# Patient Record
Sex: FEMALE | Race: WHITE | NOT HISPANIC OR LATINO | Employment: OTHER | URBAN - NONMETROPOLITAN AREA
[De-identification: names, ages, dates, MRNs, and addresses within clinical notes are randomized per-mention and may not be internally consistent; named-entity substitution may affect disease eponyms.]

---

## 2017-01-12 ENCOUNTER — TRANSFERRED RECORDS (OUTPATIENT)
Dept: HEALTH INFORMATION MANAGEMENT | Facility: HOSPITAL | Age: 57
End: 2017-01-12

## 2017-01-26 ENCOUNTER — TRANSFERRED RECORDS (OUTPATIENT)
Dept: HEALTH INFORMATION MANAGEMENT | Facility: HOSPITAL | Age: 57
End: 2017-01-26

## 2017-02-01 DIAGNOSIS — F33.9 RECURRENT MAJOR DEPRESSIVE DISORDER, REMISSION STATUS UNSPECIFIED (H): Primary | ICD-10-CM

## 2017-02-01 RX ORDER — PAROXETINE 20 MG/1
TABLET, FILM COATED ORAL
Qty: 90 TABLET | Refills: 0 | Status: SHIPPED | OUTPATIENT
Start: 2017-02-01 | End: 2017-05-02

## 2017-02-01 NOTE — TELEPHONE ENCOUNTER
Paxil 20mg tab     Last Written Prescription Date: 2-1-2016  Last Fill Quantity: 90, # refills: 0  Last Office Visit with [facility] [place] primary care provider:  12-   Next 5 appointments (look out 90 days)     Feb 09, 2017 11:30 AM   (Arrive by 11:15 AM)   SHORT with Henna Cruz MD   [facility] [place] ([facility] [place] [facility])    [address]  [facility] [address]   [phone]            Feb 20, 2017  8:15 AM   (Arrive by 8:00 AM)   SHORT with Henna Cruz MD   [facility] [place] ([facility] [place] [facility])    [address]  [place] [address]   [phone]                   Last PHQ-9 score on record=   PHQ-9 SCORE 10/14/2016   Total Score 4

## 2017-02-09 ENCOUNTER — OFFICE VISIT (OUTPATIENT)
Dept: FAMILY MEDICINE | Facility: OTHER | Age: 57
End: 2017-02-09
Attending: FAMILY MEDICINE
Payer: COMMERCIAL

## 2017-02-09 VITALS
DIASTOLIC BLOOD PRESSURE: 72 MMHG | HEART RATE: 68 BPM | SYSTOLIC BLOOD PRESSURE: 130 MMHG | OXYGEN SATURATION: 98 % | BODY MASS INDEX: 26.32 KG/M2 | RESPIRATION RATE: 20 BRPM | WEIGHT: 163 LBS

## 2017-02-09 DIAGNOSIS — M79.18 MYOFASCIAL PAIN: ICD-10-CM

## 2017-02-09 DIAGNOSIS — M54.2 CHRONIC NECK PAIN: ICD-10-CM

## 2017-02-09 DIAGNOSIS — G43.711 INTRACTABLE CHRONIC MIGRAINE WITHOUT AURA AND WITH STATUS MIGRAINOSUS: ICD-10-CM

## 2017-02-09 DIAGNOSIS — G89.29 CHRONIC NECK PAIN: ICD-10-CM

## 2017-02-09 DIAGNOSIS — Z71.89 ACP (ADVANCE CARE PLANNING): Chronic | ICD-10-CM

## 2017-02-09 DIAGNOSIS — G89.29 CHRONIC RADICULAR CERVICAL PAIN: Primary | ICD-10-CM

## 2017-02-09 DIAGNOSIS — M54.12 CHRONIC RADICULAR CERVICAL PAIN: Primary | ICD-10-CM

## 2017-02-09 PROBLEM — M75.50 SUBACROMIAL BURSITIS: Status: ACTIVE | Noted: 2017-01-26

## 2017-02-09 PROBLEM — M54.40 ACUTE BACK PAIN WITH SCIATICA: Status: ACTIVE | Noted: 2017-01-05

## 2017-02-09 PROBLEM — G43.709 CHRONIC MIGRAINE WITHOUT AURA WITHOUT STATUS MIGRAINOSUS, NOT INTRACTABLE: Status: ACTIVE | Noted: 2017-01-05

## 2017-02-09 PROBLEM — M54.10 RADICULAR PAIN: Status: ACTIVE | Noted: 2017-01-05

## 2017-02-09 PROCEDURE — 99214 OFFICE O/P EST MOD 30 MIN: CPT | Performed by: FAMILY MEDICINE

## 2017-02-09 RX ORDER — HYDROCODONE BITARTRATE AND ACETAMINOPHEN 7.5; 325 MG/1; MG/1
TABLET ORAL
Qty: 60 TABLET | Refills: 0 | Status: SHIPPED | OUTPATIENT
Start: 2017-02-09 | End: 2017-04-11

## 2017-02-09 RX ORDER — GABAPENTIN 300 MG/1
300 CAPSULE ORAL EVERY MORNING
COMMUNITY
End: 2017-04-20

## 2017-02-09 ASSESSMENT — PAIN SCALES - GENERAL: PAINLEVEL: MODERATE PAIN (4)

## 2017-02-09 NOTE — NURSING NOTE
"Chief Complaint   Patient presents with     Forms     Headache     *_* Health Care Directive *_*     declined       Initial /72 mmHg  Pulse 68  Resp 20  Wt 163 lb (73.936 kg)  SpO2 98% Estimated body mass index is 26.32 kg/(m^2) as calculated from the following:    Height as of 12/20/16: 5' 6\" (1.676 m).    Weight as of this encounter: 163 lb (73.936 kg).  Medication Reconciliation: complete   Liudmila Rivera      "

## 2017-02-09 NOTE — PROGRESS NOTES
[facility]    Sadaf Blankenship, 56 year old, female presents with   Chief Complaint   Patient presents with     Forms     forms need to be completed for disability for ongoing cervical radicular pain radiating down the right arm and recurrent severe migraine headaches. She has had 2 previous surgeries of the cervical spine for this pain. repetitive movement such as using a mouse or typing cause pain. She isn't able to lift more than 10# with this arm to due this causing exacerbation of pain. We have tried multiple modalities to treat this and she is now with the chronic pain program at [facility]. She has been receiving trigger point injections, the last of which wore off after 3 days. She continues there with treatment. She has ongoing neck pain as well related to this. She is unable to rotate her neck, flex, extend or bend the neck without pain. She had weakness of the right arm associated with her pain     Headache     headaches occur on average 2 times weekly requiring her to lie down for the remainder of the day. These are caused by stress, loud noises, repetitive noises and fatigue. She is receiving Botox injections for these headaches with Dr Guajardo also at the [facility] chronic pain program. The last given made her headaches worse. She was started on prednisone and had to wear her neck brace.      *_* Health Care Directive *_*     declined       PAST MEDICAL HISTORY:  Past Medical History   Diagnosis Date     Migraine headaches, severe 7/9/2001     sinusitis (chronic) 4/16/2001     Degenerative disc disease, cervical 1/1/2011     Esophageal ulcer 5/26/1998     Bleeding ulcer 5/26/1976     Recurrent UTI 5/26/2011     ASCUS on Pap smear 5/26/2004     negative HPV     Atypical chest pain 5/26/2008     negative cardiolite stress test [facility]     Cervical radicular pain 5/10/2012     Pseudoarthrosis of cervical spine 7/3/2012     Irritable bowel syndrome 1/26/2015       PAST SURGICAL  HISTORY:  Past Surgical History   Procedure Laterality Date     Posterior decompression , fusion c3-5       Psuedoarthrosis     Supracervical hysterectomy with right salpingoophorectomy       Endometriosis     Coronary angiogram, normal       Chest pain     Cervical spine surgery with removal of 2 disks, c5,c7              Fusion discectomy anterior cervical  2011     Nose surgery       x3     Ent surgery       nose surgery x3     Back surgery       cervical     Colonoscopy  10/13/2014     Dr Camargo, internal hemorrhoids     Hc esophagoscopy, diagnostic  10/31/2014     Dr Camargo, mild erythema of antrum, negative biopsies       SOCIAL HISTORY  Social History     Social History     Marital Status:      Spouse Name: N/A     Number of Children: N/A     Years of Education: N/A     Occupational History     architectual resources      Social History Main Topics     Smoking status: Former Smoker     Types: Cigarettes     Smokeless tobacco: Never Used      Comment: quit . no passive exposure     Alcohol Use: No     Drug Use: No     Sexual Activity: Not on file     Other Topics Concern     Blood Transfusions Yes     Caffeine Concern Yes     1 cup daily     Parent/Sibling W/ Cabg, Mi Or Angioplasty Before 65f 55m? Yes     Social History Narrative       MEDICATIONS:  Prior to Admission medications    Medication Sig Start Date End Date Taking? Authorizing Provider   gabapentin (NEURONTIN) 300 MG capsule Take 300 mg by mouth every morning   Yes Reported, Patient   order for DME Equipment being ordered: TENS   Yes Reported, Patient   HYDROcodone-acetaminophen (NORCO) 7.5-325 MG per tablet TAKE 1 TABLET BY MOUTH EVER Y 4 TO 6 HOURS AS NEEDED FO R PAIN 17  Yes Henna Cruz MD   PARoxetine (PAXIL) 20 MG tablet TAKE 1 TABLET (40MG) BY RASHAD TH AT BEDTIME 17  Yes Zoe Herbert MD   DEXILANT 60 MG CPDR CR capsule TAKE 1 CAPSULE (60 MG) BY MOUTH DAILY 16  Yes Henna Cruz MD  "  diazepam (VALIUM) 5 MG tablet TAKE 1 TABLET BY MOUTH EVER Y 6 HOURS AS NEEDED - GENER IC FOR VALIUM 12/20/16  Yes Henna Cruz MD   levofloxacin (LEVAQUIN) 500 MG tablet Take 1 tablet (500 mg) by mouth daily 12/20/16  Yes Henna Cruz MD   baclofen (LIORESAL) 10 MG tablet TAKE 1 TABLET BY MOUTH AT B EDTIME  Patient taking differently: take one tablet in the morning and one tablet at bedtime 12/5/16  Yes Henna Cruz MD   dexamethasone (DECADRON) 4 MG tablet Take 10 mg today, 8 mg tomorrow (11/21/16), 6 mg on 11/22/16 and 4 mg on 11/23/16 then discontinue 11/20/16  Yes Jessica Lux, NP   senna-docusate (SENOKOT-S;PERICOLACE) 8.6-50 MG per tablet Take 1-4 tablets by mouth 6/26/12  Yes Reported, Patient   Misc. Devices MISC Dispense one Cefaly neurostimulator and the electro stimulators with refills 6/13/16  Yes Reported, Patient   sucralfate (CARAFATE) 1 GM tablet Take 1 tablet (1 g) by mouth 4 times daily 9/13/16  Yes Henna Cruz MD   OMEPRAZOLE PO Take 40 mg by mouth 2 times daily   Yes Reported, Patient   topiramate (TOPAMAX) 25 MG tablet TAKE 2 TABLETS BY MOUTH EVERY MORNING & TAKE 2 TABLETS EVERY EVENING. GENERIC FOR TOPAMAX.  Patient taking differently: TAKE 1 TABLETS BY MOUTH EVERY MORNING & TAKE 2 TABLETS EVERY EVENING. GENERIC FOR TOPAMAX. 2/22/16  Yes Henna Cruz MD   GABAPENTIN PO Take 600 mg by mouth At Bedtime    Yes Reported, Patient   Probiotic Product (PROBIOTIC DAILY PO) Take by mouth 2 times daily   Yes Reported, Patient       ALLERGIES:     Allergies   Allergen Reactions     Aspirin Hives     Ibuprofen      Dye in the otc form causes headaches  \"patient states over the counter ibuprofen  bothers her\"     Lansoprazole Other (See Comments) and Visual Disturbance     Changes in eyesight  Prevacid  Change in eyesight     Penicillins Other (See Comments)     pruritis     Red Dye Hives     Bupropion Rash     Bupropion Hcl Hives and Rash     Wellbutrin     Demerol [Meperidine] " Other (See Comments)     Made migraine worse       ROS:  C: NEGATIVE for fever, chills, change in weight  I: NEGATIVE for worrisome rashes, moles or lesions  P: NEGATIVE for changes in mood or affect    EXAM:  /72 mmHg  Pulse 68  Resp 20  Wt 163 lb (73.936 kg)  SpO2 98% Body mass index is 26.32 kg/(m^2).   GENERAL APPEARANCE: alert, no distress and fatigued  ORTHO: Cervical Spine Exam: Inspection: increased cervical lordosis  Tender:  left paracervical muscles, right paracervical muscles, left trapezius muscles, right trapezius muscles, right much worse with significant spasm of the trapezius muscle and para spinous muscles  Non-tender:  none  Range of Motion:  flexion:  painful, 10 degrees, extension: painful, 0 degrees, left lateral bending: painful, 5 degrees, right lateral bending: painful, 10 degrees, left lateral rotation:  painful, 10 degrees, right lateral rotation:  painful, 10 degrees  Strength: flexion:  4/5, extension:  4/5, lateral bendin/5, lateral rotation:  4/5  SKIN: no suspicious lesions or rashes  NEURO: Normal strength and tone, mentation intact and speech normal  PSYCH: mentation appears normal, fatigued and in pain    LABS AND IMAGING:           ASSESSMENT/PLAN:  (M54.12,  G89.29) Chronic radicular cervical pain  (primary encounter diagnosis)  Comment: right   Plan: ongoing pain with weakness. Forms are completed for disablity    (M79.1) Myofascial pain  Comment:   Plan: trapezius and upper back, ongoing, attempting trigger point injections at the chronic pain center at [facility]    (M54.2,  G89.29) Chronic neck pain  Comment:   Plan: HYDROcodone-acetaminophen (NORCO) 7.5-325 MG         per tablet        Renewed for her    (G43.711) Intractable chronic migraine without aura and with status migrainosus  Comment:   Plan: last botox injection didn't work, caused rebound migraine and neck pain    (Z71.89) ACP (advance care planning)  Comment:   Plan: discussed per  staff      Henna Cruz MD  February 9, 2017

## 2017-02-23 ENCOUNTER — TRANSFERRED RECORDS (OUTPATIENT)
Dept: HEALTH INFORMATION MANAGEMENT | Facility: HOSPITAL | Age: 57
End: 2017-02-23

## 2017-02-23 ENCOUNTER — MEDICAL CORRESPONDENCE (OUTPATIENT)
Dept: HEALTH INFORMATION MANAGEMENT | Facility: HOSPITAL | Age: 57
End: 2017-02-23

## 2017-03-03 DIAGNOSIS — K22.10 ULCER OF ESOPHAGUS WITHOUT BLEEDING: ICD-10-CM

## 2017-03-06 RX ORDER — DEXLANSOPRAZOLE 60 MG/1
CAPSULE, DELAYED RELEASE ORAL
Qty: 30 CAPSULE | Refills: 5 | Status: SHIPPED | OUTPATIENT
Start: 2017-03-06 | End: 2017-07-11

## 2017-03-15 ENCOUNTER — OFFICE VISIT (OUTPATIENT)
Dept: FAMILY MEDICINE | Facility: OTHER | Age: 57
End: 2017-03-15
Attending: NURSE PRACTITIONER
Payer: COMMERCIAL

## 2017-03-15 VITALS
DIASTOLIC BLOOD PRESSURE: 86 MMHG | TEMPERATURE: 97.9 F | RESPIRATION RATE: 22 BRPM | WEIGHT: 157 LBS | OXYGEN SATURATION: 98 % | SYSTOLIC BLOOD PRESSURE: 110 MMHG | HEIGHT: 66 IN | BODY MASS INDEX: 25.23 KG/M2 | HEART RATE: 66 BPM

## 2017-03-15 DIAGNOSIS — R53.83 OTHER FATIGUE: ICD-10-CM

## 2017-03-15 DIAGNOSIS — J20.9 ACUTE BRONCHITIS WITH SYMPTOMS > 10 DAYS: Primary | ICD-10-CM

## 2017-03-15 LAB
FLUAV+FLUBV AG SPEC QL: NEGATIVE
FLUAV+FLUBV AG SPEC QL: NORMAL
SPECIMEN SOURCE: NORMAL

## 2017-03-15 PROCEDURE — 99213 OFFICE O/P EST LOW 20 MIN: CPT | Performed by: NURSE PRACTITIONER

## 2017-03-15 PROCEDURE — 87804 INFLUENZA ASSAY W/OPTIC: CPT | Mod: 59 | Performed by: NURSE PRACTITIONER

## 2017-03-15 RX ORDER — BACLOFEN 10 MG/1
10 TABLET ORAL
COMMUNITY
Start: 2017-01-26 | End: 2017-03-15

## 2017-03-15 RX ORDER — MULTIVIT-MIN/IRON/FA/VIT K/LUT 8MG-400MCG
1 TABLET ORAL DAILY
COMMUNITY
Start: 2017-01-06

## 2017-03-15 RX ORDER — TOPIRAMATE 50 MG/1
TABLET, FILM COATED ORAL
COMMUNITY
Start: 2017-01-03 | End: 2022-02-15 | Stop reason: DRUGHIGH

## 2017-03-15 RX ORDER — PREDNISONE 20 MG/1
TABLET ORAL
COMMUNITY
Start: 2017-02-01 | End: 2017-03-15

## 2017-03-15 RX ORDER — PAROXETINE 20 MG/1
20 TABLET, FILM COATED ORAL
COMMUNITY
End: 2017-03-15

## 2017-03-15 RX ORDER — GABAPENTIN 600 MG/1
TABLET ORAL
COMMUNITY
Start: 2017-01-31 | End: 2017-03-15

## 2017-03-15 RX ORDER — BENZONATATE 100 MG/1
100 CAPSULE ORAL 3 TIMES DAILY PRN
Qty: 42 CAPSULE | Refills: 0 | Status: SHIPPED | OUTPATIENT
Start: 2017-03-15 | End: 2017-07-11

## 2017-03-15 RX ORDER — DIAZEPAM 5 MG
5 TABLET ORAL
COMMUNITY
End: 2017-03-15

## 2017-03-15 RX ORDER — AMOXICILLIN AND CLAVULANATE POTASSIUM 500; 125 MG/1; MG/1
1 TABLET, FILM COATED ORAL 2 TIMES DAILY
Qty: 20 TABLET | Refills: 0 | Status: SHIPPED | OUTPATIENT
Start: 2017-03-15 | End: 2017-04-20

## 2017-03-15 ASSESSMENT — PAIN SCALES - GENERAL: PAINLEVEL: MODERATE PAIN (5)

## 2017-03-15 NOTE — PROGRESS NOTES
SUBJECTIVE:                                                    Sadaf Blankenship is a 56 year old female who presents to clinic today for the following health issues:      RESPIRATORY SYMPTOMS      Duration: one week    Description  nasal congestion and chest pressure and SOB    Severity: moderate    Accompanying signs and symptoms: h/a, no fever, chills, hx of uvla    History (predisposing factors):  none    Precipitating or alleviating factors: None    Therapies tried and outcome:  none           Problem list and histories reviewed & adjusted, as indicated.  Additional history: as documented    Patient Active Problem List   Diagnosis     Degenerative disc disease, cervical     Pseudoarthrosis of cervical spine     Cervical pain     Migraine     UTI (urinary tract infection)     Advanced care planning/counseling discussion     Thoracic sprain and strain     Sprain and strain of shoulder and upper arm     Irritable bowel syndrome     Myofascial pain syndrome, cervical     Depression, major     Chronic pain syndrome     Uvulitis     Chronic rhinitis     Madina bullosa     Chronic maxillary sinusitis     Hypertrophy of inferior nasal turbinate     Long uvula     Chronic neck pain     Chronic pain     Chronic tension-type headache, intractable     Migraine aura without headache     History of arthrodesis     Myofascial pain     Disuse syndrome     Intractable chronic migraine without aura and with status migrainosus     Ulcer of esophagus without bleeding     Gastroesophageal reflux disease with esophagitis     Intractable chronic migraine without aura and without status migrainosus     Ulcer of esophagus     Ulnar neuropathy     Chronic radicular cervical pain     Mild episode of recurrent major depressive disorder (H)     Ulnar neuropathy at elbow of left upper extremity     Lumbar radiculopathy     S/P cervical spinal fusion     ACP (advance care planning)     Acute back pain with sciatica     Chronic  migraine without aura without status migrainosus, not intractable     Radicular pain     Subacromial bursitis     Past Surgical History   Procedure Laterality Date     Posterior decompression , fusion c3-5       Psuedoarthrosis     Supracervical hysterectomy with right salpingoophorectomy       Endometriosis     Coronary angiogram, normal       Chest pain     Cervical spine surgery with removal of 2 disks, c5,c7              Fusion discectomy anterior cervical  2011     Nose surgery       x3     Ent surgery       nose surgery x3     Back surgery       cervical     Colonoscopy  10/13/2014     Dr Camargo, internal hemorrhoids     Hc esophagoscopy, diagnostic  10/31/2014     Dr Camargo, mild erythema of antrum, negative biopsies       Social History   Substance Use Topics     Smoking status: Former Smoker     Types: Cigarettes     Smokeless tobacco: Never Used      Comment: quit . no passive exposure     Alcohol use No     Family History   Problem Relation Age of Onset     CANCER Father      lung, also a heavy smoker     DIABETES Mother      HEART DISEASE Mother 58     MI; cause of death; heavy smoker. had first MI at 40     Coronary Artery Disease Mother      Hypertension Mother      CANCER Other      strong history     HEART DISEASE Other      heart disease     HEART DISEASE Brother      x3     Hypertension Brother      DIABETES Brother      Coronary Artery Disease Brother      Hypertension Sister          Current Outpatient Prescriptions   Medication Sig Dispense Refill     Cholecalciferol (VITAMIN D3) 1000 UNITS CAPS Take 1,000 Units by mouth       Multiple Vitamins-Minerals (CENTRUM SILVER ULTRA WOMENS) TABS        topiramate (TOPAMAX) 50 MG tablet TAKE 1 TABLET (50MG) BY MOUTH EVERY MORNING AND 2 TABLETS (100MG) ATNIGHT **NOTE DOSE INCREASE** -DO NOT CRUSH-       DEXILANT 60 MG CPDR CR capsule TAKE 1 CAPSULE (60 MG) BY MOUTH DAILY 30 capsule 5     gabapentin (NEURONTIN) 300 MG capsule Take  "300 mg by mouth every morning       order for DME Equipment being ordered: TENS       HYDROcodone-acetaminophen (NORCO) 7.5-325 MG per tablet TAKE 1 TABLET BY MOUTH EVER Y 4 TO 6 HOURS AS NEEDED FO R PAIN 60 tablet 0     PARoxetine (PAXIL) 20 MG tablet TAKE 1 TABLET (40MG) BY RASHAD TH AT BEDTIME 90 tablet 0     diazepam (VALIUM) 5 MG tablet TAKE 1 TABLET BY MOUTH EVER Y 6 HOURS AS NEEDED - GENER IC FOR VALIUM 100 tablet 0     senna-docusate (SENOKOT-S;PERICOLACE) 8.6-50 MG per tablet Take 1-4 tablets by mouth       Misc. Devices MISC Dispense one Cefaly neurostimulator and the electro stimulators with refills       sucralfate (CARAFATE) 1 GM tablet Take 1 tablet (1 g) by mouth 4 times daily 40 tablet 1     OMEPRAZOLE PO Take 40 mg by mouth 2 times daily       GABAPENTIN PO Take 600 mg by mouth At Bedtime        Probiotic Product (PROBIOTIC DAILY PO) Take by mouth 2 times daily       [DISCONTINUED] diazepam (VALIUM) 5 MG tablet Take 5 mg by mouth Reported on 3/15/2017       [DISCONTINUED] PARoxetine (PAXIL) 20 MG tablet Take 20 mg by mouth Reported on 3/15/2017       baclofen (LIORESAL) 10 MG tablet TAKE 1 TABLET BY MOUTH AT B EDTIME (Patient taking differently: take one tablet in the morning and one tablet at bedtime) 90 tablet 0     [DISCONTINUED] topiramate (TOPAMAX) 25 MG tablet TAKE 2 TABLETS BY MOUTH EVERY MORNING & TAKE 2 TABLETS EVERY EVENING. GENERIC FOR TOPAMAX. (Patient not taking: Reported on 3/15/2017) 360 tablet 1     Allergies   Allergen Reactions     Aspirin Hives     Ibuprofen      Dye in the otc form causes headaches  \"patient states over the counter ibuprofen  bothers her\"     Lansoprazole Other (See Comments) and Visual Disturbance     Changes in eyesight  Prevacid  Change in eyesight     Penicillins Other (See Comments)     pruritis     Red Dye Hives     Bupropion Rash     Bupropion Hcl Hives and Rash     Wellbutrin     Demerol [Meperidine] Other (See Comments)     Made migraine worse " "      Reviewed and updated as needed this visit by clinical staff  Tobacco  Meds  Med Hx  Surg Hx  Fam Hx  Soc Hx      Reviewed and updated as needed this visit by Provider         ROS:  CONSTITUTIONAL:arthralgias, chills, fatigue and malaise  ENT/MOUTH: ear pain bilateral, sinus pressure and tooth pain  RESP:cough-productive and SOB/dyspnea  CV: NEGATIVE for chest pain, palpitations or peripheral edema    OBJECTIVE:                                                    /86 (BP Location: Left arm, Patient Position: Chair, Cuff Size: Adult Regular)  Pulse 66  Temp 97.9  F (36.6  C) (Tympanic)  Resp 22  Ht 5' 6\" (1.676 m)  Wt 157 lb (71.2 kg)  SpO2 98%  BMI 25.34 kg/m2  Body mass index is 25.34 kg/(m^2).   GENERAL: alert and mild distress -slight SOB  HENT: ear canals and TM's normal, nose and mouth without ulcers or lesions  NECK: no adenopathy, no asymmetry, masses, or scars and thyroid normal to palpation  RESP: lungs clear to auscultation - no rales, rhonchi or wheezes  CV: regular rate and rhythm, normal S1 S2, no S3 or S4, no murmur, click or rub, no peripheral edema and peripheral pulses strong  SKIN: no suspicious lesions or rashes  PSYCH: mentation appears normal, affect normal/bright  LYMPH: normal ant/post cervical, supraclavicular nodes    Diagnostic Test Results:  Results for orders placed or performed in visit on 03/15/17 (from the past 24 hour(s))   Influenza A/B antigen   Result Value Ref Range    Influenza A/B Agn Specimen Nares     Influenza A Negative NEG    Influenza B  NEG     Negative   Test results must be correlated with clinical data. If necessary, results   should be confirmed by a molecular assay or viral culture.          ASSESSMENT:                                                        PLAN:                                                    ASSESSMENT / PLAN:  (J20.9) Acute bronchitis with symptoms > 10 days  (primary encounter diagnosis)  Comment:   Plan: "  amoxicillin-clavulanate (AUGMENTIN) 500-125 MG per tablet,  benzonatate (TESSALON) 100 MG capsule   Stay hydrated   rest            (R53.83) Other fatigue  Comment:   Plan:  Influenza A/B antigen - negative              Follow up if no improvement or worsening symptoms          Selene Diana, [facility]

## 2017-03-15 NOTE — PATIENT INSTRUCTIONS
Bronchitis, Antibiotic Treatment (Adult)    Bronchitis is an infection of the air passages (bronchial tubes) in your lungs. It often occurs when you have a cold. This illness is contagious during the first few days and is spread through the air by coughing and sneezing, or by direct contact (touching the sick person and then touching your own eyes, nose, or mouth).  Symptoms of bronchitis include cough with mucus (phlegm) and low-grade fever. Bronchitis usually lasts 7 to 14 days. Mild cases can be treated with simple home remedies. More severe infection is treated with an antibiotic.  Home care  Follow these guidelines when caring for yourself at home:    If your symptoms are severe, rest at home for the first 2 to 3 days. When you go back to your usual activities, don't let yourself get too tired.    Do not smoke. Also avoid being exposed to secondhand smoke.    You may use over-the-counter medicines to control fever or pain, unless another medicine was prescribed. (Note: If you have chronic liver or kidney disease or have ever had a stomach ulcer or gastrointestinal bleeding, talk with your healthcare provider before using these medicines. Also talk to your provider if you are taking medicine to prevent blood clots.) Aspirin should never be given to anyone younger than 18 years of age who is ill with a viral infection or fever. It may cause severe liver or brain damage.    Your appetite may be poor, so a light diet is fine. Avoid dehydration by drinking 6 to 8 glasses of fluids per day (such as water, soft drinks, sports drinks, juices, tea, or soup). Extra fluids will help loosen secretions in the nose and lungs.    Over-the-counter cough, cold, and sore-throat medicines will not shorten the length of the illness, but they may be helpful to reduce symptoms. (Note: Do not use decongestants if you have high blood pressure.)    Finish all antibiotic medicine. Do this even if you are feeling better after only a  few days.  Follow-up care  Follow up with your healthcare provider, or as advised. If you had an X-ray or ECG (electrocardiogram), a specialist will review it. You will be notified of any new findings that may affect your care.  Note: If you are age 65 or older, or if you have a chronic lung disease or condition that affects your immune system, or you smoke, talk to your healthcare provider about having pneumococcal vaccinations and a yearly influenza vaccination (flu shot).  When to seek medical advice  Call your healthcare provider right away if any of these occur:    Fever of 100.4 F (38 C) or higher    Coughing up increased amounts of colored sputum    Weakness, drowsiness, headache, facial pain, ear pain, or a stiff neck   Call 911, or get immediate medical care  Contact emergency services right away if any of these occur.    Coughing up blood    Worsening weakness, drowsiness, headache, or stiff neck    Trouble breathing, wheezing, or pain with breathing    [id number] [employer]. [address] [place] [address], [place] [address]. All rights reserved. This information is not intended as a substitute for professional medical care. Always follow your healthcare professional's instructions.

## 2017-03-15 NOTE — NURSING NOTE
"Chief Complaint   Patient presents with     URI     one week.  Taking care of her brother who is one week postop open heart and current incision infection       Initial /86 (BP Location: Left arm, Patient Position: Chair, Cuff Size: Adult Regular)  Pulse 66  Temp 97.9  F (36.6  C) (Tympanic)  Resp 22  Ht 5' 6\" (1.676 m)  Wt 157 lb (71.2 kg)  SpO2 98%  BMI 25.34 kg/m2 Estimated body mass index is 25.34 kg/(m^2) as calculated from the following:    Height as of this encounter: 5' 6\" (1.676 m).    Weight as of this encounter: 157 lb (71.2 kg).  Medication Reconciliation: complete   Megha Lakhani      "

## 2017-03-15 NOTE — MR AVS SNAPSHOT
After Visit Summary   3/15/2017    Sadaf Blankenship    MRN: [id number]           Patient Information     Date Of Birth          1960        Visit Information        Provider Department      3/15/2017 3:00 PM Selene Diana APRN [facility]        Today's Diagnoses     Acute bronchitis with symptoms > 10 days    -  1    Other fatigue          Care Instructions      Bronchitis, Antibiotic Treatment (Adult)    Bronchitis is an infection of the air passages (bronchial tubes) in your lungs. It often occurs when you have a cold. This illness is contagious during the first few days and is spread through the air by coughing and sneezing, or by direct contact (touching the sick person and then touching your own eyes, nose, or mouth).  Symptoms of bronchitis include cough with mucus (phlegm) and low-grade fever. Bronchitis usually lasts 7 to 14 days. Mild cases can be treated with simple home remedies. More severe infection is treated with an antibiotic.  Home care  Follow these guidelines when caring for yourself at home:    If your symptoms are severe, rest at home for the first 2 to 3 days. When you go back to your usual activities, don't let yourself get too tired.    Do not smoke. Also avoid being exposed to secondhand smoke.    You may use over-the-counter medicines to control fever or pain, unless another medicine was prescribed. (Note: If you have chronic liver or kidney disease or have ever had a stomach ulcer or gastrointestinal bleeding, talk with your healthcare provider before using these medicines. Also talk to your provider if you are taking medicine to prevent blood clots.) Aspirin should never be given to anyone younger than 18 years of age who is ill with a viral infection or fever. It may cause severe liver or brain damage.    Your appetite may be poor, so a light diet is fine. Avoid dehydration by drinking 6 to 8 glasses of fluids per day (such as  water, soft drinks, sports drinks, juices, tea, or soup). Extra fluids will help loosen secretions in the nose and lungs.    Over-the-counter cough, cold, and sore-throat medicines will not shorten the length of the illness, but they may be helpful to reduce symptoms. (Note: Do not use decongestants if you have high blood pressure.)    Finish all antibiotic medicine. Do this even if you are feeling better after only a few days.  Follow-up care  Follow up with your healthcare provider, or as advised. If you had an X-ray or ECG (electrocardiogram), a specialist will review it. You will be notified of any new findings that may affect your care.  Note: If you are age 65 or older, or if you have a chronic lung disease or condition that affects your immune system, or you smoke, talk to your healthcare provider about having pneumococcal vaccinations and a yearly influenza vaccination (flu shot).  When to seek medical advice  Call your healthcare provider right away if any of these occur:    Fever of 100.4 F (38 C) or higher    Coughing up increased amounts of colored sputum    Weakness, drowsiness, headache, facial pain, ear pain, or a stiff neck   Call 911, or get immediate medical care  Contact emergency services right away if any of these occur.    Coughing up blood    Worsening weakness, drowsiness, headache, or stiff neck    Trouble breathing, wheezing, or pain with breathing    9571-9990 [employer]. [address] [place] [address]. All rights reserved. This information is not intended as a substitute for professional medical care. Always follow your healthcare professional's instructions.              Follow-ups after your visit        Your next 10 appointments already scheduled     Apr 20, 2017 10:30 AM CDT   (Arrive by 10:15 AM)   SHORT with Henna Cruz MD   [facility] ([facility])    [facility] [place] [address]   [phone]              Who to contact   "   If you have questions or need follow up information about today's clinic visit or your schedule please contact [facility] [place] directly at [phone].  Normal or non-critical lab and imaging results will be communicated to you by MyChart, letter or phone within 4 business days after the clinic has received the results. If you do not hear from us within 7 days, please contact the clinic through Allocadehart or phone. If you have a critical or abnormal lab result, we will notify you by phone as soon as possible.  Submit refill requests through O-film or call your pharmacy and they will forward the refill request to us. Please allow 3 business days for your refill to be completed.          Additional Information About Your Visit        AllocadeharGreetz Information     O-film lets you send messages to your doctor, view your test results, renew your prescriptions, schedule appointments and more. To sign up, go to [email or web address] . Click on \"Log in\" on the left side of the screen, which will take you to the Welcome page. Then click on \"Sign up Now\" on the right side of the page.     You will be asked to enter the access code listed below, as well as some personal information. Please follow the directions to create your username and password.     Your access code is: [email or web address]  Expires: 3/20/2017 10:16 AM     Your access code will  in 90 days. If you need help or a new code, please call your [facility] clinic or [phone].        Care EveryWhere ID     This is your Care EveryWhere ID. This could be used by other organizations to access your [facility] medical records  [id number]        Your Vitals Were     Pulse Temperature Respirations Height Pulse Oximetry BMI (Body Mass Index)    66 97.9  F (36.6  C) (Tympanic) 22 5' 6\" (1.676 m) 98% 25.34 kg/m2       Blood Pressure from Last 3 Encounters:   03/15/17 110/86   17 130/72   16 118/76    Weight from Last 3 Encounters:   03/15/17 157 lb (71.2 " kg)   02/09/17 163 lb (73.9 kg)   12/20/16 163 lb (73.9 kg)              We Performed the Following     Influenza A/B antigen          Today's Medication Changes          These changes are accurate as of: 3/15/17  4:18 PM.  If you have any questions, ask your nurse or doctor.               Start taking these medicines.        Dose/Directions    amoxicillin-clavulanate 500-125 MG per tablet   Commonly known as:  AUGMENTIN   Used for:  Acute bronchitis with symptoms > 10 days   Started by:  Selene Diana APRN CNP        Dose:  1 tablet   Take 1 tablet by mouth 2 times daily   Quantity:  20 tablet   Refills:  0       benzonatate 100 MG capsule   Commonly known as:  TESSALON   Used for:  Acute bronchitis with symptoms > 10 days   Started by:  Selene Diana APRN CNP        Dose:  100 mg   Take 1 capsule (100 mg) by mouth 3 times daily as needed for cough   Quantity:  42 capsule   Refills:  0         These medicines have changed or have updated prescriptions.        Dose/Directions    baclofen 10 MG tablet   Commonly known as:  LIORESAL   This may have changed:  See the new instructions.   Used for:  Back muscle spasm        TAKE 1 TABLET BY MOUTH AT B EDTIME   Quantity:  90 tablet   Refills:  0            Where to get your medicines      These medications were sent to [id number] - [place], [place] - [address]  [address] [place] [address] [address]     Phone:  [phone]     amoxicillin-clavulanate 500-125 MG per tablet    benzonatate 100 MG capsule                Primary Care Provider Office Phone # Fax #    Henna Cruz [phone]       [facility] [place] [address] [facility]  [place] [address]        Thank you!     Thank you for choosing [facility] [place]  for your care. Our goal is always to provide you with excellent care. Hearing back from our patients is one way we can continue to improve our services. Please take a few minutes to complete the written  survey that you may receive in the mail after your visit with us. Thank you!             Your Updated Medication List - Protect others around you: Learn how to safely use, store and throw away your medicines at www.disposemymeds.org.          This list is accurate as of: 3/15/17  4:18 PM.  Always use your most recent med list.                   Brand Name Dispense Instructions for use    amoxicillin-clavulanate 500-125 MG per tablet    AUGMENTIN    20 tablet    Take 1 tablet by mouth 2 times daily       baclofen 10 MG tablet    LIORESAL    90 tablet    TAKE 1 TABLET BY MOUTH AT B EDTIME       benzonatate 100 MG capsule    TESSALON    42 capsule    Take 1 capsule (100 mg) by mouth 3 times daily as needed for cough       CENTRUM SILVER ULTRA WOMENS Tabs          DEXILANT 60 MG Cpdr CR capsule   Generic drug:  dexlansoprazole     30 capsule    TAKE 1 CAPSULE (60 MG) BY MOUTH DAILY       diazepam 5 MG tablet    VALIUM    100 tablet    TAKE 1 TABLET BY MOUTH EVER Y 6 HOURS AS NEEDED - GENER IC FOR VALIUM       * GABAPENTIN PO      Take 600 mg by mouth At Bedtime       * gabapentin 300 MG capsule    NEURONTIN     Take 300 mg by mouth every morning       HYDROcodone-acetaminophen 7.5-325 MG per tablet    NORCO    60 tablet    TAKE 1 TABLET BY MOUTH EVER Y 4 TO 6 HOURS AS NEEDED FO R PAIN       Misc. Devices Misc      Dispense one Cefaly neurostimulator and the electro stimulators with refills       OMEPRAZOLE PO      Take 40 mg by mouth 2 times daily       order for DME      Equipment being ordered: TENS       PARoxetine 20 MG tablet    PAXIL    90 tablet    TAKE 1 TABLET (40MG) BY RASHAD TH AT BEDTIME       PROBIOTIC DAILY PO      Take by mouth 2 times daily       senna-docusate 8.6-50 MG per tablet    SENOKOT-S;PERICOLACE     Take 1-4 tablets by mouth       sucralfate 1 GM tablet    CARAFATE    40 tablet    Take 1 tablet (1 g) by mouth 4 times daily       topiramate 50 MG tablet    TOPAMAX     TAKE 1 TABLET (50MG) BY MOUTH  EVERY MORNING AND 2 TABLETS (100MG) ATNIGHT **NOTE DOSE INCREASE** -DO NOT CRUSH-       vitamin D3 1000 UNITS Caps      Take 1,000 Units by mouth       * Notice:  This list has 2 medication(s) that are the same as other medications prescribed for you. Read the directions carefully, and ask your doctor or other care provider to review them with you.

## 2017-04-02 DIAGNOSIS — K21.00 GASTROESOPHAGEAL REFLUX DISEASE WITH ESOPHAGITIS: ICD-10-CM

## 2017-04-04 NOTE — TELEPHONE ENCOUNTER
Prilosec      Last Written Prescription Date: 9/13/16  Last Fill Quantity: 90,  # refills: 1   Last Office Visit with [facility], [facility] or [facility] prescribing provider: 3/15/16                                         Next 5 appointments (look out 90 days)     Apr 20, 2017 10:30 AM CDT   (Arrive by 10:15 AM)   SHORT with Henna Cruz MD   [facility] ([facility])    [address] [place] [address] [facility] [address]   [phone]

## 2017-04-05 RX ORDER — OMEPRAZOLE 40 MG/1
CAPSULE, DELAYED RELEASE ORAL
Qty: 30 CAPSULE | Refills: 10 | Status: SHIPPED | OUTPATIENT
Start: 2017-04-05 | End: 2017-09-21

## 2017-04-06 ENCOUNTER — TRANSFERRED RECORDS (OUTPATIENT)
Dept: HEALTH INFORMATION MANAGEMENT | Facility: HOSPITAL | Age: 57
End: 2017-04-06

## 2017-04-11 DIAGNOSIS — M54.2 CHRONIC NECK PAIN: ICD-10-CM

## 2017-04-11 DIAGNOSIS — G89.29 CHRONIC NECK PAIN: ICD-10-CM

## 2017-04-11 NOTE — TELEPHONE ENCOUNTER
blueco      Last Written Prescription Date: 2/9/17  Last Fill Quantity: 60,  # refills: 0   Last Office Visit with G, [facility] or [facility] prescribing provider: 3/15/17                                         Next 5 appointments (look out 90 days)     Apr 20, 2017 10:30 AM CDT   (Arrive by 10:15 AM)   SHORT with Henna Cruz MD   [facility] [place] [facility] [place] [facility]    [address] [place] [address] [place]  [place] [address]   [phone]

## 2017-04-12 RX ORDER — HYDROCODONE BITARTRATE AND ACETAMINOPHEN 7.5; 325 MG/1; MG/1
TABLET ORAL
Qty: 60 TABLET | Refills: 0 | Status: SHIPPED | OUTPATIENT
Start: 2017-04-12 | End: 2017-07-11

## 2017-04-12 NOTE — TELEPHONE ENCOUNTER
Pt notified that the written RX is ready at the [facility] [place]  registration to be picked up.

## 2017-04-20 ENCOUNTER — OFFICE VISIT (OUTPATIENT)
Dept: FAMILY MEDICINE | Facility: OTHER | Age: 57
End: 2017-04-20
Attending: FAMILY MEDICINE
Payer: COMMERCIAL

## 2017-04-20 VITALS
BODY MASS INDEX: 25.23 KG/M2 | OXYGEN SATURATION: 98 % | HEIGHT: 66 IN | HEART RATE: 65 BPM | RESPIRATION RATE: 20 BRPM | DIASTOLIC BLOOD PRESSURE: 68 MMHG | WEIGHT: 157 LBS | SYSTOLIC BLOOD PRESSURE: 112 MMHG

## 2017-04-20 DIAGNOSIS — R07.89 OTHER CHEST PAIN: ICD-10-CM

## 2017-04-20 DIAGNOSIS — G43.711 INTRACTABLE CHRONIC MIGRAINE WITHOUT AURA AND WITH STATUS MIGRAINOSUS: ICD-10-CM

## 2017-04-20 DIAGNOSIS — G89.29 CHRONIC RADICULAR CERVICAL PAIN: Primary | ICD-10-CM

## 2017-04-20 DIAGNOSIS — M54.12 CHRONIC RADICULAR CERVICAL PAIN: Primary | ICD-10-CM

## 2017-04-20 LAB
BUN SERPL-MCNC: 10 MG/DL (ref 7–30)
CREAT SERPL-MCNC: 1.03 MG/DL (ref 0.52–1.04)
GFR SERPL CREATININE-BSD FRML MDRD: 55 ML/MIN/1.7M2

## 2017-04-20 PROCEDURE — 82565 ASSAY OF CREATININE: CPT | Performed by: FAMILY MEDICINE

## 2017-04-20 PROCEDURE — 84520 ASSAY OF UREA NITROGEN: CPT | Performed by: FAMILY MEDICINE

## 2017-04-20 PROCEDURE — 99214 OFFICE O/P EST MOD 30 MIN: CPT | Performed by: FAMILY MEDICINE

## 2017-04-20 PROCEDURE — 36415 COLL VENOUS BLD VENIPUNCTURE: CPT | Performed by: FAMILY MEDICINE

## 2017-04-20 RX ORDER — GABAPENTIN 300 MG/1
600 CAPSULE ORAL EVERY MORNING
Qty: 90 CAPSULE | Refills: 0 | COMMUNITY
Start: 2017-04-20 | End: 2017-11-27 | Stop reason: ALTCHOICE

## 2017-04-20 RX ORDER — RIZATRIPTAN BENZOATE 10 MG/1
10 TABLET ORAL
COMMUNITY
End: 2018-02-26

## 2017-04-20 RX ORDER — ONDANSETRON 4 MG/1
4 TABLET, ORALLY DISINTEGRATING ORAL EVERY 8 HOURS PRN
COMMUNITY
End: 2017-11-27

## 2017-04-20 ASSESSMENT — ANXIETY QUESTIONNAIRES
IF YOU CHECKED OFF ANY PROBLEMS ON THIS QUESTIONNAIRE, HOW DIFFICULT HAVE THESE PROBLEMS MADE IT FOR YOU TO DO YOUR WORK, TAKE CARE OF THINGS AT HOME, OR GET ALONG WITH OTHER PEOPLE: NOT DIFFICULT AT ALL
1. FEELING NERVOUS, ANXIOUS, OR ON EDGE: MORE THAN HALF THE DAYS
GAD7 TOTAL SCORE: 9
6. BECOMING EASILY ANNOYED OR IRRITABLE: SEVERAL DAYS
5. BEING SO RESTLESS THAT IT IS HARD TO SIT STILL: SEVERAL DAYS
2. NOT BEING ABLE TO STOP OR CONTROL WORRYING: SEVERAL DAYS
7. FEELING AFRAID AS IF SOMETHING AWFUL MIGHT HAPPEN: NOT AT ALL
3. WORRYING TOO MUCH ABOUT DIFFERENT THINGS: MORE THAN HALF THE DAYS

## 2017-04-20 ASSESSMENT — PATIENT HEALTH QUESTIONNAIRE - PHQ9: 5. POOR APPETITE OR OVEREATING: MORE THAN HALF THE DAYS

## 2017-04-20 ASSESSMENT — PAIN SCALES - GENERAL: PAINLEVEL: EXTREME PAIN (8)

## 2017-04-20 NOTE — NURSING NOTE
"Chief Complaint   Patient presents with     Neck Pain     follow up on chronic neck pain. Pain is high today       Initial /68  Pulse 65  Resp 20  Ht 5' 6\" (1.676 m)  Wt 157 lb (71.2 kg)  SpO2 98%  BMI 25.34 kg/m2 Estimated body mass index is 25.34 kg/(m^2) as calculated from the following:    Height as of this encounter: 5' 6\" (1.676 m).    Weight as of this encounter: 157 lb (71.2 kg).  Medication Reconciliation: complete   Liudmila Rivera      "

## 2017-04-20 NOTE — MR AVS SNAPSHOT
"              After Visit Summary   4/20/2017    Sadaf Blankenship    MRN: [id number]           Patient Information     Date Of Birth          1960        Visit Information        Provider Department      4/20/2017 10:30 AM Henna Cruz MD [facility] [place]        Today's Diagnoses     Intractable chronic migraine without aura and with status migrainosus    -  1    Chronic migraine without aura without status migrainosus, not intractable        Other chest pain           Follow-ups after your visit        Follow-up notes from your care team     Return in about 2 months (around 6/20/2017).      Your next 10 appointments already scheduled     Jul 11, 2017 10:45 AM CDT   (Arrive by 10:30 AM)   SHORT with Henna Cruz MD   [facility] ([facility] [place] [facility])    [address]  [place] [address]   [phone]              Who to contact     If you have questions or need follow up information about today's clinic visit or your schedule please contact [facility] at [phone].  Normal or non-critical lab and imaging results will be communicated to you by MyChart, letter or phone within 4 business days after the clinic has received the results. If you do not hear from us within 7 days, please contact the clinic through MyChart or phone. If you have a critical or abnormal lab result, we will notify you by phone as soon as possible.  Submit refill requests through BUILD or call your pharmacy and they will forward the refill request to us. Please allow 3 business days for your refill to be completed.          Additional Information About Your Visit        Eagle Alphahart Information     BUILD lets you send messages to your doctor, view your test results, renew your prescriptions, schedule appointments and more. To sign up, go to [email or web address] . Click on \"Log in\" on the left side of the screen, which will take you to the Welcome page. Then click on \"Sign up " "Now\" on the right side of the page.     You will be asked to enter the access code listed below, as well as some personal information. Please follow the directions to create your username and password.     Your access code is: [email or web address]  Expires: 2017 11:09 AM     Your access code will  in 90 days. If you need help or a new code, please call your [facility] clinic or [phone].        Care EveryWhere ID     This is your Care EveryWhere ID. This could be used by other organizations to access your [facility] medical records  [id number]        Your Vitals Were     Pulse Respirations Height Pulse Oximetry BMI (Body Mass Index)       65 20 5' 6\" (1.676 m) 98% 25.34 kg/m2        Blood Pressure from Last 3 Encounters:   17 112/68   03/15/17 110/86   17 130/72    Weight from Last 3 Encounters:   17 157 lb (71.2 kg)   03/15/17 157 lb (71.2 kg)   17 163 lb (73.9 kg)              We Performed the Following     Creatinine     CT Chest w Contrast     Urea nitrogen          Today's Medication Changes          These changes are accurate as of: 17 11:12 AM.  If you have any questions, ask your nurse or doctor.               These medicines have changed or have updated prescriptions.        Dose/Directions    baclofen 10 MG tablet   Commonly known as:  LIORESAL   This may have changed:  See the new instructions.   Used for:  Back muscle spasm        TAKE 1 TABLET BY MOUTH AT B EDTIME   Quantity:  90 tablet   Refills:  0       gabapentin 300 MG capsule   Commonly known as:  NEURONTIN   This may have changed:    - how much to take  - Another medication with the same name was removed. Continue taking this medication, and follow the directions you see here.   Changed by:  Henna Cruz MD        Dose:  600 mg   Take 2 capsules (600 mg) by mouth every morning   Quantity:  90 capsule   Refills:  0                Primary Care Provider Office Phone # Fax #    Henna Cruz [phone] " [phone]       [facility] [place] [address]  [place] [address]        Thank you!     Thank you for choosing [facility]  for your care. Our goal is always to provide you with excellent care. Hearing back from our patients is one way we can continue to improve our services. Please take a few minutes to complete the written survey that you may receive in the mail after your visit with us. Thank you!             Your Updated Medication List - Protect others around you: Learn how to safely use, store and throw away your medicines at www.disposemymeds.org.          This list is accurate as of: 4/20/17 11:12 AM.  Always use your most recent med list.                   Brand Name Dispense Instructions for use    baclofen 10 MG tablet    LIORESAL    90 tablet    TAKE 1 TABLET BY MOUTH AT B EDTIME       benzonatate 100 MG capsule    TESSALON    42 capsule    Take 1 capsule (100 mg) by mouth 3 times daily as needed for cough       CENTRUM SILVER ULTRA WOMENS Tabs          DEXILANT 60 MG Cpdr CR capsule   Generic drug:  dexlansoprazole     30 capsule    TAKE 1 CAPSULE (60 MG) BY MOUTH DAILY       diazepam 5 MG tablet    VALIUM    100 tablet    TAKE 1 TABLET BY MOUTH EVER Y 6 HOURS AS NEEDED - GENER IC FOR VALIUM       gabapentin 300 MG capsule    NEURONTIN    90 capsule    Take 2 capsules (600 mg) by mouth every morning       HYDROcodone-acetaminophen 7.5-325 MG per tablet    NORCO    60 tablet    TAKE 1 TABLET BY MOUTH EVER Y 4 TO 6 HOURS AS NEEDED FO R PAIN       MAXALT 10 MG tablet   Generic drug:  rizatriptan      Take 10 mg by mouth at onset of headache for migraine       Misc. Devices Misc      Dispense one Cefaly neurostimulator and the electro stimulators with refills       * OMEPRAZOLE PO      Take 40 mg by mouth 2 times daily       * omeprazole 40 MG capsule    priLOSEC    30 capsule    TAKE 1 CAPSULE (40 MG) BY MOUTH DAILY TAKE 30-60 MINUTES BEFORE A MEAL.       ondansetron 4 MG ODT tab     ZOFRAN-ODT     Take 4 mg by mouth every 8 hours as needed for nausea       order for DME      Equipment being ordered: TENS       PARoxetine 20 MG tablet    PAXIL    90 tablet    TAKE 1 TABLET (40MG) BY RASHAD TH AT BEDTIME       PROBIOTIC DAILY PO      Take by mouth 2 times daily       senna-docusate 8.6-50 MG per tablet    SENOKOT-S;PERICOLACE     Take 1-4 tablets by mouth       sucralfate 1 GM tablet    CARAFATE    40 tablet    Take 1 tablet (1 g) by mouth 4 times daily       topiramate 50 MG tablet    TOPAMAX     Take 2 tablets in the morning and 2 tablets at night       vitamin D3 1000 UNITS Caps      Take 1,000 Units by mouth       * Notice:  This list has 2 medication(s) that are the same as other medications prescribed for you. Read the directions carefully, and ask your doctor or other care provider to review them with you.

## 2017-04-21 ENCOUNTER — HOSPITAL ENCOUNTER (OUTPATIENT)
Dept: CT IMAGING | Facility: HOSPITAL | Age: 57
Discharge: HOME | End: 2017-04-21
Attending: FAMILY MEDICINE | Admitting: FAMILY MEDICINE
Payer: COMMERCIAL

## 2017-04-21 PROCEDURE — 71260 CT THORAX DX C+: CPT | Mod: TC

## 2017-04-21 RX ORDER — IOPAMIDOL 612 MG/ML
100 INJECTION, SOLUTION INTRAVASCULAR ONCE
Status: COMPLETED | OUTPATIENT
Start: 2017-04-21 | End: 2017-04-21

## 2017-04-21 RX ADMIN — IOPAMIDOL 100 ML: 612 INJECTION, SOLUTION INTRAVASCULAR at 10:02

## 2017-04-21 ASSESSMENT — ANXIETY QUESTIONNAIRES: GAD7 TOTAL SCORE: 9

## 2017-04-21 ASSESSMENT — PATIENT HEALTH QUESTIONNAIRE - PHQ9: SUM OF ALL RESPONSES TO PHQ QUESTIONS 1-9: 5

## 2017-04-21 NOTE — PROGRESS NOTES
[facility]    Sadaf MCMAHAN Latendresseduar, 56 year old, female presents with   Chief Complaint   Patient presents with     Neck Pain     follow up on chronic neck pain. Pain is elevaed today. She is working [facility] and just had her Topamax increased along with Gabapentin to 600 mg bid and baclafen to 10 mg tid. . She is using Maxalt  with Zofran for migraines which hasn't worked in the past but she is trying again. She is concerned with her right shoulder pain radiating into her right chest as her father had similar pain which was treated but turned out to be lung cancer. She is a former smoker. No cough. Her last Botox shot worked too well causing neck weakness where she had to wear her neck brace for a time. She didn't feel it helped the pain.        PAST MEDICAL HISTORY:  Past Medical History:   Diagnosis Date     ASCUS on Pap smear 2004    negative HPV     Atypical chest pain 2008    negative cardiolite stress test [facility]     Bleeding ulcer 1976     Cervical radicular pain 5/10/2012     Degenerative disc disease, cervical 2011     Esophageal ulcer 1998     Irritable bowel syndrome 2015     Migraine headaches, severe 2001     Pseudoarthrosis of cervical spine 7/3/2012     Recurrent UTI 2011     sinusitis (chronic) 2001       PAST SURGICAL HISTORY:  Past Surgical History:   Procedure Laterality Date     BACK SURGERY      cervical     Cervical spine surgery with removal of 2 disks, C5,C7       COLONOSCOPY  10/13/2014    Dr Camargo, internal hemorrhoids     Coronary angiogram, normal      Chest pain     ENT SURGERY      nose surgery x3     fusion discectomy anterior cervical       HC ESOPHAGOSCOPY, DIAGNOSTIC  10/31/2014    Dr Camargo, mild erythema of antrum, negative biopsies     NOSE SURGERY      x3            Posterior decompression , fusion C3-5      Psuedoarthrosis     Supracervical hysterectomy with right  salpingoophorectomy  2002    Endometriosis       SOCIAL HISTORY  Social History     Social History     Marital status:      Spouse name: N/A     Number of children: N/A     Years of education: N/A     Occupational History     architectual resources      Social History Main Topics     Smoking status: Former Smoker     Types: Cigarettes     Smokeless tobacco: Never Used      Comment: quit 2002. no passive exposure     Alcohol use No     Drug use: No     Sexual activity: Not on file     Other Topics Concern     Blood Transfusions Yes     Caffeine Concern Yes     1 cup daily     Parent/Sibling W/ Cabg, Mi Or Angioplasty Before 65f 55m? Yes     Social History Narrative       MEDICATIONS:  Prior to Admission medications    Medication Sig Start Date End Date Taking? Authorizing Provider   ondansetron (ZOFRAN-ODT) 4 MG ODT tab Take 4 mg by mouth every 8 hours as needed for nausea   Yes Reported, Patient   rizatriptan (MAXALT) 10 MG tablet Take 10 mg by mouth at onset of headache for migraine   Yes Reported, Patient   gabapentin (NEURONTIN) 300 MG capsule Take 2 capsules (600 mg) by mouth every morning 4/20/17  Yes Henna Cruz MD   HYDROcodone-acetaminophen (NORCO) 7.5-325 MG per tablet TAKE 1 TABLET BY MOUTH EVER Y 4 TO 6 HOURS AS NEEDED FO R PAIN 4/12/17  Yes Anthony Hays MD   omeprazole (PRILOSEC) 40 MG capsule TAKE 1 CAPSULE (40 MG) BY MOUTH DAILY TAKE 30-60 MINUTES BEFORE A MEAL. 4/5/17  Yes Paula Leary PA   Cholecalciferol (VITAMIN D3) 1000 UNITS CAPS Take 1,000 Units by mouth 1/6/17  Yes Reported, Patient   Multiple Vitamins-Minerals (CENTRUM SILVER ULTRA WOMENS) TABS  1/6/17  Yes Reported, Patient   topiramate (TOPAMAX) 50 MG tablet Take 2 tablets in the morning and 2 tablets at night 1/3/17  Yes Reported, Patient   benzonatate (TESSALON) 100 MG capsule Take 1 capsule (100 mg) by mouth 3 times daily as needed for cough 3/15/17  Yes Selene Diana APRN CNP   DEXILANT 60 MG CPDR CR  "capsule TAKE 1 CAPSULE (60 MG) BY MOUTH DAILY 3/6/17  Yes Henna Cruz MD   order for DME Equipment being ordered: TENS   Yes Reported, Patient   PARoxetine (PAXIL) 20 MG tablet TAKE 1 TABLET (40MG) BY RASHAD TH AT BEDTIME 2/1/17  Yes Zoe Herbert MD   diazepam (VALIUM) 5 MG tablet TAKE 1 TABLET BY MOUTH EVER Y 6 HOURS AS NEEDED - GENER IC FOR VALIUM 12/20/16  Yes Henna Cruz MD   baclofen (LIORESAL) 10 MG tablet TAKE 1 TABLET BY MOUTH AT B EDTIME  Patient taking differently: take one tablet in the morning and one tablet at bedtime 12/5/16  Yes Henna Cruz MD   senna-docusate (SENOKOT-S;PERICOLACE) 8.6-50 MG per tablet Take 1-4 tablets by mouth 6/26/12  Yes Reported, Patient   Misc. Devices MISC Dispense one Cefaly neurostimulator and the electro stimulators with refills 6/13/16  Yes Reported, Patient   sucralfate (CARAFATE) 1 GM tablet Take 1 tablet (1 g) by mouth 4 times daily 9/13/16  Yes Henna Cruz MD   OMEPRAZOLE PO Take 40 mg by mouth 2 times daily   Yes Reported, Patient   Probiotic Product (PROBIOTIC DAILY PO) Take by mouth 2 times daily   Yes Reported, Patient       ALLERGIES:     Allergies   Allergen Reactions     Aspirin Hives     Ibuprofen      Dye in the otc form causes headaches  \"patient states over the counter ibuprofen  bothers her\"     Lansoprazole Other (See Comments) and Visual Disturbance     Changes in eyesight  Prevacid  Change in eyesight     Penicillins Other (See Comments)     pruritis     Red Dye Hives     Bupropion Rash     Bupropion Hcl Hives and Rash     Wellbutrin     Demerol [Meperidine] Other (See Comments)     Made migraine worse       ROS:  C: NEGATIVE for fever, chills, change in weight  R: NEGATIVE for significant cough or SOB  CV: NEGATIVE for chest pain, palpitations or peripheral edema  GI: NEGATIVE for nausea, abdominal pain, heartburn, or change in bowel habits  N: NEGATIVE for weakness, dizziness or paresthesias  P: NEGATIVE for changes in mood or " "affect    EXAM:  /68  Pulse 65  Resp 20  Ht 5' 6\" (1.676 m)  Wt 157 lb (71.2 kg)  SpO2 98%  BMI 25.34 kg/m2 Body mass index is 25.34 kg/(m^2).   GENERAL APPEARANCE: alert and fatigued  ORTHO: Cervical Spine Exam: Inspection: increased cervical lordosis  Tender:  left paracervical muscles, right paracervical muscles, left trapezius muscles, right trapezius muscles  Non-tender:  spinous processes  Range of Motion:  flexion:  decreased, painful, extension: decreased, painful, 10 degrees, left lateral rotation:  decreased, painful, 20 degrees, right lateral rotation:  decreased, painful, 10 degrees  Strength: Full strength of all neck muscles  SKIN: no suspicious lesions or rashes  NEURO: Normal strength and tone, mentation intact and speech normal  PSYCH: mentation appears normal and affect normal/bright    LABS AND IMAGING:     Results for orders placed or performed in visit on 04/20/17   Creatinine   Result Value Ref Range    Creatinine 1.03 0.52 - 1.04 mg/dL    GFR Estimate 55 (L) >60 mL/min/1.7m2    GFR Estimate If Black 67 >60 mL/min/1.7m2   Urea nitrogen   Result Value Ref Range    Urea Nitrogen 10 7 - 30 mg/dL         ASSESSMENT/PLAN:  (M54.12,  G89.29) Chronic radicular cervical pain  (primary encounter diagnosis)  Comment:   Plan: continue with [facility]. She prefers to avoid narcotics and is getting lortab sparingly. Recheck here in 2 months    (G43.711) Intractable chronic migraine without aura and with status migrainosus  Comment:   Plan: continue with Maxalt and Zofran to see if these are helpful    (R07.89) Other chest pain  Comment:   Plan: CT Chest w Contrast, Creatinine, Urea nitrogen        Right upper chest pain. Will check CT to rule out other pathology and check renal function as she recently had contrast with an MRI of the cervical spine done in [place]      Henna Cruz MD  April 21, 2017          "

## 2017-04-22 PROBLEM — J43.1 PANLOBULAR EMPHYSEMA (H): Status: ACTIVE | Noted: 2017-04-22

## 2017-05-02 DIAGNOSIS — F33.9 RECURRENT MAJOR DEPRESSIVE DISORDER, REMISSION STATUS UNSPECIFIED (H): ICD-10-CM

## 2017-05-05 RX ORDER — PAROXETINE 20 MG/1
TABLET, FILM COATED ORAL
Qty: 90 TABLET | Refills: 0 | Status: SHIPPED | OUTPATIENT
Start: 2017-05-05 | End: 2017-07-31

## 2017-05-18 ENCOUNTER — TELEPHONE (OUTPATIENT)
Dept: FAMILY MEDICINE | Facility: OTHER | Age: 57
End: 2017-05-18

## 2017-05-18 NOTE — TELEPHONE ENCOUNTER
1:19 PM    Reason for Call: Phone Call    Description: Pt called and stated she went to  her dexilant from the the pharmacy but because of her new ins, they will not cover it. Stated she needs ASAP as she will be going out of town on Sun. Please call her back at [phone]    Was an appointment offered for this call? Yes    Preferred method for responding to this message: Telephone Call    If we cannot reach you directly, may we leave a detailed response at the number you provided? Yes    Can this message wait until your PCP/provider returns, if available today? Not applicable    Noemy Ty

## 2017-05-18 NOTE — TELEPHONE ENCOUNTER
Pt notified fax just received from the pharmacy and will start the PA today. Will call her once I know this is approved or denied.

## 2017-05-19 ENCOUNTER — HOSPITAL ENCOUNTER (EMERGENCY)
Facility: HOSPITAL | Age: 57
Discharge: HOME | End: 2017-05-19
Attending: FAMILY MEDICINE | Admitting: FAMILY MEDICINE
Payer: COMMERCIAL

## 2017-05-19 VITALS
HEART RATE: 79 BPM | TEMPERATURE: 98.3 F | OXYGEN SATURATION: 98 % | RESPIRATION RATE: 16 BRPM | SYSTOLIC BLOOD PRESSURE: 105 MMHG | DIASTOLIC BLOOD PRESSURE: 70 MMHG

## 2017-05-19 DIAGNOSIS — N20.1 CALCULUS OF URETER: ICD-10-CM

## 2017-05-19 LAB
ALBUMIN UR-MCNC: NEGATIVE MG/DL
ANION GAP SERPL CALCULATED.3IONS-SCNC: 14 MMOL/L (ref 3–14)
APPEARANCE UR: CLEAR
BACTERIA #/AREA URNS HPF: ABNORMAL /HPF
BASOPHILS # BLD AUTO: 0 10E9/L (ref 0–0.2)
BASOPHILS NFR BLD AUTO: 0.3 %
BILIRUB UR QL STRIP: NEGATIVE
BUN SERPL-MCNC: 13 MG/DL (ref 7–30)
CALCIUM SERPL-MCNC: 8.6 MG/DL (ref 8.5–10.1)
CHLORIDE SERPL-SCNC: 106 MMOL/L (ref 94–109)
CO2 SERPL-SCNC: 18 MMOL/L (ref 20–32)
COLOR UR AUTO: ABNORMAL
CREAT SERPL-MCNC: 1.1 MG/DL (ref 0.52–1.04)
CRP SERPL-MCNC: <2.9 MG/L (ref 0–8)
DIFFERENTIAL METHOD BLD: NORMAL
EOSINOPHIL # BLD AUTO: 0.1 10E9/L (ref 0–0.7)
EOSINOPHIL NFR BLD AUTO: 0.9 %
ERYTHROCYTE [DISTWIDTH] IN BLOOD BY AUTOMATED COUNT: 12.3 % (ref 10–15)
GFR SERPL CREATININE-BSD FRML MDRD: 51 ML/MIN/1.7M2
GLUCOSE SERPL-MCNC: 85 MG/DL (ref 70–99)
GLUCOSE UR STRIP-MCNC: NEGATIVE MG/DL
HCT VFR BLD AUTO: 37.9 % (ref 35–47)
HGB BLD-MCNC: 13.5 G/DL (ref 11.7–15.7)
HGB UR QL STRIP: ABNORMAL
IMM GRANULOCYTES # BLD: 0 10E9/L (ref 0–0.4)
IMM GRANULOCYTES NFR BLD: 0.3 %
KETONES UR STRIP-MCNC: NEGATIVE MG/DL
LEUKOCYTE ESTERASE UR QL STRIP: ABNORMAL
LYMPHOCYTES # BLD AUTO: 1.3 10E9/L (ref 0.8–5.3)
LYMPHOCYTES NFR BLD AUTO: 14.1 %
MCH RBC QN AUTO: 30.7 PG (ref 26.5–33)
MCHC RBC AUTO-ENTMCNC: 35.6 G/DL (ref 31.5–36.5)
MCV RBC AUTO: 86 FL (ref 78–100)
MONOCYTES # BLD AUTO: 0.7 10E9/L (ref 0–1.3)
MONOCYTES NFR BLD AUTO: 7 %
MUCOUS THREADS #/AREA URNS LPF: PRESENT /LPF
NEUTROPHILS # BLD AUTO: 7.2 10E9/L (ref 1.6–8.3)
NEUTROPHILS NFR BLD AUTO: 77.4 %
NITRATE UR QL: NEGATIVE
NRBC # BLD AUTO: 0 10*3/UL
NRBC BLD AUTO-RTO: 0 /100
PH UR STRIP: 5.5 PH (ref 4.7–8)
PLATELET # BLD AUTO: 174 10E9/L (ref 150–450)
POTASSIUM SERPL-SCNC: 3.6 MMOL/L (ref 3.4–5.3)
RBC # BLD AUTO: 4.4 10E12/L (ref 3.8–5.2)
RBC #/AREA URNS AUTO: 120 /HPF (ref 0–2)
SODIUM SERPL-SCNC: 138 MMOL/L (ref 133–144)
SP GR UR STRIP: 1.01 (ref 1–1.03)
URN SPEC COLLECT METH UR: ABNORMAL
UROBILINOGEN UR STRIP-MCNC: NORMAL MG/DL (ref 0–2)
WBC # BLD AUTO: 9.3 10E9/L (ref 4–11)
WBC #/AREA URNS AUTO: 4 /HPF (ref 0–2)

## 2017-05-19 PROCEDURE — 25000128 H RX IP 250 OP 636

## 2017-05-19 PROCEDURE — 99285 EMERGENCY DEPT VISIT HI MDM: CPT | Mod: 25

## 2017-05-19 PROCEDURE — 74176 CT ABD & PELVIS W/O CONTRAST: CPT | Mod: TC

## 2017-05-19 PROCEDURE — 96375 TX/PRO/DX INJ NEW DRUG ADDON: CPT

## 2017-05-19 PROCEDURE — 99284 EMERGENCY DEPT VISIT MOD MDM: CPT | Performed by: FAMILY MEDICINE

## 2017-05-19 PROCEDURE — 36415 COLL VENOUS BLD VENIPUNCTURE: CPT | Performed by: FAMILY MEDICINE

## 2017-05-19 PROCEDURE — 86140 C-REACTIVE PROTEIN: CPT | Performed by: FAMILY MEDICINE

## 2017-05-19 PROCEDURE — 81001 URINALYSIS AUTO W/SCOPE: CPT | Performed by: FAMILY MEDICINE

## 2017-05-19 PROCEDURE — 96361 HYDRATE IV INFUSION ADD-ON: CPT

## 2017-05-19 PROCEDURE — 85025 COMPLETE CBC W/AUTO DIFF WBC: CPT | Performed by: FAMILY MEDICINE

## 2017-05-19 PROCEDURE — 80048 BASIC METABOLIC PNL TOTAL CA: CPT | Performed by: FAMILY MEDICINE

## 2017-05-19 PROCEDURE — 25000128 H RX IP 250 OP 636: Performed by: FAMILY MEDICINE

## 2017-05-19 PROCEDURE — 96374 THER/PROPH/DIAG INJ IV PUSH: CPT

## 2017-05-19 RX ORDER — DIPHENHYDRAMINE HCL 25 MG
25 TABLET ORAL EVERY 6 HOURS PRN
Qty: 56 TABLET | Refills: 0 | Status: SHIPPED | OUTPATIENT
Start: 2017-05-19 | End: 2017-07-11

## 2017-05-19 RX ORDER — DIPHENHYDRAMINE HYDROCHLORIDE 50 MG/ML
25 INJECTION INTRAMUSCULAR; INTRAVENOUS ONCE
Status: COMPLETED | OUTPATIENT
Start: 2017-05-19 | End: 2017-05-19

## 2017-05-19 RX ORDER — KETOROLAC TROMETHAMINE 30 MG/ML
30 INJECTION, SOLUTION INTRAMUSCULAR; INTRAVENOUS ONCE
Status: COMPLETED | OUTPATIENT
Start: 2017-05-19 | End: 2017-05-19

## 2017-05-19 RX ORDER — SODIUM CHLORIDE 9 MG/ML
1000 INJECTION, SOLUTION INTRAVENOUS CONTINUOUS
Status: DISCONTINUED | OUTPATIENT
Start: 2017-05-19 | End: 2017-05-19 | Stop reason: HOSPADM

## 2017-05-19 RX ORDER — KETOROLAC TROMETHAMINE 10 MG/1
10 TABLET, FILM COATED ORAL EVERY 6 HOURS PRN
Qty: 20 TABLET | Refills: 0 | Status: SHIPPED | OUTPATIENT
Start: 2017-05-19 | End: 2017-07-11

## 2017-05-19 RX ORDER — KETOROLAC TROMETHAMINE 30 MG/ML
INJECTION, SOLUTION INTRAMUSCULAR; INTRAVENOUS
Status: COMPLETED
Start: 2017-05-19 | End: 2017-05-19

## 2017-05-19 RX ADMIN — KETOROLAC TROMETHAMINE 30 MG: 30 INJECTION, SOLUTION INTRAMUSCULAR; INTRAVENOUS at 14:37

## 2017-05-19 RX ADMIN — KETOROLAC TROMETHAMINE 30 MG: 30 INJECTION, SOLUTION INTRAMUSCULAR at 14:37

## 2017-05-19 RX ADMIN — SODIUM CHLORIDE 1000 ML: 9 INJECTION, SOLUTION INTRAVENOUS at 14:31

## 2017-05-19 RX ADMIN — DIPHENHYDRAMINE HYDROCHLORIDE 25 MG: 50 INJECTION, SOLUTION INTRAMUSCULAR; INTRAVENOUS at 14:35

## 2017-05-19 NOTE — ED AVS SNAPSHOT
[facility]    [address] [address]    Phone:  [phone]                                       Sadaf Blankenship   MRN: [id number]    Department:  [facility]   Date of Visit:  5/19/2017           After Visit Summary Signature Page     I have received my discharge instructions, and my questions have been answered. I have discussed any challenges I see with this plan with the nurse or doctor.    ..........................................................................................................................................  Patient/Patient Representative Signature      ..........................................................................................................................................  Patient Representative Print Name and Relationship to Patient    ..................................................               ................................................  Date                                            Time    ..........................................................................................................................................  Reviewed by Signature/Title    ...................................................              ..............................................  Date                                                            Time

## 2017-05-19 NOTE — ED NOTES
Patient presents with left sided flank pain since last evening. The pain is intermittent and squeezing, associated with nausea without emesis. The patient denies gross hematuria.  Assessment as per chart. Presents with her sister. Ambulated to room.

## 2017-05-19 NOTE — DISCHARGE INSTRUCTIONS
Treating Kidney Stones: Expectant Therapy  Most kidney stones are about the size of a grape seed. Stones of this size are small enough to pass naturally. Once it is passed, a stone can be analyzed. This  wait and see  approach is called expectant therapy. Small stones can often be passed with expectant therapy. If pain is a problem, ask your doctor about pain medications. Then follow his or her directions on how much water to drink. Drinking more water creates more urine to  flush out  your stone. Also be sure to strain your urine. Take any stones you pass to your doctor for analysis.    Drink lots of water  Drinking lots of water may help your stone pass. Water also dilutes the chemicals in your urine. This reduces your risk of forming new stones. You may be told to drink 8, 12-ounce glasses of water a day. Avoid liquids that dehydrate you, such as those containing caffeine or alcohol.  Strain your urine  Straining your urine lets you collect your stone for analysis. Use the strainer each time you urinate. Strain your urine for as long as your doctor suggests. Watch for brown, tan, gold, or black specks or tiny maggy. These may be kidney stones.  Take your medicine  Your doctor may give you medicine that makes it more likely for you to pass the kidney stone.   Follow up with your doctor  Follow up by taking any stones you find to your doctor for analysis. The type of stone you have determines your diet and prevention program. You may need more tests in the future. These tests will ensure that new stones are not forming.    2461-6886 [employer]. [address] [place] [address], [place] [address]. All rights reserved. This information is not intended as a substitute for professional medical care. Always follow your healthcare professional's instructions.

## 2017-05-19 NOTE — ED AVS SNAPSHOT
[facility]    [address] [place] [address]    Phone:  [phone]                                       Sadaf Blankenship   MRN: [id number]    Department:  [facility]   Date of Visit:  5/19/2017           Patient Information     Date Of Birth          1960        Your diagnoses for this visit were:     Calculus of ureter        You were seen by Nanette Freeman MD.      Follow-up Information     Follow up with Henna Cruz MD.    Specialty:  Family Practice    Why:  As needed    Contact information:    [facility]  [address]  [facility] [address]  [phone]          Discharge Instructions         Treating Kidney Stones: Expectant Therapy  Most kidney stones are about the size of a grape seed. Stones of this size are small enough to pass naturally. Once it is passed, a stone can be analyzed. This  wait and see  approach is called expectant therapy. Small stones can often be passed with expectant therapy. If pain is a problem, ask your doctor about pain medications. Then follow his or her directions on how much water to drink. Drinking more water creates more urine to  flush out  your stone. Also be sure to strain your urine. Take any stones you pass to your doctor for analysis.    Drink lots of water  Drinking lots of water may help your stone pass. Water also dilutes the chemicals in your urine. This reduces your risk of forming new stones. You may be told to drink 8, 12-ounce glasses of water a day. Avoid liquids that dehydrate you, such as those containing caffeine or alcohol.  Strain your urine  Straining your urine lets you collect your stone for analysis. Use the strainer each time you urinate. Strain your urine for as long as your doctor suggests. Watch for brown, tan, gold, or black specks or tiny maggy. These may be kidney stones.  Take your medicine  Your doctor may give you medicine that makes it more likely for you to  pass the kidney stone.   Follow up with your doctor  Follow up by taking any stones you find to your doctor for analysis. The type of stone you have determines your diet and prevention program. You may need more tests in the future. These tests will ensure that new stones are not forming.    6930-0747 [employer]. [address] [place] [address]. All rights reserved. This information is not intended as a substitute for professional medical care. Always follow your healthcare professional's instructions.          Future Appointments        Provider Department Dept Phone Center    7/11/2017 10:45 AM Henna Cruz MD [facility] [phone] [facility]         Review of your medicines      START taking        Dose / Directions Last dose taken    diphenhydrAMINE 25 MG tablet   Commonly known as:  BENADRYL   Dose:  25 mg   Quantity:  56 tablet        Take 1 tablet (25 mg) by mouth every 6 hours as needed for itching   Refills:  0        ketorolac 10 MG tablet   Commonly known as:  TORADOL   Dose:  10 mg   Quantity:  20 tablet        Take 1 tablet (10 mg) by mouth every 6 hours as needed for moderate pain   Refills:  0          Our records show that you are taking the medicines listed below. If these are incorrect, please call your family doctor or clinic.        Dose / Directions Last dose taken    baclofen 10 MG tablet   Commonly known as:  LIORESAL   Quantity:  90 tablet        TAKE 1 TABLET BY MOUTH AT B EDTIME   Refills:  0        benzonatate 100 MG capsule   Commonly known as:  TESSALON   Dose:  100 mg   Quantity:  42 capsule        Take 1 capsule (100 mg) by mouth 3 times daily as needed for cough   Refills:  0        CENTRUM SILVER ULTRA WOMENS Tabs        Refills:  0        DEXILANT 60 MG Cpdr CR capsule   Quantity:  30 capsule   Generic drug:  dexlansoprazole        TAKE 1 CAPSULE (60 MG) BY MOUTH DAILY   Refills:  5        diazepam 5 MG tablet   Commonly known as:  VALIUM    Quantity:  100 tablet        TAKE 1 TABLET BY MOUTH EVER Y 6 HOURS AS NEEDED - GENER IC FOR VALIUM   Refills:  0        gabapentin 300 MG capsule   Commonly known as:  NEURONTIN   Dose:  600 mg   Quantity:  90 capsule        Take 2 capsules (600 mg) by mouth every morning   Refills:  0        HYDROcodone-acetaminophen 7.5-325 MG per tablet   Commonly known as:  NORCO   Quantity:  60 tablet        TAKE 1 TABLET BY MOUTH EVER Y 4 TO 6 HOURS AS NEEDED FO R PAIN   Refills:  0        MAXALT 10 MG tablet   Dose:  10 mg   Generic drug:  rizatriptan        Take 10 mg by mouth at onset of headache for migraine   Refills:  0        Misc. Devices Misc        Dispense one Cefaly neurostimulator and the electro stimulators with refills   Refills:  0        * OMEPRAZOLE PO   Dose:  40 mg        Take 40 mg by mouth 2 times daily   Refills:  0        * omeprazole 40 MG capsule   Commonly known as:  priLOSEC   Quantity:  30 capsule        TAKE 1 CAPSULE (40 MG) BY MOUTH DAILY TAKE 30-60 MINUTES BEFORE A MEAL.   Refills:  10        ondansetron 4 MG ODT tab   Commonly known as:  ZOFRAN-ODT   Dose:  4 mg        Take 4 mg by mouth every 8 hours as needed for nausea   Refills:  0        order for DME        Equipment being ordered: TENS   Refills:  0        PARoxetine 20 MG tablet   Commonly known as:  PAXIL   Quantity:  90 tablet        TAKE 1 TABLET (40MG) BY RASHAD TH AT BEDTIME   Refills:  0        PROBIOTIC DAILY PO        Take by mouth 2 times daily   Refills:  0        senna-docusate 8.6-50 MG per tablet   Commonly known as:  SENOKOT-S;PERICOLACE   Dose:  1-4 tablet        Take 1-4 tablets by mouth   Refills:  0        sucralfate 1 GM tablet   Commonly known as:  CARAFATE   Dose:  1 g   Quantity:  40 tablet        Take 1 tablet (1 g) by mouth 4 times daily   Refills:  1        topiramate 50 MG tablet   Commonly known as:  TOPAMAX        Take 2 tablets in the morning and 2 tablets at night   Refills:  0        vitamin D3 1000 UNITS  "Caps   Dose:  1000 Units        Take 1,000 Units by mouth   Refills:  0        * Notice:  This list has 2 medication(s) that are the same as other medications prescribed for you. Read the directions carefully, and ask your doctor or other care provider to review them with you.            Prescriptions were sent or printed at these locations (2 Prescriptions)                   [id number] - [place] - [address] [facility]   [address] [place] [address]    Telephone:  [phone]   Fax:  [phone]   Hours:                  E-Prescribed (2 of 2)         ketorolac (TORADOL) 10 MG tablet               diphenhydrAMINE (BENADRYL) 25 MG tablet                Procedures and tests performed during your visit     Abd/pelvis CT - no contrast - Stone Protocol    Basic metabolic panel    CBC with platelets differential    CRP inflammation    Peripheral IV: Standard    UA with Microscopic reflex to Culture    Vital signs      Orders Needing Specimen Collection     None      Pending Results     No orders found from 5/17/2017 to 5/20/2017.            Pending Culture Results     No orders found from 5/17/2017 to 5/20/2017.            Thank you for choosing [facility]       Thank you for choosing [facility] for your care. Our goal is always to provide you with excellent care. Hearing back from our patients is one way we can continue to improve our services. Please take a few minutes to complete the written survey that you may receive in the mail after you visit with us. Thank you!        [employer] Information     [employer] lets you send messages to your doctor, view your test results, renew your prescriptions, schedule appointments and more. To sign up, go to [email or web address] . Click on \"Log in\" on the left side of the screen, which will take you to the Welcome page. Then click on \"Sign up Now\" on the right side of the page.     You will be asked to enter the access code listed below, as well as some personal " information. Please follow the directions to create your username and password.     Your access code is: [email or web address]  Expires: 2017 11:09 AM     Your access code will  in 90 days. If you need help or a new code, please call your [facility] clinic or [phone].        Care EveryWhere ID     This is your Care EveryWhere ID. This could be used by other organizations to access your [facility] medical records  [id number]        After Visit Summary       This is your record. Keep this with you and show to your community pharmacist(s) and doctor(s) at your next visit.

## 2017-05-19 NOTE — ED PROVIDER NOTES
History     Chief Complaint   Patient presents with     Rule out Kidney Stone     intermittent left flank pain since last night.      MILTON MCMAHAN Latendresse is a 56 year old female who has had intermittent left flank pain since last night. She is not febrile, has had no issues with visible hematuria, bowels are working well.  Pain is in the flank and LUQ, and she is unable to find a position of comfort.  She has a history of renal stones, states this feels just like that.  She tried her Norco for the pain at home without relief.       I have reviewed the Medications, Allergies, Past Medical and Surgical History, and Social History in the Epic system.    Review of Systems   Constitutional: Positive for activity change. Negative for fatigue and fever.   HENT: Negative.    Respiratory: Negative for shortness of breath.    Cardiovascular: Negative for chest pain.   Gastrointestinal: Negative for abdominal pain, constipation, diarrhea, nausea and vomiting.   Genitourinary: Positive for flank pain. Negative for difficulty urinating, dysuria, hematuria and urgency.        Left side   Neurological: Negative.    Psychiatric/Behavioral: Positive for agitation and dysphoric mood. The patient is nervous/anxious.        Physical Exam   BP: 128/78  Pulse: 82  Temp: 98.7  F (37.1  C)  Resp: 16  SpO2: 100 %  Physical Exam   Constitutional: She is oriented to person, place, and time. She appears well-developed and well-nourished. She appears distressed.   HENT:   Head: Normocephalic and atraumatic.   Neck: Normal range of motion. Neck supple.   Cardiovascular: Normal rate, regular rhythm, normal heart sounds and intact distal pulses.    No murmur heard.  Pulmonary/Chest: Effort normal and breath sounds normal. No respiratory distress.   Abdominal: Soft. Bowel sounds are normal. She exhibits no distension. There is tenderness in the left upper quadrant. There is CVA tenderness. There is no rebound and no guarding.    Musculoskeletal: Normal range of motion.   Neurological: She is alert and oriented to person, place, and time.   Skin: Skin is warm and dry.   Psychiatric: Judgment normal. Her mood appears anxious. Her affect is labile. Her speech is rapid and/or pressured. She is agitated. Cognition and memory are normal.   Calmed down once pain was controlled.   Nursing note and vitals reviewed.      ED Course     ED Course     Procedures        Labs Ordered and Resulted from Time of ED Arrival Up to the Time of Departure from the ED   ROUTINE UA WITH MICROSCOPIC REFLEX TO CULTURE - Abnormal; Notable for the following:        Result Value    Blood Urine Large (*)     Leukocyte Esterase Urine Trace (*)     WBC Urine 4 (*)     RBC Urine 120 (*)     Bacteria Urine Few (*)     Mucous Urine Present (*)     All other components within normal limits   BASIC METABOLIC PANEL - Abnormal; Notable for the following:     Carbon Dioxide 18 (*)     Creatinine 1.10 (*)     GFR Estimate 51 (*)     All other components within normal limits   CBC WITH PLATELETS DIFFERENTIAL   CRP INFLAMMATION   VITAL SIGNS   PERIPHERAL IV CATHETER       Assessments & Plan (with Medical Decision Making)   Patient given Toradol with Benadryl due to previous history of issues with ibuprofen.  She had no side effects and got excellent pain relief.  She passed some small shards of stone in the toilet, but on CT she has a remaining 2mm stone at L4 level.  Given fluids, discharged with instructions to drink lots of fluids, walk and take Toradol with Benadryl every 6 hours as needed for pain.    I have reviewed the nursing notes.    I have reviewed the findings, diagnosis, plan and need for follow up with the patient.    Discharge Medication List as of 5/19/2017  4:52 PM      START taking these medications    Details   ketorolac (TORADOL) 10 MG tablet Take 1 tablet (10 mg) by mouth every 6 hours as needed for moderate pain, Disp-20 tablet, R-0, E-Prescribe       diphenhydrAMINE (BENADRYL) 25 MG tablet Take 1 tablet (25 mg) by mouth every 6 hours as needed for itching, Disp-56 tablet, R-0, E-Prescribe             Final diagnoses:   Calculus of ureter       5/19/2017   [facility]     Nanette Freeman MD  05/20/17 1640

## 2017-05-20 ASSESSMENT — ENCOUNTER SYMPTOMS
DYSPHORIC MOOD: 1
DYSURIA: 0
NERVOUS/ANXIOUS: 1
HEMATURIA: 0
ABDOMINAL PAIN: 0
NEUROLOGICAL NEGATIVE: 1
DIFFICULTY URINATING: 0
VOMITING: 0
AGITATION: 1
FATIGUE: 0
FEVER: 0
FLANK PAIN: 1
ACTIVITY CHANGE: 1
DIARRHEA: 0
CONSTIPATION: 0
SHORTNESS OF BREATH: 0
NAUSEA: 0

## 2017-05-23 ENCOUNTER — TELEPHONE (OUTPATIENT)
Dept: FAMILY MEDICINE | Facility: OTHER | Age: 57
End: 2017-05-23

## 2017-05-23 NOTE — TELEPHONE ENCOUNTER
Still awaiting on the approval to come back from insurance, pt notified once I get a fax back I will notify her.

## 2017-05-23 NOTE — TELEPHONE ENCOUNTER
10:20 AM    Reason for Call: Phone Call    Description: call back regarding her DEXILANT 60 MG CPDR CR capsule - she would like to speak with NURSE.  Thank you    Was an appointment offered for this call? N/A    Preferred method for responding to this message: TELEPHONE    If we cannot reach you directly, may we leave a detailed response at the number you provided? Yes    Can this message wait until your PCP/provider returns, if available today? Not applicable, PROVIDER IS IN TODAY    Aubrie Rahman

## 2017-07-03 ENCOUNTER — TELEPHONE (OUTPATIENT)
Dept: FAMILY MEDICINE | Facility: OTHER | Age: 57
End: 2017-07-03

## 2017-07-03 NOTE — TELEPHONE ENCOUNTER
1:12 PM    Reason for Call: Phone Call    Description: Pt called and states that she has an appointment on (07/11/17) and states that she has to fly in and wont get here until 11:30a.m was wondering if there was anyway she could get another appointment for later that day.    Was an appointment offered for this call? No    Preferred method for responding to this message: Telephone Call    If we cannot reach you directly, may we leave a detailed response at the number you provided? Yes    Can this message wait until your PCP/provider returns, if available today? Not applicable,     Rianna Spring

## 2017-07-03 NOTE — TELEPHONE ENCOUNTER
Please schedule patient for date/time: unable to work in later in the day on 7/11/17, may reschedule for next available.     Have patient go to ER/Urgent Care Center. Urgent Care hours are 9:30 am to 8 pm, open 7 days a week. No.    Provider will call patient.No.    Other:

## 2017-07-11 ENCOUNTER — OFFICE VISIT (OUTPATIENT)
Dept: FAMILY MEDICINE | Facility: OTHER | Age: 57
End: 2017-07-11
Attending: FAMILY MEDICINE
Payer: COMMERCIAL

## 2017-07-11 VITALS
HEART RATE: 60 BPM | HEIGHT: 66 IN | RESPIRATION RATE: 20 BRPM | OXYGEN SATURATION: 98 % | SYSTOLIC BLOOD PRESSURE: 100 MMHG | DIASTOLIC BLOOD PRESSURE: 60 MMHG

## 2017-07-11 DIAGNOSIS — G89.29 CHRONIC NECK PAIN: ICD-10-CM

## 2017-07-11 DIAGNOSIS — K22.10 ULCER OF ESOPHAGUS WITHOUT BLEEDING: ICD-10-CM

## 2017-07-11 DIAGNOSIS — F33.40 RECURRENT MAJOR DEPRESSIVE DISORDER, IN REMISSION (H): ICD-10-CM

## 2017-07-11 DIAGNOSIS — M62.838 MUSCLE SPASM: ICD-10-CM

## 2017-07-11 DIAGNOSIS — G89.4 CHRONIC PAIN SYNDROME: Primary | ICD-10-CM

## 2017-07-11 DIAGNOSIS — M62.830 BACK MUSCLE SPASM: ICD-10-CM

## 2017-07-11 DIAGNOSIS — N20.0 CALCULUS OF KIDNEY: ICD-10-CM

## 2017-07-11 DIAGNOSIS — M54.2 CHRONIC NECK PAIN: ICD-10-CM

## 2017-07-11 DIAGNOSIS — J43.9 PULMONARY EMPHYSEMA, UNSPECIFIED EMPHYSEMA TYPE (H): ICD-10-CM

## 2017-07-11 DIAGNOSIS — L73.9 FOLLICULITIS: ICD-10-CM

## 2017-07-11 PROCEDURE — 99215 OFFICE O/P EST HI 40 MIN: CPT | Performed by: FAMILY MEDICINE

## 2017-07-11 PROCEDURE — 99212 OFFICE O/P EST SF 10 MIN: CPT

## 2017-07-11 RX ORDER — HYDROCODONE BITARTRATE AND ACETAMINOPHEN 7.5; 325 MG/1; MG/1
TABLET ORAL
Qty: 60 TABLET | Refills: 0 | Status: SHIPPED | OUTPATIENT
Start: 2017-07-11 | End: 2017-10-09

## 2017-07-11 RX ORDER — DEXLANSOPRAZOLE 60 MG/1
CAPSULE, DELAYED RELEASE ORAL
Qty: 30 CAPSULE | Refills: 11 | Status: SHIPPED | OUTPATIENT
Start: 2017-07-11 | End: 2018-07-03

## 2017-07-11 RX ORDER — DIAZEPAM 5 MG
TABLET ORAL
Qty: 100 TABLET | Refills: 0 | Status: SHIPPED | OUTPATIENT
Start: 2017-07-11 | End: 2017-10-09

## 2017-07-11 ASSESSMENT — ANXIETY QUESTIONNAIRES
GAD7 TOTAL SCORE: 8
1. FEELING NERVOUS, ANXIOUS, OR ON EDGE: MORE THAN HALF THE DAYS
IF YOU CHECKED OFF ANY PROBLEMS ON THIS QUESTIONNAIRE, HOW DIFFICULT HAVE THESE PROBLEMS MADE IT FOR YOU TO DO YOUR WORK, TAKE CARE OF THINGS AT HOME, OR GET ALONG WITH OTHER PEOPLE: NOT DIFFICULT AT ALL
2. NOT BEING ABLE TO STOP OR CONTROL WORRYING: SEVERAL DAYS
5. BEING SO RESTLESS THAT IT IS HARD TO SIT STILL: SEVERAL DAYS
6. BECOMING EASILY ANNOYED OR IRRITABLE: SEVERAL DAYS
3. WORRYING TOO MUCH ABOUT DIFFERENT THINGS: MORE THAN HALF THE DAYS
7. FEELING AFRAID AS IF SOMETHING AWFUL MIGHT HAPPEN: NOT AT ALL

## 2017-07-11 ASSESSMENT — PATIENT HEALTH QUESTIONNAIRE - PHQ9: 5. POOR APPETITE OR OVEREATING: SEVERAL DAYS

## 2017-07-11 ASSESSMENT — PAIN SCALES - GENERAL: PAINLEVEL: SEVERE PAIN (6)

## 2017-07-11 NOTE — PROGRESS NOTES
[facility]    Sadaf Blankenship, 57 year old, female presents with   Chief Complaint   Patient presents with     Pain Management     she is using hydrocodone intermittently for pain. Her last fill was in April. She didn't take medication today. Discussed chronic pain medication contract and this is reviewed and signed     Neck Pain     chronic neck pain, rates pain 7/10. Botox shots have been helpful and she is due again. She is having ongoing pain in the left neck radiating to the left arm, worse on the left than the right     Stress     concerns with her daughter Noemy and what has been happening with her and her exhusband. Noemy is considering moving to [place]     Gastric Problem     severe gastritis failiing protonix nexium and prilosec. She called the insurance company herself and had dexilent approved. We received the letter today     Derm Problem     rash last month of the right neck with raised lesions of the upper back. She is wondering if these are related to her Botox shots but her shots were done in April wtih the rash appearing last week     Kidney Problem     she passed a kidney stone in the ER     Shortness of Breath     over the last year when climbing stairs, emphysema was noted on a chest CT last month, mild       PAST MEDICAL HISTORY:  Past Medical History:   Diagnosis Date     ASCUS on Pap smear 5/26/2004    negative HPV     Atypical chest pain 5/26/2008    negative cardiolite stress test [facility]     Bleeding ulcer 5/26/1976     Cervical radicular pain 5/10/2012     Degenerative disc disease, cervical 1/1/2011     Esophageal ulcer 5/26/1998     Irritable bowel syndrome 1/26/2015     Migraine headaches, severe 7/9/2001     Pseudoarthrosis of cervical spine 7/3/2012     Recurrent UTI 5/26/2011     sinusitis (chronic) 4/16/2001       PAST SURGICAL HISTORY:  Past Surgical History:   Procedure Laterality Date     BACK SURGERY      cervical     Cervical spine surgery with  removal of 2 disks, C5,C7       COLONOSCOPY  10/13/2014    Dr Camargo, internal hemorrhoids     Coronary angiogram, normal      Chest pain     ENT SURGERY      nose surgery x3     fusion discectomy anterior cervical  2011     HC ESOPHAGOSCOPY, DIAGNOSTIC  10/31/2014    Dr Camargo, mild erythema of antrum, negative biopsies     NOSE SURGERY      x3       1997     Posterior decompression , fusion C3-5      Psuedoarthrosis     Supracervical hysterectomy with right salpingoophorectomy  2002    Endometriosis       SOCIAL HISTORY  Social History     Social History     Marital status:      Spouse name: N/A     Number of children: N/A     Years of education: N/A     Occupational History     architectual resources      Social History Main Topics     Smoking status: Former Smoker     Types: Cigarettes     Smokeless tobacco: Never Used      Comment: quit . no passive exposure     Alcohol use No     Drug use: No     Sexual activity: Not on file     Other Topics Concern     Blood Transfusions Yes     Caffeine Concern Yes     1 cup daily     Parent/Sibling W/ Cabg, Mi Or Angioplasty Before 65f 55m? Yes     Social History Narrative       MEDICATIONS:  Prior to Admission medications    Medication Sig Start Date End Date Taking? Authorizing Provider   diazepam (VALIUM) 5 MG tablet TAKE 1 TABLET BY MOUTH EVER Y 6 HOURS AS NEEDED - GENER IC FOR VALIUM 17  Yes Henna Cruz MD   HYDROcodone-acetaminophen (NORCO) 7.5-325 MG per tablet TAKE 1 TABLET BY MOUTH EVER Y 4 TO 6 HOURS AS NEEDED FO R PAIN 17  Yes Henna Cruz MD   dexlansoprazole (DEXILANT) 60 MG CPDR CR capsule TAKE 1 CAPSULE (60 MG) BY MOUTH DAILY 17  Yes Henna Cruz MD   PARoxetine (PAXIL) 20 MG tablet TAKE 1 TABLET (40MG) BY RASHAD TH AT BEDTIME 17  Yes Zoe Herbert MD   ondansetron (ZOFRAN-ODT) 4 MG ODT tab Take 4 mg by mouth every 8 hours as needed for nausea   Yes Reported, Patient   rizatriptan (MAXALT) 10 MG tablet  "Take 10 mg by mouth at onset of headache for migraine   Yes Reported, Patient   gabapentin (NEURONTIN) 300 MG capsule Take 2 capsules (600 mg) by mouth every morning 4/20/17  Yes Henna Cruz MD   omeprazole (PRILOSEC) 40 MG capsule TAKE 1 CAPSULE (40 MG) BY MOUTH DAILY TAKE 30-60 MINUTES BEFORE A MEAL. 4/5/17  Yes Paula Leary PA   Cholecalciferol (VITAMIN D3) 1000 UNITS CAPS Take 1,000 Units by mouth 1/6/17  Yes Reported, Patient   Multiple Vitamins-Minerals (CENTRUM SILVER ULTRA WOMENS) TABS  1/6/17  Yes Reported, Patient   topiramate (TOPAMAX) 50 MG tablet Take 2 tablets in the morning and 2 tablets at night 1/3/17  Yes Reported, Patient   order for DME Equipment being ordered: TENS   Yes Reported, Patient   baclofen (LIORESAL) 10 MG tablet TAKE 1 TABLET BY MOUTH AT B EDTIME  Patient taking differently: take one tablet in the morning and one tablet at bedtime 12/5/16  Yes Henna Cruz MD   senna-docusate (SENOKOT-S;PERICOLACE) 8.6-50 MG per tablet Take 1-4 tablets by mouth 6/26/12  Yes Reported, Patient   Misc. Devices MISC Dispense one Cefaly neurostimulator and the electro stimulators with refills 6/13/16  Yes Reported, Patient   sucralfate (CARAFATE) 1 GM tablet Take 1 tablet (1 g) by mouth 4 times daily 9/13/16  Yes Henna Cruz MD   OMEPRAZOLE PO Take 40 mg by mouth 2 times daily   Yes Reported, Patient   Probiotic Product (PROBIOTIC DAILY PO) Take by mouth 2 times daily   Yes Reported, Patient       ALLERGIES:     Allergies   Allergen Reactions     Aspirin Hives     Ibuprofen      Dye in the otc form causes headaches  \"patient states over the counter ibuprofen  bothers her\"     Lansoprazole Other (See Comments) and Visual Disturbance     Changes in eyesight  Prevacid  Change in eyesight     Penicillins Other (See Comments)     pruritis     Red Dye Hives     Bupropion Rash     Bupropion Hcl Hives and Rash     Wellbutrin     Demerol [Meperidine] Other (See Comments)     Made migraine worse " "      ROS:  C: NEGATIVE for fever, chills, change in weight  R: NEGATIVE for significant cough or SOB  CV: NEGATIVE for chest pain, palpitations or peripheral edema  GI: ongoing stomach upset  M: NEGATIVE for significant arthralgias or myalgia  N: NEGATIVE for weakness, dizziness or paresthesias  H: NEGATIVE for bleeding problems  P: NEGATIVE for changes in mood or affect    EXAM:  /60  Pulse 60  Resp 20  Ht 5' 6\" (1.676 m)  SpO2 98%  Breastfeeding? No There is no height or weight on file to calculate BMI.   GENERAL APPEARANCE: alert, no distress and fatigued  NECK: no adenopathy, no asymmetry, masses, or scars and thyroid normal to palpation  RESP: lungs clear to auscultation - no rales, rhonchi or wheezes  CV: regular rates and rhythm, normal S1 S2, no S3 or S4 and no murmur, click or rub  LYMPHATICS: normal ant/post cervical and supraclavicular nodes  ABDOMEN: soft, nontender, without hepatosplenomegaly or masses and bowel sounds normal  MS: extremities normal- no gross deformities noted and significant muscle spasm of the bilateral trapezius and strap musculature of the neck, left worse than right, all tender to palpation. Pain with rotablation and flexion  SKIN: folliculitis of the upper back, 3 lesions, no rash noted  NEURO: Normal strength and tone, mentation intact and speech normal  PSYCH: mentation appears normal and worried    LABS AND IMAGING:     Results for orders placed or performed during the hospital encounter of 05/19/17   Abd/pelvis CT - no contrast - Stone Protocol    Narrative    ABDOMEN AND PELVIS CT    FINDINGS:  There is dependent atelectasis at the lung bases.  The  liver is free of masses or biliary duct enlargement.  The spleen and  pancreas are normal.  There are no adrenal masses.  No renal calculi  are noted.  On coronal image #27 in the left ureter, adjacent to L4 is  a 2 mm in diameter calculus.  No intraperitoneal abnormalities are  seen.  In the pelvis, the bladder and " rectum appear normal.  No pelvic  masses or fluid collections are noted.    IMPRESSION:  THERE IS A 2 MM MID LEFT URETERAL CALCULUS.  Exam Date: May 19, 2017 03:39:00 PM  Author: ZACHERY NUNEZ  This report is final and signed     UA with Microscopic reflex to Culture   Result Value Ref Range    Color Urine Light Yellow     Appearance Urine Clear     Glucose Urine Negative NEG mg/dL    Bilirubin Urine Negative NEG    Ketones Urine Negative NEG mg/dL    Specific Gravity Urine 1.008 1.003 - 1.035    Blood Urine Large (A) NEG    pH Urine 5.5 4.7 - 8.0 pH    Protein Albumin Urine Negative NEG mg/dL    Urobilinogen mg/dL Normal 0.0 - 2.0 mg/dL    Nitrite Urine Negative NEG    Leukocyte Esterase Urine Trace (A) NEG    Source Midstream Urine     WBC Urine 4 (H) 0 - 2 /HPF    RBC Urine 120 (H) 0 - 2 /HPF    Bacteria Urine Few (A) NEG /HPF    Mucous Urine Present (A) NEG /LPF   CBC with platelets differential   Result Value Ref Range    WBC 9.3 4.0 - 11.0 10e9/L    RBC Count 4.40 3.8 - 5.2 10e12/L    Hemoglobin 13.5 11.7 - 15.7 g/dL    Hematocrit 37.9 35.0 - 47.0 %    MCV 86 78 - 100 fl    MCH 30.7 26.5 - 33.0 pg    MCHC 35.6 31.5 - 36.5 g/dL    RDW 12.3 10.0 - 15.0 %    Platelet Count 174 150 - 450 10e9/L    Diff Method Automated Method     % Neutrophils 77.4 %    % Lymphocytes 14.1 %    % Monocytes 7.0 %    % Eosinophils 0.9 %    % Basophils 0.3 %    % Immature Granulocytes 0.3 %    Nucleated RBCs 0 0 /100    Absolute Neutrophil 7.2 1.6 - 8.3 10e9/L    Absolute Lymphocytes 1.3 0.8 - 5.3 10e9/L    Absolute Monocytes 0.7 0.0 - 1.3 10e9/L    Absolute Eosinophils 0.1 0.0 - 0.7 10e9/L    Absolute Basophils 0.0 0.0 - 0.2 10e9/L    Abs Immature Granulocytes 0.0 0 - 0.4 10e9/L    Absolute Nucleated RBC 0.0    Basic metabolic panel   Result Value Ref Range    Sodium 138 133 - 144 mmol/L    Potassium 3.6 3.4 - 5.3 mmol/L    Chloride 106 94 - 109 mmol/L    Carbon Dioxide 18 (L) 20 - 32 mmol/L    Anion Gap 14 3 - 14 mmol/L    Glucose  85 70 - 99 mg/dL    Urea Nitrogen 13 7 - 30 mg/dL    Creatinine 1.10 (H) 0.52 - 1.04 mg/dL    GFR Estimate 51 (L) >60 mL/min/1.7m2    GFR Estimate If Black 62 >60 mL/min/1.7m2    Calcium 8.6 8.5 - 10.1 mg/dL   CRP inflammation   Result Value Ref Range    CRP Inflammation <2.9 0.0 - 8.0 mg/L         ASSESSMENT/PLAN:  (G89.4) Chronic pain syndrome  (primary encounter diagnosis)  Comment:   Plan: Pain Drug Scr UR W Rptd Meds, CANCELED: Pain         Drug Scr UR W Rptd Meds        Drug medication consent signed for chronic meds. She did not take a pain pill today and doesn't take them regularly so urine testing is not done    (M62.838) Muscle spasm  Comment:   Plan: diazepam (VALIUM) 5 MG tablet        Renewed     (M54.2,  G89.29) Chronic neck pain  Comment:   Plan: HYDROcodone-acetaminophen (NORCO) 7.5-325 MG         per tablet        Renewed today, recheck 3 months    (K22.10) Ulcer of esophagus without bleeding  Comment:   Plan: dexlansoprazole (DEXILANT) 60 MG CPDR CR         capsule        This prior auth was finally approved after she called the insurance company. Prescription is resent    (N20.0) Calculus of kidney  Comment:   Plan: stable, passed    (F33.40) Recurrent major depressive disorder, in remission (H)  Comment:   Plan: stable, she wishes to continue her current dose of medicaiton    (L73.9) Folliculitis  Comment: resolving  Plan: follow at this time. This isn't related to Botox injections which we discussed    (J43.9) Pulmonary emphysema, unspecified emphysema type (H)  Comment:   Plan: mild.recommended proceeding with stress testing to rule out a cardiac cause this but she doesn't wish to proceed at this time  She is asked to follow up sooner  if symptoms worsen.    Forty five  minutes spent with the patient, greater than 50% in counseling and coordination of care.of the above problems and treatment options      Henna Cruz MD  July 11, 2017

## 2017-07-11 NOTE — LETTER
[facility]    07/11/17    Patient: Sadaf Blankenship  YOB: 1960  Medical Record Number: [id number]                                                                  Controlled Substance Agreement  I understand that my care provider has prescribed controlled substances (narcotics, tranquilizers, and/or stimulants) to help manage my condition(s).  I am taking this medicine to help me function or work.  I know that this is strong medicine.  It could have serious side effects and even cause a dependency on the drug.  If I stop these medicines suddenly, I could have severe withdrawal symptoms.    The risks, benefits, and side effects of these medication(s) were explained to me.  I agree that:  1. I will take part in other treatments as advised by my provider.  This may be psychiatry or counseling, physical therapy, behavioral therapy, group treatment, or a referral to a pain clinic.  I will reduce or stop my medicine when my provider tells me to do so.   2. I will take my medicines as prescribed.  I will not change the dose or schedule unless my provider tells me to.  There will be no refills if I  run out early.   I may be contacted at any time without warning and asked to complete a drug test or pill count.   3. I will keep all my appointments at the clinic.  If I miss appointments or fail to follow instructions, my provider may stop my medicine.  4. I will not ask other providers to prescribe controlled substances. And I will not accept controlled substances from other people. If I need another prescribed controlled substance for a new reason, I will notify my provider within one business day.  5. If I enroll in the [place] Medical Marijuana program, I will tell my provider.  I will also sign an agreement to share my medical records with my provider.  6. I will use one pharmacy to fill all of my controlled substance prescriptions.  If my prescription is mailed to my pharmacy, it may take  5 to 7 days for my medicine to be ready.  7. I understand that my provider, clinic care team, and pharmacy can track controlled substance prescriptions from other providers through a central database (prescription monitoring program).  8. I will bring in my list of medications (or my medicine bottles) each time I come to the clinic.  REV- 04/2016                                                                                                                                            Page 1 of 2      [facility]    07/11/17    Patient: Sadaf Blankenship  YOB: 1960  Medical Record Number: [id number]    9. Refills of controlled substances will be made only during office hours.  It is up to me to make sure that I do not run out of my medicines on weekends or holidays.    10. I am responsible for my prescriptions.  If the medicine is lost or stolen, it will not be replaced.   I also agree not to share these medicines with anyone.  11. I agree to not use ANY illegal or recreational drugs.  This includes marijuana, cocaine, bath salts or other drugs.  I agree not to use alcohol unless my provider says I may.  I agree to give urine samples whenever asked.  If I fail to give a urine sample, the provider may stop my medicine.     12. I will tell my nurse or provider right away if I become pregnant or have a new medical problem treated outside of [facility].  13. I understand that this medicine can affect my thinking and judgment.  It may be unsafe for me to drive, use machinery and do dangerous tasks.  I will not do any of these things until I know how the medicine affects me.  If my dose changes, I will wait to see how it affects me.  I will contact my provider if I have concerns about medicine side effects.  I understand that if I do not follow any of the conditions above, my prescriptions or treatment may be stopped.    I agree that my provider, clinic care team, and pharmacy may work with  any city, state or federal law enforcement agency that investigates the misuse, sale, or other diversion of my controlled medicine. I will allow my provider to discuss my care with or share a copy of this agreement with any other treating provider, pharmacy or emergency room where I receive care.  I agree to give up (waive) any right of privacy or confidentiality with respect to these authorizations.   I have read this agreement and have asked questions about anything I did not understand.   ___________________________________    ___________________________  Patient Signature                                                           Date and Time  ___________________________________     ____________________________  Witness                                                                            Date and Time  ___________________________________  Henna Cruz MD  REV-  04/2016                                                                                                                                                                 Page 2 of 2

## 2017-07-11 NOTE — NURSING NOTE
"Chief Complaint   Patient presents with     Pain Management     Neck Pain     chronic neck pain, rates pain 7/10.        Initial /60  Pulse 60  Resp 20  Ht 5' 6\" (1.676 m)  SpO2 98%  Breastfeeding? No Estimated body mass index is 25.34 kg/(m^2) as calculated from the following:    Height as of 4/20/17: 5' 6\" (1.676 m).    Weight as of 4/20/17: 157 lb (71.2 kg).  Medication Reconciliation: complete   Liudmila Rivera      "

## 2017-07-11 NOTE — MR AVS SNAPSHOT
After Visit Summary   7/11/2017    Sadaf Blankenship    MRN: [id number]           Patient Information     Date Of Birth          1960        Visit Information        Provider Department      7/11/2017 2:15 PM Henna Cruz MD [facility] [place]        Today's Diagnoses     Chronic pain syndrome    -  1    Muscle spasm        Chronic neck pain        Ulcer of esophagus without bleeding        Calculus of kidney        Recurrent major depressive disorder, in remission (H)        Folliculitis        Pulmonary emphysema, unspecified emphysema type (H)           Follow-ups after your visit        Follow-up notes from your care team     Return in about 3 months (around 10/11/2017).      Your next 10 appointments already scheduled     Sep 21, 2017  2:15 PM CDT   (Arrive by 2:00 PM)   SHORT with Henna Cruz MD   [facility] [place] ([facility] )    [address]  [place] [address]   [phone]              Future tests that were ordered for you today     Open Future Orders        Priority Expected Expires Ordered    Pain Drug Scr UR W Rptd Meds Routine  7/11/2018 7/11/2017            Who to contact     If you have questions or need follow up information about today's clinic visit or your schedule please contact [facility] directly at [phone].  Normal or non-critical lab and imaging results will be communicated to you by MyChart, letter or phone within 4 business days after the clinic has received the results. If you do not hear from us within 7 days, please contact the clinic through MyChart or phone. If you have a critical or abnormal lab result, we will notify you by phone as soon as possible.  Submit refill requests through [employer] or call your pharmacy and they will forward the refill request to us. Please allow 3 business days for your refill to be completed.          Additional Information About Your Visit        MyChart Information   "   [employer] lets you send messages to your doctor, view your test results, renew your prescriptions, schedule appointments and more. To sign up, go to [email or web address] . Click on \"Log in\" on the left side of the screen, which will take you to the Welcome page. Then click on \"Sign up Now\" on the right side of the page.     You will be asked to enter the access code listed below, as well as some personal information. Please follow the directions to create your username and password.     Your access code is: [email or web address]  Expires: 2017 11:09 AM     Your access code will  in 90 days. If you need help or a new code, please call your [place] clinic or [phone].        Care EveryWhere ID     This is your Care EveryWhere ID. This could be used by other organizations to access your [place] medical records  [id number]        Your Vitals Were     Pulse Respirations Height Pulse Oximetry Breastfeeding?       60 20 5' 6\" (1.676 m) 98% No        Blood Pressure from Last 3 Encounters:   17 100/60   17 105/70   17 112/68    Weight from Last 3 Encounters:   17 157 lb (71.2 kg)   03/15/17 157 lb (71.2 kg)   17 163 lb (73.9 kg)                 Today's Medication Changes          These changes are accurate as of: 17  5:20 PM.  If you have any questions, ask your nurse or doctor.               These medicines have changed or have updated prescriptions.        Dose/Directions    baclofen 10 MG tablet   Commonly known as:  LIORESAL   This may have changed:  See the new instructions.   Used for:  Back muscle spasm        TAKE 1 TABLET BY MOUTH AT B EDTIME   Quantity:  90 tablet   Refills:  0       dexlansoprazole 60 MG Cpdr CR capsule   Commonly known as:  DEXILANT   This may have changed:  See the new instructions.   Used for:  Ulcer of esophagus without bleeding   Changed by:  Henna Cruz MD        TAKE 1 CAPSULE (60 MG) BY MOUTH DAILY   Quantity:  30 capsule   Refills:  11 "            Where to get your medicines      These medications were sent to [id number] - [place], [place] - [address]  [address] [facility], [place] [address]     Phone:  [phone]     dexlansoprazole 60 MG Cpdr CR capsule         Some of these will need a paper prescription and others can be bought over the counter.  Ask your nurse if you have questions.     Bring a paper prescription for each of these medications     diazepam 5 MG tablet    HYDROcodone-acetaminophen 7.5-325 MG per tablet                Primary Care Provider Office Phone # Fax #    Henna Cruz [phone]       [facility] [place] [address]  [place] [address]        Equal Access to Services     [facility] : Hadii trent lee hadasho Soomaali, waaxda luqadaha, qaybta kaalmada adeegyada, cherise vogt . So [facility] [place] [phone].    ATENCIÓN: Si habla español, tiene a schroeder disposición servicios gratuitos de asistencia lingüística. Llame al [phone].    We comply with applicable federal civil rights laws and [place] laws. We do not discriminate on the basis of race, color, national origin, age, disability sex, sexual orientation or gender identity.            Thank you!     Thank you for choosing [facility]  for your care. Our goal is always to provide you with excellent care. Hearing back from our patients is one way we can continue to improve our services. Please take a few minutes to complete the written survey that you may receive in the mail after your visit with us. Thank you!             Your Updated Medication List - Protect others around you: Learn how to safely use, store and throw away your medicines at www.disposemymeds.org.          This list is accurate as of: 7/11/17  5:20 PM.  Always use your most recent med list.                   Brand Name Dispense Instructions for use Diagnosis    baclofen 10 MG tablet    LIORESAL    90 tablet    TAKE 1 TABLET BY MOUTH  AT B EDTIME    Back muscle spasm       CENTRUM SILVER ULTRA WOMENS Tabs           dexlansoprazole 60 MG Cpdr CR capsule    DEXILANT    30 capsule    TAKE 1 CAPSULE (60 MG) BY MOUTH DAILY    Ulcer of esophagus without bleeding       diazepam 5 MG tablet    VALIUM    100 tablet    TAKE 1 TABLET BY MOUTH EVER Y 6 HOURS AS NEEDED - GENER IC FOR VALIUM    Muscle spasm       gabapentin 300 MG capsule    NEURONTIN    90 capsule    Take 2 capsules (600 mg) by mouth every morning        HYDROcodone-acetaminophen 7.5-325 MG per tablet    NORCO    60 tablet    TAKE 1 TABLET BY MOUTH EVER Y 4 TO 6 HOURS AS NEEDED FO R PAIN    Chronic neck pain       MAXALT 10 MG tablet   Generic drug:  rizatriptan      Take 10 mg by mouth at onset of headache for migraine        Misc. Devices Misc      Dispense one Cefaly neurostimulator and the electro stimulators with refills        * OMEPRAZOLE PO      Take 40 mg by mouth 2 times daily        * omeprazole 40 MG capsule    priLOSEC    30 capsule    TAKE 1 CAPSULE (40 MG) BY MOUTH DAILY TAKE 30-60 MINUTES BEFORE A MEAL.    Gastroesophageal reflux disease with esophagitis       ondansetron 4 MG ODT tab    ZOFRAN-ODT     Take 4 mg by mouth every 8 hours as needed for nausea        order for DME      Equipment being ordered: TENS        PARoxetine 20 MG tablet    PAXIL    90 tablet    TAKE 1 TABLET (40MG) BY RASHAD TH AT BEDTIME    Recurrent major depressive disorder, remission status unspecified (H)       PROBIOTIC DAILY PO      Take by mouth 2 times daily        senna-docusate 8.6-50 MG per tablet    SENOKOT-S;PERICOLACE     Take 1-4 tablets by mouth        sucralfate 1 GM tablet    CARAFATE    40 tablet    Take 1 tablet (1 g) by mouth 4 times daily    Gastroesophageal reflux disease with esophagitis       topiramate 50 MG tablet    TOPAMAX     Take 2 tablets in the morning and 2 tablets at night        vitamin D3 1000 UNITS Caps      Take 1,000 Units by mouth        * Notice:  This list has 2  medication(s) that are the same as other medications prescribed for you. Read the directions carefully, and ask your doctor or other care provider to review them with you.

## 2017-07-12 ASSESSMENT — ANXIETY QUESTIONNAIRES: GAD7 TOTAL SCORE: 8

## 2017-07-12 ASSESSMENT — PATIENT HEALTH QUESTIONNAIRE - PHQ9: SUM OF ALL RESPONSES TO PHQ QUESTIONS 1-9: 3

## 2017-07-14 RX ORDER — BACLOFEN 10 MG/1
TABLET ORAL
Qty: 90 TABLET | Refills: 0 | Status: SHIPPED | OUTPATIENT
Start: 2017-07-14 | End: 2017-08-07

## 2017-07-31 DIAGNOSIS — F33.9 RECURRENT MAJOR DEPRESSIVE DISORDER, REMISSION STATUS UNSPECIFIED (H): ICD-10-CM

## 2017-08-01 ENCOUNTER — TRANSFERRED RECORDS (OUTPATIENT)
Dept: HEALTH INFORMATION MANAGEMENT | Facility: HOSPITAL | Age: 57
End: 2017-08-01

## 2017-08-02 RX ORDER — PAROXETINE 20 MG/1
TABLET, FILM COATED ORAL
Qty: 90 TABLET | Refills: 1 | Status: SHIPPED | OUTPATIENT
Start: 2017-08-02 | End: 2018-01-29 | Stop reason: ALTCHOICE

## 2017-08-02 NOTE — TELEPHONE ENCOUNTER
Paxil     Last Written Prescription Date: 5/5/2017  Last Fill Quantity: 90, # refills: 0  Last Office Visit with [facility] primary care provider:  7/11/2017   Next 5 appointments (look out 90 days)     Sep 21, 2017  2:15 PM CDT   (Arrive by 2:00 PM)   SHORT with Henna Cruz MD   [facility] [place] ([facility] [place] )    [address]  [place] [address]   [phone]                   Last PHQ-9 score on record=   PHQ-9 SCORE 7/11/2017   Total Score 3

## 2017-08-07 DIAGNOSIS — M62.830 BACK MUSCLE SPASM: ICD-10-CM

## 2017-08-09 NOTE — TELEPHONE ENCOUNTER
Baclofen       Last Written Prescription Date:  7/14/17  Last Fill Quantity: 90,   # refills: 0  Last [facility] Visit with [facility] prescribing provider: 7/11/17  Future Office visit:    Next 5 appointments (look out 90 days)     Sep 21, 2017  2:15 PM CDT   (Arrive by 2:00 PM)   SHORT with Henna Cruz MD   [facility] [place] ([facility] )    [address] [place] [address]   [phone]                   Routing refill request to provider for review/approval because:  Drug not on the [facility] or [facility] refill protocol or controlled substance

## 2017-08-10 RX ORDER — BACLOFEN 10 MG/1
TABLET ORAL
Qty: 90 TABLET | Refills: 1 | Status: SHIPPED | OUTPATIENT
Start: 2017-08-10 | End: 2017-10-05

## 2017-09-21 ENCOUNTER — OFFICE VISIT (OUTPATIENT)
Dept: FAMILY MEDICINE | Facility: OTHER | Age: 57
End: 2017-09-21
Attending: FAMILY MEDICINE
Payer: COMMERCIAL

## 2017-09-21 VITALS
TEMPERATURE: 98.6 F | RESPIRATION RATE: 20 BRPM | DIASTOLIC BLOOD PRESSURE: 70 MMHG | HEIGHT: 66 IN | BODY MASS INDEX: 25.71 KG/M2 | SYSTOLIC BLOOD PRESSURE: 100 MMHG | OXYGEN SATURATION: 98 % | WEIGHT: 160 LBS | HEART RATE: 61 BPM

## 2017-09-21 DIAGNOSIS — M54.16 LUMBAR RADICULOPATHY: ICD-10-CM

## 2017-09-21 DIAGNOSIS — M54.12 CHRONIC RADICULAR CERVICAL PAIN: ICD-10-CM

## 2017-09-21 DIAGNOSIS — G89.4 CHRONIC PAIN SYNDROME: Primary | ICD-10-CM

## 2017-09-21 DIAGNOSIS — K21.9 GASTROESOPHAGEAL REFLUX DISEASE WITHOUT ESOPHAGITIS: ICD-10-CM

## 2017-09-21 DIAGNOSIS — G89.29 CHRONIC RADICULAR CERVICAL PAIN: ICD-10-CM

## 2017-09-21 PROBLEM — M75.51 SUBACROMIAL BURSITIS OF RIGHT SHOULDER JOINT: Status: ACTIVE | Noted: 2017-01-26

## 2017-09-21 PROCEDURE — 99212 OFFICE O/P EST SF 10 MIN: CPT

## 2017-09-21 PROCEDURE — 99214 OFFICE O/P EST MOD 30 MIN: CPT | Performed by: FAMILY MEDICINE

## 2017-09-21 ASSESSMENT — ANXIETY QUESTIONNAIRES
6. BECOMING EASILY ANNOYED OR IRRITABLE: SEVERAL DAYS
2. NOT BEING ABLE TO STOP OR CONTROL WORRYING: SEVERAL DAYS
5. BEING SO RESTLESS THAT IT IS HARD TO SIT STILL: SEVERAL DAYS
GAD7 TOTAL SCORE: 5
7. FEELING AFRAID AS IF SOMETHING AWFUL MIGHT HAPPEN: NOT AT ALL
IF YOU CHECKED OFF ANY PROBLEMS ON THIS QUESTIONNAIRE, HOW DIFFICULT HAVE THESE PROBLEMS MADE IT FOR YOU TO DO YOUR WORK, TAKE CARE OF THINGS AT HOME, OR GET ALONG WITH OTHER PEOPLE: SOMEWHAT DIFFICULT
IF YOU CHECKED OFF ANY PROBLEMS ON THIS QUESTIONNAIRE, HOW DIFFICULT HAVE THESE PROBLEMS MADE IT FOR YOU TO DO YOUR WORK, TAKE CARE OF THINGS AT HOME, OR GET ALONG WITH OTHER PEOPLE: SOMEWHAT DIFFICULT
GAD7 TOTAL SCORE: 5
5. BEING SO RESTLESS THAT IT IS HARD TO SIT STILL: SEVERAL DAYS
6. BECOMING EASILY ANNOYED OR IRRITABLE: SEVERAL DAYS
1. FEELING NERVOUS, ANXIOUS, OR ON EDGE: NOT AT ALL
3. WORRYING TOO MUCH ABOUT DIFFERENT THINGS: SEVERAL DAYS
1. FEELING NERVOUS, ANXIOUS, OR ON EDGE: NOT AT ALL
7. FEELING AFRAID AS IF SOMETHING AWFUL MIGHT HAPPEN: NOT AT ALL
4. TROUBLE RELAXING: SEVERAL DAYS
3. WORRYING TOO MUCH ABOUT DIFFERENT THINGS: SEVERAL DAYS
4. TROUBLE RELAXING: SEVERAL DAYS
2. NOT BEING ABLE TO STOP OR CONTROL WORRYING: SEVERAL DAYS

## 2017-09-21 ASSESSMENT — PAIN SCALES - GENERAL: PAINLEVEL: MODERATE PAIN (4)

## 2017-09-21 ASSESSMENT — PATIENT HEALTH QUESTIONNAIRE - PHQ9: SUM OF ALL RESPONSES TO PHQ QUESTIONS 1-9: 10

## 2017-09-21 NOTE — MR AVS SNAPSHOT
After Visit Summary   9/21/2017    Sadaf Blankenship    MRN: [id number]           Patient Information     Date Of Birth          1960        Visit Information        Provider Department      9/21/2017 2:15 PM Henna Cruz MD [facility] [place]        Today's Diagnoses     Chronic pain syndrome    -  1    Chronic radicular cervical pain        Lumbar radiculopathy        Gastroesophageal reflux disease without esophagitis           Follow-ups after your visit        Additional Services     GASTROENTEROLOGY ADULT REF CONSULT ONLY       Preferred Location: [facility] for second opinion regarding recurrent GERD      Please be aware that coverage of these services is subject to the terms and limitations of your health insurance plan.  Call member services at your health plan with any benefit or coverage questions.  Any procedures must be performed at a [facility] facility OR coordinated by your clinic's referral office.    Please bring the following with you to your appointment:    (1) Any X-Rays, CTs or MRIs which have been performed.  Contact the facility where they were done to arrange for  prior to your scheduled appointment.    (2) List of current medications   (3) This referral request   (4) Any documents/labs given to you for this referral            NEUROSURGERY REFERRAL       Your provider has referred you to: [facility] for second opinion regarding treatment    Please be aware that coverage of these services is subject to the terms and limitations of your health insurance plan.  Call member services at your health plan with any benefit or coverage questions.      Please bring the following with you to your appointment:    (1) Any X-Rays, CTs or MRIs which have been performed.  Contact the facility where they were done to arrange for  prior to your scheduled appointment.   (2) List of current medications  (3) This referral request   (4) Any documents/labs given to  "you for this referral                  Follow-up notes from your care team     Return in about 3 months (around 2017) for medication check.      Your next 10 appointments already scheduled     Dec 18, 2017 10:45 AM CST   (Arrive by 10:30 AM)   SHORT with Henna Cruz MD   [facility] [place] ([facility] [place] )    [address] [place] [address]   [phone]              Who to contact     If you have questions or need follow up information about today's clinic visit or your schedule please contact [facility] directly at [phone].  Normal or non-critical lab and imaging results will be communicated to you by [employer], letter or phone within 4 business days after the clinic has received the results. If you do not hear from us within 7 days, please contact the clinic through [employer] or phone. If you have a critical or abnormal lab result, we will notify you by phone as soon as possible.  Submit refill requests through [employer] or call your pharmacy and they will forward the refill request to us. Please allow 3 business days for your refill to be completed.          Additional Information About Your Visit        [employer] Information     [employer] lets you send messages to your doctor, view your test results, renew your prescriptions, schedule appointments and more. To sign up, go to [email or web address] . Click on \"Log in\" on the left side of the screen, which will take you to the Welcome page. Then click on \"Sign up Now\" on the right side of the page.     You will be asked to enter the access code listed below, as well as some personal information. Please follow the directions to create your username and password.     Your access code is: [email or web address]  Expires: 2017  3:03 PM     Your access code will  in 90 days. If you need help or a new code, please call your [facility] or [phone].        Care EveryWhere ID     This is your Care EveryWhere ID. " "This could be used by other organizations to access your [place] medical records  [id number]        Your Vitals Were     Pulse Temperature Respirations Height Pulse Oximetry BMI (Body Mass Index)    61 98.6  F (37  C) (Tympanic) 20 5' 6\" (1.676 m) 98% 25.82 kg/m2       Blood Pressure from Last 3 Encounters:   09/21/17 100/70   07/11/17 100/60   05/19/17 105/70    Weight from Last 3 Encounters:   09/21/17 160 lb (72.6 kg)   04/20/17 157 lb (71.2 kg)   03/15/17 157 lb (71.2 kg)              We Performed the Following     GASTROENTEROLOGY ADULT REF CONSULT ONLY     NEUROSURGERY REFERRAL        Primary Care Provider Office Phone # Fax #    Henna Cruz [phone]       [facility] [place] [address]  [place] [address]        Equal Access to Services     [facility] : Hadii aad ku hadasho Soomaali, waaxda luqadaha, qaybta kaalmada adeegyada, waxay idiin haysergon deanna vogt . So [facility] [phone].    ATENCIÓN: Si habla español, tiene a schroeder disposición servicios gratuitos de asistencia lingüística. Traci al [phone].    We comply with applicable federal civil rights laws and [place] laws. We do not discriminate on the basis of race, color, national origin, age, disability sex, sexual orientation or gender identity.            Thank you!     Thank you for choosing [facility]  for your care. Our goal is always to provide you with excellent care. Hearing back from our patients is one way we can continue to improve our services. Please take a few minutes to complete the written survey that you may receive in the mail after your visit with us. Thank you!             Your Updated Medication List - Protect others around you: Learn how to safely use, store and throw away your medicines at www.disposemymeds.org.          This list is accurate as of: 9/21/17  3:03 PM.  Always use your most recent med list.                   Brand Name Dispense Instructions for use " Diagnosis    baclofen 10 MG tablet    LIORESAL    90 tablet    TAKE 1 TABLET DAILY BY MOUTH THREE TIMES DAILY TAKE WITH FOOD    Back muscle spasm       CENTRUM SILVER ULTRA WOMENS Tabs           dexlansoprazole 60 MG Cpdr CR capsule    DEXILANT    30 capsule    TAKE 1 CAPSULE (60 MG) BY MOUTH DAILY    Ulcer of esophagus without bleeding       diazepam 5 MG tablet    VALIUM    100 tablet    TAKE 1 TABLET BY MOUTH EVER Y 6 HOURS AS NEEDED - GENER IC FOR VALIUM    Muscle spasm       gabapentin 300 MG capsule    NEURONTIN    90 capsule    Take 2 capsules (600 mg) by mouth every morning        HYDROcodone-acetaminophen 7.5-325 MG per tablet    NORCO    60 tablet    TAKE 1 TABLET BY MOUTH EVER Y 4 TO 6 HOURS AS NEEDED FO R PAIN    Chronic neck pain       MAXALT 10 MG tablet   Generic drug:  rizatriptan      Take 10 mg by mouth at onset of headache for migraine        Misc. Devices Misc      Dispense one Cefaly neurostimulator and the electro stimulators with refills        OMEPRAZOLE PO      Take 40 mg by mouth 2 times daily        ondansetron 4 MG ODT tab    ZOFRAN-ODT     Take 4 mg by mouth every 8 hours as needed for nausea        order for DME      Equipment being ordered: TENS        PARoxetine 20 MG tablet    PAXIL    90 tablet    TAKE 1 TABLET BY MOUTH ONCE DAILY AT BEDTIME    Recurrent major depressive disorder, remission status unspecified (H)       PROBIOTIC DAILY PO      Take by mouth 2 times daily        senna-docusate 8.6-50 MG per tablet    SENOKOT-S;PERICOLACE     Take 1-4 tablets by mouth        sucralfate 1 GM tablet    CARAFATE    40 tablet    Take 1 tablet (1 g) by mouth 4 times daily    Gastroesophageal reflux disease with esophagitis       topiramate 50 MG tablet    TOPAMAX     Take 2 tablets in the morning and 2 tablets at night        vitamin D3 1000 UNITS Caps      Take 1,000 Units by mouth

## 2017-09-21 NOTE — NURSING NOTE
"Chief Complaint   Patient presents with     Musculoskeletal Problem     Follow up neck pain       Initial /70  Pulse 61  Temp 98.6  F (37  C) (Tympanic)  Resp 20  Ht 5' 6\" (1.676 m)  Wt 160 lb (72.6 kg)  SpO2 98%  BMI 25.82 kg/m2 Estimated body mass index is 25.82 kg/(m^2) as calculated from the following:    Height as of this encounter: 5' 6\" (1.676 m).    Weight as of this encounter: 160 lb (72.6 kg).  Medication Reconciliation: complete   Tessy Holloway LPN      "

## 2017-09-22 ASSESSMENT — ANXIETY QUESTIONNAIRES: GAD7 TOTAL SCORE: 5

## 2017-09-22 NOTE — PROGRESS NOTES
[facility]    Sadaf Blankenship, 57 year old, female presents with   Chief Complaint   Patient presents with     Musculoskeletal Problem     Follow up neck pain, chronic, right, with radicular burning pain down the arm. She has been following with Dr Grewal at [facility] who treated her with trigger point injections and diagnostic blocks with good resutls and pain relief. He recommended radiofrequency ablation however her family is concerned about this and wants her to get a second opinion. She has been caring for her brother who has severe heart failure and is awaiting heart transplant. Her daughter was in the hurricane in [place] recently and that also was stressful.      Gastrophageal Reflux     she is on Dexilent and still having symptoms of pain and heartburn. She would like a second opinion at [place] for this.      Pain     lumbar pain with radiculopathy, intermittently down bilateral legs, burning pain.        PAST MEDICAL HISTORY:  Past Medical History:   Diagnosis Date     ASCUS on Pap smear 5/26/2004    negative HPV     Atypical chest pain 5/26/2008    [facility] cardi[facility]     Bleeding ulcer 5/26/1976     Cervical radicular pain 5/10/2012     Degenerative disc disease, cervical 1/1/2011     Esophageal ulcer 5/26/1998     Irritable bowel syndrome 1/26/2015     Migraine headaches, severe 7/9/2001     Pseudoarthrosis of cervical spine 7/3/2012     Recurrent UTI 5/26/2011     sinusitis (chronic) 4/16/2001       PAST SURGICAL HISTORY:  Past Surgical History:   Procedure Laterality Date     BACK SURGERY      cervical     Cervical spine surgery with removal of 2 disks, C5,C7       COLONOSCOPY  10/13/2014    Dr Camargo, internal hemorrhoids     Coronary angiogram, normal  2003    Chest pain     ENT SURGERY      nose surgery x3     fusion discectomy anterior cervical  2011      ESOPHAGOSCOPY, DIAGNOSTIC  10/31/2014    Dr Camargo, mild erythema of antrum, negative biopsies      NOSE SURGERY      x3       1997     Posterior decompression , fusion C3-5      Psuedoarthrosis     Supracervical hysterectomy with right salpingoophorectomy  2002    Endometriosis       SOCIAL HISTORY  Social History     Social History     Marital status:      Spouse name: N/A     Number of children: N/A     Years of education: N/A     Occupational History     architectual resources      Social History Main Topics     Smoking status: Former Smoker     Types: Cigarettes     Smokeless tobacco: Never Used      Comment: quit . no passive exposure     Alcohol use No     Drug use: No     Sexual activity: Not on file     Other Topics Concern     Blood Transfusions Yes     Caffeine Concern Yes     1 cup daily     Parent/Sibling W/ Cabg, Mi Or Angioplasty Before 65f 55m? Yes     Social History Narrative       MEDICATIONS:  Prior to Admission medications    Medication Sig Start Date End Date Taking? Authorizing Provider   baclofen (LIORESAL) 10 MG tablet TAKE 1 TABLET DAILY BY MOUTH THREE TIMES DAILY TAKE WITH FOOD 8/10/17  Yes Paula Leary PA   diazepam (VALIUM) 5 MG tablet TAKE 1 TABLET BY MOUTH EVER Y 6 HOURS AS NEEDED - GENER IC FOR VALIUM 17  Yes Henna Crzu MD   HYDROcodone-acetaminophen (NORCO) 7.5-325 MG per tablet TAKE 1 TABLET BY MOUTH EVER Y 4 TO 6 HOURS AS NEEDED FO R PAIN 17  Yes Henna Cruz MD   dexlansoprazole (DEXILANT) 60 MG CPDR CR capsule TAKE 1 CAPSULE (60 MG) BY MOUTH DAILY 17  Yes Henna Cruz MD   rizatriptan (MAXALT) 10 MG tablet Take 10 mg by mouth at onset of headache for migraine   Yes Reported, Patient   gabapentin (NEURONTIN) 300 MG capsule Take 2 capsules (600 mg) by mouth every morning 17  Yes Henna Cruz MD   Cholecalciferol (VITAMIN D3) 1000 UNITS CAPS Take 1,000 Units by mouth 17  Yes Reported, Patient   Multiple Vitamins-Minerals (CENTRUM SILVER ULTRA WOMENS) TABS  17  Yes Reported, Patient   topiramate (TOPAMAX) 50 MG  "tablet Take 2 tablets in the morning and 2 tablets at night 1/3/17  Yes Reported, Patient   senna-docusate (SENOKOT-S;PERICOLACE) 8.6-50 MG per tablet Take 1-4 tablets by mouth 6/26/12  Yes Reported, Patient   Misc. Devices MISC Dispense one Cefaly neurostimulator and the electro stimulators with refills 6/13/16  Yes Reported, Patient   sucralfate (CARAFATE) 1 GM tablet Take 1 tablet (1 g) by mouth 4 times daily 9/13/16  Yes Henna Cruz MD   Probiotic Product (PROBIOTIC DAILY PO) Take by mouth 2 times daily   Yes Reported, Patient   PARoxetine (PAXIL) 20 MG tablet TAKE 1 TABLET BY MOUTH ONCE DAILY AT BEDTIME 8/2/17   Zoe Herbert MD   ondansetron (ZOFRAN-ODT) 4 MG ODT tab Take 4 mg by mouth every 8 hours as needed for nausea    Reported, Patient   order for DME Equipment being ordered: TENS    Reported, Patient   OMEPRAZOLE PO Take 40 mg by mouth 2 times daily    Reported, Patient       ALLERGIES:     Allergies   Allergen Reactions     Aspirin Hives     Ibuprofen      Dye in the otc form causes headaches  \"patient states over the counter ibuprofen  bothers her\"     Lansoprazole Other (See Comments) and Visual Disturbance     Changes in eyesight  Prevacid  Change in eyesight     Penicillins Other (See Comments)     pruritis     Red Dye Hives     Bupropion Rash     Bupropion Hcl Hives and Rash     Wellbutrin     Demerol [Meperidine] Other (See Comments)     Made migraine worse       ROS:  C: NEGATIVE for fever, chills, change in weight  I: NEGATIVE for worrisome rashes, moles or lesions  R: NEGATIVE for significant cough or SOB  CV: NEGATIVE for chest pain, palpitations or peripheral edema  GI: NEGATIVE for nausea, abdominal pain, heartburn, or change in bowel habits  N: NEGATIVE for weakness, dizziness or paresthesias  P: NEGATIVE for changes in mood or affect    EXAM:  /70  Pulse 61  Temp 98.6  F (37  C) (Tympanic)  Resp 20  Ht 5' 6\" (1.676 m)  Wt 160 lb (72.6 kg)  SpO2 98%  BMI 25.82 kg/m2 " Body mass index is 25.82 kg/(m^2).   GENERAL APPEARANCE: alert, no distress and teary a times  ORTHO: Cervical Spine Exam: Inspection: increased cervical lordosis  Tender:  right paracervical muscles, right trapezius muscles  Non-tender:  left paracervical muscles, left trapezius muscles  Range of Motion:  flexion:  decreased, painful, 10 degrees, extension: decreased, painful, 20 degrees, left lateral rotation:  decreased, painful, 10 degrees, right lateral rotation:  decreased, painful, 10 degrees  Strength: intact    Lumber/Thoracic Spine Exam: Tender:  left parathoracic muscles, right parathoracic muscles  Non-tender:  left SI joint, right SI joint  Range of Motion:  lumbar flexion  decreased, painful, lumbar extension  decreased, painful  Strength:  able to heel walk, able to toe walk    SKIN: no suspicious lesions or rashes  NEURO: Normal strength and tone, mentation intact and speech normal  PSYCH: mentation appears normal and worried    LABS AND IMAGING:           ASSESSMENT/PLAN:  (G89.4) Chronic pain syndrome  (primary encounter diagnosis)  Comment:   Plan: NEUROSURGERY REFERRAL        She would like to have a second opinion at [place] before she considers radioablative surgery    (M54.12,  G89.29) Chronic radicular cervical pain  Comment: right arm  Plan: NEUROSURGERY REFERRAL        As above       (M54.16) Lumbar radiculopathy  Comment: right lower leg, occasionally left lower leg  Plan: continue current medications    (K21.9) Gastroesophageal reflux disease without esophagitis  Comment:   Plan: GASTROENTEROLOGY ADULT REF CONSULT ONLY        Continue Dexilent and referral to [place]  She will follow up in December, sooner for concerns. She also has paper work that needs to be completed but that she forgot at home. She will drop this off.     Henna Cruz MD  September 21, 2017

## 2017-10-04 ENCOUNTER — TELEPHONE (OUTPATIENT)
Dept: FAMILY MEDICINE | Facility: OTHER | Age: 57
End: 2017-10-04

## 2017-10-04 NOTE — TELEPHONE ENCOUNTER
2:07 PM    Reason for Call: Phone Call    Description: Patient would like to speak to the nurse regarding some requests she sent over about her going to the [place]. Patient is aware that Dr Henna Cruz is out of office until 10-5-17    Was an appointment offered for this call? No  If yes : Appointment type              Date    Preferred method for responding to this message: Telephone Call  What is your phone number ? [phone]    If we cannot reach you directly, may we leave a detailed response at the number you provided? Yes    Can this message wait until your PCP/provider returns, if available today? YES    Marilyn Anthony

## 2017-10-05 DIAGNOSIS — M62.830 BACK MUSCLE SPASM: ICD-10-CM

## 2017-10-05 RX ORDER — BACLOFEN 10 MG/1
TABLET ORAL
Qty: 90 TABLET | Refills: 0 | Status: SHIPPED | OUTPATIENT
Start: 2017-10-05 | End: 2017-11-11

## 2017-10-05 NOTE — TELEPHONE ENCOUNTER
Called and spoke with Melanie and she would like a call back sometime today if possible in regards to her daughter Noemy. States she is at school in [place] and is continuing to have crying episodes, suicidal thoughts and wants to speak with you in regards to all of this. I did note you may not call right away, since we are busy with patients .

## 2017-10-05 NOTE — TELEPHONE ENCOUNTER
Controlled Substance Refill Request for Baclofen  Problem List Complete:  Yes    Last Written Prescription Date:  8.10.17  Last Fill Quantity: 90,   # refills: 1    Last Office Visit with [facility] primary care provider: 9.21.17    Clinic visit frequency required: Q 3 months     Future Office visit:   Next 5 appointments (look out 90 days)     Dec 18, 2017 10:45 AM CST   (Arrive by 10:30 AM)   SHORT with Henna Cruz MD   [facility] ([facility] )    [address] [place] [address]  [place] [address]   [phone]                  Controlled substance agreement on file: Yes:  Date 7.11.17.     Processing:  Eprescribe     checked in past 6 months?  Yes 7.10.17

## 2017-10-09 DIAGNOSIS — G89.29 CHRONIC NECK PAIN: ICD-10-CM

## 2017-10-09 DIAGNOSIS — M62.838 MUSCLE SPASM: ICD-10-CM

## 2017-10-09 DIAGNOSIS — M54.2 CHRONIC NECK PAIN: ICD-10-CM

## 2017-10-09 RX ORDER — HYDROCODONE BITARTRATE AND ACETAMINOPHEN 7.5; 325 MG/1; MG/1
TABLET ORAL
Qty: 60 TABLET | Refills: 0 | Status: SHIPPED | OUTPATIENT
Start: 2017-10-09 | End: 2018-01-22

## 2017-10-09 RX ORDER — DIAZEPAM 5 MG
TABLET ORAL
Qty: 100 TABLET | Refills: 0 | Status: SHIPPED | OUTPATIENT
Start: 2017-10-09 | End: 2018-01-22

## 2017-10-09 NOTE — TELEPHONE ENCOUNTER
valium      Last Written Prescription Date: 7/11/17  Last Fill Quantity: 100,  # refills: 0   Last Office Visit with [facility], [facility] or  Health prescribing provider: 9/21/17                                         Next 5 appointments (look out 90 days)     Dec 18, 2017 10:45 AM CST   (Arrive by 10:30 AM)   SHORT with Henna Cruz MD   [facility]    [address] [facility] [address]   [phone]                    norco      Last Written Prescription Date: 7/11/17  Last Fill Quantity: 60,  # refills: 0   Last Office Visit with [facility], [facility] or  Health prescribing provider: 9/27/17                                         Next 5 appointments (look out 90 days)     Dec 18, 2017 10:45 AM CST   (Arrive by 10:30 AM)   SHORT with Henna Cruz MD   [facility] ([facility] )    [address] [place] [address]   [phone]

## 2017-10-10 NOTE — TELEPHONE ENCOUNTER
Pt notified that the written RX is ready at the [facility]  registration to be picked up.

## 2017-11-01 ENCOUNTER — TELEPHONE (OUTPATIENT)
Dept: FAMILY MEDICINE | Facility: OTHER | Age: 57
End: 2017-11-01

## 2017-11-01 NOTE — TELEPHONE ENCOUNTER
Writer sent message to Gary Orosco for [facility] insurance referral for appointment 11/14/17.

## 2017-11-01 NOTE — TELEPHONE ENCOUNTER
"Sadaf stated she spoke to [employer] about forms that need to be sent to [facility].  She wanted to add that [facility] needs Dr RAMOS Cruz to put \"Urgent\" on them, so they get processed before her appointments on 11-15 and 11-16.  Thank you  "

## 2017-11-08 NOTE — TELEPHONE ENCOUNTER
11/8/17  To: Allison SCHULER for Dr Henna Cruz  Patient called regarding the insurance pre authorization to the [facility].  Patient said the submission was done for Gastro and should have been for Spinal.  The [facility] business office is in need of this as soon as possible as the procedure is set for next week possibly (Tues 11/14/17).  Please call patient back today @

## 2017-11-11 DIAGNOSIS — M62.830 BACK MUSCLE SPASM: ICD-10-CM

## 2017-11-13 NOTE — TELEPHONE ENCOUNTER
Baclofen      Last Written Prescription Date: 10/5/17  Last Fill Quantity: 90,  # refills: 0   Last Office Visit with G, P or [facility] prescribing provider: 9/21/17

## 2017-11-14 RX ORDER — BACLOFEN 10 MG/1
TABLET ORAL
Qty: 90 TABLET | Refills: 1 | Status: SHIPPED | OUTPATIENT
Start: 2017-11-14 | End: 2018-01-04

## 2017-11-15 ENCOUNTER — TELEPHONE (OUTPATIENT)
Dept: FAMILY MEDICINE | Facility: OTHER | Age: 57
End: 2017-11-15

## 2017-11-15 NOTE — TELEPHONE ENCOUNTER
8:44 AM    Reason for Call: Phone Call    Description: Pt called and stated she needs ins referrals for 3 more days of seeing specialists at [place]. Stated she has been working will Allison about this and she would know what she needs. Please call pt back at [phone]    Was an appointment offered for this call? No  If yes : Appointment type              Date    Preferred method for responding to this message: Telephone Call  What is your phone number ?    If we cannot reach you directly, may we leave a detailed response at the number you provided? Yes    Can this message wait until your PCP/provider returns, if available today? Not applicable    Noemy Ty

## 2017-11-15 NOTE — TELEPHONE ENCOUNTER
Writer will coordinate with Gary Orosco, HIM staff who works with insurance referrals, to see if further authorizations can be allowed for patient.

## 2017-11-17 ENCOUNTER — TRANSFERRED RECORDS (OUTPATIENT)
Dept: HEALTH INFORMATION MANAGEMENT | Facility: HOSPITAL | Age: 57
End: 2017-11-17

## 2017-11-20 ENCOUNTER — TELEPHONE (OUTPATIENT)
Dept: FAMILY MEDICINE | Facility: OTHER | Age: 57
End: 2017-11-20

## 2017-11-20 NOTE — TELEPHONE ENCOUNTER
Please schedule patient for date/time: unable to see this week, but can work into our schedule next week on Monday at 2:00.    Have patient go to ER/Urgent Care Center. Urgent Care hours are 9:30 am to 8 pm, open 7 days a week. No.    Provider will call patient.No.    Other:

## 2017-11-20 NOTE — TELEPHONE ENCOUNTER
11:07 AM    Reason for Call: OVERBOOK    Patient is having the following symptoms: follow up from [place] visit they wanted seen asap for med change for 1 weeks.    The patient is requesting an appointment for asap with Henna Cruz.    Was an appointment offered for this call? No  If yes : Appointment type              Date    Preferred method for responding to this message: Telephone Call  What is your phone number ?    If we cannot reach you directly, may we leave a detailed response at the number you provided? Yes    Can this message wait until your PCP/provider returns, if unavailable today?     Nichelle Landeros

## 2017-11-27 ENCOUNTER — TELEPHONE (OUTPATIENT)
Dept: FAMILY MEDICINE | Facility: OTHER | Age: 57
End: 2017-11-27

## 2017-11-27 ENCOUNTER — OFFICE VISIT (OUTPATIENT)
Dept: FAMILY MEDICINE | Facility: OTHER | Age: 57
End: 2017-11-27
Attending: FAMILY MEDICINE
Payer: COMMERCIAL

## 2017-11-27 VITALS
HEART RATE: 67 BPM | RESPIRATION RATE: 20 BRPM | HEIGHT: 66 IN | OXYGEN SATURATION: 98 % | TEMPERATURE: 98.4 F | DIASTOLIC BLOOD PRESSURE: 74 MMHG | BODY MASS INDEX: 26.03 KG/M2 | WEIGHT: 162 LBS | SYSTOLIC BLOOD PRESSURE: 102 MMHG

## 2017-11-27 DIAGNOSIS — G89.29 CHRONIC NECK PAIN: Primary | ICD-10-CM

## 2017-11-27 DIAGNOSIS — M54.2 CHRONIC NECK PAIN: Primary | ICD-10-CM

## 2017-11-27 DIAGNOSIS — F41.9 ANXIETY: ICD-10-CM

## 2017-11-27 DIAGNOSIS — M79.18 MYOFASCIAL PAIN: ICD-10-CM

## 2017-11-27 PROCEDURE — 99214 OFFICE O/P EST MOD 30 MIN: CPT | Performed by: FAMILY MEDICINE

## 2017-11-27 PROCEDURE — 99212 OFFICE O/P EST SF 10 MIN: CPT

## 2017-11-27 RX ORDER — DULOXETIN HYDROCHLORIDE 30 MG/1
30 CAPSULE, DELAYED RELEASE ORAL DAILY
Qty: 30 CAPSULE | Refills: 3 | Status: SHIPPED | OUTPATIENT
Start: 2017-11-27 | End: 2018-01-02

## 2017-11-27 ASSESSMENT — ANXIETY QUESTIONNAIRES
6. BECOMING EASILY ANNOYED OR IRRITABLE: MORE THAN HALF THE DAYS
2. NOT BEING ABLE TO STOP OR CONTROL WORRYING: MORE THAN HALF THE DAYS
3. WORRYING TOO MUCH ABOUT DIFFERENT THINGS: MORE THAN HALF THE DAYS
1. FEELING NERVOUS, ANXIOUS, OR ON EDGE: MORE THAN HALF THE DAYS
GAD7 TOTAL SCORE: 12
5. BEING SO RESTLESS THAT IT IS HARD TO SIT STILL: MORE THAN HALF THE DAYS
7. FEELING AFRAID AS IF SOMETHING AWFUL MIGHT HAPPEN: NOT AT ALL
IF YOU CHECKED OFF ANY PROBLEMS ON THIS QUESTIONNAIRE, HOW DIFFICULT HAVE THESE PROBLEMS MADE IT FOR YOU TO DO YOUR WORK, TAKE CARE OF THINGS AT HOME, OR GET ALONG WITH OTHER PEOPLE: VERY DIFFICULT

## 2017-11-27 ASSESSMENT — PAIN SCALES - GENERAL: PAINLEVEL: MODERATE PAIN (5)

## 2017-11-27 ASSESSMENT — PATIENT HEALTH QUESTIONNAIRE - PHQ9
SUM OF ALL RESPONSES TO PHQ QUESTIONS 1-9: 19
5. POOR APPETITE OR OVEREATING: MORE THAN HALF THE DAYS

## 2017-11-27 NOTE — TELEPHONE ENCOUNTER
3:46 PM    Reason for Call: Phone Call    Description: Patient is just calling to give the name of the neurologist she saw at the [facility], it is Dr Ivy Minor MD PhD.     Was an appointment offered for this call? No  If yes : Appointment type              Date    Preferred method for responding to this message: NA  What is your phone number ?    If we cannot reach you directly, may we leave a detailed response at the number you provided? NA    Can this message wait until your PCP/provider returns, if available today? EMMANUEL Anthony

## 2017-11-27 NOTE — NURSING NOTE
"Chief Complaint   Patient presents with     Recheck Medication     Follow up from [place]/Medication change       Initial /74  Pulse 67  Temp 98.4  F (36.9  C) (Tympanic)  Resp 20  Ht 5' 6\" (1.676 m)  Wt 162 lb (73.5 kg)  SpO2 98%  BMI 26.15 kg/m2 Estimated body mass index is 26.15 kg/(m^2) as calculated from the following:    Height as of this encounter: 5' 6\" (1.676 m).    Weight as of this encounter: 162 lb (73.5 kg).  Medication Reconciliation: complete   RAFFY CARLOS lpn  "

## 2017-11-27 NOTE — MR AVS SNAPSHOT
"              After Visit Summary   11/27/2017    Sadaf Blankenship    MRN: [id number]           Patient Information     Date Of Birth          1960        Visit Information        Provider Department      11/27/2017 2:00 PM Henna Cruz MD [facility]        Today's Diagnoses     Chronic neck pain    -  1    Myofascial pain           Follow-ups after your visit        Your next 10 appointments already scheduled     Nov 28, 2017  8:20 AM CST   (Arrive by 8:05 AM)   Return Visit with Zoe Herbert MD   [facility] ([facility] [place] )    [address] [place] [address]  [place] [address] [phone]            Dec 18, 2017 10:45 AM CST   (Arrive by 10:30 AM)   SHORT with Henna Cruz MD   [facility] [place] ([facility] [place] )    [address] [facility] [address]   [phone]              Who to contact     If you have questions or need follow up information about today's clinic visit or your schedule please contact [facility] directly at [phone].  Normal or non-critical lab and imaging results will be communicated to you by MyChart, letter or phone within 4 business days after the clinic has received the results. If you do not hear from us within 7 days, please contact the clinic through [employer] or phone. If you have a critical or abnormal lab result, we will notify you by phone as soon as possible.  Submit refill requests through [email or web address] or call your pharmacy and they will forward the refill request to us. Please allow 3 business days for your refill to be completed.          Additional Information About Your Visit        MyChart Information     [email or web address] lets you send messages to your doctor, view your test results, renew your prescriptions, schedule appointments and more. To sign up, go to [email or web address] . Click on \"Log in\" on the left side of the screen, which will take you to the Welcome page. Then click on " "\"Sign up Now\" on the right side of the page.     You will be asked to enter the access code listed below, as well as some personal information. Please follow the directions to create your username and password.     Your access code is: [email or web address]  Expires: 2017  2:03 PM     Your access code will  in 90 days. If you need help or a new code, please call your [facility] clinic or [phone].        Care EveryWhere ID     This is your Care EveryWhere ID. This could be used by other organizations to access your [facility] medical records  [id number]        Your Vitals Were     Pulse Temperature Respirations Height Pulse Oximetry BMI (Body Mass Index)    67 98.4  F (36.9  C) (Tympanic) 20 5' 6\" (1.676 m) 98% 26.15 kg/m2       Blood Pressure from Last 3 Encounters:   17 102/74   17 100/70   17 100/60    Weight from Last 3 Encounters:   17 162 lb (73.5 kg)   17 160 lb (72.6 kg)   17 157 lb (71.2 kg)              Today, you had the following     No orders found for display         Today's Medication Changes          These changes are accurate as of: 17  2:46 PM.  If you have any questions, ask your nurse or doctor.               Start taking these medicines.        Dose/Directions    DULoxetine 30 MG EC capsule   Commonly known as:  CYMBALTA   Used for:  Chronic neck pain   Started by:  Henna Cruz MD        Dose:  30 mg   Take 1 capsule (30 mg) by mouth daily   Quantity:  30 capsule   Refills:  3         Stop taking these medicines if you haven't already. Please contact your care team if you have questions.     gabapentin 300 MG capsule   Commonly known as:  NEURONTIN   Stopped by:  Henna Cruz MD           OMEPRAZOLE PO   Stopped by:  Henna Cruz MD           ondansetron 4 MG ODT tab   Commonly known as:  ZOFRAN-ODT   Stopped by:  Henna Cruz MD                Where to get your medicines      These medications were sent to [id number] - " [place], [place] [address]  [address] [facility] [address]     Phone:  [phone]     DULoxetine 30 MG EC capsule                Primary Care Provider Office Phone # Fax #    Henna Cruz [phone]       [facility] [address]  [place] [address]        Equal Access to Services     [facility]: Hadii aad ku hadasho Soomaali, waaxda luqadaha, qaybta kaalmada adeegyada, waxay idiin hayaan adeeg kharash la'aan ah. So [facility] [phone].    ATENCIÓN: Si habla español, tiene a schroeder disposición servicios gratuitos de asistencia lingüística. Llame al [phone].    We comply with applicable federal civil rights laws and [place] laws. We do not discriminate on the basis of race, color, national origin, age, disability, sex, sexual orientation, or gender identity.            Thank you!     Thank you for choosing [facility]  for your care. Our goal is always to provide you with excellent care. Hearing back from our patients is one way we can continue to improve our services. Please take a few minutes to complete the written survey that you may receive in the mail after your visit with us. Thank you!             Your Updated Medication List - Protect others around you: Learn how to safely use, store and throw away your medicines at www.disposemymeds.org.          This list is accurate as of: 11/27/17  2:46 PM.  Always use your most recent med list.                   Brand Name Dispense Instructions for use Diagnosis    baclofen 10 MG tablet    LIORESAL    90 tablet    TAKE 1 TABLET BY MOUTH THREE TIMES DAILY WITH FOOD    Back muscle spasm       CENTRUM SILVER ULTRA WOMENS Tabs           dexlansoprazole 60 MG Cpdr CR capsule    DEXILANT    30 capsule    TAKE 1 CAPSULE (60 MG) BY MOUTH DAILY    Ulcer of esophagus without bleeding       diazepam 5 MG tablet    VALIUM    100 tablet    TAKE 1 TABLET BY MOUTH EVER Y 6 HOURS AS NEEDED - GENER IC FOR VALIUM    Muscle spasm        DULoxetine 30 MG EC capsule    CYMBALTA    30 capsule    Take 1 capsule (30 mg) by mouth daily    Chronic neck pain       HYDROcodone-acetaminophen 7.5-325 MG per tablet    NORCO    60 tablet    TAKE 1 TABLET BY MOUTH EVER Y 4 TO 6 HOURS AS NEEDED FO R PAIN    Chronic neck pain       MAXALT 10 MG tablet   Generic drug:  rizatriptan      Take 10 mg by mouth at onset of headache for migraine        Misc. Devices Misc      Dispense one Cefaly neurostimulator and the electro stimulators with refills        order for DME      Equipment being ordered: TENS        PARoxetine 20 MG tablet    PAXIL    90 tablet    TAKE 1 TABLET BY MOUTH ONCE DAILY AT BEDTIME    Recurrent major depressive disorder, remission status unspecified (H)       PROBIOTIC DAILY PO      Take by mouth 2 times daily        senna-docusate 8.6-50 MG per tablet    SENOKOT-S;PERICOLACE     Take 1-4 tablets by mouth        sucralfate 1 GM tablet    CARAFATE    40 tablet    Take 1 tablet (1 g) by mouth 4 times daily    Gastroesophageal reflux disease with esophagitis       topiramate 50 MG tablet    TOPAMAX     Take 2 tablets in the morning and 2 tablets at night        vitamin D3 1000 UNITS Caps      Take 1,000 Units by mouth

## 2017-11-28 ENCOUNTER — OFFICE VISIT (OUTPATIENT)
Dept: PSYCHIATRY | Facility: OTHER | Age: 57
End: 2017-11-28
Attending: PSYCHIATRY & NEUROLOGY
Payer: COMMERCIAL

## 2017-11-28 VITALS
BODY MASS INDEX: 26.15 KG/M2 | TEMPERATURE: 96.9 F | HEART RATE: 57 BPM | WEIGHT: 162 LBS | DIASTOLIC BLOOD PRESSURE: 64 MMHG | SYSTOLIC BLOOD PRESSURE: 92 MMHG

## 2017-11-28 DIAGNOSIS — F32.1 MAJOR DEPRESSIVE DISORDER, SINGLE EPISODE, MODERATE (H): Primary | ICD-10-CM

## 2017-11-28 PROCEDURE — 99212 OFFICE O/P EST SF 10 MIN: CPT

## 2017-11-28 PROCEDURE — 99214 OFFICE O/P EST MOD 30 MIN: CPT | Performed by: PSYCHIATRY & NEUROLOGY

## 2017-11-28 ASSESSMENT — ANXIETY QUESTIONNAIRES
7. FEELING AFRAID AS IF SOMETHING AWFUL MIGHT HAPPEN: NOT AT ALL
6. BECOMING EASILY ANNOYED OR IRRITABLE: MORE THAN HALF THE DAYS
GAD7 TOTAL SCORE: 12
2. NOT BEING ABLE TO STOP OR CONTROL WORRYING: MORE THAN HALF THE DAYS
1. FEELING NERVOUS, ANXIOUS, OR ON EDGE: MORE THAN HALF THE DAYS
3. WORRYING TOO MUCH ABOUT DIFFERENT THINGS: MORE THAN HALF THE DAYS
5. BEING SO RESTLESS THAT IT IS HARD TO SIT STILL: MORE THAN HALF THE DAYS
GAD7 TOTAL SCORE: 12

## 2017-11-28 ASSESSMENT — PATIENT HEALTH QUESTIONNAIRE - PHQ9
5. POOR APPETITE OR OVEREATING: MORE THAN HALF THE DAYS
SUM OF ALL RESPONSES TO PHQ QUESTIONS 1-9: 13

## 2017-11-28 ASSESSMENT — PAIN SCALES - GENERAL: PAINLEVEL: MODERATE PAIN (4)

## 2017-11-28 NOTE — PATIENT INSTRUCTIONS
Start Cymbalta at 30 mg daily. Let's have you continue Paxil for a week or two as you get used to the Cymbalta. If cymbalta goes okay then I would suggest taper down and off the Paxil like I said after Cymbalta for week or two. paxil works on serotonin , cymbalta works on serotonin and norepinephrine. I'd prefer keep your meds simple rather switching than adding another one.     Paxil: short acting so sometimes people have hard time going off of it. I think decreasing the 20 mg to 10 thus take 1/2 tab for week then discontinue it. Withdrawal : nausea, sweats, dizzy, irritability.

## 2017-11-28 NOTE — PROGRESS NOTES
[facility]    Sadaf MCMAHAN Latebrent, 57 year old, female presents with   Chief Complaint   Patient presents with     Recheck Medication     Follow up from [place]/Medication change. She was evaluated at [place]; we don't have these records. The Neurosurgeon told her there was nothing to do and wanted her to go to a pain program. She has been involved with two others and didn't want to travel down there for this again. She met with a neurologist Dr Ivy Shaffer who felt that she needed treatment for her nerve pain and started tapering her off of neurontin as Melanie felt that this was causing memory change and fatigue. She is down to 300 mg daily and her symptoms have improved however she is noting how much the medication was helping as her pain has worsened. She would like to try something different. She was advised to follow up here for depression and anxiety. She will be seeing Dr Herbert tomorrow and then leaving Thursday to drive to [place] to help her brother and his wife who will be having a heart transplant. Her daughter Noemy has been having issues as well and she worries about her. Her neurologist stated that she didn't thing that with her chronic pain she would be able to work again. She continues to have burning pain of the right neck radiating to the right trapezius area and right shoulder. No suicidal ideation       PAST MEDICAL HISTORY:  Past Medical History:   Diagnosis Date     ASCUS on Pap smear 5/26/2004    negative HPV     Atypical chest pain 5/26/2008    negative cardiolite stress test [facility]     Bleeding ulcer 5/26/1976     Cervical radicular pain 5/10/2012     Degenerative disc disease, cervical 1/1/2011     Esophageal ulcer 5/26/1998     Irritable bowel syndrome 1/26/2015     Migraine headaches, severe 7/9/2001     Pseudoarthrosis of cervical spine 7/3/2012     Recurrent UTI 5/26/2011     sinusitis (chronic) 4/16/2001       PAST SURGICAL HISTORY:  Past Surgical History:    Procedure Laterality Date     BACK SURGERY      cervical     Cervical spine surgery with removal of 2 disks, C5,C7       COLONOSCOPY  10/13/2014    Dr Camargo, internal hemorrhoids     Coronary angiogram, normal      Chest pain     ENT SURGERY      nose surgery x3     fusion discectomy anterior cervical  2011     HC ESOPHAGOSCOPY, DIAGNOSTIC  10/31/2014    Dr Camargo, mild erythema of antrum, negative biopsies     NOSE SURGERY      x3            Posterior decompression , fusion C3-5      Psuedoarthrosis     Supracervical hysterectomy with right salpingoophorectomy  2002    Endometriosis       SOCIAL HISTORY  Social History     Social History     Marital status:      Spouse name: N/A     Number of children: N/A     Years of education: N/A     Occupational History     architectual resources      Social History Main Topics     Smoking status: Former Smoker     Types: Cigarettes     Smokeless tobacco: Never Used      Comment: quit . no passive exposure     Alcohol use No     Drug use: No     Sexual activity: Not on file     Other Topics Concern     Blood Transfusions Yes     Caffeine Concern Yes     1 cup daily     Parent/Sibling W/ Cabg, Mi Or Angioplasty Before 65f 55m? Yes     Social History Narrative       MEDICATIONS:  Prior to Admission medications    Medication Sig Start Date End Date Taking? Authorizing Provider   DULoxetine (CYMBALTA) 30 MG EC capsule Take 1 capsule (30 mg) by mouth daily 17  Yes Henna Cruz MD   baclofen (LIORESAL) 10 MG tablet TAKE 1 TABLET BY MOUTH THREE TIMES DAILY WITH FOOD 17  Yes Henna Cruz MD   diazepam (VALIUM) 5 MG tablet TAKE 1 TABLET BY MOUTH EVER Y 6 HOURS AS NEEDED - GENER IC FOR VALIUM 10/9/17  Yes Henna Cruz MD   PARoxetine (PAXIL) 20 MG tablet TAKE 1 TABLET BY MOUTH ONCE DAILY AT BEDTIME 17  Yes Zoe Herbert MD   dexlansoprazole (DEXILANT) 60 MG CPDR CR capsule TAKE 1 CAPSULE (60 MG) BY MOUTH DAILY 17  Yes  "Henna Cruz MD   Cholecalciferol (VITAMIN D3) 1000 UNITS CAPS Take 1,000 Units by mouth 1/6/17  Yes Reported, Patient   Multiple Vitamins-Minerals (CENTRUM SILVER ULTRA WOMENS) TABS  1/6/17  Yes Reported, Patient   topiramate (TOPAMAX) 50 MG tablet Take 2 tablets in the morning and 2 tablets at night 1/3/17  Yes Reported, Patient   order for DME Equipment being ordered: TENS   Yes Reported, Patient   Misc. Devices MISC Dispense one Cefaly neurostimulator and the electro stimulators with refills 6/13/16  Yes Reported, Patient   Probiotic Product (PROBIOTIC DAILY PO) Take by mouth 2 times daily   Yes Reported, Patient   HYDROcodone-acetaminophen (NORCO) 7.5-325 MG per tablet TAKE 1 TABLET BY MOUTH EVER Y 4 TO 6 HOURS AS NEEDED FO R PAIN 10/9/17   Henna Cruz MD   rizatriptan (MAXALT) 10 MG tablet Take 10 mg by mouth at onset of headache for migraine    Reported, Patient   senna-docusate (SENOKOT-S;PERICOLACE) 8.6-50 MG per tablet Take 1-4 tablets by mouth 6/26/12   Reported, Patient   sucralfate (CARAFATE) 1 GM tablet Take 1 tablet (1 g) by mouth 4 times daily 9/13/16   Henna Cruz MD       ALLERGIES:     Allergies   Allergen Reactions     Aspirin Hives     Ibuprofen      Dye in the otc form causes headaches  \"patient states over the counter ibuprofen  bothers her\"     Lansoprazole Other (See Comments) and Visual Disturbance     Changes in eyesight  Prevacid  Change in eyesight     Penicillins Other (See Comments)     pruritis     Red Dye Hives     Bupropion Rash     Bupropion Hcl Hives and Rash     Wellbutrin     Demerol [Meperidine] Other (See Comments)     Made migraine worse       ROS:  CONSTITUTIONAL:NEGATIVE for fever, chills, change in weight  NEURO: NEGATIVE for  dizziness   PSYCHIATRIC: NEGATIVE for changes in mood or affect    EXAM:  /74  Pulse 67  Temp 98.4  F (36.9  C) (Tympanic)  Resp 20  Ht 5' 6\" (1.676 m)  Wt 162 lb (73.5 kg)  SpO2 98%  BMI 26.15 kg/m2 Body mass index is 26.15 " kg/(m^2).   GENERAL APPEARANCE: alert, no distress, crying and fatigued  MS: spasm of the right cervical and upper bilateral trapezius muscles with tenderness noted. Decreased ROM throughout of the neck   NEURO: Normal strength and tone, mentation intact and speech normal  PSYCH: mentation appears normal, crying and worried    LABS AND IMAGING:           ASSESSMENT/PLAN:  (M54.2,  G89.29) Chronic neck pain  (primary encounter diagnosis)  Comment:   Plan: DULoxetine (CYMBALTA) 30 MG EC capsule        Will start Cymbalta when she has completed gabapentin later this week. She will call from [place] in a month to see how she is doing. Will use this for pain and anxiety    (M79.1) Myofascial pain  Comment:   Plan: as above. This is chronic and will not improve    (F41.9) Anxiety  Comment:   Plan: Cymbalta as above and follow up tomorrow with Dr. Coronel. continue Paxil    Thirty minutes spent with the patient, greater than 50% in counseling and coordination of care of the above problems and treatment        Henna Cruz MD  November 27, 2017

## 2017-11-28 NOTE — PROGRESS NOTES
"  PSYCHIATRY CLINIC PROGRESS NOTE   20 minute medication management, more than 50% of time spent counseling patient on medications, medication side effects, symptom history and management   SUBJECTIVE / INTERIM HISTORY                                                                       Social- niece and niece's kids moved out Children-  Daughter going to college  Last visit April 2016:  Continue Paxil 20 mg HS    - Dr. Cruz suggested cymbal  - Oral visit for second opinion for cervical rhizotomy: they said \"no\" and do their pain program and / or rhizotomy there  - long-term disability and moved in with her sister  - \"I'm horseshit\"   - as it gets colder pain gets worse then depression & anxiety get worse  - pain clinic Tues / Thurs 8-4:30 for ~8 weeks starts tomorrow  - terminated her from work and working on disability  - Topamax: for migraines on twice daily    SUBSTANCE USE- no issues    SYMPTOMS- depression, anhedonia, low energy, overwhelmed, no SI.  MEDICAL ROS-  .. Substernal pain after she eats (hx ulcers esophageal and gastric) neck pain s/p neck surgeries, migraines, IBS  MEDICAL / SURGICAL HISTORY                    Patient Active Problem List   Diagnosis     Degenerative disc disease, cervical     Pseudoarthrosis of cervical spine     Cervical pain     Migraine     UTI (urinary tract infection)     Advanced care planning/counseling discussion     Thoracic sprain and strain     Sprain and strain of shoulder and upper arm     Irritable bowel syndrome     Myofascial pain syndrome, cervical     Depression, major     Chronic pain syndrome     Uvulitis     Chronic rhinitis     Madina bullosa     Chronic maxillary sinusitis     Hypertrophy of inferior nasal turbinate     Long uvula     Chronic neck pain     Chronic pain     Chronic tension-type headache, intractable     Migraine aura without headache     History of arthrodesis     Myofascial pain     Disuse syndrome     Intractable chronic migraine " without aura and with status migrainosus     Ulcer of esophagus without bleeding     Gastroesophageal reflux disease with esophagitis     Intractable chronic migraine without aura and without status migrainosus     Ulcer of esophagus     Ulnar neuropathy     Chronic radicular cervical pain     Mild episode of recurrent major depressive disorder (H)     Ulnar neuropathy at elbow of left upper extremity     Lumbar radiculopathy     S/P cervical spinal fusion     ACP (advance care planning)     Acute back pain with sciatica     Chronic migraine without aura without status migrainosus, not intractable     Radicular pain     Subacromial bursitis of right shoulder joint     Panlobular emphysema (H)     Calculus of kidney     Pulmonary emphysema, unspecified emphysema type (H)     ALLERGY   Aspirin; Ibuprofen; Lansoprazole; Penicillins; Red dye; Bupropion; Bupropion hcl; and Demerol [meperidine]  MEDICATIONS                                                                                             Current Outpatient Prescriptions   Medication Sig     DULoxetine (CYMBALTA) 30 MG EC capsule Take 1 capsule (30 mg) by mouth daily     baclofen (LIORESAL) 10 MG tablet TAKE 1 TABLET BY MOUTH THREE TIMES DAILY WITH FOOD     diazepam (VALIUM) 5 MG tablet TAKE 1 TABLET BY MOUTH EVER Y 6 HOURS AS NEEDED - GENER IC FOR VALIUM     HYDROcodone-acetaminophen (NORCO) 7.5-325 MG per tablet TAKE 1 TABLET BY MOUTH EVER Y 4 TO 6 HOURS AS NEEDED FO R PAIN     PARoxetine (PAXIL) 20 MG tablet TAKE 1 TABLET BY MOUTH ONCE DAILY AT BEDTIME     dexlansoprazole (DEXILANT) 60 MG CPDR CR capsule TAKE 1 CAPSULE (60 MG) BY MOUTH DAILY     rizatriptan (MAXALT) 10 MG tablet Take 10 mg by mouth at onset of headache for migraine     Cholecalciferol (VITAMIN D3) 1000 UNITS CAPS Take 1,000 Units by mouth     Multiple Vitamins-Minerals (CENTRUM SILVER ULTRA WOMENS) TABS      topiramate (TOPAMAX) 50 MG tablet Take 2 tablets in the morning and 2 tablets at night  "    order for DME Equipment being ordered: TENS     senna-docusate (SENOKOT-S;PERICOLACE) 8.6-50 MG per tablet Take 1-4 tablets by mouth     Misc. Devices MISC Dispense one Cefaly neurostimulator and the electro stimulators with refills     sucralfate (CARAFATE) 1 GM tablet Take 1 tablet (1 g) by mouth 4 times daily     Probiotic Product (PROBIOTIC DAILY PO) Take by mouth 2 times daily     No current facility-administered medications for this visit.        VITALS   BP 92/64  Pulse 57  Temp 96.9  F (36.1  C) (Tympanic)  Wt 162 lb (73.5 kg)  BMI 26.15 kg/m2     LABS                                                                                                                           Last Basic Metabolic Panel:  NA      142   6/9/2015   POTASSIUM      3.6   6/9/2015  CHLORIDE      109   6/9/2015  SALOME      8.7   6/9/2015  CO2       26   6/9/2015  BUN        8   6/9/2015  CR     0.91   6/9/2015  GLC       96   6/9/2015    MENTAL STATUS EXAM                                                                                        Alert. Oriented to person, place, and date / time. Well groomed, calm, cooperative with good eye contact. No problems with speech or psychomotor behavior. Mood was described as \"horseshit and affect was congruent to speech content and full range. Thought process, including associations, was unremarkable and thought content was devoid of suicidal and homicidal ideation and psychotic thought. No hallucinations. Insight was good. Judgment was intact and adequate for safety. Fund of knowledge was intact. Pt demonstrates no obvious problems with attention, concentration, language, recent or remote memory although these were not formally tested.     ASSESSMENT                                                                                                      HISTORICAL:  Initial psych note 10/13/15        NOTES:  Cymbalta -> agitation, angry    This patient is a 57 year old, female who has hx of " depression and she is not doing well with depression, anxiety and pain. Still not working, visit to [facility] and they recommended hold off on their pain program until anxiety / depression under control. Gabapentin made her foggy / cognitive impairment and was on Topamax concurrently: no longer on gabapentin.     I support her going on disability and frankly am surprised she managed to work- given mental health and physical health sx.     Today Sadaf and I reviewed past chart notes. She was on Cymbalta in past but we both think it is worth trying it again --> pretty complicated back then as we had tapered her off prozac and paxil then she was put back on paxil with cymbalta. AND she ewas on gabapentin during this time. AND issues at work and with her sister.... Dr. Cruz, her primary, started Cymbalta and we are going to go ahead [facility] plan.     TREATMENT RISK STATEMENT:  The risks, benefits, alternatives and potential adverse effects have been explained and are understood by the pt.  The pt agrees to the treatment plan with the ability to do so.   The pt knows to call the clinic for any problems or access emergency care if needed.        DIAGNOSES                 (Use of Axes system will continue, even though absent from DSM 5)         MDD recurrent, moderate  ESTRELLA      PLAN                                                                                                                    1)  MEDICATIONS:         --  Start Cymbalta at 30 mg daily. Let's have you continue Paxil for a week or two as you get used to the Cymbalta. If cymbalta goes okay then I would suggest taper down and off the Paxil like I said after Cymbalta for week or two. paxil works on serotonin , cymbalta works on serotonin and norepinephrine. I'd prefer keep your meds simple rather switching than adding another one.     Paxil: short acting so sometimes people have hard time going off of it. I think decreasing the 20 mg to 10 thus take 1/2 tab for  week then discontinue it. Withdrawal : nausea, sweats, dizzy, irritability.    2)  THERAPY:  No Change    3)  LABS:  None    4)  PT MONITOR [call for probs]:  worsening sx, SI/HI, SEs from meds    5)  REFERRALS [CD, medical, other]:  None    6)  RTC:  ~ Brian

## 2017-11-28 NOTE — NURSING NOTE
"Chief Complaint   Patient presents with     RECHECK     Mental health.  Discuss anxiety and depression.       Initial BP 92/64 (BP Location: Left arm, Patient Position: Sitting, Cuff Size: Adult Regular)  Pulse 57  Temp 96.9  F (36.1  C) (Tympanic)  Wt 162 lb (73.5 kg)  BMI 26.15 kg/m2 Estimated body mass index is 26.15 kg/(m^2) as calculated from the following:    Height as of 11/27/17: 5' 6\" (1.676 m).    Weight as of this encounter: 162 lb (73.5 kg).  Medication Reconciliation: complete     JANICE ROMO      "

## 2017-11-28 NOTE — MR AVS SNAPSHOT
After Visit Summary   11/28/2017    Sadaf Blankenship    MRN: [id number]           Patient Information     Date Of Birth          1960        Visit Information        Provider Department      11/28/2017 8:20 AM Zoe Herbert MD [facility]        Care Instructions    Start Cymbalta at 30 mg daily. Let's have you continue Paxil for a week or two as you get used to the Cymbalta. If cymbalta goes okay then I would suggest taper down and off the Paxil like I said after Cymbalta for week or two. paxil works on serotonin , cymbalta works on serotonin and norepinephrine. I'd prefer keep your meds simple rather switching than adding another one.     Paxil: short acting so sometimes people have hard time going off of it. I think decreasing the 20 mg to 10 thus take 1/2 tab for week then discontinue it. Withdrawal : nausea, sweats, dizzy, irritability.          Follow-ups after your visit        Your next 10 appointments already scheduled     Dec 18, 2017 10:45 AM CST   (Arrive by 10:30 AM)   SHORT with Henna Cruz MD   [facility] ([facility] )    [address] [place] [id number]   [phone]            Jan 29, 2018  8:40 AM CST   (Arrive by 8:25 AM)   Return Visit with Zoe Herbert MD   [facility] ([facility] )    [address] [place] [phone] [phone]              Who to contact     If you have questions or need follow up information about today's clinic visit or your schedule please contact [facility] directly at [phone].  Normal or non-critical lab and imaging results will be communicated to you by MyChart, letter or phone within 4 business days after the clinic has received the results. If you do not hear from us within 7 days, please contact the clinic through MyChart or phone. If you have a critical or abnormal lab result, we will notify you by phone as soon  "as possible.  Submit refill requests through [employer] or call your pharmacy and they will forward the refill request to us. Please allow 3 business days for your refill to be completed.          Additional Information About Your Visit        [employer] Information     [employer] lets you send messages to your doctor, view your test results, renew your prescriptions, schedule appointments and more. To sign up, go to [email or web address] . Click on \"Log in\" on the left side of the screen, which will take you to the Welcome page. Then click on \"Sign up Now\" on the right side of the page.     You will be asked to enter the access code listed below, as well as some personal information. Please follow the directions to create your username and password.     Your access code is: [email or web address]  Expires: 2017  2:03 PM     Your access code will  in 90 days. If you need help or a new code, please call your [facility] clinic or [phone].        Care EveryWhere ID     This is your Care EveryWhere ID. This could be used by other organizations to access your [facility] medical records  [id number]        Your Vitals Were     Pulse Temperature BMI (Body Mass Index)             57 96.9  F (36.1  C) (Tympanic) 26.15 kg/m2          Blood Pressure from Last 3 Encounters:   17 92/64   17 102/74   17 100/70    Weight from Last 3 Encounters:   17 162 lb (73.5 kg)   17 162 lb (73.5 kg)   17 160 lb (72.6 kg)              Today, you had the following     No orders found for display       Primary Care Provider Office Phone # Fax #    Henna Cruz [phone]       [facility] [place] [address]  [place] [address]        Equal Access to Services     HARIKA MAY : Quita Knott, waaxda luqadaha, qaybta kaalmada le, cherise chen. [facility] [phone].    ATENCIÓN: Si habla chelsiañol, tiene a schroeder disposición servicios " kamla de asistencia lingüística. Traci reyes [phone].    We comply with applicable federal civil rights laws and [place] laws. We do not discriminate on the basis of race, color, national origin, age, disability, sex, sexual orientation, or gender identity.            Thank you!     Thank you for choosing [facility]  for your care. Our goal is always to provide you with excellent care. Hearing back from our patients is one way we can continue to improve our services. Please take a few minutes to complete the written survey that you may receive in the mail after your visit with us. Thank you!             Your Updated Medication List - Protect others around you: Learn how to safely use, store and throw away your medicines at www.disposemymeds.org.          This list is accurate as of: 11/28/17  9:05 AM.  Always use your most recent med list.                   Brand Name Dispense Instructions for use Diagnosis    baclofen 10 MG tablet    LIORESAL    90 tablet    TAKE 1 TABLET BY MOUTH THREE TIMES DAILY WITH FOOD    Back muscle spasm       CENTRUM SILVER ULTRA WOMENS Tabs           dexlansoprazole 60 MG Cpdr CR capsule    DEXILANT    30 capsule    TAKE 1 CAPSULE (60 MG) BY MOUTH DAILY    Ulcer of esophagus without bleeding       diazepam 5 MG tablet    VALIUM    100 tablet    TAKE 1 TABLET BY MOUTH EVER Y 6 HOURS AS NEEDED - GENER IC FOR VALIUM    Muscle spasm       DULoxetine 30 MG EC capsule    CYMBALTA    30 capsule    Take 1 capsule (30 mg) by mouth daily    Chronic neck pain       HYDROcodone-acetaminophen 7.5-325 MG per tablet    NORCO    60 tablet    TAKE 1 TABLET BY MOUTH EVER Y 4 TO 6 HOURS AS NEEDED FO R PAIN    Chronic neck pain       MAXALT 10 MG tablet   Generic drug:  rizatriptan      Take 10 mg by mouth at onset of headache for migraine        Misc. Devices Misc      Dispense one Cefaly neurostimulator and the electro stimulators with refills        order for DME      Equipment being  ordered: TENS        PARoxetine 20 MG tablet    PAXIL    90 tablet    TAKE 1 TABLET BY MOUTH ONCE DAILY AT BEDTIME    Recurrent major depressive disorder, remission status unspecified (H)       PROBIOTIC DAILY PO      Take by mouth 2 times daily        senna-docusate 8.6-50 MG per tablet    SENOKOT-S;PERICOLACE     Take 1-4 tablets by mouth        sucralfate 1 GM tablet    CARAFATE    40 tablet    Take 1 tablet (1 g) by mouth 4 times daily    Gastroesophageal reflux disease with esophagitis       topiramate 50 MG tablet    TOPAMAX     Take 2 tablets in the morning and 2 tablets at night        vitamin D3 1000 UNITS Caps      Take 1,000 Units by mouth

## 2017-11-29 ENCOUNTER — TRANSFERRED RECORDS (OUTPATIENT)
Dept: HEALTH INFORMATION MANAGEMENT | Facility: HOSPITAL | Age: 57
End: 2017-11-29

## 2017-11-29 ASSESSMENT — ANXIETY QUESTIONNAIRES: GAD7 TOTAL SCORE: 12

## 2017-12-27 ENCOUNTER — TELEPHONE (OUTPATIENT)
Dept: PSYCHIATRY | Facility: OTHER | Age: 57
End: 2017-12-27

## 2017-12-27 NOTE — TELEPHONE ENCOUNTER
Patient is asking for a return call from Dr. Herbert about Cymbalta and other medications.  She c/o possible side effect of stomach issues, nausea since she started taking Cymbalta.  She also states that the Cymbalta is working for anxiety, but not for pain.  Last office visit on 11-28-17.  Next f/u is on 1-29-18.  Currently in [place].  # to reach [facility] is [phone].  Noted that Dr. Herbert is out of office until next week.  Please review and advise.  Thank you

## 2017-12-28 ENCOUNTER — TRANSFERRED RECORDS (OUTPATIENT)
Dept: HEALTH INFORMATION MANAGEMENT | Facility: HOSPITAL | Age: 57
End: 2017-12-28

## 2017-12-29 ENCOUNTER — MEDICAL CORRESPONDENCE (OUTPATIENT)
Dept: HEALTH INFORMATION MANAGEMENT | Facility: HOSPITAL | Age: 57
End: 2017-12-29

## 2018-01-02 ENCOUNTER — TELEPHONE (OUTPATIENT)
Dept: FAMILY MEDICINE | Facility: OTHER | Age: 58
End: 2018-01-02

## 2018-01-02 DIAGNOSIS — R10.13 ABDOMINAL PAIN, EPIGASTRIC: Primary | ICD-10-CM

## 2018-01-02 DIAGNOSIS — G89.29 CHRONIC NECK PAIN: ICD-10-CM

## 2018-01-02 DIAGNOSIS — M54.2 CHRONIC NECK PAIN: ICD-10-CM

## 2018-01-02 RX ORDER — DULOXETIN HYDROCHLORIDE 30 MG/1
60 CAPSULE, DELAYED RELEASE ORAL DAILY
Qty: 60 CAPSULE | Refills: 3 | COMMUNITY
Start: 2018-01-02 | End: 2018-01-14 | Stop reason: DRUGHIGH

## 2018-01-02 NOTE — TELEPHONE ENCOUNTER
Phone call returned 1:04 pm. Severe stomach pain was evaluated in [place] in the hospital with what sounds like a GI cocktail that wasn't helpful and an ultrasound that showed a normal gallbladder, sounds as though pancreas wasn't seen well. She was started on Zofran and isn't able to eat much other than liquids. Discussed, will refer for EGD and colonoscopy with Dr Iraheta with further evaluation and will follow up with her when she returns

## 2018-01-02 NOTE — TELEPHONE ENCOUNTER
"Cymbalta helping with anxiety, not pain yet. Had nausea and then GI / \"stomach issues\" and notes ER visit and didn't think was gallbladder. Had phone conversation with Dr. Cruz plan being try increase of Cymbalta with hope of it helping with pain.     Sadaf and I discussed and agreed with her and Dr. Cruz's plan to increase to 60 mg daily and we are going to have her try it at night with hope not as much nausea. Reviewed try if she can eat prior to taking it and also if it messes with her sleep please give me a call and we will then change to a different med.  "

## 2018-01-02 NOTE — TELEPHONE ENCOUNTER
10:34 AM    Reason for Call: Phone Call    Description: Pt called and states that she would like to see if she could get a call back regarding some stomach issues she has been having     Was an appointment offered for this call? No  If yes : Appointment type              Date    Preferred method for responding to this message: Telephone Call  What is your phone number ?    If we cannot reach you directly, may we leave a detailed response at the number you provided? Yes    Can this message wait until your PCP/provider returns, if available today? Not applicable, PCP is in     [place]

## 2018-01-02 NOTE — TELEPHONE ENCOUNTER
Called patient back and she has an upcoming appointment with us on 1/29/17, is having some major stomach issues and wants you to call her back if at all possible.   Was seen in the ER down in [place] and had some tests done but wants to discuss this with you.

## 2018-01-04 DIAGNOSIS — M62.830 BACK MUSCLE SPASM: ICD-10-CM

## 2018-01-07 RX ORDER — BACLOFEN 10 MG/1
TABLET ORAL
Qty: 90 TABLET | Refills: 0 | Status: SHIPPED | OUTPATIENT
Start: 2018-01-13 | End: 2018-02-10

## 2018-01-08 ENCOUNTER — TELEPHONE (OUTPATIENT)
Dept: FAMILY MEDICINE | Facility: OTHER | Age: 58
End: 2018-01-08

## 2018-01-08 NOTE — TELEPHONE ENCOUNTER
Called patient back and she was inquiring when the referral was sent, it was sent last week on 1/2/18 and they just called her today but she can't get into see Dr Iraheta until March, she is wondering if she has to see him or if she could see Elsi?

## 2018-01-08 NOTE — TELEPHONE ENCOUNTER
She can see Dr Calzada also and if they can't get her in we can have her see one of the surgeons here. Make sure that they are looking at doing the scope in [place] for Dr Calzada

## 2018-01-08 NOTE — TELEPHONE ENCOUNTER
12:02 PM    Reason for Call: Phone Call    Description: Patient would like to talk about her referral to Dr. Mcrae.    Was an appointment offered for this call? No  If yes : Appointment type              Date    Preferred method for responding to this message: Telephone Call  What is your phone number ? [phone]     If we cannot reach you directly, may we leave a detailed response at the number you provided? Yes    Can this message wait until your PCP/provider returns, if available today? YES,     Henna Grullon

## 2018-01-12 ENCOUNTER — TELEPHONE (OUTPATIENT)
Dept: PSYCHIATRY | Facility: OTHER | Age: 58
End: 2018-01-12

## 2018-01-12 DIAGNOSIS — F41.1 GAD (GENERALIZED ANXIETY DISORDER): Primary | ICD-10-CM

## 2018-01-12 NOTE — TELEPHONE ENCOUNTER
Refill request for Cymbalta 30 mg.  Takes 2 capsules 60 mg daily.  Please send to [facility].  On 1-2-18 per Epic, order not sent to printer for this Rx (Rx was not sent.)  Patient will need this med on Monday, 1-15-18.  Has f/u on 1-29-18.  Thank you, please advise.

## 2018-01-14 RX ORDER — DULOXETIN HYDROCHLORIDE 60 MG/1
60 CAPSULE, DELAYED RELEASE ORAL DAILY
Qty: 30 CAPSULE | Refills: 6 | Status: SHIPPED | OUTPATIENT
Start: 2018-01-14 | End: 2018-01-29 | Stop reason: DRUGHIGH

## 2018-01-22 DIAGNOSIS — M62.838 MUSCLE SPASM: ICD-10-CM

## 2018-01-22 DIAGNOSIS — M54.2 CHRONIC NECK PAIN: ICD-10-CM

## 2018-01-22 DIAGNOSIS — G89.29 CHRONIC NECK PAIN: ICD-10-CM

## 2018-01-22 RX ORDER — DIAZEPAM 5 MG
TABLET ORAL
Qty: 100 TABLET | Refills: 0 | Status: SHIPPED | OUTPATIENT
Start: 2018-01-22 | End: 2018-04-06

## 2018-01-22 RX ORDER — HYDROCODONE BITARTRATE AND ACETAMINOPHEN 7.5; 325 MG/1; MG/1
TABLET ORAL
Qty: 60 TABLET | Refills: 0 | Status: SHIPPED | OUTPATIENT
Start: 2018-01-22 | End: 2018-02-06

## 2018-01-22 NOTE — TELEPHONE ENCOUNTER
Patient notified that hard copy RX is ready at the [facility]  registration to be picked up.

## 2018-01-22 NOTE — TELEPHONE ENCOUNTER
Controlled Substance Refill Request for Hydrocodone and Valium  Problem List Complete:  No     PROVIDER TO CONSIDER COMPLETION OF PROBLEM LIST AND OVERVIEW/CONTROLLED SUBSTANCE AGREEMENT    Hydrocodone  Last Written Prescription Date:  10.9.17  Last Fill Quantity: 60,   # refills: 0    Valium  Last Written Prescription Date:  10.9.17  Last Fill Quantity: 100,   # refills: 0    Last Office Visit with [facility] primary care provider: 11.27.17    Future Office visit:   Next 5 appointments (look out 90 days)     Jan 29, 2018  8:40 AM CST   (Arrive by 8:25 AM)   Return Visit with Zoe Herbert MD   [facility] ([facility] )    [address] [place] [id number] [phone]            Jan 29, 2018 10:45 AM CST   (Arrive by 10:30 AM)   SHORT with Henan Cruz MD   [facility] ([facility] )    [address] [place], [place] [address] [place] [address]   [phone]                  Controlled substance agreement on file: No.     Processing:  Patient will  in clinic

## 2018-01-29 ENCOUNTER — OFFICE VISIT (OUTPATIENT)
Dept: PSYCHIATRY | Facility: OTHER | Age: 58
End: 2018-01-29
Attending: PSYCHIATRY & NEUROLOGY
Payer: COMMERCIAL

## 2018-01-29 ENCOUNTER — OFFICE VISIT (OUTPATIENT)
Dept: FAMILY MEDICINE | Facility: OTHER | Age: 58
End: 2018-01-29
Attending: FAMILY MEDICINE
Payer: COMMERCIAL

## 2018-01-29 VITALS
WEIGHT: 153 LBS | HEART RATE: 64 BPM | BODY MASS INDEX: 24.59 KG/M2 | DIASTOLIC BLOOD PRESSURE: 64 MMHG | SYSTOLIC BLOOD PRESSURE: 108 MMHG | OXYGEN SATURATION: 99 % | TEMPERATURE: 97.5 F | HEIGHT: 66 IN

## 2018-01-29 VITALS
WEIGHT: 153 LBS | DIASTOLIC BLOOD PRESSURE: 72 MMHG | HEART RATE: 65 BPM | BODY MASS INDEX: 24.69 KG/M2 | SYSTOLIC BLOOD PRESSURE: 110 MMHG | TEMPERATURE: 97.7 F | OXYGEN SATURATION: 98 %

## 2018-01-29 DIAGNOSIS — M54.12 CHRONIC RADICULAR CERVICAL PAIN: Primary | ICD-10-CM

## 2018-01-29 DIAGNOSIS — R10.13 EPIGASTRIC PAIN: ICD-10-CM

## 2018-01-29 DIAGNOSIS — F41.1 GAD (GENERALIZED ANXIETY DISORDER): Primary | ICD-10-CM

## 2018-01-29 DIAGNOSIS — M79.18 MYOFASCIAL PAIN: ICD-10-CM

## 2018-01-29 DIAGNOSIS — G89.29 CHRONIC RADICULAR CERVICAL PAIN: Primary | ICD-10-CM

## 2018-01-29 DIAGNOSIS — Z98.1 S/P CERVICAL SPINAL FUSION: ICD-10-CM

## 2018-01-29 DIAGNOSIS — K59.00 CONSTIPATION, UNSPECIFIED CONSTIPATION TYPE: ICD-10-CM

## 2018-01-29 PROCEDURE — 99214 OFFICE O/P EST MOD 30 MIN: CPT | Performed by: FAMILY MEDICINE

## 2018-01-29 PROCEDURE — G0463 HOSPITAL OUTPT CLINIC VISIT: HCPCS

## 2018-01-29 PROCEDURE — 99214 OFFICE O/P EST MOD 30 MIN: CPT | Performed by: PSYCHIATRY & NEUROLOGY

## 2018-01-29 RX ORDER — PREGABALIN 25 MG/1
CAPSULE ORAL
Qty: 60 CAPSULE | Refills: 1 | Status: SHIPPED | OUTPATIENT
Start: 2018-01-29 | End: 2018-02-26

## 2018-01-29 RX ORDER — DULOXETIN HYDROCHLORIDE 30 MG/1
30 CAPSULE, DELAYED RELEASE ORAL DAILY
Qty: 30 CAPSULE | Refills: 11 | Status: SHIPPED | OUTPATIENT
Start: 2018-01-29 | End: 2018-04-25 | Stop reason: ALTCHOICE

## 2018-01-29 ASSESSMENT — ANXIETY QUESTIONNAIRES
2. NOT BEING ABLE TO STOP OR CONTROL WORRYING: NOT AT ALL
5. BEING SO RESTLESS THAT IT IS HARD TO SIT STILL: NOT AT ALL
5. BEING SO RESTLESS THAT IT IS HARD TO SIT STILL: NOT AT ALL
GAD7 TOTAL SCORE: 0
3. WORRYING TOO MUCH ABOUT DIFFERENT THINGS: NOT AT ALL
IF YOU CHECKED OFF ANY PROBLEMS ON THIS QUESTIONNAIRE, HOW DIFFICULT HAVE THESE PROBLEMS MADE IT FOR YOU TO DO YOUR WORK, TAKE CARE OF THINGS AT HOME, OR GET ALONG WITH OTHER PEOPLE: NOT DIFFICULT AT ALL
6. BECOMING EASILY ANNOYED OR IRRITABLE: NOT AT ALL
3. WORRYING TOO MUCH ABOUT DIFFERENT THINGS: NOT AT ALL
1. FEELING NERVOUS, ANXIOUS, OR ON EDGE: NOT AT ALL
GAD7 TOTAL SCORE: 0
1. FEELING NERVOUS, ANXIOUS, OR ON EDGE: NOT AT ALL
2. NOT BEING ABLE TO STOP OR CONTROL WORRYING: NOT AT ALL
7. FEELING AFRAID AS IF SOMETHING AWFUL MIGHT HAPPEN: NOT AT ALL
6. BECOMING EASILY ANNOYED OR IRRITABLE: NOT AT ALL
7. FEELING AFRAID AS IF SOMETHING AWFUL MIGHT HAPPEN: NOT AT ALL

## 2018-01-29 ASSESSMENT — PATIENT HEALTH QUESTIONNAIRE - PHQ9
5. POOR APPETITE OR OVEREATING: NOT AT ALL
5. POOR APPETITE OR OVEREATING: NOT AT ALL

## 2018-01-29 ASSESSMENT — PAIN SCALES - GENERAL
PAINLEVEL: MODERATE PAIN (4)
PAINLEVEL: MILD PAIN (3)

## 2018-01-29 NOTE — NURSING NOTE
"Chief Complaint   Patient presents with     Pain     Follow up on neck and back pain       Initial /64  Pulse 64  Temp 97.5  F (36.4  C) (Tympanic)  Ht 5' 6\" (1.676 m)  Wt 153 lb (69.4 kg)  SpO2 99%  BMI 24.69 kg/m2 Estimated body mass index is 24.69 kg/(m^2) as calculated from the following:    Height as of this encounter: 5' 6\" (1.676 m).    Weight as of this encounter: 153 lb (69.4 kg).  Medication Reconciliation: complete   RAFFY CARLOS LPN  "

## 2018-01-29 NOTE — MR AVS SNAPSHOT
"              After Visit Summary   1/29/2018    Sadaf Blankenship    MRN: [id number]           Patient Information     Date Of Birth          1960        Visit Information        Provider Department      1/29/2018 8:40 AM Zoe Herbert MD [facility]        Today's Diagnoses     ESTRELLA (generalized anxiety disorder)    -  1       Follow-ups after your visit        Your next 10 appointments already scheduled     Jan 29, 2018 10:45 AM CST   (Arrive by 10:30 AM)   SHORT with Henna Cruz MD   [facility] [place] ([facility] [place] )    [address]  [place] [address]   [phone]              Who to contact     If you have questions or need follow up information about today's clinic visit or your schedule please contact [facility] directly at [phone].  Normal or non-critical lab and imaging results will be communicated to you by MyChart, letter or phone within 4 business days after the clinic has received the results. If you do not hear from us within 7 days, please contact the clinic through MyChart or phone. If you have a critical or abnormal lab result, we will notify you by phone as soon as possible.  Submit refill requests through [employer] or call your pharmacy and they will forward the refill request to us. Please allow 3 business days for your refill to be completed.          Additional Information About Your Visit        MyChart Information     [employer] lets you send messages to your doctor, view your test results, renew your prescriptions, schedule appointments and more. To sign up, go to [email or web address] . Click on \"Log in\" on the left side of the screen, which will take you to the Welcome page. Then click on \"Sign up Now\" on the right side of the page.     You will be asked to enter the access code listed below, as well as some personal information. Please follow the directions to create your username and password.     Your access " code is: [email or web address]  Expires: 2018  9:08 AM     Your access code will  in 90 days. If you need help or a new code, please call your [facility] or [phone].        Care EveryWhere ID     This is your Care EveryWhere ID. This could be used by other organizations to access your [facility] medical records  [id number]        Your Vitals Were     Pulse Temperature Pulse Oximetry BMI (Body Mass Index)          65 97.7  F (36.5  C) (Tympanic) 98% 24.69 kg/m2         Blood Pressure from Last 3 Encounters:   18 110/72   17 92/64   17 102/74    Weight from Last 3 Encounters:   18 153 lb (69.4 kg)   17 162 lb (73.5 kg)   17 162 lb (73.5 kg)              Today, you had the following     No orders found for display         Today's Medication Changes          These changes are accurate as of 18  9:08 AM.  If you have any questions, ask your nurse or doctor.               These medicines have changed or have updated prescriptions.        Dose/Directions    DULoxetine 30 MG EC capsule   Commonly known as:  CYMBALTA   This may have changed:    - medication strength  - how much to take   Used for:  ESTRELLA (generalized anxiety disorder)   Changed by:  Zoe Herbert MD        Dose:  30 mg   Take 1 capsule (30 mg) by mouth daily   Quantity:  30 capsule   Refills:  11         Stop taking these medicines if you haven't already. Please contact your care team if you have questions.     PARoxetine 20 MG tablet   Commonly known as:  PAXIL   Stopped by:  Zoe Herbert MD                Where to get your medicines      These medications were sent to [id number] - [place] - [address]  [address] [place] [address]     Phone:  [phone]     DULoxetine 30 MG EC capsule                Primary Care Provider Office Phone # Fax #    Henna Cruz [phone]       [facility] [address] [place] [address]        Equal  Access to Services     [facility]: Hadii aad ku hadperiaaliyah Monikaali, waaxda luqadaha, qaybta kaalmakayleigh lujan, cherise chen. So [facility] [phone].    ATENCIÓN: Si habla maria, tiene a schroeder disposición servicios gratuitos de asistencia lingüística. Llame al [phone].    We comply with applicable federal civil rights laws and [place] laws. We do not discriminate on the basis of race, color, national origin, age, disability, sex, sexual orientation, or gender identity.            Thank you!     Thank you for choosing [facility]  for your care. Our goal is always to provide you with excellent care. Hearing back from our patients is one way we can continue to improve our services. Please take a few minutes to complete the written survey that you may receive in the mail after your visit with us. Thank you!             Your Updated Medication List - Protect others around you: Learn how to safely use, store and throw away your medicines at www.disposemymeds.org.          This list is accurate as of 1/29/18  9:08 AM.  Always use your most recent med list.                   Brand Name Dispense Instructions for use Diagnosis    baclofen 10 MG tablet    LIORESAL    90 tablet    TAKE 1 TABLET BY MOUTH THREE TIMES DAILY WITH FOOD    Back muscle spasm       CENTRUM SILVER ULTRA WOMENS Tabs           dexlansoprazole 60 MG Cpdr CR capsule    DEXILANT    30 capsule    TAKE 1 CAPSULE (60 MG) BY MOUTH DAILY    Ulcer of esophagus without bleeding       diazepam 5 MG tablet    VALIUM    100 tablet    TAKE 1 TABLET BY MOUTH EVER Y 6 HOURS AS NEEDED - GENER IC FOR VALIUM    Muscle spasm       DULoxetine 30 MG EC capsule    CYMBALTA    30 capsule    Take 1 capsule (30 mg) by mouth daily    ESTRELLA (generalized anxiety disorder)       HYDROcodone-acetaminophen 7.5-325 MG per tablet    NORCO    60 tablet    TAKE 1 TABLET BY MOUTH EVER Y 4 TO 6 HOURS AS NEEDED FO R PAIN    Chronic neck pain        MAXALT 10 MG tablet   Generic drug:  rizatriptan      Take 10 mg by mouth at onset of headache for migraine        Misc. Devices Misc      Dispense one Cefaly neurostimulator and the electro stimulators with refills        order for DME      Equipment being ordered: TENS        PROBIOTIC DAILY PO      Take by mouth 2 times daily        senna-docusate 8.6-50 MG per tablet    SENOKOT-S;PERICOLACE     Take 1-4 tablets by mouth as needed        sucralfate 1 GM tablet    CARAFATE    40 tablet    Take 1 tablet (1 g) by mouth 4 times daily    Gastroesophageal reflux disease with esophagitis       topiramate 50 MG tablet    TOPAMAX     Take 2 tablets in the morning and 2 tablets at night        vitamin D3 1000 UNITS Caps      Take 1,000 Units by mouth
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
## 2018-01-29 NOTE — MR AVS SNAPSHOT
After Visit Summary   1/29/2018    Sadaf Blankenship    MRN: [id number]           Patient Information     Date Of Birth          1960        Visit Information        Provider Department      1/29/2018 10:45 AM Henna Cruz MD [facility]        Today's Diagnoses     Chronic radicular cervical pain    -  1    S/P cervical spinal fusion        Myofascial pain        Epigastric pain        Constipation, unspecified constipation type           Follow-ups after your visit        Additional Services     GASTROENTEROLOGY ADULT REF CONSULT ONLY       Preferred Location: Scheduled with Dr Iraheta for March for EGD and colonoscopy but having ongoing abdominal pain, losing weight, can she be seen sooner, consult and procedure      Please be aware that coverage of these services is subject to the terms and limitations of your health insurance plan.  Call member services at your health plan with any benefit or coverage questions.  Any procedures must be performed at a [facility] facility OR coordinated by your clinic's referral office.    Please bring the following with you to your appointment:    (1) Any X-Rays, CTs or MRIs which have been performed.  Contact the facility where they were done to arrange for  prior to your scheduled appointment.    (2) List of current medications   (3) This referral request   (4) Any documents/labs given to you for this referral                  Your next 10 appointments already scheduled     Feb 28, 2018  8:40 AM CST   (Arrive by 8:25 AM)   Return Visit with Zoe Herbert MD   [facility] ([facility] )    [address] [place] [id number] [phone]            Mar 01, 2018 10:45 AM CST   (Arrive by 10:30 AM)   SHORT with Henna Cruz MD   [facility] ([facility] )    [address] [place] [address]   [phone]              Who to contact     If you have  "questions or need follow up information about today's clinic visit or your schedule please contact [facility] directly at [phone].  Normal or non-critical lab and imaging results will be communicated to you by MyChart, letter or phone within 4 business days after the clinic has received the results. If you do not hear from us within 7 days, please contact the clinic through [employer] or phone. If you have a critical or abnormal lab result, we will notify you by phone as soon as possible.  Submit refill requests through [employer] or call your pharmacy and they will forward the refill request to us. Please allow 3 business days for your refill to be completed.          Additional Information About Your Visit        [employer] Information     [employer] lets you send messages to your doctor, view your test results, renew your prescriptions, schedule appointments and more. To sign up, go to [email or web address] . Click on \"Log in\" on the left side of the screen, which will take you to the Welcome page. Then click on \"Sign up Now\" on the right side of the page.     You will be asked to enter the access code listed below, as well as some personal information. Please follow the directions to create your username and password.     Your access code is: [email or web address]  Expires: 2018  9:08 AM     Your access code will  in 90 days. If you need help or a new code, please call your [facility] clinic or [phone].        Care EveryWhere ID     This is your Care EveryWhere ID. This could be used by other organizations to access your [facility] medical records  [id number]        Your Vitals Were     Pulse Temperature Height Pulse Oximetry BMI (Body Mass Index)       64 97.5  F (36.4  C) (Tympanic) 5' 6\" (1.676 m) 99% 24.69 kg/m2        Blood Pressure from Last 3 Encounters:   18 108/64   18 110/72   17 92/64    Weight from Last 3 Encounters:   18 153 lb (69.4 kg)   18 153 lb (69.4 " kg)   11/28/17 162 lb (73.5 kg)              We Performed the Following     GASTROENTEROLOGY ADULT REF CONSULT ONLY          Today's Medication Changes          These changes are accurate as of 1/29/18 11:28 AM.  If you have any questions, ask your nurse or doctor.               Start taking these medicines.        Dose/Directions    pregabalin 25 MG capsule   Commonly known as:  LYRICA   Used for:  Chronic radicular cervical pain   Started by:  Henna Cruz MD        Start with 25 mg at bedtime then increase up to twice a day after one week   Quantity:  60 capsule   Refills:  1         These medicines have changed or have updated prescriptions.        Dose/Directions    DULoxetine 30 MG EC capsule   Commonly known as:  CYMBALTA   This may have changed:    - medication strength  - how much to take   Used for:  ESTRELLA (generalized anxiety disorder)   Changed by:  Zoe Herbert MD        Dose:  30 mg   Take 1 capsule (30 mg) by mouth daily   Quantity:  30 capsule   Refills:  11         Stop taking these medicines if you haven't already. Please contact your care team if you have questions.     PARoxetine 20 MG tablet   Commonly known as:  PAXIL   Stopped by:  Zoe Herbert MD                Where to get your medicines      These medications were sent to [id number] - [place], [place] - [address]  [address] [place] [address]     Phone:  [phone]     DULoxetine 30 MG EC capsule         Some of these will need a paper prescription and others can be bought over the counter.  Ask your nurse if you have questions.     Bring a paper prescription for each of these medications     pregabalin 25 MG capsule                Primary Care Provider Office Phone # Fax #    Henna Cruz [phone]       [facility] [place] [address] [facility] [place] [address]        Equal Access to Services     [facility]: Hadii trent Knott, yaden rojas, stefan grady  cherise lujanwalter kristiedonta simaan ah. [facility] [phone].    ATENCIÓN: Si gennyla maria, tiene a schroeder disposición servicios gratuitos de asistencia lingüística. Traci al [phone].    We comply with applicable federal civil rights laws and [place] laws. We do not discriminate on the basis of race, color, national origin, age, disability, sex, sexual orientation, or gender identity.            Thank you!     Thank you for choosing [facility]  [facility] your [facility]. Our goal is always to provide you with excellent care. Hearing back from our patients is one way we can continue to improve our services. Please take a few minutes to complete the written survey that you may receive in the mail after your visit with us. Thank you!             Your Updated Medication List - Protect others around you: Learn how to safely use, store and throw away your medicines at www.disposemymeds.org.          This list is accurate as of 1/29/18 11:28 AM.  Always use your most recent med list.                   Brand Name Dispense Instructions for use Diagnosis    baclofen 10 MG tablet    LIORESAL    90 tablet    TAKE 1 TABLET BY MOUTH THREE TIMES DAILY WITH FOOD    Back muscle spasm       CENTRUM SILVER ULTRA WOMENS Tabs           dexlansoprazole 60 MG Cpdr CR capsule    DEXILANT    30 capsule    TAKE 1 CAPSULE (60 MG) BY MOUTH DAILY    Ulcer of esophagus without bleeding       diazepam 5 MG tablet    VALIUM    100 tablet    TAKE 1 TABLET BY MOUTH EVER Y 6 HOURS AS NEEDED - GENER IC FOR VALIUM    Muscle spasm       DULoxetine 30 MG EC capsule    CYMBALTA    30 capsule    Take 1 capsule (30 mg) by mouth daily    ESTRELLA (generalized anxiety disorder)       HYDROcodone-acetaminophen 7.5-325 MG per tablet    NORCO    60 tablet    TAKE 1 TABLET BY MOUTH EVER Y 4 TO 6 HOURS AS NEEDED FO R PAIN    Chronic neck pain       MAXALT 10 MG tablet   Generic drug:  rizatriptan      Take 10 mg by mouth at onset of headache for  migraine        Misc. Devices Misc      Dispense one Cefaly neurostimulator and the electro stimulators with refills        order for DME      Equipment being ordered: TENS        pregabalin 25 MG capsule    LYRICA    60 capsule    Start with 25 mg at bedtime then increase up to twice a day after one week    Chronic radicular cervical pain       PROBIOTIC DAILY PO      Take by mouth 2 times daily        senna-docusate 8.6-50 MG per tablet    SENOKOT-S;PERICOLACE     Take 1-4 tablets by mouth as needed        sucralfate 1 GM tablet    CARAFATE    40 tablet    Take 1 tablet (1 g) by mouth 4 times daily    Gastroesophageal reflux disease with esophagitis       topiramate 50 MG tablet    TOPAMAX     Take 2 tablets in the morning and 2 tablets at night        vitamin D3 1000 UNITS Caps      Take 1,000 Units by mouth

## 2018-01-29 NOTE — NURSING NOTE
"Chief Complaint   Patient presents with     RECHECK     mental health.  Discuss Cymbalta       Initial /72 (BP Location: Left arm, Patient Position: Sitting, Cuff Size: Adult Regular)  Pulse 65  Temp 97.7  F (36.5  C) (Tympanic)  Wt 153 lb (69.4 kg)  SpO2 98%  BMI 24.69 kg/m2 Estimated body mass index is 24.69 kg/(m^2) as calculated from the following:    Height as of 11/27/17: 5' 6\" (1.676 m).    Weight as of this encounter: 153 lb (69.4 kg).  Medication Reconciliation: complete     JANICE ROMO      "

## 2018-01-30 ASSESSMENT — PATIENT HEALTH QUESTIONNAIRE - PHQ9
SUM OF ALL RESPONSES TO PHQ QUESTIONS 1-9: 0
SUM OF ALL RESPONSES TO PHQ QUESTIONS 1-9: 0

## 2018-01-30 ASSESSMENT — ANXIETY QUESTIONNAIRES
GAD7 TOTAL SCORE: 0
GAD7 TOTAL SCORE: 0

## 2018-01-30 NOTE — PROGRESS NOTES
[facility]    Sadaf Blankenship, 57 year old, female presents with   Chief Complaint   Patient presents with     Pain     Follow up on neck and back pain, chronic right sided radicular pain. She has gone off of Gabapentin as she didn't feel well on this. She had cymbalta reduced due to side effects though it is helping her anxiety. She saw Dr Herbert for this today.      Abdominal Pain     ongoing with inability to eat well with this. She is taking Dexilent and carafate withtout a lot of relief. She has scheduled an EGD and colonoscopy with Dr Iraheta but not until March. She is having diarrhea, no hematemasis, occasional constipation. She had a workup done in [place] where she was staying with an ultrasound that was normal       PAST MEDICAL HISTORY:  Past Medical History:   Diagnosis Date     ASCUS on Pap smear 2004    negative HPV     Atypical chest pain 2008    negative cardiolite stress test [facility]     Bleeding ulcer 1976     Cervical radicular pain 5/10/2012     Degenerative disc disease, cervical 2011     Esophageal ulcer 1998     Irritable bowel syndrome 2015     Migraine headaches, severe 2001     Pseudoarthrosis of cervical spine 7/3/2012     Recurrent UTI 2011     sinusitis (chronic) 2001       PAST SURGICAL HISTORY:  Past Surgical History:   Procedure Laterality Date     BACK SURGERY      cervical     Cervical spine surgery with removal of 2 disks, C5,C7       COLONOSCOPY  10/13/2014    Dr Camargo, internal hemorrhoids     Coronary angiogram, normal      Chest pain     ENT SURGERY      nose surgery x3     fusion discectomy anterior cervical       HC ESOPHAGOSCOPY, DIAGNOSTIC  10/31/2014    Dr Camargo, mild erythema of antrum, negative biopsies     NOSE SURGERY      x3            Posterior decompression , fusion C3-5      Psuedoarthrosis     Supracervical hysterectomy with right salpingoophorectomy       Endometriosis       SOCIAL HISTORY  Social History     Social History     Marital status:      Spouse name: N/A     Number of children: N/A     Years of education: N/A     Occupational History     architectual resources      Social History Main Topics     Smoking status: Former Smoker     Types: Cigarettes     Smokeless tobacco: Never Used      Comment: quit 2002. no passive exposure     Alcohol use No     Drug use: No     Sexual activity: Not on file     Other Topics Concern     Blood Transfusions Yes     Caffeine Concern Yes     1 cup daily     Parent/Sibling W/ Cabg, Mi Or Angioplasty Before 65f 55m? Yes     Social History Narrative       MEDICATIONS:  Prior to Admission medications    Medication Sig Start Date End Date Taking? Authorizing Provider   DULoxetine (CYMBALTA) 30 MG EC capsule Take 1 capsule (30 mg) by mouth daily 1/29/18  Yes Zoe Herbert MD   pregabalin (LYRICA) 25 MG capsule Start with 25 mg at bedtime then increase up to twice a day after one week 1/29/18  Yes Henna Cruz MD   HYDROcodone-acetaminophen (NORCO) 7.5-325 MG per tablet TAKE 1 TABLET BY MOUTH EVER Y 4 TO 6 HOURS AS NEEDED FO R PAIN 1/22/18  Yes Henna Cruz MD   diazepam (VALIUM) 5 MG tablet TAKE 1 TABLET BY MOUTH EVER Y 6 HOURS AS NEEDED - GENER IC FOR VALIUM 1/22/18  Yes Henna Cruz MD   baclofen (LIORESAL) 10 MG tablet TAKE 1 TABLET BY MOUTH THREE TIMES DAILY WITH FOOD 1/13/18  Yes Henna Cruz MD   dexlansoprazole (DEXILANT) 60 MG CPDR CR capsule TAKE 1 CAPSULE (60 MG) BY MOUTH DAILY 7/11/17  Yes Henna Cruz MD   rizatriptan (MAXALT) 10 MG tablet Take 10 mg by mouth at onset of headache for migraine   Yes Reported, Patient   Cholecalciferol (VITAMIN D3) 1000 UNITS CAPS Take 1,000 Units by mouth 1/6/17  Yes Reported, Patient   Multiple Vitamins-Minerals (CENTRUM SILVER ULTRA WOMENS) TABS  1/6/17  Yes Reported, Patient   topiramate (TOPAMAX) 50 MG tablet Take 2 tablets in the morning and 2 tablets at  "night 1/3/17  Yes Reported, Patient   order for DME Equipment being ordered: TENS   Yes Reported, Patient   senna-docusate (SENOKOT-S;PERICOLACE) 8.6-50 MG per tablet Take 1-4 tablets by mouth as needed  6/26/12  Yes Reported, Patient   Misc. Devices MISC Dispense one Cefaly neurostimulator and the electro stimulators with refills 6/13/16  Yes Reported, Patient   sucralfate (CARAFATE) 1 GM tablet Take 1 tablet (1 g) by mouth 4 times daily 9/13/16  Yes Henna Cruz MD   Probiotic Product (PROBIOTIC DAILY PO) Take by mouth 2 times daily   Yes Reported, Patient       ALLERGIES:     Allergies   Allergen Reactions     Aspirin Hives     Ibuprofen      Dye in the otc form causes headaches  \"patient states over the counter ibuprofen  bothers her\"     Lansoprazole Other (See Comments) and Visual Disturbance     Changes in eyesight  Prevacid  Change in eyesight     Penicillins Other (See Comments)     pruritis     Red Dye Hives     Bupropion Rash     Bupropion Hcl Hives and Rash     Wellbutrin     Demerol [Meperidine] Other (See Comments)     Made migraine worse       ROS:  CONSTITUTIONAL:NEGATIVE for fever, chills, change in weight  RESP:NEGATIVE for significant cough or SOB  CV: NEGATIVE for chest pain, palpitations or peripheral edema  NEURO: NEGATIVE for weakness, dizziness or paresthesias  PSYCHIATRIC: NEGATIVE for changes in mood or affect    EXAM:  /64  Pulse 64  Temp 97.5  F (36.4  C) (Tympanic)  Ht 5' 6\" (1.676 m)  Wt 153 lb (69.4 kg)  SpO2 99%  BMI 24.69 kg/m2 Body mass index is 24.69 kg/(m^2).   GENERAL APPEARANCE: alert, no distress and fatigued  NECK: no adenopathy, no asymmetry, masses, or scars and thyroid normal to palpation  RESP: lungs clear to auscultation - no rales, rhonchi or wheezes  CV: regular rates and rhythm, normal S1 S2, no S3 or S4 and no murmur, click or rub  LYMPHATICS: normal ant/post cervical and supraclavicular nodes  ABDOMEN: soft, slightly tender in the epigastric area " without rebound or guarding, without hepatosplenomegaly or masses and bowel sounds normal  NEURO: Normal strength and tone, mentation intact and speech normal  PSYCH: mentation appears normal and worried    LABS AND IMAGING:     Results for orders placed or performed during the hospital encounter of 05/19/17   Abd/pelvis CT - no contrast - Stone Protocol    Narrative    ABDOMEN AND PELVIS CT    FINDINGS:  There is dependent atelectasis at the lung bases.  The  liver is free of masses or biliary duct enlargement.  The spleen and  pancreas are normal.  There are no adrenal masses.  No renal calculi  are noted.  On coronal image #27 in the left ureter, adjacent to L4 is  a 2 mm in diameter calculus.  No intraperitoneal abnormalities are  seen.  In the pelvis, the bladder and rectum appear normal.  No pelvic  masses or fluid collections are noted.    IMPRESSION:  THERE IS A 2 MM MID LEFT URETERAL CALCULUS.  Exam Date: May 19, 2017 03:39:00 PM  Author: ZACHERY NUNEZ  This report is final and signed     UA with Microscopic reflex to Culture   Result Value Ref Range    Color Urine Light Yellow     Appearance Urine Clear     Glucose Urine Negative NEG mg/dL    Bilirubin Urine Negative NEG    Ketones Urine Negative NEG mg/dL    Specific Gravity Urine 1.008 1.003 - 1.035    Blood Urine Large (A) NEG    pH Urine 5.5 4.7 - 8.0 pH    Protein Albumin Urine Negative NEG mg/dL    Urobilinogen mg/dL Normal 0.0 - 2.0 mg/dL    Nitrite Urine Negative NEG    Leukocyte Esterase Urine Trace (A) NEG    Source Midstream Urine     WBC Urine 4 (H) 0 - 2 /HPF    RBC Urine 120 (H) 0 - 2 /HPF    Bacteria Urine Few (A) NEG /HPF    Mucous Urine Present (A) NEG /LPF   CBC with platelets differential   Result Value Ref Range    WBC 9.3 4.0 - 11.0 10e9/L    RBC Count 4.40 3.8 - 5.2 10e12/L    Hemoglobin 13.5 11.7 - 15.7 g/dL    Hematocrit 37.9 35.0 - 47.0 %    MCV 86 78 - 100 fl    MCH 30.7 26.5 - 33.0 pg    MCHC 35.6 31.5 - 36.5 g/dL    RDW 12.3  10.0 - 15.0 %    Platelet Count 174 150 - 450 10e9/L    Diff Method Automated Method     % Neutrophils 77.4 %    % Lymphocytes 14.1 %    % Monocytes 7.0 %    % Eosinophils 0.9 %    % Basophils 0.3 %    % Immature Granulocytes 0.3 %    Nucleated RBCs 0 0 /100    Absolute Neutrophil 7.2 1.6 - 8.3 10e9/L    Absolute Lymphocytes 1.3 0.8 - 5.3 10e9/L    Absolute Monocytes 0.7 0.0 - 1.3 10e9/L    Absolute Eosinophils 0.1 0.0 - 0.7 10e9/L    Absolute Basophils 0.0 0.0 - 0.2 10e9/L    Abs Immature Granulocytes 0.0 0 - 0.4 10e9/L    Absolute Nucleated RBC 0.0    Basic metabolic panel   Result Value Ref Range    Sodium 138 133 - 144 mmol/L    Potassium 3.6 3.4 - 5.3 mmol/L    Chloride 106 94 - 109 mmol/L    Carbon Dioxide 18 (L) 20 - 32 mmol/L    Anion Gap 14 3 - 14 mmol/L    Glucose 85 70 - 99 mg/dL    Urea Nitrogen 13 7 - 30 mg/dL    Creatinine 1.10 (H) 0.52 - 1.04 mg/dL    GFR Estimate 51 (L) >60 mL/min/1.7m2    GFR Estimate If Black 62 >60 mL/min/1.7m2    Calcium 8.6 8.5 - 10.1 mg/dL   CRP inflammation   Result Value Ref Range    CRP Inflammation <2.9 0.0 - 8.0 mg/L         ASSESSMENT/PLAN:  (M54.12,  G89.29) Chronic radicular cervical pain  (primary encounter diagnosis)  Comment:   Plan: pregabalin (LYRICA) 25 MG capsule        Will try Lyrica 25 mg titrating up to 25 mg bid, recheck in one month, sooner for concerns    (Z98.1) S/P cervical spinal fusion  Comment:   Plan: stable    (M79.1) Myofascial pain  Comment:   Plan: Lyrica    (R10.13) Epigastric pain  Comment:   Plan: GASTROENTEROLOGY ADULT REF CONSULT ONLY        Will see if she can't be moved up sooner, the other option is to have the studies done her with our surgeons    (K59.00) Constipation, unspecified constipation type  Comment:   Plan: GASTROENTEROLOGY ADULT REF CONSULT ONLY        referred  Thirty minutes spent with the patient, greater than 50% in counseling and coordination of care of the above problems and treatment options      Henna Cruz,  MD  January 29, 2018

## 2018-02-05 ENCOUNTER — TELEPHONE (OUTPATIENT)
Dept: FAMILY MEDICINE | Facility: OTHER | Age: 58
End: 2018-02-05

## 2018-02-05 DIAGNOSIS — G43.711 INTRACTABLE CHRONIC MIGRAINE WITHOUT AURA AND WITH STATUS MIGRAINOSUS: Primary | ICD-10-CM

## 2018-02-05 NOTE — TELEPHONE ENCOUNTER
3:55 PM    Reason for Call: Phone Call    Description: Patient called in and stated she has a really bad migranes, she has taken pain killers and nothing is helping.     Was an appointment offered for this call? No  If yes : Appointment type              Date    Preferred method for responding to this message: Telephone Call  What is your phone number ? [phone]    If we cannot reach you directly, may we leave a detailed response at the number you provided? Yes    Can this message wait until your PCP/provider returns, if available today? YES,     Henna Grullon

## 2018-02-05 NOTE — TELEPHONE ENCOUNTER
Called Melanie back and gave her the recommendations below, she said she will follow up with neurology.

## 2018-02-05 NOTE — TELEPHONE ENCOUNTER
I would have her seen back in Neurology at [facility] as we cannot go higher on the Topamax she is taking. Long term hydrocodone isn't an option. She has had chronic headaches evaluated there in the past. Referral  done

## 2018-02-06 ENCOUNTER — TELEPHONE (OUTPATIENT)
Dept: FAMILY MEDICINE | Facility: OTHER | Age: 58
End: 2018-02-06

## 2018-02-06 DIAGNOSIS — G89.29 CHRONIC NECK PAIN: ICD-10-CM

## 2018-02-06 DIAGNOSIS — M54.2 CHRONIC NECK PAIN: ICD-10-CM

## 2018-02-06 NOTE — TELEPHONE ENCOUNTER
Controlled Substance Refill Request for Norco  Problem List Complete:  No     PROVIDER TO CONSIDER COMPLETION OF PROBLEM LIST AND OVERVIEW/CONTROLLED SUBSTANCE AGREEMENT    Last Written Prescription Date:  1.22.18  Last Fill Quantity: 60,   # refills: 0    [place] [facility] [place]: 1.29.18    Future Office visit:   Next 5 appointments (look out 90 days)     Feb 28, 2018  8:40 AM CST   (Arrive by 8:25 AM)   [facility]   [facility]    [address] [place] [address]   [phone]            Mar 01, 2018 10:45 AM CST   (Arrive by 10:30 AM)   SHORT with Henna Cruz MD   [facility] ([facility]    [address] [facility] [address]   [phone]                  Controlled substance agreement on file: No.     Processing:  Patient will  in clinic

## 2018-02-07 RX ORDER — HYDROCODONE BITARTRATE AND ACETAMINOPHEN 7.5; 325 MG/1; MG/1
TABLET ORAL
Qty: 60 TABLET | Refills: 0 | Status: SHIPPED | OUTPATIENT
Start: 2018-02-07 | End: 2018-04-06

## 2018-02-07 NOTE — TELEPHONE ENCOUNTER
Please see note below for what patient is looking for.  Patient denied Norco RX.  [facility] shredded by refill.

## 2018-02-07 NOTE — TELEPHONE ENCOUNTER
Patient states that she does not want another Rx for Norco, she wanted a shot for her migraine.

## 2018-02-08 NOTE — TELEPHONE ENCOUNTER
"Called patient back and wanted to clarify that she doesn't want a prescription for hydrocodone since she already had received the prescription and isn't out and is not a \"fricken\" drug seeker, and she wants a shot of Demerol for her migraine, I did state that we will not be doing that as its not recommended and she was in agreeance with that. Pt will follow up with neurology.   "

## 2018-02-09 ENCOUNTER — TRANSFERRED RECORDS (OUTPATIENT)
Dept: HEALTH INFORMATION MANAGEMENT | Facility: HOSPITAL | Age: 58
End: 2018-02-09

## 2018-02-10 DIAGNOSIS — M62.830 BACK MUSCLE SPASM: ICD-10-CM

## 2018-02-12 RX ORDER — BACLOFEN 10 MG/1
TABLET ORAL
Qty: 90 TABLET | Refills: 0 | Status: SHIPPED | OUTPATIENT
Start: 2018-02-12 | End: 2018-02-26

## 2018-02-22 ENCOUNTER — TRANSFERRED RECORDS (OUTPATIENT)
Dept: HEALTH INFORMATION MANAGEMENT | Facility: HOSPITAL | Age: 58
End: 2018-02-22

## 2018-02-26 ENCOUNTER — OFFICE VISIT (OUTPATIENT)
Dept: FAMILY MEDICINE | Facility: OTHER | Age: 58
End: 2018-02-26
Attending: FAMILY MEDICINE
Payer: COMMERCIAL

## 2018-02-26 VITALS
HEIGHT: 66 IN | SYSTOLIC BLOOD PRESSURE: 118 MMHG | OXYGEN SATURATION: 98 % | DIASTOLIC BLOOD PRESSURE: 72 MMHG | HEART RATE: 71 BPM | WEIGHT: 153 LBS | TEMPERATURE: 97.8 F | BODY MASS INDEX: 24.59 KG/M2

## 2018-02-26 DIAGNOSIS — G89.29 CHRONIC RADICULAR CERVICAL PAIN: ICD-10-CM

## 2018-02-26 DIAGNOSIS — G43.709 CHRONIC MIGRAINE WITHOUT AURA WITHOUT STATUS MIGRAINOSUS, NOT INTRACTABLE: Primary | ICD-10-CM

## 2018-02-26 DIAGNOSIS — K29.50 OTHER CHRONIC GASTRITIS WITHOUT HEMORRHAGE: ICD-10-CM

## 2018-02-26 DIAGNOSIS — M54.12 CHRONIC RADICULAR CERVICAL PAIN: ICD-10-CM

## 2018-02-26 DIAGNOSIS — M79.18 MYOFASCIAL PAIN SYNDROME, CERVICAL: ICD-10-CM

## 2018-02-26 DIAGNOSIS — Z98.1 S/P CERVICAL SPINAL FUSION: ICD-10-CM

## 2018-02-26 PROCEDURE — G0463 HOSPITAL OUTPT CLINIC VISIT: HCPCS

## 2018-02-26 PROCEDURE — 99215 OFFICE O/P EST HI 40 MIN: CPT | Performed by: FAMILY MEDICINE

## 2018-02-26 RX ORDER — PREGABALIN 100 MG/1
100 CAPSULE ORAL 3 TIMES DAILY
Qty: 90 CAPSULE | Refills: 0 | COMMUNITY
Start: 2018-02-26 | End: 2018-11-05

## 2018-02-26 RX ORDER — BACLOFEN 10 MG/1
10 TABLET ORAL 3 TIMES DAILY PRN
Qty: 40 TABLET | Refills: 0 | Status: SHIPPED | OUTPATIENT
Start: 2018-02-26 | End: 2018-12-29

## 2018-02-26 ASSESSMENT — ANXIETY QUESTIONNAIRES
GAD7 TOTAL SCORE: 5
6. BECOMING EASILY ANNOYED OR IRRITABLE: SEVERAL DAYS
7. FEELING AFRAID AS IF SOMETHING AWFUL MIGHT HAPPEN: NOT AT ALL
5. BEING SO RESTLESS THAT IT IS HARD TO SIT STILL: NOT AT ALL
1. FEELING NERVOUS, ANXIOUS, OR ON EDGE: SEVERAL DAYS
3. WORRYING TOO MUCH ABOUT DIFFERENT THINGS: SEVERAL DAYS
IF YOU CHECKED OFF ANY PROBLEMS ON THIS QUESTIONNAIRE, HOW DIFFICULT HAVE THESE PROBLEMS MADE IT FOR YOU TO DO YOUR WORK, TAKE CARE OF THINGS AT HOME, OR GET ALONG WITH OTHER PEOPLE: NOT DIFFICULT AT ALL
2. NOT BEING ABLE TO STOP OR CONTROL WORRYING: SEVERAL DAYS

## 2018-02-26 ASSESSMENT — PATIENT HEALTH QUESTIONNAIRE - PHQ9: 5. POOR APPETITE OR OVEREATING: SEVERAL DAYS

## 2018-02-26 ASSESSMENT — PAIN SCALES - GENERAL: PAINLEVEL: MODERATE PAIN (5)

## 2018-02-26 NOTE — MR AVS SNAPSHOT
"              After Visit Summary   2/26/2018    Sadaf Blankenship    MRN: [id number]           Patient Information     Date Of Birth          1960        Visit Information        Provider Department      2/26/2018 3:30 PM Henna Cruz MD [facility]        Today's Diagnoses     Chronic radicular cervical pain           Follow-ups after your visit        Follow-up notes from your care team     Return in about 2 months (around 4/26/2018).      Your next 10 appointments already scheduled     Feb 28, 2018  8:40 AM CST   (Arrive by 8:25 AM)   Return Visit with Zoe Herbert MD   [facility] ([facility] [place] )    [address] [place] [address]  [place] [id number] [phone]            Apr 26, 2018  8:30 AM CDT   (Arrive by 8:15 AM)   SHORT with Henna Cruz MD   [facility] ([facility] [place] )    [address]  [place] [address]   [phone]              Who to contact     If you have questions or need follow up information about today's clinic visit or your schedule please contact [facility] directly at [phone].  Normal or non-critical lab and imaging results will be communicated to you by MyChart, letter or phone within 4 business days after the clinic has received the results. If you do not hear from us within 7 days, please contact the clinic through MyChart or phone. If you have a critical or abnormal lab result, we will notify you by phone as soon as possible.  Submit refill requests through ipvive or call your pharmacy and they will forward the refill request to us. Please allow 3 business days for your refill to be completed.          Additional Information About Your Visit        MyChart Information     ipvive lets you send messages to your doctor, view your test results, renew your prescriptions, schedule appointments and more. To sign up, go to [email or web address] . Click on \"Log in\" on the left " "side of the screen, which will take you to the Welcome page. Then click on \"Sign up Now\" on the right side of the page.     You will be asked to enter the access code listed below, as well as some personal information. Please follow the directions to create your username and password.     Your access code is: [email or web address]  Expires: 2018  9:08 AM     Your access code will  in 90 days. If you need help or a new code, please call your [place] clinic or [phone].        Care EveryWhere ID     This is your Care EveryWhere ID. This could be used by other organizations to access your [place] medical records  [id number]        Your Vitals Were     Pulse Temperature Height Pulse Oximetry BMI (Body Mass Index)       71 97.8  F (36.6  C) (Tympanic) 5' 6\" (1.676 m) 98% 24.69 kg/m2        Blood Pressure from Last 3 Encounters:   18 118/72   18 108/64   18 110/72    Weight from Last 3 Encounters:   18 153 lb (69.4 kg)   18 153 lb (69.4 kg)   18 153 lb (69.4 kg)              Today, you had the following     No orders found for display         Today's Medication Changes          These changes are accurate as of 18  4:53 PM.  If you have any questions, ask your nurse or doctor.               Start taking these medicines.        Dose/Directions    baclofen 10 MG tablet   Commonly known as:  LIORESAL   Used for:  Chronic radicular cervical pain   Started by:  Henna Cruz MD        Dose:  10 mg   Take 1 tablet (10 mg) by mouth 3 times daily as needed for muscle spasms   Quantity:  40 tablet   Refills:  0         These medicines have changed or have updated prescriptions.        Dose/Directions    pregabalin 100 MG capsule   Commonly known as:  LYRICA   This may have changed:    - medication strength  - how much to take  - how to take this  - when to take this  - additional instructions   Used for:  Chronic radicular cervical pain   Changed by:  Henna Cruz MD        " Dose:  100 mg   Take 1 capsule (100 mg) by mouth 3 times daily   Quantity:  90 capsule   Refills:  0       sucralfate 1 GM tablet   Commonly known as:  CARAFATE   This may have changed:  how much to take   Used for:  Gastroesophageal reflux disease with esophagitis        Dose:  1 g   Take 1 tablet (1 g) by mouth 4 times daily   Quantity:  40 tablet   Refills:  1         Stop taking these medicines if you haven't already. Please contact your care team if you have questions.     MAXALT 10 MG tablet   Generic drug:  rizatriptan   Stopped by:  Henna Cruz MD                Where to get your medicines      These medications were sent to [id number] - [place], [place] - [address]  [address] [facility] [address]     Phone:  [phone]     baclofen 10 MG tablet                Primary Care Provider Office Phone # Fax #    Henna Cruz [phone] [address] [facility] [address]        Equal Access to Services     [facility]: Hadii aad ku hadasho Soomaali, waaxda luqadaha, qaybta kaalmada adeegyada, waxay idiin hayaan deanna vogt . So [facility] [phone].    ATENCIÓN: Si gennyla espjosias, tiene a schroeder disposición servicios gratuitos de asistencia lingüística. Llame al [phone].    We comply with applicable federal civil rights laws and [place] laws. We do not discriminate on the basis of race, color, national origin, age, disability, sex, sexual orientation, or gender identity.            Thank you!     Thank you for choosing [facility]  for your care. Our goal is always to provide you with excellent care. Hearing back from our patients is one way we can continue to improve our services. Please take a few minutes to complete the written survey that you may receive in the mail after your visit with us. Thank you!             Your Updated Medication List - Protect others around you: Learn how to safely use, store and throw away your medicines  at www.disposemymeds.org.          This list is accurate as of 2/26/18  4:53 PM.  Always use your most recent med list.                   Brand Name Dispense Instructions for use Diagnosis    baclofen 10 MG tablet    LIORESAL    40 tablet    Take 1 tablet (10 mg) by mouth 3 times daily as needed for muscle spasms    Chronic radicular cervical pain       CENTRUM SILVER ULTRA WOMENS Tabs           dexlansoprazole 60 MG Cpdr CR capsule    DEXILANT    30 capsule    TAKE 1 CAPSULE (60 MG) BY MOUTH DAILY    Ulcer of esophagus without bleeding       diazepam 5 MG tablet    VALIUM    100 tablet    TAKE 1 TABLET BY MOUTH EVER Y 6 HOURS AS NEEDED - GENER IC FOR VALIUM    Muscle spasm       DULoxetine 30 MG EC capsule    CYMBALTA    30 capsule    Take 1 capsule (30 mg) by mouth daily    ESTRELLA (generalized anxiety disorder)       HYDROcodone-acetaminophen 7.5-325 MG per tablet    NORCO    60 tablet    TAKE 1 TABLET BY MOUTH EVER Y 4 TO 6 HOURS AS NEEDED FO R PAIN    Chronic neck pain       Misc. Devices Misc      Dispense one Cefaly neurostimulator and the electro stimulators with refills        order for DME      Equipment being ordered: TENS        pregabalin 100 MG capsule    LYRICA    90 capsule    Take 1 capsule (100 mg) by mouth 3 times daily    Chronic radicular cervical pain       PROBIOTIC DAILY PO      Take by mouth 2 times daily        senna-docusate 8.6-50 MG per tablet    SENOKOT-S;PERICOLACE     Take 1-4 tablets by mouth as needed        sucralfate 1 GM tablet    CARAFATE    40 tablet    Take 1 tablet (1 g) by mouth 4 times daily    Gastroesophageal reflux disease with esophagitis       topiramate 50 MG tablet    TOPAMAX     Take 2 tablets in the morning and 2 tablets at night        vitamin D3 1000 UNITS Caps      Take 1,000 Units by mouth

## 2018-02-26 NOTE — NURSING NOTE
"Chief Complaint   Patient presents with     Pain     Follow up for neck and back pain. On Lyrica 25mg BID       Initial /72  Pulse 71  Temp 97.8  F (36.6  C) (Tympanic)  Ht 5' 6\" (1.676 m)  Wt 163 lb (73.9 kg)  SpO2 98%  BMI 26.31 kg/m2 Estimated body mass index is 26.31 kg/(m^2) as calculated from the following:    Height as of this encounter: 5' 6\" (1.676 m).    Weight as of this encounter: 163 lb (73.9 kg).  Medication Reconciliation: complete   RAFFY CALROS LPN  "

## 2018-02-27 ASSESSMENT — PATIENT HEALTH QUESTIONNAIRE - PHQ9: SUM OF ALL RESPONSES TO PHQ QUESTIONS 1-9: 2

## 2018-02-27 ASSESSMENT — ANXIETY QUESTIONNAIRES: GAD7 TOTAL SCORE: 5

## 2018-02-27 NOTE — PROGRESS NOTES
[facility]    Sadaf Blankenship, 57 year old, female presents with   Chief Complaint   Patient presents with     Pain     Follow up for neck and back pain. On Lyrica 100 mg tid. She was seen recently by Dr Kirby for her ongoing headaches and will be trying Botox injections again for her. She also increased her Lyrica to 100 mg tid.      Abdominal Pain     she had an EGD and colonoscopy at [facility] and was found to have gastritis, was started on Carafate bid and continued on Dexiclent. She has now gained 3 pounds and is able to eat more foods.      Neck Pain     chronic, right, aching, She will be seeing Dr Agrawal for Botox injections as well for ongoing pain. She had been seen at [place] and is frustrated with her results there, feels more satisfied with her care at [facility]     Stress     she is upset with issues with her daughter, Noemy, who she is worried may be using drugs and may be buying drugs for her father. Her brother is critically ill as well on the list for a heart transplant       PAST MEDICAL HISTORY:  Past Medical History:   Diagnosis Date     ASCUS on Pap smear 5/26/2004    negative HPV     Atypical chest pain 5/26/2008    negative cardiolite stress test [facility]     Bleeding ulcer 5/26/1976     Cervical radicular pain 5/10/2012     Degenerative disc disease, cervical 1/1/2011     Esophageal ulcer 5/26/1998     Irritable bowel syndrome 1/26/2015     Migraine headaches, severe 7/9/2001     Pseudoarthrosis of cervical spine 7/3/2012     Recurrent UTI 5/26/2011     sinusitis (chronic) 4/16/2001       PAST SURGICAL HISTORY:  Past Surgical History:   Procedure Laterality Date     BACK SURGERY      cervical     Cervical spine surgery with removal of 2 disks, C5,C7       COLONOSCOPY  10/13/2014    Dr Camargo, internal hemorrhoids     Coronary angiogram, normal  2003    Chest pain     ENT SURGERY      nose surgery x3     fusion discectomy anterior cervical  2011     HC ESOPHAGOSCOPY,  DIAGNOSTIC  10/31/2014    Dr Camargo, mild erythema of antrum, negative biopsies     NOSE SURGERY      x3            Posterior decompression , fusion C3-5      Psuedoarthrosis     Supracervical hysterectomy with right salpingoophorectomy  2002    Endometriosis       SOCIAL HISTORY  Social History     Social History     Marital status:      Spouse name: N/A     Number of children: N/A     Years of education: N/A     Occupational History     architectual resources      Social History Main Topics     Smoking status: Former Smoker     Types: Cigarettes     Smokeless tobacco: Never Used      Comment: quit . no passive exposure     Alcohol use No     Drug use: No     Sexual activity: Not on file     Other Topics Concern     Blood Transfusions Yes     Caffeine Concern Yes     1 cup daily     Parent/Sibling W/ Cabg, Mi Or Angioplasty Before 65f 55m? Yes     Social History Narrative       MEDICATIONS:  Prior to Admission medications    Medication Sig Start Date End Date Taking? Authorizing Provider   pregabalin (LYRICA) 100 MG capsule Take 1 capsule (100 mg) by mouth 3 times daily 18  Yes Henna Cruz MD   baclofen (LIORESAL) 10 MG tablet Take 1 tablet (10 mg) by mouth 3 times daily as needed for muscle spasms 18  Yes Henna Cruz MD   HYDROcodone-acetaminophen (NORCO) 7.5-325 MG per tablet TAKE 1 TABLET BY MOUTH EVER Y 4 TO 6 HOURS AS NEEDED FO R PAIN 18   Henna Cruz MD   DULoxetine (CYMBALTA) 30 MG EC capsule Take 1 capsule (30 mg) by mouth daily 18   Zoe Herbert MD   diazepam (VALIUM) 5 MG tablet TAKE 1 TABLET BY MOUTH EVER Y 6 HOURS AS NEEDED - GENER IC FOR VALIUM 18   Henna Cruz MD   dexlansoprazole (DEXILANT) 60 MG CPDR CR capsule TAKE 1 CAPSULE (60 MG) BY MOUTH DAILY 17   Henna Cruz MD   Cholecalciferol (VITAMIN D3) 1000 UNITS CAPS Take 1,000 Units by mouth 17   Reported, Patient   Multiple Vitamins-Minerals (CENTRUM SILVER ULTRA  "WOMENS) TABS  1/6/17   Reported, Patient   topiramate (TOPAMAX) 50 MG tablet Take 2 tablets in the morning and 2 tablets at night 1/3/17   Reported, Patient   order for DME Equipment being ordered: TENS    Reported, Patient   senna-docusate (SENOKOT-S;PERICOLACE) 8.6-50 MG per tablet Take 1-4 tablets by mouth as needed  6/26/12   Reported, Patient   Misc. Devices MISC Dispense one Cefaly neurostimulator and the electro stimulators with refills 6/13/16   Reported, Patient   sucralfate (CARAFATE) 1 GM tablet Take 1 tablet (1 g) by mouth 4 times daily  Patient taking differently: Take 2 g by mouth 4 times daily  9/13/16   Henna Cruz MD   Probiotic Product (PROBIOTIC DAILY PO) Take by mouth 2 times daily    Reported, Patient       ALLERGIES:     Allergies   Allergen Reactions     Aspirin Hives     Ibuprofen      Dye in the otc form causes headaches  \"patient states over the counter ibuprofen  bothers her\"     Lansoprazole Other (See Comments) and Visual Disturbance     Changes in eyesight  Prevacid  Change in eyesight     Penicillins Other (See Comments)     pruritis     Red Dye Hives     Bupropion Rash     Bupropion Hcl Hives and Rash     Wellbutrin     Demerol [Meperidine] Other (See Comments)     Made migraine worse       ROS:  CONSTITUTIONAL:NEGATIVE for fever, chills, change in weight  RESP:NEGATIVE for significant cough or SOB  CV: NEGATIVE for chest pain, palpitations or peripheral edema  GI: Negative for emesis, diarrhea, POSITIVE for hemorrhoids, no bleeding  NEURO: NEGATIVE for weakness, dizziness or paresthesias  PSYCHIATRIC: anxiety and fatigue    EXAM:  /72  Pulse 71  Temp 97.8  F (36.6  C) (Tympanic)  Ht 5' 6\" (1.676 m)  Wt 153 lb (69.4 kg)  SpO2 98%  BMI 24.69 kg/m2 Body mass index is 24.69 kg/(m^2).   GENERAL APPEARANCE: alert, no distress, crying and fatigued  NECK: no adenopathy, no asymmetry, masses, or scars and thyroid normal to palpation  RESP: lungs clear to auscultation - no " rales, rhonchi or wheezes  CV: regular rates and rhythm, normal S1 S2, no S3 or S4 and no murmur, click or rub  MS: extremities normal- no gross deformities noted and neck with bilateral trapezius muscle spasm extending to the neck with tenderness of these areas, ROM is limited in all modes due to pain  SKIN: no suspicious lesions or rashes and well healed incisions of the anterior lower neck and posterior neck   NEURO: Normal strength and tone, mentation intact and speech normal  PSYCH: mentation appears normal, crying and worried    LABS AND IMAGING:           ASSESSMENT/PLAN:  (G43.709) Chronic migraine without aura without status migrainosus, not intractable  (primary encounter diagnosis)  Comment:   Plan: continue topamax and follow up for Botox injections    (M54.12,  G89.29) Chronic radicular cervical pain  Comment:   Plan: pregabalin (LYRICA) 100 MG capsule, baclofen         (LIORESAL) 10 MG tablet        Continue lyrica for pain and will add back Baclofen which was stopped by GI as it was felt this was used for her IBS but is actually use for her neck and chronic pain    (Z98.1) S/P cervical spinal fusion  Comment:   Plan: stable    (M79.1) Myofascial pain syndrome, cervical  Comment:   Plan: possible Botox injections for this as well     (K29.50) Other chronic gastritis without hemorrhage  Comment:   Plan: improved on medications. Continue. Recheck in 2 months, sooner for concerns  Forty five  minutes spent with the patient, greater than 50% in counseling and coordination of care of the above problems and treatment options      Henna Cruz MD  February 26, 2018

## 2018-02-28 ENCOUNTER — OFFICE VISIT (OUTPATIENT)
Dept: PSYCHIATRY | Facility: OTHER | Age: 58
End: 2018-02-28
Attending: PSYCHIATRY & NEUROLOGY
Payer: COMMERCIAL

## 2018-02-28 VITALS
TEMPERATURE: 97.8 F | SYSTOLIC BLOOD PRESSURE: 98 MMHG | BODY MASS INDEX: 26.31 KG/M2 | WEIGHT: 163 LBS | HEART RATE: 74 BPM | OXYGEN SATURATION: 95 % | DIASTOLIC BLOOD PRESSURE: 66 MMHG

## 2018-02-28 DIAGNOSIS — F33.1 MAJOR DEPRESSIVE DISORDER, RECURRENT EPISODE, MODERATE (H): Primary | ICD-10-CM

## 2018-02-28 PROCEDURE — G0463 HOSPITAL OUTPT CLINIC VISIT: HCPCS

## 2018-02-28 PROCEDURE — 99214 OFFICE O/P EST MOD 30 MIN: CPT | Performed by: PSYCHIATRY & NEUROLOGY

## 2018-02-28 RX ORDER — FLUOXETINE 20 MG/1
20 TABLET, FILM COATED ORAL DAILY
Qty: 30 TABLET | Refills: 11 | Status: SHIPPED | OUTPATIENT
Start: 2018-02-28 | End: 2018-02-28 | Stop reason: ALTCHOICE

## 2018-02-28 ASSESSMENT — PATIENT HEALTH QUESTIONNAIRE - PHQ9: 5. POOR APPETITE OR OVEREATING: NOT AT ALL

## 2018-02-28 ASSESSMENT — ANXIETY QUESTIONNAIRES
2. NOT BEING ABLE TO STOP OR CONTROL WORRYING: SEVERAL DAYS
5. BEING SO RESTLESS THAT IT IS HARD TO SIT STILL: NOT AT ALL
3. WORRYING TOO MUCH ABOUT DIFFERENT THINGS: NOT AT ALL
6. BECOMING EASILY ANNOYED OR IRRITABLE: NOT AT ALL
GAD7 TOTAL SCORE: 2
7. FEELING AFRAID AS IF SOMETHING AWFUL MIGHT HAPPEN: NOT AT ALL
1. FEELING NERVOUS, ANXIOUS, OR ON EDGE: SEVERAL DAYS

## 2018-02-28 ASSESSMENT — PAIN SCALES - GENERAL: PAINLEVEL: MODERATE PAIN (4)

## 2018-02-28 NOTE — MR AVS SNAPSHOT
"              After Visit Summary   2/28/2018    Sadaf Blankenship    MRN: [id number]           Patient Information     Date Of Birth          1960        Visit Information        Provider Department      2/28/2018 8:40 AM Zoe Herbert MD [facility]        Today's Diagnoses     Major depressive disorder, recurrent episode, moderate (H)    -  1       Follow-ups after your visit        Your next 10 appointments already scheduled     Apr 25, 2018  9:00 AM CDT   (Arrive by 8:45 AM)   Return Visit with Zoe Herbert MD   [facility] ([facility] )    [address] [place] [id number] [phone]            Apr 26, 2018  8:30 AM CDT   (Arrive by 8:15 AM)   SHORT with Henna Cruz MD   [facility] ([facility] )    [address] [facility] [address]   [phone]              Who to contact     If you have questions or need follow up information about today's clinic visit or your schedule please contact [facility] directly at [phone].  Normal or non-critical lab and imaging results will be communicated to you by MyChart, letter or phone within 4 business days after the clinic has received the results. If you do not hear from us within 7 days, please contact the clinic through [employer] or phone. If you have a critical or abnormal lab result, we will notify you by phone as soon as possible.  Submit refill requests through [email or web address] or call your pharmacy and they will forward the refill request to us. Please allow 3 business days for your refill to be completed.          Additional Information About Your Visit        MyChart Information     [email or web address] lets you send messages to your doctor, view your test results, renew your prescriptions, schedule appointments and more. To sign up, go to [email or web address] . Click on \"Log in\" on the left side of the screen, which will take you to the Welcome " "page. Then click on \"Sign up Now\" on the right side of the page.     You will be asked to enter the access code listed below, as well as some personal information. Please follow the directions to create your username and password.     Your access code is: [email or web address]  Expires: 2018  9:08 AM     Your access code will  in 90 days. If you need help or a new code, please call your [place] clinic or [phone].        Care EveryWhere ID     This is your Care EveryWhere ID. This could be used by other organizations to access your [place] medical records  [id number]        Your Vitals Were     Pulse Temperature Pulse Oximetry BMI (Body Mass Index)          74 97.8  F (36.6  C) (Tympanic) 95% 26.31 kg/m2         Blood Pressure from Last 3 Encounters:   18 98/66   18 118/72   18 108/64    Weight from Last 3 Encounters:   18 163 lb (73.9 kg)   18 153 lb (69.4 kg)   18 153 lb (69.4 kg)              Today, you had the following     No orders found for display         Today's Medication Changes          These changes are accurate as of 18  9:34 AM.  If you have any questions, ask your nurse or doctor.               Start taking these medicines.        Dose/Directions    FLUoxetine 20 MG capsule   Commonly known as:  PROzac   Used for:  Major depressive disorder, recurrent episode, moderate (H)   Started by:  Zoe Herbert MD        Dose:  20 mg   Take 1 capsule (20 mg) by mouth daily   Quantity:  30 capsule   Refills:  11         These medicines have changed or have updated prescriptions.        Dose/Directions    sucralfate 1 GM tablet   Commonly known as:  CARAFATE   This may have changed:  how much to take   Used for:  Gastroesophageal reflux disease with esophagitis        Dose:  1 g   Take 1 tablet (1 g) by mouth 4 times daily   Quantity:  40 tablet   Refills:  1            Where to get your medicines      These medications were sent to [facility] " #[id number] - [place], [place] - [address] [facility]  [address] [facility] [facility] [address]     Phone:  [phone]     FLUoxetine 20 MG capsule                Primary Care Provider Office Phone # Fax #    Henna Cruz [phone]       [address] [facility] [address]        Equal Access to Services     [facility]: Hadii aad ku hadasho Soomaali, waaxda luqadaha, qaybta kaalmada adeegyada, waxay idiin hayaan adeeg khevash laSulemanaan . So [facility] [phone].    ATENCIÓN: Si habla español, tiene a schroeder disposición servicios gratuitos de asistencia lingüística. Llame al [phone].    We comply with applicable federal civil rights laws and [place] laws. We do not discriminate on the basis of race, color, national origin, age, disability, sex, sexual orientation, or gender identity.            Thank you!     Thank you for choosing [facility]  for your care. Our goal is always to provide you with excellent care. Hearing back from our patients is one way we can continue to improve our services. Please take a few minutes to complete the written survey that you may receive in the mail after your visit with us. Thank you!             Your Updated Medication List - Protect others around you: Learn how to safely use, store and throw away your medicines at www.disposemymeds.org.          This list is accurate as of 2/28/18  9:34 AM.  Always use your most recent med list.                   Brand Name Dispense Instructions for use Diagnosis    baclofen 10 MG tablet    LIORESAL    40 tablet    Take 1 tablet (10 mg) by mouth 3 times daily as needed for muscle spasms    Chronic radicular cervical pain       CENTRUM SILVER ULTRA WOMENS Tabs           dexlansoprazole 60 MG Cpdr CR capsule    DEXILANT    30 capsule    TAKE 1 CAPSULE (60 MG) BY MOUTH DAILY    Ulcer of esophagus without bleeding       diazepam 5 MG tablet    VALIUM    100 tablet    TAKE 1 TABLET BY MOUTH EVER Y 6 HOURS AS NEEDED - GENER IC FOR  VALIUM    Muscle spasm       DULoxetine 30 MG EC capsule    CYMBALTA    30 capsule    Take 1 capsule (30 mg) by mouth daily    ESTRELLA (generalized anxiety disorder)       FLUoxetine 20 MG capsule    PROzac    30 capsule    Take 1 capsule (20 mg) by mouth daily    Major depressive disorder, recurrent episode, moderate (H)       HYDROcodone-acetaminophen 7.5-325 MG per tablet    NORCO    60 tablet    TAKE 1 TABLET BY MOUTH EVER Y 4 TO 6 HOURS AS NEEDED FO R PAIN    Chronic neck pain       Misc. Devices Misc      Dispense one Cefaly neurostimulator and the electro stimulators with refills        order for DME      Equipment being ordered: TENS        pregabalin 100 MG capsule    LYRICA    90 capsule    Take 1 capsule (100 mg) by mouth 3 times daily    Chronic radicular cervical pain       PROBIOTIC DAILY PO      Take by mouth 2 times daily        senna-docusate 8.6-50 MG per tablet    SENOKOT-S;PERICOLACE     Take 1-4 tablets by mouth as needed        sucralfate 1 GM tablet    CARAFATE    40 tablet    Take 1 tablet (1 g) by mouth 4 times daily    Gastroesophageal reflux disease with esophagitis       topiramate 50 MG tablet    TOPAMAX     Take 2 tablets in the morning and 2 tablets at night        vitamin D3 1000 UNITS Caps      Take 1,000 Units by mouth

## 2018-02-28 NOTE — PROGRESS NOTES
"  PSYCHIATRY CLINIC PROGRESS NOTE   20 minute medication management, more than 50% of time spent counseling patient on medications, medication side effects, symptom history and management   SUBJECTIVE / INTERIM HISTORY                                                                       Social- niece and niece's kids moved out Children-  Daughter going to college  Last visit 1/29/18: cymbalta decrease down to 30 mg daily     - issues with daughter 20 yo Noemy. She lives in [place] with dad. Afraid something going with daughter.   - wonders if Cymbalta helping with depression, feels helping with anxiety. Working on boundaries with daughter  - GI issues: gastritis. Started on Carafate  - came back from [place] was visiting and helping try take care of her sick brother. Brother and her got into BIG fight. She did not get into it with him rather did not partake in his arguing. Brother on list for heart transplant (heart failure).   - [facility] visit for second opinion for cervical rhizotomy: they said \"no\" and do their pain program and / or rhizotomy there  - long-term disability and moved in with her sister  - as it gets colder pain gets worse then depression & anxiety get worse  - Topamax: for migraines on twice daily    SUBSTANCE USE- no issues    SYMPTOMS- doing depression, anhedonia, low energy, overwhelmed, no SI.  MEDICAL ROS- upper abdominal pain better with Carafate.  Substernal pain after she eats (hx ulcers esophageal and gastric) neck pain s/p neck surgeries, migraines, IBS  MEDICAL / SURGICAL HISTORY                    Patient Active Problem List   Diagnosis     Degenerative disc disease, cervical     Pseudoarthrosis of cervical spine     Cervical pain     Migraine     UTI (urinary tract infection)     Advanced care planning/counseling discussion     Thoracic sprain and strain     Sprain and strain of shoulder and upper arm     Irritable bowel syndrome     Myofascial pain syndrome, cervical     Depression, major "     Chronic pain syndrome     Uvulitis     Chronic rhinitis     Madina bullosa     Chronic maxillary sinusitis     Hypertrophy of inferior nasal turbinate     Long uvula     Chronic neck pain     Chronic pain     Chronic tension-type headache, intractable     Migraine aura without headache     History of arthrodesis     Myofascial pain     Disuse syndrome     Intractable chronic migraine without aura and with status migrainosus     Ulcer of esophagus without bleeding     Gastroesophageal reflux disease with esophagitis     Intractable chronic migraine without aura and without status migrainosus     Ulcer of esophagus     Ulnar neuropathy     Chronic radicular cervical pain     Mild episode of recurrent major depressive disorder (H)     Ulnar neuropathy at elbow of left upper extremity     Lumbar radiculopathy     S/P cervical spinal fusion     ACP (advance care planning)     Acute back pain with sciatica     Chronic migraine without aura without status migrainosus, not intractable     Radicular pain     Subacromial bursitis of right shoulder joint     Panlobular emphysema (H)     Calculus of kidney     Pulmonary emphysema, unspecified emphysema type (H)     Other chronic gastritis without hemorrhage     ALLERGY   Aspirin; Ibuprofen; Lansoprazole; Penicillins; Red dye; Bupropion; Bupropion hcl; and Demerol [meperidine]  MEDICATIONS                                                                                             Current Outpatient Prescriptions   Medication Sig     pregabalin (LYRICA) 100 MG capsule Take 1 capsule (100 mg) by mouth 3 times daily     baclofen (LIORESAL) 10 MG tablet Take 1 tablet (10 mg) by mouth 3 times daily as needed for muscle spasms     HYDROcodone-acetaminophen (NORCO) 7.5-325 MG per tablet TAKE 1 TABLET BY MOUTH EVER Y 4 TO 6 HOURS AS NEEDED FO R PAIN     DULoxetine (CYMBALTA) 30 MG EC capsule Take 1 capsule (30 mg) by mouth daily     diazepam (VALIUM) 5 MG tablet TAKE 1 TABLET BY  MOUTH EVER Y 6 HOURS AS NEEDED - GENER IC FOR VALIUM     dexlansoprazole (DEXILANT) 60 MG CPDR CR capsule TAKE 1 CAPSULE (60 MG) BY MOUTH DAILY     Cholecalciferol (VITAMIN D3) 1000 UNITS CAPS Take 1,000 Units by mouth     Multiple Vitamins-Minerals (CENTRUM SILVER ULTRA WOMENS) TABS      topiramate (TOPAMAX) 50 MG tablet Take 2 tablets in the morning and 2 tablets at night     order for DME Equipment being ordered: TENS     senna-docusate (SENOKOT-S;PERICOLACE) 8.6-50 MG per tablet Take 1-4 tablets by mouth as needed      Misc. Devices MISC Dispense one Cefaly neurostimulator and the electro stimulators with refills     sucralfate (CARAFATE) 1 GM tablet Take 1 tablet (1 g) by mouth 4 times daily (Patient taking differently: Take 2 g by mouth 4 times daily )     Probiotic Product (PROBIOTIC DAILY PO) Take by mouth 2 times daily     No current facility-administered medications for this visit.        VITALS   BP 98/66 (BP Location: Left arm, Patient Position: Sitting, Cuff Size: Adult Regular)  Pulse 74  Temp 97.8  F (36.6  C) (Tympanic)  Wt 163 lb (73.9 kg)  SpO2 95%  BMI 26.31 kg/m2     LABS                                                                                                                           Last Basic Metabolic Panel:  NA      142   6/9/2015   POTASSIUM      3.6   6/9/2015  CHLORIDE      109   6/9/2015  SALOME      8.7   6/9/2015  CO2       26   6/9/2015  BUN        8   6/9/2015  CR     0.91   6/9/2015  GLC       96   6/9/2015    MENTAL STATUS EXAM                                                                                        Alert. Oriented to person, place, and date / time. Well groomed, calm, cooperative with good eye contact. No problems with speech or psychomotor behavior. Mood was described as depressed and affect was congruent to speech content and full range. Thought process, including associations, was unremarkable and thought content was devoid of suicidal and homicidal  ideation and psychotic thought. No hallucinations. Insight was good. Judgment was intact and adequate for safety. Fund of knowledge was intact. Pt demonstrates no obvious problems with attention, concentration, language, recent or remote memory although these were not formally tested.     ASSESSMENT                                                                                                      HISTORICAL:  Initial psych note 10/13/15        NOTES:  Cymbalta -> agitation, angry. Paxil     This patient is a 57 year old, female who has hx of depression and is actually doing okay with depression, anxiety. cymbalta she thinks is helping with anxiety but major GI issues.  Still not working, visit to [facility] and they recommended hold off on their pain program until anxiety / depression under control. Gabapentin made her foggy / cognitive impairment and was on Topamax concurrently: no longer on gabapentin.     I support her going on disability and frankly am surprised she managed to work- given mental health and physical health sx.     Cymbalta helping a lot for anxiety BUT GI issues so we reduced dose from 60 mg to 30 mg. She doesn't feel helping with depression.     Felt like prozac was best in terms of treating her sx. ** RED dye in capsule. She would actually like to try first however and need be we can then change to tab.        TREATMENT RISK STATEMENT:  The risks, benefits, alternatives and potential adverse effects have been explained and are understood by the pt.  The pt agrees to the treatment plan with the ability to do so.   The pt knows to call the clinic for any problems or access emergency care if needed.        DIAGNOSES                 (Use of Axes system will continue, even though absent from DSM 5)         MDD recurrent, moderate  ESTRELLA      PLAN                                                                                                                    1)  MEDICATIONS:         --  Start fluoxetine  20 mg daily. Continue cymbalta 30 mg daily for 5 days while you start fluoxetine then can disocntinue fluoxetine (so on both for 5 days)    2)  THERAPY:  No Change    3)  LABS:  None    4)  PT MONITOR [call for probs]:  worsening sx, SI/HI, SEs from meds    5)  REFERRALS [CD, medical, other]:  None    6)  RTC:  ~ 2 months

## 2018-02-28 NOTE — NURSING NOTE
"Chief Complaint   Patient presents with     RECHECK     Mental health.  Discuss Cymbalta or change to different medication       Initial BP 98/66 (BP Location: Left arm, Patient Position: Sitting, Cuff Size: Adult Regular)  Pulse 74  Temp 97.8  F (36.6  C) (Tympanic)  Wt 163 lb (73.9 kg)  SpO2 95%  BMI 26.31 kg/m2 Estimated body mass index is 26.31 kg/(m^2) as calculated from the following:    Height as of 2/26/18: 5' 6\" (1.676 m).    Weight as of this encounter: 163 lb (73.9 kg).  Medication Reconciliation: complete     JANICE ROMO      "

## 2018-03-01 ASSESSMENT — PATIENT HEALTH QUESTIONNAIRE - PHQ9: SUM OF ALL RESPONSES TO PHQ QUESTIONS 1-9: 2

## 2018-03-01 ASSESSMENT — ANXIETY QUESTIONNAIRES: GAD7 TOTAL SCORE: 2

## 2018-03-08 ENCOUNTER — TELEPHONE (OUTPATIENT)
Dept: FAMILY MEDICINE | Facility: OTHER | Age: 58
End: 2018-03-08

## 2018-03-08 NOTE — TELEPHONE ENCOUNTER
Called Lay back at [facility] and notified her of her issues we are following and also upcoming appointment date given.

## 2018-03-08 NOTE — TELEPHONE ENCOUNTER
10:26 AM    Reason for Call: Phone Call    Description: Lay from [employer] called and states that she would like a call back regarding Sadaf and if she has any work restrictions.    Was an appointment offered for this call? No  If yes : Appointment type              Date    Preferred method for responding to this message: Telephone Call  What is your phone number ?    If we cannot reach you directly, may we leave a detailed response at the number you provided? Yes    Can this message wait until your PCP/provider returns, if available today? Not applicable, PCP is in     Rianna Mcdaniel

## 2018-03-08 NOTE — TELEPHONE ENCOUNTER
She has not been able to work due to ongoing severe pain from radicular neck pain, headaches, thoracic back pain, please call

## 2018-03-14 ENCOUNTER — TRANSFERRED RECORDS (OUTPATIENT)
Dept: HEALTH INFORMATION MANAGEMENT | Facility: CLINIC | Age: 58
End: 2018-03-14

## 2018-03-20 ENCOUNTER — TELEPHONE (OUTPATIENT)
Dept: PSYCHIATRY | Facility: OTHER | Age: 58
End: 2018-03-20

## 2018-03-20 NOTE — TELEPHONE ENCOUNTER
10:59 AM    Reason for Call: Phone Call    Description: Michelle from [facility] called and stated they had requested records for disability claim. They also needed an attending physician statement that was not filled out. Wondering if pt is on restrictions and when last appt is? Please call her back at [phone] ext [phone]    Was an appointment offered for this call? No  If yes : Appointment type              Date    Preferred method for responding to this message: Telephone Call  What is your phone number ?    If we cannot reach you directly, may we leave a detailed response at the number you provided? Yes    Can this message wait until your PCP/provider returns, if available today? Yes, aware provider out until Friday    Noemy Ty

## 2018-03-21 ENCOUNTER — TRANSFERRED RECORDS (OUTPATIENT)
Dept: HEALTH INFORMATION MANAGEMENT | Facility: CLINIC | Age: 58
End: 2018-03-21

## 2018-03-22 ENCOUNTER — TELEPHONE (OUTPATIENT)
Dept: FAMILY MEDICINE | Facility: OTHER | Age: 58
End: 2018-03-22

## 2018-03-22 NOTE — TELEPHONE ENCOUNTER
Called patient back and she states her paperwork for [facility] is at risk for being cut off, so the 20 pound weight limit is not good enough, her diagnoses have to be specific to her neck and shoulder pain. Printed forms for you to look at and complete.

## 2018-03-22 NOTE — TELEPHONE ENCOUNTER
12:36 PM    Reason for Call: Phone Call    Description: Sadaf states that some paperwork that was sent to the insurance company was not done correctly and she needs to tell Dr. Cruz what was wrong.  Please call Sadaf    Was an appointment offered for this call ?  No    Preferred method for responding to this message: [phone]    If we cannot reach you directly, may we leave a detailed response at the number you provided? Yes        Valencia Alcazar

## 2018-04-02 ENCOUNTER — TRANSFERRED RECORDS (OUTPATIENT)
Dept: HEALTH INFORMATION MANAGEMENT | Facility: CLINIC | Age: 58
End: 2018-04-02

## 2018-04-06 DIAGNOSIS — M54.2 CHRONIC NECK PAIN: ICD-10-CM

## 2018-04-06 DIAGNOSIS — M62.838 MUSCLE SPASM: ICD-10-CM

## 2018-04-06 DIAGNOSIS — G89.29 CHRONIC NECK PAIN: ICD-10-CM

## 2018-04-06 RX ORDER — DIAZEPAM 5 MG
TABLET ORAL
Qty: 100 TABLET | Refills: 0 | Status: SHIPPED | OUTPATIENT
Start: 2018-04-06 | End: 2018-09-26

## 2018-04-06 RX ORDER — HYDROCODONE BITARTRATE AND ACETAMINOPHEN 7.5; 325 MG/1; MG/1
TABLET ORAL
Qty: 60 TABLET | Refills: 0 | Status: SHIPPED | OUTPATIENT
Start: 2018-04-06 | End: 2018-06-20

## 2018-04-06 NOTE — TELEPHONE ENCOUNTER
norco      Last Written Prescription Date:  2/7/18  Last Fill Quantity: 60,   # refills: 0  Last Office Visit: 2/26/18  Future Office visit:    Next 5 appointments (look out 90 days)     Apr 25, 2018  9:00 AM CDT   (Arrive by 8:45 AM)   Return Visit with Zoe Herbert MD   [facility] [place] ([facility] [place] )    [address] [place] [address]  [place] [address] [phone]            Apr 26, 2018  8:30 AM CDT   (Arrive by 8:15 AM)   SHORT with Henna Cruz MD   [facility] ([facility] [place] )    [address] [place] [address]   [phone]                   Routing refill request to provider for review/approval because:  Drug not on the [facility], [facility] or [facility] refill protocol or controlled substance

## 2018-04-25 ENCOUNTER — TRANSFERRED RECORDS (OUTPATIENT)
Dept: HEALTH INFORMATION MANAGEMENT | Facility: CLINIC | Age: 58
End: 2018-04-25

## 2018-04-25 ENCOUNTER — OFFICE VISIT (OUTPATIENT)
Dept: PSYCHIATRY | Facility: OTHER | Age: 58
End: 2018-04-25
Attending: PSYCHIATRY & NEUROLOGY
Payer: COMMERCIAL

## 2018-04-25 VITALS
TEMPERATURE: 98 F | WEIGHT: 156 LBS | DIASTOLIC BLOOD PRESSURE: 62 MMHG | HEART RATE: 64 BPM | BODY MASS INDEX: 25.18 KG/M2 | OXYGEN SATURATION: 98 % | SYSTOLIC BLOOD PRESSURE: 108 MMHG

## 2018-04-25 DIAGNOSIS — F41.1 GAD (GENERALIZED ANXIETY DISORDER): Primary | ICD-10-CM

## 2018-04-25 PROCEDURE — G0463 HOSPITAL OUTPT CLINIC VISIT: HCPCS

## 2018-04-25 PROCEDURE — 99213 OFFICE O/P EST LOW 20 MIN: CPT | Performed by: PSYCHIATRY & NEUROLOGY

## 2018-04-25 ASSESSMENT — ANXIETY QUESTIONNAIRES
5. BEING SO RESTLESS THAT IT IS HARD TO SIT STILL: NOT AT ALL
2. NOT BEING ABLE TO STOP OR CONTROL WORRYING: NOT AT ALL
GAD7 TOTAL SCORE: 0
7. FEELING AFRAID AS IF SOMETHING AWFUL MIGHT HAPPEN: NOT AT ALL
6. BECOMING EASILY ANNOYED OR IRRITABLE: NOT AT ALL
1. FEELING NERVOUS, ANXIOUS, OR ON EDGE: NOT AT ALL
3. WORRYING TOO MUCH ABOUT DIFFERENT THINGS: NOT AT ALL

## 2018-04-25 ASSESSMENT — PAIN SCALES - GENERAL: PAINLEVEL: MODERATE PAIN (4)

## 2018-04-25 ASSESSMENT — PATIENT HEALTH QUESTIONNAIRE - PHQ9: 5. POOR APPETITE OR OVEREATING: NOT AT ALL

## 2018-04-25 NOTE — NURSING NOTE
"Chief Complaint   Patient presents with     RECHECK     Mental health. Fluoxetine is working well.       Initial /62 (BP Location: Right arm, Patient Position: Sitting, Cuff Size: Adult Regular)  Pulse 64  Temp 98  F (36.7  C) (Tympanic)  Wt 156 lb (70.8 kg)  SpO2 98%  BMI 25.18 kg/m2 Estimated body mass index is 25.18 kg/(m^2) as calculated from the following:    Height as of 2/26/18: 5' 6\" (1.676 m).    Weight as of this encounter: 156 lb (70.8 kg).  Medication Reconciliation: complete     JANICE ROMO      "

## 2018-04-25 NOTE — PROGRESS NOTES
PSYCHIATRY CLINIC PROGRESS NOTE   20 minute medication management, more than 50% of time spent counseling patient on medications, medication side effects, symptom history and management   SUBJECTIVE / INTERIM HISTORY                                                                       Social- niece and niece's kids moved out Children-  Daughter Shandra going to college  Last visit February: Start fluoxetine 20 mg daily. Continue cymbalta 30 mg daily for 5 days while you start fluoxetine then can disocntinue fluoxetine (so on both for 5 days)    - feels fluoxetine is helping. Feels more level overall. Likes fluoxetine much more than Cymbalta.  - issues with daughter 22 yo Noemy. She lives in [place] with dad. Afraid something going with daughter. Shandra wants to have 22 yo birthday party at her house. Initially was okay with it then Shandra's idea of the party seems to be expanding  - wonders if Cymbalta helping with depression, feels helping with anxiety. Working on boundaries with daughter  - GI issues: gastritis. Started on Carafate. Had tests yesterday.   - pain has been extreme: had botox injections last week and had reaction to them. notes she ended up down in bed for days afterwards.   - long-term disability and moved in with her sister    SUBSTANCE USE- no issues    SYMPTOMS-  Anxiety, gets easily overwhelmed, gets panic attacks, depression, anhedonia, low energy, overwhelmed, no SI.  MEDICAL ROS- migraines most recent she was in bed for 3 days, fourth day she notes could start to slightly move her neck. . upper abdominal pain. .  Substernal pain after she eats (hx ulcers esophageal and gastric) neck pain s/p neck surgeries, migraines, IBS  MEDICAL / SURGICAL HISTORY                    Patient Active Problem List   Diagnosis     Degenerative disc disease, cervical     Pseudoarthrosis of cervical spine     Cervical pain     Migraine     UTI (urinary tract infection)     Advanced care planning/counseling  discussion     Thoracic sprain and strain     Sprain and strain of shoulder and upper arm     Irritable bowel syndrome     Myofascial pain syndrome, cervical     Depression, major     Chronic pain syndrome     Uvulitis     Chronic rhinitis     Madina bullosa     Chronic maxillary sinusitis     Hypertrophy of inferior nasal turbinate     Long uvula     Chronic neck pain     Chronic pain     Chronic tension-type headache, intractable     Migraine aura without headache     History of arthrodesis     Myofascial pain     Disuse syndrome     Intractable chronic migraine without aura and with status migrainosus     Ulcer of esophagus without bleeding     Gastroesophageal reflux disease with esophagitis     Intractable chronic migraine without aura and without status migrainosus     Ulcer of esophagus     Ulnar neuropathy     Chronic radicular cervical pain     Mild episode of recurrent major depressive disorder (H)     Ulnar neuropathy at elbow of left upper extremity     Lumbar radiculopathy     S/P cervical spinal fusion     ACP (advance care planning)     Acute back pain with sciatica     Chronic migraine without aura without status migrainosus, not intractable     Radicular pain     Subacromial bursitis of right shoulder joint     Panlobular emphysema (H)     Calculus of kidney     Pulmonary emphysema, unspecified emphysema type (H)     Other chronic gastritis without hemorrhage     ALLERGY   Aspirin; Ibuprofen; Lansoprazole; Penicillins; Red dye; Bupropion; Bupropion hcl; and Demerol [meperidine]  MEDICATIONS                                                                                             Current Outpatient Prescriptions   Medication Sig     baclofen (LIORESAL) 10 MG tablet Take 1 tablet (10 mg) by mouth 3 times daily as needed for muscle spasms     Cholecalciferol (VITAMIN D3) 1000 UNITS CAPS Take 1,000 Units by mouth     dexlansoprazole (DEXILANT) 60 MG CPDR CR capsule TAKE 1 CAPSULE (60 MG) BY MOUTH  DAILY     diazepam (VALIUM) 5 MG tablet TAKE 1 TABLET BY MOUTH EVER Y 6 HOURS AS NEEDED - GENER IC FOR VALIUM     FLUoxetine (PROZAC) 20 MG capsule Take 1 capsule (20 mg) by mouth daily     HYDROcodone-acetaminophen (NORCO) 7.5-325 MG per tablet TAKE 1 TABLET BY MOUTH EVER Y 4 TO 6 HOURS AS NEEDED FO R PAIN     Misc. Devices MISC Dispense one Cefaly neurostimulator and the electro stimulators with refills     Multiple Vitamins-Minerals (CENTRUM SILVER ULTRA WOMENS) TABS      order for DME Equipment being ordered: TENS     pregabalin (LYRICA) 100 MG capsule Take 1 capsule (100 mg) by mouth 3 times daily     Probiotic Product (PROBIOTIC DAILY PO) Take by mouth 2 times daily     senna-docusate (SENOKOT-S;PERICOLACE) 8.6-50 MG per tablet Take 1-4 tablets by mouth as needed      sucralfate (CARAFATE) 1 GM tablet Take 1 tablet (1 g) by mouth 4 times daily (Patient taking differently: Take 2 g by mouth 4 times daily )     topiramate (TOPAMAX) 50 MG tablet Take 2 tablets in the morning and 2 tablets at night     No current facility-administered medications for this visit.        VITALS   /62 (BP Location: Right arm, Patient Position: Sitting, Cuff Size: Adult Regular)  Pulse 64  Temp 98  F (36.7  C) (Tympanic)  Wt 156 lb (70.8 kg)  SpO2 98%  BMI 25.18 kg/m2     LABS                                                                                                                           Last Basic Metabolic Panel:  NA      142   6/9/2015   POTASSIUM      3.6   6/9/2015  CHLORIDE      109   6/9/2015  SALOME      8.7   6/9/2015  CO2       26   6/9/2015  BUN        8   6/9/2015  CR     0.91   6/9/2015  GLC       96   6/9/2015    MENTAL STATUS EXAM                                                                                        Alert. Oriented to person, place, and date / time. Well groomed, calm, cooperative with good eye contact. No problems with speech or psychomotor behavior. Mood was  depressed and affect was  congruent to speech content and full range. Thought process, including associations, was unremarkable and thought content was devoid of suicidal and homicidal ideation and psychotic thought. No hallucinations. Insight was good. Judgment was intact and adequate for safety. Fund of knowledge was intact. Pt demonstrates no obvious problems with attention, concentration, language, recent or remote memory although these were not formally tested.     ASSESSMENT                                                                                                      HISTORICAL:  Initial psych note 10/13/15        NOTES:  Cymbalta -> agitation, angry. Perry Blankenship is a  57 year old, female who has hx of depression and is actually doing okay with depression, anxiety. cymbalta she thinks is helping with anxiety but major GI issues.  Still not working, visit to [facility] and they recommended hold off on their pain program until anxiety / depression under control. Gabapentin made her foggy / cognitive impairment and was on Topamax concurrently: no longer on gabapentin.     I support her going on disability and frankly am surprised she managed to work- given mental health and physical health sx.     Last month was pretty rough: crying to point her family and friends indicated they felt she should make appointment to come in ... Eventually things settled down and she feels prozac started helping. Prozac is helping: no changes today. She feels better than she did on Cymbalta.         TREATMENT RISK STATEMENT:  The risks, benefits, alternatives and potential adverse effects have been explained and are understood by the pt.  The pt agrees to the treatment plan with the ability to do so.   The pt knows to call the clinic for any problems or access emergency care if needed.        DIAGNOSES                 (Use of Axes system will continue, even though absent from DSM 5)         MDD recurrent, moderate  ESTRELLA      PLAN                                                                                                                     1)  MEDICATIONS:         --  Continue fluoxetine 20 mg daily    2)  THERAPY:  No Change    3)  LABS:  None    4)  PT MONITOR [call for probs]:  worsening sx, SI/HI, SEs from meds    5)  REFERRALS [CD, medical, other]:  None    6)  RTC:  ~ 2 months

## 2018-04-25 NOTE — MR AVS SNAPSHOT
"              After Visit Summary   4/25/2018    Sadaf Blankenship    MRN: [id number]           Patient Information     Date Of Birth          1960        Visit Information        Provider Department      4/25/2018 9:00 AM Zoe Herbert MD [facility]         Follow-ups after your visit        Your next 10 appointments already scheduled     Apr 26, 2018  8:30 AM CDT   (Arrive by 8:15 AM)   SHORT with Henna Cruz MD   [facility] ([facility] )    [address] [place] [address]   [phone]              Who to contact     If you have questions or need follow up information about today's clinic visit or your schedule please contact [facility] directly at [phone].  Normal or non-critical lab and imaging results will be communicated to you by MyChart, letter or phone within 4 business days after the clinic has received the results. If you do not hear from us within 7 days, please contact the clinic through MyChart or phone. If you have a critical or abnormal lab result, we will notify you by phone as soon as possible.  Submit refill requests through MagMe or call your pharmacy and they will forward the refill request to us. Please allow 3 business days for your refill to be completed.          Additional Information About Your Visit        MyChart Information     MagMe lets you send messages to your doctor, view your test results, renew your prescriptions, schedule appointments and more. To sign up, go to [email or web address] . Click on \"Log in\" on the left side of the screen, which will take you to the Welcome page. Then click on \"Sign up Now\" on the right side of the page.     You will be asked to enter the access code listed below, as well as some personal information. Please follow the directions to create your username and password.     Your access code is: [email or web address]  Expires: 4/29/2018 10:08 AM     Your access code " will  in 90 days. If you need help or a new code, please call your [place] clinic or [phone].        Care EveryWhere ID     This is your Care EveryWhere ID. This could be used by other organizations to access your [place] medical records  [id number]        Your Vitals Were     Pulse Temperature Pulse Oximetry BMI (Body Mass Index)          64 98  F (36.7  C) (Tympanic) 98% 25.18 kg/m2         Blood Pressure from Last 3 Encounters:   18 108/62   18 98/66   18 118/72    Weight from Last 3 Encounters:   18 156 lb (70.8 kg)   18 163 lb (73.9 kg)   18 153 lb (69.4 kg)              Today, you had the following     No orders found for display         Today's Medication Changes          These changes are accurate as of 18  9:49 AM.  If you have any questions, ask your nurse or doctor.               These medicines have changed or have updated prescriptions.        Dose/Directions    sucralfate 1 GM tablet   Commonly known as:  CARAFATE   This may have changed:  how much to take   Used for:  Gastroesophageal reflux disease with esophagitis        Dose:  1 g   Take 1 tablet (1 g) by mouth 4 times daily   Quantity:  40 tablet   Refills:  1         Stop taking these medicines if you haven't already. Please contact your care team if you have questions.     DULoxetine 30 MG EC capsule   Commonly known as:  CYMBALTA   Stopped by:  Zoe Herbert MD                    Primary Care Provider Office Phone # Fax #    Henna Cruz [phone]       [facility] [address]        Equal Access to Services     [facility] AH: Hadii trent pradhan Sosujata, waaxda luqadaha, qaybta kaalmacherise sweet. So [facility] [phone].    ATENCIÓN: Si habla español, tiene a schroeder disposición servicios gratuitos de asistencia lingüística. Llame al [phone].    We comply with applicable federal civil rights laws and  [place] laws. We do not discriminate on the basis of race, color, national origin, age, disability, sex, sexual orientation, or gender identity.            Thank you!     [facility] you [facility]  [facility] your [facility]. Our goal is always to provide you with excellent care. Hearing back from our patients is one way we can continue to improve our services. Please take a few minutes to complete the written survey that you may receive in the mail after your visit with us. Thank you!             Your Updated Medication List - Protect others around you: Learn how to safely use, store and throw away your medicines at www.disposemymeds.org.          This list is accurate as of 4/25/18  9:49 AM.  Always use your most recent med list.                   Brand Name Dispense Instructions for use Diagnosis    baclofen 10 MG tablet    LIORESAL    40 tablet    Take 1 tablet (10 mg) by mouth 3 times daily as needed for muscle spasms    Chronic radicular cervical pain       CENTRUM SILVER ULTRA WOMENS Tabs           dexlansoprazole 60 MG Cpdr CR capsule    DEXILANT    30 capsule    TAKE 1 CAPSULE (60 MG) BY MOUTH DAILY    Ulcer of esophagus without bleeding       diazepam 5 MG tablet    VALIUM    100 tablet    TAKE 1 TABLET BY MOUTH EVER Y 6 HOURS AS NEEDED - GENER IC FOR VALIUM    Muscle spasm       FLUoxetine 20 MG capsule    PROzac    30 capsule    Take 1 capsule (20 mg) by mouth daily    Major depressive disorder, recurrent episode, moderate (H)       HYDROcodone-acetaminophen 7.5-325 MG per tablet    NORCO    60 tablet    TAKE 1 TABLET BY MOUTH EVER Y 4 TO 6 HOURS AS NEEDED FO R PAIN    Chronic neck pain       Misc. Devices Misc      Dispense one Cefaly neurostimulator and the electro stimulators with refills        order for DME      Equipment being ordered: TENS        pregabalin 100 MG capsule    LYRICA    90 capsule    Take 1 capsule (100 mg) by mouth 3 times daily    Chronic radicular cervical pain        PROBIOTIC DAILY PO      Take by mouth 2 times daily        senna-docusate 8.6-50 MG per tablet    SENOKOT-S;PERICOLACE     Take 1-4 tablets by mouth as needed        sucralfate 1 GM tablet    CARAFATE    40 tablet    Take 1 tablet (1 g) by mouth 4 times daily    Gastroesophageal reflux disease with esophagitis       topiramate 50 MG tablet    TOPAMAX     Take 2 tablets in the morning and 2 tablets at night        vitamin D3 1000 units Caps      Take 1,000 Units by mouth

## 2018-04-26 ENCOUNTER — OFFICE VISIT (OUTPATIENT)
Dept: FAMILY MEDICINE | Facility: OTHER | Age: 58
End: 2018-04-26
Attending: FAMILY MEDICINE
Payer: COMMERCIAL

## 2018-04-26 VITALS
TEMPERATURE: 98.1 F | SYSTOLIC BLOOD PRESSURE: 90 MMHG | OXYGEN SATURATION: 98 % | BODY MASS INDEX: 25.71 KG/M2 | DIASTOLIC BLOOD PRESSURE: 60 MMHG | WEIGHT: 160 LBS | HEART RATE: 63 BPM | HEIGHT: 66 IN

## 2018-04-26 DIAGNOSIS — G43.711 INTRACTABLE CHRONIC MIGRAINE WITHOUT AURA AND WITH STATUS MIGRAINOSUS: ICD-10-CM

## 2018-04-26 DIAGNOSIS — M54.12 CHRONIC RADICULAR CERVICAL PAIN: ICD-10-CM

## 2018-04-26 DIAGNOSIS — M79.18 MYOFASCIAL PAIN SYNDROME, CERVICAL: Primary | ICD-10-CM

## 2018-04-26 DIAGNOSIS — M54.2 CHRONIC NECK PAIN: ICD-10-CM

## 2018-04-26 DIAGNOSIS — G89.29 CHRONIC RADICULAR CERVICAL PAIN: ICD-10-CM

## 2018-04-26 DIAGNOSIS — G56.22 ULNAR NEUROPATHY AT ELBOW OF LEFT UPPER EXTREMITY: ICD-10-CM

## 2018-04-26 DIAGNOSIS — G89.29 CHRONIC NECK PAIN: ICD-10-CM

## 2018-04-26 DIAGNOSIS — F33.0 MILD EPISODE OF RECURRENT MAJOR DEPRESSIVE DISORDER (H): ICD-10-CM

## 2018-04-26 PROCEDURE — G0463 HOSPITAL OUTPT CLINIC VISIT: HCPCS

## 2018-04-26 PROCEDURE — 99214 OFFICE O/P EST MOD 30 MIN: CPT | Performed by: FAMILY MEDICINE

## 2018-04-26 ASSESSMENT — ANXIETY QUESTIONNAIRES
2. NOT BEING ABLE TO STOP OR CONTROL WORRYING: NOT AT ALL
GAD7 TOTAL SCORE: 0
6. BECOMING EASILY ANNOYED OR IRRITABLE: NOT AT ALL
5. BEING SO RESTLESS THAT IT IS HARD TO SIT STILL: NOT AT ALL
GAD7 TOTAL SCORE: 0
3. WORRYING TOO MUCH ABOUT DIFFERENT THINGS: NOT AT ALL
1. FEELING NERVOUS, ANXIOUS, OR ON EDGE: NOT AT ALL
7. FEELING AFRAID AS IF SOMETHING AWFUL MIGHT HAPPEN: NOT AT ALL
4. TROUBLE RELAXING: NOT AT ALL

## 2018-04-26 ASSESSMENT — PATIENT HEALTH QUESTIONNAIRE - PHQ9: SUM OF ALL RESPONSES TO PHQ QUESTIONS 1-9: 0

## 2018-04-26 ASSESSMENT — PAIN SCALES - GENERAL: PAINLEVEL: MODERATE PAIN (5)

## 2018-04-26 NOTE — MR AVS SNAPSHOT
After Visit Summary   4/26/2018    Sadaf Blankenship    MRN: [id number]           Patient Information     Date Of Birth          1960        Visit Information        Provider Department      4/26/2018 8:30 AM Henna Cruz MD [facility]        Today's Diagnoses     Myofascial pain syndrome, cervical    -  1    Chronic neck pain        Intractable chronic migraine without aura and with status migrainosus        Chronic radicular cervical pain        Ulnar neuropathy at elbow of left upper extremity        Mild episode of recurrent major depressive disorder (H)           Follow-ups after your visit        Follow-up notes from your care team     Return in about 3 months (around 7/26/2018).      Your next 10 appointments already scheduled     May 23, 2018  9:00 AM CDT   (Arrive by 8:45 AM)   Return Visit with Zoe Herbert MD   [facility] ([facility] [place] )    [address] [place] [address]  [place] [address] [phone]            Jul 03, 2018 10:00 AM CDT   (Arrive by 9:45 AM)   SHORT with Henna Cruz MD   [facility] ([facility] [place] )    [address] [place] [address]   [phone]              Who to contact     If you have questions or need follow up information about today's clinic visit or your schedule please contact [facility] directly at [phone].  Normal or non-critical lab and imaging results will be communicated to you by MyChart, letter or phone within 4 business days after the clinic has received the results. If you do not hear from us within 7 days, please contact the clinic through MyChart or phone. If you have a critical or abnormal lab result, we will notify you by phone as soon as possible.  Submit refill requests through rankdesk or call your pharmacy and they will forward the refill request to us. Please allow 3 business days for your refill to be completed.           "Additional Information About Your Visit        MyChart Information     [email or web address].it lets you send messages to your doctor, view your test results, renew your prescriptions, schedule appointments and more. To sign up, go to [email or web address] . Click on \"Log in\" on the left side of the screen, which will take you to the Welcome page. Then click on \"Sign up Now\" on the right side of the page.     You will be asked to enter the access code listed below, as well as some personal information. Please follow the directions to create your username and password.     Your access code is: [email or web address]  Expires: 2018 10:08 AM     Your access code will  in 90 days. If you need help or a new code, please call your [place] clinic or [phone].        Care EveryWhere ID     This is your Care EveryWhere ID. This could be used by other organizations to access your [place] medical records  [id number]        Your Vitals Were     Pulse Temperature Height Pulse Oximetry BMI (Body Mass Index)       63 98.1  F (36.7  C) 5' 6\" (1.676 m) 98% 25.82 kg/m2        Blood Pressure from Last 3 Encounters:   18 90/60   18 108/62   18 98/66    Weight from Last 3 Encounters:   18 160 lb (72.6 kg)   18 156 lb (70.8 kg)   18 163 lb (73.9 kg)              Today, you had the following     No orders found for display         Today's Medication Changes          These changes are accurate as of 18  9:10 AM.  If you have any questions, ask your nurse or doctor.               These medicines have changed or have updated prescriptions.        Dose/Directions    sucralfate 1 GM tablet   Commonly known as:  CARAFATE   This may have changed:  how much to take   Used for:  Gastroesophageal reflux disease with esophagitis        Dose:  1 g   Take 1 tablet (1 g) by mouth 4 times daily   Quantity:  40 tablet   Refills:  1                Primary Care Provider Office Phone # Fax #    Henna Cruz MD " [phone]       [address] [facility] [address]        Equal Access to Services     HARIKA MAY : Hadii aad ku hadperiaaliyah Nikos, wamckenzieda jennienicky, qadedrickta kaanada le, cherise maevein hayaan breezywalter horn laSulemancristiana chen. So [facility] [phone].    ATENCIÓN: Si habla español, tiene a schroeder disposición servicios gratuitos de asistencia lingüística. Llame al [phone].    We comply with applicable federal civil rights laws and [place] laws. We do not discriminate on the basis of race, color, national origin, age, disability, sex, sexual orientation, or gender identity.            Thank you!     Thank you for choosing [facility]  for your care. Our goal is always to provide you with excellent care. Hearing back from our patients is one way we can continue to improve our services. Please take a few minutes to complete the written survey that you may receive in the mail after your visit with us. Thank you!             Your Updated Medication List - Protect others around you: Learn how to safely use, store and throw away your medicines at www.disposemymeds.org.          This list is accurate as of 4/26/18  9:10 AM.  Always use your most recent med list.                   Brand Name Dispense Instructions for use Diagnosis    baclofen 10 MG tablet    LIORESAL    40 tablet    Take 1 tablet (10 mg) by mouth 3 times daily as needed for muscle spasms    Chronic radicular cervical pain       CENTRUM SILVER ULTRA WOMENS Tabs           dexlansoprazole 60 MG Cpdr CR capsule    DEXILANT    30 capsule    TAKE 1 CAPSULE (60 MG) BY MOUTH DAILY    Ulcer of esophagus without bleeding       diazepam 5 MG tablet    VALIUM    100 tablet    TAKE 1 TABLET BY MOUTH EVER Y 6 HOURS AS NEEDED - GENER IC FOR VALIUM    Muscle spasm       FLUoxetine 20 MG capsule    PROzac    30 capsule    Take 1 capsule (20 mg) by mouth daily    Major depressive disorder, recurrent episode, moderate (H)        HYDROcodone-acetaminophen 7.5-325 MG per tablet    NORCO    60 tablet    TAKE 1 TABLET BY MOUTH EVER Y 4 TO 6 HOURS AS NEEDED FO R PAIN    Chronic neck pain       Misc. Devices Misc      Dispense one Cefaly neurostimulator and the electro stimulators with refills        order for DME      Equipment being ordered: TENS        pregabalin 100 MG capsule    LYRICA    90 capsule    Take 1 capsule (100 mg) by mouth 3 times daily    Chronic radicular cervical pain       PROBIOTIC DAILY PO      Take by mouth 2 times daily        senna-docusate 8.6-50 MG per tablet    SENOKOT-S;PERICOLACE     Take 1-4 tablets by mouth as needed        sucralfate 1 GM tablet    CARAFATE    40 tablet    Take 1 tablet (1 g) by mouth 4 times daily    Gastroesophageal reflux disease with esophagitis       topiramate 50 MG tablet    TOPAMAX     Take 2 tablets in the morning and 2 tablets at night        vitamin D3 1000 units Caps      Take 1,000 Units by mouth

## 2018-04-26 NOTE — PROGRESS NOTES
SUBJECTIVE:   Sadaf Blankenship is a 57 year old female who presents to clinic today for the following health issues:      Chronic Pain Follow-Up       Type / Location of Pain: Neck,Right shoulder,Right arm  Analgesia/pain control:       Recent changes:  worse      Overall control: Inadequate pain control  Activity level/function:      Daily activities:  Unable to perform most daily activities - chores, hobbies, social activities, driving    Work:  Unable to work  Adverse effects:  No  Adherance    Taking medication as directed?  Yes    Participating in other treatments: No,goes to Neurologist,Neurophysicyst  Risk Factors:    Sleep:  Poor    Mood/anxiety:  controlled    Recent family or social stressors:  none noted    Other aggravating factors: none  PHQ-9 SCORE 2/28/2018 4/25/2018 4/26/2018   Total Score 2 0 0     ESTRELLA-7 SCORE 2/28/2018 4/25/2018 4/26/2018   Total Score 2 0 0     Encounter-Level CSA - 07/11/2017:          Controlled Substance Agreement - Scan on 7/14/2017 12:56 PM : CONTROLLED SUBSTANCE AGREEMENT (below)                Amount of exercise or physical activity: None    Problems taking medications regularly: No    Medication side effects: none    Diet: regular (no restrictions)    She has just been approved for rhizotomy injections to help control her pain. She is [facility] and just had her Lyrica increased and was treated with prednisone and robaxin for acute relief of her pain. This has been disabling for her and she is unable to work. Neck pain is bilateral with radicular pain down the left arm.     Chief Complaint   Patient presents with     Chronic Pain     she was out in [place] helping her brother who has end stage heart disease and just received a heart transplant. She has returned here now to continue to manage her chronic pain and her application for disability. She is living with her sister as she hasn't been able to sustain work with her chronic neck and radicualr left  arm pain.      Headache     migraine headache which has been intractable. She is following with neurology at [facility] and has been recieving Botox injections for this, however her last injections caused her headache to be worse and she was in bed for 2 days afterwards.      Diarrhea     ongoing with GI issues. She was just evaluated at [facility] with testing that revealed a rectocele and enterocele which we discussed. She hasn't recieved recommendations from [facility] yet             Problem list and histories reviewed & adjusted, as indicated.  Additional history: as documented    Patient Active Problem List   Diagnosis     Degenerative disc disease, cervical     Pseudoarthrosis of cervical spine     Cervical pain     Migraine     UTI (urinary tract infection)     Advanced care planning/counseling discussion     Thoracic sprain and strain     Sprain and strain of shoulder and upper arm     Irritable bowel syndrome     Myofascial pain syndrome, cervical     Depression, major     Chronic pain syndrome     Uvulitis     Chronic rhinitis     Madina bullosa     Chronic maxillary sinusitis     Hypertrophy of inferior nasal turbinate     Long uvula     Chronic neck pain     Chronic pain     Chronic tension-type headache, intractable     Migraine aura without headache     History of arthrodesis     Myofascial pain     Disuse syndrome     Intractable chronic migraine without aura and with status migrainosus     Ulcer of esophagus without bleeding     Gastroesophageal reflux disease with esophagitis     Intractable chronic migraine without aura and without status migrainosus     Ulcer of esophagus     Ulnar neuropathy     Chronic radicular cervical pain     Mild episode of recurrent major depressive disorder (H)     Ulnar neuropathy at elbow of left upper extremity     Lumbar radiculopathy     S/P cervical spinal fusion     ACP (advance care planning)     Acute back pain with sciatica     Chronic migraine without aura without  status migrainosus, not intractable     Radicular pain     Subacromial bursitis of right shoulder joint     Panlobular emphysema (H)     Calculus of kidney     Pulmonary emphysema, unspecified emphysema type (H)     Other chronic gastritis without hemorrhage     Past Surgical History:   Procedure Laterality Date     BACK SURGERY      cervical     Cervical spine surgery with removal of 2 disks, C5,C7       COLONOSCOPY  10/13/2014    Dr Camargo, internal hemorrhoids     COLONOSCOPY - HIM SCAN  2018    EGD and colonoscopy with Dr. Jennings     Coronary angiogram, normal      Chest pain     ENT SURGERY      nose surgery x3     fusion discectomy anterior cervical  2011     HC ESOPHAGOSCOPY, DIAGNOSTIC  10/31/2014    Dr Camargo, mild erythema of antrum, negative biopsies     NOSE SURGERY      x3            Posterior decompression , fusion C3-5      Psuedoarthrosis     Supracervical hysterectomy with right salpingoophorectomy      Endometriosis       Social History   Substance Use Topics     Smoking status: Former Smoker     Types: Cigarettes     Smokeless tobacco: Never Used      Comment: quit . no passive exposure     Alcohol use No     Family History   Problem Relation Age of Onset     CANCER Father      lung, also a heavy smoker     DIABETES Mother      HEART DISEASE Mother 58     MI; cause of death; heavy smoker. had first MI at 40     Coronary Artery Disease Mother      Hypertension Mother      CANCER Other      strong history     HEART DISEASE Other      heart disease     HEART DISEASE Brother      x3     Hypertension Brother      DIABETES Brother      Coronary Artery Disease Brother      Hypertension Sister          Current Outpatient Prescriptions   Medication Sig Dispense Refill     baclofen (LIORESAL) 10 MG tablet Take 1 tablet (10 mg) by mouth 3 times daily as needed for muscle spasms 40 tablet 0     Cholecalciferol (VITAMIN D3) 1000 UNITS CAPS Take 1,000 Units by mouth        "dexlansoprazole (DEXILANT) 60 MG CPDR CR capsule TAKE 1 CAPSULE (60 MG) BY MOUTH DAILY 30 capsule 11     diazepam (VALIUM) 5 MG tablet TAKE 1 TABLET BY MOUTH EVER Y 6 HOURS AS NEEDED - GENER IC FOR VALIUM 100 tablet 0     FLUoxetine (PROZAC) 20 MG capsule Take 1 capsule (20 mg) by mouth daily 30 capsule 11     HYDROcodone-acetaminophen (NORCO) 7.5-325 MG per tablet TAKE 1 TABLET BY MOUTH EVER Y 4 TO 6 HOURS AS NEEDED FO R PAIN 60 tablet 0     Misc. Devices MISC Dispense one Cefaly neurostimulator and the electro stimulators with refills       Multiple Vitamins-Minerals (CENTRUM SILVER ULTRA WOMENS) TABS        order for DME Equipment being ordered: TENS       pregabalin (LYRICA) 100 MG capsule Take 1 capsule (100 mg) by mouth 3 times daily 90 capsule 0     Probiotic Product (PROBIOTIC DAILY PO) Take by mouth 2 times daily       senna-docusate (SENOKOT-S;PERICOLACE) 8.6-50 MG per tablet Take 1-4 tablets by mouth as needed        sucralfate (CARAFATE) 1 GM tablet Take 1 tablet (1 g) by mouth 4 times daily (Patient taking differently: Take 2 g by mouth 4 times daily ) 40 tablet 1     topiramate (TOPAMAX) 50 MG tablet Take 2 tablets in the morning and 2 tablets at night       Allergies   Allergen Reactions     Aspirin Hives     Ibuprofen      Dye in the otc form causes headaches  \"patient states over the counter ibuprofen  bothers her\"     Lansoprazole Other (See Comments) and Visual Disturbance     Changes in eyesight  Prevacid  Change in eyesight     Penicillins Other (See Comments)     pruritis     Red Dye Hives     Bupropion Rash     Bupropion Hcl Hives and Rash     Wellbutrin     Demerol [Meperidine] Other (See Comments)     Made migraine worse     BP Readings from Last 3 Encounters:   04/26/18 90/60   04/25/18 108/62   02/28/18 98/66    Wt Readings from Last 3 Encounters:   04/26/18 160 lb (72.6 kg)   04/25/18 156 lb (70.8 kg)   02/28/18 163 lb (73.9 kg)                    Reviewed and updated as needed this visit " "by clinical staff  Tobacco  Allergies  Meds  Med Hx  Surg Hx  Fam Hx  Soc Hx      Reviewed and updated as needed this visit by Provider         ROS:  Constitutional, HEENT, cardiovascular, pulmonary, gi and gu systems are negative, except as otherwise noted.    OBJECTIVE:                                                    BP 90/60  Pulse 63  Temp 98.1  F (36.7  C)  Ht 5' 6\" (1.676 m)  Wt 160 lb (72.6 kg)  SpO2 98%  BMI 25.82 kg/m2  Body mass index is 25.82 kg/(m^2).  GENERAL APPEARANCE: alert, no distress and fatigued  RESP: lungs clear to auscultation - no rales, rhonchi or wheezes  CV: regular rates and rhythm, normal S1 S2, no S3 or S4 and no murmur, click or rub  SKIN: well healed incisions of the anterior neck from her cervical fusion  ORTHO: Cervical Spine Exam: Inspection: increased cervical lordosis  Tender:  left paracervical muscles, right paracervical muscles, left trapezius muscles, right trapezius muscles  Non-tender:  spinous processes, scapula left  Range of Motion:  flexion:  decreased, painful, 20 degrees, extension: decreased, painful, 10 degrees, left lateral rotation:  decreased, painful, 40 degrees, right lateral rotation:  decreased, painful, 20 degrees  Strength: flexion:  4/5, extension:  4/5  SKIN: no suspicious lesions or rashes  NEURO: mentation intact and speech normal  PSYCH: mentation appears normal and worried         ASSESSMENT/PLAN:                                                    1. Myofascial pain syndrome, cervical  Chronic, under the care of neurology at [facility]    2. Chronic neck pain  I support her application for disability as she is not able to work with her chronic pain and depression    3. Intractable chronic migraine without aura and with status migrainosus  Continue botox injections, these have been intractable    4. Chronic radicular cervical pain  Left sided with 2 previous surgeries    5. Ulnar neuropathy at elbow of left upper extremity  New, she is " working with treatment again through [facility]    6. Mild episode of recurrent major depressive disorder (H)  Per Dr Herbert, psychiatrist, ongoing      Follow up with Provider - 3 months     Henna Cruz MD  [facility]

## 2018-04-26 NOTE — NURSING NOTE
"Chief Complaint   Patient presents with     Chronic Pain       Initial BP 90/60  Pulse 63  Temp 98.1  F (36.7  C)  Ht 5' 6\" (1.676 m)  Wt 160 lb (72.6 kg)  SpO2 98%  BMI 25.82 kg/m2 Estimated body mass index is 25.82 kg/(m^2) as calculated from the following:    Height as of this encounter: 5' 6\" (1.676 m).    Weight as of this encounter: 160 lb (72.6 kg).  Medication Reconciliation: complete   [facility]   Medical Assistant      "

## 2018-04-27 ENCOUNTER — TRANSFERRED RECORDS (OUTPATIENT)
Dept: HEALTH INFORMATION MANAGEMENT | Facility: CLINIC | Age: 58
End: 2018-04-27

## 2018-04-27 ASSESSMENT — PATIENT HEALTH QUESTIONNAIRE - PHQ9: SUM OF ALL RESPONSES TO PHQ QUESTIONS 1-9: 0

## 2018-04-27 ASSESSMENT — ANXIETY QUESTIONNAIRES: GAD7 TOTAL SCORE: 0

## 2018-05-03 ENCOUNTER — TELEPHONE (OUTPATIENT)
Dept: FAMILY MEDICINE | Facility: OTHER | Age: 58
End: 2018-05-03

## 2018-05-03 DIAGNOSIS — R19.7 DIARRHEA: Primary | ICD-10-CM

## 2018-05-03 DIAGNOSIS — R19.7 DIARRHEA, UNSPECIFIED TYPE: ICD-10-CM

## 2018-05-03 NOTE — TELEPHONE ENCOUNTER
11:25 AM    Reason for Call: Phone Call    Description: Pt calling and wanting a referral to see Dr Mcrae. She is not happy with who she has been seeing. Ok with having to wait a while. Please call her back.     Was an appointment offered for this call? Yes  If yes : Appointment type              Date    Preferred method for responding to this message: Telephone Call  What is your phone number ? [phone]    If we cannot reach you directly, may we leave a detailed response at the number you provided? Yes    Can this message wait until your PCP/provider returns, if available today? Not applicable, Dr is in today    Nichelle Landeros

## 2018-05-10 ENCOUNTER — TRANSFERRED RECORDS (OUTPATIENT)
Dept: HEALTH INFORMATION MANAGEMENT | Facility: CLINIC | Age: 58
End: 2018-05-10

## 2018-05-15 ENCOUNTER — TRANSFERRED RECORDS (OUTPATIENT)
Dept: HEALTH INFORMATION MANAGEMENT | Facility: CLINIC | Age: 58
End: 2018-05-15

## 2018-05-23 ENCOUNTER — OFFICE VISIT (OUTPATIENT)
Dept: PSYCHIATRY | Facility: OTHER | Age: 58
End: 2018-05-23
Attending: PSYCHIATRY & NEUROLOGY
Payer: COMMERCIAL

## 2018-05-23 VITALS
HEART RATE: 63 BPM | BODY MASS INDEX: 25.82 KG/M2 | SYSTOLIC BLOOD PRESSURE: 92 MMHG | DIASTOLIC BLOOD PRESSURE: 68 MMHG | TEMPERATURE: 98.3 F | WEIGHT: 160 LBS | OXYGEN SATURATION: 98 %

## 2018-05-23 DIAGNOSIS — F33.2 MAJOR DEPRESSIVE DISORDER, RECURRENT SEVERE WITHOUT PSYCHOTIC FEATURES (H): Primary | ICD-10-CM

## 2018-05-23 PROCEDURE — G0463 HOSPITAL OUTPT CLINIC VISIT: HCPCS

## 2018-05-23 PROCEDURE — 99214 OFFICE O/P EST MOD 30 MIN: CPT | Performed by: PSYCHIATRY & NEUROLOGY

## 2018-05-23 RX ORDER — FLUOXETINE 40 MG/1
40 CAPSULE ORAL DAILY
Qty: 30 CAPSULE | Refills: 3 | Status: SHIPPED | OUTPATIENT
Start: 2018-05-23 | End: 2018-09-02

## 2018-05-23 ASSESSMENT — ANXIETY QUESTIONNAIRES
5. BEING SO RESTLESS THAT IT IS HARD TO SIT STILL: MORE THAN HALF THE DAYS
GAD7 TOTAL SCORE: 13
3. WORRYING TOO MUCH ABOUT DIFFERENT THINGS: MORE THAN HALF THE DAYS
6. BECOMING EASILY ANNOYED OR IRRITABLE: MORE THAN HALF THE DAYS
1. FEELING NERVOUS, ANXIOUS, OR ON EDGE: MORE THAN HALF THE DAYS
2. NOT BEING ABLE TO STOP OR CONTROL WORRYING: MORE THAN HALF THE DAYS
7. FEELING AFRAID AS IF SOMETHING AWFUL MIGHT HAPPEN: SEVERAL DAYS

## 2018-05-23 ASSESSMENT — PAIN SCALES - GENERAL: PAINLEVEL: EXTREME PAIN (8)

## 2018-05-23 ASSESSMENT — PATIENT HEALTH QUESTIONNAIRE - PHQ9: 5. POOR APPETITE OR OVEREATING: MORE THAN HALF THE DAYS

## 2018-05-23 NOTE — PROGRESS NOTES
PSYCHIATRY CLINIC PROGRESS NOTE   20 minute medication management, more than 50% of time spent counseling patient on medications, medication side effects, symptom history and management   SUBJECTIVE / INTERIM HISTORY                                                                       Social- niece and niece's kids moved out Children-  Daughter Shandra going to college  Last visit:  Continue fluoxetine 20 mg daily    - lots of backfired medical issues going on: botox, rhizotomy  - issues with her daughter Noemy. Working at [employer] and worked 19 days -- still asking Sadaf for $ even with her helping with car, phone, etc. Going back and forth between Sadaf and dad.  - LAST VISIT Sadaf noted feelng fluoxetine helpful much more than Cymbalta.  - GI issues: gastritis. Started on Carafate. Had tests yesterday.   - pain has been extreme: had botox injections last week and had reaction to them. notes she ended up down in bed for days afterwards.   - long-term disability and moved in with her sister    SUBSTANCE USE- no issues    SYMPTOMS-  Anxiety, gets easily overwhelmed, gets panic attacks, depression, anhedonia, low energy, overwhelmed, no SI.  MEDICAL ROS- migraines most recent she was in bed for 3 days, fourth day she notes could start to slightly move her neck. . upper abdominal pain. .  Substernal pain after she eats (hx ulcers esophageal and gastric) neck pain s/p neck surgeries, migraines, IBS  MEDICAL / SURGICAL HISTORY                    Patient Active Problem List   Diagnosis     Degenerative disc disease, cervical     Pseudoarthrosis of cervical spine     Cervical pain     Migraine     UTI (urinary tract infection)     Advanced care planning/counseling discussion     Thoracic sprain and strain     Sprain and strain of shoulder and upper arm     Irritable bowel syndrome     Myofascial pain syndrome, cervical     Depression, major     Chronic pain syndrome     Uvulitis     Chronic rhinitis     Madina  bullosa     Chronic maxillary sinusitis     Hypertrophy of inferior nasal turbinate     Long uvula     Chronic neck pain     Chronic pain     Chronic tension-type headache, intractable     Migraine aura without headache     History of arthrodesis     Myofascial pain     Disuse syndrome     Intractable chronic migraine without aura and with status migrainosus     Ulcer of esophagus without bleeding     Gastroesophageal reflux disease with esophagitis     Intractable chronic migraine without aura and without status migrainosus     Ulcer of esophagus     Ulnar neuropathy     Chronic radicular cervical pain     Mild episode of recurrent major depressive disorder (H)     Ulnar neuropathy at elbow of left upper extremity     Lumbar radiculopathy     S/P cervical spinal fusion     ACP (advance care planning)     Acute back pain with sciatica     Chronic migraine without aura without status migrainosus, not intractable     Radicular pain     Subacromial bursitis of right shoulder joint     Panlobular emphysema (H)     Calculus of kidney     Pulmonary emphysema, unspecified emphysema type (H)     Other chronic gastritis without hemorrhage     ALLERGY   Aspirin; Ibuprofen; Lansoprazole; Penicillins; Red dye; Bupropion; Bupropion hcl; and Demerol [meperidine]  MEDICATIONS                                                                                             Current Outpatient Prescriptions   Medication Sig     Cholecalciferol (VITAMIN D3) 1000 UNITS CAPS Take 1,000 Units by mouth     dexlansoprazole (DEXILANT) 60 MG CPDR CR capsule TAKE 1 CAPSULE (60 MG) BY MOUTH DAILY     diazepam (VALIUM) 5 MG tablet TAKE 1 TABLET BY MOUTH EVER Y 6 HOURS AS NEEDED - GENER IC FOR VALIUM     FLUoxetine (PROZAC) 20 MG capsule Take 1 capsule (20 mg) by mouth daily     HYDROcodone-acetaminophen (NORCO) 7.5-325 MG per tablet TAKE 1 TABLET BY MOUTH EVER Y 4 TO 6 HOURS AS NEEDED FO R PAIN     METHOCARBAMOL PO Take 750 mg by mouth Take one  tablet at bedtime for 21 days  Prescribed by Dr. Alonzo Grewal     Misc. Devices MISC Dispense one Cefaly neurostimulator and the electro stimulators with refills     Multiple Vitamins-Minerals (CENTRUM SILVER ULTRA WOMENS) TABS      order for DME Equipment being ordered: TENS     pregabalin (LYRICA) 100 MG capsule Take 1 capsule (100 mg) by mouth 3 times daily     Probiotic Product (PROBIOTIC DAILY PO) Take by mouth 2 times daily     senna-docusate (SENOKOT-S;PERICOLACE) 8.6-50 MG per tablet Take 1-4 tablets by mouth as needed      sucralfate (CARAFATE) 1 GM tablet Take 1 tablet (1 g) by mouth 4 times daily (Patient taking differently: Patient takes as needed (PRN.)     topiramate (TOPAMAX) 50 MG tablet Take 2 tablets in the morning and 2 tablets at night     baclofen (LIORESAL) 10 MG tablet Take 1 tablet (10 mg) by mouth 3 times daily as needed for muscle spasms (Patient not taking: Reported on 5/23/2018)     No current facility-administered medications for this visit.        VITALS   BP 92/68 (BP Location: Left arm, Patient Position: Sitting, Cuff Size: Adult Regular)  Pulse 63  Temp 98.3  F (36.8  C) (Tympanic)  Wt 160 lb (72.6 kg)  SpO2 98%  BMI 25.82 kg/m2     LABS                                                                                                                           Last Basic Metabolic Panel:  NA      142   6/9/2015   POTASSIUM      3.6   6/9/2015  CHLORIDE      109   6/9/2015  SALOME      8.7   6/9/2015  CO2       26   6/9/2015  BUN        8   6/9/2015  CR     0.91   6/9/2015  GLC       96   6/9/2015    MENTAL STATUS EXAM                                                                                        Alert. Oriented to person, place, and date / time. Well groomed, calm, cooperative with good eye contact. No problems with speech or psychomotor behavior. Mood was  depressed and affect was congruent to speech content and full range. Thought process, including associations, was  unremarkable and thought content was devoid of suicidal and homicidal ideation and psychotic thought. No hallucinations. Insight was good. Judgment was intact and adequate for safety. Fund of knowledge was intact. Pt demonstrates no obvious problems with attention, concentration, language, recent or remote memory although these were not formally tested.     ASSESSMENT                                                                                                      HISTORICAL:  Initial psych note 10/13/15        NOTES:  Cymbalta -> agitation, angry. Perry Blankenship is a  57 year old, female who has hx of depression and is actually doing okay with depression, anxiety. cymbalta she thinks is helping with anxiety but major GI issues.  Still not working, visit to [facility] and they recommended hold off on their pain program until anxiety / depression under control. Gabapentin made her foggy / cognitive impairment and was on Topamax concurrently: no longer on gabapentin.     I support her going on disability and frankly am surprised she managed to work- given mental health and physical health sx.     Today she is really having a rough time with irritablity and anxiety. WE both feel increase in fluoxetine warranted.      TREATMENT RISK STATEMENT:  The risks, benefits, alternatives and potential adverse effects have been explained and are understood by the pt.  The pt agrees to the treatment plan with the ability to do so.   The pt knows to call the clinic for any problems or access emergency care if needed.        DIAGNOSES                 (Use of Axes system will continue, even though absent from DSM 5)         MDD recurrent, moderate  ESTRELLA      PLAN                                                                                                                    1)  MEDICATIONS:         --  Increase fluoxetine 20 mg daily to 40 mg daily    2)  THERAPY:  No Change    3)  LABS:  None    4)  PT MONITOR [call for  probs]:  worsening sx, SI/HI, SEs from meds    5)  REFERRALS [CD, medical, other]:  None    6)  RTC:  ~ 1 month

## 2018-05-23 NOTE — MR AVS SNAPSHOT
After Visit Summary   5/23/2018    Sadaf Blankenship    MRN: [id number]           Patient Information     Date Of Birth          1960        Visit Information        Provider Department      5/23/2018 9:00 AM Zoe Herbert MD [facility]        Today's Diagnoses     Major depressive disorder, recurrent severe without psychotic features (H)    -  1       Follow-ups after your visit        Your next 10 appointments already scheduled     Jul 03, 2018 10:00 AM CDT   (Arrive by 9:45 AM)   SHORT with Henna Cruz MD   [facility] ([facility] [place] )    [address]  [place] [address]   [phone]            Jul 23, 2018  9:00 AM CDT   (Arrive by 8:45 AM)   Return Visit with Zoe Herbert MD   [facility] ([facility] [place] )    [address] [place] [address]  [place] [id number] [phone]              Who to contact     If you have questions or need follow up information about today's clinic visit or your schedule please contact [facility] directly at [phone].  Normal or non-critical lab and imaging results will be communicated to you by MyChart, letter or phone within 4 business days after the clinic has received the results. If you do not hear from us within 7 days, please contact the clinic through Mediumhart or phone. If you have a critical or abnormal lab result, we will notify you by phone as soon as possible.  Submit refill requests through Social[employer] or call your pharmacy and they will forward the refill request to us. Please allow 3 business days for your refill to be completed.          Additional Information About Your Visit        MyChart Information     SocialDi[employer] gives you secure access to your electronic health record. If you see a primary care provider, you can also send messages to your care team and make appointments. If you have questions, please call your primary care clinic.  If you do not  have a primary care provider, please call [phone] and they will assist you.        Care EveryWhere ID     This is your Care EveryWhere ID. This could be used by other organizations to access your [facility] medical records  [id number]        Your Vitals Were     Pulse Temperature Pulse Oximetry BMI (Body Mass Index)          63 98.3  F (36.8  C) (Tympanic) 98% 25.82 kg/m2         Blood Pressure from Last 3 Encounters:   05/23/18 92/68   04/26/18 90/60   04/25/18 108/62    Weight from Last 3 Encounters:   05/23/18 160 lb (72.6 kg)   04/26/18 160 lb (72.6 kg)   04/25/18 156 lb (70.8 kg)              Today, you had the following     No orders found for display         Today's Medication Changes          These changes are accurate as of 5/23/18  9:31 AM.  If you have any questions, ask your nurse or doctor.               These medicines have changed or have updated prescriptions.        Dose/Directions    FLUoxetine 40 MG capsule   Commonly known as:  PROzac   This may have changed:    - medication strength  - how much to take   Used for:  Major depressive disorder, recurrent severe without psychotic features (H)   Changed by:  Zoe Herbert MD        Dose:  40 mg   Take 1 capsule (40 mg) by mouth daily   Quantity:  30 capsule   Refills:  3       sucralfate 1 GM tablet   Commonly known as:  CARAFATE   This may have changed:    - how much to take  - when to take this  - additional instructions   Used for:  Gastroesophageal reflux disease with esophagitis        Dose:  1 g   Take 1 tablet (1 g) by mouth 4 times daily   Quantity:  40 tablet   Refills:  1            Where to get your medicines      These medications were sent to [id number] - [place] - [address] [facility]  [address] [place] [address]     Phone:  [phone]     FLUoxetine 40 MG capsule                Primary Care Provider Office Phone # Fax #    Henna Cruz [phone] [address] [facility]  [place]  [place] [address]        Equal Access to Services     [facility]: Hadii trent lee lorne Knott, waaxda luqadaha, qaybta kaalmada le, cherise flowersevamily chen. So [facility] [phone].    ATENCIÓN: Si habla español, tiene a schroeder disposición servicios gratuitos de asistencia lingüística. Traci al [phone].    We comply with applicable federal civil rights laws and [place] laws. We do not discriminate on the basis of race, color, national origin, age, disability, sex, sexual orientation, or gender identity.            Thank you!     Thank you for choosing [facility]  for your care. Our goal is always to provide you with excellent care. Hearing back from our patients is one way we can continue to improve our services. Please take a few minutes to complete the written survey that you may receive in the mail after your visit with us. Thank you!             Your Updated Medication List - Protect others around you: Learn how to safely use, store and throw away your medicines at www.disposemymeds.org.          This list is accurate as of 5/23/18  9:31 AM.  Always use your most recent med list.                   Brand Name Dispense Instructions for use Diagnosis    baclofen 10 MG tablet    LIORESAL    40 tablet    Take 1 tablet (10 mg) by mouth 3 times daily as needed for muscle spasms    Chronic radicular cervical pain       CENTRUM SILVER ULTRA WOMENS Tabs           dexlansoprazole 60 MG Cpdr CR capsule    DEXILANT    30 capsule    TAKE 1 CAPSULE (60 MG) BY MOUTH DAILY    Ulcer of esophagus without bleeding       diazepam 5 MG tablet    VALIUM    100 tablet    TAKE 1 TABLET BY MOUTH EVER Y 6 HOURS AS NEEDED - GENER IC FOR VALIUM    Muscle spasm       FLUoxetine 40 MG capsule    PROzac    30 capsule    Take 1 capsule (40 mg) by mouth daily    Major depressive disorder, recurrent severe without psychotic features (H)       HYDROcodone-acetaminophen 7.5-325 MG per tablet    NORCO    60 tablet     TAKE 1 TABLET BY MOUTH EVER Y 4 TO 6 HOURS AS NEEDED FO R PAIN    Chronic neck pain       METHOCARBAMOL PO      Take 750 mg by mouth Take one tablet at bedtime for 21 days Prescribed by Dr. Alonzo Grewal        Misc. Devices Misc      Dispense one Cefaly neurostimulator and the electro stimulators with refills        order for DME      Equipment being ordered: TENS        pregabalin 100 MG capsule    LYRICA    90 capsule    Take 1 capsule (100 mg) by mouth 3 times daily    Chronic radicular cervical pain       PROBIOTIC DAILY PO      Take by mouth 2 times daily        senna-docusate 8.6-50 MG per tablet    SENOKOT-S;PERICOLACE     Take 1-4 tablets by mouth as needed        sucralfate 1 GM tablet    CARAFATE    40 tablet    Take 1 tablet (1 g) by mouth 4 times daily    Gastroesophageal reflux disease with esophagitis       topiramate 50 MG tablet    TOPAMAX     Take 2 tablets in the morning and 2 tablets at night        vitamin D3 1000 units Caps      Take 1,000 Units by mouth

## 2018-05-23 NOTE — NURSING NOTE
"Chief Complaint   Patient presents with     RECHECK     Mental health.       Initial BP 92/68 (BP Location: Left arm, Patient Position: Sitting, Cuff Size: Adult Regular)  Pulse 63  Temp 98.3  F (36.8  C) (Tympanic)  Wt 160 lb (72.6 kg)  SpO2 98%  BMI 25.82 kg/m2 Estimated body mass index is 25.82 kg/(m^2) as calculated from the following:    Height as of 4/26/18: 5' 6\" (1.676 m).    Weight as of this encounter: 160 lb (72.6 kg).  Medication Reconciliation: complete    JANICE ROMO LPN    "

## 2018-05-24 ENCOUNTER — HOSPITAL ENCOUNTER (EMERGENCY)
Facility: HOSPITAL | Age: 58
Discharge: HOME | End: 2018-05-24
Attending: NURSE PRACTITIONER | Admitting: NURSE PRACTITIONER
Payer: COMMERCIAL

## 2018-05-24 VITALS
RESPIRATION RATE: 16 BRPM | SYSTOLIC BLOOD PRESSURE: 138 MMHG | OXYGEN SATURATION: 98 % | DIASTOLIC BLOOD PRESSURE: 74 MMHG | TEMPERATURE: 97.3 F

## 2018-05-24 DIAGNOSIS — M54.81 CERVICO-OCCIPITAL NEURALGIA OF RIGHT SIDE: ICD-10-CM

## 2018-05-24 LAB
BASOPHILS # BLD AUTO: 0 10E9/L (ref 0–0.2)
BASOPHILS NFR BLD AUTO: 0.7 %
DIFFERENTIAL METHOD BLD: NORMAL
EOSINOPHIL # BLD AUTO: 0.1 10E9/L (ref 0–0.7)
EOSINOPHIL NFR BLD AUTO: 1.9 %
ERYTHROCYTE [DISTWIDTH] IN BLOOD BY AUTOMATED COUNT: 12.3 % (ref 10–15)
HCT VFR BLD AUTO: 40.1 % (ref 35–47)
HGB BLD-MCNC: 13.8 G/DL (ref 11.7–15.7)
IMM GRANULOCYTES # BLD: 0 10E9/L (ref 0–0.4)
IMM GRANULOCYTES NFR BLD: 0.2 %
LYMPHOCYTES # BLD AUTO: 1.6 10E9/L (ref 0.8–5.3)
LYMPHOCYTES NFR BLD AUTO: 38.1 %
MCH RBC QN AUTO: 30.7 PG (ref 26.5–33)
MCHC RBC AUTO-ENTMCNC: 34.4 G/DL (ref 31.5–36.5)
MCV RBC AUTO: 89 FL (ref 78–100)
MONOCYTES # BLD AUTO: 0.3 10E9/L (ref 0–1.3)
MONOCYTES NFR BLD AUTO: 7.7 %
NEUTROPHILS # BLD AUTO: 2.2 10E9/L (ref 1.6–8.3)
NEUTROPHILS NFR BLD AUTO: 51.4 %
NRBC # BLD AUTO: 0 10*3/UL
NRBC BLD AUTO-RTO: 0 /100
PLATELET # BLD AUTO: 164 10E9/L (ref 150–450)
RBC # BLD AUTO: 4.49 10E12/L (ref 3.8–5.2)
WBC # BLD AUTO: 4.3 10E9/L (ref 4–11)

## 2018-05-24 PROCEDURE — G0463 HOSPITAL OUTPT CLINIC VISIT: HCPCS | Mod: 25

## 2018-05-24 PROCEDURE — 99213 OFFICE O/P EST LOW 20 MIN: CPT | Performed by: NURSE PRACTITIONER

## 2018-05-24 PROCEDURE — 36415 COLL VENOUS BLD VENIPUNCTURE: CPT | Performed by: NURSE PRACTITIONER

## 2018-05-24 PROCEDURE — 96372 THER/PROPH/DIAG INJ SC/IM: CPT

## 2018-05-24 PROCEDURE — 25000128 H RX IP 250 OP 636: Performed by: NURSE PRACTITIONER

## 2018-05-24 PROCEDURE — 85025 COMPLETE CBC W/AUTO DIFF WBC: CPT | Performed by: NURSE PRACTITIONER

## 2018-05-24 RX ORDER — LIDOCAINE 40 MG/G
CREAM TOPICAL ONCE
Status: DISCONTINUED | OUTPATIENT
Start: 2018-05-24 | End: 2018-05-24 | Stop reason: HOSPADM

## 2018-05-24 RX ORDER — DIAZEPAM 10 MG/2ML
10 INJECTION, SOLUTION INTRAMUSCULAR; INTRAVENOUS ONCE
Status: COMPLETED | OUTPATIENT
Start: 2018-05-24 | End: 2018-05-24

## 2018-05-24 RX ADMIN — DIAZEPAM 10 MG: 5 INJECTION, SOLUTION INTRAMUSCULAR; INTRAVENOUS at 17:47

## 2018-05-24 ASSESSMENT — ENCOUNTER SYMPTOMS
FEVER: 0
NECK PAIN: 1
COUGH: 0
FATIGUE: 0
CHILLS: 0

## 2018-05-24 ASSESSMENT — PATIENT HEALTH QUESTIONNAIRE - PHQ9: SUM OF ALL RESPONSES TO PHQ QUESTIONS 1-9: 11

## 2018-05-24 ASSESSMENT — ANXIETY QUESTIONNAIRES: GAD7 TOTAL SCORE: 13

## 2018-05-24 NOTE — DISCHARGE INSTRUCTIONS
Apply cold packs and rest.   Avoid taking Norco while on Valium.   Call for follow up appointment to see PCP or neurology for re-evaluation.

## 2018-05-24 NOTE — ED AVS SNAPSHOT
[facility]    [address] [address]    Phone:  [phone]                                       Sadaf Blankenship   MRN: [id number]    Department:  [facility]   Date of Visit:  5/24/2018           Patient Information     Date Of Birth          1960        Your diagnoses for this visit were:     Cervico-occipital neuralgia of right side        You were seen by Astrid Rogers NP.      Follow-up Information     Schedule an appointment as soon as possible for a visit with Henna Cruz MD.    Specialty:  Family Practice    Why:  for re-evaluation    Contact information:    [address] [facility] [address]  [phone]          Discharge Instructions       Apply cold packs and rest.   Avoid taking Norco while on Valium.   Call for follow up appointment to see PCP or neurology for re-evaluation.         Your next 10 appointments already scheduled     Jul 03, 2018 10:00 AM CDT   (Arrive by 9:45 AM)   SHORT with Henna Cruz MD   [facility] ([facility] )    [address] [place] [address]   [phone]            Jul 23, 2018  9:00 AM CDT   (Arrive by 8:45 AM)   Return Visit with Zoe Herbert MD   [facility] ([facility] )    [address] [id number] [phone]                 Review of your medicines      Our records show that you are taking the medicines listed below. If these are incorrect, please call your family doctor or clinic.        Dose / Directions Last dose taken    baclofen 10 MG tablet   Commonly known as:  LIORESAL   Dose:  10 mg   Quantity:  40 tablet        Take 1 tablet (10 mg) by mouth 3 times daily as needed for muscle spasms   Refills:  0        CENTRUM SILVER ULTRA WOMENS Tabs        Refills:  0        dexlansoprazole 60 MG Cpdr CR capsule   Commonly known as:  DEXILANT   Quantity:  30 capsule        TAKE 1 CAPSULE (60 MG)  BY MOUTH DAILY   Refills:  11        diazepam 5 MG tablet   Commonly known as:  VALIUM   Quantity:  100 tablet        TAKE 1 TABLET BY MOUTH EVER Y 6 HOURS AS NEEDED - GENER IC FOR VALIUM   Refills:  0        FLUoxetine 40 MG capsule   Commonly known as:  PROzac   Dose:  40 mg   Quantity:  30 capsule        Take 1 capsule (40 mg) by mouth daily   Refills:  3        HYDROcodone-acetaminophen 7.5-325 MG per tablet   Commonly known as:  NORCO   Quantity:  60 tablet        TAKE 1 TABLET BY MOUTH EVER Y 4 TO 6 HOURS AS NEEDED FO R PAIN   Refills:  0        METHOCARBAMOL PO   Dose:  750 mg        Take 750 mg by mouth Take one tablet at bedtime for 21 days Prescribed by Dr. Alonzo Grewal   Refills:  0        Misc. Devices Misc        Dispense one Cefaly neurostimulator and the electro stimulators with refills   Refills:  0        order for DME        Equipment being ordered: TENS   Refills:  0        pregabalin 100 MG capsule   Commonly known as:  LYRICA   Dose:  100 mg   Quantity:  90 capsule        Take 1 capsule (100 mg) by mouth 3 times daily   Refills:  0        PROBIOTIC DAILY PO        Take by mouth 2 times daily   Refills:  0        senna-docusate 8.6-50 MG per tablet   Commonly known as:  SENOKOT-S;PERICOLACE   Dose:  1-4 tablet        Take 1-4 tablets by mouth as needed   Refills:  0        sucralfate 1 GM tablet   Commonly known as:  CARAFATE   Dose:  1 g   Quantity:  40 tablet        Take 1 tablet (1 g) by mouth 4 times daily   Refills:  1        topiramate 50 MG tablet   Commonly known as:  TOPAMAX        Take 2 tablets in the morning and 2 tablets at night   Refills:  0        vitamin D3 1000 units Caps   Dose:  1000 Units        Take 1,000 Units by mouth   Refills:  0                Procedures and tests performed during your visit     CBC with platelets differential      Orders Needing Specimen Collection     None      Pending Results     No orders found from 5/22/2018 to 5/25/2018.            Pending Culture  Results     No orders found from 5/22/2018 to 5/25/2018.            Thank you for choosing [facility]       Thank you for choosing [facility] for your care. Our goal is always to provide you with excellent care. Hearing back from our patients is one way we can continue to improve our services. Please take a few minutes to complete the written survey that you may receive in the mail after you visit with us. Thank you!        Ma[employer]in[employer] Information     [employer]o gives you secure access to your electronic health record. If you see a primary care provider, you can also send messages to your care team and make appointments. If you have questions, please call your primary care clinic.  If you do not have a primary care provider, please call [phone] and they will assist you.        Care EveryWhere ID     This is your Care EveryWhere ID. This could be used by other organizations to access your [facility] medical records  [id number]        Equal Access to Services     HARIKA MAY : Quita Knott, ayden rojas, cherise jackson . So [facility] [phone].    ATENCIÓN: Si habla español, tiene a schroeder disposición servicios gratuitos de asistencia lingüística. Llame al [phone].    We comply with applicable federal civil rights laws and [place] laws. We do not discriminate on the basis of race, color, national origin, age, disability, sex, sexual orientation, or gender identity.            After Visit Summary       This is your record. Keep this with you and show to your community pharmacist(s) and doctor(s) at your next visit.

## 2018-05-24 NOTE — ED AVS SNAPSHOT
[facility]    [address] [address]    Phone:  [phone]                                       Sadaf Blankenship   MRN: [id number]    Department:  [facility]   Date of Visit:  5/24/2018           After Visit Summary Signature Page     I have received my discharge instructions, and my questions have been answered. I have discussed any challenges I see with this plan with the nurse or doctor.    ..........................................................................................................................................  Patient/Patient Representative Signature      ..........................................................................................................................................  Patient Representative Print Name and Relationship to Patient    ..................................................               ................................................  Date                                            Time    ..........................................................................................................................................  Reviewed by Signature/Title    ...................................................              ..............................................  Date                                                            Time

## 2018-05-24 NOTE — ED PROVIDER NOTES
History     Chief Complaint   Patient presents with     Neck Pain     hx of 3 neck surgeries. states that she has a risotomy on 4/27 and has seen doctor for f/u and the doctor suggested increased injections. pt states taht sh ehas burning to the neck and doctor states that is normal after burning nerves.      The history is provided by the patient. No  was used.     Sadaf Blankenship is a 57 year old female who presents with right sided burning neck pain. Had a rhizotomy done on 4/27/18 and has had the burning since. Worse in past few days after PT was working on the area. Today she has taken Fioricet with no relief. She applied a salonpas patch which increased her pain. Last night she took a Lortab that didn't help her pain at all, has taken robaxin with no improvement of pain as well. States she's called her pain clinic and neurology and was advised to come into the ED for evaluation.     Problem List:    Patient Active Problem List    Diagnosis Date Noted     Other chronic gastritis without hemorrhage 02/26/2018     Priority: Medium     Calculus of kidney 07/11/2017     Priority: Medium     Pulmonary emphysema, unspecified emphysema type (H) 07/11/2017     Priority: Medium     Panlobular emphysema (H) 04/22/2017     Priority: Medium     Mild, noted on CT 4/21/17       ACP (advance care planning) 02/09/2017     Priority: Medium     Advance Care Planning 2/9/2017: ACP Review of Chart / Resources Provided:  Reviewed chart for advance care plan.  Sadaf Blankenship has no plan or code status on file. Discussed available resources and provided with information. Confirmed code status reflects current choices pending further ACP discussions.  Confirmed/documented legally designated decision makers.  Added by Liudmila Rivera             Subacromial bursitis of right shoulder joint 01/26/2017     Priority: Medium     Acute back pain with sciatica 01/05/2017     Priority: Medium     Chronic  migraine without aura without status migrainosus, not intractable 01/05/2017     Priority: Medium     Radicular pain 01/05/2017     Priority: Medium     Chronic radicular cervical pain 11/14/2016     Priority: Medium     Right arm       Mild episode of recurrent major depressive disorder (H) 11/14/2016     Priority: Medium     Ulnar neuropathy at elbow of left upper extremity 11/14/2016     Priority: Medium     Lumbar radiculopathy 11/14/2016     Priority: Medium     bilateral       S/P cervical spinal fusion 11/14/2016     Priority: Medium     Ulnar neuropathy 11/11/2016     Priority: Medium     Ulcer of esophagus without bleeding 10/14/2016     Priority: Medium     Gastroesophageal reflux disease with esophagitis 10/14/2016     Priority: Medium     Intractable chronic migraine without aura and without status migrainosus 10/14/2016     Priority: Medium     Intractable chronic migraine without aura and with status migrainosus 09/13/2016     Priority: Medium     Myofascial pain 05/09/2016     Priority: Medium     Disuse syndrome 02/12/2016     Priority: Medium     Chronic pain 02/05/2016     Priority: Medium     Uvulitis 01/22/2016     Priority: Medium     Chronic rhinitis 01/22/2016     Priority: Medium     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     Priority: Medium     Chronic maxillary sinusitis 01/22/2016     Priority: Medium     Hypertrophy of inferior nasal turbinate 01/22/2016     Priority: Medium     Long uvula 01/22/2016     Priority: Medium     improved       Chronic pain syndrome 12/07/2015     Priority: Medium     Patient is followed by Henna Cruz MD for ongoing prescription of pain medication.  All refills should only be approved by this provider, or covering partner.    Medication(s): Norco 7.5/325mg.   Maximum quantity per month: #60  Clinic visit frequency required: Q 3 months     Controlled substance agreement:  Encounter-Level CSA - 07/11/2017:          Controlled Substance Agreement - Scan on 7/14/2017  12:56 PM : CONTROLLED SUBSTANCE AGREEMENT (below)              Pain Clinic evaluation in the past: No    DIRE Total Score(s):  No flowsheet data found.    Last [facility] website verification:  done on 7.10.17   [email or web address]         Chronic neck pain 10/05/2015     Priority: Medium     Chronic tension-type headache, intractable 10/05/2015     Priority: Medium     Migraine aura without headache 10/05/2015     Priority: Medium     History of arthrodesis 10/05/2015     Priority: Medium     Myofascial pain syndrome, cervical 09/17/2015     Priority: Medium     Depression, major 09/17/2015     Priority: Medium     Irritable bowel syndrome 01/26/2015     Priority: Medium     Thoracic sprain and strain 11/06/2013     Priority: Medium     Sprain and strain of shoulder and upper arm 11/06/2013     Priority: Medium     Pseudoarthrosis of cervical spine 07/03/2012     Priority: Medium     Ulcer of esophagus 06/25/2012     Priority: Medium     Cervical pain 05/10/2012     Priority: Medium     With radicuopathy         Advanced care planning/counseling discussion 02/22/2012     Priority: Medium     UTI (urinary tract infection) 05/26/2011     Priority: Medium     Problem list name updated by automated process. Provider to review       Degenerative disc disease, cervical 01/01/2011     Priority: Medium     Migraine 07/09/2001     Priority: Medium     Problem list name updated by automated process. Provider to review          Past Medical History:    Past Medical History:   Diagnosis Date     ASCUS on Pap smear 5/26/2004     Atypical chest pain 5/26/2008     Bleeding ulcer 5/26/1976     Cervical radicular pain 5/10/2012     Degenerative disc disease, cervical 1/1/2011     Esophageal ulcer 5/26/1998     Irritable bowel syndrome 1/26/2015     Migraine headaches, severe 7/9/2001     Pseudoarthrosis of cervical spine 7/3/2012     Recurrent UTI 5/26/2011     sinusitis (chronic) 4/16/2001       Past Surgical History:    Past  Surgical History:   Procedure Laterality Date     BACK SURGERY      cervical     Cervical spine surgery with removal of 2 disks, C5,C7       COLONOSCOPY  10/13/2014    Dr Camargo, internal hemorrhoids     COLONOSCOPY - HIM SCAN  2018    EGD and colonoscopy with Dr. Jennings     Coronary angiogram, normal      Chest pain     ENT SURGERY      nose surgery x3     fusion discectomy anterior cervical  2011     HC ESOPHAGOSCOPY, DIAGNOSTIC  10/31/2014    Dr Camargo, mild erythema of antrum, negative biopsies     NOSE SURGERY      x3       1997     Posterior decompression , fusion C3-5      Psuedoarthrosis     Supracervical hysterectomy with right salpingoophorectomy      Endometriosis       Family History:    Family History   Problem Relation Age of Onset     CANCER Father      lung, also a heavy smoker     DIABETES Mother      HEART DISEASE Mother 58     MI; cause of death; heavy smoker. had first MI at 40     Coronary Artery Disease Mother      Hypertension Mother      CANCER Other      strong history     HEART DISEASE Other      heart disease     HEART DISEASE Brother      x3     Hypertension Brother      DIABETES Brother      Coronary Artery Disease Brother      Hypertension Sister        Social History:  Marital Status:   [4]  Social History   Substance Use Topics     Smoking status: Former Smoker     Types: Cigarettes     Smokeless tobacco: Never Used      Comment: quit . no passive exposure     Alcohol use No        Medications:      baclofen (LIORESAL) 10 MG tablet   Cholecalciferol (VITAMIN D3) 1000 UNITS CAPS   dexlansoprazole (DEXILANT) 60 MG CPDR CR capsule   diazepam (VALIUM) 5 MG tablet   FLUoxetine (PROZAC) 40 MG capsule   HYDROcodone-acetaminophen (NORCO) 7.5-325 MG per tablet   METHOCARBAMOL PO   Misc. Devices MISC   Multiple Vitamins-Minerals (CENTRUM SILVER ULTRA WOMENS) TABS   order for DME   pregabalin (LYRICA) 100 MG capsule   Probiotic Product (PROBIOTIC DAILY PO)    senna-docusate (SENOKOT-S;PERICOLACE) 8.6-50 MG per tablet   sucralfate (CARAFATE) 1 GM tablet   topiramate (TOPAMAX) 50 MG tablet         Review of Systems   Constitutional: Negative for chills, fatigue and fever.   Respiratory: Negative for cough.    Musculoskeletal: Positive for neck pain (Right side posterior burning neck pain).       Physical Exam   BP: 138/74  Heart Rate: 84  Temp: 97.3  F (36.3  C)  Resp: 16  SpO2: 98 %      Physical Exam   Constitutional: She appears well-developed and well-nourished.   HENT:   Head: Normocephalic and atraumatic.   Neck: Normal range of motion. Neck supple. No spinous process tenderness present. No rigidity. No edema and no erythema present.       Noted area is painful for her, sensitive to touch. There is no erythema, edema or ecchymosis. No masses. She has a well healed mid-line incision.  Applied topical lidocaine, checked with her 15 minutes later and it had made her pain worse. Pt had to wipe of the medication with a wet wash cloth and did so independently without difficulty or increasing pain with applying pressure to the site.    Cardiovascular: Normal rate.    Pulmonary/Chest: Effort normal.   Neurological: She is alert.   Skin: She is not diaphoretic.   Psychiatric: Her speech is normal. Her mood appears anxious.   Nursing note and vitals reviewed.      ED Course     ED Course     Procedures    Results for orders placed or performed during the hospital encounter of 05/24/18 (from the past 24 hour(s))   CBC with platelets differential   Result Value Ref Range    WBC 4.3 4.0 - 11.0 10e9/L    RBC Count 4.49 3.8 - 5.2 10e12/L    Hemoglobin 13.8 11.7 - 15.7 g/dL    Hematocrit 40.1 35.0 - 47.0 %    MCV 89 78 - 100 fl    MCH 30.7 26.5 - 33.0 pg    MCHC 34.4 31.5 - 36.5 g/dL    RDW 12.3 10.0 - 15.0 %    Platelet Count 164 150 - 450 10e9/L    Diff Method Automated Method     % Neutrophils 51.4 %    % Lymphocytes 38.1 %    % Monocytes 7.7 %    % Eosinophils 1.9 %    %  "Basophils 0.7 %    % Immature Granulocytes 0.2 %    Nucleated RBCs 0 0 /100    Absolute Neutrophil 2.2 1.6 - 8.3 10e9/L    Absolute Lymphocytes 1.6 0.8 - 5.3 10e9/L    Absolute Monocytes 0.3 0.0 - 1.3 10e9/L    Absolute Eosinophils 0.1 0.0 - 0.7 10e9/L    Absolute Basophils 0.0 0.0 - 0.2 10e9/L    Abs Immature Granulocytes 0.0 0 - 0.4 10e9/L    Absolute Nucleated RBC 0.0        Medications   lidocaine (LMX4) kit (not administered)   diazepam (VALIUM) injection 10 mg (10 mg Intramuscular Given 5/24/18 1747)       Assessments & Plan (with Medical Decision Making)     I have reviewed the nursing notes.  I have reviewed the findings, diagnosis, plan and need for follow up with the patient.  Patient here today for complaints of burning pain in her neck.   CBC is benign, topical lidocaine made her pain worse. There is no indication of an infectious process on exam.   She's taken Lyrica, barbituate, and topical analgesics today with no benefit. Is also on an SSRI.   Yesterday took muscle relaxer and Norco with no relief. Valium helped her sleep but in combination with Norco made her feel \"loopy\".  Out of UC, there isn't much else to offer to help with her neuralgia.   Discussed with ED provider, with worsening pain using a topical anesthetic advised to treat anxiety.  Given valium IM. Advised to use cold packs to the area and follow up with PCP or neurology for re-evaluation.   Advised no narcotics while taking valium.     Current Discharge Medication List          Final diagnoses:   Cervico-occipital neuralgia of right side       5/24/2018   [facility]     Astrid Rogers NP  05/24/18 3583    "

## 2018-05-27 ENCOUNTER — HEALTH MAINTENANCE LETTER (OUTPATIENT)
Age: 58
End: 2018-05-27

## 2018-06-08 ENCOUNTER — TRANSFERRED RECORDS (OUTPATIENT)
Dept: HEALTH INFORMATION MANAGEMENT | Facility: CLINIC | Age: 58
End: 2018-06-08

## 2018-06-20 DIAGNOSIS — G89.29 CHRONIC NECK PAIN: ICD-10-CM

## 2018-06-20 DIAGNOSIS — M54.2 CHRONIC NECK PAIN: ICD-10-CM

## 2018-06-20 RX ORDER — HYDROCODONE BITARTRATE AND ACETAMINOPHEN 7.5; 325 MG/1; MG/1
TABLET ORAL
Qty: 60 TABLET | Refills: 0 | Status: SHIPPED | OUTPATIENT
Start: 2018-06-20 | End: 2018-09-26

## 2018-06-20 NOTE — TELEPHONE ENCOUNTER
norco      Last Written Prescription Date:  4/6/18  Last Fill Quantity: 60,   # refills: 0  Last Office Visit: 4/26/18  Future Office visit:    Next 5 appointments (look out 90 days)     Jul 03, 2018 10:00 AM CDT   (Arrive by 9:45 AM)   SHORT with Henna Cruz MD   [facility] ([facility] )    [address] [place], [place] [address] [address]   [phone]            Jul 23, 2018  9:00 AM CDT   (Arrive by 8:45 AM)   [facility]   [facility]    [address] [place] [id number] [phone]                   Routing refill request to provider for review/approval because:  Drug not on the [facility], [facility] or  Health refill protocol or controlled substance]

## 2018-06-21 NOTE — TELEPHONE ENCOUNTER
Patient notified prescription is ready to be picked up at the [facility], Registration.

## 2018-07-02 ENCOUNTER — HOSPITAL ENCOUNTER (OUTPATIENT)
Dept: PHYSICAL THERAPY | Facility: HOSPITAL | Age: 58
Setting detail: THERAPIES SERIES
End: 2018-07-02
Attending: INTERNAL MEDICINE
Payer: COMMERCIAL

## 2018-07-02 PROCEDURE — 40000718 ZZHC STATISTIC PT DEPARTMENT ORTHO VISIT

## 2018-07-02 PROCEDURE — 97161 PT EVAL LOW COMPLEX 20 MIN: CPT | Mod: GP

## 2018-07-03 ENCOUNTER — OFFICE VISIT (OUTPATIENT)
Dept: FAMILY MEDICINE | Facility: OTHER | Age: 58
End: 2018-07-03
Attending: FAMILY MEDICINE
Payer: COMMERCIAL

## 2018-07-03 VITALS
BODY MASS INDEX: 25.71 KG/M2 | DIASTOLIC BLOOD PRESSURE: 78 MMHG | SYSTOLIC BLOOD PRESSURE: 132 MMHG | WEIGHT: 160 LBS | OXYGEN SATURATION: 97 % | RESPIRATION RATE: 20 BRPM | HEART RATE: 64 BPM | HEIGHT: 66 IN

## 2018-07-03 DIAGNOSIS — M25.571 PAIN IN JOINT INVOLVING ANKLE AND FOOT, RIGHT: ICD-10-CM

## 2018-07-03 DIAGNOSIS — M79.18 MYOFASCIAL PAIN: ICD-10-CM

## 2018-07-03 DIAGNOSIS — G43.719 INTRACTABLE CHRONIC MIGRAINE WITHOUT AURA AND WITHOUT STATUS MIGRAINOSUS: ICD-10-CM

## 2018-07-03 DIAGNOSIS — G89.29 CHRONIC RADICULAR CERVICAL PAIN: Primary | ICD-10-CM

## 2018-07-03 DIAGNOSIS — M54.12 CHRONIC RADICULAR CERVICAL PAIN: Primary | ICD-10-CM

## 2018-07-03 PROBLEM — M62.89 PELVIC FLOOR DYSFUNCTION: Status: ACTIVE | Noted: 2018-05-01

## 2018-07-03 LAB — URATE SERPL-MCNC: 4.6 MG/DL (ref 2.6–6)

## 2018-07-03 PROCEDURE — 82607 VITAMIN B-12: CPT | Mod: ZL | Performed by: FAMILY MEDICINE

## 2018-07-03 PROCEDURE — 36415 COLL VENOUS BLD VENIPUNCTURE: CPT | Mod: ZL | Performed by: FAMILY MEDICINE

## 2018-07-03 PROCEDURE — 84550 ASSAY OF BLOOD/URIC ACID: CPT | Mod: ZL | Performed by: FAMILY MEDICINE

## 2018-07-03 PROCEDURE — 99000 SPECIMEN HANDLING OFFICE-LAB: CPT | Performed by: FAMILY MEDICINE

## 2018-07-03 PROCEDURE — 82746 ASSAY OF FOLIC ACID SERUM: CPT | Mod: ZL | Performed by: FAMILY MEDICINE

## 2018-07-03 PROCEDURE — G0463 HOSPITAL OUTPT CLINIC VISIT: HCPCS

## 2018-07-03 PROCEDURE — 99214 OFFICE O/P EST MOD 30 MIN: CPT | Performed by: FAMILY MEDICINE

## 2018-07-03 ASSESSMENT — ANXIETY QUESTIONNAIRES
6. BECOMING EASILY ANNOYED OR IRRITABLE: SEVERAL DAYS
1. FEELING NERVOUS, ANXIOUS, OR ON EDGE: SEVERAL DAYS
2. NOT BEING ABLE TO STOP OR CONTROL WORRYING: SEVERAL DAYS
4. TROUBLE RELAXING: SEVERAL DAYS
5. BEING SO RESTLESS THAT IT IS HARD TO SIT STILL: SEVERAL DAYS
7. FEELING AFRAID AS IF SOMETHING AWFUL MIGHT HAPPEN: NOT AT ALL
IF YOU CHECKED OFF ANY PROBLEMS ON THIS QUESTIONNAIRE, HOW DIFFICULT HAVE THESE PROBLEMS MADE IT FOR YOU TO DO YOUR WORK, TAKE CARE OF THINGS AT HOME, OR GET ALONG WITH OTHER PEOPLE: NOT DIFFICULT AT ALL
3. WORRYING TOO MUCH ABOUT DIFFERENT THINGS: SEVERAL DAYS
GAD7 TOTAL SCORE: 6

## 2018-07-03 ASSESSMENT — PAIN SCALES - GENERAL: PAINLEVEL: MODERATE PAIN (4)

## 2018-07-03 NOTE — MR AVS SNAPSHOT
After Visit Summary   7/3/2018    Sadaf Blankenship    MRN: [id number]           Patient Information     Date Of Birth          1960        Visit Information        Provider Department      7/3/2018 10:00 AM Henna Cruz MD [facility]        Today's Diagnoses     Pain in joint involving ankle and foot, right    -  1       Follow-ups after your visit        Your next 10 appointments already scheduled     Jul 18, 2018 10:30 AM CDT   Treatment 60 with Lay Butcher, PT   [facility] ([facility] )    [address] [address]   [phone]            Jul 23, 2018  9:00 AM CDT   (Arrive by 8:45 AM)   Return Visit with Zoe Herbert MD   [facility] ([facility] )    [address] [id number]   [phone]            Jul 23, 2018  3:00 PM CDT   Treatment 60 with Gemini Jackman, PT   [facility] ([facility] )    [address] [address]   [phone]            Jul 30, 2018  2:00 PM CDT   Treatment 60 with Gemini Jackman, PT   [facility] ([facility] )    [address] [address]   [phone]            Aug 06, 2018 10:00 AM CDT   Treatment 60 with Gemini Jackman, PT   [facility] ([facility] )    [address] [address]   [phone]            Aug 13, 2018 10:00 AM CDT   Treatment 60 with Gemini Jackman, PT   [facility] ([facility] )    [address] [address]   [phone]            Sep 06, 2018 10:00 AM CDT   (Arrive by 9:45 AM)   PHYSICAL with Henna Cruz MD   [facility] ([facility] )    [facility] [address]  [facility] [address]   [phone]              Who to contact     If you have questions or need follow up information about today's clinic visit or your schedule please contact [facility]  "[place] directly at [phone].  Normal or non-critical lab and imaging results will be communicated to you by Kunerangohart, letter or phone within 4 business days after the clinic has received the results. If you do not hear from us within 7 days, please contact the clinic through Kunerangohart or phone. If you have a critical or abnormal lab result, we will notify you by phone as soon as possible.  Submit refill requests through copygram or call your pharmacy and they will forward the refill request to us. Please allow 3 business days for your refill to be completed.          Additional Information About Your Visit        Kunerangohart Information     copygram gives you secure access to your electronic health record. If you see a primary care provider, you can also send messages to your care team and make appointments. If you have questions, please call your primary care clinic.  If you do not have a primary care provider, please call [phone] and they will assist you.        Care EveryWhere ID     This is your Care EveryWhere ID. This could be used by other organizations to access your [facility] medical records  [id number]        Your Vitals Were     Pulse Respirations Height Pulse Oximetry Breastfeeding? BMI (Body Mass Index)    64 20 5' 6\" (1.676 m) 97% No 25.82 kg/m2       Blood Pressure from Last 3 Encounters:   07/03/18 132/78   05/24/18 138/74   05/23/18 92/68    Weight from Last 3 Encounters:   07/03/18 160 lb (72.6 kg)   05/23/18 160 lb (72.6 kg)   04/26/18 160 lb (72.6 kg)              We Performed the Following     Folate     Uric acid     Vitamin B12          Today's Medication Changes          These changes are accurate as of 7/3/18 10:56 AM.  If you have any questions, ask your nurse or doctor.               These medicines have changed or have updated prescriptions.        Dose/Directions    sucralfate 1 GM tablet   Commonly known as:  CARAFATE   This may have changed:    - how much to take  - when to take " this  - additional instructions   Used for:  Gastroesophageal reflux disease with esophagitis        Dose:  1 g   Take 1 tablet (1 g) by mouth 4 times daily   Quantity:  40 tablet   Refills:  1                Primary Care Provider Office Phone # Fax #    Henna Cruz [phone] [address] [facility] [address]        Equal Access to Services     [facility]: Hadii aad ku hadasho Soomaali, waaxda luqadaha, qaybta kaalmada adeegyada, waxeva flannery haysergon adewalter flowersevamily simsergodayday . So [facility] [phone].    ATENCIÓN: Si habla español, tiene a schroeder disposición servicios gratuitos de asistencia lingüística. [facility] [phone].    We comply with applicable federal civil rights laws and [place] laws. We do not discriminate on the basis of race, color, national origin, age, disability, sex, sexual orientation, or gender identity.            Thank you!     Thank you for choosing [facility]  for your care. Our goal is always to provide you with excellent care. Hearing back from our patients is one way we can continue to improve our services. Please take a few minutes to complete the written survey that you may receive in the mail after your visit with us. Thank you!             Your Updated Medication List - Protect others around you: Learn how to safely use, store and throw away your medicines at www.disposemymeds.org.          This list is accurate as of 7/3/18 10:56 AM.  Always use your most recent med list.                   Brand Name Dispense Instructions for use Diagnosis    baclofen 10 MG tablet    LIORESAL    40 tablet    Take 1 tablet (10 mg) by mouth 3 times daily as needed for muscle spasms    Chronic radicular cervical pain       CENTRUM SILVER ULTRA WOMENS Tabs           dexlansoprazole 60 MG Cpdr CR capsule    DEXILANT    30 capsule    TAKE 1 CAPSULE (60 MG) BY MOUTH DAILY    Ulcer of esophagus without bleeding       diazepam 5 MG tablet    VALIUM    100 tablet     TAKE 1 TABLET BY MOUTH EVER Y 6 HOURS AS NEEDED - GENER IC FOR VALIUM    Muscle spasm       FLUoxetine 40 MG capsule    PROzac    30 capsule    Take 1 capsule (40 mg) by mouth daily    Major depressive disorder, recurrent severe without psychotic features (H)       HYDROcodone-acetaminophen 7.5-325 MG per tablet    NORCO    60 tablet    TAKE 1 TABLET BY MOUTH EVER Y 4 TO 6 HOURS AS NEEDED FO R PAIN    Chronic neck pain       METHOCARBAMOL PO      Take 750 mg by mouth Take one tablet at bedtime for 21 days Prescribed by Dr. Alonzo Grewal        Misc. Devices Misc      Dispense one Cefaly neurostimulator and the electro stimulators with refills        order for DME      Equipment being ordered: TENS        pregabalin 100 MG capsule    LYRICA    90 capsule    Take 1 capsule (100 mg) by mouth 3 times daily    Chronic radicular cervical pain       PROBIOTIC DAILY PO      Take by mouth 2 times daily        senna-docusate 8.6-50 MG per tablet    SENOKOT-S;PERICOLACE     Take 1-4 tablets by mouth as needed        sucralfate 1 GM tablet    CARAFATE    40 tablet    Take 1 tablet (1 g) by mouth 4 times daily    Gastroesophageal reflux disease with esophagitis       topiramate 50 MG tablet    TOPAMAX     Take 2 tablets in the morning and 2 tablets at night        vitamin D3 1000 units Caps      Take 1,000 Units by mouth

## 2018-07-03 NOTE — PROGRESS NOTES
"  SUBJECTIVE:   Sadaf Blankenship is a 58 year old female who presents to clinic today for the following health issues:      Musculoskeletal problem/pain      Duration: ongoing    Description  Location: neck    Intensity:  mild, 4/10    Accompanying signs and symptoms: none    History  Previous similar problem: no   Previous evaluation:  x-ray and MRI    Precipitating or alleviating factors:  Trauma or overuse: no   Aggravating factors include: sitting, lifting, exercise and overuse    Therapies tried and outcome: rest/inactivity, heat, ice, acetaminophen, Ibuprofen and physical therapy    She is having ongoing neck pain, particularly on the right with pain radiating to the right upper shoulder and trapezius muscle. This has been chronic. She had two previous cervical spine surgeries for this problem. Most recently she had a rhizotomy on the right which gave some relief however she developed pain on the right after the injection that felt like \"fire\" and was severe. She was given a muscle relaxer, Lyrica was increased to 150 mg tid and she was referred to PT. After 2 weeks of severe pain she was given lidocaine injections which did decrease the pain along with the other therapies. She continues to have pain of the right neck \"like a sunburn' now. She continues to have decreased movement of her neck chronically.     Migraine headaches  These continue to be an ongoing problem with chronic pain.  She is seeing Dr Pérez for Botox injections which are somewhat helpful.     Right foot pain  She is having burning pain intermittently when sheets touch her right ankle. She also has burning and tingling of her right toes intermittently. No trauma, no erythema of the joint    Problem list and histories reviewed & adjusted, as indicated.  Additional history: as documented    Patient Active Problem List   Diagnosis     Degenerative disc disease, cervical     Pseudoarthrosis of cervical spine     Cervical pain     Migraine "     UTI (urinary tract infection)     Advanced care planning/counseling discussion     Thoracic sprain and strain     Sprain and strain of shoulder and upper arm     Irritable bowel syndrome     Myofascial pain syndrome, cervical     Depression, major     Chronic pain syndrome     Uvulitis     Chronic rhinitis     Madina bullosa     Chronic maxillary sinusitis     Hypertrophy of inferior nasal turbinate     Long uvula     Chronic neck pain     Chronic pain     Chronic tension-type headache, intractable     Migraine aura without headache     History of arthrodesis     Myofascial pain     Disuse syndrome     Intractable chronic migraine without aura and with status migrainosus     Ulcer of esophagus without bleeding     Gastroesophageal reflux disease with esophagitis     Intractable chronic migraine without aura and without status migrainosus     Ulcer of esophagus     Ulnar neuropathy     Chronic radicular cervical pain     Mild episode of recurrent major depressive disorder (H)     Ulnar neuropathy at elbow of left upper extremity     Lumbar radiculopathy     S/P cervical spinal fusion     ACP (advance care planning)     Acute back pain with sciatica     Chronic migraine without aura without status migrainosus, not intractable     Radicular pain     Subacromial bursitis of right shoulder joint     Panlobular emphysema (H)     Calculus of kidney     Pulmonary emphysema, unspecified emphysema type (H)     Other chronic gastritis without hemorrhage     Pelvic floor dysfunction     Past Surgical History:   Procedure Laterality Date     BACK SURGERY      cervical     Cervical spine surgery with removal of 2 disks, C5,C7       COLONOSCOPY  10/13/2014    Dr Camargo, internal hemorrhoids     COLONOSCOPY - HIM SCAN  02/09/2018    EGD and colonoscopy with Dr. Jennings     Coronary angiogram, normal  2003    Chest pain     ENT SURGERY      nose surgery x3     fusion discectomy anterior cervical  2011      ESOPHAGOSCOPY,  DIAGNOSTIC  10/31/2014    Dr Camargo, mild erythema of antrum, negative biopsies     NOSE SURGERY      x3            Posterior decompression , fusion C3-5      Psuedoarthrosis     Supracervical hysterectomy with right salpingoophorectomy      Endometriosis       Social History   Substance Use Topics     Smoking status: Former Smoker     Types: Cigarettes     Smokeless tobacco: Never Used      Comment: quit . no passive exposure     Alcohol use No     Family History   Problem Relation Age of Onset     Cancer Father      lung, also a heavy smoker     Diabetes Mother      HEART DISEASE Mother 58     MI; cause of death; heavy smoker. had first MI at 40     Coronary Artery Disease Mother      Hypertension Mother      Cancer Other      strong history     HEART DISEASE Other      heart disease     HEART DISEASE Brother      x3     Hypertension Brother      Diabetes Brother      Coronary Artery Disease Brother      Hypertension Sister          Current Outpatient Prescriptions   Medication Sig Dispense Refill     baclofen (LIORESAL) 10 MG tablet Take 1 tablet (10 mg) by mouth 3 times daily as needed for muscle spasms 40 tablet 0     Cholecalciferol (VITAMIN D3) 1000 UNITS CAPS Take 1,000 Units by mouth       dexlansoprazole (DEXILANT) 60 MG CPDR CR capsule TAKE 1 CAPSULE (60 MG) BY MOUTH DAILY 30 capsule 11     diazepam (VALIUM) 5 MG tablet TAKE 1 TABLET BY MOUTH EVER Y 6 HOURS AS NEEDED - GENER IC FOR VALIUM 100 tablet 0     FLUoxetine (PROZAC) 40 MG capsule Take 1 capsule (40 mg) by mouth daily 30 capsule 3     HYDROcodone-acetaminophen (NORCO) 7.5-325 MG per tablet TAKE 1 TABLET BY MOUTH EVER Y 4 TO 6 HOURS AS NEEDED FO R PAIN 60 tablet 0     METHOCARBAMOL PO Take 750 mg by mouth Take one tablet at bedtime for 21 days  Prescribed by Dr. Alonzo Grewal       Misc. Devices MISC Dispense one Cefaly neurostimulator and the electro stimulators with refills       Multiple Vitamins-Minerals (CENTRUM SILVER ULTRA  "WOMENS) TABS        order for DME Equipment being ordered: TENS       pregabalin (LYRICA) 100 MG capsule Take 1 capsule (100 mg) by mouth 3 times daily 90 capsule 0     Probiotic Product (PROBIOTIC DAILY PO) Take by mouth 2 times daily       senna-docusate (SENOKOT-S;PERICOLACE) 8.6-50 MG per tablet Take 1-4 tablets by mouth as needed        sucralfate (CARAFATE) 1 GM tablet Take 1 tablet (1 g) by mouth 4 times daily (Patient taking differently: Patient takes as needed (PRN.) 40 tablet 1     topiramate (TOPAMAX) 50 MG tablet Take 2 tablets in the morning and 2 tablets at night       Allergies   Allergen Reactions     Aspirin Hives     Ibuprofen      Dye in the otc form causes headaches  \"patient states over the counter ibuprofen  bothers her\"     Lansoprazole Other (See Comments) and Visual Disturbance     Changes in eyesight  Prevacid  Change in eyesight     Penicillins Other (See Comments)     pruritis     Red Dye Hives     Bupropion Rash     Bupropion Hcl Hives and Rash     Wellbutrin     Demerol [Meperidine] Other (See Comments)     Made migraine worse     BP Readings from Last 3 Encounters:   07/03/18 132/78   05/24/18 138/74   05/23/18 92/68    Wt Readings from Last 3 Encounters:   07/03/18 160 lb (72.6 kg)   05/23/18 160 lb (72.6 kg)   04/26/18 160 lb (72.6 kg)                    Reviewed and updated as needed this visit by clinical staff  Tobacco  Allergies  Meds  Med Hx  Surg Hx  Fam Hx  Soc Hx      Reviewed and updated as needed this visit by Provider         ROS:  Constitutional, HEENT, cardiovascular, pulmonary, gi and gu systems are negative, except as otherwise noted.    OBJECTIVE:                                                    /78  Pulse 64  Resp 20  Ht 5' 6\" (1.676 m)  Wt 160 lb (72.6 kg)  SpO2 97%  Breastfeeding? No  BMI 25.82 kg/m2  Body mass index is 25.82 kg/(m^2).  GENERAL APPEARANCE: alert, no distress and fatigued  MS: extremities normal- no gross deformities noted and " no tenderness of the right lateral malleolus or toes  ORTHO: Cervical Spine Exam: Inspection: increased cervical lordosis  Tender:  right paracervical muscles, right trapezius muscles  Non-tender:  spinous processes  Range of Motion:  flexion:  decreased, painful, 20 degrees, extension: decreased, painful, 10 degrees, left lateral rotation:  decreased, painful, 20 degrees, right lateral rotation:  decreased, painful, 10 degrees  Strength: Full strength of all neck muscles  SKIN: no suspicious lesions or rashes and well healed incisions of the mid posterior cervical spine and anterior neck  NEURO: Normal strength and tone, mentation intact and speech normal  PSYCH: mentation appears normal and worried         ASSESSMENT/PLAN:                                                    .(M54.12,  G89.29) Chronic radicular cervical pain  (primary encounter diagnosis)  Comment:   Plan: ongoing pain management in [place] with rhizotomy with ongoing right neck pain post procedure. She has not been able to work due to her disability. Her hearing is coming up August 7. Recheck in 3 months, continue pain management through [facility]    (M79.1) Myofascial pain  Comment:   Plan: as above, ongoing, right sided    (G43.719) Intractable chronic migraine without aura and without status migrainosus  Comment:   Plan: continue medications and botox injections. These have also been chronic and she has not been able to work with the ongoing pain    (M25.571) Pain in joint involving ankle and foot, right  Comment:   Plan: Uric acid, Vitamin B12, Folate        Check uric acid and labs to rule out gout and vitamin deficiencies          Follow up with Provider - 3 months     Henna Cruz MD  [facility]

## 2018-07-03 NOTE — LETTER
My Depression Action Plan  Name: Sadaf Blankenship   Date of Birth 1960  Date: 7/3/2018    My doctor: Henna Cruz   My clinic: [facility] [place]  [place] [address]  [phone]          GREEN    ZONE   Good Control    What it looks like:     Things are going generally well. You have normal up s and down s. You may even feel depressed from time to time, but bad moods usually last less than a day.   What you need to do:  1. Continue to care for yourself (see self care plan)  2. Check your depression survival kit and update it as needed  3. Follow your physician s recommendations including any medication.  4. Do not stop taking medication unless you consult with your physician first.           YELLOW         ZONE Getting Worse    What it looks like:     Depression is starting to interfere with your life.     It may be hard to get out of bed; you may be starting to isolate yourself from others.    Symptoms of depression are starting to last most all day and this has happened for several days.     You may have suicidal thoughts but they are not constant.   What you need to do:     1. Call your care team, your response to treatment will improve if you keep your care team informed of your progress. Yellow periods are signs an adjustment may need to be made.     2. Continue your self-care, even if you have to fake it!    3. Talk to someone in your support network    4. Open up your depression survival kit           RED    ZONE Medical Alert - Get Help    What it looks like:     Depression is seriously interfering with your life.     You may experience these or other symptoms: You can t get out of bed most days, can t work or engage in other necessary activities, you have trouble taking care of basic hygiene, or basic responsibilities, thoughts of suicide or death that will not go away, self-injurious behavior.     What you need to do:  1. Call your care team and request a  same-day appointment. If they are not available (weekends or after hours) call your local crisis line, emergency room or 911.            Depression Self Care Plan / Survival Kit    Self-Care for Depression  Here s the deal. Your body and mind are really not as separate as most people think.  What you do and think affects how you feel and how you feel influences what you do and think. This means if you do things that people who feel good do, it will help you feel better.  Sometimes this is all it takes.  There is also a place for medication and therapy depending on how severe your depression is, so be sure to consult with your medical provider and/ or Behavioral Health Consultant if your symptoms are worsening or not improving.     In order to better manage my stress, I will:    Exercise  Get some form of exercise, every day. This will help reduce pain and release endorphins, the  feel good  chemicals in your brain. This is almost as good as taking antidepressants!  This is not the same as joining a gym and then never going! (they count on that by the way ) It can be as simple as just going for a walk or doing some gardening, anything that will get you moving.      Hygiene   Maintain good hygiene (Get out of bed in the morning, Make your bed, Brush your teeth, Take a shower, and Get dressed like you were going to work, even if you are unemployed).  If your clothes don't fit try to get ones that do.    Diet  I will strive to eat foods that are good for me, drink plenty of water, and avoid excessive sugar, caffeine, alcohol, and other mood-altering substances.  Some foods that are helpful in depression are: complex carbohydrates, B vitamins, flaxseed, fish or fish oil, fresh fruits and vegetables.    Psychotherapy  I agree to participate in Individual Therapy (if recommended).    Medication  If prescribed medications, I agree to take them.  Missing doses can result in serious side effects.  I understand that drinking  alcohol, or other illicit drug use, may cause potential side effects.  I will not stop my medication abruptly without first discussing it with my provider.    Staying Connected With Others  I will stay in touch with my friends, family members, and my primary care provider/team.    Use your imagination  Be creative.  We all have a creative side; it doesn t matter if it s oil painting, sand castles, or mud pies! This will also kick up the endorphins.    Witness Beauty  (AKA stop and smell the roses) Take a look outside, even in mid-winter. Notice colors, textures. Watch the squirrels and birds.     Service to others  Be of service to others.  There is always someone else in need.  By helping others we can  get out of ourselves  and remember the really important things.  This also provides opportunities for practicing all the other parts of the program.    Humor  Laugh and be silly!  Adjust your TV habits for less news and crime-drama and more comedy.    Control your stress  Try breathing deep, massage therapy, biofeedback, and meditation. Find time to relax each day.     My support system    Clinic Contact:  Phone number:    Contact 1:  Phone number:    Contact 2:  Phone number:    S[employer]ian[employer]/:  Phone number:    Therapist:  Phone number:    Local crisis center:    Phone number:    Other community support:  Phone number:

## 2018-07-03 NOTE — NURSING NOTE
"Chief Complaint   Patient presents with     Musculoskeletal Problem       Initial /78  Pulse 64  Resp 20  Ht 5' 6\" (1.676 m)  Wt 160 lb (72.6 kg)  SpO2 97%  Breastfeeding? No  BMI 25.82 kg/m2 Estimated body mass index is 25.82 kg/(m^2) as calculated from the following:    Height as of this encounter: 5' 6\" (1.676 m).    Weight as of this encounter: 160 lb (72.6 kg).  Medication Reconciliation: complete    Liudmila Rivera LPN    "

## 2018-07-04 ASSESSMENT — ANXIETY QUESTIONNAIRES: GAD7 TOTAL SCORE: 6

## 2018-07-04 ASSESSMENT — PATIENT HEALTH QUESTIONNAIRE - PHQ9: SUM OF ALL RESPONSES TO PHQ QUESTIONS 1-9: 3

## 2018-07-05 LAB
FOLATE SERPL-MCNC: 45.9 NG/ML
VIT B12 SERPL-MCNC: 383 PG/ML (ref 193–986)

## 2018-07-16 ENCOUNTER — OFFICE VISIT (OUTPATIENT)
Dept: FAMILY MEDICINE | Facility: OTHER | Age: 58
End: 2018-07-16
Attending: NURSE PRACTITIONER
Payer: COMMERCIAL

## 2018-07-16 ENCOUNTER — TELEPHONE (OUTPATIENT)
Dept: FAMILY MEDICINE | Facility: OTHER | Age: 58
End: 2018-07-16

## 2018-07-16 VITALS
BODY MASS INDEX: 24.75 KG/M2 | WEIGHT: 154 LBS | TEMPERATURE: 99.1 F | OXYGEN SATURATION: 98 % | RESPIRATION RATE: 16 BRPM | DIASTOLIC BLOOD PRESSURE: 60 MMHG | HEIGHT: 66 IN | HEART RATE: 65 BPM | SYSTOLIC BLOOD PRESSURE: 122 MMHG

## 2018-07-16 DIAGNOSIS — R30.0 DYSURIA: Primary | ICD-10-CM

## 2018-07-16 DIAGNOSIS — N89.8 VAGINAL DRYNESS: ICD-10-CM

## 2018-07-16 LAB
ALBUMIN UR-MCNC: NEGATIVE MG/DL
APPEARANCE UR: CLEAR
BILIRUB UR QL STRIP: NEGATIVE
COLOR UR AUTO: NORMAL
GLUCOSE UR STRIP-MCNC: NEGATIVE MG/DL
HGB UR QL STRIP: NEGATIVE
KETONES UR STRIP-MCNC: NEGATIVE MG/DL
LEUKOCYTE ESTERASE UR QL STRIP: NEGATIVE
NITRATE UR QL: NEGATIVE
PH UR STRIP: 7 PH (ref 4.7–8)
SOURCE: NORMAL
SP GR UR STRIP: 1 (ref 1–1.03)
SPECIMEN SOURCE: NORMAL
UROBILINOGEN UR STRIP-MCNC: NORMAL MG/DL (ref 0–2)
WET PREP SPEC: NORMAL

## 2018-07-16 PROCEDURE — G0463 HOSPITAL OUTPT CLINIC VISIT: HCPCS

## 2018-07-16 PROCEDURE — 99213 OFFICE O/P EST LOW 20 MIN: CPT | Performed by: NURSE PRACTITIONER

## 2018-07-16 PROCEDURE — 87210 SMEAR WET MOUNT SALINE/INK: CPT | Mod: ZL | Performed by: NURSE PRACTITIONER

## 2018-07-16 PROCEDURE — 81003 URINALYSIS AUTO W/O SCOPE: CPT | Mod: ZL | Performed by: NURSE PRACTITIONER

## 2018-07-16 RX ORDER — DOCUSATE SODIUM 100 MG/1
100 CAPSULE, LIQUID FILLED ORAL DAILY
COMMUNITY

## 2018-07-16 RX ORDER — BACLOFEN 10 MG/1
10 TABLET ORAL
COMMUNITY
Start: 2018-02-26 | End: 2018-07-16

## 2018-07-16 ASSESSMENT — ANXIETY QUESTIONNAIRES
5. BEING SO RESTLESS THAT IT IS HARD TO SIT STILL: SEVERAL DAYS
1. FEELING NERVOUS, ANXIOUS, OR ON EDGE: SEVERAL DAYS
6. BECOMING EASILY ANNOYED OR IRRITABLE: SEVERAL DAYS
2. NOT BEING ABLE TO STOP OR CONTROL WORRYING: SEVERAL DAYS
7. FEELING AFRAID AS IF SOMETHING AWFUL MIGHT HAPPEN: SEVERAL DAYS
4. TROUBLE RELAXING: SEVERAL DAYS
IF YOU CHECKED OFF ANY PROBLEMS ON THIS QUESTIONNAIRE, HOW DIFFICULT HAVE THESE PROBLEMS MADE IT FOR YOU TO DO YOUR WORK, TAKE CARE OF THINGS AT HOME, OR GET ALONG WITH OTHER PEOPLE: NOT DIFFICULT AT ALL
3. WORRYING TOO MUCH ABOUT DIFFERENT THINGS: SEVERAL DAYS
GAD7 TOTAL SCORE: 7

## 2018-07-16 ASSESSMENT — PAIN SCALES - GENERAL: PAINLEVEL: MODERATE PAIN (4)

## 2018-07-16 NOTE — PATIENT INSTRUCTIONS
How is vaginal dryness treated?    You can try drugstore products such as Replens, Gyne-Moistrin or Lubrin. They last for about three days.    Use water-based lubricants during sex. These include Astroglide and K-Y Jelly. Do not use Vaseline or petroleum jelly because they are not good for vaginal tissues.    Use low-dose estrogen cream in the vagina. Your doctor must prescribe this medicine.  For informational purposes only. Not to replace the advice of your health care provider.  Copyright   2010 [employer]. All rights reserved. [employer] 356203   12/15.

## 2018-07-16 NOTE — TELEPHONE ENCOUNTER
07/16/2018 Received a PA from Anay Munguia for Dexilant. Submitted on CMM. Waiting for a response.

## 2018-07-16 NOTE — PROGRESS NOTES
SUBJECTIVE:   Sadaf Blankenship is a 58 year old female who presents to clinic today for the following health issues:      URINARY TRACT SYMPTOMS      Duration: 4 days    Description  odor and pressure with full bladder    Intensity:  mild    Accompanying signs and symptoms:  Fever/chills: no   Flank pain no   Nausea and vomiting: no   Vaginal symptoms: odor and pressure  Abdominal/Pelvic Pain: YES    History  History of frequent UTI's: no   History of kidney stones: yes  Sexually Active: no   Possibility of pregnancy: No    Precipitating or alleviating factors: None    Therapies tried and outcome: none   Outcome: none          Problem list and histories reviewed & adjusted, as indicated.  Additional history: as documented    Patient Active Problem List   Diagnosis     Degenerative disc disease, cervical     Pseudoarthrosis of cervical spine     Cervical pain     Migraine     UTI (urinary tract infection)     Advanced care planning/counseling discussion     Thoracic sprain and strain     Sprain and strain of shoulder and upper arm     Irritable bowel syndrome     Myofascial pain syndrome, cervical     Depression, major     Chronic pain syndrome     Uvulitis     Chronic rhinitis     Madina bullosa     Chronic maxillary sinusitis     Hypertrophy of inferior nasal turbinate     Long uvula     Chronic neck pain     Chronic pain     Chronic tension-type headache, intractable     Migraine aura without headache     History of arthrodesis     Myofascial pain     Disuse syndrome     Intractable chronic migraine without aura and with status migrainosus     Ulcer of esophagus without bleeding     Gastroesophageal reflux disease with esophagitis     Intractable chronic migraine without aura and without status migrainosus     Ulcer of esophagus     Ulnar neuropathy     Chronic radicular cervical pain     Mild episode of recurrent major depressive disorder (H)     Ulnar neuropathy at elbow of left upper extremity      Lumbar radiculopathy     S/P cervical spinal fusion     ACP (advance care planning)     Acute back pain with sciatica     Chronic migraine without aura without status migrainosus, not intractable     Radicular pain     Subacromial bursitis of right shoulder joint     Panlobular emphysema (H)     Calculus of kidney     Pulmonary emphysema, unspecified emphysema type (H)     Other chronic gastritis without hemorrhage     Pelvic floor dysfunction     Past Surgical History:   Procedure Laterality Date     BACK SURGERY      cervical     Cervical spine surgery with removal of 2 disks, C5,C7       COLONOSCOPY  10/13/2014    Dr Camargo, internal hemorrhoids     COLONOSCOPY - HIM SCAN  2018    EGD and colonoscopy with Dr. Jennings     Coronary angiogram, normal      Chest pain     ENT SURGERY      nose surgery x3     fusion discectomy anterior cervical  2011     HC ESOPHAGOSCOPY, DIAGNOSTIC  10/31/2014    Dr Camargo, mild erythema of antrum, negative biopsies     NOSE SURGERY      x3       1997     Posterior decompression , fusion C3-5      Psuedoarthrosis     Supracervical hysterectomy with right salpingoophorectomy      Endometriosis       Social History   Substance Use Topics     Smoking status: Former Smoker     Types: Cigarettes     Smokeless tobacco: Never Used      Comment: quit . no passive exposure     Alcohol use No     Family History   Problem Relation Age of Onset     Cancer Father      lung, also a heavy smoker     Diabetes Mother      HEART DISEASE Mother 58     MI; cause of death; heavy smoker. had first MI at 40     Coronary Artery Disease Mother      Hypertension Mother      Cancer Other      strong history     HEART DISEASE Other      heart disease     HEART DISEASE Brother      x3     Hypertension Brother      Diabetes Brother      Coronary Artery Disease Brother      Hypertension Sister          Current Outpatient Prescriptions   Medication Sig Dispense Refill     baclofen  "(LIORESAL) 10 MG tablet Take 1 tablet (10 mg) by mouth 3 times daily as needed for muscle spasms 40 tablet 0     Cholecalciferol (VITAMIN D3) 1000 UNITS CAPS Take 1,000 Units by mouth       DEXILANT 60 MG CPDR CR capsule TAKE 1 CAPSULE DAILY BY MOUTH 30 capsule 4     diazepam (VALIUM) 5 MG tablet TAKE 1 TABLET BY MOUTH EVER Y 6 HOURS AS NEEDED - GENER IC FOR VALIUM 100 tablet 0     docusate sodium (COLACE) 100 MG capsule Take 100 mg by mouth       FLUoxetine (PROZAC) 40 MG capsule Take 1 capsule (40 mg) by mouth daily 30 capsule 3     HYDROcodone-acetaminophen (NORCO) 7.5-325 MG per tablet TAKE 1 TABLET BY MOUTH EVER Y 4 TO 6 HOURS AS NEEDED FO R PAIN 60 tablet 0     METHOCARBAMOL PO Take 750 mg by mouth Take one tablet at bedtime for 21 days  Prescribed by Dr. Alonzo Grewal       Misc. Devices MISC Dispense one Cefaly neurostimulator and the electro stimulators with refills       Multiple Vitamins-Minerals (CENTRUM SILVER ULTRA WOMENS) TABS        order for DME Equipment being ordered: TENS       pregabalin (LYRICA) 100 MG capsule Take 1 capsule (100 mg) by mouth 3 times daily 90 capsule 0     Probiotic Product (PROBIOTIC DAILY PO) Take by mouth 2 times daily       senna-docusate (SENOKOT-S;PERICOLACE) 8.6-50 MG per tablet Take 1-4 tablets by mouth as needed        sucralfate (CARAFATE) 1 GM tablet Take 1 tablet (1 g) by mouth 4 times daily (Patient taking differently: Patient takes as needed (PRN.) 40 tablet 1     topiramate (TOPAMAX) 50 MG tablet Take 2 tablets in the morning and 2 tablets at night       Allergies   Allergen Reactions     Aspirin Hives     Ibuprofen      Dye in the otc form causes headaches  \"patient states over the counter ibuprofen  bothers her\"     Lansoprazole Other (See Comments) and Visual Disturbance     Changes in eyesight  Prevacid  Change in eyesight     Penicillins Other (See Comments) and Itching     pruritis     Red Dye Hives     Bupropion Rash     Bupropion Hcl Hives and Rash     " "Wellbutrin     Demerol [Meperidine] Other (See Comments)     Made migraine worse       Reviewed and updated as needed this visit by clinical staff       Reviewed and updated as needed this visit by Provider         ROS:  CONSTITUTIONAL:NEGATIVE for fever, chills, change in weight  INTEGUMENTARY/SKIN: NEGATIVE for worrisome rashes, moles or lesions  RESP: NEGATIVE for significant cough or SOB  CV: NEGATIVE for chest pain, palpitations or peripheral edema  GI: abdominal pain suprapubic  : vaginal odor     OBJECTIVE:     /60  Pulse 65  Temp 99.1  F (37.3  C) (Tympanic)  Resp 16  Ht 5' 6\" (1.676 m)  Wt 154 lb (69.9 kg)  SpO2 98%  BMI 24.86 kg/m2  Body mass index is 24.86 kg/(m^2).   GENERAL: healthy, alert and no distress  RESP: lungs clear to auscultation - no rales, rhonchi or wheezes  CV: regular rate and rhythm, normal S1 S2, no S3 or S4, no murmur, click or rub, no peripheral edema and peripheral pulses strong  ABDOMEN: tenderness suprapubic and bowel sounds normal   (female): normal female external genitalia, normal urethral meatus , vaginal skin findings: dry and tender with a normal cervix, adnexae, and uterus without masses.    Diagnostic Test Results:  Results for orders placed or performed in visit on 07/16/18 (from the past 24 hour(s))   UA reflex to Microscopic and Culture - [place]   Result Value Ref Range    Color Urine Light Yellow     Appearance Urine Clear     Glucose Urine Negative NEG^Negative mg/dL    Bilirubin Urine Negative NEG^Negative    Ketones Urine Negative NEG^Negative mg/dL    Specific Gravity Urine 1.005 1.003 - 1.035    Blood Urine Negative NEG^Negative    pH Urine 7.0 4.7 - 8.0 pH    Protein Albumin Urine Negative NEG^Negative mg/dL    Urobilinogen mg/dL Normal 0.0 - 2.0 mg/dL    Nitrite Urine Negative NEG^Negative    Leukocyte Esterase Urine Negative NEG^Negative    Source Midstream Urine    Wet prep   Result Value Ref Range    Specimen Description Vagina     Wet Prep " Rare  WBC'S seen       Wet Prep No Trichomonas seen     Wet Prep No clue cells seen     Wet Prep No yeast seen        ASSESSMENT/PLAN:      1. Dysuria  Sadaf presented today complain of suprapubic irritation and vaginal odor without discharge.  UA and wet prep negative. Discussed Vaginal dryness and use of OTC lubricants every other day. If irritation continues or symptoms worsen she is encouraged to follow up with her PCP. Sadaf has an appointment for a physical in about a month. If she has not had any improvement she may need to try vaginal estrogen. Sadaf agrees with plan   - UA reflex to Microscopic and Culture - [place]  - Wet prep    2. Vaginal dryness  As above          See Patient Instructions    JULIO Lantigua [facility] [place]

## 2018-07-16 NOTE — TELEPHONE ENCOUNTER
Please schedule patient for date/time: unable to see today may go to urgent care for evaluation or else another provider    Have patient go to ER/Urgent Care Center. Urgent Care hours are 9:30 am to 8 pm, open 7 days a week. Yes.    Provider will call patient.No.    Other:

## 2018-07-16 NOTE — TELEPHONE ENCOUNTER
8:14 AM    Reason for Call: OVERBOOK    Patient is having the following symptoms: Possible UTI/vaginal infection - pain and odor for 3 days.    The patient is requesting an appointment for overbook  with Dr. CLEMENCIA Cruz.    Was an appointment offered for this call? Yes - August first available pt declined  If yes : Appointment type              Date    Preferred method for responding to this message: Telephone Call  What is your phone number ?    If we cannot reach you directly, may we leave a detailed response at the number you provided? Yes [phone]    Can this message wait until your PCP/provider returns, if unavailable today? Not applicable,     Roslyn Golden

## 2018-07-16 NOTE — NURSING NOTE
"Chief Complaint   Patient presents with     UTI       Initial /60  Pulse 65  Temp 99.1  F (37.3  C) (Tympanic)  Resp 16  Ht 5' 6\" (1.676 m)  Wt 154 lb (69.9 kg)  SpO2 98%  BMI 24.86 kg/m2 Estimated body mass index is 24.86 kg/(m^2) as calculated from the following:    Height as of this encounter: 5' 6\" (1.676 m).    Weight as of this encounter: 154 lb (69.9 kg).  Medication Reconciliation: complete    Megha Lakhani LPN    "

## 2018-07-16 NOTE — MR AVS SNAPSHOT
After Visit Summary   7/16/2018    Sadaf Blankenship    MRN: [id number]           Patient Information     Date Of Birth          1960        Visit Information        Provider Department      7/16/2018 3:30 PM Selene Diana APRN CNP [facility]        Today's Diagnoses     Dysuria    -  1    Vaginal dryness          Care Instructions    How is vaginal dryness treated?    You can try drugstore products such as Replens, Gyne-Moistrin or Lubrin. They last for about three days.    Use water-based lubricants during sex. These include Astroglide and K-Y Jelly. Do not use Vaseline or petroleum jelly because they are not good for vaginal tissues.    Use low-dose estrogen cream in the vagina. Your doctor must prescribe this medicine.  For informational purposes only. Not to replace the advice of your health care provider.  Copyright   2010 [facility]. All rights reserved. [employer] 742743 - 12/15.            Follow-ups after your visit        Your next 10 appointments already scheduled     Jul 18, 2018 10:30 AM CDT   Treatment 60 with Lay Butcher, PT   [facility] ([facility] )    [address] [place] [address]   [phone]            Jul 23, 2018  9:00 AM CDT   (Arrive by 8:45 AM)   Return Visit with Zoe Herbert MD   [facility] ([facility] )    [address] [place] [id number]   [phone]            Jul 23, 2018  3:00 PM CDT   Treatment 60 with Gemini Jackman PT   [facility] ([facility] )    [address] [place] [address]   [phone]            Jul 30, 2018  2:00 PM CDT   Treatment 60 with Gemini Jackman PT   [facility] ([facility] )    [address] [place] [address]   [phone]            Aug 06, 2018 10:00 AM CDT   Treatment 60 with Gemini Jackman PT   [facility] ([facility] )  "   [address] [address]   [phone]            Aug 13, 2018 10:00 AM CDT   Treatment 60 with Gemini Jackman, PT   [facility] ([facility] )    [address] [address]   [phone]            Sep 06, 2018 10:00 AM CDT   (Arrive by 9:45 AM)   PHYSICAL with Henna Cruz MD   [facility] ([facility] )    [address]  [facility] [address]   [phone]              Who to contact     If you have questions or need follow up information about today's clinic visit or your schedule please contact [facility] directly at [phone].  Normal or non-critical lab and imaging results will be communicated to you by [employer], letter or phone within 4 business days after the clinic has received the results. If you do not hear from us within 7 days, please contact the clinic through [employer] or phone. If you have a critical or abnormal lab result, we will notify you by phone as soon as possible.  Submit refill requests through [employer] or call your pharmacy and they will forward the refill request to us. Please allow 3 business days for your refill to be completed.          Additional Information About Your Visit        [employer] Information     [employer] gives you secure access to your electronic health record. If you see a primary care provider, you can also send messages to your care team and make appointments. If you have questions, please call your primary care clinic.  If you do not have a primary care provider, please call [phone] and they will assist you.        Care EveryWhere ID     This is your Care EveryWhere ID. This could be used by other organizations to access your [facility] medical records  [id number]        Your Vitals Were     Pulse Temperature Respirations Height Pulse Oximetry BMI (Body Mass Index)    65 99.1  F (37.3  C) (Tympanic) 16 5' 6\" (1.676 m) 98% 24.86 kg/m2       Blood Pressure from Last 3 " Encounters:   07/16/18 122/60   07/03/18 132/78   05/24/18 138/74    Weight from Last 3 Encounters:   07/16/18 154 lb (69.9 kg)   07/03/18 160 lb (72.6 kg)   05/23/18 160 lb (72.6 kg)              We Performed the Following     UA reflex to Microscopic and Culture - [place]     Wet prep          Today's Medication Changes          These changes are accurate as of 7/16/18  4:24 PM.  If you have any questions, ask your nurse or doctor.               These medicines have changed or have updated prescriptions.        Dose/Directions    baclofen 10 MG tablet   Commonly known as:  LIORESAL   This may have changed:  Another medication with the same name was removed. Continue taking this medication, and follow the directions you see here.   Used for:  Chronic radicular cervical pain   Changed by:  Selene Diana APRN CNP        Dose:  10 mg   Take 1 tablet (10 mg) by mouth 3 times daily as needed for muscle spasms   Quantity:  40 tablet   Refills:  0       sucralfate 1 GM tablet   Commonly known as:  CARAFATE   This may have changed:    - how much to take  - when to take this  - additional instructions   Used for:  Gastroesophageal reflux disease with esophagitis        Dose:  1 g   Take 1 tablet (1 g) by mouth 4 times daily   Quantity:  40 tablet   Refills:  1                Primary Care Provider Office Phone # Fax #    Henna Cruz [phone]       [facility] [id number]        Equal Access to Services     [facility] LALO AH: Hadii trent sinhao Sosujata, waaxda luqadaha, qaybta kaalmada adewalteryada, cherise chen. So [facility] [phone].    ATENCIÓN: Si habla español, tiene a schroeder disposición servicios gratuitos de asistencia lingüística. Llame al [phone].    We comply with applicable federal civil rights laws and [place] laws. We do not discriminate on the basis of race, color, national origin, age, disability, sex, sexual orientation, or gender  identity.            Thank you!     Thank you for choosing [facility]  for your care. Our goal is always to provide you with excellent care. Hearing back from our patients is one way we can continue to improve our services. Please take a few minutes to complete the written survey that you may receive in the mail after your visit with us. Thank you!             Your Updated Medication List - Protect others around you: Learn how to safely use, store and throw away your medicines at www.disposemymeds.org.          This list is accurate as of 7/16/18  4:24 PM.  Always use your most recent med list.                   Brand Name Dispense Instructions for use Diagnosis    baclofen 10 MG tablet    LIORESAL    40 tablet    Take 1 tablet (10 mg) by mouth 3 times daily as needed for muscle spasms    Chronic radicular cervical pain       CENTRUM SILVER ULTRA WOMENS Tabs           DEXILANT 60 MG Cpdr CR capsule   Generic drug:  dexlansoprazole     30 capsule    TAKE 1 CAPSULE DAILY BY MOUTH    Ulcer of esophagus without bleeding       diazepam 5 MG tablet    VALIUM    100 tablet    TAKE 1 TABLET BY MOUTH EVER Y 6 HOURS AS NEEDED - GENER IC FOR VALIUM    Muscle spasm       docusate sodium 100 MG capsule    COLACE     Take 100 mg by mouth        FLUoxetine 40 MG capsule    PROzac    30 capsule    Take 1 capsule (40 mg) by mouth daily    Major depressive disorder, recurrent severe without psychotic features (H)       HYDROcodone-acetaminophen 7.5-325 MG per tablet    NORCO    60 tablet    TAKE 1 TABLET BY MOUTH EVER Y 4 TO 6 HOURS AS NEEDED FO R PAIN    Chronic neck pain       METHOCARBAMOL PO      Take 750 mg by mouth Take one tablet at bedtime for 21 days Prescribed by Dr. Alonzo Grewal        Misc. Devices Misc      Dispense one Cefaly neurostimulator and the electro stimulators with refills        order for DME      Equipment being ordered: TENS        pregabalin 100 MG capsule    LYRICA    90 capsule    Take 1  capsule (100 mg) by mouth 3 times daily    Chronic radicular cervical pain       PROBIOTIC DAILY PO      Take by mouth 2 times daily        senna-docusate 8.6-50 MG per tablet    SENOKOT-S;PERICOLACE     Take 1-4 tablets by mouth as needed        sucralfate 1 GM tablet    CARAFATE    40 tablet    Take 1 tablet (1 g) by mouth 4 times daily    Gastroesophageal reflux disease with esophagitis       topiramate 50 MG tablet    TOPAMAX     Take 2 tablets in the morning and 2 tablets at night        vitamin D3 1000 units Caps      Take 1,000 Units by mouth

## 2018-07-17 ASSESSMENT — PATIENT HEALTH QUESTIONNAIRE - PHQ9: SUM OF ALL RESPONSES TO PHQ QUESTIONS 1-9: 3

## 2018-07-17 ASSESSMENT — ANXIETY QUESTIONNAIRES: GAD7 TOTAL SCORE: 7

## 2018-07-17 NOTE — TELEPHONE ENCOUNTER
07/17/2018 Received an APPROVAL from [employer] for Dexilant. Effective 06/16/2018 to 07/15/2021. Forms scanned to [employer].

## 2018-07-18 ENCOUNTER — HOSPITAL ENCOUNTER (OUTPATIENT)
Dept: PHYSICAL THERAPY | Facility: HOSPITAL | Age: 58
Setting detail: THERAPIES SERIES
End: 2018-07-18
Attending: FAMILY MEDICINE
Payer: COMMERCIAL

## 2018-07-18 PROCEDURE — 97140 MANUAL THERAPY 1/> REGIONS: CPT | Mod: GP

## 2018-07-18 PROCEDURE — 40000718 ZZHC STATISTIC PT DEPARTMENT ORTHO VISIT

## 2018-07-18 PROCEDURE — 97530 THERAPEUTIC ACTIVITIES: CPT | Mod: GP

## 2018-07-18 NOTE — PROGRESS NOTES
07/18/18 1023   Signing Clinician's Name / Credentials   Signing clinician's name / credentials Lay Butcher DPT   Session Number   Session Number 2   Additional Session Number Sees Dr Iraheta again 8/6/18.   Subjective Report   Subjective Report Patient seen [id number] for C/O pelvic floor dysfunction and difficulty with BM. Seen in association with Gemini Jackman, PT. Denies LBP but has lots of problems with her upper back. She gets injections. The exercises Gemini gave her are going really well. She can now turn her legs out and lay them flat. She is frustrated with prior care at another facility because they did not do anything to help her with her BM. Then she saw Dr Iraheta who sent her to PT. She has a sensation of pain that triggers her urge to defecate. Without the laxative she would just fill up and fill up and fill up.    Objective Measure 1   Objective Measure Somatic dysfunction   Details Transverse colon hypomobile in counterclockwise motion (18:7). Left SI jt locked and left LE 5/8 inch longer than right. Left ASIS, IC, PSIS, and IT all inferior relative to right. Left sacral sulcus deep and left CITLALLI prominent. T2-L5=NSRRL.    Therapeutic Activity   Minutes 30'   Skilled Intervention ther act   Patient Response good, asked many questions   Treatment Detail Discussed the importance of standing and sitting with equal weight on right and left sides to avoid re-establishing the dysfunctional mechanical pattern found today.   Progress Patient verbalized understanding.   Manual Therapy   Minutes 30'   Skilled Intervention man ther   Patient Response good   Treatment Detail Visceral mobilization   Progress Post tx colon mobile, leg lengths equal, pelvis level, and SI jts equally mobile. Sacral, lumbar, and thoracic segments WFL of mobility and alignment.    Plan   Homework Stand and sit with equal weight on right and left sides   Plan for next session Tx per re-eval 1 X week, continue with Gemini for  pelvic floor work   Comments   Comments Findings consistent with viscerally driven constipation and mechanical dysfunction at pelvis. Immobility in transverse colon, left downslipped innominate with functionally long left LE, SL sacrum, and SRRL curve in T-L spine that was associated with the leg length difference.   Total Session Time   Timed Code Treatment Minutes 60'   Total Treatment Time (sum of timed and untimed services) 60'     Lay Butcher DPT

## 2018-07-23 ENCOUNTER — OFFICE VISIT (OUTPATIENT)
Dept: PSYCHIATRY | Facility: OTHER | Age: 58
End: 2018-07-23
Attending: PSYCHIATRY & NEUROLOGY
Payer: COMMERCIAL

## 2018-07-23 ENCOUNTER — HOSPITAL ENCOUNTER (OUTPATIENT)
Dept: PHYSICAL THERAPY | Facility: HOSPITAL | Age: 58
Setting detail: THERAPIES SERIES
End: 2018-07-23
Attending: FAMILY MEDICINE
Payer: COMMERCIAL

## 2018-07-23 VITALS
HEIGHT: 66 IN | DIASTOLIC BLOOD PRESSURE: 74 MMHG | HEART RATE: 67 BPM | BODY MASS INDEX: 24.75 KG/M2 | OXYGEN SATURATION: 98 % | WEIGHT: 154 LBS | TEMPERATURE: 97.3 F | SYSTOLIC BLOOD PRESSURE: 116 MMHG

## 2018-07-23 DIAGNOSIS — F33.1 MAJOR DEPRESSIVE DISORDER, RECURRENT EPISODE, MODERATE (H): Primary | ICD-10-CM

## 2018-07-23 PROCEDURE — G0463 HOSPITAL OUTPT CLINIC VISIT: HCPCS

## 2018-07-23 PROCEDURE — 97110 THERAPEUTIC EXERCISES: CPT | Mod: GP

## 2018-07-23 PROCEDURE — 97530 THERAPEUTIC ACTIVITIES: CPT | Mod: GP

## 2018-07-23 PROCEDURE — 99213 OFFICE O/P EST LOW 20 MIN: CPT | Performed by: PSYCHIATRY & NEUROLOGY

## 2018-07-23 PROCEDURE — 40000718 ZZHC STATISTIC PT DEPARTMENT ORTHO VISIT

## 2018-07-23 PROCEDURE — 97140 MANUAL THERAPY 1/> REGIONS: CPT | Mod: GP

## 2018-07-23 ASSESSMENT — ANXIETY QUESTIONNAIRES
5. BEING SO RESTLESS THAT IT IS HARD TO SIT STILL: SEVERAL DAYS
6. BECOMING EASILY ANNOYED OR IRRITABLE: NOT AT ALL
3. WORRYING TOO MUCH ABOUT DIFFERENT THINGS: SEVERAL DAYS
7. FEELING AFRAID AS IF SOMETHING AWFUL MIGHT HAPPEN: SEVERAL DAYS
1. FEELING NERVOUS, ANXIOUS, OR ON EDGE: SEVERAL DAYS
IF YOU CHECKED OFF ANY PROBLEMS ON THIS QUESTIONNAIRE, HOW DIFFICULT HAVE THESE PROBLEMS MADE IT FOR YOU TO DO YOUR WORK, TAKE CARE OF THINGS AT HOME, OR GET ALONG WITH OTHER PEOPLE: NOT DIFFICULT AT ALL
GAD7 TOTAL SCORE: 6
2. NOT BEING ABLE TO STOP OR CONTROL WORRYING: SEVERAL DAYS

## 2018-07-23 ASSESSMENT — PAIN SCALES - GENERAL: PAINLEVEL: NO PAIN (0)

## 2018-07-23 ASSESSMENT — PATIENT HEALTH QUESTIONNAIRE - PHQ9: 5. POOR APPETITE OR OVEREATING: SEVERAL DAYS

## 2018-07-23 NOTE — MR AVS SNAPSHOT
After Visit Summary   7/23/2018    Sadaf Blankenship    MRN: [id number]           Patient Information     Date Of Birth          1960        Visit Information        Provider Department      7/23/2018 9:00 AM Zoe Herbert MD [facility]        Today's Diagnoses     Major depressive disorder, recurrent episode, moderate (H)    -  1       Follow-ups after your visit        Your next 10 appointments already scheduled     Jul 23, 2018  3:00 PM CDT   Treatment 60 with Gemini Jackman, PT   [facility] ([facility] )    [address] [address]   [phone]            Jul 26, 2018  1:30 PM CDT   Treatment with Lay Butcher, PT   [facility] ([facility] )    [address] [address]   [phone]            Jul 30, 2018  2:00 PM CDT   Treatment 60 with Gemini Jackman, PT   [facility] ([facility] )    [address] [address]   [phone]            Aug 06, 2018 10:00 AM CDT   Treatment 60 with Gemini Jackman, PT   [facility] ([facility] )    [address] [address]   [phone]            Aug 08, 2018  1:00 PM CDT   Treatment with Lay Butcher, PT   [facility] )    [address] [address]   [phone]            Aug 13, 2018 10:00 AM CDT   Treatment 60 with Gemini Jackman, PT   [facility] ([facility] )    [address] [place] [address]   [phone]            Aug 15, 2018  9:00 AM CDT   Treatment with Lay Butcher, PT   [facility] )    [address] [address]   [phone]            Aug 22, 2018  9:00 AM CDT   Treatment with Lay Butcher, PT   [facility] ([facility] )    [address] [place] [address]   [phone]            Sep 06, 2018 10:00 AM CDT  "  (Arrive by 9:45 AM)   PHYSICAL with Henna Cruz MD   [facility] [place] ([facility] [place] )    [address]  [place] [address]   [phone]            Oct 30, 2018 10:20 AM CDT   (Arrive by 10:05 AM)   Return Visit with Zoe Herbert MD   [facility] [place] ([facility] [place] )    [address]  [place] [id number] [phone]              Who to contact     If you have questions or need follow up information about today's clinic visit or your schedule please contact [facility] directly at [phone].  Normal or non-critical lab and imaging results will be communicated to you by MyChart, letter or phone within 4 business days after the clinic has received the results. If you do not hear from us within 7 days, please contact the clinic through [employer] or phone. If you have a critical or abnormal lab result, we will notify you by phone as soon as possible.  Submit refill requests through [email or web address] or call your pharmacy and they will forward the refill request to us. Please allow 3 business days for your refill to be completed.          Additional Information About Your Visit        [employer] Information     [email or web address] gives you secure access to your electronic health record. If you see a primary care provider, you can also send messages to your care team and make appointments. If you have questions, please call your primary care clinic.  If you do not have a primary care provider, please call [phone] and they will assist you.        Care EveryWhere ID     This is your Care EveryWhere ID. This could be used by other organizations to access your [facility] medical records  [id number]        Your Vitals Were     Pulse Temperature Height Pulse Oximetry BMI (Body Mass Index)       67 97.3  F (36.3  C) (Tympanic) 5' 6\" (1.676 m) 98% 24.86 kg/m2        Blood Pressure from Last 3 Encounters:   07/23/18 116/74   07/16/18 122/60   07/03/18 132/78 "    Weight from Last 3 Encounters:   07/23/18 154 lb (69.9 kg)   07/16/18 154 lb (69.9 kg)   07/03/18 160 lb (72.6 kg)              Today, you had the following     No orders found for display         Today's Medication Changes          These changes are accurate as of 7/23/18  9:36 AM.  If you have any questions, ask your nurse or doctor.               These medicines have changed or have updated prescriptions.        Dose/Directions    sucralfate 1 GM tablet   Commonly known as:  CARAFATE   This may have changed:    - how much to take  - when to take this  - additional instructions   Used for:  Gastroesophageal reflux disease with esophagitis        Dose:  1 g   Take 1 tablet (1 g) by mouth 4 times daily   Quantity:  40 tablet   Refills:  1                Primary Care Provider Office Phone # Fax #    Henna Cruz [phone] [address] [address]        Equal Access to Services     PRASANNA MAY AH: Hadii trent pradhan Sosujata, waaxda luqadaha, qaybta kaalmada adewalteryakayleigh, cherise vogt . So [facility] [phone].    ATENCIÓN: Si habla español, tiene a schroeder disposición servicios gratuitos de asistencia lingüística. [facility] [phone].    We comply with applicable federal civil rights laws and [place] laws. We do not discriminate on the basis of race, color, national origin, age, disability, sex, sexual orientation, or gender identity.            Thank you!     Thank you for choosing [facility]  for your care. Our goal is always to provide you with excellent care. Hearing back from our patients is one way we can continue to improve our services. Please take a few minutes to complete the written survey that you may receive in the mail after your visit with us. Thank you!             Your Updated Medication List - Protect others around you: Learn how to safely use, store and throw away your medicines at www.disposemymeds.org.          This  list is accurate as of 7/23/18  9:36 AM.  Always use your most recent med list.                   Brand Name Dispense Instructions for use Diagnosis    baclofen 10 MG tablet    LIORESAL    40 tablet    Take 1 tablet (10 mg) by mouth 3 times daily as needed for muscle spasms    Chronic radicular cervical pain       CENTRUM SILVER ULTRA WOMENS Tabs           DEXILANT 60 MG Cpdr CR capsule   Generic drug:  dexlansoprazole     30 capsule    TAKE 1 CAPSULE DAILY BY MOUTH    Ulcer of esophagus without bleeding       diazepam 5 MG tablet    VALIUM    100 tablet    TAKE 1 TABLET BY MOUTH EVER Y 6 HOURS AS NEEDED - GENER IC FOR VALIUM    Muscle spasm       docusate sodium 100 MG capsule    COLACE     Take 100 mg by mouth        FLUoxetine 40 MG capsule    PROzac    30 capsule    Take 1 capsule (40 mg) by mouth daily    Major depressive disorder, recurrent severe without psychotic features (H)       HYDROcodone-acetaminophen 7.5-325 MG per tablet    NORCO    60 tablet    TAKE 1 TABLET BY MOUTH EVER Y 4 TO 6 HOURS AS NEEDED FO R PAIN    Chronic neck pain       METHOCARBAMOL PO      Take 750 mg by mouth Take one tablet at bedtime for 21 days Prescribed by Dr. Alonzo Grewal        Misc. Devices Misc      Dispense one Cefaly neurostimulator and the electro stimulators with refills        order for DME      Equipment being ordered: TENS        pregabalin 100 MG capsule    LYRICA    90 capsule    Take 1 capsule (100 mg) by mouth 3 times daily    Chronic radicular cervical pain       PROBIOTIC DAILY PO      Take by mouth 2 times daily        senna-docusate 8.6-50 MG per tablet    SENOKOT-S;PERICOLACE     Take 1-4 tablets by mouth as needed        sucralfate 1 GM tablet    CARAFATE    40 tablet    Take 1 tablet (1 g) by mouth 4 times daily    Gastroesophageal reflux disease with esophagitis       topiramate 50 MG tablet    TOPAMAX     Take 2 tablets in the morning and 2 tablets at night        vitamin D3 1000 units Caps      Take 1,000  Units by mouth

## 2018-07-23 NOTE — NURSING NOTE
"Chief Complaint   Patient presents with     Mental Health Problem     Recheck       Initial /74 (Cuff Size: Adult Regular)  Pulse 67  Temp 97.3  F (36.3  C) (Tympanic)  Ht 5' 6\" (1.676 m)  Wt 154 lb (69.9 kg)  SpO2 98%  BMI 24.86 kg/m2 Estimated body mass index is 24.86 kg/(m^2) as calculated from the following:    Height as of this encounter: 5' 6\" (1.676 m).    Weight as of this encounter: 154 lb (69.9 kg).  Medication Reconciliation: complete    Lali Seaman LPN    "

## 2018-07-23 NOTE — PROGRESS NOTES
PSYCHIATRY CLINIC PROGRESS NOTE   20 minute medication management, more than 50% of time spent counseling patient on medications, medication side effects, symptom history and management   SUBJECTIVE / INTERIM HISTORY                                                                       Social- niece and niece's kids moved out Children-  Daughter Shandra going to college  Last visit 5/23/18:   Increase fluoxetine 20 mg daily to 40 mg daily    - hearing is August 7th [place]  - generally doing okay with depression and anxiety.   - last week had injections / numbed the area from rhizotomy   - going to be working with physical therapy for GI issues  - doing better with issues with her daughter Noemy. Tough love.  Working at [employer] and worked 19 days -- WAS still asking Sadaf for $ even with her helping with car, phone, etc.   - she feels fluoxetine helpful much more than Cymbalta.  - GI : issues with bowel movements. .   - long-term disability and moved in with her sister    SUBSTANCE USE- no issues    SYMPTOMS-  Anxiety, gets easily overwhelmed, gets panic attacks, depression, anhedonia, low energy, overwhelmed, no SI.  MEDICAL ROS- migraines, . upper abdominal pain. .  Substernal pain after she eats (hx ulcers esophageal and gastric) neck pain s/p neck surgeries, migraines, IBS  MEDICAL / SURGICAL HISTORY                    Patient Active Problem List   Diagnosis     Degenerative disc disease, cervical     Pseudoarthrosis of cervical spine     Cervical pain     Migraine     UTI (urinary tract infection)     Advanced care planning/counseling discussion     Thoracic sprain and strain     Sprain and strain of shoulder and upper arm     Irritable bowel syndrome     Myofascial pain syndrome, cervical     Depression, major     Chronic pain syndrome     Uvulitis     Chronic rhinitis     Madina bullosa     Chronic maxillary sinusitis     Hypertrophy of inferior nasal turbinate     Long uvula     Chronic neck pain      Chronic pain     Chronic tension-type headache, intractable     Migraine aura without headache     History of arthrodesis     Myofascial pain     Disuse syndrome     Intractable chronic migraine without aura and with status migrainosus     Ulcer of esophagus without bleeding     Gastroesophageal reflux disease with esophagitis     Intractable chronic migraine without aura and without status migrainosus     Ulcer of esophagus     Ulnar neuropathy     Chronic radicular cervical pain     Mild episode of recurrent major depressive disorder (H)     Ulnar neuropathy at elbow of left upper extremity     Lumbar radiculopathy     S/P cervical spinal fusion     ACP (advance care planning)     Acute back pain with sciatica     Chronic migraine without aura without status migrainosus, not intractable     Radicular pain     Subacromial bursitis of right shoulder joint     Panlobular emphysema (H)     Calculus of kidney     Pulmonary emphysema, unspecified emphysema type (H)     Other chronic gastritis without hemorrhage     Pelvic floor dysfunction     ALLERGY   Aspirin; Ibuprofen; Lansoprazole; Penicillins; Red dye; Bupropion; Bupropion hcl; and Demerol [meperidine]  MEDICATIONS                                                                                             Current Outpatient Prescriptions   Medication Sig     baclofen (LIORESAL) 10 MG tablet Take 1 tablet (10 mg) by mouth 3 times daily as needed for muscle spasms     Cholecalciferol (VITAMIN D3) 1000 UNITS CAPS Take 1,000 Units by mouth     DEXILANT 60 MG CPDR CR capsule TAKE 1 CAPSULE DAILY BY MOUTH     diazepam (VALIUM) 5 MG tablet TAKE 1 TABLET BY MOUTH EVER Y 6 HOURS AS NEEDED - GENER IC FOR VALIUM     docusate sodium (COLACE) 100 MG capsule Take 100 mg by mouth     FLUoxetine (PROZAC) 40 MG capsule Take 1 capsule (40 mg) by mouth daily     HYDROcodone-acetaminophen (NORCO) 7.5-325 MG per tablet TAKE 1 TABLET BY MOUTH EVER Y 4 TO 6 HOURS AS NEEDED FO R PAIN      "METHOCARBAMOL PO Take 750 mg by mouth Take one tablet at bedtime for 21 days  Prescribed by Dr. Alonzo Grewal     Misc. Devices MISC Dispense one Cefaly neurostimulator and the electro stimulators with refills     Multiple Vitamins-Minerals (CENTRUM SILVER ULTRA WOMENS) TABS      order for DME Equipment being ordered: TENS     pregabalin (LYRICA) 100 MG capsule Take 1 capsule (100 mg) by mouth 3 times daily     Probiotic Product (PROBIOTIC DAILY PO) Take by mouth 2 times daily     senna-docusate (SENOKOT-S;PERICOLACE) 8.6-50 MG per tablet Take 1-4 tablets by mouth as needed      sucralfate (CARAFATE) 1 GM tablet Take 1 tablet (1 g) by mouth 4 times daily (Patient taking differently: Patient takes as needed (PRN.)     topiramate (TOPAMAX) 50 MG tablet Take 2 tablets in the morning and 2 tablets at night     No current facility-administered medications for this visit.        VITALS   /74 (Cuff Size: Adult Regular)  Pulse 67  Temp 97.3  F (36.3  C) (Tympanic)  Ht 5' 6\" (1.676 m)  Wt 154 lb (69.9 kg)  SpO2 98%  BMI 24.86 kg/m2     LABS                                                                                                                           Last Basic Metabolic Panel:  NA      142   6/9/2015   POTASSIUM      3.6   6/9/2015  CHLORIDE      109   6/9/2015  SALOME      8.7   6/9/2015  CO2       26   6/9/2015  BUN        8   6/9/2015  CR     0.91   6/9/2015  GLC       96   6/9/2015    MENTAL STATUS EXAM                                                                                        Alert. Oriented to person, place, and date / time. Well groomed, calm, cooperative with good eye contact. No problems with speech or psychomotor behavior. Mood was described as \"doing okay\"  and affect was congruent to speech content and full range. Thought process, including associations, was unremarkable and thought content was devoid of suicidal and homicidal ideation and psychotic thought. No hallucinations. Insight " was good. Judgment was intact and adequate for safety. Fund of knowledge was intact. Pt demonstrates no obvious problems with attention, concentration, language, recent or remote memory although these were not formally tested.     ASSESSMENT                                                                                                      HISTORICAL:  Initial psych note 10/13/15        NOTES:  Cymbalta -> agitation, angry. Perry Blankenship is a  58 year old, female who has hx of depression and is actually doing okay with depression, anxiety, now on fluoxetine 40 mg daily. No changes today. No changes today      TREATMENT RISK STATEMENT:  The risks, benefits, alternatives and potential adverse effects have been explained and are understood by the pt.  The pt agrees to the treatment plan with the ability to do so.   The pt knows to call the clinic for any problems or access emergency care if needed.        DIAGNOSES                 (Use of Axes system will continue, even though absent from DSM 5)         MDD recurrent, mild  ESTRELLA      PLAN                                                                                                                    1)  MEDICATIONS:         -- Continue fluoxetine 40 mg daily    2)  THERAPY:  No Change    3)  LABS:  None    4)  PT MONITOR [call for probs]:  worsening sx, SI/HI, SEs from meds    5)  REFERRALS [CD, medical, other]:  None    6)  RTC:  ~ 1 month

## 2018-07-24 ASSESSMENT — ANXIETY QUESTIONNAIRES: GAD7 TOTAL SCORE: 6

## 2018-07-24 ASSESSMENT — PATIENT HEALTH QUESTIONNAIRE - PHQ9: SUM OF ALL RESPONSES TO PHQ QUESTIONS 1-9: 1

## 2018-07-26 ENCOUNTER — HOSPITAL ENCOUNTER (OUTPATIENT)
Dept: PHYSICAL THERAPY | Facility: HOSPITAL | Age: 58
Setting detail: THERAPIES SERIES
End: 2018-07-26
Attending: FAMILY MEDICINE
Payer: COMMERCIAL

## 2018-07-26 PROCEDURE — 40000718 ZZHC STATISTIC PT DEPARTMENT ORTHO VISIT

## 2018-07-26 PROCEDURE — 97140 MANUAL THERAPY 1/> REGIONS: CPT | Mod: GP

## 2018-07-30 ENCOUNTER — HOSPITAL ENCOUNTER (OUTPATIENT)
Dept: PHYSICAL THERAPY | Facility: HOSPITAL | Age: 58
Setting detail: THERAPIES SERIES
End: 2018-07-30
Attending: FAMILY MEDICINE
Payer: COMMERCIAL

## 2018-07-30 PROCEDURE — 40000718 ZZHC STATISTIC PT DEPARTMENT ORTHO VISIT

## 2018-07-30 PROCEDURE — 97110 THERAPEUTIC EXERCISES: CPT | Mod: GP

## 2018-07-30 PROCEDURE — 97140 MANUAL THERAPY 1/> REGIONS: CPT | Mod: GP

## 2018-08-06 ENCOUNTER — HOSPITAL ENCOUNTER (OUTPATIENT)
Dept: PHYSICAL THERAPY | Facility: HOSPITAL | Age: 58
Setting detail: THERAPIES SERIES
End: 2018-08-06
Attending: FAMILY MEDICINE
Payer: COMMERCIAL

## 2018-08-06 PROCEDURE — 40000718 ZZHC STATISTIC PT DEPARTMENT ORTHO VISIT

## 2018-08-06 PROCEDURE — 97110 THERAPEUTIC EXERCISES: CPT | Mod: GP

## 2018-08-06 NOTE — PROGRESS NOTES
Outpatient Physical Therapy Progress Note     Patient: Sadaf Blankenship  : 1960    Beginning/End Dates of Reporting Period:  2018 to 2018    Referring Provider: Dr Iraheta    Therapy Diagnosis: chronic constipation and pelvic pain d/t PFM dysfunction.      Client Self Report: hurts all over today- was cooking for family all weekend. Worried about court tomorrow.  Has been having BM 3 days in row over past 4 days.         Goals:  Goal Identifier STG 1   Goal Description Pt will demonstrate indep HEP compliance including toileting posture/techniques   Target Date 18   Date Met      Progress: ongoingly being met.     Goal Identifier STG2   Goal Description Pt will have regular formed BMs 3x/week without significant strain/pain.    Target Date 18   Date Met      Progress: met last week- will cont same goal     Goal Identifier LTG    Goal Description ADLs will not be limited by PFM dysfunction and pt will have regular BMs without use of laxatives.   Target Date 18   Date Met      Progress: not met as of this date.      Goal Identifier     Goal Description     Target Date     Date Met      Progress:     Goal Identifier     Goal Description     Target Date     Date Met      Progress:     Goal Identifier     Goal Description     Target Date     Date Met      Progress:     Goal Identifier     Goal Description     Target Date     Date Met      Progress:     Goal Identifier     Goal Description     Target Date     Date Met      Progress:     Progress Toward Goals:   Progress this reporting period: Pt has been seen for pelvic based PT and manual PT 1-2x/week for total of 6 visits thus far. Pt is receiving pelvic floor manual therapy, education on bowel habits and diet, as well as manual therapy including craniosacral therapy/visceral mobilization.  She is making significant progress towards goals and has started having somewhat regular bowel movements but continues to strain at times  depending on bowel movement consistency. Pt is drinking prune juice and taking laxatives daily until regularity and PFM becomes normal tension/length. HEP consists of stretching and relaxation techniques as well as clockwise colon massage.           Plan:  Continue therapy per current plan of care. Plan to cont 1-2x/week for now. Pt agrees with plan.    Discharge:  No

## 2018-08-08 ENCOUNTER — HOSPITAL ENCOUNTER (OUTPATIENT)
Dept: PHYSICAL THERAPY | Facility: HOSPITAL | Age: 58
Setting detail: THERAPIES SERIES
End: 2018-08-08
Attending: FAMILY MEDICINE
Payer: COMMERCIAL

## 2018-08-08 PROCEDURE — 97140 MANUAL THERAPY 1/> REGIONS: CPT | Mod: GP

## 2018-08-08 PROCEDURE — 40000718 ZZHC STATISTIC PT DEPARTMENT ORTHO VISIT

## 2018-08-13 ENCOUNTER — HOSPITAL ENCOUNTER (OUTPATIENT)
Dept: PHYSICAL THERAPY | Facility: HOSPITAL | Age: 58
Setting detail: THERAPIES SERIES
End: 2018-08-13
Attending: FAMILY MEDICINE
Payer: COMMERCIAL

## 2018-08-13 PROCEDURE — 97140 MANUAL THERAPY 1/> REGIONS: CPT | Mod: GP

## 2018-08-13 PROCEDURE — 97530 THERAPEUTIC ACTIVITIES: CPT | Mod: GP

## 2018-08-13 PROCEDURE — 97110 THERAPEUTIC EXERCISES: CPT | Mod: GP

## 2018-08-13 PROCEDURE — 40000718 ZZHC STATISTIC PT DEPARTMENT ORTHO VISIT

## 2018-08-15 ENCOUNTER — OFFICE VISIT (OUTPATIENT)
Dept: FAMILY MEDICINE | Facility: OTHER | Age: 58
End: 2018-08-15
Attending: FAMILY MEDICINE
Payer: COMMERCIAL

## 2018-08-15 ENCOUNTER — HOSPITAL ENCOUNTER (OUTPATIENT)
Dept: PHYSICAL THERAPY | Facility: HOSPITAL | Age: 58
Setting detail: THERAPIES SERIES
End: 2018-08-15
Attending: FAMILY MEDICINE
Payer: COMMERCIAL

## 2018-08-15 VITALS
TEMPERATURE: 99.3 F | SYSTOLIC BLOOD PRESSURE: 90 MMHG | BODY MASS INDEX: 25.41 KG/M2 | HEART RATE: 60 BPM | WEIGHT: 157.4 LBS | OXYGEN SATURATION: 95 % | DIASTOLIC BLOOD PRESSURE: 62 MMHG

## 2018-08-15 DIAGNOSIS — J20.9 ACUTE BRONCHITIS, UNSPECIFIED ORGANISM: Primary | ICD-10-CM

## 2018-08-15 PROCEDURE — 99213 OFFICE O/P EST LOW 20 MIN: CPT | Performed by: FAMILY MEDICINE

## 2018-08-15 PROCEDURE — G0463 HOSPITAL OUTPT CLINIC VISIT: HCPCS

## 2018-08-15 PROCEDURE — 40000268 ZZH STATISTIC NO CHARGES

## 2018-08-15 RX ORDER — AZITHROMYCIN 250 MG/1
TABLET, FILM COATED ORAL
Qty: 6 TABLET | Refills: 0 | Status: SHIPPED | OUTPATIENT
Start: 2018-08-15 | End: 2018-09-27

## 2018-08-15 ASSESSMENT — ANXIETY QUESTIONNAIRES
4. TROUBLE RELAXING: SEVERAL DAYS
7. FEELING AFRAID AS IF SOMETHING AWFUL MIGHT HAPPEN: NOT AT ALL
6. BECOMING EASILY ANNOYED OR IRRITABLE: NOT AT ALL
3. WORRYING TOO MUCH ABOUT DIFFERENT THINGS: SEVERAL DAYS
1. FEELING NERVOUS, ANXIOUS, OR ON EDGE: SEVERAL DAYS
5. BEING SO RESTLESS THAT IT IS HARD TO SIT STILL: NOT AT ALL
GAD7 TOTAL SCORE: 4
2. NOT BEING ABLE TO STOP OR CONTROL WORRYING: SEVERAL DAYS

## 2018-08-15 ASSESSMENT — PAIN SCALES - GENERAL: PAINLEVEL: MODERATE PAIN (4)

## 2018-08-15 NOTE — LETTER
My COPD Action Plan     Name: Sadaf Blankenship    YOB: 1960   Date: 8/15/2018    My doctor: Anthony Hays MD   My clinic: [facility] [place]    [address]  [place] [address]  [phone]  My Controller Medicine: { :531743}   Dose: ***     My Rescue Medicine: { :882246}   Dose: ***     My Flare Up Medicine: { :794351}   Dose: ***     My COPD Severity: { :460997}      Use of Oxygen: { :318971}     Make sure you've had your pneumonia   vaccines.          GREEN ZONE       Doing well today      Usual level of activity and exercise    Usual amount of cough and mucus    No shortness of breath    Usual level of health (thinking clearly, sleeping well, feel like eating) Actions:      Take daily medicines    Use oxygen as prescribed    Follow regular exercise and diet plan    Avoid cigarette smoke and other irritants that harm the lungs           YELLOW ZONE          Having a bad day or flare up      Short of breath more than usual    A lot more sputum (mucus) than usual    Sputum looks yellow, green, tan, brown or bloody    More coughing or wheezing    Fever or chills    Less energy; trouble completing activities    Trouble thinking or focusing    Using quick relief inhaler or nebulizer more often    Poor sleep; symptoms wake me up    Do not feel like eating Actions:      Get plenty of rest    Take daily medicines    Use quick relief inhaler every *** hours    If you use oxygen, call you doctor to see if you should adjust your oxygen    Do breathing exercises or other things to help you relax    Let a loved one, friend or neighbor know you are feeling worse    Call your care team if you have 2 or more symptoms.  Start taking steroids or antibiotics if directed by your care team           RED ZONE       Need medical care now      Severe shortness of breath (feel you can't breathe)    Fever, chills    Not enough breath to do any activity    Trouble coughing up mucus, walking or  talking    Blood in mucus    Frequent coughing   Rescue medicines are not working    Not able to sleep because of breathing    Feel confused or drowsy    Chest pain    Actions:      Call your health care team.  If you cannot reach your care team, call 911 or go to the emergency room.        Annual Reminders:  Meet with Care Team, Flu Shot every Fall  Pharmacy:    [id number] - [place], [place] - [address] [facility] [id number] - [place], [place] - [address] AT [facility] [address]

## 2018-08-15 NOTE — NURSING NOTE
"Chief Complaint   Patient presents with     URI       Initial BP 90/62 (BP Location: Right arm, Patient Position: Chair, Cuff Size: Adult Regular)  Pulse 60  Temp 99.3  F (37.4  C) (Tympanic)  Wt 157 lb 6.4 oz (71.4 kg)  SpO2 95%  BMI 25.41 kg/m2 Estimated body mass index is 25.41 kg/(m^2) as calculated from the following:    Height as of 7/23/18: 5' 6\" (1.676 m).    Weight as of this encounter: 157 lb 6.4 oz (71.4 kg).  Medication Reconciliation: lux Mishra    "

## 2018-08-15 NOTE — MR AVS SNAPSHOT
"              After Visit Summary   8/15/2018    Sadaf Blankenship    MRN: [id number]           Patient Information     Date Of Birth          1960        Visit Information        Provider Department      8/15/2018 10:00 AM Anthony Hays MD [facility]        Today's Diagnoses     Acute bronchitis, unspecified organism    -  1      Care Instructions    Take azithromycin (Zithromax) as directed          Follow-ups after your visit        Follow-up notes from your care team     Return if symptoms worsen or fail to improve.      Your next 10 appointments already scheduled     Sep 12, 2018 10:30 AM CDT   Treatment 60 with Gemini Jackman, PT   [facility] ([facility] )    [address] [address]   [phone]            Sep 19, 2018 10:30 AM CDT   Treatment 60 with Gemini Jackman, PT   [facility] ([facility] )    [address] [address]   [phone]            Sep 26, 2018 10:00 AM CDT   (Arrive by 9:45 AM)   MA SCREENING BILATERAL W/ VINAY with HCMA1   [facility] ([facility] )    [address] [facility] [address]   [phone]           Three-dimensional (3D) mammograms are available at [facility] locations in [facility], [place], [place], [facility], [place], [place], and [place]. Health locations include [place] and [facility] in [place]. Benefits of 3D mammograms include: - Improved rate of cancer detection - Decreases your chance of having to go back for more tests, which means fewer: - \"False-positive\" results (This means that there is an abnormal area but it isn't cancer.) - Invasive testing procedures, such as a biopsy or surgery - Can provide clearer images of the breast if you have dense breast tissue. 3D mammography is an optional exam that anyone can have with a 2D mammogram. It doesn't replace or take the place of a " 2D mammogram. 2D mammograms remain an effective screening test for all women.  Not all insurance companies cover the cost of a 3D mammogram. Check with your insurance.            Sep 26, 2018 10:30 AM CDT   Treatment 60 with Gemini Jackman, PT   [facility] ([facility] )    [address] [address]   [phone]            Sep 27, 2018  9:00 AM CDT   (Arrive by 8:45 AM)   PHYSICAL with Henna Cruz MD   [facility] ([facility] )    [address] [facility] [address]   [phone]            Oct 03, 2018 10:30 AM CDT   Treatment 60 with Gemini Jackman, PT   [facility] ([facility] )    [address] [address]   [phone]            Oct 10, 2018 10:30 AM CDT   Treatment 60 with Gemini Jackman, PT   [facility] ([facility] )    [address] [address]   [phone]            Oct 30, 2018 10:20 AM CDT   (Arrive by 10:05 AM)   Return Visit with Zoe Herbert MD   [facility] ([facility] )    [address] [phone]              Who to contact     If you have questions or need follow up information about today's clinic visit or your schedule please contact [facility] directly at [phone].  Normal or non-critical lab and imaging results will be communicated to you by MyChart, letter or phone within 4 business days after the clinic has received the results. If you do not hear from us within 7 days, please contact the clinic through [employer] or phone. If you have a critical or abnormal lab result, we will notify you by phone as soon as possible.  Submit refill requests through [employer] or call your pharmacy and they will forward the refill request to us. Please allow 3 business days for your refill to be completed.          Additional Information About Your Visit        [employer] [facility] [employer]  Information     [employer] [employer] gives you secure access to your electronic health record. If you see a primary care provider, you can also send messages to your care team and make appointments. If you have questions, please call your primary care clinic.  If you do not have a primary care provider, please call [phone] and they will assist you.        Care EveryWhere ID     This is your Care EveryWhere ID. This could be used by other organizations to access your [place] medical records  [id number]        Your Vitals Were     Pulse Temperature Pulse Oximetry BMI (Body Mass Index)          60 99.3  F (37.4  C) (Tympanic) 95% 25.41 kg/m2         Blood Pressure from Last 3 Encounters:   08/15/18 90/62   07/23/18 116/74   07/16/18 122/60    Weight from Last 3 Encounters:   08/15/18 157 lb 6.4 oz (71.4 kg)   07/23/18 154 lb (69.9 kg)   07/16/18 154 lb (69.9 kg)              Today, you had the following     No orders found for display         Today's Medication Changes          These changes are accurate as of 8/15/18  7:28 PM.  If you have any questions, ask your nurse or doctor.               Start taking these medicines.        Dose/Directions    azithromycin 250 MG tablet   Commonly known as:  ZITHROMAX   Used for:  Acute bronchitis, unspecified organism   Started by:  Anthony Hays MD        Two tablets first day, then one tablet daily for four days.   Quantity:  6 tablet   Refills:  0         These medicines have changed or have updated prescriptions.        Dose/Directions    sucralfate 1 GM tablet   Commonly known as:  CARAFATE   This may have changed:    - how much to take  - when to take this  - additional instructions   Used for:  Gastroesophageal reflux disease with esophagitis        Dose:  1 g   Take 1 tablet (1 g) by mouth 4 times daily   Quantity:  40 tablet   Refills:  1            Where to get your medicines      These medications were sent to [id number] - [place], [place] - [address] [facility]   [address] [facility] [address]     Phone:  [phone]     azithromycin 250 MG tablet                Primary Care Provider Office Phone # Fax #    Henna Cruz [phone] [address] [facility] [address]        Equal Access to Services     [facility] : Hadii trent ku hadasho Soomaali, waaxda luqadaha, qaybta kaalmada adeegyada, waxay maevein haysergon deanna simonemily chen. So [facility] [phone].    ATENCIÓN: Si habla español, tiene a schroeder disposición servicios gratuitos de asistencia lingüística. Llame al [phone].    We comply with applicable federal civil rights laws and [place] laws. We do not discriminate on the basis of race, color, national origin, age, disability, sex, sexual orientation, or gender identity.            Thank you!     Thank you for choosing [facility]  for your care. Our goal is always to provide you with excellent care. Hearing back from our patients is one way we can continue to improve our services. Please take a few minutes to complete the written survey that you may receive in the mail after your visit with us. Thank you!             Your Updated Medication List - Protect others around you: Learn how to safely use, store and throw away your medicines at www.disposemymeds.org.          This list is accurate as of 8/15/18  7:28 PM.  Always use your most recent med list.                   Brand Name Dispense Instructions for use Diagnosis    azithromycin 250 MG tablet    ZITHROMAX    6 tablet    Two tablets first day, then one tablet daily for four days.    Acute bronchitis, unspecified organism       baclofen 10 MG tablet    LIORESAL    40 tablet    Take 1 tablet (10 mg) by mouth 3 times daily as needed for muscle spasms    Chronic radicular cervical pain       CENTRUM SILVER ULTRA WOMENS Tabs           DEXILANT 60 MG Cpdr CR capsule   Generic drug:  dexlansoprazole     30 capsule    TAKE 1 CAPSULE DAILY BY MOUTH    Ulcer of esophagus  without bleeding       diazepam 5 MG tablet    VALIUM    100 tablet    TAKE 1 TABLET BY MOUTH EVER Y 6 HOURS AS NEEDED - GENER IC FOR VALIUM    Muscle spasm       docusate sodium 100 MG capsule    COLACE     Take 100 mg by mouth        FLUoxetine 40 MG capsule    PROzac    30 capsule    Take 1 capsule (40 mg) by mouth daily    Major depressive disorder, recurrent severe without psychotic features (H)       HYDROcodone-acetaminophen 7.5-325 MG per tablet    NORCO    60 tablet    TAKE 1 TABLET BY MOUTH EVER Y 4 TO 6 HOURS AS NEEDED FO R PAIN    Chronic neck pain       METHOCARBAMOL PO      Take 750 mg by mouth Take one tablet at bedtime for 21 days Prescribed by Dr. Alonzo Grewal        Misc. Devices Misc      Dispense one Cefaly neurostimulator and the electro stimulators with refills        order for DME      Equipment being ordered: TENS        pregabalin 100 MG capsule    LYRICA    90 capsule    Take 1 capsule (100 mg) by mouth 3 times daily    Chronic radicular cervical pain       PROBIOTIC DAILY PO      Take by mouth 2 times daily        senna-docusate 8.6-50 MG per tablet    SENOKOT-S;PERICOLACE     Take 1-4 tablets by mouth as needed        sucralfate 1 GM tablet    CARAFATE    40 tablet    Take 1 tablet (1 g) by mouth 4 times daily    Gastroesophageal reflux disease with esophagitis       topiramate 50 MG tablet    TOPAMAX     Take 2 tablets in the morning and 2 tablets at night        vitamin D3 1000 units Caps      Take 1,000 Units by mouth

## 2018-08-15 NOTE — PROGRESS NOTES
SUBJECTIVE:   Sadaf Blankenship is a 58 year old female who presents to clinic today for the following health issues:    RESPIRATORY SYMPTOMS      Duration: 8/11/18    Description  sore throat, facial pain/pressure, cough, fever, chills, headache and fatigue/malaise    Severity: severe    Accompanying signs and symptoms: Sore throat, coughing, fatigue / exhausted, fever, chills , facial pressure     History (predisposing factors):  none    Precipitating or alleviating factors: Tylenol    Therapies tried and outcome:  rest and fluids acetaminophen, vicks    Problem list and histories reviewed & adjusted, as indicated.  Additional history: as documented    Patient Active Problem List   Diagnosis     Degenerative disc disease, cervical     Pseudoarthrosis of cervical spine     Cervical pain     Migraine     UTI (urinary tract infection)     Advanced care planning/counseling discussion     Thoracic sprain and strain     Sprain and strain of shoulder and upper arm     Irritable bowel syndrome     Myofascial pain syndrome, cervical     Depression, major     Chronic pain syndrome     Uvulitis     Chronic rhinitis     Madina bullosa     Chronic maxillary sinusitis     Hypertrophy of inferior nasal turbinate     Long uvula     Chronic neck pain     Chronic pain     Chronic tension-type headache, intractable     Migraine aura without headache     History of arthrodesis     Myofascial pain     Disuse syndrome     Intractable chronic migraine without aura and with status migrainosus     Ulcer of esophagus without bleeding     Gastroesophageal reflux disease with esophagitis     Intractable chronic migraine without aura and without status migrainosus     Ulcer of esophagus     Ulnar neuropathy     Chronic radicular cervical pain     Mild episode of recurrent major depressive disorder (H)     Ulnar neuropathy at elbow of left upper extremity     Lumbar radiculopathy     S/P cervical spinal fusion     ACP (advance care  planning)     Acute back pain with sciatica     Chronic migraine without aura without status migrainosus, not intractable     Radicular pain     Subacromial bursitis of right shoulder joint     Panlobular emphysema (H)     Calculus of kidney     Pulmonary emphysema, unspecified emphysema type (H)     Other chronic gastritis without hemorrhage     Pelvic floor dysfunction     Past Surgical History:   Procedure Laterality Date     BACK SURGERY      cervical     Cervical spine surgery with removal of 2 disks, C5,C7       COLONOSCOPY  10/13/2014    Dr Camargo, internal hemorrhoids     COLONOSCOPY - HIM SCAN  2018    EGD and colonoscopy with Dr. Jennings     Coronary angiogram, normal      Chest pain     ENT SURGERY      nose surgery x3     fusion discectomy anterior cervical       HC ESOPHAGOSCOPY, DIAGNOSTIC  10/31/2014    Dr Camargo, mild erythema of antrum, negative biopsies     NOSE SURGERY      x3            Posterior decompression , fusion C3-5      Psuedoarthrosis     Supracervical hysterectomy with right salpingoophorectomy      Endometriosis       Social History   Substance Use Topics     Smoking status: Former Smoker     Types: Cigarettes     Smokeless tobacco: Never Used      Comment: quit . no passive exposure     Alcohol use No     Family History   Problem Relation Age of Onset     Cancer Father      lung, also a heavy smoker     Diabetes Mother      HEART DISEASE Mother 58     MI; cause of death; heavy smoker. had first MI at 40     Coronary Artery Disease Mother      Hypertension Mother      Cancer Other      strong history     HEART DISEASE Other      heart disease     HEART DISEASE Brother      x3     Hypertension Brother      Diabetes Brother      Coronary Artery Disease Brother      Hypertension Sister          Current Outpatient Prescriptions   Medication Sig Dispense Refill     baclofen (LIORESAL) 10 MG tablet Take 1 tablet (10 mg) by mouth 3 times daily as needed for  "muscle spasms 40 tablet 0     Cholecalciferol (VITAMIN D3) 1000 UNITS CAPS Take 1,000 Units by mouth       DEXILANT 60 MG CPDR CR capsule TAKE 1 CAPSULE DAILY BY MOUTH 30 capsule 4     diazepam (VALIUM) 5 MG tablet TAKE 1 TABLET BY MOUTH EVER Y 6 HOURS AS NEEDED - GENER IC FOR VALIUM 100 tablet 0     docusate sodium (COLACE) 100 MG capsule Take 100 mg by mouth       FLUoxetine (PROZAC) 40 MG capsule Take 1 capsule (40 mg) by mouth daily 30 capsule 3     HYDROcodone-acetaminophen (NORCO) 7.5-325 MG per tablet TAKE 1 TABLET BY MOUTH EVER Y 4 TO 6 HOURS AS NEEDED FO R PAIN 60 tablet 0     METHOCARBAMOL PO Take 750 mg by mouth Take one tablet at bedtime for 21 days  Prescribed by Dr. Alonzo Grewal       Misc. Devices MISC Dispense one Cefaly neurostimulator and the electro stimulators with refills       Multiple Vitamins-Minerals (CENTRUM SILVER ULTRA WOMENS) TABS        order for DME Equipment being ordered: TENS       pregabalin (LYRICA) 100 MG capsule Take 1 capsule (100 mg) by mouth 3 times daily 90 capsule 0     Probiotic Product (PROBIOTIC DAILY PO) Take by mouth 2 times daily       senna-docusate (SENOKOT-S;PERICOLACE) 8.6-50 MG per tablet Take 1-4 tablets by mouth as needed        sucralfate (CARAFATE) 1 GM tablet Take 1 tablet (1 g) by mouth 4 times daily (Patient taking differently: Patient takes as needed (PRN.) 40 tablet 1     topiramate (TOPAMAX) 50 MG tablet Take 2 tablets in the morning and 2 tablets at night       Allergies   Allergen Reactions     Aspirin Hives     Ibuprofen      Dye in the otc form causes headaches  \"patient states over the counter ibuprofen  bothers her\"     Lansoprazole Other (See Comments) and Visual Disturbance     Changes in eyesight  Prevacid  Change in eyesight     Penicillins Other (See Comments) and Itching     pruritis     Red Dye Hives     Bupropion Rash     Bupropion Hcl Hives and Rash     Wellbutrin     Demerol [Meperidine] Other (See Comments)     Made migraine worse "       Reviewed and updated as needed this visit by clinical staff  Tobacco  Allergies  Med Hx  Surg Hx  Fam Hx  Soc Hx      Reviewed and updated as needed this visit by Provider         ROS:  Constitutional, HEENT, cardiovascular, pulmonary, gi and gu systems are negative, except as otherwise noted.    OBJECTIVE:     BP 90/62 (BP Location: Right arm, Patient Position: Chair, Cuff Size: Adult Regular)  Pulse 60  Temp 99.3  F (37.4  C) (Tympanic)  Wt 157 lb 6.4 oz (71.4 kg)  SpO2 95%  BMI 25.41 kg/m2  Body mass index is 25.41 kg/(m^2).  Physical Exam   Constitutional: She is oriented to person, place, and time. She appears well-developed and well-nourished. No distress.   HENT:   Head: Normocephalic.   Right Ear: Hearing, tympanic membrane, external ear and ear canal normal.   Left Ear: Hearing, tympanic membrane, external ear and ear canal normal.   Nose: Nose normal. Right sinus exhibits no maxillary sinus tenderness and no frontal sinus tenderness. Left sinus exhibits no maxillary sinus tenderness and no frontal sinus tenderness.   Mouth/Throat: Uvula is midline and oropharynx is clear and moist. No oropharyngeal exudate or posterior oropharyngeal erythema.   Eyes: Conjunctivae are normal.   Neck: Neck supple.   Pulmonary/Chest: Effort normal and breath sounds normal.   Lymphadenopathy:     She has no cervical adenopathy.   Neurological: She is alert and oriented to person, place, and time.   Skin: Skin is warm and dry.   Psychiatric: She has a normal mood and affect.         Diagnostic Test Results:  none     ASSESSMENT/PLAN:     Acute bronchitis, unspecified organism  Azithromycin (Zithromax) as directed.  Continue over the counter cough suppressants and expectorants.  Follow up with persistent symptoms.  - azithromycin (ZITHROMAX) 250 MG tablet; Two tablets first day, then one tablet daily for four days.      Anthony Hays MD  [facility]

## 2018-08-16 ASSESSMENT — ANXIETY QUESTIONNAIRES: GAD7 TOTAL SCORE: 4

## 2018-08-16 ASSESSMENT — PATIENT HEALTH QUESTIONNAIRE - PHQ9: SUM OF ALL RESPONSES TO PHQ QUESTIONS 1-9: 2

## 2018-08-20 ENCOUNTER — TELEPHONE (OUTPATIENT)
Dept: FAMILY MEDICINE | Facility: OTHER | Age: 58
End: 2018-08-20

## 2018-08-20 NOTE — TELEPHONE ENCOUNTER
Reason for call:  Medication    1. Medication Name? azithromycin  2. Is this request for a refill? No  3. What Pharmacy do you use? [facility]  4. Have you contacted your pharmacy? No    5. If yes, when?  (Please note that the turn-around-time for prescriptions is 72 business hours; I am sending your request at this time. SEND TO  [facility]  )  Description: Pt saw Dr. Hays last week (8-15-18) and he prescribed azithromycin. Pt states she hasn't gotten any better and would like to know if she could try something stronger. Her daughter is having surgery tomorrow and she will be taking care of her for a while afterward and wants to make she she doesn't give her anything.  Was an appointment offered for this a call? No   Preferred method for responding to this messageTelephone Call  If we cannot reach you directly, may we leave a detailed response at the number you provided? Yes  Can this message wait until your PCP/Provider returns if not available today? No, would like call back today

## 2018-08-20 NOTE — TELEPHONE ENCOUNTER
Give it few more days - lot of times it takes 4-5 days to start improving. It is a 5 day med but last for 10 days.  IF not working also could be viral.

## 2018-09-19 ENCOUNTER — HOSPITAL ENCOUNTER (OUTPATIENT)
Dept: PHYSICAL THERAPY | Facility: HOSPITAL | Age: 58
Setting detail: THERAPIES SERIES
End: 2018-09-19
Attending: FAMILY MEDICINE
Payer: COMMERCIAL

## 2018-09-19 PROCEDURE — 97140 MANUAL THERAPY 1/> REGIONS: CPT | Mod: GP

## 2018-09-19 PROCEDURE — 40000718 ZZHC STATISTIC PT DEPARTMENT ORTHO VISIT

## 2018-09-19 PROCEDURE — 97110 THERAPEUTIC EXERCISES: CPT | Mod: GP

## 2018-09-21 NOTE — PROGRESS NOTES
SUBJECTIVE:   CC: Sadaf Blankenship is an 58 year old woman who presents for preventive health visit.     Healthy Habits:    Do you get at least three servings of calcium containing foods daily (dairy, green leafy vegetables, etc.)? yes    Amount of exercise or daily activities, outside of work: no    Problems taking medications regularly No    Medication side effects: No    Have you had an eye exam in the past two years? no    Do you see a dentist twice per year? yes    Do you have sleep apnea, excessive snoring or daytime drowsiness?yes      Musculoskeletal problem/pain      Duration: 2 months    Description  Location: left shoulder    Intensity:  moderate    Accompanying signs and symptoms: radiation of pain to bicep    History  Previous similar problem: YES- right shoulder  Previous evaluation:  none    Precipitating or alleviating factors:  Trauma or overuse: no   Aggravating factors include: none    Therapies tried and outcome: nothing    Chronic Pain Follow-Up       Type / Location of Pain: neck and right shoulder  Analgesia/pain control:       Recent changes:  worse      Overall control: Tolerable with discomfort  Activity level/function:      Daily activities:  Able to do light housework, cooking    Work:  Unable to work  Adverse effects:  No  Adherance    Taking medication as directed?  Yes    Participating in other treatments: yes  Risk Factors:    Sleep:  Poor    Mood/anxiety:  controlled    Recent family or social stressors:  none noted    Other aggravating factors: none  PHQ-9 SCORE 7/16/2018 7/23/2018 8/15/2018   Total Score 3 1 2     ESTRELLA-7 SCORE 7/16/2018 7/23/2018 8/15/2018   Total Score 7 6 4     Encounter-Level CSA - 07/11/2017:          Controlled Substance Agreement - Scan on 7/14/2017 12:56 PM : CONTROLLED SUBSTANCE AGREEMENT (below)               Chief Complaint   Patient presents with     Physical     mammogram done yesterday     Shortness of Breath     with walking, different but not  incapacitating. No chest pain or tightness     Shoulder Pain     left posterior shoulder pain particularly with extension. No trauma. She has chronic neck and right arm pain due to cervical disease and is concerned that this is new from her neck however she isn't having neck pain with this.      Knee Pain     right knee with a bulge of the posterior knee which is getting smaller. No trauma. She is taking 800 mg of Ibuprofen with relief.          Today's PHQ-2 Score:   PHQ-2 ( 1999 Pf[employer]) 10/14/2014 2/6/2014   Q1: Little interest or pleasure in doing things 0 0   Q2: Feeling down, depressed or hopeless 0 0   PHQ-2 Score 0 0       Abuse: Current or Past(Physical, Sexual or Emotional)- NO  Do you feel safe in your environment - Yes    Social History   Substance Use Topics     Smoking status: Former Smoker     Types: Cigarettes     Smokeless tobacco: Never Used      Comment: quit 2002. no passive exposure     Alcohol use No     If you drink alcohol do you typically have >3 drinks per day or >7 drinks per week? No                     Reviewed orders with patient.  Reviewed health maintenance and updated orders accordingly - Yes  BP Readings from Last 3 Encounters:   09/27/18 108/60   08/15/18 90/62   07/23/18 116/74    Wt Readings from Last 3 Encounters:   09/27/18 155 lb (70.3 kg)   08/15/18 157 lb 6.4 oz (71.4 kg)   07/23/18 154 lb (69.9 kg)                  Patient Active Problem List   Diagnosis     Degenerative disc disease, cervical     Pseudoarthrosis of cervical spine     Cervical pain     Migraine     UTI (urinary tract infection)     Advanced care planning/counseling discussion     Thoracic sprain and strain     Sprain and strain of shoulder and upper arm     Irritable bowel syndrome     Myofascial pain syndrome, cervical     Depression, major     Chronic pain syndrome     Uvulitis     Chronic rhinitis     Madina bullosa     Chronic maxillary sinusitis     Hypertrophy of inferior nasal turbinate     Long uvula      Chronic neck pain     Chronic pain     Chronic tension-type headache, intractable     Migraine aura without headache     History of arthrodesis     Myofascial pain     Disuse syndrome     Intractable chronic migraine without aura and with status migrainosus     Ulcer of esophagus without bleeding     Gastroesophageal reflux disease with esophagitis     Intractable chronic migraine without aura and without status migrainosus     Ulcer of esophagus     Ulnar neuropathy     Chronic radicular cervical pain     Mild episode of recurrent major depressive disorder (H)     Ulnar neuropathy at elbow of left upper extremity     Lumbar radiculopathy     S/P cervical spinal fusion     ACP (advance care planning)     Acute back pain with sciatica     Chronic migraine without aura without status migrainosus, not intractable     Radicular pain     Subacromial bursitis of right shoulder joint     Panlobular emphysema (H)     Calculus of kidney     Pulmonary emphysema, unspecified emphysema type (H)     Other chronic gastritis without hemorrhage     Pelvic floor dysfunction     Past Surgical History:   Procedure Laterality Date     BACK SURGERY      cervical     Cervical spine surgery with removal of 2 disks, C5,C7       COLONOSCOPY  10/13/2014    Dr Camargo, internal hemorrhoids     COLONOSCOPY - HIM SCAN  2018    EGD and colonoscopy with Dr. Jennings     Coronary angiogram, normal      Chest pain     ENT SURGERY      nose surgery x3     fusion discectomy anterior cervical  2011      ESOPHAGOSCOPY, DIAGNOSTIC  10/31/2014    Dr Camargo, mild erythema of antrum, negative biopsies     NOSE SURGERY      x3            Posterior decompression , fusion C3-5      Psuedoarthrosis     Supracervical hysterectomy with right salpingoophorectomy  2002    Endometriosis       Social History   Substance Use Topics     Smoking status: Former Smoker     Years: 8.00     Types: Cigarettes     Smokeless tobacco: Never Used       Comment: quit 2002. no passive exposure     Alcohol use No     Family History   Problem Relation Age of Onset     Cancer Father      lung, also a heavy smoker     Diabetes Mother      HEART DISEASE Mother 58     MI; cause of death; heavy smoker. had first MI at 40     Coronary Artery Disease Mother      Hypertension Mother      Cancer Other      strong history     HEART DISEASE Other      heart disease     HEART DISEASE Brother      x3     Hypertension Brother      Diabetes Brother      Coronary Artery Disease Brother      Hypertension Sister          Current Outpatient Prescriptions   Medication Sig Dispense Refill     baclofen (LIORESAL) 10 MG tablet Take 1 tablet (10 mg) by mouth 3 times daily as needed for muscle spasms (Patient taking differently: Take 10 mg by mouth At Bedtime ) 40 tablet 0     Cholecalciferol (VITAMIN D3) 1000 UNITS CAPS Take 1,000 Units by mouth       DEXILANT 60 MG CPDR CR capsule TAKE 1 CAPSULE DAILY BY MOUTH 30 capsule 4     diazepam (VALIUM) 5 MG tablet TAKE 1 TABLET BY MOUTH EVER Y 6 HOURS AS NEEDED - GENER IC FOR VALIUM 100 tablet 0     docusate sodium (COLACE) 100 MG capsule Take 100 mg by mouth       FLUoxetine (PROZAC) 40 MG capsule TAKE 1 CAPSULE (40 MG) BY MOUTH DAILY 30 capsule 3     HYDROcodone-acetaminophen (NORCO) 7.5-325 MG per tablet TAKE 1 TABLET BY MOUTH EVER Y 4 TO 6 HOURS AS NEEDED FO R PAIN 60 tablet 0     ibuprofen (ADVIL/MOTRIN) 800 MG tablet Take 1 tablet (800 mg) by mouth every 8 hours as needed for moderate pain 90 tablet 1     METHOCARBAMOL PO Take 750 mg by mouth Take one tablet at bedtime for 21 days  Prescribed by Dr. Alonzo Grewal       Misc. Devices MISC Dispense one Cefaly neurostimulator and the electro stimulators with refills       Multiple Vitamins-Minerals (CENTRUM SILVER ULTRA WOMENS) TABS        order for DME Equipment being ordered: TENS       pregabalin (LYRICA) 100 MG capsule Take 1 capsule (100 mg) by mouth 3 times daily 90 capsule 0     Probiotic  "Product (PROBIOTIC DAILY PO) Take by mouth At Bedtime        senna-docusate (SENOKOT-S;PERICOLACE) 8.6-50 MG per tablet Take 1-4 tablets by mouth as needed        sucralfate (CARAFATE) 1 GM tablet Take 1 tablet (1 g) by mouth 4 times daily (Patient taking differently: Patient takes as needed (PRN.) 40 tablet 1     topiramate (TOPAMAX) 50 MG tablet Take 2 tablets in the morning and 2 tablets at night       Allergies   Allergen Reactions     Aspirin Hives     Ibuprofen      Dye in the otc form causes headaches  \"patient states over the counter ibuprofen  bothers her\"     Lansoprazole Other (See Comments) and Visual Disturbance     Changes in eyesight  Prevacid  Change in eyesight     Penicillins Other (See Comments) and Itching     pruritis     Red Dye Hives     Bupropion Rash     Bupropion Hcl Hives and Rash     Wellbutrin     Demerol [Meperidine] Other (See Comments)     Made migraine worse           Pertinent mammograms are reviewed under the imaging tab.  History of abnormal Pap smear: Status post hysterectomy. Pap still indicated. By record she is to have a cervix remaining however on exam she has no cervix.      Reviewed and updated as needed this visit by clinical staff         Reviewed and updated as needed this visit by Provider        Past Medical History:   Diagnosis Date     ASCUS on Pap smear 5/26/2004    negative HPV     Atypical chest pain 5/26/2008    [facility]     Bleeding ulcer 5/26/1976     Cervical radicular pain 5/10/2012     Degenerative disc disease, cervical 1/1/2011     Esophageal ulcer 5/26/1998     Irritable bowel syndrome 1/26/2015     Migraine headaches, severe 7/9/2001     Pseudoarthrosis of cervical spine 7/3/2012     Recurrent UTI 5/26/2011     sinusitis (chronic) 4/16/2001      Past Surgical History:   Procedure Laterality Date     BACK SURGERY      cervical     Cervical spine surgery with removal of 2 disks, C5,C7       COLONOSCOPY  10/13/2014    " Raman, internal hemorrhoids     COLONOSCOPY - HIM SCAN  2018    EGD and colonoscopy with Dr. Jennings     Coronary angiogram, normal      Chest pain     ENT SURGERY      nose surgery x3     fusion discectomy anterior cervical  2011     [facility] ESOPHAGOSCOPY, DIAGNOSTIC  10/31/2014    Dr Camargo, mild erythema of antrum, negative biopsies     NOSE SURGERY      x3            Posterior decompression , fusion C3-5      Psuedoarthrosis     Supracervical hysterectomy with right salpingoophorectomy      Endometriosis     Obstetric History     No data available          ROS:  CONSTITUTIONAL: NEGATIVE for fever, chills, change in weight  INTEGUMENTARU/SKIN: NEGATIVE for worrisome rashes, moles or lesions  EYES: NEGATIVE for vision changes or irritation  ENT: NEGATIVE for ear, mouth and throat problems  RESP: NEGATIVE for significant cough or SOB  BREAST: NEGATIVE for masses, tenderness or discharge  CV: NEGATIVE for chest pain, palpitations or peripheral edema  GI: NEGATIVE for nausea, abdominal pain, heartburn, or change in bowel habits  : NEGATIVE for unusual urinary or vaginal symptoms. Periods are regular.  MUSCULOSKELETAL: NEGATIVE for significant arthralgias or myalgia  NEURO: NEGATIVE for weakness, dizziness or paresthesias  ENDOCRINE: NEGATIVE for temperature intolerance, skin/hair changes  HEME/ALLERGY/IMMUNE: NEGATIVE for bleeding problems  PSYCHIATRIC: NEGATIVE for changes in mood or affect    OBJECTIVE:   There were no vitals taken for this visit.  EXAM:  GENERAL APPEARANCE: healthy, alert and no distress  EYES: Eyes grossly normal to inspection, PERRL and conjunctivae and sclerae normal  HENT: ear canals and TM's normal, nose and mouth without ulcers or lesions, oropharynx clear and oral mucous membranes moist  NECK: no adenopathy, no asymmetry, masses, or scars and thyroid normal to palpation  RESP: lungs clear to auscultation - no rales, rhonchi or wheezes  BREAST: normal without masses,  tenderness or nipple discharge and no palpable axillary masses or adenopathy  CV: regular rate and rhythm, normal S1 S2, no S3 or S4, no murmur, click or rub, no peripheral edema and peripheral pulses strong  ABDOMEN: soft, nontender, no hepatosplenomegaly, no masses and bowel sounds normal   (female): normal female external genitalia, normal urethral meatus, vaginal mucosal atrophy noted, cervix, uterus and ovaries are surgically absent  MS: no musculoskeletal defects are noted and gait is age appropriate without ataxia. Left shoulder is tender posteriorly, with pain with external rotation and flexion and abduction above 90 degrees. Strength intact. Bursa noted in the popliteal space of the right knee. Joint line is non tender with full ROM, ligaments and ACL intact, strength intact  SKIN: no suspicious lesions or rashes  NEURO: Normal strength and tone, sensory exam grossly normal, mentation intact and speech normal  PSYCH: mentation appears normal and affect normal/bright        ASSESSMENT/PLAN:   1. Routine general medical examination at a health care facility  Pap done however cervix is not present. Will adjust PMH, no further paps needed  - A pap thin layer screen with  HPV - recommended age 30 - 65 years (select HPV order below)  - HPV High Risk Types DNA Cervical  - Lipid Profile  - Glucose    2. Chronic radicular cervical pain  Continue rechecks every 3 months    3. Myofascial pain syndrome, cervical  As above. S/P cervical fusion X2    4. Need for prophylactic vaccination and inoculation against influenza  Flu vaccine and pneumovax given today  - Vaccine Administration, Initial [77188]  - FLU VAC, QUADRIVALENT (RIV4) RECOMBINANT DNA, IM    5. Bursitis of other bursa of right knee  Resolving continue Ibuprofen  - ibuprofen (ADVIL/MOTRIN) 800 MG tablet; Take 1 tablet (800 mg) by mouth every 8 hours as needed for moderate pain  Dispense: 90 tablet; Refill: 1    6. Acute pain of left shoulder  X ray today  "and refer, continue Ibuprofen  - ORTHOPEDICS ADULT REFERRAL  - XR SHOULDER LEFT G/E 2 VIEWS (Clinic Performed); Future    7. Dyspnea, unspecified type  Discussed that this could be cardiac in origin and recommended stress testing to evaluate. She is traveling for 3 weeks leaving in about 10 days and wants to defer this. She will schedule follow up when she returns    8. Need for vaccination  Given today  - Pneumococcal vaccine 13 valent PCV13 IM (Prevnar) [69903]  - ADMIN: Vaccine, Initial (33994)    COUNSELING:   Reviewed preventive health counseling, as reflected in patient instructions       Regular exercise       Healthy diet/nutrition    BP Readings from Last 1 Encounters:   08/15/18 90/62     Estimated body mass index is 25.41 kg/(m^2) as calculated from the following:    Height as of 7/23/18: 5' 6\" (1.676 m).    Weight as of 8/15/18: 157 lb 6.4 oz (71.4 kg).           reports that she has quit smoking. Her smoking use included Cigarettes. She has never used smokeless tobacco.      Counseling Resources:  ATP IV Guidelines  Pooled Cohorts Equation Calculator  Breast Cancer Risk Calculator  FRAX Risk Assessment  ICSI Preventive Guidelines  Dietary Guidelines for Americans, 2010  USDA's MyPlate  ASA Prophylaxis  Lung CA Screening    Henna Cruz MD  [facility] [place]  "

## 2018-09-26 ENCOUNTER — RADIANT APPOINTMENT (OUTPATIENT)
Dept: MAMMOGRAPHY | Facility: OTHER | Age: 58
End: 2018-09-26
Attending: FAMILY MEDICINE
Payer: COMMERCIAL

## 2018-09-26 DIAGNOSIS — Z12.31 VISIT FOR SCREENING MAMMOGRAM: ICD-10-CM

## 2018-09-26 DIAGNOSIS — M54.2 CHRONIC NECK PAIN: ICD-10-CM

## 2018-09-26 DIAGNOSIS — G89.29 CHRONIC NECK PAIN: ICD-10-CM

## 2018-09-26 DIAGNOSIS — M62.838 MUSCLE SPASM: ICD-10-CM

## 2018-09-26 PROCEDURE — 77067 SCR MAMMO BI INCL CAD: CPT | Mod: TC

## 2018-09-26 NOTE — TELEPHONE ENCOUNTER
diazepam      Last Written Prescription Date:  4/6/18  Last Fill Quantity: 100,   # refills: 0  Last Office Visit: 8/15/18  Future Office visit:    Next 5 appointments (look out 90 days)     Sep 27, 2018  9:00 AM CDT   (Arrive by 8:45 AM)   PHYSICAL with Henna Cruz MD   [facility] [place] ([facility] [place] )    [address]  [place] [address]   [phone]            Oct 30, 2018 10:20 AM CDT   (Arrive by 10:05 AM)   Return Visit with Zoe Herbert MD   [facility] ([facility] [place] )    [address]  [place] [address] [phone]                   Routing refill request to provider for review/approval because:  Drug not on the [facility], [facility] or [facility] refill protocol or controlled substance  norco      Last Written Prescription Date:  6/20/18  Last Fill Quantity: 60,   # refills: 0  Last Office Visit:   Future Office visit:    Next 5 appointments (look out 90 days)     Sep 27, 2018  9:00 AM CDT   (Arrive by 8:45 AM)   PHYSICAL with Henna Cruz MD   [facility] [place] ([facility] [place] )    [address]  [place] [address]   [phone]            Oct 30, 2018 10:20 AM CDT   (Arrive by 10:05 AM)   Return Visit with Zoe Herbert MD   [facility] [place] ([facility] [place] )    [address]  [place] [address]   [phone]                   Routing refill request to provider for review/approval because:  Drug not on the [facility], [facility] or [facility] refill protocol or controlled substance

## 2018-09-27 ENCOUNTER — OFFICE VISIT (OUTPATIENT)
Dept: FAMILY MEDICINE | Facility: OTHER | Age: 58
End: 2018-09-27
Attending: FAMILY MEDICINE
Payer: COMMERCIAL

## 2018-09-27 ENCOUNTER — RADIANT APPOINTMENT (OUTPATIENT)
Dept: GENERAL RADIOLOGY | Facility: OTHER | Age: 58
End: 2018-09-27
Attending: FAMILY MEDICINE
Payer: COMMERCIAL

## 2018-09-27 VITALS
TEMPERATURE: 97.4 F | OXYGEN SATURATION: 98 % | BODY MASS INDEX: 24.91 KG/M2 | SYSTOLIC BLOOD PRESSURE: 108 MMHG | WEIGHT: 155 LBS | DIASTOLIC BLOOD PRESSURE: 60 MMHG | HEIGHT: 66 IN | HEART RATE: 54 BPM

## 2018-09-27 DIAGNOSIS — G89.29 CHRONIC RADICULAR CERVICAL PAIN: ICD-10-CM

## 2018-09-27 DIAGNOSIS — Z00.00 ROUTINE GENERAL MEDICAL EXAMINATION AT A HEALTH CARE FACILITY: Primary | ICD-10-CM

## 2018-09-27 DIAGNOSIS — M25.512 ACUTE PAIN OF LEFT SHOULDER: ICD-10-CM

## 2018-09-27 DIAGNOSIS — Z23 NEED FOR PROPHYLACTIC VACCINATION AND INOCULATION AGAINST INFLUENZA: ICD-10-CM

## 2018-09-27 DIAGNOSIS — R06.00 DYSPNEA, UNSPECIFIED TYPE: ICD-10-CM

## 2018-09-27 DIAGNOSIS — M79.18 MYOFASCIAL PAIN SYNDROME, CERVICAL: ICD-10-CM

## 2018-09-27 DIAGNOSIS — M70.51 BURSITIS OF OTHER BURSA OF RIGHT KNEE: ICD-10-CM

## 2018-09-27 DIAGNOSIS — Z23 NEED FOR VACCINATION: ICD-10-CM

## 2018-09-27 DIAGNOSIS — M54.12 CHRONIC RADICULAR CERVICAL PAIN: ICD-10-CM

## 2018-09-27 LAB
CHOLEST SERPL-MCNC: 240 MG/DL
GLUCOSE SERPL-MCNC: 86 MG/DL (ref 70–99)
HDLC SERPL-MCNC: 62 MG/DL
LDLC SERPL CALC-MCNC: 145 MG/DL
NONHDLC SERPL-MCNC: 178 MG/DL
TRIGL SERPL-MCNC: 163 MG/DL

## 2018-09-27 PROCEDURE — 90472 IMMUNIZATION ADMIN EACH ADD: CPT | Performed by: FAMILY MEDICINE

## 2018-09-27 PROCEDURE — 73030 X-RAY EXAM OF SHOULDER: CPT | Mod: TC,LT

## 2018-09-27 PROCEDURE — 80061 LIPID PANEL: CPT | Mod: ZL | Performed by: FAMILY MEDICINE

## 2018-09-27 PROCEDURE — G0476 HPV COMBO ASSAY CA SCREEN: HCPCS | Mod: ZL | Performed by: FAMILY MEDICINE

## 2018-09-27 PROCEDURE — 99000 SPECIMEN HANDLING OFFICE-LAB: CPT | Performed by: FAMILY MEDICINE

## 2018-09-27 PROCEDURE — 36415 COLL VENOUS BLD VENIPUNCTURE: CPT | Mod: ZL | Performed by: FAMILY MEDICINE

## 2018-09-27 PROCEDURE — 82947 ASSAY GLUCOSE BLOOD QUANT: CPT | Mod: ZL | Performed by: FAMILY MEDICINE

## 2018-09-27 PROCEDURE — G0123 SCREEN CERV/VAG THIN LAYER: HCPCS | Mod: ZL | Performed by: FAMILY MEDICINE

## 2018-09-27 PROCEDURE — 90471 IMMUNIZATION ADMIN: CPT | Performed by: FAMILY MEDICINE

## 2018-09-27 PROCEDURE — 90670 PCV13 VACCINE IM: CPT | Performed by: FAMILY MEDICINE

## 2018-09-27 PROCEDURE — 90682 RIV4 VACC RECOMBINANT DNA IM: CPT | Performed by: FAMILY MEDICINE

## 2018-09-27 PROCEDURE — 99396 PREV VISIT EST AGE 40-64: CPT | Performed by: FAMILY MEDICINE

## 2018-09-27 PROCEDURE — 87624 HPV HI-RISK TYP POOLED RSLT: CPT | Mod: ZL | Performed by: FAMILY MEDICINE

## 2018-09-27 RX ORDER — PREGABALIN 150 MG/1
150 CAPSULE ORAL 3 TIMES DAILY
Refills: 5 | COMMUNITY
Start: 2018-09-13 | End: 2020-08-14

## 2018-09-27 RX ORDER — HYDROCODONE BITARTRATE AND ACETAMINOPHEN 7.5; 325 MG/1; MG/1
TABLET ORAL
Qty: 60 TABLET | Refills: 0 | Status: SHIPPED | OUTPATIENT
Start: 2018-09-27 | End: 2019-03-07

## 2018-09-27 RX ORDER — IBUPROFEN 800 MG/1
800 TABLET, FILM COATED ORAL EVERY 8 HOURS PRN
Qty: 90 TABLET | Refills: 1 | Status: SHIPPED | OUTPATIENT
Start: 2018-09-27 | End: 2022-01-06

## 2018-09-27 RX ORDER — DIAZEPAM 5 MG
TABLET ORAL
Qty: 100 TABLET | Refills: 0 | Status: SHIPPED | OUTPATIENT
Start: 2018-09-27 | End: 2019-03-21

## 2018-09-27 ASSESSMENT — ANXIETY QUESTIONNAIRES
2. NOT BEING ABLE TO STOP OR CONTROL WORRYING: SEVERAL DAYS
6. BECOMING EASILY ANNOYED OR IRRITABLE: NOT AT ALL
4. TROUBLE RELAXING: SEVERAL DAYS
GAD7 TOTAL SCORE: 4
7. FEELING AFRAID AS IF SOMETHING AWFUL MIGHT HAPPEN: NOT AT ALL
3. WORRYING TOO MUCH ABOUT DIFFERENT THINGS: SEVERAL DAYS
5. BEING SO RESTLESS THAT IT IS HARD TO SIT STILL: NOT AT ALL
1. FEELING NERVOUS, ANXIOUS, OR ON EDGE: SEVERAL DAYS

## 2018-09-27 ASSESSMENT — PAIN SCALES - GENERAL: PAINLEVEL: MODERATE PAIN (5)

## 2018-09-27 NOTE — MR AVS SNAPSHOT
After Visit Summary   9/27/2018    Sadaf Blankenship    MRN: [id number]           Patient Information     Date Of Birth          1960        Visit Information        Provider Department      9/27/2018 9:00 AM Henna Cruz MD [facility]        Today's Diagnoses     Routine general medical examination at a health care facility    -  1    Chronic radicular cervical pain        Myofascial pain syndrome, cervical        Need for prophylactic vaccination and inoculation against influenza        Bursitis of other bursa of right knee        Acute pain of left shoulder        Dyspnea, unspecified type        Need for vaccination          Care Instructions      Preventive Health Recommendations  Female Ages 50 - 64    Yearly exam: See your health care provider every year in order to  o Review health changes.   o Discuss preventive care.    o Review your medicines if your doctor has prescribed any.      Get a Pap test every three years (unless you have an abnormal result and your provider advises testing more often).    If you get Pap tests with HPV test, you only need to test every 5 years, unless you have an abnormal result.     You do not need a Pap test if your uterus was removed (hysterectomy) and you have not had cancer.    You should be tested each year for STDs (sexually transmitted diseases) if you're at risk.     Have a mammogram every 1 to 2 years.    Have a colonoscopy at age 50, or have a yearly FIT test (stool test). These exams screen for colon cancer.      Have a cholesterol test every 5 years, or more often if advised.    Have a diabetes test (fasting glucose) every three years. If you are at risk for diabetes, you should have this test more often.     If you are at risk for osteoporosis (brittle bone disease), think about having a bone density scan (DEXA).    Shots: Get a flu shot each year. Get a tetanus shot every 10 years.    Nutrition:     Eat at least 5  servings of fruits and vegetables each day.    Eat whole-grain bread, whole-wheat pasta and brown rice instead of white grains and rice.    Get adequate Calcium and Vitamin D.     Lifestyle    Exercise at least 150 minutes a week (30 minutes a day, 5 days a week). This will help you control your weight and prevent disease.    Limit alcohol to one drink per day.    No smoking.     Wear sunscreen to prevent skin cancer.     See your dentist every six months for an exam and cleaning.    See your eye doctor every 1 to 2 years.            Follow-ups after your visit        Additional Services     ORTHOPEDICS ADULT REFERRAL       Your provider has referred you to: [facility] [phone]  [email or web address]    Please be aware that coverage of these services is subject to the terms and limitations of your health insurance plan.  Call member services at your health plan with any benefit or coverage questions.      Please bring the following to your appointment:    >>   Any x-rays, CTs or MRIs which have been performed.  Contact the facility where they were done to arrange for  prior to your scheduled appointment.    >>   List of current medications   >>   This referral request   >>   Any documents/labs given to you for this referral                  Your next 10 appointments already scheduled     Oct 02, 2018 10:00 AM CDT   Treatment 61 with Gemini Jackman PT   [facility] ([facility] )    [address] [address]   [phone]            Oct 09, 2018  3:00 PM CDT   Treatment 61 with Gemini Jackman PT   [facility] ([facility] )    [address] [address]   [phone]            Oct 30, 2018 10:20 AM CDT   (Arrive by 10:05 AM)   Return Visit with Zoe Herbert MD   [facility] ([facility] )    [address] [place] [address]  [phone]            Nov 05, 2018 10:45 AM CST   (Arrive by 10:30 AM)   SHORT with Henna Cruz MD   [facility] ([facility] )    [place] [address]   [phone]              Who to contact     If you have questions or need follow up information about today's clinic visit or your schedule please contact [facility] directly at [phone].  Normal or non-critical lab and imaging results will be communicated to you by [email or web address], letter or phone within 4 business days after the clinic has received the results. If you do not hear from us within 7 days, please contact the clinic through Rosumt or phone. If you have a critical or abnormal lab result, we will notify you by phone as soon as possible.  Submit refill requests through saambaa or call your pharmacy and they will forward the refill request to us. Please allow 3 business days for your refill to be completed.          Additional Information About Your Visit        saambaa Information     saambaa gives you secure access to your electronic health record. If you see a primary care provider, you can also send messages to your care team and make appointments. If you have questions, please call your primary care clinic.  If you do not have a primary care provider, please call [phone] and they will assist you.        Care EveryWhere ID     This is your Care EveryWhere ID. This could be used by other organizations to access your [place] medical records  [id number]        Your Vitals Were     Pulse Temperature Height Pulse Oximetry BMI (Body Mass Index)       54 97.4  F (36.3  C) 5' 6\" (1.676 m) 98% 25.02 kg/m2        Blood Pressure from Last 3 Encounters:   09/27/18 108/60   08/15/18 90/62   07/23/18 116/74    Weight from Last 3 Encounters:   09/27/18 155 lb (70.3 kg)   08/15/18 157 lb 6.4 oz (71.4 kg)   07/23/18 154 lb (69.9 kg)              We Performed the " Following     A pap thin layer screen with  HPV - recommended age 30 - 65 years (select HPV order below)     ADMIN: Vaccine, Initial (95844)     FLU VAC, QUADRIVALENT (RIV4) RECOMBINANT DNA, IM     Glucose     HPV High Risk Types DNA Cervical     Lipid Profile     ORTHOPEDICS ADULT REFERRAL     Pneumococcal vaccine 13 valent PCV13 IM (Prevnar) [28756]     Vaccine Administration, Initial [87018]          Today's Medication Changes          These changes are accurate as of 9/27/18  8:57 PM.  If you have any questions, ask your nurse or doctor.               Start taking these medicines.        Dose/Directions    ibuprofen 800 MG tablet   Commonly known as:  ADVIL/MOTRIN   Used for:  Bursitis of other bursa of right knee   Started by:  Henna Cruz MD        Dose:  800 mg   Take 1 tablet (800 mg) by mouth every 8 hours as needed for moderate pain   Quantity:  90 tablet   Refills:  1         These medicines have changed or have updated prescriptions.        Dose/Directions    baclofen 10 MG tablet   Commonly known as:  LIORESAL   This may have changed:  when to take this   Used for:  Chronic radicular cervical pain        Dose:  10 mg   Take 1 tablet (10 mg) by mouth 3 times daily as needed for muscle spasms   Quantity:  40 tablet   Refills:  0       sucralfate 1 GM tablet   Commonly known as:  CARAFATE   This may have changed:    - how much to take  - when to take this  - additional instructions   Used for:  Gastroesophageal reflux disease with esophagitis        Dose:  1 g   Take 1 tablet (1 g) by mouth 4 times daily   Quantity:  40 tablet   Refills:  1         Stop taking these medicines if you haven't already. Please contact your care team if you have questions.     azithromycin 250 MG tablet   Commonly known as:  ZITHROMAX   Stopped by:  Henna Cruz MD                Where to get your medicines      These medications were sent to [id number] - [place], [place] - [address]  [address] [facility],  [facility] [address]     Phone:  [phone]     ibuprofen 800 MG tablet                Primary Care Provider Office Phone # Fax #    Henna Cruz [phone] [address] [facility] [address]        Equal Access to Services     SHAZIAPRASANNA LALO : Hadii trent lee hadperio Soomaali, waaxda luqadaha, qaybta kaalmada adeegyada, waxeva maevein haysergon adewalter horn laprincedayday chen. So [facility] [phone].    ATENCIÓN: Si habla español, tiene a schroeder disposición servicios gratuitos de asistencia lingüística. Llame al [phone].    We comply with applicable federal civil rights laws and [place] laws. We do not discriminate on the basis of race, color, national origin, age, disability, sex, sexual orientation, or gender identity.            Thank you!     Thank you for choosing [facility]  for your care. Our goal is always to provide you with excellent care. Hearing back from our patients is one way we can continue to improve our services. Please take a few minutes to complete the written survey that you may receive in the mail after your visit with us. Thank you!             Your Updated Medication List - Protect others around you: Learn how to safely use, store and throw away your medicines at www.disposemymeds.org.          This list is accurate as of 9/27/18  8:58 PM.  Always use your most recent med list.                   Brand Name Dispense Instructions for use Diagnosis    baclofen 10 MG tablet    LIORESAL    40 tablet    Take 1 tablet (10 mg) by mouth 3 times daily as needed for muscle spasms    Chronic radicular cervical pain       CENTRUM SILVER ULTRA WOMENS Tabs           DEXILANT 60 MG Cpdr CR capsule   Generic drug:  dexlansoprazole     30 capsule    TAKE 1 CAPSULE DAILY BY MOUTH    Ulcer of esophagus without bleeding       diazepam 5 MG tablet    VALIUM    100 tablet    TAKE 1 TABLET BY MOUTH EVER Y 6 HOURS AS NEEDED - GENER IC FOR VALIUM    Muscle spasm       docusate sodium  100 MG capsule    COLACE     Take 100 mg by mouth        FLUoxetine 40 MG capsule    PROzac    30 capsule    TAKE 1 CAPSULE (40 MG) BY MOUTH DAILY    Major depressive disorder, recurrent severe without psychotic features (H)       HYDROcodone-acetaminophen 7.5-325 MG per tablet    NORCO    60 tablet    TAKE 1 TABLET BY MOUTH EVER Y 4 TO 6 HOURS AS NEEDED FO R PAIN    Chronic neck pain       ibuprofen 800 MG tablet    ADVIL/MOTRIN    90 tablet    Take 1 tablet (800 mg) by mouth every 8 hours as needed for moderate pain    Bursitis of other bursa of right knee       METHOCARBAMOL PO      Take 750 mg by mouth Take one tablet at bedtime for 21 days Prescribed by Dr. Alonzo Grewal        Misc. Devices Misc      Dispense one Cefaly neurostimulator and the electro stimulators with refills        order for DME      Equipment being ordered: TENS        * pregabalin 100 MG capsule    LYRICA    90 capsule    Take 1 capsule (100 mg) by mouth 3 times daily    Chronic radicular cervical pain       * LYRICA 150 MG capsule   Generic drug:  pregabalin      INCREASE OVER 2 WEEKS  MG THREE TIMES DAILY, THEN CONTINUE ON THIS DOSE.        PROBIOTIC DAILY PO      Take by mouth At Bedtime        senna-docusate 8.6-50 MG per tablet    SENOKOT-S;PERICOLACE     Take 1-4 tablets by mouth as needed        sucralfate 1 GM tablet    CARAFATE    40 tablet    Take 1 tablet (1 g) by mouth 4 times daily    Gastroesophageal reflux disease with esophagitis       topiramate 50 MG tablet    TOPAMAX     Take 2 tablets in the morning and 2 tablets at night        vitamin D3 1000 units Caps      Take 1,000 Units by mouth        * Notice:  This list has 2 medication(s) that are the same as other medications prescribed for you. Read the directions carefully, and ask your doctor or other care provider to review them with you.

## 2018-09-27 NOTE — PROGRESS NOTES

## 2018-09-27 NOTE — NURSING NOTE
"Chief Complaint   Patient presents with     Physical       Initial /60  Pulse 54  Temp 97.4  F (36.3  C)  Ht 5' 6\" (1.676 m)  Wt 155 lb (70.3 kg)  SpO2 98%  BMI 25.02 kg/m2 Estimated body mass index is 25.02 kg/(m^2) as calculated from the following:    Height as of this encounter: 5' 6\" (1.676 m).    Weight as of this encounter: 155 lb (70.3 kg).  Medication Reconciliation: complete    Amaury Anthony LPN  "

## 2018-09-27 NOTE — LETTER
My COPD Action Plan     Name: Sadaf Blankenship    YOB: 1960   Date: 9/21/2018    My doctor: Henna Cruz MD   My clinic: [facility] [place]    [facility] [facility]  [place] [address]  [phone]      My COPD Severity: Mild = FeV1 > 80%      Use of Oxygen: Oxygen Not Prescribed      Make sure you've had your pneumonia   vaccines.          GREEN ZONE       Doing well today      Usual level of activity and exercise    Usual amount of cough and mucus    No shortness of breath    Usual level of health (thinking clearly, sleeping well, feel like eating) Actions:      Take daily medicines    Use oxygen as prescribed    Follow regular exercise and diet plan    Avoid cigarette smoke and other irritants that harm the lungs           YELLOW ZONE          Having a bad day or flare up      Short of breath more than usual    A lot more sputum (mucus) than usual    Sputum looks yellow, green, tan, brown or bloody    More coughing or wheezing    Fever or chills    Less energy; trouble completing activities    Trouble thinking or focusing    Using quick relief inhaler or nebulizer more often    Poor sleep; symptoms wake me up    Do not feel like eating Actions:      Get plenty of rest    Take daily medicines    Use quick relief inhaler every 4 hours    If you use oxygen, call you doctor to see if you should adjust your oxygen    Do breathing exercises or other things to help you relax    Let a loved one, friend or neighbor know you are feeling worse    Call your care team if you have 2 or more symptoms.  Start taking steroids or antibiotics if directed by your care team           RED ZONE       Need medical care now      Severe shortness of breath (feel you can't breathe)    Fever, chills    Not enough breath to do any activity    Trouble coughing up mucus, walking or talking    Blood in mucus    Frequent coughing   Rescue medicines are not working    Not able to sleep because of  breathing    Feel confused or drowsy    Chest pain    Actions:      Call your health care team.  If you cannot reach your care team, call 911 or go to the emergency room.        Annual Reminders:  Meet with Care Team, Flu Shot every Fall  Pharmacy:    [id number] - [place], [place] - [address] [facility] [id number] - [place] - [address] AT [facility] OF  [address]

## 2018-09-27 NOTE — LETTER
[facility] [place]  [address]  [place] [address]  [phone]        October 4, 2018    Sadaf Dumontndresseduar  [address]  [facility] [address]          Dear Sadaf Blankenship    Your pap smear is normal.  It is generally recommended that women have a yearly pelvic exam.  If your provider  requested that you have a pap smear more frequently because of any previous problems please schedule that appointment as requested.  If you have any questions please call the clinic at [phone].      Sincerely,        Henna Cruz MD

## 2018-09-28 ASSESSMENT — ANXIETY QUESTIONNAIRES: GAD7 TOTAL SCORE: 4

## 2018-09-28 ASSESSMENT — PATIENT HEALTH QUESTIONNAIRE - PHQ9: SUM OF ALL RESPONSES TO PHQ QUESTIONS 1-9: 4

## 2018-10-02 ENCOUNTER — HOSPITAL ENCOUNTER (OUTPATIENT)
Dept: PHYSICAL THERAPY | Facility: HOSPITAL | Age: 58
Setting detail: THERAPIES SERIES
End: 2018-10-02
Attending: FAMILY MEDICINE
Payer: COMMERCIAL

## 2018-10-02 LAB
COPATH REPORT: NORMAL
PAP: NORMAL

## 2018-10-02 PROCEDURE — 97530 THERAPEUTIC ACTIVITIES: CPT | Mod: GP,59

## 2018-10-02 PROCEDURE — 40000718 ZZHC STATISTIC PT DEPARTMENT ORTHO VISIT

## 2018-10-02 PROCEDURE — 97140 MANUAL THERAPY 1/> REGIONS: CPT | Mod: GP

## 2018-10-02 PROCEDURE — 97110 THERAPEUTIC EXERCISES: CPT | Mod: GP

## 2018-10-03 LAB
FINAL DIAGNOSIS: NORMAL
HPV HR 12 DNA CVX QL NAA+PROBE: NEGATIVE
HPV16 DNA SPEC QL NAA+PROBE: NEGATIVE
HPV18 DNA SPEC QL NAA+PROBE: NEGATIVE
SPECIMEN DESCRIPTION: NORMAL
SPECIMEN SOURCE CVX/VAG CYTO: NORMAL

## 2018-10-09 ENCOUNTER — HOSPITAL ENCOUNTER (OUTPATIENT)
Dept: PHYSICAL THERAPY | Facility: HOSPITAL | Age: 58
Setting detail: THERAPIES SERIES
End: 2018-10-09
Attending: FAMILY MEDICINE
Payer: COMMERCIAL

## 2018-10-09 PROCEDURE — 97110 THERAPEUTIC EXERCISES: CPT | Mod: GP

## 2018-10-09 PROCEDURE — 40000718 ZZHC STATISTIC PT DEPARTMENT ORTHO VISIT

## 2018-10-09 PROCEDURE — 97140 MANUAL THERAPY 1/> REGIONS: CPT | Mod: GP

## 2018-10-23 DIAGNOSIS — M25.561 POSTERIOR KNEE PAIN, RIGHT: Primary | ICD-10-CM

## 2018-10-29 NOTE — PROGRESS NOTES
SUBJECTIVE:   Sadaf Blankenship is a 58 year old female who presents to clinic today for the following health issues:      COPD Follow-Up    Symptoms are currently: stable, can hear wheezing when at rest and while walking    Current fatigue or dyspnea with ambulation: none    Shortness of breath: stable    Increased or change in Cough/Sputum: No    Fever(s): No    Baseline ambulation without stopping to rest:  Couple blocks . Able to walk up 2 flights of stairs without stopping to rest.    Any ER/UC or hospital admissions since your last visit? No     History   Smoking Status     Former Smoker     Years: 8.00     Types: Cigarettes   Smokeless Tobacco     Never Used     Comment: quit 2002. no passive exposure     No results found for: FEV1, VAB7FSU    Amount of exercise or physical activity: None    Problems taking medications regularly: No    Medication side effects: none    Diet: regular (no restrictions)             Her disability application was denied. She is upset about this, and teary. She will be appealing. She has had ongoing pain with anxiety and depression related to cervical radicular pain with 2 cervical surgeries, intractable migraine headaches and has pushed through pain when she was working. She is unable to work due to chronic pain of the neck and right arm with her ongoing headaches, anxiety and depression    Shoulder pain, left  She fell while visiting family in [place] on October 24 sustaining a comminuted fracture of the left index finger. This was placed in a cast with her third finger and she will be seeing Dr Kraus later this week. She also struck her shoulder but this was not evaluated as she was out of state and her insurance would not ok this. She cannot move the shoulder without pain.    Problem list and histories reviewed & adjusted, as indicated.  Additional history: as documented    Patient Active Problem List   Diagnosis     Degenerative disc disease, cervical     Pseudoarthrosis of  cervical spine     Cervical pain     Migraine     UTI (urinary tract infection)     Advanced care planning/counseling discussion     Thoracic sprain and strain     Sprain and strain of shoulder and upper arm     Irritable bowel syndrome     Myofascial pain syndrome, cervical     Depression, major     Chronic pain syndrome     Uvulitis     Chronic rhinitis     Madina bullosa     Chronic maxillary sinusitis     Hypertrophy of inferior nasal turbinate     Long uvula     Chronic neck pain     Chronic pain     Chronic tension-type headache, intractable     Migraine aura without headache     History of arthrodesis     Myofascial pain     Disuse syndrome     Intractable chronic migraine without aura and with status migrainosus     Ulcer of esophagus without bleeding     Gastroesophageal reflux disease with esophagitis     Intractable chronic migraine without aura and without status migrainosus     Ulcer of esophagus     Ulnar neuropathy     Chronic radicular cervical pain     Mild episode of recurrent major depressive disorder (H)     Ulnar neuropathy at elbow of left upper extremity     Lumbar radiculopathy     S/P cervical spinal fusion     ACP (advance care planning)     Acute back pain with sciatica     Chronic migraine without aura without status migrainosus, not intractable     Radicular pain     Subacromial bursitis of right shoulder joint     Panlobular emphysema (H)     Calculus of kidney     Pulmonary emphysema, unspecified emphysema type (H)     Other chronic gastritis without hemorrhage     Pelvic floor dysfunction     Past Surgical History:   Procedure Laterality Date     BACK SURGERY      cervical     Cervical spine surgery with removal of 2 disks, C5,C7       COLONOSCOPY  10/13/2014    Dr Camargo, internal hemorrhoids     COLONOSCOPY - HIM SCAN  02/09/2018    EGD and colonoscopy with Dr. Jennings     Coronary angiogram, normal  2003    Chest pain     ENT SURGERY      nose surgery x3     fusion discectomy  anterior cervical  2011      ESOPHAGOSCOPY, DIAGNOSTIC  10/31/2014    Dr Camargo, mild erythema of antrum, negative biopsies     NOSE SURGERY      x3       1997     Posterior decompression , fusion C3-5      Psuedoarthrosis     Supracervical hysterectomy with right salpingoophorectomy      Endometriosis       Social History   Substance Use Topics     Smoking status: Former Smoker     Years: 8.00     Types: Cigarettes     Smokeless tobacco: Never Used      Comment: quit . no passive exposure     Alcohol use No     Family History   Problem Relation Age of Onset     Cancer Father      lung, also a heavy smoker     Diabetes Mother      HEART DISEASE Mother 58     MI; cause of death; heavy smoker. had first MI at 40     Coronary Artery Disease Mother      Hypertension Mother      Cancer Other      strong history     HEART DISEASE Other      heart disease     HEART DISEASE Brother      x3     Hypertension Brother      Diabetes Brother      Coronary Artery Disease Brother      Hypertension Sister          Current Outpatient Prescriptions   Medication Sig Dispense Refill     baclofen (LIORESAL) 10 MG tablet Take 1 tablet (10 mg) by mouth 3 times daily as needed for muscle spasms (Patient taking differently: Take 10 mg by mouth At Bedtime ) 40 tablet 0     Cholecalciferol (VITAMIN D3) 1000 UNITS CAPS Take 1,000 Units by mouth       DEXILANT 60 MG CPDR CR capsule TAKE 1 CAPSULE DAILY BY MOUTH 30 capsule 4     diazepam (VALIUM) 5 MG tablet TAKE 1 TABLET BY MOUTH EVER Y 6 HOURS AS NEEDED - GENER IC FOR VALIUM 100 tablet 0     docusate sodium (COLACE) 100 MG capsule Take 100 mg by mouth       FLUoxetine (PROZAC) 40 MG capsule TAKE 1 CAPSULE (40 MG) BY MOUTH DAILY 30 capsule 3     HYDROcodone-acetaminophen (NORCO)  MG per tablet Take 1 tablet by mouth every 4 hours as needed for severe pain 60 tablet 0     HYDROcodone-acetaminophen (NORCO) 7.5-325 MG per tablet TAKE 1 TABLET BY MOUTH EVER Y 4 TO 6 HOURS AS  "NEEDED FO R PAIN 60 tablet 0     ibuprofen (ADVIL/MOTRIN) 800 MG tablet Take 1 tablet (800 mg) by mouth every 8 hours as needed for moderate pain 90 tablet 1     LYRICA 150 MG capsule INCREASE OVER 2 WEEKS  MG THREE TIMES DAILY, THEN CONTINUE ON THIS DOSE.  5     Misc. Devices MISC Dispense one Cefaly neurostimulator and the electro stimulators with refills       Multiple Vitamins-Minerals (CENTRUM SILVER ULTRA WOMENS) TABS        order for DME Equipment being ordered: TENS       Probiotic Product (PROBIOTIC DAILY PO) Take by mouth At Bedtime        senna-docusate (SENOKOT-S;PERICOLACE) 8.6-50 MG per tablet Take 1-4 tablets by mouth as needed        sucralfate (CARAFATE) 1 GM tablet Take 1 tablet (1 g) by mouth 4 times daily (Patient taking differently: Patient takes as needed (PRN.) 40 tablet 1     topiramate (TOPAMAX) 50 MG tablet Take 2 tablets in the morning and 2 tablets at night       [DISCONTINUED] pregabalin (LYRICA) 100 MG capsule Take 1 capsule (100 mg) by mouth 3 times daily 90 capsule 0     Allergies   Allergen Reactions     Aspirin Hives     Ibuprofen      Dye in the otc form causes headaches  \"patient states over the counter ibuprofen  bothers her\"     Lansoprazole Other (See Comments) and Visual Disturbance     Changes in eyesight  Prevacid  Change in eyesight     Penicillins Other (See Comments) and Itching     pruritis     Red Dye Hives     Bupropion Rash     Bupropion Hcl Hives and Rash     Wellbutrin     Demerol [Meperidine] Other (See Comments)     Made migraine worse     BP Readings from Last 3 Encounters:   11/05/18 112/78   09/27/18 108/60   08/15/18 90/62    Wt Readings from Last 3 Encounters:   11/05/18 159 lb (72.1 kg)   09/27/18 155 lb (70.3 kg)   08/15/18 157 lb 6.4 oz (71.4 kg)                    Reviewed and updated as needed this visit by clinical staff       Reviewed and updated as needed this visit by Provider         ROS:  Constitutional, HEENT, cardiovascular, pulmonary, gi " "and gu systems are negative, except as otherwise noted.    OBJECTIVE:                                                    /78  Pulse 69  Temp 98.3  F (36.8  C)  Ht 5' 6\" (1.676 m)  Wt 159 lb (72.1 kg)  SpO2 98%  BMI 25.66 kg/m2  Body mass index is 25.66 kg/(m^2).  GENERAL APPEARANCE: alert, moderate distress and crying  NECK: no adenopathy, no asymmetry, masses, or scars and thyroid normal to palpation  RESP: lungs clear to auscultation - no rales, rhonchi or wheezes  CV: regular rates and rhythm, normal S1 S2, no S3 or S4 and no murmur, click or rub  MS: left second and third fingers in a cast to the mid forearm, CMS intact. Left shoulder tender over the anterior shoulder and upper lateral arm. Posterior shoulder and trapezius are non tender. She is unable to move the shoulder in any ROM without pain  SKIN: no suspicious lesions or rashes  NEURO: Normal strength and tone, mentation intact and speech normal  PSYCH: mentation appears normal, crying and worried         ASSESSMENT/PLAN:                                                    1. Chronic radicular cervical pain  Her current pain medication is not covering her pain. Will increase thi dose to 10 mg. Recheck in 4 weeks. She will be appealing the decision  - HYDROcodone-acetaminophen (NORCO)  MG per tablet; Take 1 tablet by mouth every 4 hours as needed for severe pain  Dispense: 60 tablet; Refill: 0    2.COPD  Discussed options and decided to follow at this time. This was noted to be mild on chest x ray in the past and she isn't having symptoms    3. Acute pain of left shoulder  Will check x ray and MRI to rule out rotator cuff tear  - MR Shoulder Left w/o Contrast; Future  - HYDROcodone-acetaminophen (NORCO)  MG per tablet; Take 1 tablet by mouth every 4 hours as needed for severe pain  Dispense: 60 tablet; Refill: 0    4. Closed nondisplaced fracture of phalanx of left index finger with routine healing, unspecified phalanx, subsequent " encounter    - XR FINGER LT G/E 2 VW (Clinic Performed); Future  - HYDROcodone-acetaminophen (NORCO)  MG per tablet; Take 1 tablet by mouth every 4 hours as needed for severe pain  Dispense: 60 tablet; Refill: 0    5. S/P cervical spinal fusion  With ongoing radicular pain    6. Intractable chronic migraine without aura and without status migrainosus  Ongoing. She has been following with neurology, receiving Botox injections remotely          Henna Cruz MD  [facility] - [place]

## 2018-11-05 ENCOUNTER — OFFICE VISIT (OUTPATIENT)
Dept: FAMILY MEDICINE | Facility: OTHER | Age: 58
End: 2018-11-05
Attending: FAMILY MEDICINE
Payer: COMMERCIAL

## 2018-11-05 ENCOUNTER — RADIANT APPOINTMENT (OUTPATIENT)
Dept: GENERAL RADIOLOGY | Facility: OTHER | Age: 58
End: 2018-11-05
Attending: FAMILY MEDICINE
Payer: COMMERCIAL

## 2018-11-05 ENCOUNTER — HOSPITAL ENCOUNTER (OUTPATIENT)
Dept: MRI IMAGING | Facility: HOSPITAL | Age: 58
Discharge: HOME | End: 2018-11-05
Attending: FAMILY MEDICINE | Admitting: FAMILY MEDICINE
Payer: COMMERCIAL

## 2018-11-05 VITALS
WEIGHT: 159 LBS | HEIGHT: 66 IN | BODY MASS INDEX: 25.55 KG/M2 | SYSTOLIC BLOOD PRESSURE: 112 MMHG | HEART RATE: 69 BPM | DIASTOLIC BLOOD PRESSURE: 78 MMHG | TEMPERATURE: 98.3 F | OXYGEN SATURATION: 98 %

## 2018-11-05 DIAGNOSIS — G43.719 INTRACTABLE CHRONIC MIGRAINE WITHOUT AURA AND WITHOUT STATUS MIGRAINOSUS: ICD-10-CM

## 2018-11-05 DIAGNOSIS — R06.09 DYSPNEA ON EXERTION: ICD-10-CM

## 2018-11-05 DIAGNOSIS — M25.512 ACUTE PAIN OF LEFT SHOULDER: ICD-10-CM

## 2018-11-05 DIAGNOSIS — Z98.1 S/P CERVICAL SPINAL FUSION: ICD-10-CM

## 2018-11-05 DIAGNOSIS — S62.601D CLOSED NONDISPLACED FRACTURE OF PHALANX OF LEFT INDEX FINGER WITH ROUTINE HEALING, UNSPECIFIED PHALANX, SUBSEQUENT ENCOUNTER: ICD-10-CM

## 2018-11-05 DIAGNOSIS — M54.12 CHRONIC RADICULAR CERVICAL PAIN: Primary | ICD-10-CM

## 2018-11-05 DIAGNOSIS — G89.29 CHRONIC RADICULAR CERVICAL PAIN: Primary | ICD-10-CM

## 2018-11-05 PROCEDURE — 73140 X-RAY EXAM OF FINGER(S): CPT | Mod: TC,LT

## 2018-11-05 PROCEDURE — G0463 HOSPITAL OUTPT CLINIC VISIT: HCPCS

## 2018-11-05 PROCEDURE — 73030 X-RAY EXAM OF SHOULDER: CPT | Mod: TC,LT

## 2018-11-05 PROCEDURE — G0463 HOSPITAL OUTPT CLINIC VISIT: HCPCS | Mod: 25

## 2018-11-05 PROCEDURE — 99214 OFFICE O/P EST MOD 30 MIN: CPT | Performed by: FAMILY MEDICINE

## 2018-11-05 PROCEDURE — 73221 MRI JOINT UPR EXTREM W/O DYE: CPT | Mod: TC,LT

## 2018-11-05 RX ORDER — HYDROCODONE BITARTRATE AND ACETAMINOPHEN 10; 325 MG/1; MG/1
1 TABLET ORAL EVERY 4 HOURS PRN
Qty: 60 TABLET | Refills: 0 | Status: SHIPPED | OUTPATIENT
Start: 2018-11-05 | End: 2018-12-21

## 2018-11-05 ASSESSMENT — ANXIETY QUESTIONNAIRES
3. WORRYING TOO MUCH ABOUT DIFFERENT THINGS: MORE THAN HALF THE DAYS
1. FEELING NERVOUS, ANXIOUS, OR ON EDGE: MORE THAN HALF THE DAYS
7. FEELING AFRAID AS IF SOMETHING AWFUL MIGHT HAPPEN: SEVERAL DAYS
6. BECOMING EASILY ANNOYED OR IRRITABLE: SEVERAL DAYS
2. NOT BEING ABLE TO STOP OR CONTROL WORRYING: MORE THAN HALF THE DAYS
4. TROUBLE RELAXING: MORE THAN HALF THE DAYS
5. BEING SO RESTLESS THAT IT IS HARD TO SIT STILL: MORE THAN HALF THE DAYS
GAD7 TOTAL SCORE: 12

## 2018-11-05 ASSESSMENT — PATIENT HEALTH QUESTIONNAIRE - PHQ9: SUM OF ALL RESPONSES TO PHQ QUESTIONS 1-9: 8

## 2018-11-05 ASSESSMENT — PAIN SCALES - GENERAL: PAINLEVEL: SEVERE PAIN (7)

## 2018-11-05 NOTE — LETTER
My COPD Action Plan     Name: Sadaf Blankenship    YOB: 1960   Date: 10/29/2018    My doctor: Henna Cruz MD   My clinic: [facility] [place]    [facility]  [place] [address]  [phone]  My Controller Medicine: none   Dose:      My Rescue Medicine: none   Dose:      My Flare Up Medicine: none   Dose:      My COPD Severity: Mild = FeV1 > 80%      Use of Oxygen: Oxygen Not Prescribed      Make sure you've had your pneumonia   vaccines.          GREEN ZONE       Doing well today      Usual level of activity and exercise    Usual amount of cough and mucus    No shortness of breath    Usual level of health (thinking clearly, sleeping well, feel like eating) Actions:      Take daily medicines    Use oxygen as prescribed    Follow regular exercise and diet plan    Avoid cigarette smoke and other irritants that harm the lungs           YELLOW ZONE          Having a bad day or flare up      Short of breath more than usual    A lot more sputum (mucus) than usual    Sputum looks yellow, green, tan, brown or bloody    More coughing or wheezing    Fever or chills    Less energy; trouble completing activities    Trouble thinking or focusing    Using quick relief inhaler or nebulizer more often    Poor sleep; symptoms wake me up    Do not feel like eating Actions:      Get plenty of rest    Take daily medicines    Use quick relief inhaler every 4 hours    If you use oxygen, call you doctor to see if you should adjust your oxygen    Do breathing exercises or other things to help you relax    Let a loved one, friend or neighbor know you are feeling worse    Call your care team if you have 2 or more symptoms.  Start taking steroids or antibiotics if directed by your care team           RED ZONE       Need medical care now      Severe shortness of breath (feel you can't breathe)    Fever, chills    Not enough breath to do any activity    Trouble coughing up mucus, walking or  talking    Blood in mucus    Frequent coughing   Rescue medicines are not working    Not able to sleep because of breathing    Feel confused or drowsy    Chest pain    Actions:      Call your health care team.  If you cannot reach your care team, call 911 or go to the emergency room.        Annual Reminders:  Meet with Care Team, Flu Shot every Fall  Pharmacy:    [id number] - [place], [place] - [address] [facility] [id number] - [place], [place] - [address] AT [facility] [address]

## 2018-11-05 NOTE — NURSING NOTE
"Chief Complaint   Patient presents with     Breathing Problem     COPD       Initial /78  Pulse 69  Temp 98.3  F (36.8  C)  Ht 5' 6\" (1.676 m)  Wt 159 lb (72.1 kg)  SpO2 98%  BMI 25.66 kg/m2 Estimated body mass index is 25.66 kg/(m^2) as calculated from the following:    Height as of this encounter: 5' 6\" (1.676 m).    Weight as of this encounter: 159 lb (72.1 kg).  Medication Reconciliation: complete    Amaury Anthony LPN  "

## 2018-11-05 NOTE — MR AVS SNAPSHOT
After Visit Summary   11/5/2018    Sadaf Blankenship    MRN: [id number]           Patient Information     Date Of Birth          1960        Visit Information        Provider Department      11/5/2018 10:45 AM Henna Cruz MD [facility]        Today's Diagnoses     Dyspnea on exertion    -  1    Acute pain of left shoulder        Closed nondisplaced fracture of phalanx of left index finger with routine healing, unspecified phalanx, subsequent encounter        Chronic neck pain           Follow-ups after your visit        Your next 10 appointments already scheduled     Nov 06, 2018 10:00 AM CST   Treatment 60 with Gemini Jackman, PT   [facility] ([facility] )    [address] [address]   [phone]            Nov 08, 2018  2:30 PM CST   (Arrive by 2:15 PM)   XR KNEE RIGHT G/E 4 VIEWS with HCXR1   [facility] ([facility] )    [address]  [facility] [address]   [phone]           How do I prepare for my exam? (Food and drink instructions) No Food and Drink Restrictions.  How do I prepare for my exam? (Other instructions) You do not need to do anything special for this exam.  What should I wear: Wear comfortable clothes.  How long does the exam take: Most scans take less than 5 minutes.  What should I bring: Bring a list of your medicines, including vitamins, minerals and over-the-counter drugs. It is safest to leave personal items at home.  Do I need a :  No  is needed.  What do I need to tell my doctor: Tell your doctor if there s any chance you are pregnant.  What should I do after the exam: No restrictions, You may resume normal activities.  What is this test: An image of a specific body part shown in shades of black and white.  Who should I call with questions: If you have any questions, please call the Imaging Department where you will have your exam.  Directions, parking instructions, and other information is available on our website, [email or web address].            Nov 08, 2018  3:00 PM CST   (Arrive by 2:45 PM)   New Visit with Subhash Kraus DO   [facility]    [address] [address]   [phone]            Nov 09, 2018  8:40 AM CST   (Arrive by 8:25 AM)   Return Visit with Zoe Herbert MD   [facility] ([facility] )    [address] [address]   [phone]            Nov 13, 2018 10:00 AM CST   Treatment 60 with Gemini Jackman, PT   [facility] ([facility] )    [address] [address]   [phone]            Nov 20, 2018 10:00 AM CST   Treatment 60 with Gemini Jackman, PT   [facility] ([facility] )    [address] [address]   [phone]            Nov 29, 2018 10:00 AM CST   Treatment 60 with Gemini Jackman, PT   [facility] ([facility] )    [address] [address]   [phone]            Dec 06, 2018 10:45 AM CST   (Arrive by 10:30 AM)   SHORT with Henna Cruz MD   [facility]    [address] [facility] [address]   [phone]              Future tests that were ordered for you today     Open Future Orders        Priority Expected Expires Ordered    MR Shoulder Left w/o Contrast Routine  11/5/2019 11/5/2018    XR FINGER LT G/E 2 VW (Clinic Performed) Routine 11/5/2018 11/5/2019 11/5/2018    XR SHOULDER LT 1 VW (Clinic Performed) Routine 11/5/2018 11/5/2019 11/5/2018            Who to contact     If you have questions or need follow up information about today's clinic visit or your schedule please contact [facility] directly at [phone].  Normal or non-critical lab and imaging results will be communicated  "to you by [employer], letter or phone within 4 business days after the clinic has received the results. If you do not hear from us within 7 days, please contact the clinic through [employer] or phone. If you have a critical or abnormal lab result, we will notify you by phone as soon as possible.  Submit refill requests through [employer] or call your pharmacy and they will forward the refill request to us. Please allow 3 business days for your refill to be completed.          Additional Information About Your Visit        [employer] Information     [employer] gives you secure access to your electronic health record. If you see a primary care provider, you can also send messages to your care team and make appointments. If you have questions, please call your primary care clinic.  If you do not have a primary care provider, please call [phone] and they will assist you.        Care EveryWhere ID     This is your Care EveryWhere ID. This could be used by other organizations to access your [place] medical records  [id number]        Your Vitals Were     Pulse Temperature Height Pulse Oximetry BMI (Body Mass Index)       69 98.3  F (36.8  C) 5' 6\" (1.676 m) 98% 25.66 kg/m2        Blood Pressure from Last 3 Encounters:   11/05/18 112/78   09/27/18 108/60   08/15/18 90/62    Weight from Last 3 Encounters:   11/05/18 159 lb (72.1 kg)   09/27/18 155 lb (70.3 kg)   08/15/18 157 lb 6.4 oz (71.4 kg)                 Today's Medication Changes          These changes are accurate as of 11/5/18 11:44 AM.  If you have any questions, ask your nurse or doctor.               These medicines have changed or have updated prescriptions.        Dose/Directions    baclofen 10 MG tablet   Commonly known as:  LIORESAL   This may have changed:  when to take this   Used for:  Chronic radicular cervical pain        Dose:  10 mg   Take 1 tablet (10 mg) by mouth 3 times daily as needed for muscle spasms   Quantity:  40 tablet   Refills:  0       * " HYDROcodone-acetaminophen 7.5-325 MG per tablet   Commonly known as:  NORCO   This may have changed:  Another medication with the same name was added. Make sure you understand how and when to take each.   Used for:  Chronic neck pain   Changed by:  Henna Cruz MD        TAKE 1 TABLET BY MOUTH EVER Y 4 TO 6 HOURS AS NEEDED FO R PAIN   Quantity:  60 tablet   Refills:  0       * HYDROcodone-acetaminophen  MG per tablet   Commonly known as:  NORCO   This may have changed:  You were already taking a medication with the same name, and this prescription was added. Make sure you understand how and when to take each.   Used for:  Closed nondisplaced fracture of phalanx of left index finger with routine healing, unspecified phalanx, subsequent encounter, Acute pain of left shoulder, Chronic neck pain   Changed by:  Henna Cruz MD        Dose:  1 tablet   Take 1 tablet by mouth every 4 hours as needed for severe pain   Quantity:  60 tablet   Refills:  0       LYRICA 150 MG capsule   This may have changed:  Another medication with the same name was removed. Continue taking this medication, and follow the directions you see here.   Generic drug:  pregabalin   Changed by:  Henna Cruz MD        INCREASE OVER 2 WEEKS  MG THREE TIMES DAILY, THEN CONTINUE ON THIS DOSE.   Refills:  5       sucralfate 1 GM tablet   Commonly known as:  CARAFATE   This may have changed:    - how much to take  - when to take this  - additional instructions   Used for:  Gastroesophageal reflux disease with esophagitis        Dose:  1 g   Take 1 tablet (1 g) by mouth 4 times daily   Quantity:  40 tablet   Refills:  1       * Notice:  This list has 2 medication(s) that are the same as other medications prescribed for you. Read the directions carefully, and ask your doctor or other care provider to review them with you.      Stop taking these medicines if you haven't already. Please contact your care team if you have questions.      METHOCARBAMOL PO   Stopped by:  Henna Cruz MD                Where to get your medicines      Some of these will need a paper prescription and others can be bought over the counter.  Ask your nurse if you have questions.     Bring a paper prescription for each of these medications     HYDROcodone-acetaminophen  MG per tablet               Information about OPIOIDS     PRESCRIPTION OPIOIDS: WHAT YOU NEED TO KNOW   We gave you an opioid (narcotic) pain medicine. It is important to manage your pain, but opioids are not always the best choice. You should first try all the other options your care team gave you. Take this medicine for as short a time (and as few doses) as possible.    Some activities can increase your pain, such as bandage changes or therapy sessions. It may help to take your pain medicine 30 to 60 minutes before these activities. Reduce your stress by getting enough sleep, working on hobbies you enjoy and practicing relaxation or meditation. Talk to your care team about ways to manage your pain beyond prescription opioids.    These medicines have risks:    DO NOT drive when on new or higher doses of pain medicine. These medicines can affect your alertness and reaction times, and you could be arrested for driving under the influence (DUI). If you need to use opioids long-term, talk to your care team about driving.    DO NOT operate heavy machinery    DO NOT do any other dangerous activities while taking these medicines.    DO NOT drink any alcohol while taking these medicines.     If the opioid prescribed includes acetaminophen, DO NOT take with any other medicines that contain acetaminophen. Read all labels carefully. Look for the word  acetaminophen  or  Tylenol.  Ask your pharmacist if you have questions or are unsure.    You can get addicted to pain medicines, especially if you have a history of addiction (chemical, alcohol or substance dependence). Talk to your care team about ways to  reduce this risk.    All opioids tend to cause constipation. Drink plenty of water and eat foods that have a lot of fiber, such as fruits, vegetables, prune juice, apple juice and high-fiber cereal. Take a laxative (Miralax, milk of magnesia, Colace, Senna) if you don t move your bowels at least every other day. Other side effects include upset stomach, sleepiness, dizziness, throwing up, tolerance (needing more of the medicine to have the same effect), physical dependence and slowed breathing.    Store your pills in a secure place, locked if possible. We will not replace any lost or stolen medicine. If you don t finish your medicine, please throw away (dispose) as directed by your pharmacist. The [place] [employer]ution [employer] has more information about safe disposal: [email or web address]://www.pca.[email or web address]-[email or web address]/[email or web address]-medications         Primary Care Provider Office Phone # Fax #    Henna Cruz [phone]       [address] [place] [address]        Equal Access to Services     HARIKA MAY : Hadii trent lee hadasho Soomaali, waaxda luqadaha, qaybta kaalmada adewalteryakayleigh, cherise vogt . So [facility] [phone].    ATENCIÓN: Si habla español, tiene a schroeder disposición servicios gratuitos de asistencia lingüística. [facility] [phone].    We comply with applicable federal civil rights laws and [place] laws. We do not discriminate on the basis of race, color, national origin, age, disability, sex, sexual orientation, or gender identity.            Thank you!     Thank you for choosing [facility]  for your care. Our goal is always to provide you with excellent care. Hearing back from our patients is one way we can continue to improve our services. Please take a few minutes to complete the written survey that you may receive in the mail after your visit with us. Thank you!             Your Updated Medication List - Protect others around  you: Learn how to safely use, store and throw away your medicines at www.disposemymeds.org.          This list is accurate as of 11/5/18 11:44 AM.  Always use your most recent med list.                   Brand Name Dispense Instructions for use Diagnosis    baclofen 10 MG tablet    LIORESAL    40 tablet    Take 1 tablet (10 mg) by mouth 3 times daily as needed for muscle spasms    Chronic radicular cervical pain       CENTRUM SILVER ULTRA WOMENS Tabs           DEXILANT 60 MG Cpdr CR capsule   Generic drug:  dexlansoprazole     30 capsule    TAKE 1 CAPSULE DAILY BY MOUTH    Ulcer of esophagus without bleeding       diazepam 5 MG tablet    VALIUM    100 tablet    TAKE 1 TABLET BY MOUTH EVER Y 6 HOURS AS NEEDED - GENER IC FOR VALIUM    Muscle spasm       docusate sodium 100 MG capsule    COLACE     Take 100 mg by mouth        FLUoxetine 40 MG capsule    PROzac    30 capsule    TAKE 1 CAPSULE (40 MG) BY MOUTH DAILY    Major depressive disorder, recurrent severe without psychotic features (H)       * HYDROcodone-acetaminophen 7.5-325 MG per tablet    NORCO    60 tablet    TAKE 1 TABLET BY MOUTH EVER Y 4 TO 6 HOURS AS NEEDED FO R PAIN    Chronic neck pain       * HYDROcodone-acetaminophen  MG per tablet    NORCO    60 tablet    Take 1 tablet by mouth every 4 hours as needed for severe pain    Closed nondisplaced fracture of phalanx of left index finger with routine healing, unspecified phalanx, subsequent encounter, Acute pain of left shoulder, Chronic neck pain       ibuprofen 800 MG tablet    ADVIL/MOTRIN    90 tablet    Take 1 tablet (800 mg) by mouth every 8 hours as needed for moderate pain    Bursitis of other bursa of right knee       LYRICA 150 MG capsule   Generic drug:  pregabalin      INCREASE OVER 2 WEEKS  MG THREE TIMES DAILY, THEN CONTINUE ON THIS DOSE.        Misc. Devices Misc      Dispense one Cefaly neurostimulator and the electro stimulators with refills        order for DME      Equipment  being ordered: TENS        PROBIOTIC DAILY PO      Take by mouth At Bedtime        senna-docusate 8.6-50 MG per tablet    SENOKOT-S;PERICOLACE     Take 1-4 tablets by mouth as needed        sucralfate 1 GM tablet    CARAFATE    40 tablet    Take 1 tablet (1 g) by mouth 4 times daily    Gastroesophageal reflux disease with esophagitis       topiramate 50 MG tablet    TOPAMAX     Take 2 tablets in the morning and 2 tablets at night        vitamin D3 1000 units Caps      Take 1,000 Units by mouth        * Notice:  This list has 2 medication(s) that are the same as other medications prescribed for you. Read the directions carefully, and ask your doctor or other care provider to review them with you.

## 2018-11-06 ENCOUNTER — HOSPITAL ENCOUNTER (OUTPATIENT)
Dept: PHYSICAL THERAPY | Facility: HOSPITAL | Age: 58
Setting detail: THERAPIES SERIES
End: 2018-11-06
Attending: FAMILY MEDICINE
Payer: COMMERCIAL

## 2018-11-06 PROCEDURE — 97140 MANUAL THERAPY 1/> REGIONS: CPT | Mod: GP

## 2018-11-06 PROCEDURE — 40000718 ZZHC STATISTIC PT DEPARTMENT ORTHO VISIT

## 2018-11-06 PROCEDURE — 97530 THERAPEUTIC ACTIVITIES: CPT | Mod: GP

## 2018-11-06 PROCEDURE — 97110 THERAPEUTIC EXERCISES: CPT | Mod: GP

## 2018-11-06 ASSESSMENT — ANXIETY QUESTIONNAIRES: GAD7 TOTAL SCORE: 12

## 2018-11-08 ENCOUNTER — RADIANT APPOINTMENT (OUTPATIENT)
Dept: GENERAL RADIOLOGY | Facility: OTHER | Age: 58
End: 2018-11-08
Attending: ORTHOPAEDIC SURGERY
Payer: COMMERCIAL

## 2018-11-08 ENCOUNTER — OFFICE VISIT (OUTPATIENT)
Dept: ORTHOPEDICS | Facility: OTHER | Age: 58
End: 2018-11-08
Attending: FAMILY MEDICINE
Payer: COMMERCIAL

## 2018-11-08 VITALS
HEART RATE: 65 BPM | OXYGEN SATURATION: 98 % | WEIGHT: 159 LBS | BODY MASS INDEX: 25.55 KG/M2 | SYSTOLIC BLOOD PRESSURE: 110 MMHG | DIASTOLIC BLOOD PRESSURE: 70 MMHG | HEIGHT: 66 IN | TEMPERATURE: 97.7 F

## 2018-11-08 DIAGNOSIS — M71.21 BAKER'S CYST, UNRUPTURED, RIGHT: ICD-10-CM

## 2018-11-08 DIAGNOSIS — G89.29 CHRONIC PAIN OF RIGHT KNEE: ICD-10-CM

## 2018-11-08 DIAGNOSIS — S62.601A CLOSED NONDISPLACED FRACTURE OF PHALANX OF LEFT INDEX FINGER, UNSPECIFIED PHALANX, INITIAL ENCOUNTER: Primary | ICD-10-CM

## 2018-11-08 DIAGNOSIS — M25.561 CHRONIC PAIN OF RIGHT KNEE: ICD-10-CM

## 2018-11-08 DIAGNOSIS — M75.02 ADHESIVE CAPSULITIS OF LEFT SHOULDER: ICD-10-CM

## 2018-11-08 DIAGNOSIS — M25.561 POSTERIOR KNEE PAIN, RIGHT: ICD-10-CM

## 2018-11-08 PROCEDURE — G0463 HOSPITAL OUTPT CLINIC VISIT: HCPCS | Mod: 25

## 2018-11-08 PROCEDURE — G0463 HOSPITAL OUTPT CLINIC VISIT: HCPCS

## 2018-11-08 PROCEDURE — 26720 TREAT FINGER FRACTURE EACH: CPT | Mod: LT

## 2018-11-08 PROCEDURE — 99203 OFFICE O/P NEW LOW 30 MIN: CPT | Mod: 25 | Performed by: ORTHOPAEDIC SURGERY

## 2018-11-08 PROCEDURE — 73564 X-RAY EXAM KNEE 4 OR MORE: CPT | Mod: TC,RT

## 2018-11-08 PROCEDURE — 73140 X-RAY EXAM OF FINGER(S): CPT | Mod: TC,LT

## 2018-11-08 PROCEDURE — 26720 TREAT FINGER FRACTURE EACH: CPT | Mod: LT | Performed by: ORTHOPAEDIC SURGERY

## 2018-11-08 ASSESSMENT — PAIN SCALES - GENERAL: PAINLEVEL: MODERATE PAIN (5)

## 2018-11-08 NOTE — PROGRESS NOTES
Patient presents today with multiple musculoskeletal issues.  First, she fell while she was on vacation in [place] approximately 2 weeks ago and sustained a closed nondisplaced fracture at the base of her index finger that is intra-articular with the metacarpal phalangeal joint.  She was placed in a cast for this and referred to orthopedics on her return home for definitive care.  At the same time as her hand injury she also injured her shoulder.  Since that time she said limited range of motion, pain with attempted use, no instability, no numbness tingling or neurologic symptoms in the left upper extremity.  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  This is been inflating and deflating for many years.  She relates a history of having had a locked knee when she was approximately 21 years old there was treated with traction in a cast.  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  If she bends down or twists the knee she gets a sharp stabbing pain in the medial and lateral aspect of her knee joint often accompanied for several days by an effusion.    Past medical history: Cervical spine arthrodesis for traumatic fractures and disc disruption of the cervical spine, recurrent UTIs, kidney stones,Intractable migraines, chronic pain syndrome, ulnar neuropathy, lumbar radiculopathy, chronic rhinitis chronic maxillary sinusitis, pulmonary emphysema, irritable bowel syndrome esophageal ulcers and chronic gastritis    Allergies: Aspirin causes hives, ibuprofen, lansoprazole, penicillin causes itching, red dye causes itching, bupropion causes rashes and hives, Demerol causes delirium.    Review of systems: Patient has no recent constitutional symptoms or changes in her overall medical condition.  As mentioned in her past medical history, she does have chronic neck pain, migraines, as well as myofascial and musculoskeletal chronic  issues.  HEENT: No new changes, chest: No new changes, cardiovascular, no new changes, GI, stable, neurologic, and stable psychiatric, stable musculoskeletal: The above-mentioned chronic issues as well as the chief complaint.    Physical exam: Patient is an alert, pleasant adult female in no obvious distress.    HEENT: Cranial nerves II through XII intact, well-healed scar in the anterior aspect of her neck from her previous cervical spine surgeries, limited range of motion and side bending, right and left rotation as well as flexion and extension.  Midline trachea.    Chest: Appears normal    Abdomen: Appears normal    Cardiovascular: Regular rate and rhythm with no abnormal vital signs.    Neurologic: Patient walks with a normal gait and balance    Musculoskeletal: The left shoulder demonstrates limited active and passive range of motion.  This is limited to approximately 80 degrees of abduction 70 degrees of forward flexion 30 degrees of extension and approximately 15 degrees of external rotation.  The cast is removed from the left hand, there is some contusion and ecchymosis over the metacarpal phalangeal joint area and proximal phalanxes of the index and middle finger with tenderness at the base of the index finger, sensation is intact and the alignment is normal.  Examination of the right knee demonstrates a moderately large Baker's cyst with an effusion, medial and lateral joint line tenderness, range of motion is 0 to approximately 125 degrees.  The knee is stable to varus valgus and drawer.  X-rays of the shoulder are essentially normal, x-rays of the hand demonstrate a comminuted intra-articular nondisplaced fracture of the proximal phalanx of the left index finger that involves the MCP joint as well, x-rays of the knee are essentially normal.  An MRI of the shoulder demonstrate some mild tendinosis, some findings in the axillary recess that are consistent with frozen shoulder or adhesive  "capsulitis.    Assessment and plan: The patient has a nondisplaced intra-articular comminuted fracture of the index finger proximal phalanx, adhesive capsulitis of the left shoulder, and probable meniscus derangement with Baker's cyst of the right knee.  She was placed in a much less restrictive much less bulky cast today to protect her index finger fracture for an additional 2 weeks.  We will institute physical therapy to help her with her frozen shoulder syndrome, and I am ordering an MRI of her right knee to assess the Baker's cyst and the highly likely association with a chronic meniscus tear./70  Pulse 65  Temp 97.7  F (36.5  C) (Tympanic)  Ht 5' 6\" (1.676 m)  Wt 159 lb (72.1 kg)  SpO2 98%  BMI 25.66 kg/m2  "

## 2018-11-08 NOTE — MR AVS SNAPSHOT
After Visit Summary   11/8/2018    Sadaf Blankenship    MRN: [id number]           Patient Information     Date Of Birth          1960        Visit Information        Provider Department      11/8/2018 3:00 PM Subhash Kraus DO [facility]        Today's Diagnoses     Closed nondisplaced fracture of phalanx of left index finger, unspecified phalanx, initial encounter    -  1    Chronic pain of right knee        Adhesive capsulitis of left shoulder           Follow-ups after your visit        Additional Services     PHYSICAL THERAPY REFERRAL       If you have not heard from the scheduling office within 2 business days, please call [phone] for all locations, with the exception of [place], please call [phone] and [place], please call [phone].    Please be aware that coverage of these services is subject to the terms and limitations of your health insurance plan.  Call member services at your health plan with any benefit or coverage questions.                  Your next 10 appointments already scheduled     Nov 09, 2018  8:40 AM CST   (Arrive by 8:25 AM)   Return Visit with Zoe Herbert MD   [facility] )    [address] [address]   [phone]            Nov 13, 2018 10:00 AM CST   Treatment 60 with Gemini Jackman, PT   [facility] )    [address] [address]   [phone]            Nov 20, 2018 10:00 AM CST   Treatment 60 with Gemini Jackman, PT   [facility] ([facility] )    [address] [address]   [phone]            Nov 26, 2018  1:20 PM CST   (Arrive by 1:05 PM)   Return Visit with Subhash Kraus DO   [facility] ([facility] )    [address] [address]   [phone]            Nov 29, 2018 10:00 AM CST   Treatment  60 with Gemini Jackman, PT   [facility] ([facility] )    [address] [address]   [phone]            Dec 06, 2018 10:45 AM CST   (Arrive by 10:30 AM)   SHORT with Henna Cruz MD   [facility] )    [address]  [facility] [address]   [phone]              Future tests that were ordered for you today     Open Future Orders        Priority Expected Expires Ordered    MR Knee Right w/o Contrast Routine  11/8/2019 11/8/2018    PHYSICAL THERAPY REFERRAL Routine  11/8/2019 11/8/2018            Who to contact     If you have questions or need follow up information about today's clinic visit or your schedule please contact [facility] directly at [phone].  Normal or non-critical lab and imaging results will be communicated to you by MyChart, letter or phone within 4 business days after the clinic has received the results. If you do not hear from us within 7 days, please contact the clinic through [employer] or phone. If you have a critical or abnormal lab result, we will notify you by phone as soon as possible.  Submit refill requests through [employer] or call your pharmacy and they will forward the refill request to us. Please allow 3 business days for your refill to be completed.          Additional Information About Your Visit        MyChart Information     [employer] gives you secure access to your electronic health record. If you see a primary care provider, you can also send messages to your care team and make appointments. If you have questions, please call your primary care clinic.  If you do not have a primary care provider, please call [phone] and they will assist you.        Care EveryWhere ID     This is your Care EveryWhere ID. This could be used by other organizations to access your [place] medical records  [id number]        Your Vitals Were     Pulse Temperature Height Pulse  "Oximetry BMI (Body Mass Index)       65 97.7  F (36.5  C) (Tympanic) 5' 6\" (1.676 m) 98% 25.66 kg/m2        Blood Pressure from Last 3 Encounters:   11/08/18 110/70   11/05/18 112/78   09/27/18 108/60    Weight from Last 3 Encounters:   11/08/18 159 lb (72.1 kg)   11/05/18 159 lb (72.1 kg)   09/27/18 155 lb (70.3 kg)              We Performed the Following     XR FINGER LT G/E 2 VW (Clinic Performed)          Today's Medication Changes          These changes are accurate as of 11/8/18  4:24 PM.  If you have any questions, ask your nurse or doctor.               These medicines have changed or have updated prescriptions.        Dose/Directions    baclofen 10 MG tablet   Commonly known as:  LIORESAL   This may have changed:  when to take this   Used for:  Chronic radicular cervical pain        Dose:  10 mg   Take 1 tablet (10 mg) by mouth 3 times daily as needed for muscle spasms   Quantity:  40 tablet   Refills:  0       sucralfate 1 GM tablet   Commonly known as:  CARAFATE   This may have changed:    - how much to take  - when to take this  - additional instructions   Used for:  Gastroesophageal reflux disease with esophagitis        Dose:  1 g   Take 1 tablet (1 g) by mouth 4 times daily   Quantity:  40 tablet   Refills:  1                Primary Care Provider Office Phone # Fax #    Henna Cruz [phone]       [address] [place] [address]        Equal Access to Services     PRASANNA MAY AH: Hadii trent sinhao Sosujata, waaxda luqadaha, qaybta kaalmada adeegyada, waxay idiin hayaan adewalter chen. So [facility] [phone].    ATENCIÓN: Si habla español, tiene a schroeder disposición servicios gratuitos de asistencia lingüística. Llame al [phone].    We comply with applicable federal civil rights laws and [place] laws. We do not discriminate on the basis of race, color, national origin, age, disability, sex, sexual orientation, or gender identity.            Thank you!     Thank " you for choosing [facility]  for your care. Our goal is always to provide you with excellent care. Hearing back from our patients is one way we can continue to improve our services. Please take a few minutes to complete the written survey that you may receive in the mail after your visit with us. Thank you!             Your Updated Medication List - Protect others around you: Learn how to safely use, store and throw away your medicines at www.disposemymeds.org.          This list is accurate as of 11/8/18  4:24 PM.  Always use your most recent med list.                   Brand Name Dispense Instructions for use Diagnosis    baclofen 10 MG tablet    LIORESAL    40 tablet    Take 1 tablet (10 mg) by mouth 3 times daily as needed for muscle spasms    Chronic radicular cervical pain       CENTRUM SILVER ULTRA WOMENS Tabs           DEXILANT 60 MG Cpdr CR capsule   Generic drug:  dexlansoprazole     30 capsule    TAKE 1 CAPSULE DAILY BY MOUTH    Ulcer of esophagus without bleeding       diazepam 5 MG tablet    VALIUM    100 tablet    TAKE 1 TABLET BY MOUTH EVER Y 6 HOURS AS NEEDED - GENER IC FOR VALIUM    Muscle spasm       docusate sodium 100 MG capsule    COLACE     Take 100 mg by mouth        FLUoxetine 40 MG capsule    PROzac    30 capsule    TAKE 1 CAPSULE (40 MG) BY MOUTH DAILY    Major depressive disorder, recurrent severe without psychotic features (H)       * HYDROcodone-acetaminophen 7.5-325 MG per tablet    NORCO    60 tablet    TAKE 1 TABLET BY MOUTH EVER Y 4 TO 6 HOURS AS NEEDED FO R PAIN    Chronic neck pain       * HYDROcodone-acetaminophen  MG per tablet    NORCO    60 tablet    Take 1 tablet by mouth every 4 hours as needed for severe pain    Closed nondisplaced fracture of phalanx of left index finger with routine healing, unspecified phalanx, subsequent encounter, Acute pain of left shoulder, Chronic radicular cervical pain       ibuprofen 800 MG tablet    ADVIL/MOTRIN    90  tablet    Take 1 tablet (800 mg) by mouth every 8 hours as needed for moderate pain    Bursitis of other bursa of right knee       LYRICA 150 MG capsule   Generic drug:  pregabalin      INCREASE OVER 2 WEEKS  MG THREE TIMES DAILY, THEN CONTINUE ON THIS DOSE.        Misc. Devices Misc      Dispense one Cefaly neurostimulator and the electro stimulators with refills        order for DME      Equipment being ordered: TENS        PROBIOTIC DAILY PO      Take by mouth At Bedtime        senna-docusate 8.6-50 MG per tablet    SENOKOT-S;PERICOLACE     Take 1-4 tablets by mouth as needed        sucralfate 1 GM tablet    CARAFATE    40 tablet    Take 1 tablet (1 g) by mouth 4 times daily    Gastroesophageal reflux disease with esophagitis       topiramate 50 MG tablet    TOPAMAX     Take 2 tablets in the morning and 2 tablets at night        vitamin D3 1000 units Caps      Take 1,000 Units by mouth        * Notice:  This list has 2 medication(s) that are the same as other medications prescribed for you. Read the directions carefully, and ask your doctor or other care provider to review them with you.

## 2018-11-08 NOTE — NURSING NOTE
"Chief Complaint   Patient presents with     Pain     NPT  Right Knee/ Left Shoulder/ Left Index Finger  Xrays First       Initial /70  Pulse 65  Temp 97.7  F (36.5  C) (Tympanic)  Ht 5' 6\" (1.676 m)  Wt 159 lb (72.1 kg)  SpO2 98%  BMI 25.66 kg/m2 Estimated body mass index is 25.66 kg/(m^2) as calculated from the following:    Height as of this encounter: 5' 6\" (1.676 m).    Weight as of this encounter: 159 lb (72.1 kg).  Medication Reconciliation: complete    Fariba Hazel LPN    "

## 2018-11-09 ENCOUNTER — OFFICE VISIT (OUTPATIENT)
Dept: PSYCHIATRY | Facility: OTHER | Age: 58
End: 2018-11-09
Attending: PSYCHIATRY & NEUROLOGY
Payer: COMMERCIAL

## 2018-11-09 VITALS
HEART RATE: 68 BPM | BODY MASS INDEX: 25.66 KG/M2 | OXYGEN SATURATION: 98 % | DIASTOLIC BLOOD PRESSURE: 78 MMHG | SYSTOLIC BLOOD PRESSURE: 98 MMHG | WEIGHT: 159 LBS | TEMPERATURE: 97.9 F

## 2018-11-09 DIAGNOSIS — F33.1 MAJOR DEPRESSIVE DISORDER, RECURRENT EPISODE, MODERATE (H): Primary | ICD-10-CM

## 2018-11-09 PROCEDURE — G0463 HOSPITAL OUTPT CLINIC VISIT: HCPCS

## 2018-11-09 PROCEDURE — 99213 OFFICE O/P EST LOW 20 MIN: CPT | Performed by: PSYCHIATRY & NEUROLOGY

## 2018-11-09 ASSESSMENT — ANXIETY QUESTIONNAIRES
6. BECOMING EASILY ANNOYED OR IRRITABLE: MORE THAN HALF THE DAYS
GAD7 TOTAL SCORE: 12
5. BEING SO RESTLESS THAT IT IS HARD TO SIT STILL: SEVERAL DAYS
2. NOT BEING ABLE TO STOP OR CONTROL WORRYING: MORE THAN HALF THE DAYS
3. WORRYING TOO MUCH ABOUT DIFFERENT THINGS: MORE THAN HALF THE DAYS
1. FEELING NERVOUS, ANXIOUS, OR ON EDGE: MORE THAN HALF THE DAYS
7. FEELING AFRAID AS IF SOMETHING AWFUL MIGHT HAPPEN: SEVERAL DAYS

## 2018-11-09 ASSESSMENT — PATIENT HEALTH QUESTIONNAIRE - PHQ9
SUM OF ALL RESPONSES TO PHQ QUESTIONS 1-9: 11
5. POOR APPETITE OR OVEREATING: MORE THAN HALF THE DAYS

## 2018-11-09 ASSESSMENT — PAIN SCALES - GENERAL: PAINLEVEL: MODERATE PAIN (4)

## 2018-11-09 NOTE — MR AVS SNAPSHOT
After Visit Summary   11/9/2018    Sadaf Blankenship    MRN: [id number]           Patient Information     Date Of Birth          1960        Visit Information        Provider Department      11/9/2018 8:40 AM Zoe Herbert MD [facility]        Today's Diagnoses     Major depressive disorder, recurrent episode, moderate (H)    -  1       Follow-ups after your visit        Your next 10 appointments already scheduled     Nov 13, 2018 10:00 AM CST   Treatment 60 with Gemini Jackman, PT   [facility] ([facility] )    [address] [address]   [phone]            Nov 20, 2018 10:00 AM CST   Treatment 60 with Gemini Jackman, PT   [facility] ([facility] )    [address] [address]   [phone]            Nov 26, 2018  1:20 PM CST   (Arrive by 1:05 PM)   Return Visit with Subhash Kraus DO   [facility] ([facility] )    [address] [address] [phone]            Nov 29, 2018 10:00 AM CST   Treatment 60 with Gemini Jackman, PT   [facility] ([facility] )    [address] [address]   [phone]            Dec 06, 2018 10:45 AM CST   (Arrive by 10:30 AM)   SHORT with Henna Cruz MD   [facility] ([facility] )    [address] [facility] [address]   [phone]              Future tests that were ordered for you today     Open Future Orders        Priority Expected Expires Ordered    MR Knee Right w/o Contrast Routine  11/8/2019 11/8/2018    PHYSICAL THERAPY REFERRAL Routine  11/8/2019 11/8/2018            Who to contact     If you have questions or need follow up information about today's clinic visit or your schedule please contact [facility] directly at [phone].  Normal or non-critical lab and imaging results  will be communicated to you by [employer], letter or phone within 4 business days after the clinic has received the results. If you do not hear from us within 7 days, please contact the clinic through [employer] or phone. If you have a critical or abnormal lab result, we will notify you by phone as soon as possible.  Submit refill requests through [employer] or call your pharmacy and they will forward the refill request to us. Please allow 3 business days for your refill to be completed.          Additional Information About Your Visit        [employer] Information     [employer] gives you secure access to your electronic health record. If you see a primary care provider, you can also send messages to your care team and make appointments. If you have questions, please call your primary care clinic.  If you do not have a primary care provider, please call [phone] and they will assist you.        Care EveryWhere ID     This is your Care EveryWhere ID. This could be used by other organizations to access your [place] medical records  [id number]        Your Vitals Were     Pulse Temperature Pulse Oximetry BMI (Body Mass Index)          68 97.9  F (36.6  C) (Tympanic) 98% 25.66 kg/m2         Blood Pressure from Last 3 Encounters:   11/09/18 98/78   11/08/18 110/70   11/05/18 112/78    Weight from Last 3 Encounters:   11/09/18 72.1 kg (159 lb)   11/08/18 72.1 kg (159 lb)   11/05/18 72.1 kg (159 lb)              Today, you had the following     No orders found for display         Today's Medication Changes          These changes are accurate as of 11/9/18  9:23 AM.  If you have any questions, ask your nurse or doctor.               These medicines have changed or have updated prescriptions.        Dose/Directions    baclofen 10 MG tablet   Commonly known as:  LIORESAL   This may have changed:  when to take this   Used for:  Chronic radicular cervical pain        Dose:  10 mg   Take 1 tablet (10 mg) by mouth 3 times daily as needed  for muscle spasms   Quantity:  40 tablet   Refills:  0       sucralfate 1 GM tablet   Commonly known as:  CARAFATE   This may have changed:    - how much to take  - when to take this  - additional instructions   Used for:  Gastroesophageal reflux disease with esophagitis        Dose:  1 g   Take 1 tablet (1 g) by mouth 4 times daily   Quantity:  40 tablet   Refills:  1                Primary Care Provider Office Phone # Fax #    Henna Cruz [phone] [address] [address]        Equal Access to Services     HARIKA MAY : Hadii trent ku hadasho Soomaali, waaxda luqadaha, qaybta kaalmada adeegyada, waxeva flannery haycristiana vogt . So [facility] [phone].    ATENCIÓN: Si habla español, tiene a schroeder disposición servicios gratuitos de asistencia lingüística. [facility] [phone].    We comply with applicable federal civil rights laws and [place] laws. We do not discriminate on the basis of race, color, national origin, age, disability, sex, sexual orientation, or gender identity.            Thank you!     Thank you for choosing [facility]  for your care. Our goal is always to provide you with excellent care. Hearing back from our patients is one way we can continue to improve our services. Please take a few minutes to complete the written survey that you may receive in the mail after your visit with us. Thank you!             Your Updated Medication List - Protect others around you: Learn how to safely use, store and throw away your medicines at www.disposemymeds.org.          This list is accurate as of 11/9/18  9:23 AM.  Always use your most recent med list.                   Brand Name Dispense Instructions for use Diagnosis    baclofen 10 MG tablet    LIORESAL    40 tablet    Take 1 tablet (10 mg) by mouth 3 times daily as needed for muscle spasms    Chronic radicular cervical pain       CENTRUM SILVER ULTRA WOMENS Tabs           DEXILANT 60  MG Cpdr CR capsule   Generic drug:  dexlansoprazole     30 capsule    TAKE 1 CAPSULE DAILY BY MOUTH    Ulcer of esophagus without bleeding       diazepam 5 MG tablet    VALIUM    100 tablet    TAKE 1 TABLET BY MOUTH EVER Y 6 HOURS AS NEEDED - GENER IC FOR VALIUM    Muscle spasm       docusate sodium 100 MG capsule    COLACE     Take 100 mg by mouth        FLUoxetine 40 MG capsule    PROzac    30 capsule    TAKE 1 CAPSULE (40 MG) BY MOUTH DAILY    Major depressive disorder, recurrent severe without psychotic features (H)       * HYDROcodone-acetaminophen 7.5-325 MG per tablet    NORCO    60 tablet    TAKE 1 TABLET BY MOUTH EVER Y 4 TO 6 HOURS AS NEEDED FO R PAIN    Chronic neck pain       * HYDROcodone-acetaminophen  MG per tablet    NORCO    60 tablet    Take 1 tablet by mouth every 4 hours as needed for severe pain    Closed nondisplaced fracture of phalanx of left index finger with routine healing, unspecified phalanx, subsequent encounter, Acute pain of left shoulder, Chronic radicular cervical pain       ibuprofen 800 MG tablet    ADVIL/MOTRIN    90 tablet    Take 1 tablet (800 mg) by mouth every 8 hours as needed for moderate pain    Bursitis of other bursa of right knee       LYRICA 150 MG capsule   Generic drug:  pregabalin      INCREASE OVER 2 WEEKS  MG THREE TIMES DAILY, THEN CONTINUE ON THIS DOSE.        Misc. Devices Misc      Dispense one Cefaly neurostimulator and the electro stimulators with refills        order for DME      Equipment being ordered: TENS        PROBIOTIC DAILY PO      Take by mouth At Bedtime        senna-docusate 8.6-50 MG per tablet    SENOKOT-S;PERICOLACE     Take 1-4 tablets by mouth as needed        sucralfate 1 GM tablet    CARAFATE    40 tablet    Take 1 tablet (1 g) by mouth 4 times daily    Gastroesophageal reflux disease with esophagitis       topiramate 50 MG tablet    TOPAMAX     Take 2 tablets in the morning and 2 tablets at night        vitamin D3 1000 units  Caps      Take 1,000 Units by mouth        * Notice:  This list has 2 medication(s) that are the same as other medications prescribed for you. Read the directions carefully, and ask your doctor or other care provider to review them with you.

## 2018-11-09 NOTE — PROGRESS NOTES
PSYCHIATRY CLINIC PROGRESS NOTE   20 minute medication management, more than 50% of time spent counseling patient on medications, medication side effects, symptom history and management   SUBJECTIVE / INTERIM HISTORY                                                                       Social- niece and niece's kids moved out Children-  Daughter Shandra going to college  Last visit 7/23/18:  Continue fluoxetine 40 mg daily    - hearing August 7th [place] and just received letter she was denied this week  - left finger broken from trip / fall  - sister helping take care of Sadaf  - as of now health insurance will be shut off as of end this month / November  - doing better with issues with her daughter Noemy. Tough love.  Working at [employer] and worked 19 days -- WAS still asking Sadaf for $ even with her helping with car, phone, etc.   - GI : issues with bowel movements. Still having issues  - long-term disability and moved in with her sister    SUBSTANCE USE- no issues    SYMPTOMS-  Depressed mood, low energy, anhedonia, Anxiety, gets easily overwhelmed, gets panic attacks, depression, anhedonia, low energy, overwhelmed, no SI.  MEDICAL ROS- migraines, . upper abdominal pain. .  Substernal pain after she eats (hx ulcers esophageal and gastric) neck pain s/p neck surgeries, migraines, IBS  MEDICAL / SURGICAL HISTORY                    Patient Active Problem List   Diagnosis     Degenerative disc disease, cervical     Pseudoarthrosis of cervical spine     Cervical pain     Migraine     UTI (urinary tract infection)     Advanced care planning/counseling discussion     Thoracic sprain and strain     Sprain and strain of shoulder and upper arm     Irritable bowel syndrome     Myofascial pain syndrome, cervical     Depression, major     Chronic pain syndrome     Uvulitis     Chronic rhinitis     Madina bullosa     Chronic maxillary sinusitis     Hypertrophy of inferior nasal turbinate     Long uvula     Chronic  neck pain     Chronic pain     Chronic tension-type headache, intractable     Migraine aura without headache     History of arthrodesis     Myofascial pain     Disuse syndrome     Intractable chronic migraine without aura and with status migrainosus     Ulcer of esophagus without bleeding     Gastroesophageal reflux disease with esophagitis     Intractable chronic migraine without aura and without status migrainosus     Ulcer of esophagus     Ulnar neuropathy     Chronic radicular cervical pain     Mild episode of recurrent major depressive disorder (H)     Ulnar neuropathy at elbow of left upper extremity     Lumbar radiculopathy     S/P cervical spinal fusion     ACP (advance care planning)     Acute back pain with sciatica     Chronic migraine without aura without status migrainosus, not intractable     Radicular pain     Subacromial bursitis of right shoulder joint     Panlobular emphysema (H)     Calculus of kidney     Pulmonary emphysema, unspecified emphysema type (H)     Other chronic gastritis without hemorrhage     Pelvic floor dysfunction     ALLERGY   Aspirin; Ibuprofen; Lansoprazole; Penicillins; Red dye; Bupropion; Bupropion hcl; and Demerol [meperidine]  MEDICATIONS                                                                                             Current Outpatient Prescriptions   Medication Sig     baclofen (LIORESAL) 10 MG tablet Take 1 tablet (10 mg) by mouth 3 times daily as needed for muscle spasms (Patient taking differently: Take 10 mg by mouth At Bedtime )     Cholecalciferol (VITAMIN D3) 1000 UNITS CAPS Take 1,000 Units by mouth     DEXILANT 60 MG CPDR CR capsule TAKE 1 CAPSULE DAILY BY MOUTH     diazepam (VALIUM) 5 MG tablet TAKE 1 TABLET BY MOUTH EVER Y 6 HOURS AS NEEDED - GENER IC FOR VALIUM     docusate sodium (COLACE) 100 MG capsule Take 100 mg by mouth     FLUoxetine (PROZAC) 40 MG capsule TAKE 1 CAPSULE (40 MG) BY MOUTH DAILY     HYDROcodone-acetaminophen (NORCO)  MG  per tablet Take 1 tablet by mouth every 4 hours as needed for severe pain     HYDROcodone-acetaminophen (NORCO) 7.5-325 MG per tablet TAKE 1 TABLET BY MOUTH EVER Y 4 TO 6 HOURS AS NEEDED FO R PAIN     ibuprofen (ADVIL/MOTRIN) 800 MG tablet Take 1 tablet (800 mg) by mouth every 8 hours as needed for moderate pain     LYRICA 150 MG capsule INCREASE OVER 2 WEEKS  MG THREE TIMES DAILY, THEN CONTINUE ON THIS DOSE.     Misc. Devices MISC Dispense one Cefaly neurostimulator and the electro stimulators with refills     Multiple Vitamins-Minerals (CENTRUM SILVER ULTRA WOMENS) TABS      order for DME Equipment being ordered: TENS     Probiotic Product (PROBIOTIC DAILY PO) Take by mouth At Bedtime      senna-docusate (SENOKOT-S;PERICOLACE) 8.6-50 MG per tablet Take 1-4 tablets by mouth as needed      sucralfate (CARAFATE) 1 GM tablet Take 1 tablet (1 g) by mouth 4 times daily (Patient taking differently: Patient takes as needed (PRN.)     topiramate (TOPAMAX) 50 MG tablet Take 2 tablets in the morning and 2 tablets at night     No current facility-administered medications for this visit.        VITALS   BP 98/78 (BP Location: Right arm, Patient Position: Standing, Cuff Size: Adult Regular)  Pulse 68  Temp 97.9  F (36.6  C) (Tympanic)  Wt 72.1 kg (159 lb)  SpO2 98%  BMI 25.66 kg/m2     LABS                                                                                                                           Last Basic Metabolic Panel:  NA      142   6/9/2015   POTASSIUM      3.6   6/9/2015  CHLORIDE      109   6/9/2015  SALOME      8.7   6/9/2015  CO2       26   6/9/2015  BUN        8   6/9/2015  CR     0.91   6/9/2015  GLC       96   6/9/2015    MENTAL STATUS EXAM                                                                                        Alert. Oriented to person, place, and date / time. Well groomed, calm, cooperative with good eye contact. No problems with speech or psychomotor behavior. Mood was  depressed and affect was congruent to speech content and full range. Thought process, including associations, was unremarkable and thought content was devoid of suicidal and homicidal ideation and psychotic thought. No hallucinations. Insight was good. Judgment was intact and adequate for safety. Fund of knowledge was intact. Pt demonstrates no obvious problems with attention, concentration, language, recent or remote memory although these were not formally tested.     ASSESSMENT                                                                                                      HISTORICAL:  Initial psych note 10/13/15        NOTES:  Cymbalta -> agitation, angry. Perry Blankenship is a  58 year old, female who has hx of depression and is actually doing okay with depression, anxiety, now on fluoxetine 40 mg daily. She is depressed, upset: has a lot of things going on. We don't feel med change is going to change / improve unfortunately      TREATMENT RISK STATEMENT:  The risks, benefits, alternatives and potential adverse effects have been explained and are understood by the pt.  The pt agrees to the treatment plan with the ability to do so.   The pt knows to call the clinic for any problems or access emergency care if needed.        DIAGNOSES                 (Use of Axes system will continue, even though absent from DSM 5)         MDD recurrent, mild  ESTRELLA      PLAN                                                                                                                    1)  MEDICATIONS:         -- Continue fluoxetine 40 mg daily    2)  THERAPY:  No Change    3)  LABS:  None    4)  PT MONITOR [call for probs]:  worsening sx, SI/HI, SEs from meds    5)  REFERRALS [CD, medical, other]:  None    6)  RTC:  ~ 1 month

## 2018-11-09 NOTE — NURSING NOTE
"Chief Complaint   Patient presents with     RECHECK     Mental health.       Initial BP 98/78 (BP Location: Right arm, Patient Position: Standing, Cuff Size: Adult Regular)  Pulse 68  Temp 97.9  F (36.6  C) (Tympanic)  Wt 72.1 kg (159 lb)  SpO2 98%  BMI 25.66 kg/m2 Estimated body mass index is 25.66 kg/(m^2) as calculated from the following:    Height as of 11/8/18: 1.676 m (5' 6\").    Weight as of this encounter: 72.1 kg (159 lb).  Medication Reconciliation: complete    JANICE ROMO LPN    "

## 2018-11-10 ASSESSMENT — ANXIETY QUESTIONNAIRES: GAD7 TOTAL SCORE: 12

## 2018-11-11 DIAGNOSIS — K22.10 ULCER OF ESOPHAGUS WITHOUT BLEEDING: ICD-10-CM

## 2018-11-12 NOTE — TELEPHONE ENCOUNTER
Dexilant       Last Written Prescription Date:  7/05/2018  Last Fill Quantity: 30,   # refills: 4  Last Office Visit: 11/05/2018  Future Office visit:    Next 5 appointments (look out 90 days)     Nov 26, 2018  1:20 PM CST   (Arrive by 1:05 PM)   Return Visit with Subhash Kraus DO   [facility] [place] [facility] [place] [facility]    [address]  [place] [address]   [phone]            Dec 06, 2018 10:45 AM CST   (Arrive by 10:30 AM)   SHORT with Henna Cruz MD   [facility] [place] ([facility] [place] [facility]    [address] [place] [address]   [phone]            Jan 09, 2019  9:00 AM CST   (Arrive by 8:45 AM)   Return Visit with Zoe Herbert MD   [facility] [place] ([facility] [place] )    [address]  [place] [address]   [phone]

## 2018-11-13 ENCOUNTER — HOSPITAL ENCOUNTER (OUTPATIENT)
Dept: PHYSICAL THERAPY | Facility: HOSPITAL | Age: 58
Setting detail: THERAPIES SERIES
End: 2018-11-13
Attending: FAMILY MEDICINE
Payer: COMMERCIAL

## 2018-11-13 PROCEDURE — 40000718 ZZHC STATISTIC PT DEPARTMENT ORTHO VISIT

## 2018-11-13 PROCEDURE — 97140 MANUAL THERAPY 1/> REGIONS: CPT | Mod: GP

## 2018-11-13 PROCEDURE — 97110 THERAPEUTIC EXERCISES: CPT | Mod: GP

## 2018-11-13 RX ORDER — DEXLANSOPRAZOLE 60 MG/1
CAPSULE, DELAYED RELEASE ORAL
Qty: 30 CAPSULE | Refills: 3 | Status: SHIPPED | OUTPATIENT
Start: 2018-11-13 | End: 2019-03-15

## 2018-11-16 ENCOUNTER — HOSPITAL ENCOUNTER (OUTPATIENT)
Dept: PHYSICAL THERAPY | Facility: HOSPITAL | Age: 58
Setting detail: THERAPIES SERIES
End: 2018-11-16
Attending: ORTHOPAEDIC SURGERY
Payer: COMMERCIAL

## 2018-11-16 DIAGNOSIS — M75.02 ADHESIVE CAPSULITIS OF LEFT SHOULDER: ICD-10-CM

## 2018-11-16 PROCEDURE — 97530 THERAPEUTIC ACTIVITIES: CPT | Mod: GP

## 2018-11-16 PROCEDURE — 97162 PT EVAL MOD COMPLEX 30 MIN: CPT | Mod: GP

## 2018-11-16 PROCEDURE — 97110 THERAPEUTIC EXERCISES: CPT | Mod: GP

## 2018-11-16 NOTE — PROGRESS NOTES
Initial Physical Therapy Evaluation      Name: Sadaf Blankenship MRN# [id number]   Age: 58 year old YOB: 1960     Date of Consultation: November 16, 2018  Primary care provider: Henna Cruz    Referring Physician: Dr. Kraus  Orders: Eval and Treat  Medical Diagnosis: L frozen shoulder/adhesive capsulitis  Onset of Illness/Injury: 10/25/18    Reason for PT Visit: Patient is a 57 y/o female who presents with L shoulder pain and discomfort. States that she had a fall when she was running and tripped over some patio blocks. Ended up breaking her L pointer finger, but also landed on her L shoulder. Started to notice it was difficult to lift and move the L shoulder after the fall. Had an X ray which ruled out a fracture. Did have an MRI taken on 11/5/18 which showed adhesive capsulitis. Pain will wake her up at night and has difficulty performing any active movements of L shouler.   Prior Level of Function: Normal use of L shoulder prior to fall     Community Support/Living Environment/Employment History: Disabled     Patient/Family Goal: Decrease pain, return to normal use of L    Fall Screen:   Have you fallen 2 or more times in the last year? Yes  Have you fallen and had an injury in the last year? Yes - injured L shoulder and broke L 2nd finger  Timed up & go:   Is patient a fall risk? No    Past Medical History:   Past Medical History:   Diagnosis Date     ASCUS on Pap smear 5/26/2004    negative HPV     Atypical chest pain 5/26/2008    negative cardiolite stress test [facility]     Bleeding ulcer 5/26/1976     Cervical radicular pain 5/10/2012     Degenerative disc disease, cervical 1/1/2011     Esophageal ulcer 5/26/1998     Irritable bowel syndrome 1/26/2015     Migraine headaches, severe 7/9/2001     Pseudoarthrosis of cervical spine 7/3/2012     Recurrent UTI 5/26/2011     sinusitis (chronic) 4/16/2001       Past Surgical History:  Past Surgical History:   Procedure Laterality Date      BACK SURGERY      cervical     Cervical spine surgery with removal of 2 disks, C5,C7       COLONOSCOPY  10/13/2014    Dr Camargo, internal hemorrhoids     COLONOSCOPY - HIM SCAN  2018    EGD and colonoscopy with Dr. Jennings     Coronary angiogram, normal      Chest pain     ENT SURGERY      nose surgery x3     fusion discectomy anterior cervical  2011     HC ESOPHAGOSCOPY, DIAGNOSTIC  10/31/2014    Dr Camargo, mild erythema of antrum, negative biopsies     NOSE SURGERY      x3            Posterior decompression , fusion C3-5      Psuedoarthrosis     Supracervical hysterectomy with right salpingoophorectomy      Endometriosis       Medications:   Current Outpatient Prescriptions   Medication Sig     baclofen (LIORESAL) 10 MG tablet Take 1 tablet (10 mg) by mouth 3 times daily as needed for muscle spasms (Patient taking differently: Take 10 mg by mouth At Bedtime )     Cholecalciferol (VITAMIN D3) 1000 UNITS CAPS Take 1,000 Units by mouth     DEXILANT 60 MG CPDR CR capsule TAKE 1 CAPSULE DAILY BY MOUTH     diazepam (VALIUM) 5 MG tablet TAKE 1 TABLET BY MOUTH EVER Y 6 HOURS AS NEEDED - GENER IC FOR VALIUM     docusate sodium (COLACE) 100 MG capsule Take 100 mg by mouth     FLUoxetine (PROZAC) 40 MG capsule TAKE 1 CAPSULE (40 MG) BY MOUTH DAILY     HYDROcodone-acetaminophen (NORCO)  MG per tablet Take 1 tablet by mouth every 4 hours as needed for severe pain     HYDROcodone-acetaminophen (NORCO) 7.5-325 MG per tablet TAKE 1 TABLET BY MOUTH EVER Y 4 TO 6 HOURS AS NEEDED FO R PAIN     ibuprofen (ADVIL/MOTRIN) 800 MG tablet Take 1 tablet (800 mg) by mouth every 8 hours as needed for moderate pain     LYRICA 150 MG capsule INCREASE OVER 2 WEEKS  MG THREE TIMES DAILY, THEN CONTINUE ON THIS DOSE.     Misc. Devices MISC Dispense one Cefaly neurostimulator and the electro stimulators with refills     Multiple Vitamins-Minerals (CENTRUM SILVER ULTRA WOMENS) TABS      order for DME Equipment  being ordered: TENS     Probiotic Product (PROBIOTIC DAILY PO) Take by mouth At Bedtime      senna-docusate (SENOKOT-S;PERICOLACE) 8.6-50 MG per tablet Take 1-4 tablets by mouth as needed      sucralfate (CARAFATE) 1 GM tablet Take 1 tablet (1 g) by mouth 4 times daily (Patient taking differently: Patient takes as needed (PRN.)     topiramate (TOPAMAX) 50 MG tablet Take 2 tablets in the morning and 2 tablets at night     No current facility-administered medications for this encounter.        Imaging: MRI taken of L shoulder on 11/5/18:  IMPRESSION: Pericapsular soft tissue edema within the axillary recess.  This can be seen with adhesive capsulitis.     There is mild to moderate tendinosis of the rotator cuff. There is no  evidence of a full-thickness rotator cuff tear.     SUN BRADSHAW MD    Musculoskeletal Findings:     OBJECTIVE   Observation: Patient appears to PT in to acute distress  Palpation: Tenderness with palpation to L rotator cuff insertions, upper trap tension    Posture: forward head, protracted shoulders  Sitting Posture: Fair   Standing Posture: Fair    Neurological Assessment:   -Deferred    Range of Motion/Strength:   Cervical range of motion: within normal limits    Shoulder Range of Motion and Strength    RIGHT Shoulder ROM (Active)  Flexion: 124 degrees  Abduction: 97 degrees   Internal Rotation:   Functional IR: L1   External Rotation:   Functional ER: T3     LEFT Shoulder ROM (Active)  Flexion: 75 degrees  Abduction: 55 degrees  Internal Rotation:   Functional IR: ASIS on L  External Rotation:   Functional ER: L upper trap   Bilateral elbow, wrist, and hand range of motion and strength within normal limits.    L PROM: scaption to 75 degrees, IR to chest wall, ER to neutral (0 degrees)    STRENGTH                                                 Right                       Left  Flexion:                              3/5                         1+/5  Extension:                                                      IR:                                      3/5                         2/5  ER:                                     3/5                         1+/5  Abduction:                         3/5                         2-/5      Short-term goals:  To be achieved in 3 weeks:  Instruct in home program    1.) Patient will understand biomechanical stressors of the shoulder joint in order to make modifications to activities to reduce further risk of injury.  2.) Patient will be independent with a short-term home exercise program.  3.) Patient will report 25% improvement of overall symptoms to allow decreased shoulder pain with daily movement and activity.       Long-term goals:  To be achieved in 10 weeks:  Self management of symptoms    1.) Improve score on Shoulder Pain and Disability Index by 13 points or greater to correlate with clinically significant change.  2.) Patient will report 50% improvement of overall symptoms to allow decreased shoulder pain with daily movement and activity  3.) Patient will be able to attain roughly 125 degrees of active L shoulder scaption to allow increased ability to reach and grab objects with left upper extremity.    Discharge goals: Patient will be independent with a home program for self-management of symptoms.    Outcome Measures:   SPADI - 100% disability    Prognosis/Plan of Care: Good  Appropriate for Physical Therapy Intervention: Yes       Planned Interventions: Therapeutic exercise, manual therapy, therapeutic activity, patient education    Patient presents today with signs and symptoms consistent of L shoulder adhesive capsulitis. Therapy today consisted of evaluation, patient education, and therapeutic exercise. Patient would benefit from continued PT sessions addressing overall pain and dysfunction with use of appropriate interventions.    Clinical Impressions:  Criteria for Skilled Therapeutic Intervention Met: Yes  PT Diagnosis: L shoulder adhesive  capsulitis  Influenced by the following impairments: pain, decreased ROM, weakness  Functional limitations due to impairment: Difficulty sleeping at night, unable to reach, difficulty with dressing  Clinical presentation: Evolving/Changing  Clinical presentation rationale: Clinical judgement  Clinical Decision making (complexity): Moderate Complexity  Predicted Duration of Therapy Intervention (days/wks): 10 weeks  Risks and Benefits of therapy have been explained: Yes  Patient, Family & other staff in agreement with plan of care: Yes  Comments:     Treatment Rendered/Intervention:  Evaluation completed as described above followed by discussion of exam findings and plan of care.    Therapeutic exercise: Patient instructed in and demonstrated proper performance of home exercise program consisting of:  Pendulums, wall slides, active assistive reaching with other UE helping, and scap sets    Educated in posture principles and neutral spine positioning. Patient was instructed in home use of heat and/or ice for pain management      Total Evaluation Time: 50 minutes

## 2018-11-20 ENCOUNTER — HOSPITAL ENCOUNTER (OUTPATIENT)
Dept: MRI IMAGING | Facility: HOSPITAL | Age: 58
Discharge: HOME | End: 2018-11-20
Attending: ORTHOPAEDIC SURGERY | Admitting: ORTHOPAEDIC SURGERY
Payer: COMMERCIAL

## 2018-11-20 ENCOUNTER — HOSPITAL ENCOUNTER (OUTPATIENT)
Dept: PHYSICAL THERAPY | Facility: HOSPITAL | Age: 58
Setting detail: THERAPIES SERIES
End: 2018-11-20
Attending: FAMILY MEDICINE
Payer: COMMERCIAL

## 2018-11-20 ENCOUNTER — HOSPITAL ENCOUNTER (OUTPATIENT)
Dept: PHYSICAL THERAPY | Facility: HOSPITAL | Age: 58
Setting detail: THERAPIES SERIES
End: 2018-11-20
Attending: ORTHOPAEDIC SURGERY
Payer: COMMERCIAL

## 2018-11-20 DIAGNOSIS — G89.29 CHRONIC PAIN OF RIGHT KNEE: ICD-10-CM

## 2018-11-20 DIAGNOSIS — M25.561 CHRONIC PAIN OF RIGHT KNEE: ICD-10-CM

## 2018-11-20 PROCEDURE — 97140 MANUAL THERAPY 1/> REGIONS: CPT | Mod: GP

## 2018-11-20 PROCEDURE — 97110 THERAPEUTIC EXERCISES: CPT | Mod: GP

## 2018-11-20 PROCEDURE — 73721 MRI JNT OF LWR EXTRE W/O DYE: CPT | Mod: TC,RT

## 2018-11-20 PROCEDURE — 40000718 ZZHC STATISTIC PT DEPARTMENT ORTHO VISIT

## 2018-11-26 ENCOUNTER — OFFICE VISIT (OUTPATIENT)
Dept: ORTHOPEDICS | Facility: OTHER | Age: 58
End: 2018-11-26
Attending: ORTHOPAEDIC SURGERY
Payer: COMMERCIAL

## 2018-11-26 ENCOUNTER — RADIANT APPOINTMENT (OUTPATIENT)
Dept: GENERAL RADIOLOGY | Facility: OTHER | Age: 58
End: 2018-11-26
Attending: ORTHOPAEDIC SURGERY
Payer: COMMERCIAL

## 2018-11-26 VITALS
SYSTOLIC BLOOD PRESSURE: 110 MMHG | WEIGHT: 160 LBS | TEMPERATURE: 98.9 F | HEART RATE: 71 BPM | HEIGHT: 66 IN | DIASTOLIC BLOOD PRESSURE: 72 MMHG | BODY MASS INDEX: 25.71 KG/M2 | OXYGEN SATURATION: 93 %

## 2018-11-26 DIAGNOSIS — S62.601A CLOSED NONDISPLACED FRACTURE OF PHALANX OF LEFT INDEX FINGER, UNSPECIFIED PHALANX, INITIAL ENCOUNTER: ICD-10-CM

## 2018-11-26 DIAGNOSIS — S62.641A: Primary | ICD-10-CM

## 2018-11-26 PROCEDURE — 99212 OFFICE O/P EST SF 10 MIN: CPT | Mod: 24 | Performed by: ORTHOPAEDIC SURGERY

## 2018-11-26 PROCEDURE — 99207 ZZC FRACTURE CARE IN GLOBAL PERIOD: CPT | Performed by: ORTHOPAEDIC SURGERY

## 2018-11-26 PROCEDURE — G0463 HOSPITAL OUTPT CLINIC VISIT: HCPCS

## 2018-11-26 PROCEDURE — 73140 X-RAY EXAM OF FINGER(S): CPT | Mod: TC,LT

## 2018-11-26 ASSESSMENT — PAIN SCALES - GENERAL: PAINLEVEL: SEVERE PAIN (6)

## 2018-11-26 NOTE — PROGRESS NOTES
"Patient presents today follow-up after MRI of her right knee and for her left index finger proximal phalanx fracture.  She has minimal tenderness at the fracture site, anatomic alignment is noted, sensation is intact, vascular status is normal in the left index finger.  The MRI of her right knee demonstrates some chondromalacia and cracking and fissuring of the lateral facet of the patella but no significant arthritic change in the medial or lateral compartments, no meniscus tears and specifically no Baker's cyst is noted.  At the present time were going to buddy tape the left index finger to the left middle finger and allow her to begin range of motion and comfortable use of the digit.  As far as her knee is concerned, she should continue to maintain her flexibility and strength and follow-up for her knee on a as needed basis./72 (BP Location: Left arm, Cuff Size: Adult Regular)  Pulse 71  Temp 98.9  F (37.2  C) (Tympanic)  Ht 5' 6\" (1.676 m)  Wt 160 lb (72.6 kg)  SpO2 93%  BMI 25.82 kg/m2  "

## 2018-11-26 NOTE — NURSING NOTE
"Chief Complaint   Patient presents with     RECHECK     follow up right knee, left shoulder and left index finger       Initial /72 (BP Location: Left arm, Cuff Size: Adult Regular)  Pulse 71  Temp 98.9  F (37.2  C) (Tympanic)  Ht 5' 6\" (1.676 m)  Wt 160 lb (72.6 kg)  SpO2 93%  BMI 25.82 kg/m2 Estimated body mass index is 25.82 kg/(m^2) as calculated from the following:    Height as of this encounter: 5' 6\" (1.676 m).    Weight as of this encounter: 160 lb (72.6 kg).  Medication Reconciliation: complete    Jazmin Harris LPN  "

## 2018-11-26 NOTE — MR AVS SNAPSHOT
After Visit Summary   11/26/2018    Sadaf Blankenship    MRN: [id number]           Patient Information     Date Of Birth          1960        Visit Information        Provider Department      11/26/2018 1:20 PM Subhash Kraus DO [facility]        Today's Diagnoses     Nondisp fx of proximal phalanx of left index finger, init    -  1    Closed nondisplaced fracture of phalanx of left index finger, unspecified phalanx, initial encounter           Follow-ups after your visit        Your next 10 appointments already scheduled     Nov 29, 2018  9:30 AM CST   Ortho Treatment with Kip Thorstenson, PT   [facility] ([facility] )    [address] [address]   [phone]            Nov 29, 2018 10:00 AM CST   Treatment 60 with Gemini Jackman, PT   [facility] ([facility] )    [address] [address]   [phone]            Dec 05, 2018 10:00 AM CST   Ortho Treatment with Katie Hu, PTA   [facility] ([facility] )    [address] [address]   [phone]            Dec 06, 2018 10:45 AM CST   (Arrive by 10:30 AM)   SHORT with Henna Cruz MD   [facility]    [address] [facility] [address]   [phone]            Dec 11, 2018  9:00 AM CST   Ortho Treatment with Kip Thorstenson, PT   [facility] ([facility] )    [address] [address]   [phone]            Jan 09, 2019  9:00 AM CST   (Arrive by 8:45 AM)   Return Visit with Zoe Herbert MD   [facility] ([facility] )    [address] [id number] [phone]              Who to contact     If you have questions or need follow up information about today's clinic visit or your schedule please contact [facility] directly at  "[phone].  Normal or non-critical lab and imaging results will be communicated to you by [email or web address], letter or phone within 4 business days after the clinic has received the results. If you do not hear from us within 7 days, please contact the clinic through [employer] or phone. If you have a critical or abnormal lab result, we will notify you by phone as soon as possible.  Submit refill requests through [employer] or call your pharmacy and they will forward the refill request to us. Please allow 3 business days for your refill to be completed.          Additional Information About Your Visit        MashMe[employer]TV[employer] Information     [employer] gives you secure access to your electronic health record. If you see a primary care provider, you can also send messages to your care team and make appointments. If you have questions, please call your primary care clinic.  If you do not have a primary care provider, please call [phone] and they will assist you.        Care EveryWhere ID     This is your Care EveryWhere ID. This could be used by other organizations to access your [place] medical records  [id number]        Your Vitals Were     Pulse Temperature Height Pulse Oximetry BMI (Body Mass Index)       71 98.9  F (37.2  C) (Tympanic) 5' 6\" (1.676 m) 93% 25.82 kg/m2        Blood Pressure from Last 3 Encounters:   11/26/18 110/72   11/09/18 98/78   11/08/18 110/70    Weight from Last 3 Encounters:   11/26/18 160 lb (72.6 kg)   11/09/18 159 lb (72.1 kg)   11/08/18 159 lb (72.1 kg)              We Performed the Following     XR FINGER LT G/E 2 VW (Clinic Performed)          Today's Medication Changes          These changes are accurate as of 11/26/18  2:01 PM.  If you have any questions, ask your nurse or doctor.               These medicines have changed or have updated prescriptions.        Dose/Directions    baclofen 10 MG tablet   Commonly known as:  LIORESAL   This may have changed:  when to take this   Used for:  Chronic " radicular cervical pain        Dose:  10 mg   Take 1 tablet (10 mg) by mouth 3 times daily as needed for muscle spasms   Quantity:  40 tablet   Refills:  0                Primary Care Provider Office Phone # Fax #    Henna Cruz [phone] [address] [facility] [address]        Equal Access to Services     [facility]: Hadii aad ku hadasho Soomaali, waaxda luqadaha, qaybta kaalmada adeegyada, waxay maevein hayaan adewalter horn laprincedayday . So [facility] [phone].    ATENCIÓN: Si habla español, tiene a schroeder disposición servicios gratuitos de asistencia lingüística. [facility] [phone].    We comply with applicable federal civil rights laws and [place] laws. We do not discriminate on the basis of race, color, national origin, age, disability, sex, sexual orientation, or gender identity.            Thank you!     Thank you for choosing [facility]  for your care. Our goal is always to provide you with excellent care. Hearing back from our patients is one way we can continue to improve our services. Please take a few minutes to complete the written survey that you may receive in the mail after your visit with us. Thank you!             Your Updated Medication List - Protect others around you: Learn how to safely use, store and throw away your medicines at www.disposemymeds.org.          This list is accurate as of 11/26/18  2:01 PM.  Always use your most recent med list.                   Brand Name Dispense Instructions for use Diagnosis    baclofen 10 MG tablet    LIORESAL    40 tablet    Take 1 tablet (10 mg) by mouth 3 times daily as needed for muscle spasms    Chronic radicular cervical pain       CENTRUM SILVER ULTRA WOMENS Tabs           DEXILANT 60 MG Cpdr CR capsule   Generic drug:  dexlansoprazole     30 capsule    TAKE 1 CAPSULE DAILY BY MOUTH    Ulcer of esophagus without bleeding       diazepam 5 MG tablet    VALIUM    100 tablet    TAKE 1 TABLET BY  MOUTH EVER Y 6 HOURS AS NEEDED - GENER IC FOR VALIUM    Muscle spasm       docusate sodium 100 MG capsule    COLACE     Take 100 mg by mouth        FLUoxetine 40 MG capsule    PROzac    30 capsule    TAKE 1 CAPSULE (40 MG) BY MOUTH DAILY    Major depressive disorder, recurrent severe without psychotic features (H)       * HYDROcodone-acetaminophen 7.5-325 MG per tablet    NORCO    60 tablet    TAKE 1 TABLET BY MOUTH EVER Y 4 TO 6 HOURS AS NEEDED FO R PAIN    Chronic neck pain       * HYDROcodone-acetaminophen  MG per tablet    NORCO    60 tablet    Take 1 tablet by mouth every 4 hours as needed for severe pain    Closed nondisplaced fracture of phalanx of left index finger with routine healing, unspecified phalanx, subsequent encounter, Acute pain of left shoulder, Chronic radicular cervical pain       ibuprofen 800 MG tablet    ADVIL/MOTRIN    90 tablet    Take 1 tablet (800 mg) by mouth every 8 hours as needed for moderate pain    Bursitis of other bursa of right knee       LYRICA 150 MG capsule   Generic drug:  pregabalin      INCREASE OVER 2 WEEKS  MG THREE TIMES DAILY, THEN CONTINUE ON THIS DOSE.        Misc. Devices Misc      Dispense one Cefaly neurostimulator and the electro stimulators with refills        order for DME      Equipment being ordered: TENS        PROBIOTIC DAILY PO      Take by mouth At Bedtime        sucralfate 1 GM tablet    CARAFATE    40 tablet    Take 1 tablet (1 g) by mouth 4 times daily    Gastroesophageal reflux disease with esophagitis       topiramate 50 MG tablet    TOPAMAX     Take 2 tablets in the morning and 2 tablets at night        vitamin D3 1000 units Caps      Take 1,000 Units by mouth        * Notice:  This list has 2 medication(s) that are the same as other medications prescribed for you. Read the directions carefully, and ask your doctor or other care provider to review them with you.

## 2018-11-28 NOTE — PROGRESS NOTES
SUBJECTIVE:   Sadaf Blankenship is a 58 year old female who presents to clinic today for the following health issues:      Chronic Pain Follow-Up       Type / Location of Pain: Left shoulder and arm  Analgesia/pain control:       Recent changes:  improved      Overall control: Tolerable with discomfort  Activity level/function:      Daily activities:  Unable to perform most daily activities - chores, hobbies, social activities, driving    Work:  not applicable  Adverse effects:  No  Adherance    Taking medication as directed?  Yes    Participating in other treatments: yes  Risk Factors:    Sleep:  Poor    Mood/anxiety:  controlled    Recent family or social stressors:  none noted    Other aggravating factors: reaching and lying down  PHQ-9 SCORE 9/27/2018 11/5/2018 11/9/2018   PHQ-9 Total Score 4 8 11     ESTRELLA-7 SCORE 9/27/2018 11/5/2018 11/9/2018   Total Score 4 12 12     Encounter-Level CSA - 07/11/2017:          Controlled Substance Agreement - Scan on 7/14/2017 12:56 PM : CONTROLLED SUBSTANCE AGREEMENT (below)                Amount of exercise or physical activity: 5-6 days a week    Problems taking medications regularly: No    Medication side effects: none    Diet: regular (no restrictions)  This left shoulder pain has continued since a fall where she also fractured her left index finger. Dr Kraus has been following this for her. She has limited movement of the shoulder and is expected to need 6 months of PT to improve this. She continues to wait for her appeal for disability for her chronic neck pain. She is considering moving to [place] to be with family.       She continues to have ongoing chronic neck and upper thoracic pain    Problem list and histories reviewed & adjusted, as indicated.  Additional history: as documented    Patient Active Problem List   Diagnosis     Degenerative disc disease, cervical     Pseudoarthrosis of cervical spine     Cervical pain     Migraine     UTI (urinary tract infection)      Advanced care planning/counseling discussion     Thoracic sprain and strain     Sprain and strain of shoulder and upper arm     Irritable bowel syndrome     Myofascial pain syndrome, cervical     Depression, major     Chronic pain syndrome     Uvulitis     Chronic rhinitis     Madina bullosa     Chronic maxillary sinusitis     Hypertrophy of inferior nasal turbinate     Long uvula     Chronic neck pain     Chronic pain     Chronic tension-type headache, intractable     Migraine aura without headache     History of arthrodesis     Myofascial pain     Disuse syndrome     Intractable chronic migraine without aura and with status migrainosus     Ulcer of esophagus without bleeding     Gastroesophageal reflux disease with esophagitis     Intractable chronic migraine without aura and without status migrainosus     Ulcer of esophagus     Ulnar neuropathy     Chronic radicular cervical pain     Mild episode of recurrent major depressive disorder (H)     Ulnar neuropathy at elbow of left upper extremity     Lumbar radiculopathy     S/P cervical spinal fusion     ACP (advance care planning)     Acute back pain with sciatica     Chronic migraine without aura without status migrainosus, not intractable     Radicular pain     Subacromial bursitis of right shoulder joint     Panlobular emphysema (H)     Calculus of kidney     Pulmonary emphysema, unspecified emphysema type (H)     Other chronic gastritis without hemorrhage     Pelvic floor dysfunction     Adhesive capsulitis of left shoulder     Past Surgical History:   Procedure Laterality Date     BACK SURGERY      cervical     Cervical spine surgery with removal of 2 disks, C5,C7       COLONOSCOPY  10/13/2014    Dr Camargo, internal hemorrhoids     COLONOSCOPY - HIM SCAN  02/09/2018    EGD and colonoscopy with Dr. Jennings     Coronary angiogram, normal  2003    Chest pain     ENT SURGERY      nose surgery x3     fusion discectomy anterior cervical  2011      ESOPHAGOSCOPY,  DIAGNOSTIC  10/31/2014    Dr Camargo, mild erythema of antrum, negative biopsies     NOSE SURGERY      x3            Posterior decompression , fusion C3-5      Psuedoarthrosis     Supracervical hysterectomy with right salpingoophorectomy      Endometriosis       Social History   Substance Use Topics     Smoking status: Former Smoker     Years: 8.00     Types: Cigarettes     Smokeless tobacco: Never Used      Comment: quit . no passive exposure     Alcohol use No     Family History   Problem Relation Age of Onset     Cancer Father      lung, also a heavy smoker     Diabetes Mother      Heart Disease Mother 58     MI; cause of death; heavy smoker. had first MI at 40     Coronary Artery Disease Mother      Hypertension Mother      Cancer Other      strong history     Heart Disease Other      heart disease     Heart Disease Brother      x3     Hypertension Brother      Diabetes Brother      Coronary Artery Disease Brother      Hypertension Sister          Current Outpatient Prescriptions   Medication Sig Dispense Refill     baclofen (LIORESAL) 10 MG tablet Take 1 tablet (10 mg) by mouth 3 times daily as needed for muscle spasms (Patient taking differently: Take 10 mg by mouth At Bedtime ) 40 tablet 0     butalbital-acetaminophen-caffeine (FIORICET/ESGIC) -40 MG tablet TAKE 1 TO 2 TABLETS BY MOUTH AT THE ONSET OF A MIGRAINE HEADACHE, LIMIT NO MORE THAN 3 TIMES PER WEEK. ACETAMINOPHEN SHOULD BE LIMITED TO 40  5     Cholecalciferol (VITAMIN D3) 1000 UNITS CAPS Take 1,000 Units by mouth       DEXILANT 60 MG CPDR CR capsule TAKE 1 CAPSULE DAILY BY MOUTH 30 capsule 3     diazepam (VALIUM) 5 MG tablet TAKE 1 TABLET BY MOUTH EVER Y 6 HOURS AS NEEDED - GENER IC FOR VALIUM (Patient not taking: Reported on 2018) 100 tablet 0     docusate sodium (COLACE) 100 MG capsule Take 100 mg by mouth       FLUoxetine (PROZAC) 40 MG capsule TAKE 1 CAPSULE (40 MG) BY MOUTH DAILY 30 capsule 3      "HYDROcodone-acetaminophen (NORCO)  MG per tablet Take 1 tablet by mouth every 4 hours as needed for severe pain (Patient not taking: Reported on 11/26/2018) 60 tablet 0     HYDROcodone-acetaminophen (NORCO) 7.5-325 MG per tablet TAKE 1 TABLET BY MOUTH EVER Y 4 TO 6 HOURS AS NEEDED FO R PAIN (Patient not taking: Reported on 11/26/2018) 60 tablet 0     ibuprofen (ADVIL/MOTRIN) 800 MG tablet Take 1 tablet (800 mg) by mouth every 8 hours as needed for moderate pain (Patient not taking: Reported on 11/26/2018) 90 tablet 1     LYRICA 150 MG capsule INCREASE OVER 2 WEEKS  MG THREE TIMES DAILY, THEN CONTINUE ON THIS DOSE.  5     Misc. Devices MISC Dispense one Cefaly neurostimulator and the electro stimulators with refills       Multiple Vitamins-Minerals (CENTRUM SILVER ULTRA WOMENS) TABS        order for DME Equipment being ordered: TENS       Probiotic Product (PROBIOTIC DAILY PO) Take by mouth At Bedtime        sucralfate (CARAFATE) 1 GM tablet Take 1 tablet (1 g) by mouth 4 times daily (Patient not taking: Reported on 11/26/2018) 40 tablet 1     topiramate (TOPAMAX) 50 MG tablet Take 2 tablets in the morning and 3 tablets at night       Allergies   Allergen Reactions     Aspirin Hives     Ibuprofen      Dye in the otc form causes headaches  \"patient states over the counter ibuprofen  bothers her\"     Lansoprazole Other (See Comments) and Visual Disturbance     Changes in eyesight  Prevacid  Change in eyesight     Penicillins Other (See Comments) and Itching     pruritis     Red Dye Hives     Bupropion Rash     Bupropion Hcl Hives and Rash     Wellbutrin     Demerol [Meperidine] Other (See Comments)     Made migraine worse     BP Readings from Last 3 Encounters:   12/06/18 104/70   11/26/18 110/72   11/09/18 98/78    Wt Readings from Last 3 Encounters:   12/06/18 158 lb (71.7 kg)   11/26/18 160 lb (72.6 kg)   11/09/18 159 lb (72.1 kg)                    Reviewed and updated as needed this visit by clinical " "staff       Reviewed and updated as needed this visit by Provider         ROS:  Constitutional, HEENT, cardiovascular, pulmonary, gi and gu systems are negative, except as otherwise noted.    OBJECTIVE:                                                    /70 (BP Location: Right arm, Patient Position: Standing, Cuff Size: Adult Regular)  Pulse 71  Temp 98.7  F (37.1  C) (Tympanic)  Resp 17  Ht 5' 6\" (1.676 m)  Wt 158 lb (71.7 kg)  SpO2 98%  BMI 25.5 kg/m2  Body mass index is 25.5 kg/(m^2).  GENERAL APPEARANCE, alert and mild distress, uncomfortable  NECK: no adenopathy, no asymmetry, masses, or scars and thyroid normal to palpation  RESP: lungs clear to auscultation - no rales, rhonchi or wheezes  CV: regular rates and rhythm, normal S1 S2, no S3 or S4 and no murmur, click or rub  ORTHO:   SHOULDER Exam-Left   Inspection: no swelling, no bruising, no discoloration, no obvious deformity, no asymmetry, no glenohumeral joint anterior bulge, no distal clavicle elevation, no muscle atrophy, no scapular winging   Tenderness of: SC joint- no , clavicle(prox-mid)- no , clavicle-(mid-distal)- no , AC joint- YES, acromion- no , anterior capsule- YES, prox bicep tendon- no , greater tuberosity- no , prox humerus- no , supraspinatous- no , infraspinatous- no , superior trapezious- YES, rhomboids- no    Range of Motion: Active- forward flexion- 30 degrees, abduction- 30 degrees, external rotation- unalbe, internal rotation- degrees 20  Range of Motion: Passive- as above    Strength: intact but testing is painful   Special tests: not done        Cervical Spine Exam: Inspection: increased cervical lordosis  Tender:  left paracervical muscles, right paracervical muscles, left trapezius muscles  Non-tender:  occipital nerves, spinous processes, scapula, right trapezius muscles  Range of Motion:  flexion:  decreased, painful, extension: decreased, painful, left lateral rotation:  decreased, painful, 30 degrees, right " lateral rotation:  decreased, painful, 20degrees  Strength: Full strength of all neck muscles  Left index finger is peyton taped to the third finger for another few days. Normal appearance  SKIN: no suspicious lesions or rashes  NEURO: Normal strength and tone, mentation intact and speech normal  PSYCH: mentation appears normal, anxious and worried         ASSESSMENT/PLAN:                                                    1. Chronic radicular cervical pain  Continue medication, she is waiting on an appeal for disability.     2. Lumbar radiculopathy  stable    3. S/P cervical spinal fusion      4. Intractable chronic migraine without aura and with status migrainosus  She has been receiving Botox injections    5. Chronic pain syndrome  Continue medication    6. Adhesive capsulitis of left shoulder  With significant decreased ROM, continue PT. She is very frustrated with her ongoing pain and inability to be active or work     7. Closed nondisplaced fracture of proximal phalanx of left index finger with routine healing, subsequent encounter  Peyton taped today, doing well.      Follow up with Provider - 3 months     Henna Cruz MD  [facility]

## 2018-11-29 ENCOUNTER — HOSPITAL ENCOUNTER (OUTPATIENT)
Dept: PHYSICAL THERAPY | Facility: HOSPITAL | Age: 58
Setting detail: THERAPIES SERIES
End: 2018-11-29
Attending: ORTHOPAEDIC SURGERY
Payer: COMMERCIAL

## 2018-11-29 ENCOUNTER — HOSPITAL ENCOUNTER (OUTPATIENT)
Dept: PHYSICAL THERAPY | Facility: HOSPITAL | Age: 58
Setting detail: THERAPIES SERIES
End: 2018-11-29
Attending: FAMILY MEDICINE
Payer: COMMERCIAL

## 2018-11-29 PROCEDURE — 97110 THERAPEUTIC EXERCISES: CPT | Mod: GP

## 2018-11-29 PROCEDURE — 97140 MANUAL THERAPY 1/> REGIONS: CPT | Mod: GP

## 2018-11-29 PROCEDURE — 40000718 ZZHC STATISTIC PT DEPARTMENT ORTHO VISIT

## 2018-12-05 ENCOUNTER — HOSPITAL ENCOUNTER (OUTPATIENT)
Dept: PHYSICAL THERAPY | Facility: HOSPITAL | Age: 58
Setting detail: THERAPIES SERIES
End: 2018-12-05
Attending: ORTHOPAEDIC SURGERY

## 2018-12-05 PROCEDURE — 97110 THERAPEUTIC EXERCISES: CPT | Mod: GP

## 2018-12-05 PROCEDURE — 97140 MANUAL THERAPY 1/> REGIONS: CPT | Mod: GP

## 2018-12-06 ENCOUNTER — OFFICE VISIT (OUTPATIENT)
Dept: FAMILY MEDICINE | Facility: OTHER | Age: 58
End: 2018-12-06
Attending: FAMILY MEDICINE

## 2018-12-06 VITALS
DIASTOLIC BLOOD PRESSURE: 70 MMHG | HEART RATE: 71 BPM | HEIGHT: 66 IN | RESPIRATION RATE: 17 BRPM | SYSTOLIC BLOOD PRESSURE: 104 MMHG | BODY MASS INDEX: 25.39 KG/M2 | WEIGHT: 158 LBS | TEMPERATURE: 98.7 F | OXYGEN SATURATION: 98 %

## 2018-12-06 DIAGNOSIS — S62.641D CLOSED NONDISPLACED FRACTURE OF PROXIMAL PHALANX OF LEFT INDEX FINGER WITH ROUTINE HEALING, SUBSEQUENT ENCOUNTER: ICD-10-CM

## 2018-12-06 DIAGNOSIS — M54.16 LUMBAR RADICULOPATHY: ICD-10-CM

## 2018-12-06 DIAGNOSIS — M75.02 ADHESIVE CAPSULITIS OF LEFT SHOULDER: ICD-10-CM

## 2018-12-06 DIAGNOSIS — Z98.1 S/P CERVICAL SPINAL FUSION: ICD-10-CM

## 2018-12-06 DIAGNOSIS — G89.4 CHRONIC PAIN SYNDROME: ICD-10-CM

## 2018-12-06 DIAGNOSIS — M54.12 CHRONIC RADICULAR CERVICAL PAIN: Primary | ICD-10-CM

## 2018-12-06 DIAGNOSIS — G43.711 INTRACTABLE CHRONIC MIGRAINE WITHOUT AURA AND WITH STATUS MIGRAINOSUS: ICD-10-CM

## 2018-12-06 DIAGNOSIS — G89.29 CHRONIC RADICULAR CERVICAL PAIN: Primary | ICD-10-CM

## 2018-12-06 PROCEDURE — 99214 OFFICE O/P EST MOD 30 MIN: CPT | Performed by: FAMILY MEDICINE

## 2018-12-06 RX ORDER — BUTALBITAL, ACETAMINOPHEN AND CAFFEINE 50; 325; 40 MG/1; MG/1; MG/1
TABLET ORAL
Refills: 5 | COMMUNITY
Start: 2018-11-27 | End: 2022-01-06

## 2018-12-06 ASSESSMENT — ANXIETY QUESTIONNAIRES
5. BEING SO RESTLESS THAT IT IS HARD TO SIT STILL: NOT AT ALL
6. BECOMING EASILY ANNOYED OR IRRITABLE: NOT AT ALL
1. FEELING NERVOUS, ANXIOUS, OR ON EDGE: SEVERAL DAYS
4. TROUBLE RELAXING: SEVERAL DAYS
2. NOT BEING ABLE TO STOP OR CONTROL WORRYING: NOT AT ALL
GAD7 TOTAL SCORE: 2
7. FEELING AFRAID AS IF SOMETHING AWFUL MIGHT HAPPEN: NOT AT ALL
3. WORRYING TOO MUCH ABOUT DIFFERENT THINGS: NOT AT ALL

## 2018-12-06 ASSESSMENT — PAIN SCALES - GENERAL: PAINLEVEL: MODERATE PAIN (4)

## 2018-12-06 ASSESSMENT — PATIENT HEALTH QUESTIONNAIRE - PHQ9: SUM OF ALL RESPONSES TO PHQ QUESTIONS 1-9: 5

## 2018-12-06 NOTE — LETTER
My COPD Action Plan     Name: Sadaf Blankenship    YOB: 1960   Date: 11/28/2018    My doctor: Henna Cruz MD   My clinic: [facility] [place]    [facility] [facility]  [place] [address]  [phone]  My Controller Medicine: none   Dose: N/A     My Rescue Medicine: none   Dose: N/A     My Flare Up Medicine: none   Dose: N/A     My COPD Severity:  mild     Use of Oxygen: Oxygen Not Prescribed      Make sure you've had your pneumonia   vaccines.          GREEN ZONE       Doing well today      Usual level of activity and exercise    Usual amount of cough and mucus    No shortness of breath    Usual level of health (thinking clearly, sleeping well, feel like eating) Actions:      Take daily medicines    Use oxygen as prescribed    Follow regular exercise and diet plan    Avoid cigarette smoke and other irritants that harm the lungs           YELLOW ZONE          Having a bad day or flare up      Short of breath more than usual    A lot more sputum (mucus) than usual    Sputum looks yellow, green, tan, brown or bloody    More coughing or wheezing    Fever or chills    Less energy; trouble completing activities    Trouble thinking or focusing    Using quick relief inhaler or nebulizer more often    Poor sleep; symptoms wake me up    Do not feel like eating Actions:      Get plenty of rest    Take daily medicines    Use quick relief inhaler every 4 hours    If you use oxygen, call you doctor to see if you should adjust your oxygen    Do breathing exercises or other things to help you relax    Let a loved one, friend or neighbor know you are feeling worse    Call your care team if you have 2 or more symptoms.  Start taking steroids or antibiotics if directed by your care team           RED ZONE       Need medical care now      Severe shortness of breath (feel you can't breathe)    Fever, chills    Not enough breath to do any activity    Trouble coughing up mucus, walking or talking    Blood  in mucus    Frequent coughing   Rescue medicines are not working    Not able to sleep because of breathing    Feel confused or drowsy    Chest pain    Actions:      Call your health care team.  If you cannot reach your care team, call 911 or go to the emergency room.        Annual Reminders:  Meet with Care Team, Flu Shot every Fall  Pharmacy: [id number] - [place], [place] - [address]

## 2018-12-06 NOTE — LETTER
December 6, 2018      Sadaf MARJ Dumontndresseduar  [address] [facility] [address]        To Whom It May Concern,      Sadaf will not be able to travel on January 3,2019 for medical reasons and needs to change the dates of the ticket          Sincerely,        Henna Cruz MD

## 2018-12-06 NOTE — MR AVS SNAPSHOT
After Visit Summary   12/6/2018    Sadaf Blankenship    MRN: [id number]           Patient Information     Date Of Birth          1960        Visit Information        Provider Department      12/6/2018 10:45 AM [facility]        Today's Diagnoses     Chronic radicular cervical pain    -  1    Lumbar radiculopathy        S/P cervical spinal fusion        Intractable chronic migraine without aura and with status migrainosus        Chronic pain syndrome           Follow-ups after your visit        Follow-up notes from your care team     Return in about 3 months (around 3/6/2019) for left shoulder pain, neck pain.      Your next 10 appointments already scheduled     Dec 19, 2018 10:00 AM CST   Ortho Treatment with Kip Thorstenson, PT   [facility] ([facility] )    [address] [address]   [phone]            Dec 26, 2018  8:30 AM CST   Ortho Treatment with Katie Noha, PTA   [facility] ([facility] )    [address] [address]   [phone]            Jan 02, 2019 11:00 AM CST   Ortho Treatment with Kip Thorstenson, PT   [facility] ([facility] )    [address] [address]   [phone]            Jan 09, 2019  9:00 AM CST   (Arrive by 8:45 AM)   [facility]   [facility]    [address] [address]   [phone]            Mar 07, 2019 10:00 AM CST   (Arrive by 9:45 AM)   SHORT with Henna Cruz MD   [facility]    [address]  [facility] [address]   [phone]              Who to contact     If you have questions or need follow up information about today's clinic visit or your schedule please contact [facility] directly at [phone].  Normal or  "non-critical lab and imaging results will be communicated to you by MyChart, letter or phone within 4 business days after the clinic has received the results. If you do not hear from us within 7 days, please contact the clinic through [employer] or phone. If you have a critical or abnormal lab result, we will notify you by phone as soon as possible.  Submit refill requests through [employer] or call your pharmacy and they will forward the refill request to us. Please allow 3 business days for your refill to be completed.          Additional Information About Your Visit        [employer] Information     [employer] gives you secure access to your electronic health record. If you see a primary care provider, you can also send messages to your care team and make appointments. If you have questions, please call your primary care clinic.  If you do not have a primary care provider, please call [phone] and they will assist you.        Care EveryWhere ID     This is your Care EveryWhere ID. This could be used by other organizations to access your [facility] medical records  [id number]        Your Vitals Were     Pulse Temperature Respirations Height Pulse Oximetry BMI (Body Mass Index)    71 98.7  F (37.1  C) (Tympanic) 17 5' 6\" (1.676 m) 98% 25.5 kg/m2       Blood Pressure from Last 3 Encounters:   12/06/18 104/70   11/26/18 110/72   11/09/18 98/78    Weight from Last 3 Encounters:   12/06/18 158 lb (71.7 kg)   11/26/18 160 lb (72.6 kg)   11/09/18 159 lb (72.1 kg)              Today, you had the following     No orders found for display         Today's Medication Changes          These changes are accurate as of 12/6/18 11:46 AM.  If you have any questions, ask your nurse or doctor.               These medicines have changed or have updated prescriptions.        Dose/Directions    baclofen 10 MG tablet   Commonly known as:  LIORESAL   This may have changed:  when to take this   Used for:  Chronic radicular cervical pain        " Dose:  10 mg   Take 1 tablet (10 mg) by mouth 3 times daily as needed for muscle spasms   Quantity:  40 tablet   Refills:  0                Primary Care Provider Office Phone # Fax #    Henna Cruz [phone] [address] [facility] [address]        Equal Access to Services     [facility] : Hadii aad ku hadasho Soomaali, waaxda luqadaha, qaybta kaalmada adeegyada, waxay maevein haysergon adewalter horn la'sergodayday chen. So [facility] [phone].    ATENCIÓN: Si habla español, tiene a schroeder disposición servicios gratuitos de asistencia lingüística. Llame al [phone].    We comply with applicable federal civil rights laws and [place] laws. We do not discriminate on the basis of race, color, national origin, age, disability, sex, sexual orientation, or gender identity.            Thank you!     Thank you for choosing [facility]  for your care. Our goal is always to provide you with excellent care. Hearing back from our patients is one way we can continue to improve our services. Please take a few minutes to complete the written survey that you may receive in the mail after your visit with us. Thank you!             Your Updated Medication List - Protect others around you: Learn how to safely use, store and throw away your medicines at www.disposemymeds.org.          This list is accurate as of 12/6/18 11:46 AM.  Always use your most recent med list.                   Brand Name Dispense Instructions for use Diagnosis    baclofen 10 MG tablet    LIORESAL    40 tablet    Take 1 tablet (10 mg) by mouth 3 times daily as needed for muscle spasms    Chronic radicular cervical pain       butalbital-acetaminophen-caffeine -40 MG tablet    FIORICET/ESGIC     TAKE 1 TO 2 TABLETS BY MOUTH AT THE ONSET OF A MIGRAINE HEADACHE, LIMIT NO MORE THAN 3 TIMES PER WEEK. ACETAMINOPHEN SHOULD BE LIMITED TO 40        CENTRUM SILVER ULTRA WOMENS Tabs           DEXILANT 60 MG Cpdr CR capsule    Generic drug:  dexlansoprazole     30 capsule    TAKE 1 CAPSULE DAILY BY MOUTH    Ulcer of esophagus without bleeding       diazepam 5 MG tablet    VALIUM    100 tablet    TAKE 1 TABLET BY MOUTH EVER Y 6 HOURS AS NEEDED - GENER IC FOR VALIUM    Muscle spasm       docusate sodium 100 MG capsule    COLACE     Take 100 mg by mouth        FLUoxetine 40 MG capsule    PROzac    30 capsule    TAKE 1 CAPSULE (40 MG) BY MOUTH DAILY    Major depressive disorder, recurrent severe without psychotic features (H)       * HYDROcodone-acetaminophen 7.5-325 MG per tablet    NORCO    60 tablet    TAKE 1 TABLET BY MOUTH EVER Y 4 TO 6 HOURS AS NEEDED FO R PAIN    Chronic neck pain       * HYDROcodone-acetaminophen  MG per tablet    NORCO    60 tablet    Take 1 tablet by mouth every 4 hours as needed for severe pain    Closed nondisplaced fracture of phalanx of left index finger with routine healing, unspecified phalanx, subsequent encounter, Acute pain of left shoulder, Chronic radicular cervical pain       ibuprofen 800 MG tablet    ADVIL/MOTRIN    90 tablet    Take 1 tablet (800 mg) by mouth every 8 hours as needed for moderate pain    Bursitis of other bursa of right knee       LYRICA 150 MG capsule   Generic drug:  pregabalin      INCREASE OVER 2 WEEKS  MG THREE TIMES DAILY, THEN CONTINUE ON THIS DOSE.        Misc. Devices Misc      Dispense one Cefaly neurostimulator and the electro stimulators with refills        order for DME      Equipment being ordered: TENS        PROBIOTIC DAILY PO      Take by mouth At Bedtime        sucralfate 1 GM tablet    CARAFATE    40 tablet    Take 1 tablet (1 g) by mouth 4 times daily    Gastroesophageal reflux disease with esophagitis       topiramate 50 MG tablet    TOPAMAX     Take 2 tablets in the morning and 3 tablets at night        vitamin D3 1000 units Caps      Take 1,000 Units by mouth        * Notice:  This list has 2 medication(s) that are the same as other medications  prescribed for you. Read the directions carefully, and ask your doctor or other care provider to review them with you.

## 2018-12-06 NOTE — NURSING NOTE
"Chief Complaint   Patient presents with     Pain       Initial /70 (BP Location: Right arm, Patient Position: Standing, Cuff Size: Adult Regular)  Pulse 71  Temp 98.7  F (37.1  C) (Tympanic)  Resp 17  Ht 5' 6\" (1.676 m)  Wt 158 lb (71.7 kg)  SpO2 98%  BMI 25.5 kg/m2 Estimated body mass index is 25.5 kg/(m^2) as calculated from the following:    Height as of this encounter: 5' 6\" (1.676 m).    Weight as of this encounter: 158 lb (71.7 kg).  Medication Reconciliation: complete    Mónica Goyal LPN  "

## 2018-12-07 ASSESSMENT — ANXIETY QUESTIONNAIRES: GAD7 TOTAL SCORE: 2

## 2018-12-19 ENCOUNTER — HOSPITAL ENCOUNTER (OUTPATIENT)
Dept: PHYSICAL THERAPY | Facility: HOSPITAL | Age: 58
Setting detail: THERAPIES SERIES
End: 2018-12-19
Attending: ORTHOPAEDIC SURGERY

## 2018-12-19 PROCEDURE — 97140 MANUAL THERAPY 1/> REGIONS: CPT | Mod: GP

## 2018-12-21 DIAGNOSIS — M54.12 CHRONIC RADICULAR CERVICAL PAIN: ICD-10-CM

## 2018-12-21 DIAGNOSIS — S62.601D CLOSED NONDISPLACED FRACTURE OF PHALANX OF LEFT INDEX FINGER WITH ROUTINE HEALING, UNSPECIFIED PHALANX, SUBSEQUENT ENCOUNTER: ICD-10-CM

## 2018-12-21 DIAGNOSIS — G89.29 CHRONIC RADICULAR CERVICAL PAIN: ICD-10-CM

## 2018-12-21 DIAGNOSIS — M25.512 ACUTE PAIN OF LEFT SHOULDER: ICD-10-CM

## 2018-12-21 RX ORDER — HYDROCODONE BITARTRATE AND ACETAMINOPHEN 10; 325 MG/1; MG/1
1 TABLET ORAL EVERY 4 HOURS PRN
Qty: 60 TABLET | Refills: 0 | Status: SHIPPED | OUTPATIENT
Start: 2018-12-21 | End: 2019-02-13

## 2018-12-21 NOTE — TELEPHONE ENCOUNTER
NORCO  Last Written Prescription Date:  9/27/18  Last Fill Quantity: 60,   # refills: 0  Last Office Visit: 12/6/18  Future Office visit:    Next 5 appointments (look out 90 days)    Jan 09, 2019  9:00 AM CST  (Arrive by 8:45 AM)  Return Visit with Zoe Herebrt MD  [facility] [address] [place] [id number] [phone]   Mar 07, 2019 10:00 AM CST  (Arrive by 9:45 AM)  SHORT with Henna Cruz MD  [facility] ([facility] ) [address] [facility] [address]  [phone]           Routing refill request to provider for review/approval because:  Drug not on the [facility], [facility] or [facility] refill protocol or controlled substance

## 2018-12-26 ENCOUNTER — HOSPITAL ENCOUNTER (OUTPATIENT)
Dept: PHYSICAL THERAPY | Facility: HOSPITAL | Age: 58
Setting detail: THERAPIES SERIES
End: 2018-12-26
Attending: ORTHOPAEDIC SURGERY

## 2018-12-26 PROCEDURE — 97140 MANUAL THERAPY 1/> REGIONS: CPT | Mod: GP

## 2018-12-29 DIAGNOSIS — M54.12 CHRONIC RADICULAR CERVICAL PAIN: ICD-10-CM

## 2018-12-29 DIAGNOSIS — G89.29 CHRONIC RADICULAR CERVICAL PAIN: ICD-10-CM

## 2018-12-31 RX ORDER — BACLOFEN 10 MG/1
10 TABLET ORAL 3 TIMES DAILY PRN
Qty: 40 TABLET | Refills: 0 | Status: SHIPPED | OUTPATIENT
Start: 2018-12-31 | End: 2019-04-02

## 2018-12-31 NOTE — TELEPHONE ENCOUNTER
BACLOFEN 10MG TABLET      Last Written Prescription Date:  2/26/18  Last Fill Quantity: 40,   # refills: 0  Last Office Visit: 12/6/18  Future Office visit:

## 2019-01-02 ENCOUNTER — HOSPITAL ENCOUNTER (OUTPATIENT)
Dept: PHYSICAL THERAPY | Facility: HOSPITAL | Age: 59
Setting detail: THERAPIES SERIES
End: 2019-01-02
Attending: ORTHOPAEDIC SURGERY
Payer: COMMERCIAL

## 2019-01-02 PROCEDURE — 97140 MANUAL THERAPY 1/> REGIONS: CPT | Mod: GP

## 2019-01-02 PROCEDURE — 97110 THERAPEUTIC EXERCISES: CPT | Mod: GP

## 2019-01-13 DIAGNOSIS — F33.2 MAJOR DEPRESSIVE DISORDER, RECURRENT SEVERE WITHOUT PSYCHOTIC FEATURES (H): ICD-10-CM

## 2019-01-14 RX ORDER — FLUOXETINE 40 MG/1
CAPSULE ORAL
Qty: 30 CAPSULE | Refills: 0 | Status: SHIPPED | OUTPATIENT
Start: 2019-01-14 | End: 2019-02-19

## 2019-01-14 NOTE — TELEPHONE ENCOUNTER
FLUoxetine (PROZAC) 40 MG capsule  Last Written Prescription Date:  09/04/2018  Last Fill Quantity: 30,   # refills: 3  Last Office Visit: 12/06/2018  Future Office visit:    Next 5 appointments (look out 90 days)    Feb 05, 2019  9:20 AM CST  (Arrive by 9:05 AM)  [facility] [address] [place] [id number] [phone]   Mar 07, 2019 10:00 AM CST  (Arrive by 9:45 AM)  SHORT with Henna Cruz [facility]  [facility] [address]  [phone]           Routing refill request to provider for review/approval because:

## 2019-02-05 ENCOUNTER — HOSPITAL ENCOUNTER (OUTPATIENT)
Dept: PHYSICAL THERAPY | Facility: HOSPITAL | Age: 59
Setting detail: THERAPIES SERIES
End: 2019-02-05
Attending: FAMILY MEDICINE
Payer: COMMERCIAL

## 2019-02-05 ENCOUNTER — OFFICE VISIT (OUTPATIENT)
Dept: PSYCHIATRY | Facility: OTHER | Age: 59
End: 2019-02-05
Attending: PSYCHIATRY & NEUROLOGY
Payer: COMMERCIAL

## 2019-02-05 VITALS
DIASTOLIC BLOOD PRESSURE: 70 MMHG | HEART RATE: 72 BPM | SYSTOLIC BLOOD PRESSURE: 90 MMHG | WEIGHT: 160 LBS | BODY MASS INDEX: 25.82 KG/M2 | OXYGEN SATURATION: 98 % | TEMPERATURE: 98.7 F

## 2019-02-05 DIAGNOSIS — F33.1 MAJOR DEPRESSIVE DISORDER, RECURRENT EPISODE, MODERATE (H): Primary | ICD-10-CM

## 2019-02-05 PROCEDURE — 97110 THERAPEUTIC EXERCISES: CPT | Mod: GP

## 2019-02-05 PROCEDURE — 99213 OFFICE O/P EST LOW 20 MIN: CPT | Performed by: PSYCHIATRY & NEUROLOGY

## 2019-02-05 PROCEDURE — G0463 HOSPITAL OUTPT CLINIC VISIT: HCPCS

## 2019-02-05 PROCEDURE — 97140 MANUAL THERAPY 1/> REGIONS: CPT | Mod: GP

## 2019-02-05 ASSESSMENT — ANXIETY QUESTIONNAIRES
3. WORRYING TOO MUCH ABOUT DIFFERENT THINGS: MORE THAN HALF THE DAYS
6. BECOMING EASILY ANNOYED OR IRRITABLE: SEVERAL DAYS
1. FEELING NERVOUS, ANXIOUS, OR ON EDGE: SEVERAL DAYS
7. FEELING AFRAID AS IF SOMETHING AWFUL MIGHT HAPPEN: NOT AT ALL
5. BEING SO RESTLESS THAT IT IS HARD TO SIT STILL: SEVERAL DAYS
GAD7 TOTAL SCORE: 7
2. NOT BEING ABLE TO STOP OR CONTROL WORRYING: SEVERAL DAYS

## 2019-02-05 ASSESSMENT — PATIENT HEALTH QUESTIONNAIRE - PHQ9
SUM OF ALL RESPONSES TO PHQ QUESTIONS 1-9: 0
5. POOR APPETITE OR OVEREATING: SEVERAL DAYS

## 2019-02-05 ASSESSMENT — PAIN SCALES - GENERAL: PAINLEVEL: NO PAIN (0)

## 2019-02-05 NOTE — NURSING NOTE
"Chief Complaint   Patient presents with     RECHECK     Depression, anxiety.       Initial BP 90/70 (BP Location: Right arm, Patient Position: Sitting, Cuff Size: Adult Regular)   Pulse 72   Temp 98.7  F (37.1  C) (Tympanic)   Wt 72.6 kg (160 lb)   SpO2 98%   BMI 25.82 kg/m   Estimated body mass index is 25.82 kg/m  as calculated from the following:    Height as of 12/6/18: 1.676 m (5' 6\").    Weight as of this encounter: 72.6 kg (160 lb).  Medication Reconciliation: complete    JANICE ROMO LPN    "

## 2019-02-05 NOTE — PROGRESS NOTES
PSYCHIATRY CLINIC PROGRESS NOTE   20 minute medication management, more than 50% of time spent counseling patient on medications, medication side effects, symptom history and management   SUBJECTIVE / INTERIM HISTORY                                                                       Social- niece and niece's kids moved out Children-  Daughter Shandra going to college  Last visit: Continue fluoxetine 40 mg daily      - frozen shoulder and PT. It is horribly painful   - wondering about Topamax. At night if she stays up is seeing things out of corner of her eyes... Only thing she can think of is when started on third Toapamax HS.   - doing well with sister but NOT well with her daughter  - hearing August 7th [employer] and denied, appealing. Does not feel she can work   - left finger broken from trip / fall  - still having really bad and dark days  - doing better with issues with her daughter Noemy. Tough love.  Working at [employer] and worked 19 days -- WAS still asking Sadaf for $ even with her helping with car, phone, etc.   - GI : issues with bowel movements. Still having issues  - long-term disability and moved in with her sister    SUBSTANCE USE- no issues    SYMPTOMS-  Depressed mood, low energy, anhedonia, Anxiety, gets easily overwhelmed, gets panic attacks, depression, anhedonia, low energy, overwhelmed, no SI.  MEDICAL ROS- migraines, . upper abdominal pain. .  Substernal pain after she eats (hx ulcers esophageal and gastric) neck pain s/p neck surgeries, migraines, IBS  MEDICAL / SURGICAL HISTORY                    Patient Active Problem List   Diagnosis     Degenerative disc disease, cervical     Pseudoarthrosis of cervical spine     Cervical pain     Migraine     UTI (urinary tract infection)     Advanced care planning/counseling discussion     Thoracic sprain and strain     Sprain and strain of shoulder and upper arm     Irritable bowel syndrome     Myofascial pain syndrome, cervical     Depression,  major     Chronic pain syndrome     Uvulitis     Chronic rhinitis     Madina bullosa     Chronic maxillary sinusitis     Hypertrophy of inferior nasal turbinate     Long uvula     Chronic neck pain     Chronic pain     Chronic tension-type headache, intractable     Migraine aura without headache     History of arthrodesis     Myofascial pain     Disuse syndrome     Intractable chronic migraine without aura and with status migrainosus     Ulcer of esophagus without bleeding     Gastroesophageal reflux disease with esophagitis     Intractable chronic migraine without aura and without status migrainosus     Ulcer of esophagus     Ulnar neuropathy     Chronic radicular cervical pain     Mild episode of recurrent major depressive disorder (H)     Ulnar neuropathy at elbow of left upper extremity     Lumbar radiculopathy     S/P cervical spinal fusion     ACP (advance care planning)     Acute back pain with sciatica     Chronic migraine without aura without status migrainosus, not intractable     Radicular pain     Subacromial bursitis of right shoulder joint     Panlobular emphysema (H)     Calculus of kidney     Pulmonary emphysema, unspecified emphysema type (H)     Other chronic gastritis without hemorrhage     Pelvic floor dysfunction     Adhesive capsulitis of left shoulder     ALLERGY   Aspirin; Ibuprofen; Lansoprazole; Penicillins; Red dye; Bupropion; Bupropion hcl; and Demerol [meperidine]  MEDICATIONS                                                                                             Current Outpatient Medications   Medication Sig     baclofen (LIORESAL) 10 MG tablet TAKE 1 TABLET (10 MG) BY MOUTH 3 TIMES DAILY AS NEEDED FOR MUSCLE SPASMS     butalbital-acetaminophen-caffeine (FIORICET/ESGIC) -40 MG tablet TAKE 1 TO 2 TABLETS BY MOUTH AT THE ONSET OF A MIGRAINE HEADACHE, LIMIT NO MORE THAN 3 TIMES PER WEEK. ACETAMINOPHEN SHOULD BE LIMITED TO 40     Cholecalciferol (VITAMIN D3) 1000 UNITS CAPS Take  1,000 Units by mouth     DEXILANT 60 MG CPDR CR capsule TAKE 1 CAPSULE DAILY BY MOUTH     diazepam (VALIUM) 5 MG tablet TAKE 1 TABLET BY MOUTH EVER Y 6 HOURS AS NEEDED - GENER IC FOR VALIUM     docusate sodium (COLACE) 100 MG capsule Take 100 mg by mouth     FLUoxetine (PROZAC) 40 MG capsule TAKE 1 CAPSULE DAILY BY MOUTH     HYDROcodone-acetaminophen (NORCO)  MG per tablet Take 1 tablet by mouth every 4 hours as needed for severe pain     HYDROcodone-acetaminophen (NORCO) 7.5-325 MG per tablet TAKE 1 TABLET BY MOUTH EVER Y 4 TO 6 HOURS AS NEEDED FO R PAIN     ibuprofen (ADVIL/MOTRIN) 800 MG tablet Take 1 tablet (800 mg) by mouth every 8 hours as needed for moderate pain     LYRICA 150 MG capsule INCREASE OVER 2 WEEKS  MG THREE TIMES DAILY, THEN CONTINUE ON THIS DOSE.     Misc. Devices MISC Dispense one Cefaly neurostimulator and the electro stimulators with refills     Multiple Vitamins-Minerals (CENTRUM SILVER ULTRA WOMENS) TABS      order for DME Equipment being ordered: TENS     Probiotic Product (PROBIOTIC DAILY PO) Take by mouth At Bedtime      sucralfate (CARAFATE) 1 GM tablet Take 1 tablet (1 g) by mouth 4 times daily     topiramate (TOPAMAX) 50 MG tablet Take 2 tablets in the morning and 3 tablets at night     No current facility-administered medications for this visit.        VITALS   BP 90/70 (BP Location: Right arm, Patient Position: Sitting, Cuff Size: Adult Regular)   Pulse 72   Temp 98.7  F (37.1  C) (Tympanic)   Wt 72.6 kg (160 lb)   SpO2 98%   BMI 25.82 kg/m       LABS                                                                                                                           Last Basic Metabolic Panel:  NA      142   6/9/2015   POTASSIUM      3.6   6/9/2015  CHLORIDE      109   6/9/2015  SALOME      8.7   6/9/2015  CO2       26   6/9/2015  BUN        8   6/9/2015  CR     0.91   6/9/2015  GLC       96   6/9/2015    MENTAL STATUS EXAM                                                                                         Alert. Oriented to person, place, and date / time. Well groomed, calm, cooperative with good eye contact. No problems with speech or psychomotor behavior. Mood was depressed and affect was congruent to speech content and full range. Thought process, including associations, was unremarkable and thought content was devoid of suicidal and homicidal ideation and psychotic thought. No hallucinations. Insight was good. Judgment was intact and adequate for safety. Fund of knowledge was intact. Pt demonstrates no obvious problems with attention, concentration, language, recent or remote memory although these were not formally tested.     ASSESSMENT                                                                                                      HISTORICAL:  Initial psych note 10/13/15        NOTES:  Cymbalta -> agitation, angry. Perry Blankenship is a  58 year old, female who has hx of depression and is actually doing okay with depression, anxiety, now on fluoxetine 40 mg daily. She is depressed, upset: has a lot of things going on. We don't feel med change is going to change / improve unfortunately. The visual distortions: only thing her and I can associate is the Topamax --- she has never had this before and it directly correlates when she started on the third topamax at night.      TREATMENT RISK STATEMENT:  The risks, benefits, alternatives and potential adverse effects have been explained and are understood by the pt.  The pt agrees to the treatment plan with the ability to do so.   The pt knows to call the clinic for any problems or access emergency care if needed.        DIAGNOSES                     MDD recurrent, mild  ESTRELLA      PLAN                                                                                                                    1)  MEDICATIONS:         -- Continue fluoxetine 40 mg daily. She is going to discuss Topamax decrease the HS  dose with her primary.     2)  THERAPY:  No Change    3)  LABS:  None    4)  PT MONITOR [call for probs]:  worsening sx, SI/HI, SEs from meds    5)  REFERRALS [CD, medical, other]:  None    6)  RTC:  ~ 1 month

## 2019-02-06 ASSESSMENT — ANXIETY QUESTIONNAIRES: GAD7 TOTAL SCORE: 7

## 2019-02-07 ENCOUNTER — HOSPITAL ENCOUNTER (OUTPATIENT)
Dept: PHYSICAL THERAPY | Facility: HOSPITAL | Age: 59
Setting detail: THERAPIES SERIES
End: 2019-02-07
Attending: FAMILY MEDICINE
Payer: COMMERCIAL

## 2019-02-07 PROCEDURE — 97110 THERAPEUTIC EXERCISES: CPT | Mod: GP

## 2019-02-07 PROCEDURE — 97140 MANUAL THERAPY 1/> REGIONS: CPT | Mod: GP

## 2019-02-07 NOTE — PROGRESS NOTES
Outpatient Physical Therapy Progress Note     Patient: Sadaf Blankenship  : 1960    Beginning/End Dates of Reporting Period:  18 to 2019    Referring Provider: Dr. Kraus    Therapy Diagnosis: L frozen shoulder/adhesive capsulitis     Client Self Report: Patient reports today for L shoulder rehab. States that L shoulder has a little bit of pain with the new exercise, but it is tolerable. Motion seems to be consistently improving.    Objective Measurements:  Objective Measure: L shoulder active motion  Details: 138 degrees supine scaption, seated scaption 120 degrees,      Goals:  Short-term goals:  To be achieved in 3 weeks:  Instruct in home program     1.) Patient will understand biomechanical stressors of the shoulder joint in order to make modifications to activities to reduce further risk of injury. MET  2.) Patient will be independent with a short-term home exercise program. MET  3.) Patient will report 25% improvement of overall symptoms to allow decreased shoulder pain with daily movement and activity. MET        Long-term goals:  To be achieved in 10 weeks:  Self management of symptoms     1.) Improve score on Shoulder Pain and Disability Index by 13 points or greater to correlate with clinically significant change. NOT MET  2.) Patient will report 50% improvement of overall symptoms to allow decreased shoulder pain with daily movement and activity NOT MET  3.) Patient will be able to attain roughly 125 degrees of active L shoulder scaption to allow increased ability to reach and grab objects with left upper extremity. NOT MET (120 degrees)    Assessment:  Throughout course of therapy, patient has improved overall L shoulder ROM, strength, and decreased pain. Patient is still having difficulty performing daily movements and activity secondary to L shoulder decreased ROM and pain secondary to adhesive capsulitis symptoms. Patient would benefit from continued therapy sessions addressing L  shoulder ROM, strength, and overall pain to allow safe return to PLOF.    Plan:  Continue therapy per current plan of care.    Discharge:  No

## 2019-02-12 ENCOUNTER — HOSPITAL ENCOUNTER (OUTPATIENT)
Dept: PHYSICAL THERAPY | Facility: HOSPITAL | Age: 59
Setting detail: THERAPIES SERIES
End: 2019-02-12
Attending: FAMILY MEDICINE
Payer: COMMERCIAL

## 2019-02-12 PROCEDURE — 97140 MANUAL THERAPY 1/> REGIONS: CPT | Mod: GP

## 2019-02-12 PROCEDURE — 97110 THERAPEUTIC EXERCISES: CPT | Mod: GP

## 2019-02-13 ENCOUNTER — TELEPHONE (OUTPATIENT)
Dept: FAMILY MEDICINE | Facility: OTHER | Age: 59
End: 2019-02-13

## 2019-02-13 ENCOUNTER — OFFICE VISIT (OUTPATIENT)
Dept: FAMILY MEDICINE | Facility: OTHER | Age: 59
End: 2019-02-13
Attending: FAMILY MEDICINE
Payer: COMMERCIAL

## 2019-02-13 VITALS
SYSTOLIC BLOOD PRESSURE: 98 MMHG | HEIGHT: 66 IN | OXYGEN SATURATION: 99 % | HEART RATE: 68 BPM | TEMPERATURE: 98.4 F | WEIGHT: 160 LBS | BODY MASS INDEX: 25.71 KG/M2 | DIASTOLIC BLOOD PRESSURE: 62 MMHG

## 2019-02-13 DIAGNOSIS — B96.89 ACUTE BACTERIAL SINUSITIS: Primary | ICD-10-CM

## 2019-02-13 DIAGNOSIS — J01.90 ACUTE BACTERIAL SINUSITIS: Primary | ICD-10-CM

## 2019-02-13 PROCEDURE — 99213 OFFICE O/P EST LOW 20 MIN: CPT | Performed by: FAMILY MEDICINE

## 2019-02-13 PROCEDURE — G0463 HOSPITAL OUTPT CLINIC VISIT: HCPCS

## 2019-02-13 RX ORDER — METHYLPREDNISOLONE 4 MG
TABLET, DOSE PACK ORAL
Qty: 21 TABLET | Refills: 0 | Status: SHIPPED | OUTPATIENT
Start: 2019-02-13 | End: 2019-06-05

## 2019-02-13 ASSESSMENT — MIFFLIN-ST. JEOR: SCORE: 1327.51

## 2019-02-13 ASSESSMENT — PAIN SCALES - GENERAL: PAINLEVEL: MILD PAIN (3)

## 2019-02-13 NOTE — PROGRESS NOTES
"  SUBJECTIVE:   Sadaf Blankenship is a 58 year old female who presents to clinic today for the following health issues:        RESPIRATORY SYMPTOMS      Duration: 1.5 month    Description  nasal congestion, sore throat, facial pain/pressure, cough, fever, ear pain bilateral, headache, fatigue/malaise, hoarse voice and nausea    Severity: moderate    Accompanying signs and symptoms: None    History (predisposing factors):  none    Precipitating or alleviating factors: None    Therapies tried and outcome:  rest and fluids oral decongestant OTC NSAID    Initially symptoms seemed to improve about 2 weeks ago, worsened again 5 days ago. Now having worsening headaches, pain and fatigue. No fevers. Noting productive cough at night. Occasional blood in nasal drainage. History of sinus infection sin the past. Not allergic to penicillin per patient. Has tolerated amox in the past.     Problem list and histories reviewed & adjusted, as indicated.  Additional history: as documented    Labs reviewed in EPIC    Reviewed and updated as needed this visit by clinical staff  Tobacco  Allergies  Meds  Problems  Med Hx  Surg Hx  Fam Hx       Reviewed and updated as needed this visit by Provider  Tobacco  Allergies  Meds  Problems  Med Hx  Surg Hx  Fam Hx         ROS:  Constitutional, HEENT, cardiovascular, pulmonary, gi and gu systems are negative, except as otherwise noted.    OBJECTIVE:     BP 98/62 (BP Location: Right arm, Patient Position: Sitting, Cuff Size: Adult Regular)   Pulse 68   Temp 98.4  F (36.9  C) (Tympanic)   Ht 1.676 m (5' 6\")   Wt 72.6 kg (160 lb)   SpO2 99%   BMI 25.82 kg/m    Body mass index is 25.82 kg/m .  GENERAL: healthy, alert and no distress  EYES: Eyes grossly normal to inspection, PERRL and conjunctivae and sclerae normal  HENT: normal cephalic/atraumatic, right ear: clear effusion, left ear: clear effusion, nose and mouth without ulcers or lesions, oropharynx clear and oral mucous " membranes moist  NECK: no adenopathy, no asymmetry, masses, or scars and thyroid normal to palpation  RESP: lungs clear to auscultation - no rales, rhonchi or wheezes  CV: regular rate and rhythm, normal S1 S2, no S3 or S4, no murmur, click or rub, no peripheral edema and peripheral pulses strong    Diagnostic Test Results:  none     ASSESSMENT/PLAN:     1. Acute bacterial sinusitis  Treatment as below. Supportive cares including nasal saline rinses discussed. Follow up if not improving over the next week or symptoms return shortly after completion of abx.   - amoxicillin-clavulanate (AUGMENTIN) 875-125 MG tablet; Take 1 tablet by mouth 2 times daily for 10 days  Dispense: 20 tablet; Refill: 0  - methylPREDNISolone (MEDROL DOSEPAK) 4 MG tablet therapy pack; Follow Package Directions  Dispense: 21 tablet; Refill: 0      Gwendolyn Rodgers MD  [facility]

## 2019-02-13 NOTE — NURSING NOTE
"Chief Complaint   Patient presents with     URI       Initial BP 98/62 (BP Location: Right arm, Patient Position: Sitting, Cuff Size: Adult Regular)   Pulse 68   Temp 98.4  F (36.9  C) (Tympanic)   Ht 1.676 m (5' 6\")   Wt 72.6 kg (160 lb)   SpO2 99%   BMI 25.82 kg/m   Estimated body mass index is 25.82 kg/m  as calculated from the following:    Height as of this encounter: 1.676 m (5' 6\").    Weight as of this encounter: 72.6 kg (160 lb).  Medication Reconciliation: complete    Jenny Castro LPN  "

## 2019-02-13 NOTE — TELEPHONE ENCOUNTER
GENERIC ADULT  Sadaf Blankenship is a 58 year old female who calls with complaints of symptoms of sinus infection.  Patient states she has had cold symptoms for the last 3 weeks and has been trying to treat with OTC cold medication.  Patient complains of productive cough, blood tinged green sputum, headache, sinus pain, and sinus drainage.  Patient denies fever, chest pain, difficulty breathing, or shortness of breath.      RECOMMENDED DISPOSITION: Patient scheduled today with .     Will comply with recommendation: Yes    If further questions/concerns or if symptoms do not improve, worsen or new symptoms develop, call your PCP or [facility] as soon as possible.      Guideline used: Adult Telephone Protocols Office Version 3rd Edition - Chidi Rogers MD, FACEP      Heather Vaughn RN

## 2019-02-13 NOTE — PATIENT INSTRUCTIONS
Patient Education     Acute Bacterial Rhinosinusitis (ABRS)    Acute bacterial rhinosinusitis (ABRS) is an infection of your nasal cavity and sinuses. It s caused by bacteria. Acute means that you ve had symptoms for less than 4 weeks, but possibly up to 12 weeks.  Understanding your sinuses  The nasal cavity is the large air-filled space behind your nose. The sinuses are a group of spaces formed by the bones of your face. They connect with your nasal cavity. ABRS causes the tissue lining these spaces to become inflamed. Mucus may not drain normally. This leads to facial pain and other symptoms.  What causes ABRS?  ABRS most often follows an upper respiratory infection caused by a virus. Bacteria then infect the lining of your nasal cavity and sinuses. But you can also get ABRS if you have:    Nasal allergies    Long-term nasal swelling and congestion not caused by allergies    Blockage in the nose  Symptoms of ABRS  The symptoms of ABRS may be different for each person and include:    Nasal congestion or blockage    Pain or pressure in the face    Thick, colored drainage from the nose  Other symptoms may include:    Runny nose    Fluid draining from the nose down the throat (postnasal drip)    Headache    Cough    Pain    Fever  Diagnosing ABRS  ABRS may be diagnosed if you ve had an upper respiratory infection like a cold and cough for 10 or more days without improvement or with worsening symptoms. Your healthcare provider will ask about your symptoms and your medical history. The provider will check your vital signs, including your temperature. You ll have a physical exam. The healthcare provider will check your ears, nose, and throat. You likely won t need any tests. If ABRS comes back, you may have a culture or other tests.  Treatment for ABRS  Treatment may include:    Antibiotic medicine. This is for symptoms that last for at least 10 to 14 days.    Nasal corticosteroid medicine. Drops or spray used in the  nose can lessen swelling and congestion.    Over-the-counter pain medicine. This is to lessen sinus pain and pressure.    Nasal decongestant medicine. Spray or drops may help to lessen congestion. Do not use them for more than a few days.    Salt wash (saline irrigation). This can help to loosen mucus.  Possible complications of ABRS  ABRS may come back or become long-term (chronic). In rare cases, ABRS may cause complications such as:     Inflamed tissue around the brain and spinal cord (meningitis)    Inflamed tissue around the eyes (orbital cellulitis)    Inflamed bones around the sinuses (osteitis)  These problems may need to be treated in a hospital with intravenous (IV) antibiotic medicine or surgery.  When to call the healthcare provider  Call your healthcare provider if you have any of the following:    Symptoms that don t get better, or get worse    Symptoms that don t get better after 3 to 5 days on antibiotics    Trouble seeing    Swelling around your eyes    Confusion or trouble staying awake   Date Last Reviewed: 5/1/2017 2000-2018 [employer]. [address] [place] [address], [place] [address]. All rights reserved. This information is not intended as a substitute for professional medical care. Always follow your healthcare professional's instructions.

## 2019-02-19 ENCOUNTER — HOSPITAL ENCOUNTER (OUTPATIENT)
Dept: PHYSICAL THERAPY | Facility: HOSPITAL | Age: 59
Setting detail: THERAPIES SERIES
End: 2019-02-19
Attending: FAMILY MEDICINE
Payer: COMMERCIAL

## 2019-02-19 DIAGNOSIS — F33.2 MAJOR DEPRESSIVE DISORDER, RECURRENT SEVERE WITHOUT PSYCHOTIC FEATURES (H): ICD-10-CM

## 2019-02-19 PROCEDURE — 97140 MANUAL THERAPY 1/> REGIONS: CPT | Mod: GP

## 2019-02-19 PROCEDURE — 97110 THERAPEUTIC EXERCISES: CPT | Mod: GP

## 2019-02-19 RX ORDER — FLUOXETINE 40 MG/1
CAPSULE ORAL
Qty: 30 CAPSULE | Refills: 0 | Status: SHIPPED | OUTPATIENT
Start: 2019-02-19 | End: 2019-03-26

## 2019-02-19 NOTE — TELEPHONE ENCOUNTER
FLUoxetine (PROZAC) 40 MG capsule  Last Written Prescription Date:  1/14/19  Last Fill Quantity: 30,   # refills: 0  Last Office Visit: 2/13/19  Future Office visit:    Next 5 appointments (look out 90 days)    Mar 07, 2019 10:00 AM CST  (Arrive by 9:45 AM)  SHORT with Henna Cruz MD  [facility] ([facility] ) [address] [place] [address]  [phone]   Apr 16, 2019  9:20 AM CDT  (Arrive by 9:05 AM)  Return Visit with Zoe Herbert MD  [facility] ([facility] ) [address] [facility] [id number] [phone]

## 2019-02-21 ENCOUNTER — HOSPITAL ENCOUNTER (OUTPATIENT)
Dept: PHYSICAL THERAPY | Facility: HOSPITAL | Age: 59
Setting detail: THERAPIES SERIES
End: 2019-02-21
Attending: FAMILY MEDICINE
Payer: COMMERCIAL

## 2019-02-21 PROCEDURE — 97140 MANUAL THERAPY 1/> REGIONS: CPT | Mod: GP

## 2019-02-21 PROCEDURE — 97110 THERAPEUTIC EXERCISES: CPT | Mod: GP

## 2019-02-28 ENCOUNTER — HOSPITAL ENCOUNTER (OUTPATIENT)
Dept: PHYSICAL THERAPY | Facility: HOSPITAL | Age: 59
Setting detail: THERAPIES SERIES
End: 2019-02-28
Attending: FAMILY MEDICINE
Payer: COMMERCIAL

## 2019-02-28 PROCEDURE — 97110 THERAPEUTIC EXERCISES: CPT | Mod: GP

## 2019-02-28 PROCEDURE — 97140 MANUAL THERAPY 1/> REGIONS: CPT | Mod: GP

## 2019-02-28 NOTE — PROGRESS NOTES
SUBJECTIVE:   Sadaf Blankenship is a 58 year old female who presents to clinic today for the following health issues:      Chronic Pain Follow-Up       Type / Location of Pain: Left shoulder and arm, neck  Analgesia/pain control:       Recent changes:  same      Overall control: Tolerable with discomfort  Activity level/function:      Daily activities:  Unable to perform most daily activities - chores, hobbies, social activities, driving    Work:  not applicable  Adverse effects:  No  Adherance    Taking medication as directed?  Yes    Participating in other treatments: yes  Risk Factors:    Sleep:  Poor    Mood/anxiety:  controlled    Recent family or social stressors:  none noted    Other aggravating factors: reaching and lying down  PHQ-9 SCORE 11/9/2018 12/6/2018 2/5/2019   PHQ-9 Total Score 11 5 0     ESTRELLA-7 SCORE 11/9/2018 12/6/2018 2/5/2019   Total Score 12 2 7     Encounter-Level CSA - 07/11/2017:    Controlled Substance Agreement - Scan on 7/14/2017 12:56 PM: CONTROLLED SUBSTANCE AGREEMENT (below)       Patient-Level CSA:    There are no patient-level csa.         Amount of exercise or physical activity: 5-6 days a week    Problems taking medications regularly: No    Medication side effects: none    Diet: regular (no restrictions)  Dr Grewal  had done trigger point injections in the past of the cervical spine which helped relieve some of her pain but she is starting to have symptoms again of stinging burning pain of the right neck which is mild at this time.     Sinus infection  This was severe with symptoms starting in early January when she was visiting family in [place]. She returned January 31 with severe symptoms and was treated by Dr Krishna with Augmentin and steroids. Symptoms are continuing and so another course of Augmentin was ordered for her. She continues to use a netti pot and get green mucous from her sinuses.       Left shoulder pain  Due to adhesive capsulitis. Kip in PT has been very  helpful and she is working hard on her exercises at home      Migraine headache  Worsening, occurring most days of the week. This has always been hard to  out but her Topamax was decreased by Dr Pérez and these have become worse. She will be following up with her soon.         Problem list and histories reviewed & adjusted, as indicated.  Additional history: as documented    Patient Active Problem List   Diagnosis     Degenerative disc disease, cervical     Pseudoarthrosis of cervical spine     Cervical pain     Migraine     UTI (urinary tract infection)     Advanced care planning/counseling discussion     Thoracic sprain and strain     Sprain and strain of shoulder and upper arm     Irritable bowel syndrome     Myofascial pain syndrome, cervical     Depression, major     Chronic pain syndrome     Uvulitis     Chronic rhinitis     Madina bullosa     Chronic maxillary sinusitis     Hypertrophy of inferior nasal turbinate     Long uvula     Chronic neck pain     Chronic pain     Chronic tension-type headache, intractable     Migraine aura without headache     History of arthrodesis     Myofascial pain     Disuse syndrome     Intractable chronic migraine without aura and with status migrainosus     Ulcer of esophagus without bleeding     Gastroesophageal reflux disease with esophagitis     Intractable chronic migraine without aura and without status migrainosus     Ulcer of esophagus     Ulnar neuropathy     Chronic radicular cervical pain     Mild episode of recurrent major depressive disorder (H)     Ulnar neuropathy at elbow of left upper extremity     Lumbar radiculopathy     S/P cervical spinal fusion     ACP (advance care planning)     Acute back pain with sciatica     Chronic migraine without aura without status migrainosus, not intractable     Radicular pain     Subacromial bursitis of right shoulder joint     Panlobular emphysema (H)     Calculus of kidney     Pulmonary emphysema, unspecified  emphysema type (H)     Other chronic gastritis without hemorrhage     Pelvic floor dysfunction     Adhesive capsulitis of left shoulder     Past Surgical History:   Procedure Laterality Date     BACK SURGERY      cervical     Cervical spine surgery with removal of 2 disks, C5,C7       COLONOSCOPY  10/13/2014    Dr Camargo, internal hemorrhoids     COLONOSCOPY - HIM SCAN  2018    EGD and colonoscopy with Dr. Jennings     Coronary angiogram, normal      Chest pain     ENT SURGERY      nose surgery x3     fusion discectomy anterior cervical  2011     HC ESOPHAGOSCOPY, DIAGNOSTIC  10/31/2014    Dr Camargo, mild erythema of antrum, negative biopsies     NOSE SURGERY      x3       1997     Posterior decompression , fusion C3-5      Psuedoarthrosis     Supracervical hysterectomy with right salpingoophorectomy      Endometriosis       Social History     Tobacco Use     Smoking status: Former Smoker     Years: 8.00     Types: Cigarettes     Smokeless tobacco: Never Used     Tobacco comment: quit . no passive exposure   Substance Use Topics     Alcohol use: No     Family History   Problem Relation Age of Onset     Cancer Father         lung, also a heavy smoker     Diabetes Mother      Heart Disease Mother 58        MI; cause of death; heavy smoker. had first MI at 40     Coronary Artery Disease Mother      Hypertension Mother      Cancer Other         strong history     Heart Disease Other         heart disease     Heart Disease Brother         x3     Hypertension Brother      Diabetes Brother      Coronary Artery Disease Brother      Hypertension Sister          Current Outpatient Medications   Medication Sig Dispense Refill     baclofen (LIORESAL) 10 MG tablet TAKE 1 TABLET (10 MG) BY MOUTH 3 TIMES DAILY AS NEEDED FOR MUSCLE SPASMS 40 tablet 0     butalbital-acetaminophen-caffeine (FIORICET/ESGIC) -40 MG tablet TAKE 1 TO 2 TABLETS BY MOUTH AT THE ONSET OF A MIGRAINE HEADACHE, LIMIT NO MORE THAN  "3 TIMES PER WEEK. ACETAMINOPHEN SHOULD BE LIMITED TO 40  5     Cholecalciferol (VITAMIN D3) 1000 UNITS CAPS Take 1,000 Units by mouth       DEXILANT 60 MG CPDR CR capsule TAKE 1 CAPSULE DAILY BY MOUTH 30 capsule 3     diazepam (VALIUM) 5 MG tablet TAKE 1 TABLET BY MOUTH EVER Y 6 HOURS AS NEEDED - GENER IC FOR VALIUM 100 tablet 0     docusate sodium (COLACE) 100 MG capsule Take 100 mg by mouth       FLUoxetine (PROZAC) 40 MG capsule TAKE 1 CAPSULE DAILY BY MOUTH 30 capsule 0     HYDROcodone-acetaminophen (NORCO) 7.5-325 MG per tablet TAKE 1 TABLET BY MOUTH EVER Y 4 TO 6 HOURS AS NEEDED FO R PAIN 60 tablet 0     ibuprofen (ADVIL/MOTRIN) 800 MG tablet Take 1 tablet (800 mg) by mouth every 8 hours as needed for moderate pain 90 tablet 1     LYRICA 150 MG capsule INCREASE OVER 2 WEEKS  MG THREE TIMES DAILY, THEN CONTINUE ON THIS DOSE.  5     methylPREDNISolone (MEDROL DOSEPAK) 4 MG tablet therapy pack Follow Package Directions 21 tablet 0     Misc. Devices MISC Dispense one Cefaly neurostimulator and the electro stimulators with refills       Multiple Vitamins-Minerals (CENTRUM SILVER ULTRA WOMENS) TABS        order for DME Equipment being ordered: TENS       Probiotic Product (PROBIOTIC DAILY PO) Take by mouth At Bedtime        sucralfate (CARAFATE) 1 GM tablet Take 1 tablet (1 g) by mouth 4 times daily 40 tablet 1     topiramate (TOPAMAX) 50 MG tablet Take 2 tablets in the morning and 2 tablets at night       Allergies   Allergen Reactions     Aspirin Hives     Ibuprofen      Dye in the otc form causes headaches  \"patient states over the counter ibuprofen  bothers her\"     Lansoprazole Other (See Comments) and Visual Disturbance     Changes in eyesight  Prevacid  Change in eyesight     Penicillins Other (See Comments) and Itching     pruritis     Red Dye Hives     Bupropion Rash     Bupropion Hcl Hives and Rash     Wellbutrin     Demerol [Meperidine] Other (See Comments)     Made migraine worse     BP Readings " from Last 3 Encounters:   03/07/19 118/68   02/13/19 98/62   02/05/19 90/70    Wt Readings from Last 3 Encounters:   03/07/19 72.6 kg (160 lb)   02/13/19 72.6 kg (160 lb)   02/05/19 72.6 kg (160 lb)                    Reviewed and updated as needed this visit by clinical staff       Reviewed and updated as needed this visit by Provider         ROS:  Constitutional, HEENT, cardiovascular, pulmonary, gi and gu systems are negative, except as otherwise noted.    OBJECTIVE:                                                    /68   Pulse 73   Resp 19   Wt 72.6 kg (160 lb)   SpO2 98%   BMI 25.82 kg/m     Body mass index is 25.82 kg/m .  GENERAL APPEARANCE: alert, no distress, fatigued and congested  EYES: Eyes grossly normal to inspection and conjunctivae and sclerae normal  HENT: ear canals and TM's normal and nose and mouth without ulcers or lesions  NECK: no adenopathy, no asymmetry, masses, or scars and thyroid normal to palpation  RESP: lungs clear to auscultation - no rales, rhonchi or wheezes  CV: regular rates and rhythm, normal S1 S2, no S3 or S4 and no murmur, click or rub  ORTHO: Cervical Spine Exam: Inspection: increased cervical lordosis  Tender:  left paracervical muscles, right paracervical muscles, left trapezius muscles, right trapezius muscles  Non-tender:  spinous processes  Range of Motion:  flexion:  decreased, painful, 20 degrees, extension: decreased, painful, 10 degrees, left lateral rotation:  decreased, painful, 10 degrees, right lateral rotation:  decreased, painful, 15 degrees  Strength: Full strength of all neck muscles  SKIN: no suspicious lesions or rashes  NEURO: Normal strength and tone, mentation intact and speech normal  PSYCH: mentation appears normal and worried         ASSESSMENT/PLAN:                                                    1. Other chronic pain  Cervical spine and upper thoracic back. Discussed that she may need more injections if pain gets worse and not to wait  too long to reconsider this. Recheck 3 months, sooner for concerns    2. Adhesive capsulitis of left shoulder  Improving with PT, continue    3. Chronic neck pain  Renewed, this is used very sparingly  - HYDROcodone-acetaminophen (NORCO) 7.5-325 MG per tablet; TAKE 1 TABLET BY MOUTH EVER Y 4 TO 6 HOURS AS NEEDED FO R PAIN  Dispense: 60 tablet; Refill: 0    4. Acute recurrent maxillary sinusitis  Complete Augmentin, follow up if not imroving    5. Intractable chronic migraine without aura and without status migrainosus  Increasing now, discussed having her evaluate the new monthly injectable medications as an option for her, however they may not be covered yet. She will discuss with Dr Pérez      Follow up with Provider - 3 months     Henna Cruz MD  [facility] [place]

## 2019-03-01 ENCOUNTER — TELEPHONE (OUTPATIENT)
Dept: FAMILY MEDICINE | Facility: OTHER | Age: 59
End: 2019-03-01

## 2019-03-01 DIAGNOSIS — J32.0 CHRONIC MAXILLARY SINUSITIS: Primary | ICD-10-CM

## 2019-03-01 NOTE — TELEPHONE ENCOUNTER
Called and notified pt of rx. Needs to be seen if symptoms do not resolve.   Gwendolyn Rodgers MD

## 2019-03-01 NOTE — TELEPHONE ENCOUNTER
Pt called, reports that she saw Dr Rodgers on 2/13/19. Pt has diagnosed with sinusitis and prescribed augmentin and medrol. Pt states that Dr told her if symptoms return after completing abx, she should call the clinic to be restarted on abx. Pt states that she finished abx last Thursday and her symptoms have returned. Symptoms include cough, green nasal discharge. No fever. Pharmacy pended. Please advise. Thank you!

## 2019-03-07 ENCOUNTER — OFFICE VISIT (OUTPATIENT)
Dept: FAMILY MEDICINE | Facility: OTHER | Age: 59
End: 2019-03-07
Attending: FAMILY MEDICINE
Payer: COMMERCIAL

## 2019-03-07 VITALS
WEIGHT: 160 LBS | DIASTOLIC BLOOD PRESSURE: 68 MMHG | BODY MASS INDEX: 25.82 KG/M2 | RESPIRATION RATE: 19 BRPM | HEART RATE: 73 BPM | SYSTOLIC BLOOD PRESSURE: 118 MMHG | OXYGEN SATURATION: 98 %

## 2019-03-07 DIAGNOSIS — G89.29 OTHER CHRONIC PAIN: Primary | ICD-10-CM

## 2019-03-07 DIAGNOSIS — M54.2 CHRONIC NECK PAIN: ICD-10-CM

## 2019-03-07 DIAGNOSIS — G89.29 CHRONIC NECK PAIN: ICD-10-CM

## 2019-03-07 DIAGNOSIS — J01.01 ACUTE RECURRENT MAXILLARY SINUSITIS: ICD-10-CM

## 2019-03-07 DIAGNOSIS — G43.719 INTRACTABLE CHRONIC MIGRAINE WITHOUT AURA AND WITHOUT STATUS MIGRAINOSUS: ICD-10-CM

## 2019-03-07 DIAGNOSIS — M75.02 ADHESIVE CAPSULITIS OF LEFT SHOULDER: ICD-10-CM

## 2019-03-07 PROCEDURE — 99214 OFFICE O/P EST MOD 30 MIN: CPT | Performed by: FAMILY MEDICINE

## 2019-03-07 PROCEDURE — G0463 HOSPITAL OUTPT CLINIC VISIT: HCPCS

## 2019-03-07 RX ORDER — HYDROCODONE BITARTRATE AND ACETAMINOPHEN 7.5; 325 MG/1; MG/1
TABLET ORAL
Qty: 60 TABLET | Refills: 0 | Status: SHIPPED | OUTPATIENT
Start: 2019-03-07 | End: 2019-03-18

## 2019-03-07 ASSESSMENT — PAIN SCALES - GENERAL: PAINLEVEL: SEVERE PAIN (6)

## 2019-03-07 NOTE — LETTER
My COPD Action Plan     Name: Sadaf Blankenship    YOB: 1960   Date: 2/28/2019    My doctor: Henna Cruz MD   My clinic: [facility] [place]    [address]  [place] [address]  [phone]  My Controller Medicine: { :132672}   Dose: ***     My Rescue Medicine: { :189382}   Dose: ***     My Flare Up Medicine: { :499669}   Dose: ***     My COPD Severity: { :251649}      Use of Oxygen: { :292379}     Make sure you've had your pneumonia   vaccines.          GREEN ZONE       Doing well today      Usual level of activity and exercise    Usual amount of cough and mucus    No shortness of breath    Usual level of health (thinking clearly, sleeping well, feel like eating) Actions:      Take daily medicines    Use oxygen as prescribed    Follow regular exercise and diet plan    Avoid cigarette smoke and other irritants that harm the lungs           YELLOW ZONE          Having a bad day or flare up      Short of breath more than usual    A lot more sputum (mucus) than usual    Sputum looks yellow, green, tan, brown or bloody    More coughing or wheezing    Fever or chills    Less energy; trouble completing activities    Trouble thinking or focusing    Using quick relief inhaler or nebulizer more often    Poor sleep; symptoms wake me up    Do not feel like eating Actions:      Get plenty of rest    Take daily medicines    Use quick relief inhaler every *** hours    If you use oxygen, call you doctor to see if you should adjust your oxygen    Do breathing exercises or other things to help you relax    Let a loved one, friend or neighbor know you are feeling worse    Call your care team if you have 2 or more symptoms.  Start taking steroids or antibiotics if directed by your care team           RED ZONE       Need medical care now      Severe shortness of breath (feel you can't breathe)    Fever, chills    Not enough breath to do any activity    Trouble coughing up mucus, walking or  talking    Blood in mucus    Frequent coughing   Rescue medicines are not working    Not able to sleep because of breathing    Feel confused or drowsy    Chest pain    Actions:      Call your health care team.  If you cannot reach your care team, call 911 or go to the emergency room.        Annual Reminders:  Meet with Care Team, Flu Shot every Fall  Pharmacy: [id number] - [place], [place] - [address]

## 2019-03-07 NOTE — NURSING NOTE
"Chief Complaint   Patient presents with     Pain       Initial /68   Pulse 73   Resp 19   Wt 72.6 kg (160 lb)   SpO2 98%   BMI 25.82 kg/m   Estimated body mass index is 25.82 kg/m  as calculated from the following:    Height as of 2/13/19: 1.676 m (5' 6\").    Weight as of this encounter: 72.6 kg (160 lb).  Medication Reconciliation: complete    Liudmila Rivera LPN    "

## 2019-03-08 ENCOUNTER — HOSPITAL ENCOUNTER (OUTPATIENT)
Dept: PHYSICAL THERAPY | Facility: HOSPITAL | Age: 59
Setting detail: THERAPIES SERIES
End: 2019-03-08
Attending: FAMILY MEDICINE
Payer: COMMERCIAL

## 2019-03-08 PROCEDURE — 97110 THERAPEUTIC EXERCISES: CPT | Mod: GP

## 2019-03-08 PROCEDURE — 97140 MANUAL THERAPY 1/> REGIONS: CPT | Mod: GP

## 2019-03-15 DIAGNOSIS — K22.10 ULCER OF ESOPHAGUS WITHOUT BLEEDING: ICD-10-CM

## 2019-03-15 RX ORDER — DEXLANSOPRAZOLE 60 MG/1
CAPSULE, DELAYED RELEASE ORAL
Qty: 30 CAPSULE | Refills: 3 | Status: SHIPPED | OUTPATIENT
Start: 2019-03-15 | End: 2019-07-14

## 2019-03-15 NOTE — TELEPHONE ENCOUNTER
Dexilant  Last Written Prescription Date: 11/13/18  Last Fill Quantity: 30 # of Refills: 3  Last Office Visit: 3/7/19

## 2019-03-18 DIAGNOSIS — M54.2 CHRONIC NECK PAIN: ICD-10-CM

## 2019-03-18 DIAGNOSIS — G89.29 CHRONIC NECK PAIN: ICD-10-CM

## 2019-03-18 RX ORDER — HYDROCODONE BITARTRATE AND ACETAMINOPHEN 7.5; 325 MG/1; MG/1
TABLET ORAL
Qty: 60 TABLET | Refills: 0 | Status: SHIPPED | OUTPATIENT
Start: 2019-03-18 | End: 2019-03-28

## 2019-03-18 NOTE — TELEPHONE ENCOUNTER
Patient notified prescription is ready to be picked up at the [facility], Registration.

## 2019-03-18 NOTE — TELEPHONE ENCOUNTER
Norco       Last Written Prescription Date:  3/7/2019  Last Fill Quantity: 60,   # refills: 0  Last Office Visit: 2/13/2019  Future Office visit:    Next 5 appointments (look out 90 days)    Apr 16, 2019  9:20 AM CDT  (Arrive by 9:05 AM)  Return Visit with Zoe Herbert MD  [facility] ) [address] [place] [id number] [phone]   Jun 06, 2019 10:00 AM CDT  (Arrive by 9:45 AM)  SHORT with Henna Cruz MD  [facility] ([facility] ) [facility] [address]  [phone]           Routing refill request to provider for review/approval because:  Drug not on the [facility], [facility] or [facility] refill protocol or controlled substance

## 2019-03-21 DIAGNOSIS — M62.838 MUSCLE SPASM: ICD-10-CM

## 2019-03-21 RX ORDER — DIAZEPAM 5 MG
TABLET ORAL
Qty: 100 TABLET | Refills: 0 | Status: SHIPPED | OUTPATIENT
Start: 2019-03-21 | End: 2019-06-06

## 2019-03-21 NOTE — TELEPHONE ENCOUNTER
diazepam (VALIUM) 5 MG tablet  Last Written Prescription Date:  9/27/18  Last Fill Quantity: 100,   # refills: 0  Last Office Visit: 3/7/19  Future Office visit:    Next 5 appointments (look out 90 days)    Apr 16, 2019  9:20 AM CDT  (Arrive by 9:05 AM)  Return Visit with Zoe Herbert MD  [facility] ([facility] ) [address] [place] [id number] [phone]   Jun 06, 2019 10:00 AM CDT  (Arrive by 9:45 AM)  SHORT with Henna Cruz MD  [facility] ([facility] ) [facility] [address]  [phone]           Routing refill request to provider for review/approval because:  Drug not on the [facility], [facility] or  Health refill protocol or controlled substance

## 2019-03-22 ENCOUNTER — HOSPITAL ENCOUNTER (OUTPATIENT)
Dept: PHYSICAL THERAPY | Facility: HOSPITAL | Age: 59
Setting detail: THERAPIES SERIES
End: 2019-03-22
Attending: ORTHOPAEDIC SURGERY
Payer: COMMERCIAL

## 2019-03-22 PROCEDURE — 97140 MANUAL THERAPY 1/> REGIONS: CPT | Mod: GP

## 2019-03-22 PROCEDURE — 97110 THERAPEUTIC EXERCISES: CPT | Mod: GP

## 2019-03-26 DIAGNOSIS — F33.2 MAJOR DEPRESSIVE DISORDER, RECURRENT SEVERE WITHOUT PSYCHOTIC FEATURES (H): ICD-10-CM

## 2019-03-27 RX ORDER — FLUOXETINE 40 MG/1
CAPSULE ORAL
Qty: 30 CAPSULE | Refills: 0 | Status: SHIPPED | OUTPATIENT
Start: 2019-03-27 | End: 2019-04-18

## 2019-03-28 DIAGNOSIS — G89.29 CHRONIC NECK PAIN: ICD-10-CM

## 2019-03-28 DIAGNOSIS — M54.2 CHRONIC NECK PAIN: ICD-10-CM

## 2019-03-28 RX ORDER — HYDROCODONE BITARTRATE AND ACETAMINOPHEN 7.5; 325 MG/1; MG/1
TABLET ORAL
Qty: 60 TABLET | Refills: 0 | Status: SHIPPED | OUTPATIENT
Start: 2019-03-28 | End: 2019-06-06

## 2019-03-28 NOTE — TELEPHONE ENCOUNTER
Norco       Last Written Prescription Date:  3/18/2019  Last Fill Quantity: 60,   # refills: 0  Last Office Visit: 3/7/2019  Future Office visit:    Next 5 appointments (look out 90 days)    Apr 16, 2019  9:20 AM CDT  (Arrive by 9:05 AM)  Return Visit with Z[facility] Herbert MD  [facility] ) [address] [place] [id number] [phone]   Jun 06, 2019 10:00 AM CDT  (Arrive by 9:45 AM)  SHORT with Henna Cruz MD  [facility] ([facility] ) [facility] [address]  [phone]           Routing refill request to provider for review/approval because:  Drug not on the [facility], [facility] or [facility] refill protocol or controlled substance

## 2019-04-02 DIAGNOSIS — G89.29 CHRONIC RADICULAR CERVICAL PAIN: ICD-10-CM

## 2019-04-02 DIAGNOSIS — M54.12 CHRONIC RADICULAR CERVICAL PAIN: ICD-10-CM

## 2019-04-03 RX ORDER — BACLOFEN 10 MG/1
10 TABLET ORAL 3 TIMES DAILY PRN
Qty: 40 TABLET | Refills: 0 | Status: SHIPPED | OUTPATIENT
Start: 2019-04-03 | End: 2019-06-06

## 2019-04-03 NOTE — TELEPHONE ENCOUNTER
Baclofen  Last visit: 3.7.19  Last refill: 12.31.18 #40    Future appointments:   Next 5 appointments (look out 90 days)    Apr 16, 2019  9:20 AM CDT  (Arrive by 9:05 AM)  Return Visit with Zoe Herbert MD  [facility] ([facility] ) [address] [place] [address]  [phone]   Jun 06, 2019 10:00 AM CDT  (Arrive by 9:45 AM)  SHORT with Henna Cruz MD  [facility] ([facility] ) [facility] [address]  [phone]

## 2019-04-05 ENCOUNTER — HOSPITAL ENCOUNTER (OUTPATIENT)
Dept: PHYSICAL THERAPY | Facility: HOSPITAL | Age: 59
Setting detail: THERAPIES SERIES
End: 2019-04-05
Attending: ORTHOPAEDIC SURGERY
Payer: COMMERCIAL

## 2019-04-05 PROCEDURE — 97110 THERAPEUTIC EXERCISES: CPT | Mod: GP

## 2019-04-05 NOTE — PROGRESS NOTES
Outpatient Physical Therapy Discharge Note     Patient: Sadaf Blankenship  : 1960    Beginning/End Dates of Reporting Period:  18 to 2019    Referring Provider: Dr. Kraus    Therapy Diagnosis: L frozen shoulder     Client Self Report: Patient reports today for L shoulder rehab. States that elbow and wrist are still painful. Feels roughly 80% improved. Is leaving for [place] tomorrow for roughly 2 months. Will continue with use of HEP and feels comfortable with today being discharge day.     Objective Measurements:  125 abduction  135 degrees scaption  45 degrees L ER  *all active movements    Goals:  Short-term goals:  To be achieved in 3 weeks:  Instruct in home program     1.) Patient will understand biomechanical stressors of the shoulder joint in order to make modifications to activities to reduce further risk of injury. MET  2.) Patient will be independent with a short-term home exercise program. MET  3.) Patient will report 25% improvement of overall symptoms to allow decreased shoulder pain with daily movement and activity. MET        Long-term goals:  To be achieved in 10 weeks:  Self management of symptoms MET     1.) Improve score on Shoulder Pain and Disability Index by 13 points or greater to correlate with clinically significant change.  2.) Patient will report 50% improvement of overall symptoms to allow decreased shoulder pain with daily movement and activity NOT MET - unable to get SPADI completed  3.) Patient will be able to attain roughly 125 degrees of active L shoulder scaption to allow increased ability to reach and grab objects with left upper extremity. MET         Assessment:  Throughout course of therapy, patient has improved overall L shoulder mobility, reduced pain, and improved overall ability to perform daily movements and activity with reduced pain. Patient to continue with use of HEP provided for effective management and further improvement of adhesive capsulitis  outside of clinic.    Plan:  Discharge from therapy.    Discharge:    Reason for Discharge: Patient leaving on trip to [place] - will be gone for roughly 2 months and will continue with use of HEP outside of clinic.    Equipment Issued: none    Discharge Plan: Patient to continue home program.

## 2019-04-18 DIAGNOSIS — F33.2 MAJOR DEPRESSIVE DISORDER, RECURRENT SEVERE WITHOUT PSYCHOTIC FEATURES (H): ICD-10-CM

## 2019-04-22 RX ORDER — FLUOXETINE 40 MG/1
CAPSULE ORAL
Qty: 30 CAPSULE | Refills: 3 | Status: SHIPPED | OUTPATIENT
Start: 2019-04-22 | End: 2019-08-13

## 2019-05-21 NOTE — PROGRESS NOTES
Danni Blankenship is a 58 year old female who presents to clinic today for the following health issues:    HPI   Chronic Pain Follow-Up       Type / Location of Pain: from head all the way down the back and right and left arms  Analgesia/pain control:       Recent changes:  worse      Overall control: Tolerable with discomfort  Activity level/function:      Daily activities:  Able to do light housework, cooking    Work:  not applicable  Adverse effects:  No  Adherance    Taking medication as directed?  Yes    Participating in other treatments: no  Risk Factors:    Sleep:  Fair    Mood/anxiety:  worsened    Recent family or social stressors:  none noted    Other aggravating factors: cold weather  PHQ-9 SCORE 12/6/2018 2/5/2019 6/5/2019   PHQ-9 Total Score 5 0 0     ESTRELLA-7 SCORE 12/6/2018 2/5/2019 6/5/2019   Total Score 2 7 0     Encounter-Level CSA - 07/11/2017:    Controlled Substance Agreement - Scan on 7/14/2017 12:56 PM: CONTROLLED SUBSTANCE AGREEMENT (below)       Patient-Level CSA:    There are no patient-level csa.         Amount of exercise or physical activity: None    Problems taking medications regularly: No    Medication side effects: none    Diet: regular (no restrictions)    Pain is worsening with paresthesias down her arms bilaterally. When she flexes her head forward it catches and she hears a snap. She can hardly rotate her head. She was visiting relatives in [place] and found the humidity there to be helpful. She tried mopping floors, washing dishes, washing clothes which significantly increased her pain. She took more hydrocodone for pain and became constipated. The pain relief she had with her rhizotomy has worn off now as well. Her disability was denied last Friday and is going to an appeal now.       Rectal pain   she notes that with constipation she developed some rectal pain, internally. She also had external hemorrhoids. She had a colonoscopy in 2018 that was negative and her  pain has now resolved with resolution of her constipation.       Migraine headaches  She has vision changes with increased Topamax so has reduced this to 2 tablets bid. She will be seeing her neurologist and would like to consider restarting Botox injections which were helpful.     {  Patient Active Problem List   Diagnosis     Degenerative disc disease, cervical     Pseudoarthrosis of cervical spine     Cervical pain     Migraine     UTI (urinary tract infection)     Advanced care planning/counseling discussion     Thoracic sprain and strain     Sprain and strain of shoulder and upper arm     Irritable bowel syndrome     Myofascial pain syndrome, cervical     Depression, major     Chronic pain syndrome     Uvulitis     Chronic rhinitis     Madina bullosa     Chronic maxillary sinusitis     Hypertrophy of inferior nasal turbinate     Long uvula     Chronic neck pain     Chronic pain     Chronic tension-type headache, intractable     Migraine aura without headache     History of arthrodesis     Myofascial pain     Disuse syndrome     Intractable chronic migraine without aura and with status migrainosus     Ulcer of esophagus without bleeding     Gastroesophageal reflux disease with esophagitis     Intractable chronic migraine without aura and without status migrainosus     Ulcer of esophagus     Ulnar neuropathy     Chronic radicular cervical pain     Mild episode of recurrent major depressive disorder (H)     Ulnar neuropathy at elbow of left upper extremity     Lumbar radiculopathy     S/P cervical spinal fusion     ACP (advance care planning)     Acute back pain with sciatica     Chronic migraine without aura without status migrainosus, not intractable     Radicular pain     Subacromial bursitis of right shoulder joint     Panlobular emphysema (H)     Calculus of kidney     Pulmonary emphysema, unspecified emphysema type (H)     Other chronic gastritis without hemorrhage     Pelvic floor dysfunction     Adhesive  capsulitis of left shoulder     Past Surgical History:   Procedure Laterality Date     BACK SURGERY      cervical     Cervical spine surgery with removal of 2 disks, C5,C7       COLONOSCOPY  10/13/2014    Dr Camargo, internal hemorrhoids     COLONOSCOPY - HIM SCAN  2018    EGD and colonoscopy with Dr. Jennings     Coronary angiogram, normal      Chest pain     ENT SURGERY      nose surgery x3     fusion discectomy anterior cervical  2011     HC ESOPHAGOSCOPY, DIAGNOSTIC  10/31/2014    Dr Camargo, mild erythema of antrum, negative biopsies     NOSE SURGERY      x3            Posterior decompression , fusion C3-5      Psuedoarthrosis     Supracervical hysterectomy with right salpingoophorectomy      Endometriosis       Social History     Tobacco Use     Smoking status: Former Smoker     Years: 8.00     Types: Cigarettes     Smokeless tobacco: Never Used     Tobacco comment: quit . no passive exposure   Substance Use Topics     Alcohol use: No     Family History   Problem Relation Age of Onset     Cancer Father         lung, also a heavy smoker     Diabetes Mother      Heart Disease Mother 58        MI; cause of death; heavy smoker. had first MI at 40     Coronary Artery Disease Mother      Hypertension Mother      Cancer Other         strong history     Heart Disease Other         heart disease     Heart Disease Brother         x3     Hypertension Brother      Diabetes Brother      Coronary Artery Disease Brother      Hypertension Sister          Current Outpatient Medications   Medication Sig Dispense Refill     baclofen (LIORESAL) 10 MG tablet Take 1 tablet (10 mg) by mouth 3 times daily as needed for muscle spasms 40 tablet 0     butalbital-acetaminophen-caffeine (FIORICET/ESGIC) -40 MG tablet TAKE 1 TO 2 TABLETS BY MOUTH AT THE ONSET OF A MIGRAINE HEADACHE, LIMIT NO MORE THAN 3 TIMES PER WEEK. ACETAMINOPHEN SHOULD BE LIMITED TO 40  5     Cholecalciferol (VITAMIN D3) 1000 UNITS CAPS  "Take 1,000 Units by mouth       DEXILANT 60 MG CPDR CR capsule TAKE 1 CAPSULE DAILY BY MOUTH 30 capsule 3     diazepam (VALIUM) 5 MG tablet TAKE 1 TABLET BY MOUTH EVER Y 6 HOURS AS NEEDED - GENER IC FOR VALIUM 100 tablet 0     docusate sodium (COLACE) 100 MG capsule Take 100 mg by mouth       FLUoxetine (PROZAC) 40 MG capsule TAKE 1 CAPSULE DAILY BY MOUTH 30 capsule 3     HYDROcodone-acetaminophen (NORCO) 7.5-325 MG per tablet TAKE 1 TABLET BY MOUTH EVER Y 4 TO 6 HOURS AS NEEDED FO R PAIN 60 tablet 0     ibuprofen (ADVIL/MOTRIN) 800 MG tablet Take 1 tablet (800 mg) by mouth every 8 hours as needed for moderate pain 90 tablet 1     LYRICA 150 MG capsule INCREASE OVER 2 WEEKS  MG THREE TIMES DAILY, THEN CONTINUE ON THIS DOSE.  5     Misc. Devices MISC Dispense one Cefaly neurostimulator and the electro stimulators with refills       Multiple Vitamins-Minerals (CENTRUM SILVER ULTRA WOMENS) TABS        order for DME Equipment being ordered: TENS       Probiotic Product (PROBIOTIC DAILY PO) Take by mouth At Bedtime        sucralfate (CARAFATE) 1 GM tablet Take 1 tablet (1 g) by mouth 4 times daily 40 tablet 1     topiramate (TOPAMAX) 50 MG tablet Take 2 tablets in the morning and 2 tablets at night       Allergies   Allergen Reactions     Aspirin Hives     Ibuprofen      Dye in the otc form causes headaches  \"patient states over the counter ibuprofen  bothers her\"     Lansoprazole Other (See Comments) and Visual Disturbance     Changes in eyesight  Prevacid  Change in eyesight     Penicillins Other (See Comments) and Itching     pruritis     Red Dye Hives     Bupropion Rash     Bupropion Hcl Hives and Rash     Wellbutrin     Demerol [Meperidine] Other (See Comments)     Made migraine worse     BP Readings from Last 3 Encounters:   06/06/19 90/60   06/05/19 92/68   03/07/19 118/68    Wt Readings from Last 3 Encounters:   06/06/19 68.9 kg (152 lb)   06/05/19 72.6 kg (160 lb)   03/07/19 72.6 kg (160 lb)                "     Reviewed and updated as needed this visit by Provider         Review of Systems   ROS COMP: Constitutional, HEENT, cardiovascular, pulmonary, gi and gu systems are negative, except as otherwise noted.      Objective    BP 90/60 (BP Location: Right arm, Patient Position: Sitting, Cuff Size: Adult Regular)   Pulse 69   Temp 98.5  F (36.9  C) (Tympanic)   Wt 68.9 kg (152 lb)   SpO2 98%   BMI 24.53 kg/m    Body mass index is 24.53 kg/m .  Physical Exam   GENERAL APPEARANCE: alert, no distress and fatigued  NECK: no adenopathy, no asymmetry, masses, or scars and thyroid normal to palpation  RESP: lungs clear to auscultation - no rales, rhonchi or wheezes  CV: regular rates and rhythm, normal S1 S2, no S3 or S4 and no murmur, click or rub  LYMPHATICS: no cervical adenopathy  ORTHO: Cervical Spine Exam: Inspection: increased cervical lordosis  Tender:  occipital nerves, spinous processes, left paracervical muscles, right paracervical muscles, left trapezius muscles, right trapezius muscles, spasm of the trapezius muscles bilaterally  Non-tender:  none  Range of Motion:  flexion:  decreased, painful, 10 degrees, extension: decreased, painful, 5 degrees, left lateral rotation:  decreased, painful, 10 degrees, right lateral rotation:  decreased, painful, 10 degrees  Strength: decreased strength through out. She has little motion  SKIN: no suspicious lesions or rashes  NEURO: Normal strength and tone, mentation intact and speech normal  PSYCH: mentation appears normal, worried and frustrated            Assessment & Plan     1. Chronic neck pain  Ongoing with little range of motion. onging  - HYDROcodone-acetaminophen (NORCO) 7.5-325 MG per tablet; TAKE 1 TABLET BY MOUTH EVER Y 4 TO 6 HOURS AS NEEDED FO R PAIN  Dispense: 60 tablet; Refill: 0    2. Chronic radicular cervical pain  Medication renewed. She is willing to try gentle PT so will refer. Will recheck cervical xrays as well to evaluate   - baclofen (LIORESAL) 10  "MG tablet; Take 1 tablet (10 mg) by mouth 3 times daily as needed for muscle spasms  Dispense: 40 tablet; Refill: 0  - XR Cervical Spine w Flex/Ext G/E 4 Views; Future  - PHYSICAL THERAPY REFERRAL; Future    3. Muscle spasm  renewed  - diazepam (VALIUM) 5 MG tablet; TAKE 1 TABLET BY MOUTH EVER Y 6 HOURS AS NEEDED - GENER IC FOR VALIUM  Dispense: 100 tablet; Refill: 0     BMI:   Estimated body mass index is 24.53 kg/m  as calculated from the following:    Height as of 2/13/19: 1.676 m (5' 6\").    Weight as of this encounter: 68.9 kg (152 lb).               Return in about 3 months (around 9/6/2019) for chronic pain.    Henna Cruz MD  [facility] - [place]    "

## 2019-06-05 ENCOUNTER — OFFICE VISIT (OUTPATIENT)
Dept: PSYCHIATRY | Facility: OTHER | Age: 59
End: 2019-06-05
Attending: PSYCHIATRY & NEUROLOGY
Payer: COMMERCIAL

## 2019-06-05 VITALS
TEMPERATURE: 98.1 F | WEIGHT: 160 LBS | BODY MASS INDEX: 25.82 KG/M2 | HEART RATE: 67 BPM | DIASTOLIC BLOOD PRESSURE: 68 MMHG | SYSTOLIC BLOOD PRESSURE: 92 MMHG | OXYGEN SATURATION: 98 %

## 2019-06-05 DIAGNOSIS — F41.1 GAD (GENERALIZED ANXIETY DISORDER): Primary | ICD-10-CM

## 2019-06-05 PROCEDURE — G0463 HOSPITAL OUTPT CLINIC VISIT: HCPCS

## 2019-06-05 PROCEDURE — 99214 OFFICE O/P EST MOD 30 MIN: CPT | Performed by: PSYCHIATRY & NEUROLOGY

## 2019-06-05 ASSESSMENT — ANXIETY QUESTIONNAIRES
GAD7 TOTAL SCORE: 0
5. BEING SO RESTLESS THAT IT IS HARD TO SIT STILL: NOT AT ALL
6. BECOMING EASILY ANNOYED OR IRRITABLE: NOT AT ALL
2. NOT BEING ABLE TO STOP OR CONTROL WORRYING: NOT AT ALL
1. FEELING NERVOUS, ANXIOUS, OR ON EDGE: NOT AT ALL
3. WORRYING TOO MUCH ABOUT DIFFERENT THINGS: NOT AT ALL
7. FEELING AFRAID AS IF SOMETHING AWFUL MIGHT HAPPEN: NOT AT ALL

## 2019-06-05 ASSESSMENT — PATIENT HEALTH QUESTIONNAIRE - PHQ9
SUM OF ALL RESPONSES TO PHQ QUESTIONS 1-9: 0
5. POOR APPETITE OR OVEREATING: NOT AT ALL

## 2019-06-05 ASSESSMENT — PAIN SCALES - GENERAL: PAINLEVEL: MODERATE PAIN (4)

## 2019-06-05 NOTE — NURSING NOTE
"Chief Complaint   Patient presents with     RECHECK     Depression, anxiety.       Initial BP 92/68 (BP Location: Left arm, Patient Position: Sitting, Cuff Size: Adult Regular)   Pulse 67   Temp 98.1  F (36.7  C) (Tympanic)   Wt 72.6 kg (160 lb)   SpO2 98%   BMI 25.82 kg/m   Estimated body mass index is 25.82 kg/m  as calculated from the following:    Height as of 2/13/19: 1.676 m (5' 6\").    Weight as of this encounter: 72.6 kg (160 lb).  Medication Reconciliation: complete    JANICE ROMO LPN    "

## 2019-06-05 NOTE — PROGRESS NOTES
"  PSYCHIATRY CLINIC PROGRESS NOTE   20 minute medication management, more than 50% of time spent counseling patient on medications, medication side effects, symptom history and management   SUBJECTIVE / INTERIM HISTORY                                                                       Social- niece and niece's kids moved out Children-  Daughter Shandra going to college  Last visit 2/5/19:   -- Continue fluoxetine 40 mg daily. She is going to discuss Topamax decrease the HS dose with her primary.    - notes she is done with helping out her daughter financially. Daughter moved in with dad for now and daughter is still going to go to [employer] for [employer]  - unable to make food for her nephew's graduation party given pain and weakness of upper extremities. Instead they are going to have it at [place]  - sister has been little better / nicer. Sadaf thinks sister wants her to stay there and hence being nicer  - Decreased Topamax as she was seeing things out of corner of her eyes..  Since she is doing better.   - is extremely frus  - hearing August 7th '18 [place] and denied, appealing. Does not feel she can work. Is very frustrated and notes near ready giving up. I inquired about the mismatch between her PH-Q 9 / ESTRELLA-7 and her level of depression and anxiety and she notes \"I gave up\"   - struggling financially    SUBSTANCE USE- no issues    SYMPTOMS-  Depressed mood, low energy, anhedonia, Anxiety, gets easily overwhelmed, gets panic attacks, depression, anhedonia, low energy, overwhelmed, Passive SI, no intent or plan  MEDICAL ROS- migraines, . upper abdominal pain. .  Substernal pain after she eats (hx ulcers esophageal and gastric) neck pain s/p neck surgeries, migraines, IBS  MEDICAL / SURGICAL HISTORY                    Patient Active Problem List   Diagnosis     Degenerative disc disease, cervical     Pseudoarthrosis of cervical spine     Cervical pain     Migraine     UTI (urinary tract infection)     " Advanced care planning/counseling discussion     Thoracic sprain and strain     Sprain and strain of shoulder and upper arm     Irritable bowel syndrome     Myofascial pain syndrome, cervical     Depression, major     Chronic pain syndrome     Uvulitis     Chronic rhinitis     Madina bullosa     Chronic maxillary sinusitis     Hypertrophy of inferior nasal turbinate     Long uvula     Chronic neck pain     Chronic pain     Chronic tension-type headache, intractable     Migraine aura without headache     History of arthrodesis     Myofascial pain     Disuse syndrome     Intractable chronic migraine without aura and with status migrainosus     Ulcer of esophagus without bleeding     Gastroesophageal reflux disease with esophagitis     Intractable chronic migraine without aura and without status migrainosus     Ulcer of esophagus     Ulnar neuropathy     Chronic radicular cervical pain     Mild episode of recurrent major depressive disorder (H)     Ulnar neuropathy at elbow of left upper extremity     Lumbar radiculopathy     S/P cervical spinal fusion     ACP (advance care planning)     Acute back pain with sciatica     Chronic migraine without aura without status migrainosus, not intractable     Radicular pain     Subacromial bursitis of right shoulder joint     Panlobular emphysema (H)     Calculus of kidney     Pulmonary emphysema, unspecified emphysema type (H)     Other chronic gastritis without hemorrhage     Pelvic floor dysfunction     Adhesive capsulitis of left shoulder     ALLERGY   Aspirin; Ibuprofen; Lansoprazole; Penicillins; Red dye; Bupropion; Bupropion hcl; and Demerol [meperidine]  MEDICATIONS                                                                                             Current Outpatient Medications   Medication Sig     baclofen (LIORESAL) 10 MG tablet TAKE 1 TABLET (10 MG) BY MOUTH 3 TIMES DAILY AS NEEDED FOR MUSCLE SPASMS     butalbital-acetaminophen-caffeine (FIORICET/ESGIC)  -40 MG tablet TAKE 1 TO 2 TABLETS BY MOUTH AT THE ONSET OF A MIGRAINE HEADACHE, LIMIT NO MORE THAN 3 TIMES PER WEEK. ACETAMINOPHEN SHOULD BE LIMITED TO 40     Cholecalciferol (VITAMIN D3) 1000 UNITS CAPS Take 1,000 Units by mouth     DEXILANT 60 MG CPDR CR capsule TAKE 1 CAPSULE DAILY BY MOUTH     diazepam (VALIUM) 5 MG tablet TAKE 1 TABLET BY MOUTH EVER Y 6 HOURS AS NEEDED - GENER IC FOR VALIUM     docusate sodium (COLACE) 100 MG capsule Take 100 mg by mouth     FLUoxetine (PROZAC) 40 MG capsule TAKE 1 CAPSULE DAILY BY MOUTH     HYDROcodone-acetaminophen (NORCO) 7.5-325 MG per tablet TAKE 1 TABLET BY MOUTH EVER Y 4 TO 6 HOURS AS NEEDED FO R PAIN     ibuprofen (ADVIL/MOTRIN) 800 MG tablet Take 1 tablet (800 mg) by mouth every 8 hours as needed for moderate pain     LYRICA 150 MG capsule INCREASE OVER 2 WEEKS  MG THREE TIMES DAILY, THEN CONTINUE ON THIS DOSE.     Misc. Devices MISC Dispense one Cefaly neurostimulator and the electro stimulators with refills     Multiple Vitamins-Minerals (CENTRUM SILVER ULTRA WOMENS) TABS      order for DME Equipment being ordered: TENS     Probiotic Product (PROBIOTIC DAILY PO) Take by mouth At Bedtime      sucralfate (CARAFATE) 1 GM tablet Take 1 tablet (1 g) by mouth 4 times daily     topiramate (TOPAMAX) 50 MG tablet Take 2 tablets in the morning and 2 tablets at night     No current facility-administered medications for this visit.        VITALS   BP 92/68 (BP Location: Left arm, Patient Position: Sitting, Cuff Size: Adult Regular)   Pulse 67   Temp 98.1  F (36.7  C) (Tympanic)   Wt 72.6 kg (160 lb)   SpO2 98%   BMI 25.82 kg/m       LABS                                                                                                                           Last Basic Metabolic Panel:  NA      142   6/9/2015   POTASSIUM      3.6   6/9/2015  CHLORIDE      109   6/9/2015  SALOME      8.7   6/9/2015  CO2       26   6/9/2015  BUN        8   6/9/2015  CR     0.91    6/9/2015  GLC       96   6/9/2015    MENTAL STATUS EXAM                                                                                        Alert. Oriented to person, place, and date / time. Well groomed, calm, cooperative with good eye contact. No problems with speech or psychomotor behavior. Mood was depressed and affect was congruent to speech content : tearful. Thought process, including associations, was unremarkable and thought content was devoid of suicidal and homicidal ideation and psychotic thought. No hallucinations. Insight was good. Judgment was intact and adequate for safety. Fund of knowledge was intact. Pt demonstrates no obvious problems with attention, concentration, language, recent or remote memory although these were not formally tested.     ASSESSMENT                                                                                                      HISTORICAL:  Initial psych note 10/13/15        NOTES:  Cymbalta -> agitation, angry. Perry Blankenship is a  59 year old, female who has hx of depression and is actually doing okay with depression, anxiety, now on fluoxetine 40 mg daily. She is depressed, upset: has a lot of things going on. We don't feel med change is going to change / improve unfortunately. The visual distortions have improved since she went down on Topamax. Sadaf is tearful and frustrated with being unable to work yet repeatedly getting denied for disability. I am in agreement that I do not feel she could sustain competitive employment.       TREATMENT RISK STATEMENT:  The risks, benefits, alternatives and potential adverse effects have been explained and are understood by the pt.  The pt agrees to the treatment plan with the ability to do so.   The pt knows to call the clinic for any problems or access emergency care if needed.        DIAGNOSES                     MDD recurrent, mod to severe  ESTRELLA      PLAN                                                                                                                     1)  MEDICATIONS:         -- Continue fluoxetine 40 mg daily.     2)  THERAPY:  No Change    3)  LABS:  None    4)  PT MONITOR [call for probs]:  worsening sx, SI/HI, SEs from meds    5)  REFERRALS [CD, medical, other]:  None    6)  RTC:  ~ 1 month

## 2019-06-06 ENCOUNTER — OFFICE VISIT (OUTPATIENT)
Dept: FAMILY MEDICINE | Facility: OTHER | Age: 59
End: 2019-06-06
Attending: FAMILY MEDICINE
Payer: COMMERCIAL

## 2019-06-06 ENCOUNTER — ANCILLARY PROCEDURE (OUTPATIENT)
Dept: GENERAL RADIOLOGY | Facility: OTHER | Age: 59
End: 2019-06-06
Attending: FAMILY MEDICINE
Payer: COMMERCIAL

## 2019-06-06 VITALS
SYSTOLIC BLOOD PRESSURE: 90 MMHG | HEART RATE: 69 BPM | OXYGEN SATURATION: 98 % | WEIGHT: 152 LBS | TEMPERATURE: 98.5 F | BODY MASS INDEX: 24.53 KG/M2 | DIASTOLIC BLOOD PRESSURE: 60 MMHG

## 2019-06-06 DIAGNOSIS — M62.838 MUSCLE SPASM: ICD-10-CM

## 2019-06-06 DIAGNOSIS — M54.12 CHRONIC RADICULAR CERVICAL PAIN: ICD-10-CM

## 2019-06-06 DIAGNOSIS — G89.29 CHRONIC RADICULAR CERVICAL PAIN: ICD-10-CM

## 2019-06-06 DIAGNOSIS — G89.29 CHRONIC NECK PAIN: Primary | ICD-10-CM

## 2019-06-06 DIAGNOSIS — M54.2 CHRONIC NECK PAIN: Primary | ICD-10-CM

## 2019-06-06 PROCEDURE — 72050 X-RAY EXAM NECK SPINE 4/5VWS: CPT | Mod: TC

## 2019-06-06 PROCEDURE — 99214 OFFICE O/P EST MOD 30 MIN: CPT | Performed by: FAMILY MEDICINE

## 2019-06-06 PROCEDURE — G0463 HOSPITAL OUTPT CLINIC VISIT: HCPCS

## 2019-06-06 RX ORDER — HYDROCODONE BITARTRATE AND ACETAMINOPHEN 7.5; 325 MG/1; MG/1
TABLET ORAL
Qty: 60 TABLET | Refills: 0 | Status: SHIPPED | OUTPATIENT
Start: 2019-06-06 | End: 2019-09-05

## 2019-06-06 RX ORDER — DIAZEPAM 5 MG
TABLET ORAL
Qty: 100 TABLET | Refills: 0 | Status: SHIPPED | OUTPATIENT
Start: 2019-06-06 | End: 2019-09-05

## 2019-06-06 RX ORDER — BACLOFEN 10 MG/1
10 TABLET ORAL 3 TIMES DAILY PRN
Qty: 40 TABLET | Refills: 0 | Status: SHIPPED | OUTPATIENT
Start: 2019-06-06 | End: 2019-07-09

## 2019-06-06 ASSESSMENT — PAIN SCALES - GENERAL: PAINLEVEL: MODERATE PAIN (5)

## 2019-06-06 ASSESSMENT — ANXIETY QUESTIONNAIRES: GAD7 TOTAL SCORE: 0

## 2019-06-06 NOTE — NURSING NOTE
"Chief Complaint   Patient presents with     chronic pain follow up       Initial BP 90/60 (BP Location: Right arm, Patient Position: Sitting, Cuff Size: Adult Regular)   Pulse 69   Temp 98.5  F (36.9  C) (Tympanic)   Wt 68.9 kg (152 lb)   SpO2 98%   BMI 24.53 kg/m   Estimated body mass index is 24.53 kg/m  as calculated from the following:    Height as of 2/13/19: 1.676 m (5' 6\").    Weight as of this encounter: 68.9 kg (152 lb).  Medication Reconciliation: complete    Betty Barney LPN  "

## 2019-06-27 ENCOUNTER — HOSPITAL ENCOUNTER (OUTPATIENT)
Dept: PHYSICAL THERAPY | Facility: HOSPITAL | Age: 59
Setting detail: THERAPIES SERIES
End: 2019-06-27
Attending: FAMILY MEDICINE
Payer: COMMERCIAL

## 2019-06-27 ENCOUNTER — TELEPHONE (OUTPATIENT)
Dept: FAMILY MEDICINE | Facility: OTHER | Age: 59
End: 2019-06-27

## 2019-06-27 DIAGNOSIS — G43.109 MIGRAINE AURA WITHOUT HEADACHE: ICD-10-CM

## 2019-06-27 DIAGNOSIS — G43.711 INTRACTABLE CHRONIC MIGRAINE WITHOUT AURA AND WITH STATUS MIGRAINOSUS: ICD-10-CM

## 2019-06-27 DIAGNOSIS — G89.29 CHRONIC NECK PAIN: ICD-10-CM

## 2019-06-27 DIAGNOSIS — G89.29 CHRONIC RADICULAR CERVICAL PAIN: ICD-10-CM

## 2019-06-27 DIAGNOSIS — M54.12 CHRONIC RADICULAR CERVICAL PAIN: ICD-10-CM

## 2019-06-27 DIAGNOSIS — M79.18 MYOFASCIAL PAIN SYNDROME, CERVICAL: Primary | ICD-10-CM

## 2019-06-27 DIAGNOSIS — M54.2 CHRONIC NECK PAIN: ICD-10-CM

## 2019-06-27 PROCEDURE — 97110 THERAPEUTIC EXERCISES: CPT | Mod: GP

## 2019-06-27 PROCEDURE — 97163 PT EVAL HIGH COMPLEX 45 MIN: CPT | Mod: GP

## 2019-06-27 NOTE — PROGRESS NOTES
06/27/19 0800   General Information   Type of Visit Initial OP Ortho PT Evaluation   Start of Care Date 06/27/19   Referring Physician Dr. Henna Cruz   Orders Evaluate and Treat   Date of Order 06/06/19   Medical Diagnosis Chronic radicular cervical pain   Surgical/Medical history reviewed Yes   Precautions/Limitations no known precautions/limitations   General Information Comments PMH DJD, migraine, depression, chronic pain, s/p cervical fusion, adhesive capsulitis L shoulder   Body Part(s)   Body Part(s) Cervical Spine   Presentation and Etiology   Pertinent history of current problem (include personal factors and/or comorbidities that impact the POC) Pt reports she has always had neck isssues. Pt has had a rhizotomy and a fusion from C3 to C7. Reports her neck locks up and is very painful. This just started happening and happens about every other day. She is going to see her neurologist for her migraines. Reports her head feels heavy. All of a sudden since the neck has been locking her neck feels very heavy and she feels like her head feels like a bowling ball. She recently had frozen shoulder in her left arm  She does exercises for her shoulder with therabands. Has been to pain clinic in [place] and when she has manual work she gets a migraine. Pt has also had new onset of tingling down her legs. She has fallen a couple of times but reports that she didn't want to tell anyone   Impairments A. Pain;D. Decreased ROM;E. Decreased flexibility;K. Numbness;L. Tingling;N. Headaches   Functional Limitations perform activities of daily living   Symptom Location Neck, pain down both arm. LLE numbness/tingling   How/Where did it occur From insidious onset   Onset date of current episode/exacerbation   (Couple of months ago)   Chronicity Chronic   Pain rating (0-10 point scale) Best (/10);Worst (/10)   Best (/10) 2   Worst (/10) 10   Pain quality A. Sharp   Frequency of pain/symptoms C. With activity   Pain/symptoms  are: Worse during the day   Pain/symptoms exacerbated by K. Home tasks;G. Certain positions;J. ADL   Pain/symptoms eased by G. Heat;C. Rest  (Reports she will need to lay in bed all day)   Progression of symptoms since onset: Improved   Fall Risk Screen   Have you fallen 2 or more times in the past year? Yes   Have you fallen and had an injury in the past year? No   Is patient a fall risk? Yes;Department fall risk interventions implemented   Cervical Spine   Posture Forward head, protracted shoulders   Cervical Flexion ROM Full but painful   Cervical Extension ROM 75% limited and painful   Cervical Right Side Bending ROM painful 75% impaired   Cervical Left Side Bending ROM Very painful, 75% limited   Cervical Right Rotation ROM 80% limited and painful   Cervical Left Rotation ROM 80% limited and painful   Shoulder AROM Screen Full   Shoulder Shrug (C2-C4) Strength 5/5   Shoulder Abd (C5) Strength 5/5 on R, 4-/5 on L   Shoulder Add (C7) Strength 5/5   Shoulder ER (C5, C6) Strength 5/5 R, L 4-/5   Shoulder IR (C5, C6) Strength 5/5 R, 4-/5 L   Elbow Flexion (C5, C6) Strength 5/5   Elbow Extension (C7) Strength 5/5   5th Finger Add (T1) Strength  strength 30# bilaterally    Neck Flexor Endurance Test (normal 39 sec) Very poor   Segmental Mobility-Cervical Not tolerated, pt said it would cause migraine   Palpation Pt would not allow any manual assessment. Reports any manual work, massage, palpation causes migraine   Neurological Testing Comments LLE weakness and tingling. Hip flexion 3-/5, abduction 3+/5, DF 3+/5     Planned Therapy Interventions   Planned Therapy Interventions manual therapy;ROM;strengthening;stretching;balance training   Planned Modality Interventions   Planned Modality Interventions Hot packs;Electrical stimulation;Cryotherapy   Clinical Impression   Criteria for Skilled Therapeutic Interventions Met yes, treatment indicated   PT Diagnosis Chronic cervical pain c radiculopathy   Influenced by the  following impairments Impaired ROM, LUE/LE weakness, impaired flexiblity , pain, impaired posture   Functional limitations due to impairments Decreased tolerance for daily activities due to neck pain   Clinical Presentation Unstable/Unpredictable   Clinical Presentation Rationale Clinical judgement   Clinical Decision Making (Complexity) High complexity   Therapy Frequency   (1-2x/week. See clinical impression)   Predicted Duration of Therapy Intervention (days/wks) up to 90 days   Risk & Benefits of therapy have been explained Yes   Patient, Family & other staff in agreement with plan of care Yes   Clinical Impression Comments Symptoms consistent with chronic cervical pain with radiculopathy. Pt presents with  L sided weakness and numbness/tingling that is concerning. Pt has also had a couple of falls lately. Message sent to Dr. Cruz and she recommends pt follow up with Dr. Grewal. Pt did not allow much assessment today because any manual work causes migraines. Did provide pt with ROM exercises as well as deep neck flexor strengthening.    ORTHO GOALS   PT Ortho Eval Goals 2;1;3   Ortho Goal 1   Goal Identifier STG 1   Goal Description Pt will demonstrate knowledge/understanding of HEP and report daily compliance   Target Date 07/11/19   Ortho Goal 2   Goal Identifier LTG 1   Goal Description Pt will complete daily activities without  neck pain exceeding 4/10    Target Date 09/25/19   Total Evaluation Time   PT Eval, High Complexity Minutes (42090) 30     I certify the need for these services furnished under this plan of treatment and while under my care. (Physician co-signature of this document indicates review and certification of the therapy plan).

## 2019-06-27 NOTE — PROGRESS NOTES
06/27/19 0800   General Information   Type of Visit Initial OP Ortho PT Evaluation   Start of Care Date 06/27/19   Referring Physician Dr. Henna Cruz   Orders Evaluate and Treat   Date of Order 06/06/19   Medical Diagnosis Chronic radicular cervical pain   Surgical/Medical history reviewed Yes   Precautions/Limitations no known precautions/limitations   General Information Comments PMH DJD, migraine, depression, chronic pain, s/p cervical fusion, adhesive capsulitis L shoulder   Body Part(s)   Body Part(s) Cervical Spine   Presentation and Etiology   Pertinent history of current problem (include personal factors and/or comorbidities that impact the POC) Pt reports she has always had neck isssues. Pt has had a rhizotomy and a fusion from C3 to C7. Reports her neck locks up and is very painful. This just started happening and happens about every other day. She is going to see her neurologist for her migraines. Reports her head feels heavy. All of a sudden since the neck has been locking her neck feels very heavy and she feels like her head feels like a bowling ball. She recently had frozen shoulder in her left arm  She does exercises for her shoulder with therabands. Has been to pain clinic in [place] and when she has manual work she gets a migraine. Pt has also had new onset of tingling down her legs. She has fallen a couple of times but reports that she didn't want to tell anyone   Impairments A. Pain;D. Decreased ROM;E. Decreased flexibility;K. Numbness;L. Tingling;N. Headaches   Functional Limitations perform activities of daily living   Symptom Location Neck, pain down both arm. LLE numbness/tingling   How/Where did it occur From insidious onset   Onset date of current episode/exacerbation   (Couple of months ago)   Chronicity Chronic   Pain rating (0-10 point scale) Best (/10);Worst (/10)   Best (/10) 2   Worst (/10) 10   Pain quality A. Sharp   Frequency of pain/symptoms C. With activity   Pain/symptoms  are: Worse during the day   Pain/symptoms exacerbated by K. Home tasks;G. Certain positions;J. ADL   Pain/symptoms eased by G. Heat;C. Rest  (Reports she will need to lay in bed all day)   Progression of symptoms since onset: Improved   Fall Risk Screen   Have you fallen 2 or more times in the past year? Yes   Have you fallen and had an injury in the past year? No   Is patient a fall risk? Yes;Department fall risk interventions implemented   Cervical Spine   Posture Forward head, protracted shoulders   Cervical Flexion ROM Full but painful   Cervical Extension ROM 75% limited and painful   Cervical Right Side Bending ROM painful 75% impaired   Cervical Left Side Bending ROM Very painful, 75% limited   Cervical Right Rotation ROM 80% limited and painful   Cervical Left Rotation ROM 80% limited and painful   Shoulder AROM Screen Full   Shoulder Shrug (C2-C4) Strength 5/5   Shoulder Abd (C5) Strength 5/5 on R, 4-/5 on L   Shoulder Add (C7) Strength 5/5   Shoulder ER (C5, C6) Strength 5/5 R, L 4-/5   Shoulder IR (C5, C6) Strength 5/5 R, 4-/5 L   Elbow Flexion (C5, C6) Strength 5/5   Elbow Extension (C7) Strength 5/5   5th Finger Add (T1) Strength  strength 30# bilaterally    Neck Flexor Endurance Test (normal 39 sec) Very poor   Segmental Mobility-Cervical Not tolerated, pt said it would cause migraine   Palpation Pt would not allow any manual assessment. Reports any manual work, massage, palpation causes migraine   Neurological Testing Comments LLE weakness and tingling. Hip flexion 3-/5, abduction 3+/5, DF 3+/5     Clinical Impression   Criteria for Skilled Therapeutic Interventions Met yes, treatment indicated   PT Diagnosis Chronic cervical pain c radiculopathy   Influenced by the following impairments Impaired ROM, LUE/LE weakness, impaired flexiblity , pain, impaired posture   Functional limitations due to impairments Decreased tolerance for daily activities due to neck pain   Clinical Presentation  Unstable/Unpredictable   Clinical Presentation Rationale Clinical judgement   Clinical Decision Making (Complexity) High complexity   Therapy Frequency   (1-2x/week. See clinical impression)   Predicted Duration of Therapy Intervention (days/wks) up to 90 days   Risk & Benefits of therapy have been explained Yes   Patient, Family & other staff in agreement with plan of care Yes   Clinical Impression Comments Symptoms consistent with chronic cervical pain with radiculopathy. Pt presents with  L sided weakness and numbness/tingling that is concerning. Pt has also had a couple of falls lately. Message sent to Dr. Cruz and she recommends pt follow up with Dr. Grewal. Pt did not allow much assessment today because any manual work causes migraines. Did provide pt with ROM exercises as well as deep neck flexor strengthening.    ORTHO GOALS   PT Ortho Eval Goals 2;1;3   Ortho Goal 1   Goal Identifier STG 1   Goal Description Pt will demonstrate knowledge/understanding of HEP and report daily compliance   Target Date 07/11/19   Ortho Goal 2   Goal Identifier LTG 1   Goal Description Pt will complete daily activities without  neck pain exceeding 4/10    Target Date 09/25/19   Total Evaluation Time   PT Conor High Complexity Minutes (17479) 30     I certify the need for these services furnished under this plan of treatment and while under my care. (Physician co-signature of this document indicates review and certification of the therapy plan).

## 2019-06-27 NOTE — TELEPHONE ENCOUNTER
Patient was going to make an appointment with, Dr. Grewal, neurology but he has left the practice a couple of months ago.  Patient is looking for advice on who to see now.  Can you please advise and provide a referral as needed.

## 2019-06-27 NOTE — PROGRESS NOTES
06/27/19 0800   General Information   Type of Visit Initial OP Ortho PT Evaluation   Start of Care Date 06/27/19   Referring Physician Dr. Henna Cruz   Orders Evaluate and Treat   Date of Order 06/06/19   Medical Diagnosis Chronic radicular cervical pain   Surgical/Medical history reviewed Yes   Precautions/Limitations no known precautions/limitations   General Information Comments PMH DJD, migraine, depression, chronic pain, s/p cervical fusion, adhesive capsulitis L shoulder   Body Part(s)   Body Part(s) Cervical Spine   Presentation and Etiology   Pertinent history of current problem (include personal factors and/or comorbidities that impact the POC) Pt reports she has always had neck isssues. Pt has had a rhizotomy and a fusion from C3 to C7. Reports her neck locks up and is very painful. This just started happening and happens about every other day. She is going to see her neurologist for her migraines. Reports her head feels heavy. All of a sudden since the neck has been locking her neck feels very heavy and she feels like her head feels like a bowling ball. She recently had frozen shoulder in her left arm  She does exercises for her shoulder with therabands. Has been to pain clinic in [place] and when she has manual work she gets a migraine. Pt has also had new onset of tingling down her legs. She has fallen a couple of times but reports that she didn't want to tell anyone   Impairments A. Pain;D. Decreased ROM;E. Decreased flexibility;K. Numbness;L. Tingling;N. Headaches   Functional Limitations perform activities of daily living   Symptom Location Neck, pain down both arm. LLE numbness/tingling   How/Where did it occur From insidious onset   Onset date of current episode/exacerbation   (Couple of months ago)   Chronicity Chronic   Pain rating (0-10 point scale) Best (/10);Worst (/10)   Best (/10) 2   Worst (/10) 10   Pain quality A. Sharp   Frequency of pain/symptoms C. With activity   Pain/symptoms  are: Worse during the day   Pain/symptoms exacerbated by K. Home tasks;G. Certain positions;J. ADL   Pain/symptoms eased by G. Heat;C. Rest  (Reports she will need to lay in bed all day)   Progression of symptoms since onset: Improved   Fall Risk Screen   Have you fallen 2 or more times in the past year? Yes   Have you fallen and had an injury in the past year? No   Is patient a fall risk? Yes;Department fall risk interventions implemented   Cervical Spine   Posture Forward head, protracted shoulders   Cervical Flexion ROM Full but painful   Cervical Extension ROM 75% limited and painful   Cervical Right Side Bending ROM painful 75% impaired   Cervical Left Side Bending ROM Very painful, 75% limited   Cervical Right Rotation ROM 80% limited and painful   Cervical Left Rotation ROM 80% limited and painful   Shoulder AROM Screen Full   Shoulder Shrug (C2-C4) Strength 5/5   Shoulder Abd (C5) Strength 5/5 on R, 4-/5 on L   Shoulder Add (C7) Strength 5/5   Shoulder ER (C5, C6) Strength 5/5 R, L 4-/5   Shoulder IR (C5, C6) Strength 5/5 R, 4-/5 L   Elbow Flexion (C5, C6) Strength 5/5   Elbow Extension (C7) Strength 5/5   5th Finger Add (T1) Strength  strength 30# bilaterally    Neck Flexor Endurance Test (normal 39 sec) Very poor   Segmental Mobility-Cervical Not tolerated, pt said it would cause migraine   Palpation Pt would not allow any manual assessment. Reports any manual work, massage, palpation causes migraine   Neurological Testing Comments LLE weakness and tingling. Hip flexion 3-/5, abduction 3+/5, DF 3+/5     Clinical Impression   Criteria for Skilled Therapeutic Interventions Met yes, treatment indicated   PT Diagnosis Chronic cervical pain c radiculopathy   Influenced by the following impairments Impaired ROM, LUE/LE weakness, impaired flexiblity , pain, impaired posture   Functional limitations due to impairments Decreased tolerance for daily activities due to neck pain   Clinical Presentation  Unstable/Unpredictable   Clinical Presentation Rationale Clinical judgement   Clinical Decision Making (Complexity) High complexity   Therapy Frequency   (1-2x/week. See clinical impression)   Predicted Duration of Therapy Intervention (days/wks) up to 90 days   Risk & Benefits of therapy have been explained Yes   Patient, Family & other staff in agreement with plan of care Yes   Clinical Impression Comments Symptoms consistent with chronic cervical pain with radiculopathy. Pt presents with  L sided weakness and numbness/tingling that is concerning. Pt has also had a couple of falls lately. Message sent to Dr. Cruz and she recommends pt follow up with Dr. Grewal. Pt did not allow much assessment today because any manual work causes migraines. Did provide pt with ROM exercises as well as deep neck flexor strengthening.    ORTHO GOALS   PT Ortho Eval Goals 2;1;3   Ortho Goal 1   Goal Identifier STG 1   Goal Description Pt will demonstrate knowledge/understanding of HEP and report daily compliance   Target Date 07/11/19   Ortho Goal 2   Goal Identifier LTG 1   Goal Description Pt will complete daily activities without  neck pain exceeding 4/10    Target Date 09/25/19   Total Evaluation Time   PT Conor High Complexity Minutes (25684) 30      06/27/19 0800   General Information   Type of Visit Initial OP Ortho PT Evaluation   Start of Care Date 06/27/19   Referring Physician Dr. Henna Cruz   Orders Evaluate and Treat   Date of Order 06/06/19   Medical Diagnosis Chronic radicular cervical pain   Surgical/Medical history reviewed Yes   Precautions/Limitations no known precautions/limitations   General Information Comments PMH DJD, migraine, depression, chronic pain, s/p cervical fusion, adhesive capsulitis L shoulder   Body Part(s)   Body Part(s) Cervical Spine   Presentation and Etiology   Pertinent history of current problem (include personal factors and/or comorbidities that impact the POC) Pt reports she has always  had neck isssues. Pt has had a rhizotomy and a fusion from C3 to C7. Reports her neck locks up and is very painful. This just started happening and happens about every other day. She is going to see her neurologist for her migraines. Reports her head feels heavy. All of a sudden since the neck has been locking her neck feels very heavy and she feels like her head feels like a bowling ball. She recently had frozen shoulder in her left arm  She does exercises for her shoulder with therabands. Has been to pain clinic in [place] and when she has manual work she gets a migraine. Pt has also had new onset of tingling down her legs. She has fallen a couple of times but reports that she didn't want to tell anyone   Impairments A. Pain;D. Decreased ROM;E. Decreased flexibility;K. Numbness;L. Tingling;N. Headaches   Functional Limitations perform activities of daily living   Symptom Location Neck, pain down both arm. LLE numbness/tingling   How/Where did it occur From insidious onset   Onset date of current episode/exacerbation   (Couple of months ago)   Chronicity Chronic   Pain rating (0-10 point scale) Best (/10);Worst (/10)   Best (/10) 2   Worst (/10) 10   Pain quality A. Sharp   Frequency of pain/symptoms C. With activity   Pain/symptoms are: Worse during the day   Pain/symptoms exacerbated by K. Home tasks;G. Certain positions;J. ADL   Pain/symptoms eased by G. Heat;C. Rest  (Reports she will need to lay in bed all day)   Progression of symptoms since onset: Improved   Fall Risk Screen   Have you fallen 2 or more times in the past year? Yes   Have you fallen and had an injury in the past year? No   Is patient a fall risk? Yes;Department fall risk interventions implemented   Cervical Spine   Posture Forward head, protracted shoulders   Cervical Flexion ROM Full but painful   Cervical Extension ROM 75% limited and painful   Cervical Right Side Bending ROM painful 75% impaired   Cervical Left Side Bending ROM Very painful,  75% limited   Cervical Right Rotation ROM 80% limited and painful   Cervical Left Rotation ROM 80% limited and painful   Shoulder AROM Screen Full   Shoulder Shrug (C2-C4) Strength 5/5   Shoulder Abd (C5) Strength 5/5 on R, 4-/5 on L   Shoulder Add (C7) Strength 5/5   Shoulder ER (C5, C6) Strength 5/5 R, L 4-/5   Shoulder IR (C5, C6) Strength 5/5 R, 4-/5 L   Elbow Flexion (C5, C6) Strength 5/5   Elbow Extension (C7) Strength 5/5   5th Finger Add (T1) Strength  strength 30# bilaterally    Neck Flexor Endurance Test (normal 39 sec) Very poor   Segmental Mobility-Cervical Not tolerated, pt said it would cause migraine   Palpation Pt would not allow any manual assessment. Reports any manual work, massage, palpation causes migraine   Neurological Testing Comments LLE weakness and tingling. Hip flexion 3-/5, abduction 3+/5, DF 3+/5     Clinical Impression   Criteria for Skilled Therapeutic Interventions Met yes, treatment indicated   PT Diagnosis Chronic cervical pain c radiculopathy   Influenced by the following impairments Impaired ROM, LUE/LE weakness, impaired flexiblity , pain, impaired posture   Functional limitations due to impairments Decreased tolerance for daily activities due to neck pain   Clinical Presentation Unstable/Unpredictable   Clinical Presentation Rationale Clinical judgement   Clinical Decision Making (Complexity) High complexity   Therapy Frequency   (1-2x/week. See clinical impression)   Predicted Duration of Therapy Intervention (days/wks) up to 90 days   Risk & Benefits of therapy have been explained Yes   Patient, Family & other staff in agreement with plan of care Yes   Clinical Impression Comments Symptoms consistent with chronic cervical pain with radiculopathy. Pt presents with  L sided weakness and numbness/tingling that is concerning. Pt has also had a couple of falls lately. Message sent to Dr. Cruz and she recommends pt follow up with Dr. Grewal. Pt did not allow much assessment  today because any manual work causes migraines. Did provide pt with ROM exercises as well as deep neck flexor strengthening.    ORTHO GOALS   PT Ortho Eval Goals 2;1;3   Ortho Goal 1   Goal Identifier STG 1   Goal Description Pt will demonstrate knowledge/understanding of HEP and report daily compliance   Target Date 07/11/19   Ortho Goal 2   Goal Identifier LTG 1   Goal Description Pt will complete daily activities without  neck pain exceeding 4/10    Target Date 09/25/19   Total Evaluation Time   PT Conor, High Complexity Minutes (39809) 30

## 2019-06-28 NOTE — TELEPHONE ENCOUNTER
Patient notified.  She will call to arrange appointment.  Encouraged to call back if she needs further assistance.

## 2019-07-08 ENCOUNTER — TELEPHONE (OUTPATIENT)
Dept: FAMILY MEDICINE | Facility: OTHER | Age: 59
End: 2019-07-08

## 2019-07-08 NOTE — TELEPHONE ENCOUNTER
10:16 AM    Reason for Call: Phone Call    Description: Pt called and states that she would like to see if she could get a call back regarding the referral that was supposed to get sent to the pain management doctor in [place]. The fax number she wants it sent to is [phone]    Was an appointment offered for this call? No  If yes : Appointment type              Date    Preferred method for responding to this message: Telephone Call  What is your phone number ?[phone]    If we cannot reach you directly, may we leave a detailed response at the number you provided? Yes    Can this message wait until your PCP/provider returns, if available today? Not applicable, pcp is in     [place]

## 2019-07-08 NOTE — TELEPHONE ENCOUNTER
Patient should be receiving a call soon from [facility]. Referral was faxed last week, and [facility] calls patients to schedule. The fax number in this message is directly to [facility] Pain Management dept, but [facility] does the scheduling for them. No new referral required at this time.

## 2019-07-08 NOTE — TELEPHONE ENCOUNTER
I see one was faxed on 6/25/19 to [facility] but its a different fax number, is this ok or should another referral be placed?

## 2019-07-09 DIAGNOSIS — G89.29 CHRONIC RADICULAR CERVICAL PAIN: ICD-10-CM

## 2019-07-09 DIAGNOSIS — M54.12 CHRONIC RADICULAR CERVICAL PAIN: ICD-10-CM

## 2019-07-09 RX ORDER — BACLOFEN 10 MG/1
10 TABLET ORAL 3 TIMES DAILY PRN
Qty: 40 TABLET | Refills: 0 | Status: SHIPPED | OUTPATIENT
Start: 2019-07-09 | End: 2019-07-19

## 2019-07-09 NOTE — TELEPHONE ENCOUNTER
baclofen (LIORESAL) 10 MG tablet      Last Written Prescription Date:  6/6/19  Last Fill Quantity: 40,   # refills: 0  Last Office Visit: 6/6/19  Future Office visit:    Next 5 appointments (look out 90 days)    Aug 26, 2019  9:20 AM CDT  (Arrive by 9:05 AM)  [facility] Visit with Zoe Herbert MD  [facility] [address] [place] [id number] [phone]   Sep 05, 2019 10:30 AM CDT  (Arrive by 10:15 AM)  SHORT with Henna Cruz MD  [facility] ([facility] ) [address] [place] [id number]  [phone]           Routing refill request to provider for review/approval because:  Drug not on the [facility], [facility] or  Health refill protocol or controlled substance

## 2019-07-14 DIAGNOSIS — K22.10 ULCER OF ESOPHAGUS WITHOUT BLEEDING: ICD-10-CM

## 2019-07-15 RX ORDER — DEXLANSOPRAZOLE 60 MG/1
CAPSULE, DELAYED RELEASE ORAL
Qty: 30 CAPSULE | Refills: 3 | Status: SHIPPED | OUTPATIENT
Start: 2019-07-15 | End: 2019-12-08

## 2019-07-19 DIAGNOSIS — M54.12 CHRONIC RADICULAR CERVICAL PAIN: ICD-10-CM

## 2019-07-19 DIAGNOSIS — G89.29 CHRONIC RADICULAR CERVICAL PAIN: ICD-10-CM

## 2019-07-19 RX ORDER — BACLOFEN 10 MG/1
10 TABLET ORAL 3 TIMES DAILY PRN
Qty: 40 TABLET | Refills: 0 | Status: SHIPPED | OUTPATIENT
Start: 2019-07-19 | End: 2019-09-05

## 2019-07-19 NOTE — TELEPHONE ENCOUNTER
Baclofen      Last Written Prescription Date:  7/9/2019  Last Fill Quantity: 40,   # refills: 0  Last Office Visit: 6/6/2019  Future Office visit:    Next 5 appointments (look out 90 days)    Aug 26, 2019  9:20 AM CDT  (Arrive by 9:05 AM)  Return Visit with Zoe Herbert MD  [facility] ([facility] ) [address] [place] [id number] [phone]   Sep 05, 2019 10:30 AM CDT  (Arrive by 10:15 AM)  SHORT with Henna Cruz MD  [facility] ([facility] ) [facility] [address]  [phone]           Routing refill request to provider for review/approval because:  Drug not on the [facility], [facility] or [facility] refill protocol or controlled substance'

## 2019-08-13 DIAGNOSIS — F33.2 MAJOR DEPRESSIVE DISORDER, RECURRENT SEVERE WITHOUT PSYCHOTIC FEATURES (H): ICD-10-CM

## 2019-08-13 RX ORDER — FLUOXETINE 40 MG/1
CAPSULE ORAL
Qty: 30 CAPSULE | Refills: 3 | Status: SHIPPED | OUTPATIENT
Start: 2019-08-13 | End: 2019-12-11

## 2019-08-26 ENCOUNTER — OFFICE VISIT (OUTPATIENT)
Dept: PSYCHIATRY | Facility: OTHER | Age: 59
End: 2019-08-26
Attending: PSYCHIATRY & NEUROLOGY
Payer: COMMERCIAL

## 2019-08-26 VITALS
WEIGHT: 160 LBS | SYSTOLIC BLOOD PRESSURE: 100 MMHG | BODY MASS INDEX: 25.71 KG/M2 | OXYGEN SATURATION: 98 % | HEIGHT: 66 IN | DIASTOLIC BLOOD PRESSURE: 68 MMHG | HEART RATE: 69 BPM | TEMPERATURE: 99.1 F

## 2019-08-26 DIAGNOSIS — F33.1 MAJOR DEPRESSIVE DISORDER, RECURRENT EPISODE, MODERATE (H): Primary | ICD-10-CM

## 2019-08-26 PROCEDURE — 99213 OFFICE O/P EST LOW 20 MIN: CPT | Performed by: PSYCHIATRY & NEUROLOGY

## 2019-08-26 PROCEDURE — G0463 HOSPITAL OUTPT CLINIC VISIT: HCPCS

## 2019-08-26 ASSESSMENT — PATIENT HEALTH QUESTIONNAIRE - PHQ9
SUM OF ALL RESPONSES TO PHQ QUESTIONS 1-9: 2
5. POOR APPETITE OR OVEREATING: SEVERAL DAYS

## 2019-08-26 ASSESSMENT — MIFFLIN-ST. JEOR: SCORE: 1322.51

## 2019-08-26 ASSESSMENT — ANXIETY QUESTIONNAIRES
6. BECOMING EASILY ANNOYED OR IRRITABLE: SEVERAL DAYS
2. NOT BEING ABLE TO STOP OR CONTROL WORRYING: SEVERAL DAYS
1. FEELING NERVOUS, ANXIOUS, OR ON EDGE: SEVERAL DAYS
7. FEELING AFRAID AS IF SOMETHING AWFUL MIGHT HAPPEN: NOT AT ALL
5. BEING SO RESTLESS THAT IT IS HARD TO SIT STILL: SEVERAL DAYS
3. WORRYING TOO MUCH ABOUT DIFFERENT THINGS: SEVERAL DAYS
GAD7 TOTAL SCORE: 6

## 2019-08-26 ASSESSMENT — PAIN SCALES - GENERAL: PAINLEVEL: SEVERE PAIN (6)

## 2019-08-26 NOTE — NURSING NOTE
"Chief Complaint   Patient presents with     RECHECK     Anxiety, depression.       Initial /68 (BP Location: Left arm, Patient Position: Sitting, Cuff Size: Adult Regular)   Pulse 69   Temp 99.1  F (37.3  C) (Tympanic)   Ht 1.676 m (5' 6\")   Wt 72.6 kg (160 lb)   SpO2 98%   BMI 25.82 kg/m   Estimated body mass index is 25.82 kg/m  as calculated from the following:    Height as of this encounter: 1.676 m (5' 6\").    Weight as of this encounter: 72.6 kg (160 lb).  Medication Reconciliation: complete    "

## 2019-08-26 NOTE — PROGRESS NOTES
"  PSYCHIATRY CLINIC PROGRESS NOTE   20 minute medication management, more than 50% of time spent counseling patient on medications, medication side effects, symptom history and management   SUBJECTIVE / INTERIM HISTORY                                                                       Social- niece and niece's kids moved out Children-  Daughter Shandra going to college  Last visit 6/5/19: Continue fluoxetine 40 mg daily.    - getting a lot of headaches. botox couple weeks ago and today she notes not helping. Having neck pain as well.   - notes she is done with helping out her daughter financially. Daughter moved in with dad for now and daughter is still going to go to [employer] for college  - sister wanted her to leave / move. Living above [employer] and is very loud. On busy street and [employer] across including outdoor bands   - Decreased Topamax as she was seeing things out of corner of her eyes..  Since she is doing better.   - hearing August 7th '18 [employer] and denied, appealing. Does not feel she can work. Is very frustrated and notes near ready giving up. I inquired about the mismatch between her PH-Q 9 / ESTRELLA-7 and her level of depression and anxiety and she notes \"I gave up\"   - struggling financially    SUBSTANCE USE- no issues    SYMPTOMS-  Depressed mood, low energy, anhedonia, Anxiety, gets easily overwhelmed, gets panic attacks, depression, anhedonia, low energy, overwhelmed, Passive SI, no intent or plan  MEDICAL ROS- migraines, . upper abdominal pain. .  Substernal pain after she eats (hx ulcers esophageal and gastric) neck pain s/p neck surgeries, migraines, IBS  MEDICAL / SURGICAL HISTORY                    Patient Active Problem List   Diagnosis     Degenerative disc disease, cervical     Pseudoarthrosis of cervical spine     Cervical pain     Migraine     UTI (urinary tract infection)     Advanced care planning/counseling discussion     Thoracic sprain and strain     Sprain and strain of shoulder " and upper arm     Irritable bowel syndrome     Myofascial pain syndrome, cervical     Depression, major     Chronic, continuous use of opioids     Uvulitis     Chronic rhinitis     Madina bullosa     Chronic maxillary sinusitis     Hypertrophy of inferior nasal turbinate     Long uvula     Chronic neck pain     Chronic pain     Chronic tension-type headache, intractable     Migraine aura without headache     History of arthrodesis     Myofascial pain     Disuse syndrome     Intractable chronic migraine without aura and with status migrainosus     Ulcer of esophagus without bleeding     Gastroesophageal reflux disease with esophagitis     Intractable chronic migraine without aura and without status migrainosus     Ulcer of esophagus     Ulnar neuropathy     Chronic radicular cervical pain     Mild episode of recurrent major depressive disorder (H)     Ulnar neuropathy at elbow of left upper extremity     Lumbar radiculopathy     S/P cervical spinal fusion     ACP (advance care planning)     Acute back pain with sciatica     Chronic migraine without aura without status migrainosus, not intractable     Radicular pain     Subacromial bursitis of right shoulder joint     Panlobular emphysema (H)     Calculus of kidney     Pulmonary emphysema, unspecified emphysema type (H)     Other chronic gastritis without hemorrhage     Pelvic floor dysfunction     Adhesive capsulitis of left shoulder     ALLERGY   Aspirin; Ibuprofen; Lansoprazole; Penicillins; Red dye; Bupropion; Bupropion hcl; and Demerol [meperidine]  MEDICATIONS                                                                                             Current Outpatient Medications   Medication Sig     baclofen (LIORESAL) 10 MG tablet TAKE 1 TABLET (10 MG) BY MOUTH 3 TIMES DAILY AS NEEDED FOR MUSCLE SPASMS     butalbital-acetaminophen-caffeine (FIORICET/ESGIC) -40 MG tablet TAKE 1 TO 2 TABLETS BY MOUTH AT THE ONSET OF A MIGRAINE HEADACHE, LIMIT NO MORE THAN  "3 TIMES PER WEEK. ACETAMINOPHEN SHOULD BE LIMITED TO 40     Cholecalciferol (VITAMIN D3) 1000 UNITS CAPS Take 1,000 Units by mouth     DEXILANT 60 MG CPDR CR capsule TAKE 1 CAPSULE BY MOUTH DAILY     diazepam (VALIUM) 5 MG tablet TAKE 1 TABLET BY MOUTH EVER Y 6 HOURS AS NEEDED - GENER IC FOR VALIUM     docusate sodium (COLACE) 100 MG capsule Take 100 mg by mouth     FLUoxetine (PROZAC) 40 MG capsule TAKE 1 CAPSULE DAILY BY MOUTH     HYDROcodone-acetaminophen (NORCO) 7.5-325 MG per tablet TAKE 1 TABLET BY MOUTH EVER Y 4 TO 6 HOURS AS NEEDED FO R PAIN     ibuprofen (ADVIL/MOTRIN) 800 MG tablet Take 1 tablet (800 mg) by mouth every 8 hours as needed for moderate pain     LYRICA 150 MG capsule INCREASE OVER 2 WEEKS  MG THREE TIMES DAILY, THEN CONTINUE ON THIS DOSE.     Misc. Devices MISC Dispense one Cefaly neurostimulator and the electro stimulators with refills     Multiple Vitamins-Minerals (CENTRUM SILVER ULTRA WOMENS) TABS      order for DME Equipment being ordered: TENS     Probiotic Product (PROBIOTIC DAILY PO) Take by mouth At Bedtime      sucralfate (CARAFATE) 1 GM tablet Take 1 tablet (1 g) by mouth 4 times daily     topiramate (TOPAMAX) 50 MG tablet Take 2 tablets in the morning and 2 tablets at night     No current facility-administered medications for this visit.        VITALS   /68 (BP Location: Left arm, Patient Position: Sitting, Cuff Size: Adult Regular)   Pulse 69   Temp 99.1  F (37.3  C) (Tympanic)   Ht 1.676 m (5' 6\")   Wt 72.6 kg (160 lb)   SpO2 98%   BMI 25.82 kg/m       LABS                                                                                                                           Last Basic Metabolic Panel:  NA      142   6/9/2015   POTASSIUM      3.6   6/9/2015  CHLORIDE      109   6/9/2015  SALOME      8.7   6/9/2015  CO2       26   6/9/2015  BUN        8   6/9/2015  CR     0.91   6/9/2015  GLC       96   6/9/2015    MENTAL STATUS EXAM                                "                                                         Alert. Oriented to person, place, and date / time. Well groomed, calm, cooperative with good eye contact. No problems with speech or psychomotor behavior. Mood was depressed and affect was congruent to speech content : tearful. Appeared tired and in pain. Thought process, including associations, was unremarkable and thought content was devoid of suicidal and homicidal ideation and psychotic thought. No hallucinations. Insight was good. Judgment was intact and adequate for safety. Fund of knowledge was intact. Pt demonstrates no obvious problems with attention, concentration, language, recent or remote memory although these were not formally tested.     ASSESSMENT                                                                                                      HISTORICAL:  Initial psych note 10/13/15        NOTES:  Cymbalta -> agitation, angry. Perry Blankenship is a  59 year old, female who has hx of depression and is actually doing okay with depression, anxiety, now on fluoxetine 40 mg daily. She is depressed with physical and mental health sx. We don't feel med change is going to change / improve unfortunately. Sadaf is tearful and frustrated with being unable to work yet repeatedly getting denied for disability. I am in agreement that I do not feel she could sustain competitive employment.       TREATMENT RISK STATEMENT:  The risks, benefits, alternatives and potential adverse effects have been explained and are understood by the pt.  The pt agrees to the treatment plan with the ability to do so.   The pt knows to call the clinic for any problems or access emergency care if needed.        DIAGNOSES                     MDD recurrent, mod to severe  ESTRELLA      PLAN                                                                                                                    1)  MEDICATIONS:         -- Continue fluoxetine 40 mg daily.      2)  THERAPY:  No Change    3)  LABS:  None    4)  PT MONITOR [call for probs]:  worsening sx, SI/HI, SEs from meds    5)  REFERRALS [CD, medical, other]:  None    6)  RTC:  ~ 1 month

## 2019-08-27 ENCOUNTER — TRANSFERRED RECORDS (OUTPATIENT)
Dept: HEALTH INFORMATION MANAGEMENT | Facility: CLINIC | Age: 59
End: 2019-08-27

## 2019-08-27 ASSESSMENT — ANXIETY QUESTIONNAIRES: GAD7 TOTAL SCORE: 6

## 2019-08-28 NOTE — PROGRESS NOTES
Danni MCMAHAN Latecarolinae is a 59 year old female who presents to clinic today for the following health issues:    HPI   Chronic Pain Follow-Up       Type / Location of Pain: neck and shoulders  Analgesia/pain control:       Recent changes:  same      Overall control: Tolerable with discomfort  Activity level/function:      Daily activities:  Unable to perform most daily activities - chores, hobbies, social activities, driving    Work:  Unable to work  Adverse effects:  No  Adherance    Taking medication as directed?  Yes    Participating in other treatments: Injections  Risk Factors:    Sleep:  Poor    Mood/anxiety:  controlled    Recent family or social stressors:  Cancer with family members and death in family:    Other aggravating factors: prolonged sitting and prolonged standing  PHQ-9 SCORE 2/5/2019 6/5/2019 8/26/2019   PHQ-9 Total Score 0 0 2     ESTRELLA-7 SCORE 2/5/2019 6/5/2019 8/26/2019   Total Score 7 0 6     Encounter-Level CSA - 07/11/2017:    Controlled Substance Agreement - Scan on 7/14/2017 12:56 PM: CONTROLLED SUBSTANCE AGREEMENT (below)       Patient-Level CSA:    There are no patient-level csa.           How many servings of fruits and vegetables do you eat daily?  2-3    On average, how many sweetened beverages do you drink each day (soda, juice, sweet tea, etc)?   0    How many days per week do you miss taking your medication? 0    She is following with DR Mathis at [facility] who did an epidural injection 8-27-19 for her.     RESPIRATORY SYMPTOMS      Duration: 08/30/2019    Description  sore throat, facial pain/pressure, cough, ear pain bilateral, headache, fatigue/malaise and hoarse voice    Severity: mild    Accompanying signs and symptoms: None    History (predisposing factors):  Bronchitis     Precipitating or alleviating factors: None    Therapies tried and outcome:  rest and fluids, Antibiotics- Ceftin.     She feels the Ceftin is not working and she will be leaving for [place]  tomorrow to visit family    Anxiety  She is having a lot of stress with her daughter who is involved with drugs now. She has helped her out with money, etc, but she continues to have issues. She moved out of Ciera's house to her own apartment in [place] which is working well for her.     epigastric pain  This is recurring now, she is taking her sister's carafate which is helpful. The stress is hard on her  No emesis or blood stools    Patient Active Problem List   Diagnosis     Degenerative disc disease, cervical     Pseudoarthrosis of cervical spine     Cervical pain     Migraine     UTI (urinary tract infection)     Advanced care planning/counseling discussion     Thoracic sprain and strain     Sprain and strain of shoulder and upper arm     Irritable bowel syndrome     Myofascial pain syndrome, cervical     Depression, major     Chronic, continuous use of opioids     Uvulitis     Chronic rhinitis     Madina bullosa     Chronic maxillary sinusitis     Hypertrophy of inferior nasal turbinate     Long uvula     Chronic neck pain     Chronic pain     Chronic tension-type headache, intractable     Migraine aura without headache     History of arthrodesis     Myofascial pain     Disuse syndrome     Intractable chronic migraine without aura and with status migrainosus     Ulcer of esophagus without bleeding     Gastroesophageal reflux disease with esophagitis     Intractable chronic migraine without aura and without status migrainosus     Ulcer of esophagus     Ulnar neuropathy     Chronic radicular cervical pain     Mild episode of recurrent major depressive disorder (H)     Ulnar neuropathy at elbow of left upper extremity     Lumbar radiculopathy     S/P cervical spinal fusion     ACP (advance care planning)     Acute back pain with sciatica     Chronic migraine without aura without status migrainosus, not intractable     Radicular pain     Subacromial bursitis of right shoulder joint     Panlobular emphysema (H)      Calculus of kidney     Pulmonary emphysema, unspecified emphysema type (H)     Other chronic gastritis without hemorrhage     Pelvic floor dysfunction     Adhesive capsulitis of left shoulder     Past Surgical History:   Procedure Laterality Date     BACK SURGERY      cervical     Cervical spine surgery with removal of 2 disks, C5,C7       COLONOSCOPY  10/13/2014    Dr Camargo, internal hemorrhoids     COLONOSCOPY - HIM SCAN  2018    EGD and colonoscopy with Dr. Jennings     Coronary angiogram, normal      Chest pain     ENT SURGERY      nose surgery x3     fusion discectomy anterior cervical  2011     HC ESOPHAGOSCOPY, DIAGNOSTIC  10/31/2014    Dr Camargo, mild erythema of antrum, negative biopsies     NOSE SURGERY      x3            Posterior decompression , fusion C3-5      Psuedoarthrosis     Supracervical hysterectomy with right salpingoophorectomy      Endometriosis       Social History     Tobacco Use     Smoking status: Former Smoker     Years: 8.00     Types: Cigarettes     Last attempt to quit:      Years since quittin.6     Smokeless tobacco: Never Used     Tobacco comment: quit . no passive exposure   Substance Use Topics     Alcohol use: No     Family History   Problem Relation Age of Onset     Cancer Father         lung, also a heavy smoker     Diabetes Mother      Heart Disease Mother 58        MI; cause of death; heavy smoker. had first MI at 40     Coronary Artery Disease Mother      Hypertension Mother      Cancer Other         strong history     Heart Disease Other         heart disease     Heart Disease Brother         x3     Hypertension Brother      Diabetes Brother      Coronary Artery Disease Brother      Hypertension Sister          Current Outpatient Medications   Medication Sig Dispense Refill     amoxicillin-clavulanate (AUGMENTIN) 875-125 MG tablet Take 1 tablet by mouth 2 times daily for 10 days 20 tablet 0     baclofen (LIORESAL) 10 MG tablet  "TAKE 1 TABLET (10 MG) BY MOUTH 3 TIMES DAILY AS NEEDED FOR MUSCLE SPASMS 40 tablet 0     butalbital-acetaminophen-caffeine (FIORICET/ESGIC) -40 MG tablet TAKE 1 TO 2 TABLETS BY MOUTH AT THE ONSET OF A MIGRAINE HEADACHE, LIMIT NO MORE THAN 3 TIMES PER WEEK. ACETAMINOPHEN SHOULD BE LIMITED TO 40  5     Cholecalciferol (VITAMIN D3) 1000 UNITS CAPS Take 1,000 Units by mouth       DEXILANT 60 MG CPDR CR capsule TAKE 1 CAPSULE BY MOUTH DAILY 30 capsule 3     diazepam (VALIUM) 5 MG tablet TAKE 1 TABLET BY MOUTH EVER Y 6 HOURS AS NEEDED - GENER IC FOR VALIUM 60 tablet 0     docusate sodium (COLACE) 100 MG capsule Take 100 mg by mouth       FLUoxetine (PROZAC) 40 MG capsule TAKE 1 CAPSULE DAILY BY MOUTH 30 capsule 3     HYDROcodone-acetaminophen (NORCO) 7.5-325 MG per tablet TAKE 1 TABLET BY MOUTH EVER Y 4 TO 6 HOURS AS NEEDED FO R PAIN 60 tablet 0     ibuprofen (ADVIL/MOTRIN) 800 MG tablet Take 1 tablet (800 mg) by mouth every 8 hours as needed for moderate pain 90 tablet 1     LYRICA 150 MG capsule INCREASE OVER 2 WEEKS  MG THREE TIMES DAILY, THEN CONTINUE ON THIS DOSE.  5     Misc. Devices MISC Dispense one Cefaly neurostimulator and the electro stimulators with refills       Multiple Vitamins-Minerals (CENTRUM SILVER ULTRA WOMENS) TABS        ondansetron (ZOFRAN-ODT) 4 MG ODT tab Take 4 mg by mouth       order for DME Equipment being ordered: TENS       Probiotic Product (PROBIOTIC DAILY PO) Take by mouth At Bedtime        sucralfate (CARAFATE) 1 GM/10ML suspension Take 10 mLs (1 g) by mouth 4 times daily 420 mL 3     topiramate (TOPAMAX) 50 MG tablet Take 2 tablets in the morning and 2 tablets at night       Allergies   Allergen Reactions     Aspirin Hives     Ibuprofen      Dye in the otc form causes headaches  \"patient states over the counter ibuprofen  bothers her\"     Lansoprazole Other (See Comments) and Visual Disturbance     Changes in eyesight  Prevacid  Change in eyesight     Red Dye Hives     " "Bupropion Rash     Bupropion Hcl Hives and Rash     Wellbutrin     Demerol [Meperidine] Other (See Comments)     Made migraine worse     BP Readings from Last 3 Encounters:   09/05/19 100/66   08/30/19 92/64   08/26/19 100/68    Wt Readings from Last 3 Encounters:   09/05/19 72.6 kg (160 lb)   08/30/19 73.9 kg (163 lb)   08/26/19 72.6 kg (160 lb)                      Reviewed and updated as needed this visit by Provider         Review of Systems   ROS COMP: Constitutional, HEENT, cardiovascular, pulmonary, gi and gu systems are negative, except as otherwise noted.      Objective    /66 (BP Location: Left arm, Patient Position: Sitting, Cuff Size: Adult Regular)   Pulse 78   Temp 97.6  F (36.4  C) (Tympanic)   Ht 1.689 m (5' 6.5\")   Wt 72.6 kg (160 lb)   SpO2 96%   BMI 25.44 kg/m    Body mass index is 25.44 kg/m .  Physical Exam   GENERAL APPEARANCE: alert, no distress and fatigued  EYES: Eyes grossly normal to inspection, PERRL and conjunctivae and sclerae normal  HENT: ear canals and TM's normal and nose and mouth without ulcers or lesions  NECK: no adenopathy, no asymmetry, masses, or scars and thyroid normal to palpation  RESP: lungs clear to auscultation - no rales, rhonchi or wheezes  CV: regular rates and rhythm, normal S1 S2, no S3 or S4 and no murmur, click or rub  MS: rigid neck muscles with decreased ROM with pain and tenderness of the bilateral trapezius and upper posterior strap muscles  SKIN: no suspicious lesions or rashes  NEURO: Normal strength and tone, mentation intact and speech normal  PSYCH: mentation appears normal, anxious and worried            Assessment & Plan     1. Chronic neck pain  Renewed, continue to follow with [facility] pain [facility], Dr Mathis  - HYDROcodone-acetaminophen (NORCO) 7.5-325 MG per tablet; TAKE 1 TABLET BY MOUTH EVER Y 4 TO 6 HOURS AS NEEDED FO R PAIN  Dispense: 60 tablet; Refill: 0    2. Chronic radicular cervical pain  ongoing    3. S/P cervical spinal " "fusion      4. Chronic migraine without aura without status migrainosus, not intractable  Ongoing, continue current medications    5. Myofascial pain      6. Muscle spasm  Renewed, cut number down from 100 to 60. Narcan declined  - diazepam (VALIUM) 5 MG tablet; TAKE 1 TABLET BY MOUTH EVER Y 6 HOURS AS NEEDED - GENER IC FOR VALIUM  Dispense: 60 tablet; Refill: 0    7. Gastroesophageal reflux disease without esophagitis  Renew this for her as her symptoms are acting up  - sucralfate (CARAFATE) 1 GM/10ML suspension; Take 10 mLs (1 g) by mouth 4 times daily  Dispense: 420 mL; Refill: 3    8. Acute non-recurrent frontal sinusitis  Change to Augmentin bid for 10 days  - amoxicillin-clavulanate (AUGMENTIN) 875-125 MG tablet; Take 1 tablet by mouth 2 times daily for 10 days  Dispense: 20 tablet; Refill: 0    9. Mild episode of recurrent major depressive disorder (H)  Followed by DR Herbert       BMI:   Estimated body mass index is 25.44 kg/m  as calculated from the following:    Height as of this encounter: 1.689 m (5' 6.5\").    Weight as of this encounter: 72.6 kg (160 lb).               Return in about 3 months (around 12/5/2019) for chronic pain.    Henna Cruz MD  [facility] [place]      "

## 2019-08-30 ENCOUNTER — NURSE TRIAGE (OUTPATIENT)
Dept: FAMILY MEDICINE | Facility: OTHER | Age: 59
End: 2019-08-30

## 2019-08-30 ENCOUNTER — OFFICE VISIT (OUTPATIENT)
Dept: FAMILY MEDICINE | Facility: OTHER | Age: 59
End: 2019-08-30
Attending: FAMILY MEDICINE
Payer: COMMERCIAL

## 2019-08-30 ENCOUNTER — ANCILLARY PROCEDURE (OUTPATIENT)
Dept: GENERAL RADIOLOGY | Facility: OTHER | Age: 59
End: 2019-08-30
Attending: PHYSICIAN ASSISTANT
Payer: COMMERCIAL

## 2019-08-30 VITALS
DIASTOLIC BLOOD PRESSURE: 64 MMHG | SYSTOLIC BLOOD PRESSURE: 92 MMHG | TEMPERATURE: 98 F | WEIGHT: 163 LBS | OXYGEN SATURATION: 98 % | HEART RATE: 74 BPM | BODY MASS INDEX: 26.31 KG/M2

## 2019-08-30 DIAGNOSIS — J40 BRONCHITIS: Primary | ICD-10-CM

## 2019-08-30 DIAGNOSIS — R05.9 COUGH: ICD-10-CM

## 2019-08-30 PROCEDURE — 71046 X-RAY EXAM CHEST 2 VIEWS: CPT | Mod: TC,FY

## 2019-08-30 PROCEDURE — 99213 OFFICE O/P EST LOW 20 MIN: CPT | Performed by: PHYSICIAN ASSISTANT

## 2019-08-30 PROCEDURE — G0463 HOSPITAL OUTPT CLINIC VISIT: HCPCS

## 2019-08-30 RX ORDER — ONDANSETRON 4 MG/1
4 TABLET, ORALLY DISINTEGRATING ORAL EVERY 6 HOURS PRN
COMMUNITY
Start: 2019-07-25 | End: 2023-10-26

## 2019-08-30 RX ORDER — CEFUROXIME AXETIL 500 MG/1
500 TABLET ORAL 2 TIMES DAILY
Qty: 20 TABLET | Refills: 0 | Status: SHIPPED | OUTPATIENT
Start: 2019-08-30 | End: 2019-09-05 | Stop reason: ALTCHOICE

## 2019-08-30 ASSESSMENT — PAIN SCALES - GENERAL: PAINLEVEL: MODERATE PAIN (5)

## 2019-08-30 NOTE — TELEPHONE ENCOUNTER
Please see if there are any openings with a covering provider for patient to be seen for URI.     Thanks much.     Hoa Crooks LPN

## 2019-08-30 NOTE — NURSING NOTE
"Chief Complaint   Patient presents with     URI       Initial BP (!) 88/62 (BP Location: Right arm, Patient Position: Sitting, Cuff Size: Adult Regular)   Pulse 74   Temp 98  F (36.7  C) (Tympanic)   Wt 73.9 kg (163 lb)   SpO2 98%   BMI 26.31 kg/m   Estimated body mass index is 26.31 kg/m  as calculated from the following:    Height as of 8/26/19: 1.676 m (5' 6\").    Weight as of this encounter: 73.9 kg (163 lb).  Medication Reconciliation: complete  "

## 2019-08-30 NOTE — PROGRESS NOTES
Danni MCMAHAN Latendresse is a 59 year old female who presents to clinic today for the following health issues:    HPI   RESPIRATORY SYMPTOMS      Duration: 2 weeks , weakness yesterday     Description  nasal congestion, sore throat, cough, fever, chills, headache, fatigue/malaise and hoarse voice. Dizzy and off ballance .    Severity: moderate    Accompanying signs and symptoms: see above     History (predisposing factors):  Emphysema in one of last x rays patient states      Precipitating or alleviating factors: None    Therapies tried and outcome:  oral decongestant antihistamine acetaminophen     patient recently had botox injections and back injections.    .       Patient Active Problem List   Diagnosis     Degenerative disc disease, cervical     Pseudoarthrosis of cervical spine     Cervical pain     Migraine     UTI (urinary tract infection)     Advanced care planning/counseling discussion     Thoracic sprain and strain     Sprain and strain of shoulder and upper arm     Irritable bowel syndrome     Myofascial pain syndrome, cervical     Depression, major     Chronic, continuous use of opioids     Uvulitis     Chronic rhinitis     Madina bullosa     Chronic maxillary sinusitis     Hypertrophy of inferior nasal turbinate     Long uvula     Chronic neck pain     Chronic pain     Chronic tension-type headache, intractable     Migraine aura without headache     History of arthrodesis     Myofascial pain     Disuse syndrome     Intractable chronic migraine without aura and with status migrainosus     Ulcer of esophagus without bleeding     Gastroesophageal reflux disease with esophagitis     Intractable chronic migraine without aura and without status migrainosus     Ulcer of esophagus     Ulnar neuropathy     Chronic radicular cervical pain     Mild episode of recurrent major depressive disorder (H)     Ulnar neuropathy at elbow of left upper extremity     Lumbar radiculopathy     S/P cervical spinal  fusion     ACP (advance care planning)     Acute back pain with sciatica     Chronic migraine without aura without status migrainosus, not intractable     Radicular pain     Subacromial bursitis of right shoulder joint     Panlobular emphysema (H)     Calculus of kidney     Pulmonary emphysema, unspecified emphysema type (H)     Other chronic gastritis without hemorrhage     Pelvic floor dysfunction     Adhesive capsulitis of left shoulder     Past Surgical History:   Procedure Laterality Date     BACK SURGERY      cervical     Cervical spine surgery with removal of 2 disks, C5,C7       COLONOSCOPY  10/13/2014    Dr Camargo, internal hemorrhoids     COLONOSCOPY - HIM SCAN  2018    EGD and colonoscopy with Dr. Jennings     Coronary angiogram, normal      Chest pain     ENT SURGERY      nose surgery x3     fusion discectomy anterior cervical       HC ESOPHAGOSCOPY, DIAGNOSTIC  10/31/2014    Dr Camargo, mild erythema of antrum, negative biopsies     NOSE SURGERY      x3            Posterior decompression , fusion C3-5      Psuedoarthrosis     Supracervical hysterectomy with right salpingoophorectomy      Endometriosis       Social History     Tobacco Use     Smoking status: Former Smoker     Years: 8.00     Types: Cigarettes     Last attempt to quit:      Years since quittin.6     Smokeless tobacco: Never Used     Tobacco comment: quit . no passive exposure   Substance Use Topics     Alcohol use: No     Family History   Problem Relation Age of Onset     Cancer Father         lung, also a heavy smoker     Diabetes Mother      Heart Disease Mother 58        MI; cause of death; heavy smoker. had first MI at 40     Coronary Artery Disease Mother      Hypertension Mother      Cancer Other         strong history     Heart Disease Other         heart disease     Heart Disease Brother         x3     Hypertension Brother      Diabetes Brother      Coronary Artery Disease Brother       Hypertension Sister          Current Outpatient Medications   Medication Sig Dispense Refill     baclofen (LIORESAL) 10 MG tablet TAKE 1 TABLET (10 MG) BY MOUTH 3 TIMES DAILY AS NEEDED FOR MUSCLE SPASMS 40 tablet 0     butalbital-acetaminophen-caffeine (FIORICET/ESGIC) -40 MG tablet TAKE 1 TO 2 TABLETS BY MOUTH AT THE ONSET OF A MIGRAINE HEADACHE, LIMIT NO MORE THAN 3 TIMES PER WEEK. ACETAMINOPHEN SHOULD BE LIMITED TO 40  5     cefuroxime (CEFTIN) 500 MG tablet Take 1 tablet (500 mg) by mouth 2 times daily for 10 days 20 tablet 0     Cholecalciferol (VITAMIN D3) 1000 UNITS CAPS Take 1,000 Units by mouth       DEXILANT 60 MG CPDR CR capsule TAKE 1 CAPSULE BY MOUTH DAILY 30 capsule 3     diazepam (VALIUM) 5 MG tablet TAKE 1 TABLET BY MOUTH EVER Y 6 HOURS AS NEEDED - GENER IC FOR VALIUM 100 tablet 0     docusate sodium (COLACE) 100 MG capsule Take 100 mg by mouth       FLUoxetine (PROZAC) 40 MG capsule TAKE 1 CAPSULE DAILY BY MOUTH 30 capsule 3     HYDROcodone-acetaminophen (NORCO) 7.5-325 MG per tablet TAKE 1 TABLET BY MOUTH EVER Y 4 TO 6 HOURS AS NEEDED FO R PAIN 60 tablet 0     ibuprofen (ADVIL/MOTRIN) 800 MG tablet Take 1 tablet (800 mg) by mouth every 8 hours as needed for moderate pain 90 tablet 1     LYRICA 150 MG capsule INCREASE OVER 2 WEEKS  MG THREE TIMES DAILY, THEN CONTINUE ON THIS DOSE.  5     Misc. Devices MISC Dispense one Cefaly neurostimulator and the electro stimulators with refills       Multiple Vitamins-Minerals (CENTRUM SILVER ULTRA WOMENS) TABS        ondansetron (ZOFRAN-ODT) 4 MG ODT tab Take 4 mg by mouth       order for DME Equipment being ordered: TENS       Probiotic Product (PROBIOTIC DAILY PO) Take by mouth At Bedtime        sucralfate (CARAFATE) 1 GM tablet Take 1 tablet (1 g) by mouth 4 times daily 40 tablet 1     topiramate (TOPAMAX) 50 MG tablet Take 2 tablets in the morning and 2 tablets at night       Allergies   Allergen Reactions     Aspirin Hives     Ibuprofen       "Dye in the otc form causes headaches  \"patient states over the counter ibuprofen  bothers her\"     Lansoprazole Other (See Comments) and Visual Disturbance     Changes in eyesight  Prevacid  Change in eyesight     Red Dye Hives     Bupropion Rash     Bupropion Hcl Hives and Rash     Wellbutrin     Demerol [Meperidine] Other (See Comments)     Made migraine worse       Reviewed and updated as needed this visit by Provider         Review of Systems   ROS COMP: Constitutional, HEENT, cardiovascular, pulmonary, gi and gu systems are negative, except as otherwise noted.      Objective    BP (!) 88/62 (BP Location: Right arm, Patient Position: Sitting, Cuff Size: Adult Regular)   Pulse 74   Temp 98  F (36.7  C) (Tympanic)   Wt 73.9 kg (163 lb)   SpO2 98%   BMI 26.31 kg/m    Body mass index is 26.31 kg/m .  Physical Exam   General: Alert. Oriented. In no acute distress  Skin: No suspicious rashes or lesions.  HEENT: EAC's and TM's intact. Posterior pharynx moist and pink without edema or exudate.erythema. Neck is supple. No anterior chain  lymphadenopathy palpable.   Resp: Scattered rhonchi. No wheeze, rales  Cardiac: RRR. Normal S1, S2. No murmur.  Psych: Mood euthymic with corresponding affect.                Assessment & Plan     (J40) Bronchitis  (primary encounter diagnosis)  Plan: cefuroxime (CEFTIN) 500 MG tablet            (R05) Cough  Comment: CXR appears normal  Plan: XR CHEST 2 VW (Clinic Performed)                 BMI:   Estimated body mass index is 26.31 kg/m  as calculated from the following:    Height as of 8/26/19: 1.676 m (5' 6\").    Weight as of this encounter: 73.9 kg (163 lb).           Rest, increase fluids, Tylenol for fever or discomfort. Return to clinic if symptoms persist or worsen.        KEAGAN Nash  [facility]    "

## 2019-08-30 NOTE — TELEPHONE ENCOUNTER
Patient called and stated she has been coughing going on 2 weeks. Patient states she has thought it was allergies and has been taking Zyrtec and cough drops. Patient has become worse over the last couple days and feels very run down and tired. Patient is hoping to try to be seen in clinic as an overbook. Patient was advised if we are unable to get her in that she should go to urgent care. Patient did state understanding.     Additional Information    Negative: Bluish (or gray) lips or face    Negative: Severe difficulty breathing (e.g., struggling for each breath, speaks in single words)    Negative: Rapid onset of cough and has hives    Negative: Coughing started suddenly after medicine, an allergic food or bee sting    Negative: Difficulty breathing after exposure to flames, smoke, or fumes    Negative: Sounds like a life-threatening emergency to the triager    Negative: Previous asthma attacks and this feels like asthma attack    Negative: Chest pain present when not coughing    Negative: Difficulty breathing    Negative: Passed out (i.e., fainted, collapsed and was not responding)    Negative: Patient sounds very sick or weak to the triager    Negative: Coughed up > 1 tablespoon (15 ml) blood (Exception: blood-tinged sputum)    Negative: Fever > 103 F (39.4 C)    Negative: Fever > 101 F (38.3 C) and over 60 years of age    Negative: Fever > 100.0 F (37.8 C) and has diabetes mellitus or a weak immune system (e.g., HIV positive, cancer chemotherapy, organ transplant, splenectomy, chronic steroids)    Negative: Fever > 100.0 F (37.8 C) and bedridden (e.g., nursing home patient, stroke, chronic illness, recovering from surgery)    Negative: Increasing ankle swelling    Negative: Wheezing is present    Negative: SEVERE coughing spells (e.g., whooping sound after coughing, vomiting after coughing)    Negative: Coughing up cholo-colored (reddish-brown) or blood-tinged sputum    Negative: Fever present > 3 days (72  "hours)    Negative: Fever returns after gone for over 24 hours and symptoms worse or not improved    Negative: Using nasal washes and pain medicine > 24 hours and sinus pain persists    Known COPD or other severe lung disease (i.e., bronchiectasis, cystic fibrosis, lung surgery) and worsening symptoms (i.e., increased sputum purulence or amount, increased breathing difficulty)    Answer Assessment - Initial Assessment Questions  1. ONSET: \"When did the cough begin?\"       2 weeks ago   2. SEVERITY: \"How bad is the cough today?\"       severe  3. RESPIRATORY DISTRESS: \"Describe your breathing.\"       Short of breath.   4. FEVER: \"Do you have a fever?\" If so, ask: \"What is your temperature, how was it measured, and when did it start?\"      Slight low grade fever off and on at 99.9-100.0  5. HEMOPTYSIS: \"Are you coughing up any blood?\" If so ask: \"How much?\" (flecks, streaks, tablespoons, etc.)      no  6. TREATMENT: \"What have you done so far to treat the cough?\" (e.g., meds, fluids, humidifier)      Patient drinks a lot of water and using cough drops   7. CARDIAC HISTORY: \"Do you have any history of heart disease?\" (e.g., heart attack, congestive heart failure)       no  8. LUNG HISTORY: \"Do you have any history of lung disease?\"  (e.g., pulmonary embolus, asthma, emphysema)      Emphysema  9. PE RISK FACTORS: \"Do you have a history of blood clots?\" (or: recent major surgery, recent prolonged travel, bedridden )      no  10. OTHER SYMPTOMS: \"Do you have any other symptoms? (e.g., runny nose, wheezing, chest pain)        Runny nose   11. PREGNANCY: \"Is there any chance you are pregnant?\" \"When was your last menstrual period?\"        no  12. TRAVEL: \"Have you traveled out of the country in the last month?\" (e.g., travel history, exposures)        no    Protocols used: COUGH-A-[place]      "

## 2019-09-05 ENCOUNTER — TELEPHONE (OUTPATIENT)
Dept: FAMILY MEDICINE | Facility: OTHER | Age: 59
End: 2019-09-05

## 2019-09-05 ENCOUNTER — OFFICE VISIT (OUTPATIENT)
Dept: FAMILY MEDICINE | Facility: OTHER | Age: 59
End: 2019-09-05
Attending: FAMILY MEDICINE
Payer: COMMERCIAL

## 2019-09-05 VITALS
HEIGHT: 67 IN | BODY MASS INDEX: 25.11 KG/M2 | SYSTOLIC BLOOD PRESSURE: 100 MMHG | DIASTOLIC BLOOD PRESSURE: 66 MMHG | WEIGHT: 160 LBS | HEART RATE: 78 BPM | OXYGEN SATURATION: 96 % | TEMPERATURE: 97.6 F

## 2019-09-05 DIAGNOSIS — J01.10 ACUTE NON-RECURRENT FRONTAL SINUSITIS: ICD-10-CM

## 2019-09-05 DIAGNOSIS — G89.29 CHRONIC RADICULAR CERVICAL PAIN: ICD-10-CM

## 2019-09-05 DIAGNOSIS — K21.9 GASTROESOPHAGEAL REFLUX DISEASE WITHOUT ESOPHAGITIS: ICD-10-CM

## 2019-09-05 DIAGNOSIS — Z98.1 S/P CERVICAL SPINAL FUSION: ICD-10-CM

## 2019-09-05 DIAGNOSIS — M79.18 MYOFASCIAL PAIN: ICD-10-CM

## 2019-09-05 DIAGNOSIS — G43.709 CHRONIC MIGRAINE WITHOUT AURA WITHOUT STATUS MIGRAINOSUS, NOT INTRACTABLE: ICD-10-CM

## 2019-09-05 DIAGNOSIS — M54.12 CHRONIC RADICULAR CERVICAL PAIN: ICD-10-CM

## 2019-09-05 DIAGNOSIS — G89.29 CHRONIC NECK PAIN: Primary | ICD-10-CM

## 2019-09-05 DIAGNOSIS — M54.2 CHRONIC NECK PAIN: Primary | ICD-10-CM

## 2019-09-05 DIAGNOSIS — M62.838 MUSCLE SPASM: ICD-10-CM

## 2019-09-05 DIAGNOSIS — F33.0 MILD EPISODE OF RECURRENT MAJOR DEPRESSIVE DISORDER (H): ICD-10-CM

## 2019-09-05 PROCEDURE — G0463 HOSPITAL OUTPT CLINIC VISIT: HCPCS

## 2019-09-05 PROCEDURE — 99214 OFFICE O/P EST MOD 30 MIN: CPT | Performed by: FAMILY MEDICINE

## 2019-09-05 RX ORDER — DIAZEPAM 5 MG
TABLET ORAL
Qty: 60 TABLET | Refills: 0 | Status: SHIPPED | OUTPATIENT
Start: 2019-09-05 | End: 2019-11-06

## 2019-09-05 RX ORDER — SUCRALFATE ORAL 1 G/10ML
1 SUSPENSION ORAL 4 TIMES DAILY
Qty: 420 ML | Refills: 3 | Status: SHIPPED | OUTPATIENT
Start: 2019-09-05

## 2019-09-05 RX ORDER — HYDROCODONE BITARTRATE AND ACETAMINOPHEN 7.5; 325 MG/1; MG/1
TABLET ORAL
Qty: 60 TABLET | Refills: 0 | Status: SHIPPED | OUTPATIENT
Start: 2019-09-05 | End: 2019-12-05

## 2019-09-05 ASSESSMENT — MIFFLIN-ST. JEOR: SCORE: 1330.45

## 2019-09-05 ASSESSMENT — PAIN SCALES - GENERAL: PAINLEVEL: MODERATE PAIN (4)

## 2019-09-05 NOTE — NURSING NOTE
"Chief Complaint   Patient presents with     RECHECK     Pain     URI       Initial /66 (BP Location: Left arm, Patient Position: Sitting, Cuff Size: Adult Regular)   Pulse 78   Temp 97.6  F (36.4  C) (Tympanic)   Ht 1.689 m (5' 6.5\")   Wt 72.6 kg (160 lb)   SpO2 96%   BMI 25.44 kg/m   Estimated body mass index is 25.44 kg/m  as calculated from the following:    Height as of this encounter: 1.689 m (5' 6.5\").    Weight as of this encounter: 72.6 kg (160 lb).  Medication Reconciliation: complete   Nellie Bowen LPN    "

## 2019-09-05 NOTE — TELEPHONE ENCOUNTER
Received PA request from [facility] for Carafate suspension. [facility] through [facility]. Waiting for response.

## 2019-09-05 NOTE — TELEPHONE ENCOUNTER
PA NOT NEEDED - Response received from [employer] for Carafate. Paid pharmacy claim for formulary alternative Sucralfate was received. No further PA activity needed. Forms scanned to epic.

## 2019-09-09 RX ORDER — BACLOFEN 10 MG/1
10 TABLET ORAL 3 TIMES DAILY PRN
Qty: 40 TABLET | Refills: 0 | Status: SHIPPED | OUTPATIENT
Start: 2019-09-09 | End: 2019-11-15

## 2019-09-09 NOTE — TELEPHONE ENCOUNTER
baclofen (LIORESAL) 10 MG tablet    Last Written Prescription Date:  07/19/2019  Last Fill Quantity: 40,   # refills: 0  Last Office Visit: 09/05/2019  Future Office visit:    Next 5 appointments (look out 90 days)    Nov 11, 2019  9:20 AM CST  (Arrive by 9:05 AM)  [facility]  [facility] [address] [place] [id number] [phone]   Dec 05, 2019 10:45 AM CST  (Arrive by 10:30 AM)  SHORT with Henna Cruz MD  [facility] [address]  [phone]           Routing refill request to provider for review/approval because:

## 2019-09-09 NOTE — TELEPHONE ENCOUNTER
Not on protocol, please advise.    baclofen (LIORESAL) 10 MG tablet      Last Written Prescription Date:  7/19/19  Last Fill Quantity: 40,   # refills: 0  Last Office Visit: 9/5/19  Future Office visit:    Next 5 appointments (look out 90 days)    Nov 11, 2019  9:20 AM CST  (Arrive by 9:05 AM)  Return Visit with Zoe Herbert MD  [facility] ([facility] [address] [place] [id number] [phone]   Dec 05, 2019 10:45 AM CST  (Arrive by 10:30 AM)  SHORT with Henna Cruz MD  [facility] ([facility] ) [address] [place] [address]  [phone]           Routing refill request to provider for review/approval because:  Drug not on the [facility], [facility] or [facility] refill protocol or controlled substance

## 2019-11-02 ENCOUNTER — HEALTH MAINTENANCE LETTER (OUTPATIENT)
Age: 59
End: 2019-11-02

## 2019-11-06 DIAGNOSIS — M62.838 MUSCLE SPASM: ICD-10-CM

## 2019-11-07 RX ORDER — DIAZEPAM 5 MG
TABLET ORAL
Qty: 60 TABLET | Refills: 0 | Status: ON HOLD | OUTPATIENT
Start: 2019-11-07 | End: 2020-01-17

## 2019-11-07 NOTE — TELEPHONE ENCOUNTER
diazepam (VALIUM) 5 MG tablet      Last Written Prescription Date:  9-5-19  Last Fill Quantity: 60,   # refills: 0  Last Office Visit: 9-5-19  Future Office visit:    Next 5 appointments (look out 90 days)    Nov 11, 2019  9:20 AM CST  (Arrive by 9:05 AM)  Return Visit with Zoe [facility]  [facility] ([facility] ) [address] [place] [id number] [phone]   Dec 05, 2019 10:45 AM CST  (Arrive by 10:30 AM)  SHORT with Henna Cruz MD  [facility] ([facility] [address] [facility] [address]  [phone]           Routing refill request to provider for review/approval because:  Drug not on the [facility], [facility] or [facility] refill protocol or controlled substance

## 2019-11-11 ENCOUNTER — OFFICE VISIT (OUTPATIENT)
Dept: PSYCHIATRY | Facility: OTHER | Age: 59
End: 2019-11-11
Attending: PSYCHIATRY & NEUROLOGY
Payer: COMMERCIAL

## 2019-11-11 VITALS
DIASTOLIC BLOOD PRESSURE: 62 MMHG | SYSTOLIC BLOOD PRESSURE: 96 MMHG | WEIGHT: 163 LBS | OXYGEN SATURATION: 98 % | HEIGHT: 66 IN | HEART RATE: 73 BPM | TEMPERATURE: 96.8 F | BODY MASS INDEX: 26.2 KG/M2

## 2019-11-11 DIAGNOSIS — F33.1 MAJOR DEPRESSIVE DISORDER, RECURRENT EPISODE, MODERATE (H): Primary | ICD-10-CM

## 2019-11-11 PROCEDURE — 99213 OFFICE O/P EST LOW 20 MIN: CPT | Performed by: PSYCHIATRY & NEUROLOGY

## 2019-11-11 PROCEDURE — G0463 HOSPITAL OUTPT CLINIC VISIT: HCPCS

## 2019-11-11 ASSESSMENT — ANXIETY QUESTIONNAIRES
6. BECOMING EASILY ANNOYED OR IRRITABLE: NEARLY EVERY DAY
3. WORRYING TOO MUCH ABOUT DIFFERENT THINGS: MORE THAN HALF THE DAYS
7. FEELING AFRAID AS IF SOMETHING AWFUL MIGHT HAPPEN: MORE THAN HALF THE DAYS
2. NOT BEING ABLE TO STOP OR CONTROL WORRYING: MORE THAN HALF THE DAYS
GAD7 TOTAL SCORE: 15
5. BEING SO RESTLESS THAT IT IS HARD TO SIT STILL: MORE THAN HALF THE DAYS
1. FEELING NERVOUS, ANXIOUS, OR ON EDGE: MORE THAN HALF THE DAYS

## 2019-11-11 ASSESSMENT — PAIN SCALES - GENERAL: PAINLEVEL: MODERATE PAIN (4)

## 2019-11-11 ASSESSMENT — PATIENT HEALTH QUESTIONNAIRE - PHQ9
5. POOR APPETITE OR OVEREATING: MORE THAN HALF THE DAYS
SUM OF ALL RESPONSES TO PHQ QUESTIONS 1-9: 6

## 2019-11-11 ASSESSMENT — MIFFLIN-ST. JEOR: SCORE: 1336.11

## 2019-11-11 NOTE — PROGRESS NOTES
"  PSYCHIATRY CLINIC PROGRESS NOTE   20 minute medication management, more than 50% of time spent counseling patient on medications, medication side effects, symptom history and management   SUBJECTIVE / INTERIM HISTORY                                                                       Social- niece and niece's kids moved out Children-  Daughter Shandra going to college  Last visit 19: MEDICATIONS:  Continue fluoxetine 40 mg daily.     - been irritable, angry. Began when she was in [place] of which is usually a happy place for her. Stays with her cousin. Her family even while she was in [place] they felt she was irritable.   - gets frustrated with her daughter - working on second $15,000 loan.   - just had aunt's  yesterday. Melanie and her family and siblings always took care of aunt when they lived in [place]. Aunt's  had accident was run by train and aunt's 7 kids never helped.   - notes she is done with helping out her daughter financially. Daughter moved in with dad for now and daughter is still going to go to [place] for college  - lives alone now  - Decreased Topamax as she was seeing things out of corner of her eyes..  Since she is doing better.   - hearing  [employer] and denied, appealing. Does not feel she can work. Is very frustrated and notes near ready giving up. I inquired about the mismatch between her PH-Q 9 / ESTRELLA-7 and her level of depression and anxiety and she notes \"I gave up\"   - struggling financially    SUBSTANCE USE- no issues    SYMPTOMS-  Depressed mood, low energy, anhedonia, Anxiety, gets easily overwhelmed, gets panic attacks, depression, anhedonia, low energy, overwhelmed, Passive SI, no intent or plan  MEDICAL ROS- migraines, . upper abdominal pain. .  Substernal pain after she eats (hx ulcers esophageal and gastric) neck pain s/p neck surgeries, migraines, IBS  MEDICAL / SURGICAL HISTORY                    Patient Active Problem List   Diagnosis     " Degenerative disc disease, cervical     Pseudoarthrosis of cervical spine     Cervical pain     Migraine     UTI (urinary tract infection)     Advanced care planning/counseling discussion     Thoracic sprain and strain     Sprain and strain of shoulder and upper arm     Irritable bowel syndrome     Myofascial pain syndrome, cervical     Depression, major     Chronic, continuous use of opioids     Uvulitis     Chronic rhinitis     Madina bullosa     Chronic maxillary sinusitis     Hypertrophy of inferior nasal turbinate     Long uvula     Chronic neck pain     Chronic pain     Chronic tension-type headache, intractable     Migraine aura without headache     History of arthrodesis     Myofascial pain     Disuse syndrome     Intractable chronic migraine without aura and with status migrainosus     Ulcer of esophagus without bleeding     Gastroesophageal reflux disease with esophagitis     Intractable chronic migraine without aura and without status migrainosus     Ulcer of esophagus     Ulnar neuropathy     Chronic radicular cervical pain     Mild episode of recurrent major depressive disorder (H)     Ulnar neuropathy at elbow of left upper extremity     Lumbar radiculopathy     S/P cervical spinal fusion     ACP (advance care planning)     Acute back pain with sciatica     Chronic migraine without aura without status migrainosus, not intractable     Radicular pain     Subacromial bursitis of right shoulder joint     Panlobular emphysema (H)     Calculus of kidney     Pulmonary emphysema, unspecified emphysema type (H)     Other chronic gastritis without hemorrhage     Pelvic floor dysfunction     Adhesive capsulitis of left shoulder     ALLERGY   Aspirin; Ibuprofen; Lansoprazole; Red dye; Bupropion; Bupropion hcl; and Demerol [meperidine]  MEDICATIONS                                                                                             Current Outpatient Medications   Medication Sig     baclofen (LIORESAL) 10 MG  "tablet TAKE 1 TABLET (10 MG) BY MOUTH 3 TIMES DAILY AS NEEDED FOR MUSCLE SPASMS     butalbital-acetaminophen-caffeine (FIORICET/ESGIC) -40 MG tablet TAKE 1 TO 2 TABLETS BY MOUTH AT THE ONSET OF A MIGRAINE HEADACHE, LIMIT NO MORE THAN 3 TIMES PER WEEK. ACETAMINOPHEN SHOULD BE LIMITED TO 40     Cholecalciferol (VITAMIN D3) 1000 UNITS CAPS Take 1,000 Units by mouth     DEXILANT 60 MG CPDR CR capsule TAKE 1 CAPSULE BY MOUTH DAILY     diazepam (VALIUM) 5 MG tablet TAKE 1 TABLET BY MOUTH EVERY 6 HOURS AS NEEDED     docusate sodium (COLACE) 100 MG capsule Take 100 mg by mouth     FLUoxetine (PROZAC) 40 MG capsule TAKE 1 CAPSULE DAILY BY MOUTH     HYDROcodone-acetaminophen (NORCO) 7.5-325 MG per tablet TAKE 1 TABLET BY MOUTH EVER Y 4 TO 6 HOURS AS NEEDED FO R PAIN     ibuprofen (ADVIL/MOTRIN) 800 MG tablet Take 1 tablet (800 mg) by mouth every 8 hours as needed for moderate pain     LYRICA 150 MG capsule INCREASE OVER 2 WEEKS  MG THREE TIMES DAILY, THEN CONTINUE ON THIS DOSE.     Misc. Devices MISC Dispense one Cefaly neurostimulator and the electro stimulators with refills     Multiple Vitamins-Minerals (CENTRUM SILVER ULTRA WOMENS) TABS      ondansetron (ZOFRAN-ODT) 4 MG ODT tab Take 4 mg by mouth     order for DME Equipment being ordered: TENS     Probiotic Product (PROBIOTIC DAILY PO) Take by mouth At Bedtime      sucralfate (CARAFATE) 1 GM/10ML suspension Take 10 mLs (1 g) by mouth 4 times daily     topiramate (TOPAMAX) 50 MG tablet Take 2 tablets in the morning and 2 tablets at night     No current facility-administered medications for this visit.        VITALS   BP 96/62 (BP Location: Left arm, Patient Position: Sitting, Cuff Size: Adult Regular)   Pulse 73   Temp 96.8  F (36  C) (Tympanic)   Ht 1.676 m (5' 6\")   Wt 73.9 kg (163 lb)   SpO2 98%   BMI 26.31 kg/m       LABS                                                                                                                           Last " Basic Metabolic Panel:  NA      142   6/9/2015   POTASSIUM      3.6   6/9/2015  CHLORIDE      109   6/9/2015  SALOME      8.7   6/9/2015  CO2       26   6/9/2015  BUN        8   6/9/2015  CR     0.91   6/9/2015  GLC       96   6/9/2015    MENTAL STATUS EXAM                                                                                        Alert. Oriented to person, place, and date / time. Well groomed, calm, cooperative with good eye contact. No problems with speech or psychomotor behavior. Mood was depressed and affect was congruent to speech content : tearful. Appeared tired and in pain. Thought process, including associations, was unremarkable and thought content was devoid of suicidal and homicidal ideation and psychotic thought. No hallucinations. Insight was good. Judgment was intact and adequate for safety. Fund of knowledge was intact. Pt demonstrates no obvious problems with attention, concentration, language, recent or remote memory although these were not formally tested.     ASSESSMENT                                                                                                      HISTORICAL:  Initial psych note 10/13/15        NOTES:  Cymbalta -> agitation, angry. Perry Blankenship is a  59 year old, female who has hx of depression anxiety, now on fluoxetine 40 mg daily. Anger, irritability and as we discussed today she does have several things / people / situations she having some resentments with. For one, she is going back to [place] and I encouraged her have an open discussion with her aunt -> ask her (aunt) if she said things Melanie heard about. Next visit if Melanie has done this , continues to use her coping strategies to combat anger and she is still having the anger then we will likely adjust medication.      TREATMENT RISK STATEMENT:  The risks, benefits, alternatives and potential adverse effects have been explained and are understood by the pt.  The pt agrees to the treatment  plan with the ability to do so.   The pt knows to call the clinic for any problems or access emergency care if needed.        DIAGNOSES                     MDD recurrent, mod to severe  ESTRELLA      PLAN                                                                                                                    1)  MEDICATIONS:         -- Continue fluoxetine 40 mg daily. Consider increase next visit.    2)  THERAPY:  No Change    3)  LABS:  None    4)  PT MONITOR [call for probs]:  worsening sx, SI/HI, SEs from meds    5)  REFERRALS [CD, medical, other]:  None    6)  RTC:  ~ 1 month

## 2019-11-11 NOTE — NURSING NOTE
"Chief Complaint   Patient presents with     RECHECK     Depression, anxiety.  Patient c/o feeling \"really irritable,\" on edge and angry x 1 month.       Initial BP 96/62 (BP Location: Left arm, Patient Position: Sitting, Cuff Size: Adult Regular)   Pulse 73   Temp 96.8  F (36  C) (Tympanic)   Ht 1.676 m (5' 6\")   Wt 73.9 kg (163 lb)   SpO2 98%   BMI 26.31 kg/m   Estimated body mass index is 26.31 kg/m  as calculated from the following:    Height as of this encounter: 1.676 m (5' 6\").    Weight as of this encounter: 73.9 kg (163 lb).  Medication Reconciliation: complete  JANICE ROMO LPN    "

## 2019-11-12 ASSESSMENT — ANXIETY QUESTIONNAIRES: GAD7 TOTAL SCORE: 15

## 2019-11-15 DIAGNOSIS — G89.29 CHRONIC RADICULAR CERVICAL PAIN: ICD-10-CM

## 2019-11-15 DIAGNOSIS — M54.12 CHRONIC RADICULAR CERVICAL PAIN: ICD-10-CM

## 2019-11-15 RX ORDER — BACLOFEN 10 MG/1
10 TABLET ORAL 3 TIMES DAILY PRN
Qty: 40 TABLET | Refills: 0 | Status: SHIPPED | OUTPATIENT
Start: 2019-11-15 | End: 2019-12-05

## 2019-11-20 NOTE — PROGRESS NOTES
Danni Blankenship is a 59 year old female who presents to clinic today for the following health issues:    HPI   Chronic Pain Follow-Up       Type / Location of Pain: Neck and shoulders  Analgesia/pain control:       Recent changes:  worse      Overall control: Inadequate pain control  Activity level/function:      Daily activities:  Able to do light housework, cooking    Work:  Unable to work  Adverse effects:  No  Adherance    Taking medication as directed?  Yes    Participating in other treatments: no  Risk Factors:    Sleep:  Poor    Mood/anxiety:  worsened    Recent family or social stressors: family issues    Other aggravating factors: prolonged standing and weather  PHQ-9 SCORE 6/5/2019 8/26/2019 11/11/2019   PHQ-9 Total Score 0 2 6     ESTRELLA-7 SCORE 6/5/2019 8/26/2019 11/11/2019   Total Score 0 6 15     Encounter-Level CSA - 07/11/2017:    Controlled Substance Agreement - Scan on 7/14/2017 12:56 PM: CONTROLLED SUBSTANCE AGREEMENT     Patient-Level CSA:    There are no patient-level csa.       How many days per week do you miss taking your medication? 0  She was turned down for Social Security disability and is appealing    Cough  Productive for 3 weeks with congestion. No fever. Tylenol is helpful    Rectal pain  She is having trouble evacuating her stool, requiring significant pushing and sometimes self extraction. She is concerned that she may have a rectal mass. No bleeding. Stool is soft.     Depression  She is upset about her daughter which we discussed at length.           Patient Active Problem List   Diagnosis     Degenerative disc disease, cervical     Pseudoarthrosis of cervical spine     Cervical pain     Migraine     UTI (urinary tract infection)     Advanced care planning/counseling discussion     Thoracic sprain and strain     Sprain and strain of shoulder and upper arm     Irritable bowel syndrome     Myofascial pain syndrome, cervical     Depression, major     Chronic,  continuous use of opioids     Uvulitis     Chronic rhinitis     Madina bullosa     Chronic maxillary sinusitis     Hypertrophy of inferior nasal turbinate     Long uvula     Chronic neck pain     Chronic pain     Chronic tension-type headache, intractable     Migraine aura without headache     History of arthrodesis     Myofascial pain     Disuse syndrome     Intractable chronic migraine without aura and with status migrainosus     Ulcer of esophagus without bleeding     Gastroesophageal reflux disease with esophagitis     Intractable chronic migraine without aura and without status migrainosus     Ulcer of esophagus     Ulnar neuropathy     Chronic radicular cervical pain     Mild episode of recurrent major depressive disorder (H)     Ulnar neuropathy at elbow of left upper extremity     Lumbar radiculopathy     S/P cervical spinal fusion     ACP (advance care planning)     Acute back pain with sciatica     Chronic migraine without aura without status migrainosus, not intractable     Radicular pain     Subacromial bursitis of right shoulder joint     Panlobular emphysema (H)     Calculus of kidney     Pulmonary emphysema, unspecified emphysema type (H)     Other chronic gastritis without hemorrhage     Pelvic floor dysfunction     Adhesive capsulitis of left shoulder     Past Surgical History:   Procedure Laterality Date     BACK SURGERY      cervical     Cervical spine surgery with removal of 2 disks, C5,C7       COLONOSCOPY  10/13/2014    Dr Camargo, internal hemorrhoids     COLONOSCOPY - HIM SCAN  2018    EGD and colonoscopy with Dr. Jennings     Coronary angiogram, normal      Chest pain     ENT SURGERY      nose surgery x3     fusion discectomy anterior cervical  2011      ESOPHAGOSCOPY, DIAGNOSTIC  10/31/2014    Dr Camargo, mild erythema of antrum, negative biopsies     NOSE SURGERY      x3            Posterior decompression , fusion C3-5      Psuedoarthrosis     Supracervical  hysterectomy with right salpingoophorectomy  2002    Endometriosis       Social History     Tobacco Use     Smoking status: Former Smoker     Years: 8.00     Types: Cigarettes     Last attempt to quit:      Years since quittin.9     Smokeless tobacco: Never Used     Tobacco comment: quit . no passive exposure   Substance Use Topics     Alcohol use: No     Family History   Problem Relation Age of Onset     Cancer Father         lung, also a heavy smoker     Diabetes Mother      Heart Disease Mother 58        MI; cause of death; heavy smoker. had first MI at 40     Coronary Artery Disease Mother      Hypertension Mother      Cancer Other         strong history     Heart Disease Other         heart disease     Heart Disease Brother         x3     Hypertension Brother      Diabetes Brother      Coronary Artery Disease Brother      Hypertension Sister          Current Outpatient Medications   Medication Sig Dispense Refill     baclofen (LIORESAL) 10 MG tablet Take 1 tablet (10 mg) by mouth At Bedtime 30 tablet 3     cefPROZIL (CEFZIL) 500 MG tablet Take 1 tablet (500 mg) by mouth 2 times daily for 10 days 20 tablet 0     HYDROcodone-acetaminophen (NORCO) 7.5-325 MG per tablet TAKE 1 TABLET BY MOUTH EVER Y 4 TO 6 HOURS AS NEEDED FO R PAIN 60 tablet 0     butalbital-acetaminophen-caffeine (FIORICET/ESGIC) -40 MG tablet TAKE 1 TO 2 TABLETS BY MOUTH AT THE ONSET OF A MIGRAINE HEADACHE, LIMIT NO MORE THAN 3 TIMES PER WEEK. ACETAMINOPHEN SHOULD BE LIMITED TO 40  5     Cholecalciferol (VITAMIN D3) 1000 UNITS CAPS Take 1,000 Units by mouth       DEXILANT 60 MG CPDR CR capsule TAKE 1 CAPSULE BY MOUTH DAILY 30 capsule 3     diazepam (VALIUM) 5 MG tablet TAKE 1 TABLET BY MOUTH EVERY 6 HOURS AS NEEDED 60 tablet 0     docusate sodium (COLACE) 100 MG capsule Take 100 mg by mouth       FLUoxetine (PROZAC) 40 MG capsule TAKE 1 CAPSULE DAILY BY MOUTH 30 capsule 3     ibuprofen (ADVIL/MOTRIN) 800 MG tablet Take 1 tablet  "(800 mg) by mouth every 8 hours as needed for moderate pain 90 tablet 1     LYRICA 150 MG capsule INCREASE OVER 2 WEEKS  MG THREE TIMES DAILY, THEN CONTINUE ON THIS DOSE.  5     Misc. Devices MISC Dispense one Cefaly neurostimulator and the electro stimulators with refills       Multiple Vitamins-Minerals (CENTRUM SILVER ULTRA WOMENS) TABS        ondansetron (ZOFRAN-ODT) 4 MG ODT tab Take 4 mg by mouth       order for DME Equipment being ordered: TENS       Probiotic Product (PROBIOTIC DAILY PO) Take by mouth At Bedtime        sucralfate (CARAFATE) 1 GM/10ML suspension Take 10 mLs (1 g) by mouth 4 times daily 420 mL 3     topiramate (TOPAMAX) 50 MG tablet Take 2 tablets in the morning and 2 tablets at night       Allergies   Allergen Reactions     Aspirin Hives     Ibuprofen      Dye in the otc form causes headaches  \"patient states over the counter ibuprofen  bothers her\"     Lansoprazole Other (See Comments) and Visual Disturbance     Changes in eyesight  Prevacid  Change in eyesight     Red Dye Hives     Bupropion Rash     Bupropion Hcl Hives and Rash     Wellbutrin     Demerol [Meperidine] Other (See Comments)     Made migraine worse     BP Readings from Last 3 Encounters:   12/05/19 92/64   11/11/19 96/62   09/05/19 100/66    Wt Readings from Last 3 Encounters:   12/05/19 73 kg (161 lb)   11/11/19 73.9 kg (163 lb)   09/05/19 72.6 kg (160 lb)                      Reviewed and updated as needed this visit by Provider         Review of Systems   ROS COMP: Constitutional, HEENT, cardiovascular, pulmonary, GI, , musculoskeletal, neuro, skin, endocrine and psych systems are negative, except as otherwise noted.      Objective    BP 92/64 (BP Location: Left arm, Patient Position: Sitting, Cuff Size: Adult Regular)   Pulse 79   Temp 97  F (36.1  C) (Tympanic)   Resp 17   Ht 1.676 m (5' 6\")   Wt 73 kg (161 lb)   SpO2 98%   BMI 25.99 kg/m    Body mass index is 25.99 kg/m .  Physical Exam   GENERAL: healthy, " alert and no distress  EYES: Eyes grossly normal to inspection, PERRL and conjunctivae and sclerae normal  HENT: ear canals and TM's normal, nose and mouth without ulcers or lesions  NECK: no adenopathy, no asymmetry, masses, or scars and thyroid normal to palpation  RESP: lungs clear to auscultation - no rales, rhonchi or wheezes  BREAST: normal without masses, tenderness or nipple discharge and no palpable axillary masses or adenopathy  CV: regular rate and rhythm, normal S1 S2, no S3 or S4, no murmur, click or rub, no peripheral edema and peripheral pulses strong  ABDOMEN: soft, nontender, no hepatosplenomegaly, no masses and bowel sounds normal   (female): normal female external genitalia,    RECTAL: normal sphincter tone, non thrombosed hemorrhoidal tags. Rectal exam tender, no discrete masses noted but pain makes exam limited  MS: no gross musculoskeletal defects noted, no edema. Tenderness of the paracervical musculature with atrophy of the muscles surrounding her previous surgical incision. ROM limited to 10 degrees of flexion, extension, and rotation  SKIN: no suspicious lesions or rashes  NEURO: Normal strength and tone, mentation intact and speech normal  PSYCH: mentation appears normal, affect normal/bright            Assessment & Plan     1. Degenerative disc disease, cervical  With ongoing chronic pain    2. Chronic radicular cervical pain  Renewed   - baclofen (LIORESAL) 10 MG tablet; Take 1 tablet (10 mg) by mouth At Bedtime  Dispense: 30 tablet; Refill: 3  - PHYSIATRY REFERRAL    3. Chronic neck pain  Urine drug screen today, mediations renewed. Refer back to physiatry for injections/ rhizotomy  - Urine Drugs of Abuse Screen (Tox13)  - HYDROcodone-acetaminophen (NORCO) 7.5-325 MG per tablet; TAKE 1 TABLET BY MOUTH EVER Y 4 TO 6 HOURS AS NEEDED FO R PAIN  Dispense: 60 tablet; Refill: 0  - PHYSIATRY REFERRAL    4. Bronchitis  Treat bid for 10 days follow up if not improving  - cefPROZIL (CEFZIL) 500  "MG tablet; Take 1 tablet (500 mg) by mouth 2 times daily for 10 days  Dispense: 20 tablet; Refill: 0    5. Rectal pain  Refer for further evaluation for mass  - GENERAL SURG ADULT REFERRAL    6. Major depressive disorder, recurrent severe without psychotic features (H)  Stable on current medication       BMI:   Estimated body mass index is 25.99 kg/m  as calculated from the following:    Height as of this encounter: 1.676 m (5' 6\").    Weight as of this encounter: 73 kg (161 lb).   Weight management plan: Discussed healthy diet and exercise guidelines      Forty five  minutes spent with the patient, greater than 50% in counseling and coordination of care of the above problems and treatment options of rectal pain and possible mass vs hemorrhoids, bronchitis, depression, chronic pain        Return in about 6 weeks (around 1/16/2020).    Henna Cruz MD  [facility] [place]      "

## 2019-12-05 ENCOUNTER — OFFICE VISIT (OUTPATIENT)
Dept: FAMILY MEDICINE | Facility: OTHER | Age: 59
End: 2019-12-05
Attending: FAMILY MEDICINE
Payer: COMMERCIAL

## 2019-12-05 VITALS
RESPIRATION RATE: 17 BRPM | HEIGHT: 66 IN | OXYGEN SATURATION: 98 % | DIASTOLIC BLOOD PRESSURE: 64 MMHG | HEART RATE: 79 BPM | BODY MASS INDEX: 25.88 KG/M2 | WEIGHT: 161 LBS | SYSTOLIC BLOOD PRESSURE: 92 MMHG | TEMPERATURE: 97 F

## 2019-12-05 DIAGNOSIS — K62.89 RECTAL PAIN: ICD-10-CM

## 2019-12-05 DIAGNOSIS — J40 BRONCHITIS: ICD-10-CM

## 2019-12-05 DIAGNOSIS — M54.2 CHRONIC NECK PAIN: ICD-10-CM

## 2019-12-05 DIAGNOSIS — M54.12 CHRONIC RADICULAR CERVICAL PAIN: ICD-10-CM

## 2019-12-05 DIAGNOSIS — G89.29 CHRONIC RADICULAR CERVICAL PAIN: ICD-10-CM

## 2019-12-05 DIAGNOSIS — G89.29 CHRONIC NECK PAIN: ICD-10-CM

## 2019-12-05 DIAGNOSIS — M50.30 DEGENERATION OF CERVICAL INTERVERTEBRAL DISC: Primary | ICD-10-CM

## 2019-12-05 DIAGNOSIS — F33.2 MAJOR DEPRESSIVE DISORDER, RECURRENT SEVERE WITHOUT PSYCHOTIC FEATURES (H): ICD-10-CM

## 2019-12-05 LAB
AMPHETAMINES UR QL: NOT DETECTED NG/ML
BARBITURATES UR QL SCN: NOT DETECTED NG/ML
BENZODIAZ UR QL SCN: ABNORMAL NG/ML
BUPRENORPHINE UR QL: NOT DETECTED NG/ML
CANNABINOIDS UR QL: NOT DETECTED NG/ML
COCAINE UR QL SCN: NOT DETECTED NG/ML
D-METHAMPHET UR QL: NOT DETECTED NG/ML
METHADONE UR QL SCN: NOT DETECTED NG/ML
OPIATES UR QL SCN: NOT DETECTED NG/ML
OXYCODONE UR QL SCN: NOT DETECTED NG/ML
PCP UR QL SCN: NOT DETECTED NG/ML
PROPOXYPH UR QL: NOT DETECTED NG/ML
TRICYCLICS UR QL SCN: NOT DETECTED NG/ML

## 2019-12-05 PROCEDURE — 90471 IMMUNIZATION ADMIN: CPT

## 2019-12-05 PROCEDURE — 80306 DRUG TEST PRSMV INSTRMNT: CPT | Mod: ZL | Performed by: FAMILY MEDICINE

## 2019-12-05 PROCEDURE — G0463 HOSPITAL OUTPT CLINIC VISIT: HCPCS

## 2019-12-05 PROCEDURE — 90682 RIV4 VACC RECOMBINANT DNA IM: CPT

## 2019-12-05 PROCEDURE — G0463 HOSPITAL OUTPT CLINIC VISIT: HCPCS | Mod: 25

## 2019-12-05 PROCEDURE — 99215 OFFICE O/P EST HI 40 MIN: CPT | Performed by: FAMILY MEDICINE

## 2019-12-05 RX ORDER — BACLOFEN 10 MG/1
10 TABLET ORAL AT BEDTIME
Qty: 30 TABLET | Refills: 3 | Status: SHIPPED | OUTPATIENT
Start: 2019-12-05 | End: 2020-03-17

## 2019-12-05 RX ORDER — CEFPROZIL 500 MG/1
500 TABLET, FILM COATED ORAL 2 TIMES DAILY
Qty: 20 TABLET | Refills: 0 | Status: ON HOLD | OUTPATIENT
Start: 2019-12-05 | End: 2020-01-16

## 2019-12-05 RX ORDER — HYDROCODONE BITARTRATE AND ACETAMINOPHEN 7.5; 325 MG/1; MG/1
TABLET ORAL
Qty: 60 TABLET | Refills: 0 | Status: SHIPPED | OUTPATIENT
Start: 2019-12-05 | End: 2020-01-20

## 2019-12-05 ASSESSMENT — MIFFLIN-ST. JEOR: SCORE: 1327.04

## 2019-12-05 ASSESSMENT — PAIN SCALES - GENERAL: PAINLEVEL: MODERATE PAIN (5)

## 2019-12-05 NOTE — NURSING NOTE
"Chief Complaint   Patient presents with     Pain       Initial BP 92/64 (BP Location: Left arm, Patient Position: Sitting, Cuff Size: Adult Regular)   Pulse 79   Temp 97  F (36.1  C) (Tympanic)   Resp 17   Ht 1.676 m (5' 6\")   Wt 73 kg (161 lb)   SpO2 98%   BMI 25.99 kg/m   Estimated body mass index is 25.99 kg/m  as calculated from the following:    Height as of this encounter: 1.676 m (5' 6\").    Weight as of this encounter: 73 kg (161 lb).  Medication Reconciliation: complete  Mónica Goyal LPN  "

## 2019-12-09 ENCOUNTER — OFFICE VISIT (OUTPATIENT)
Dept: SURGERY | Facility: OTHER | Age: 59
End: 2019-12-09
Attending: FAMILY MEDICINE
Payer: COMMERCIAL

## 2019-12-09 VITALS
BODY MASS INDEX: 27.64 KG/M2 | TEMPERATURE: 97.6 F | HEART RATE: 75 BPM | WEIGHT: 172 LBS | HEIGHT: 66 IN | SYSTOLIC BLOOD PRESSURE: 98 MMHG | OXYGEN SATURATION: 98 % | DIASTOLIC BLOOD PRESSURE: 64 MMHG

## 2019-12-09 DIAGNOSIS — K64.4 EXTERNAL HEMORRHOIDS: ICD-10-CM

## 2019-12-09 DIAGNOSIS — R19.8 TENESMUS: ICD-10-CM

## 2019-12-09 DIAGNOSIS — K62.89 RECTAL PAIN: ICD-10-CM

## 2019-12-09 DIAGNOSIS — K59.00 CONSTIPATION, UNSPECIFIED CONSTIPATION TYPE: ICD-10-CM

## 2019-12-09 DIAGNOSIS — M62.89 PELVIC FLOOR DYSFUNCTION: Primary | ICD-10-CM

## 2019-12-09 PROCEDURE — 99203 OFFICE O/P NEW LOW 30 MIN: CPT | Performed by: SURGERY

## 2019-12-09 PROCEDURE — G0463 HOSPITAL OUTPT CLINIC VISIT: HCPCS

## 2019-12-09 ASSESSMENT — PAIN SCALES - GENERAL: PAINLEVEL: NO PAIN (0)

## 2019-12-09 ASSESSMENT — MIFFLIN-ST. JEOR: SCORE: 1376.94

## 2019-12-09 NOTE — NURSING NOTE
"Chief Complaint   Patient presents with     Consult     difficulty evacuating rectum and hemorrhoidal tags. Referred by Dr. Henna Cruz.        Initial BP 98/64   Pulse 75   Temp 97.6  F (36.4  C)   Ht 1.676 m (5' 6\")   Wt 78 kg (172 lb)   SpO2 98%   BMI 27.76 kg/m   Estimated body mass index is 27.76 kg/m  as calculated from the following:    Height as of this encounter: 1.676 m (5' 6\").    Weight as of this encounter: 78 kg (172 lb).  Medication Reconciliation: complete  BRANDAN LOVING LPN    "

## 2019-12-09 NOTE — PATIENT INSTRUCTIONS
Thank you for allowing Dr. Olivares and our surgical team to participate in your care. Please call our health unit coordinator at [phone] with scheduling questions or the nurse at [phone] with any other questions or concerns.

## 2019-12-09 NOTE — PROGRESS NOTES
"Surgery Consult Clinic Note      RE: Sadaf Blankenship  : 1960    Chief Complaint:  Rectal pain    History of Present Illness:  Mrs. Blankenship is a very pleasant 59 year old female who I am seeing at the request of Dr. Henna Cruz MD for evaluation of rectal pain with defecation and to make further recommendations.  Her last colonoscopy was in 2018 that was positive only for external hemorrhoids.  I have personally reviewed this operative report. She denies blood in stools or family history of colon cancer.  She reports a life time issue with constipation alternating with diarrhea.  She thinks she's constipated since she feels rectal fullness and sits on the commode with excessive straining with no results. She feels like there's something that \"hangs down\" that she has to push back up and this feel very uncomfortable.  She's seen physical therapy before for pelvic floor dysfunction.  She states that she thought that was improving her constipation but she was lost to follow up after 6 weeks of therapy.       Medical history:  Past Medical History:   Diagnosis Date     ASCUS on Pap smear 2004    negative HPV     Atypical chest pain 2008    negative cardiolite stress test [facility]     Bleeding ulcer 1976     Cervical radicular pain 5/10/2012     Degenerative disc disease, cervical 2011     Esophageal ulcer 1998     Irritable bowel syndrome 2015     Migraine headaches, severe 2001     Pseudoarthrosis of cervical spine 7/3/2012     Recurrent UTI 2011     sinusitis (chronic) 2001       Surgical history:  Past Surgical History:   Procedure Laterality Date     BACK SURGERY      cervical     Cervical spine surgery with removal of 2 disks, C5,C7       COLONOSCOPY  10/13/2014    Dr Camargo, internal hemorrhoids     COLONOSCOPY - HIM SCAN  2018    EGD and colonoscopy with Dr. Jennings     Coronary angiogram, normal      Chest pain     ENT SURGERY  "     nose surgery x3     fusion discectomy anterior cervical        ESOPHAGOSCOPY, DIAGNOSTIC  10/31/2014    Dr Camargo, mild erythema of antrum, negative biopsies     NOSE SURGERY      x3            Posterior decompression , fusion C3-5      Psuedoarthrosis     Supracervical hysterectomy with right salpingoophorectomy      Endometriosis       Family history:  Family History   Problem Relation Age of Onset     Cancer Father         lung, also a heavy smoker     Diabetes Mother      Heart Disease Mother 58        MI; cause of death; heavy smoker. had first MI at 40     Coronary Artery Disease Mother      Hypertension Mother      Cancer Other         strong history     Heart Disease Other         heart disease     Heart Disease Brother         x3     Hypertension Brother      Diabetes Brother      Coronary Artery Disease Brother      Hypertension Sister        Medications:  Prior to Admission medications    Medication Sig Start Date End Date Taking? Authorizing Provider   baclofen (LIORESAL) 10 MG tablet Take 1 tablet (10 mg) by mouth At Bedtime 19  Yes Henna Cruz MD   butalbital-acetaminophen-caffeine (FIORICET/ESGIC) -40 MG tablet TAKE 1 TO 2 TABLETS BY MOUTH AT THE ONSET OF A MIGRAINE HEADACHE, LIMIT NO MORE THAN 3 TIMES PER WEEK. ACETAMINOPHEN SHOULD BE LIMITED TO 40 18  Yes Reported, Patient   cefPROZIL (CEFZIL) 500 MG tablet Take 1 tablet (500 mg) by mouth 2 times daily for 10 days 12/5/19 12/15/19 Yes Henna Cruz MD   Cholecalciferol (VITAMIN D3) 1000 UNITS CAPS Take 1,000 Units by mouth 17  Yes Reported, Patient   DEXILANT 60 MG CPDR CR capsule TAKE 1 CAPSULE BY MOUTH DAILY 7/15/19  Yes Henna Cruz MD   diazepam (VALIUM) 5 MG tablet TAKE 1 TABLET BY MOUTH EVERY 6 HOURS AS NEEDED 19  Yes Henna Cruz MD   docusate sodium (COLACE) 100 MG capsule Take 100 mg by mouth daily    Yes Reported, Patient   FLUoxetine (PROZAC) 40 MG capsule TAKE 1 CAPSULE  DAILY BY MOUTH 19  Yes Zoe Herbert MD   ibuprofen (ADVIL/MOTRIN) 800 MG tablet Take 1 tablet (800 mg) by mouth every 8 hours as needed for moderate pain 18  Yes Henna Cruz MD   LYRICA 150 MG capsule INCREASE OVER 2 WEEKS  MG THREE TIMES DAILY, THEN CONTINUE ON THIS DOSE. 18  Yes Reported, Patient   Misc. Devices MISC Dispense one Cefaly neurostimulator and the electro stimulators with refills 16  Yes Reported, Patient   Multiple Vitamins-Minerals (CENTRUM SILVER ULTRA WOMENS) TABS  17  Yes Reported, Patient   ondansetron (ZOFRAN-ODT) 4 MG ODT tab Take 4 mg by mouth every 6 hours as needed (after migraine medication)  19  Yes Reported, Patient   order for DME Equipment being ordered: TENS   Yes Reported, Patient   Probiotic Product (PROBIOTIC DAILY PO) Take by mouth At Bedtime    Yes Reported, Patient   sucralfate (CARAFATE) 1 GM/10ML suspension Take 10 mLs (1 g) by mouth 4 times daily  Patient taking differently: Take 1 g by mouth 4 times daily as needed  19  Yes Henna Cruz MD   topiramate (TOPAMAX) 50 MG tablet Take 2 tablets in the morning and 2 tablets at night 1/3/17  Yes Reported, Patient   HYDROcodone-acetaminophen (NORCO) 7.5-325 MG per tablet TAKE 1 TABLET BY MOUTH EVER Y 4 TO 6 HOURS AS NEEDED FO R PAIN 19   Henna Cruz MD       Allergies:  The patientis allergic to aspirin; ibuprofen; lansoprazole; red dye; bupropion; bupropion hcl; and demerol [meperidine].  .  Social history:  Social History     Tobacco Use     Smoking status: Former Smoker     Years: 8.00     Types: Cigarettes     Last attempt to quit:      Years since quittin.9     Smokeless tobacco: Never Used     Tobacco comment: quit . no passive exposure   Substance Use Topics     Alcohol use: No     Marital status: .    Review of Systems:    Constitutional: Per HPI  HENT: Negative for ear pain, nosebleeds, congestion, sore throat, tinnitus and ear discharge.   "  Eyes: Negative for blurred vision, double vision, photophobia and pain.   Respiratory: Negative for cough, hemoptysis, shortness of breath, wheezing and stridor.    Cardiovascular: Negative for chest pain, palpitations and orthopnea.   Gastrointestinal: Per HPI  Genitourinary: Negative for urgency, frequency and hematuria.   Musculoskeletal: Negative for myalgias, back pain and joint pain.   Neurological: Negative for tingling, speech change and headaches.   Endo/Heme/Allergies: Does not bruise/bleed easily.   Psychiatric/Behavioral: Negative for depression, suicidal ideas and hallucinations. The patient is not nervous/anxious.    Physical Examination:  BP 98/64   Pulse 75   Temp 97.6  F (36.4  C)   Ht 1.676 m (5' 6\")   Wt 78 kg (172 lb)   SpO2 98%   BMI 27.76 kg/m    General: AAOx4, NAD, WN/WD, ambulating without assistance  HEENT:NCAT, EOMI, PERRL Sclerae anicteric; Trachea mideline, no JVD  Chest:   Clear to auscultation bilaterally.  Cardiac: S1S2 , regular rate and rhythm without additional sounds  Abdomen: Soft ND/NT no rebound no guarding  Rectal: Hemorrhoidal skin tags.  No masses.  No fluctuance, no erythema, good sphincter tone, soft stool in rectal vault  Extremities: Cursory exam unremarkable.  Skin: Warm, dry, < 2 sec cap refill  Neuro: CN 2-12 grossly intact, no focal deficit, GCS 15  Psych: happy, calm, asks appropriate questions      Assessment/Plan:  #1 Tenesmus  #2 Constipation  #3 Hemorrhoids    Thank you for the consult.  I examined her in the left lateral decubitus position with straining and did not see or feel any prolapsing internal hemorrhoids or rectal prolapse.  It sounds like she's been thought to that pelvic floor dysfunction and was in therapy for this and was starting to have improvement but was lost to follow up.  I think our first steps are to get her back into physical therapy.  With a normal colonoscopy I don't think theres any utility in that.  I feel like I got a fairly " good exam here in the office and don't need to proceed with EUA.  I would like to research where a defecation lab is so that we can measure her internal pressures during defecation and they can do a stooling study to look for prolapse.           Dr. Marshall DO, [facility]  [address] [place] [address], [address]  [place]    [address]    Referring Provider:  Henna Cruz MD  [address] [place] [id number]     Primary Care Provider:  Henna Cruz

## 2019-12-11 ENCOUNTER — OFFICE VISIT (OUTPATIENT)
Dept: PSYCHIATRY | Facility: OTHER | Age: 59
End: 2019-12-11
Attending: PSYCHIATRY & NEUROLOGY
Payer: COMMERCIAL

## 2019-12-11 VITALS
HEART RATE: 77 BPM | TEMPERATURE: 98.8 F | WEIGHT: 172 LBS | BODY MASS INDEX: 27.64 KG/M2 | DIASTOLIC BLOOD PRESSURE: 70 MMHG | HEIGHT: 66 IN | SYSTOLIC BLOOD PRESSURE: 94 MMHG | OXYGEN SATURATION: 98 %

## 2019-12-11 DIAGNOSIS — F33.2 MAJOR DEPRESSIVE DISORDER, RECURRENT SEVERE WITHOUT PSYCHOTIC FEATURES (H): ICD-10-CM

## 2019-12-11 DIAGNOSIS — F33.1 MAJOR DEPRESSIVE DISORDER, RECURRENT EPISODE, MODERATE (H): Primary | ICD-10-CM

## 2019-12-11 PROCEDURE — 99213 OFFICE O/P EST LOW 20 MIN: CPT | Performed by: PSYCHIATRY & NEUROLOGY

## 2019-12-11 PROCEDURE — G0463 HOSPITAL OUTPT CLINIC VISIT: HCPCS

## 2019-12-11 RX ORDER — FLUOXETINE 40 MG/1
CAPSULE ORAL
Qty: 30 CAPSULE | Refills: 11 | Status: SHIPPED | OUTPATIENT
Start: 2019-12-11 | End: 2020-11-19

## 2019-12-11 ASSESSMENT — ANXIETY QUESTIONNAIRES
1. FEELING NERVOUS, ANXIOUS, OR ON EDGE: SEVERAL DAYS
GAD7 TOTAL SCORE: 9
2. NOT BEING ABLE TO STOP OR CONTROL WORRYING: MORE THAN HALF THE DAYS
5. BEING SO RESTLESS THAT IT IS HARD TO SIT STILL: SEVERAL DAYS
6. BECOMING EASILY ANNOYED OR IRRITABLE: MORE THAN HALF THE DAYS
3. WORRYING TOO MUCH ABOUT DIFFERENT THINGS: MORE THAN HALF THE DAYS
7. FEELING AFRAID AS IF SOMETHING AWFUL MIGHT HAPPEN: NOT AT ALL

## 2019-12-11 ASSESSMENT — PATIENT HEALTH QUESTIONNAIRE - PHQ9
5. POOR APPETITE OR OVEREATING: SEVERAL DAYS
SUM OF ALL RESPONSES TO PHQ QUESTIONS 1-9: 3

## 2019-12-11 ASSESSMENT — MIFFLIN-ST. JEOR: SCORE: 1376.94

## 2019-12-11 ASSESSMENT — PAIN SCALES - GENERAL: PAINLEVEL: MODERATE PAIN (4)

## 2019-12-11 NOTE — PROGRESS NOTES
"  PSYCHIATRY CLINIC PROGRESS NOTE   20 minute medication management, more than 50% of time spent counseling patient on medications, medication side effects, symptom history and management   SUBJECTIVE / INTERIM HISTORY                                                                       Social- niece and niece's kids moved out Children-  Daughter Shandra going to college  Last visit : Continue fluoxetine 40 mg daily. Consider increase next visit.    - been feeling really down and depressed  - irritability   - flew back to help her daughter moved. She had nothing packed, found drug paraphernalia   - denied again for disability  - notes she is done with helping out her daughter financially. Daughter moved in with dad for now and daughter is still going to go to [employer] for college  - hearing August 7th '18 [employer] and denied, appealing. Does not feel she can work. Is very frustrated and notes near ready giving up. I inquired about the mismatch between her PH-Q 9 / ESTRELLA-7 and her level of depression and anxiety and she notes \"I gave up\"   - struggling financially    SUBSTANCE USE- no issues    SYMPTOMS- Irritability, depressed mood, low energy, anhedonia, Anxiety, gets easily overwhelmed, gets panic attacks, depression, anhedonia, low energy, overwhelmed, Passive SI, no intent or plan  MEDICAL ROS- migraines, . upper abdominal pain. .  Substernal pain after she eats (hx ulcers esophageal and gastric) neck pain s/p neck surgeries, migraines, IBS  MEDICAL / SURGICAL HISTORY                    Patient Active Problem List   Diagnosis     Degenerative disc disease, cervical     Pseudoarthrosis of cervical spine     Cervical pain     Migraine     UTI (urinary tract infection)     Advanced care planning/counseling discussion     Thoracic sprain and strain     Sprain and strain of shoulder and upper arm     Irritable bowel syndrome     Myofascial pain syndrome, cervical     Depression, major     Chronic, continuous use of " opioids     Uvulitis     Chronic rhinitis     Madina bullosa     Chronic maxillary sinusitis     Hypertrophy of inferior nasal turbinate     Long uvula     Chronic neck pain     Chronic pain     Chronic tension-type headache, intractable     Migraine aura without headache     History of arthrodesis     Myofascial pain     Disuse syndrome     Intractable chronic migraine without aura and with status migrainosus     Ulcer of esophagus without bleeding     Gastroesophageal reflux disease with esophagitis     Intractable chronic migraine without aura and without status migrainosus     Ulcer of esophagus     Ulnar neuropathy     Chronic radicular cervical pain     Mild episode of recurrent major depressive disorder (H)     Ulnar neuropathy at elbow of left upper extremity     Lumbar radiculopathy     S/P cervical spinal fusion     ACP (advance care planning)     Acute back pain with sciatica     Chronic migraine without aura without status migrainosus, not intractable     Radicular pain     Subacromial bursitis of right shoulder joint     Panlobular emphysema (H)     Calculus of kidney     Pulmonary emphysema, unspecified emphysema type (H)     Other chronic gastritis without hemorrhage     Pelvic floor dysfunction     Adhesive capsulitis of left shoulder     ALLERGY   Aspirin; Ibuprofen; Lansoprazole; Red dye; Bupropion; Bupropion hcl; and Demerol [meperidine]  MEDICATIONS                                                                                             Current Outpatient Medications   Medication Sig     baclofen (LIORESAL) 10 MG tablet Take 1 tablet (10 mg) by mouth At Bedtime     butalbital-acetaminophen-caffeine (FIORICET/ESGIC) -40 MG tablet TAKE 1 TO 2 TABLETS BY MOUTH AT THE ONSET OF A MIGRAINE HEADACHE, LIMIT NO MORE THAN 3 TIMES PER WEEK. ACETAMINOPHEN SHOULD BE LIMITED TO 40     cefPROZIL (CEFZIL) 500 MG tablet Take 1 tablet (500 mg) by mouth 2 times daily for 10 days     Cholecalciferol  "(VITAMIN D3) 1000 UNITS CAPS Take 1,000 Units by mouth     DEXILANT 60 MG CPDR CR capsule TAKE 1 CAPSULE BY MOUTH DAILY     diazepam (VALIUM) 5 MG tablet TAKE 1 TABLET BY MOUTH EVERY 6 HOURS AS NEEDED     docusate sodium (COLACE) 100 MG capsule Take 100 mg by mouth daily      FLUoxetine (PROZAC) 40 MG capsule TAKE 1 CAPSULE DAILY BY MOUTH     HYDROcodone-acetaminophen (NORCO) 7.5-325 MG per tablet TAKE 1 TABLET BY MOUTH EVER Y 4 TO 6 HOURS AS NEEDED FO R PAIN     ibuprofen (ADVIL/MOTRIN) 800 MG tablet Take 1 tablet (800 mg) by mouth every 8 hours as needed for moderate pain     LYRICA 150 MG capsule INCREASE OVER 2 WEEKS  MG THREE TIMES DAILY, THEN CONTINUE ON THIS DOSE.     Misc. Devices MISC Dispense one Cefaly neurostimulator and the electro stimulators with refills     Multiple Vitamins-Minerals (CENTRUM SILVER ULTRA WOMENS) TABS      ondansetron (ZOFRAN-ODT) 4 MG ODT tab Take 4 mg by mouth every 6 hours as needed (after migraine medication)      order for DME Equipment being ordered: TENS     Probiotic Product (PROBIOTIC DAILY PO) Take by mouth At Bedtime      sucralfate (CARAFATE) 1 GM/10ML suspension Take 10 mLs (1 g) by mouth 4 times daily (Patient taking differently: Take 1 g by mouth 4 times daily as needed )     topiramate (TOPAMAX) 50 MG tablet Take 2 tablets in the morning and 2 tablets at night     No current facility-administered medications for this visit.        VITALS   BP 94/70 (BP Location: Right arm, Patient Position: Sitting, Cuff Size: Adult Regular)   Pulse 77   Temp 98.8  F (37.1  C) (Tympanic)   Ht 1.676 m (5' 6\")   Wt 78 kg (172 lb)   SpO2 98%   BMI 27.76 kg/m       LABS                                                                                                                           Last Basic Metabolic Panel:  NA      142   6/9/2015   POTASSIUM      3.6   6/9/2015  CHLORIDE      109   6/9/2015  SALOME      8.7   6/9/2015  CO2       26   6/9/2015  BUN        8   " 6/9/2015  CR     0.91   6/9/2015  GLC       96   6/9/2015    MENTAL STATUS EXAM                                                                                        Alert. Oriented to person, place, and date / time. Well groomed, calm, cooperative with good eye contact. No problems with speech or psychomotor behavior. Mood was depressed and affect was congruent to speech content : tearful. Appeared tired and in pain. Thought process, including associations, was unremarkable and thought content was devoid of suicidal and homicidal ideation and psychotic thought. No hallucinations. Insight was good. Judgment was intact and adequate for safety. Fund of knowledge was intact. Pt demonstrates no obvious problems with attention, concentration, language, recent or remote memory although these were not formally tested.     ASSESSMENT                                                                                                      HISTORICAL:  Initial psych note 10/13/15        NOTES:  Cymbalta -> agitation, angry. Perry Blankenship is a  59 year old, female who has hx of depression anxiety, now on fluoxetine 40 mg daily. Anger, irritability and as we discussed today she does have several things / people / situations she having some resentments with. That being said we agreed on trying increase of fluoxetine. I think she could really benefit from Al-Anon and we talked about this and she did attend Al-Anon in the past.      TREATMENT RISK STATEMENT:  The risks, benefits, alternatives and potential adverse effects have been explained and are understood by the pt.  The pt agrees to the treatment plan with the ability to do so.   The pt knows to call the clinic for any problems or access emergency care if needed.        DIAGNOSES                     MDD recurrent, mod to severe  ESTRELLA      PLAN                                                                                                                    1)   MEDICATIONS:         -- Increase fluoxetine 40 mg daily to 60 mg daily.     2)  THERAPY:  No Change    3)  LABS:  None    4)  PT MONITOR [call for probs]:  worsening sx, SI/HI, SEs from meds    5)  REFERRALS [CD, medical, other]:  None    6)  RTC:  ~ 2 months

## 2019-12-11 NOTE — NURSING NOTE
"Chief Complaint   Patient presents with     RECHECK     Depression, anxiety.  Discuss Fluoxetine.       Initial BP 94/70 (BP Location: Right arm, Patient Position: Sitting, Cuff Size: Adult Regular)   Pulse 77   Temp 98.8  F (37.1  C) (Tympanic)   Ht 1.676 m (5' 6\")   Wt 78 kg (172 lb)   SpO2 98%   BMI 27.76 kg/m   Estimated body mass index is 27.76 kg/m  as calculated from the following:    Height as of this encounter: 1.676 m (5' 6\").    Weight as of this encounter: 78 kg (172 lb).  Medication Reconciliation: complete  JANICE ROMO LPN    "

## 2019-12-12 ENCOUNTER — TELEPHONE (OUTPATIENT)
Dept: FAMILY MEDICINE | Facility: OTHER | Age: 59
End: 2019-12-12

## 2019-12-12 DIAGNOSIS — J40 BRONCHITIS: Primary | ICD-10-CM

## 2019-12-12 RX ORDER — METHYLPREDNISOLONE 4 MG
TABLET, DOSE PACK ORAL
Qty: 21 TABLET | Refills: 0 | Status: ON HOLD | OUTPATIENT
Start: 2019-12-12 | End: 2020-01-16

## 2019-12-12 ASSESSMENT — ANXIETY QUESTIONNAIRES: GAD7 TOTAL SCORE: 9

## 2019-12-12 NOTE — TELEPHONE ENCOUNTER
Notified prescription sent into [facility]. Pt verbalized understanding to follow up if not improvement.

## 2019-12-12 NOTE — TELEPHONE ENCOUNTER
[facility]. Let me know if you aren't improving

## 2019-12-12 NOTE — TELEPHONE ENCOUNTER
Pt calling, was in 12-5-19 , dx with bronchitis  tx'ed with Cefzil for 10 days  No better  Has had sx's for 1 month prior to coming in    States provider has in the past given her a steroid and another round of antibiotic when her sx's didn't  get better.    Please call pt.    Henna Barboza LPN

## 2020-01-07 ENCOUNTER — TELEPHONE (OUTPATIENT)
Dept: FAMILY MEDICINE | Facility: OTHER | Age: 60
End: 2020-01-07

## 2020-01-07 NOTE — TELEPHONE ENCOUNTER
Called patient back and she did get a call from her neurosurgeon and they think that her symptoms are related to her getting generic Lyrica, so they re submitted it to her pharmacy as FEDE so she doesn't get the generic anymore. Also she is seeing her neurologist on Monday for botox injections in regards to her neck pain and headaches.

## 2020-01-07 NOTE — TELEPHONE ENCOUNTER
Patient spoke with neurologist, has some med changes and would like to discuss with  nurse.    Nurses phone line busy, advised patient nurse will return her call when able. A good number to reach patient at is [phone].    Mallory Bermudez LPN on 1/7/2020 at 1:47 PM

## 2020-01-07 NOTE — TELEPHONE ENCOUNTER
FYI    Pt calling , states she has severe pain in neck, hands and feet, including a extremely painful migraine    She has left a message with neurologist Dr. Phu Pérez at [facility] for appt    If unable to get in, will call back and request primary to call and help her get an appt ASAP.    Henna Barboza LPN  .

## 2020-01-16 ENCOUNTER — APPOINTMENT (OUTPATIENT)
Dept: CT IMAGING | Facility: HOSPITAL | Age: 60
End: 2020-01-16
Attending: SURGERY
Payer: COMMERCIAL

## 2020-01-16 ENCOUNTER — HOSPITAL ENCOUNTER (OUTPATIENT)
Facility: HOSPITAL | Age: 60
Setting detail: OBSERVATION
Discharge: HOME | End: 2020-01-17
Attending: EMERGENCY MEDICINE | Admitting: SURGERY
Payer: COMMERCIAL

## 2020-01-16 ENCOUNTER — APPOINTMENT (OUTPATIENT)
Dept: GENERAL RADIOLOGY | Facility: HOSPITAL | Age: 60
End: 2020-01-16
Attending: EMERGENCY MEDICINE
Payer: COMMERCIAL

## 2020-01-16 DIAGNOSIS — M62.838 MUSCLE SPASM: ICD-10-CM

## 2020-01-16 DIAGNOSIS — S06.0X1A CONCUSSION WITH BRIEF LOC: Primary | ICD-10-CM

## 2020-01-16 LAB
ABO + RH BLD: NORMAL
ABO + RH BLD: NORMAL
ALBUMIN SERPL-MCNC: 3.6 G/DL (ref 3.4–5)
ALBUMIN UR-MCNC: 10 MG/DL
ALP SERPL-CCNC: 94 U/L (ref 40–150)
ALT SERPL W P-5'-P-CCNC: 23 U/L (ref 0–50)
AMPHETAMINES UR QL: NOT DETECTED NG/ML
ANION GAP SERPL CALCULATED.3IONS-SCNC: 7 MMOL/L (ref 3–14)
APPEARANCE UR: ABNORMAL
APTT PPP: 31 SEC (ref 22–37)
AST SERPL W P-5'-P-CCNC: 11 U/L (ref 0–45)
BACTERIA #/AREA URNS HPF: ABNORMAL /HPF
BARBITURATES UR QL SCN: NOT DETECTED NG/ML
BASE DEFICIT BLDA-SCNC: 7.8 MMOL/L
BASOPHILS # BLD AUTO: 0 10E9/L (ref 0–0.2)
BASOPHILS NFR BLD AUTO: 0.6 %
BENZODIAZ UR QL SCN: ABNORMAL NG/ML
BILIRUB SERPL-MCNC: 0.3 MG/DL (ref 0.2–1.3)
BILIRUB UR QL STRIP: NEGATIVE
BLD GP AB SCN SERPL QL: NORMAL
BLOOD BANK CMNT PATIENT-IMP: NORMAL
BLOOD BANK CMNT PATIENT-IMP: NORMAL
BUN SERPL-MCNC: 11 MG/DL (ref 7–30)
BUPRENORPHINE UR QL: NOT DETECTED NG/ML
CALCIUM SERPL-MCNC: 8.9 MG/DL (ref 8.5–10.1)
CANNABINOIDS UR QL: NOT DETECTED NG/ML
CHLORIDE SERPL-SCNC: 112 MMOL/L (ref 94–109)
CO2 SERPL-SCNC: 22 MMOL/L (ref 20–32)
COCAINE UR QL SCN: NOT DETECTED NG/ML
COLOR UR AUTO: YELLOW
CREAT SERPL-MCNC: 0.96 MG/DL (ref 0.52–1.04)
D-METHAMPHET UR QL: NOT DETECTED NG/ML
DIFFERENTIAL METHOD BLD: NORMAL
EOSINOPHIL # BLD AUTO: 0.1 10E9/L (ref 0–0.7)
EOSINOPHIL NFR BLD AUTO: 1.8 %
ERYTHROCYTE [DISTWIDTH] IN BLOOD BY AUTOMATED COUNT: 12.6 % (ref 10–15)
ETHANOL SERPL-MCNC: <0.01 G/DL
FIBRINOGEN PPP-MCNC: 380 MG/DL (ref 200–420)
GFR SERPL CREATININE-BSD FRML MDRD: 65 ML/MIN/{1.73_M2}
GLUCOSE SERPL-MCNC: 91 MG/DL (ref 70–99)
GLUCOSE UR STRIP-MCNC: NEGATIVE MG/DL
HCO3 BLD-SCNC: 19 MMOL/L (ref 21–28)
HCT VFR BLD AUTO: 41 % (ref 35–47)
HGB BLD-MCNC: 14.2 G/DL (ref 11.7–15.7)
HGB UR QL STRIP: ABNORMAL
IMM GRANULOCYTES # BLD: 0.1 10E9/L (ref 0–0.4)
IMM GRANULOCYTES NFR BLD: 1 %
INR PPP: 1 (ref 0.86–1.14)
KETONES UR STRIP-MCNC: NEGATIVE MG/DL
LACTATE BLD-SCNC: 1.1 MMOL/L (ref 0.7–2)
LEUKOCYTE ESTERASE UR QL STRIP: NEGATIVE
LIPASE SERPL-CCNC: 113 U/L (ref 73–393)
LYMPHOCYTES # BLD AUTO: 1.6 10E9/L (ref 0.8–5.3)
LYMPHOCYTES NFR BLD AUTO: 31 %
MCH RBC QN AUTO: 30.4 PG (ref 26.5–33)
MCHC RBC AUTO-ENTMCNC: 34.6 G/DL (ref 31.5–36.5)
MCV RBC AUTO: 88 FL (ref 78–100)
METHADONE UR QL SCN: NOT DETECTED NG/ML
MONOCYTES # BLD AUTO: 0.4 10E9/L (ref 0–1.3)
MONOCYTES NFR BLD AUTO: 8.6 %
MUCOUS THREADS #/AREA URNS LPF: PRESENT /LPF
NEUTROPHILS # BLD AUTO: 2.9 10E9/L (ref 1.6–8.3)
NEUTROPHILS NFR BLD AUTO: 57 %
NITRATE UR QL: NEGATIVE
NRBC # BLD AUTO: 0 10*3/UL
NRBC BLD AUTO-RTO: 0 /100
O2/TOTAL GAS SETTING VFR VENT: 100 %
OPIATES UR QL SCN: NOT DETECTED NG/ML
OXYCODONE UR QL SCN: NOT DETECTED NG/ML
OXYHGB MFR BLD: >98 % (ref 92–100)
PCO2 BLD: 40 MM HG (ref 35–45)
PCP UR QL SCN: NOT DETECTED NG/ML
PH BLD: 7.28 PH (ref 7.35–7.45)
PH UR STRIP: 6 PH (ref 4.7–8)
PLATELET # BLD AUTO: 171 10E9/L (ref 150–450)
PO2 BLD: 371 MM HG (ref 80–105)
POTASSIUM SERPL-SCNC: 3.8 MMOL/L (ref 3.4–5.3)
PROPOXYPH UR QL: NOT DETECTED NG/ML
PROT SERPL-MCNC: 7.1 G/DL (ref 6.8–8.8)
RBC # BLD AUTO: 4.67 10E12/L (ref 3.8–5.2)
RBC #/AREA URNS AUTO: 6 /HPF (ref 0–2)
SODIUM SERPL-SCNC: 141 MMOL/L (ref 133–144)
SOURCE: ABNORMAL
SP GR UR STRIP: 1.02 (ref 1–1.03)
SPECIMEN EXP DATE BLD: NORMAL
SQUAMOUS #/AREA URNS AUTO: 7 /HPF (ref 0–1)
TRICYCLICS UR QL SCN: NOT DETECTED NG/ML
UROBILINOGEN UR STRIP-MCNC: NORMAL MG/DL (ref 0–2)
WBC # BLD AUTO: 5 10E9/L (ref 4–11)
WBC #/AREA URNS AUTO: 4 /HPF (ref 0–5)

## 2020-01-16 PROCEDURE — 80306 DRUG TEST PRSMV INSTRMNT: CPT | Performed by: EMERGENCY MEDICINE

## 2020-01-16 PROCEDURE — 81001 URINALYSIS AUTO W/SCOPE: CPT | Performed by: EMERGENCY MEDICINE

## 2020-01-16 PROCEDURE — 31500 INSERT EMERGENCY AIRWAY: CPT | Performed by: EMERGENCY MEDICINE

## 2020-01-16 PROCEDURE — 25800030 ZZH RX IP 258 OP 636: Performed by: SURGERY

## 2020-01-16 PROCEDURE — 25000128 H RX IP 250 OP 636

## 2020-01-16 PROCEDURE — 99220 ZZC INITIAL OBSERVATION CARE,LEVL III: CPT | Performed by: SURGERY

## 2020-01-16 PROCEDURE — 94002 VENT MGMT INPAT INIT DAY: CPT

## 2020-01-16 PROCEDURE — 80053 COMPREHEN METABOLIC PANEL: CPT | Performed by: EMERGENCY MEDICINE

## 2020-01-16 PROCEDURE — 96361 HYDRATE IV INFUSION ADD-ON: CPT | Mod: 59

## 2020-01-16 PROCEDURE — 40000275 ZZH STATISTIC RCP TIME EA 10 MIN

## 2020-01-16 PROCEDURE — 86901 BLOOD TYPING SEROLOGIC RH(D): CPT | Performed by: EMERGENCY MEDICINE

## 2020-01-16 PROCEDURE — 25000128 H RX IP 250 OP 636: Performed by: SURGERY

## 2020-01-16 PROCEDURE — 86850 RBC ANTIBODY SCREEN: CPT | Performed by: EMERGENCY MEDICINE

## 2020-01-16 PROCEDURE — 25000132 ZZH RX MED GY IP 250 OP 250 PS 637: Performed by: SURGERY

## 2020-01-16 PROCEDURE — 86900 BLOOD TYPING SEROLOGIC ABO: CPT | Performed by: EMERGENCY MEDICINE

## 2020-01-16 PROCEDURE — 99291 CRITICAL CARE FIRST HOUR: CPT | Mod: 25

## 2020-01-16 PROCEDURE — 99291 CRITICAL CARE FIRST HOUR: CPT | Mod: 25 | Performed by: EMERGENCY MEDICINE

## 2020-01-16 PROCEDURE — 31500 INSERT EMERGENCY AIRWAY: CPT

## 2020-01-16 PROCEDURE — 72125 CT NECK SPINE W/O DYE: CPT | Mod: TC

## 2020-01-16 PROCEDURE — G0378 HOSPITAL OBSERVATION PER HR: HCPCS

## 2020-01-16 PROCEDURE — 85610 PROTHROMBIN TIME: CPT | Performed by: EMERGENCY MEDICINE

## 2020-01-16 PROCEDURE — 74177 CT ABD & PELVIS W/CONTRAST: CPT | Mod: TC

## 2020-01-16 PROCEDURE — 83690 ASSAY OF LIPASE: CPT | Performed by: EMERGENCY MEDICINE

## 2020-01-16 PROCEDURE — G0390 TRAUMA RESPONS W/HOSP CRITI: HCPCS

## 2020-01-16 PROCEDURE — 25000128 H RX IP 250 OP 636: Performed by: EMERGENCY MEDICINE

## 2020-01-16 PROCEDURE — 82805 BLOOD GASES W/O2 SATURATION: CPT | Performed by: SURGERY

## 2020-01-16 PROCEDURE — 25500064 ZZH RX 255 OP 636: Performed by: RADIOLOGY

## 2020-01-16 PROCEDURE — 36600 WITHDRAWAL OF ARTERIAL BLOOD: CPT | Mod: 59

## 2020-01-16 PROCEDURE — 85730 THROMBOPLASTIN TIME PARTIAL: CPT | Performed by: EMERGENCY MEDICINE

## 2020-01-16 PROCEDURE — 80320 DRUG SCREEN QUANTALCOHOLS: CPT | Performed by: EMERGENCY MEDICINE

## 2020-01-16 PROCEDURE — 85025 COMPLETE CBC W/AUTO DIFF WBC: CPT | Performed by: EMERGENCY MEDICINE

## 2020-01-16 PROCEDURE — 70450 CT HEAD/BRAIN W/O DYE: CPT | Mod: TC

## 2020-01-16 PROCEDURE — 71045 X-RAY EXAM CHEST 1 VIEW: CPT | Mod: TC

## 2020-01-16 PROCEDURE — 96365 THER/PROPH/DIAG IV INF INIT: CPT

## 2020-01-16 PROCEDURE — 25800030 ZZH RX IP 258 OP 636: Performed by: EMERGENCY MEDICINE

## 2020-01-16 PROCEDURE — 96375 TX/PRO/DX INJ NEW DRUG ADDON: CPT | Mod: 59

## 2020-01-16 PROCEDURE — 25000125 ZZHC RX 250: Performed by: EMERGENCY MEDICINE

## 2020-01-16 PROCEDURE — 85384 FIBRINOGEN ACTIVITY: CPT | Performed by: EMERGENCY MEDICINE

## 2020-01-16 PROCEDURE — 83605 ASSAY OF LACTIC ACID: CPT | Performed by: EMERGENCY MEDICINE

## 2020-01-16 RX ORDER — NALOXONE HYDROCHLORIDE 0.4 MG/ML
.1-.4 INJECTION, SOLUTION INTRAMUSCULAR; INTRAVENOUS; SUBCUTANEOUS
Status: DISCONTINUED | OUTPATIENT
Start: 2020-01-16 | End: 2020-01-17 | Stop reason: HOSPADM

## 2020-01-16 RX ORDER — METOCLOPRAMIDE HYDROCHLORIDE 5 MG/ML
INJECTION INTRAMUSCULAR; INTRAVENOUS
Status: COMPLETED
Start: 2020-01-16 | End: 2020-01-16

## 2020-01-16 RX ORDER — METHOCARBAMOL 500 MG/1
500 TABLET, FILM COATED ORAL 4 TIMES DAILY
Status: DISCONTINUED | OUTPATIENT
Start: 2020-01-16 | End: 2020-01-17 | Stop reason: HOSPADM

## 2020-01-16 RX ORDER — ETOMIDATE 2 MG/ML
20 INJECTION INTRAVENOUS ONCE
Status: COMPLETED | OUTPATIENT
Start: 2020-01-16 | End: 2020-01-16

## 2020-01-16 RX ORDER — KETOROLAC TROMETHAMINE 30 MG/ML
30 INJECTION, SOLUTION INTRAMUSCULAR; INTRAVENOUS ONCE
Status: COMPLETED | OUTPATIENT
Start: 2020-01-16 | End: 2020-01-16

## 2020-01-16 RX ORDER — FENTANYL CITRATE 50 UG/ML
INJECTION, SOLUTION INTRAMUSCULAR; INTRAVENOUS
Status: COMPLETED
Start: 2020-01-16 | End: 2020-01-16

## 2020-01-16 RX ORDER — METHOCARBAMOL 750 MG/1
TABLET, FILM COATED ORAL
Status: ON HOLD | COMMUNITY
Start: 2020-01-07 | End: 2020-01-16

## 2020-01-16 RX ORDER — PANTOPRAZOLE SODIUM 20 MG/1
40 TABLET, DELAYED RELEASE ORAL DAILY
Status: DISCONTINUED | OUTPATIENT
Start: 2020-01-17 | End: 2020-01-17 | Stop reason: HOSPADM

## 2020-01-16 RX ORDER — PROPOFOL 10 MG/ML
5-75 INJECTION, EMULSION INTRAVENOUS CONTINUOUS
Status: DISCONTINUED | OUTPATIENT
Start: 2020-01-16 | End: 2020-01-16

## 2020-01-16 RX ORDER — SUCRALFATE ORAL 1 G/10ML
1 SUSPENSION ORAL 4 TIMES DAILY
Status: DISCONTINUED | OUTPATIENT
Start: 2020-01-16 | End: 2020-01-17 | Stop reason: HOSPADM

## 2020-01-16 RX ORDER — ONDANSETRON 2 MG/ML
4 INJECTION INTRAMUSCULAR; INTRAVENOUS ONCE
Status: COMPLETED | OUTPATIENT
Start: 2020-01-16 | End: 2020-01-16

## 2020-01-16 RX ORDER — PROPOFOL 10 MG/ML
INJECTION, EMULSION INTRAVENOUS
Status: COMPLETED
Start: 2020-01-16 | End: 2020-01-16

## 2020-01-16 RX ORDER — DOCUSATE SODIUM 100 MG/1
100 CAPSULE, LIQUID FILLED ORAL DAILY
Status: DISCONTINUED | OUTPATIENT
Start: 2020-01-16 | End: 2020-01-17 | Stop reason: HOSPADM

## 2020-01-16 RX ORDER — BACLOFEN 10 MG/1
10 TABLET ORAL AT BEDTIME
Status: DISCONTINUED | OUTPATIENT
Start: 2020-01-16 | End: 2020-01-17 | Stop reason: HOSPADM

## 2020-01-16 RX ORDER — IBUPROFEN 600 MG/1
600 TABLET, FILM COATED ORAL EVERY 6 HOURS PRN
Status: DISCONTINUED | OUTPATIENT
Start: 2020-01-16 | End: 2020-01-17 | Stop reason: HOSPADM

## 2020-01-16 RX ORDER — TOPIRAMATE 25 MG/1
100 TABLET, FILM COATED ORAL 2 TIMES DAILY
Status: DISCONTINUED | OUTPATIENT
Start: 2020-01-16 | End: 2020-01-17 | Stop reason: HOSPADM

## 2020-01-16 RX ORDER — ONDANSETRON 2 MG/ML
4 INJECTION INTRAMUSCULAR; INTRAVENOUS EVERY 6 HOURS PRN
Status: DISCONTINUED | OUTPATIENT
Start: 2020-01-16 | End: 2020-01-17 | Stop reason: HOSPADM

## 2020-01-16 RX ORDER — IOPAMIDOL 612 MG/ML
100 INJECTION, SOLUTION INTRAVASCULAR ONCE
Status: COMPLETED | OUTPATIENT
Start: 2020-01-16 | End: 2020-01-16

## 2020-01-16 RX ORDER — METOCLOPRAMIDE HYDROCHLORIDE 5 MG/ML
10 INJECTION INTRAMUSCULAR; INTRAVENOUS ONCE
Status: COMPLETED | OUTPATIENT
Start: 2020-01-16 | End: 2020-01-16

## 2020-01-16 RX ORDER — HYDROCODONE BITARTRATE AND ACETAMINOPHEN 7.5; 325 MG/1; MG/1
1 TABLET ORAL EVERY 4 HOURS PRN
Status: DISCONTINUED | OUTPATIENT
Start: 2020-01-16 | End: 2020-01-17 | Stop reason: HOSPADM

## 2020-01-16 RX ORDER — SODIUM CHLORIDE, SODIUM LACTATE, POTASSIUM CHLORIDE, CALCIUM CHLORIDE 600; 310; 30; 20 MG/100ML; MG/100ML; MG/100ML; MG/100ML
INJECTION, SOLUTION INTRAVENOUS CONTINUOUS
Status: DISCONTINUED | OUTPATIENT
Start: 2020-01-16 | End: 2020-01-17

## 2020-01-16 RX ORDER — FENTANYL CITRATE 50 UG/ML
25 INJECTION, SOLUTION INTRAMUSCULAR; INTRAVENOUS
Status: DISCONTINUED | OUTPATIENT
Start: 2020-01-16 | End: 2020-01-17 | Stop reason: CLARIF

## 2020-01-16 RX ORDER — ONDANSETRON 4 MG/1
4 TABLET, ORALLY DISINTEGRATING ORAL EVERY 6 HOURS PRN
Status: DISCONTINUED | OUTPATIENT
Start: 2020-01-16 | End: 2020-01-17 | Stop reason: HOSPADM

## 2020-01-16 RX ORDER — LIDOCAINE 40 MG/G
CREAM TOPICAL
Status: DISCONTINUED | OUTPATIENT
Start: 2020-01-16 | End: 2020-01-17 | Stop reason: HOSPADM

## 2020-01-16 RX ORDER — DIAZEPAM 5 MG
5 TABLET ORAL EVERY 6 HOURS PRN
Status: DISCONTINUED | OUTPATIENT
Start: 2020-01-16 | End: 2020-01-17 | Stop reason: HOSPADM

## 2020-01-16 RX ORDER — ONDANSETRON 2 MG/ML
INJECTION INTRAMUSCULAR; INTRAVENOUS
Status: COMPLETED
Start: 2020-01-16 | End: 2020-01-16

## 2020-01-16 RX ADMIN — METHOCARBAMOL 500 MG: 500 TABLET, FILM COATED ORAL at 17:50

## 2020-01-16 RX ADMIN — SUCCINYLCHOLINE CHLORIDE 100 MG: 20 INJECTION, SOLUTION INTRAMUSCULAR; INTRAVENOUS at 10:57

## 2020-01-16 RX ADMIN — FENTANYL CITRATE 25 MCG: 50 INJECTION, SOLUTION INTRAMUSCULAR; INTRAVENOUS at 11:40

## 2020-01-16 RX ADMIN — HYDROCODONE BITARTRATE AND ACETAMINOPHEN 1 TABLET: 7.5; 325 TABLET ORAL at 17:58

## 2020-01-16 RX ADMIN — TOPIRAMATE 100 MG: 25 TABLET, FILM COATED ORAL at 20:50

## 2020-01-16 RX ADMIN — SUCRALFATE 1 G: 1 SUSPENSION ORAL at 18:25

## 2020-01-16 RX ADMIN — SODIUM CHLORIDE 1000 ML: 9 INJECTION, SOLUTION INTRAVENOUS at 10:55

## 2020-01-16 RX ADMIN — METHOCARBAMOL 500 MG: 500 TABLET, FILM COATED ORAL at 20:54

## 2020-01-16 RX ADMIN — DOCUSATE SODIUM 100 MG: 100 CAPSULE, LIQUID FILLED ORAL at 20:51

## 2020-01-16 RX ADMIN — PROPOFOL 50 MCG/KG/MIN: 10 INJECTION, EMULSION INTRAVENOUS at 11:07

## 2020-01-16 RX ADMIN — ONDANSETRON 4 MG: 2 INJECTION INTRAMUSCULAR; INTRAVENOUS at 11:55

## 2020-01-16 RX ADMIN — ETOMIDATE 20 MG: 2 INJECTION, SOLUTION INTRAVENOUS at 10:56

## 2020-01-16 RX ADMIN — KETOROLAC TROMETHAMINE 30 MG: 30 INJECTION, SOLUTION INTRAMUSCULAR; INTRAVENOUS at 13:51

## 2020-01-16 RX ADMIN — METOCLOPRAMIDE HYDROCHLORIDE 10 MG: 5 INJECTION INTRAMUSCULAR; INTRAVENOUS at 12:00

## 2020-01-16 RX ADMIN — PREGABALIN 150 MG: 100 CAPSULE ORAL at 20:52

## 2020-01-16 RX ADMIN — METOCLOPRAMIDE 10 MG: 5 INJECTION, SOLUTION INTRAMUSCULAR; INTRAVENOUS at 12:00

## 2020-01-16 RX ADMIN — IOPAMIDOL 100 ML: 612 INJECTION, SOLUTION INTRAVENOUS at 11:24

## 2020-01-16 RX ADMIN — ONDANSETRON 4 MG: 2 INJECTION INTRAMUSCULAR; INTRAVENOUS at 19:16

## 2020-01-16 RX ADMIN — SODIUM CHLORIDE, POTASSIUM CHLORIDE, SODIUM LACTATE AND CALCIUM CHLORIDE 500 ML: 600; 310; 30; 20 INJECTION, SOLUTION INTRAVENOUS at 22:38

## 2020-01-16 NOTE — ED NOTES
Patient ambulated 10-20 feet into hallway with minimal assist of 2. She C/O of midline neck pain upon palpation and was placed into [place]-J. Patient tearful and still C/O head, neck, and back pain. CMS intact. See MAR for med management. Toradol used over fentanyl due to sedating effects. Patient alert and oriented.

## 2020-01-16 NOTE — ED NOTES
1043- Level 2 Trama called to ED rm 10  1051- Upgraded to Level 1 Trama  1057- [facility] launched ETA 11 minutes  1113- [facility] arrived at [facility]  1142 Lifelink stand down per Dr. Jose

## 2020-01-16 NOTE — ED TRIAGE NOTES
Patient vest, shirt bra and pants cut off and no belongings in pockets.   Cut off clothes thrown away.   Jacket, purse, boots and socks given to patient's sister.  No cellphone noted in jacket or purse.

## 2020-01-16 NOTE — PLAN OF CARE
Prior to Admission Medication Reconciliation:     Medications added:   [x] None  [] As listed below:        Medications deleted:   [] None  [x] As listed below:    Augmentin, cefprozil, robaxin, medrol: therapy complete    Changes made to existing medications:   [x] None  [] As listed below:      Last times/dates taken verified with patient:  [x] Yes- completed myself  [] Nurse completed no changes made  [] Unable to review with patient:  [] Nurse completed/changes made:     Allergy modifications:    []Did not review  []Patient verified NKA  [x]Patient verified current existing allergies: no changes made  []New allergies: listed below    Medication reconciliation sources:   [x]Patient  []Patient family member/emergency contact: **  [][facility] Report Review  []Epic Chart Review  []Care Everywhere review  []Pharmacy med list: **  []Pharmacy phone call  [x]Outside meds dispense report: All fills within compliancy range. Thrcinthiay white   []Nursing home MAR:  []Other: **    Is patient on coumadin?  [x]No    Time spent on medication reconciliation:   [x]5-20 mins  []20-40 mins  []> 40 mins    Discrepancies: [x] No  []Yes: listed below    Requests for consultation by provider or pharmacist:   [x] Patient understands why all of their meds were prescribed and how to take them. No questions.   [x] Fill dates coincide with compliancy for all maintenance meds.   [] Fill dates do not coincide with compliancy with maintenance meds. See notes in PTA medlist about how patient is taking.   [] Patient has questions about the following:    Comments: Will check [facility] when I get here tomorrow. All meds verified with patient and medication dispense history.       Radha Jacobsen on 1/16/2020 at 3:55 PM     Issues completing PTA medication reconciliation:  [] On hold for a long time  [] Waited for a call back  [] Fax didn't come through  [] Fax took a long time  [] Other:

## 2020-01-16 NOTE — PROGRESS NOTES
Patient arrived in ED with decreased LOC.  RSI'd with a 7.0 ETT secured @24cm at the lip.  Breath sounds equal bilaterally.  EtCO2 28-30.  98% spoa2. ABG drawn.  Patient placed on ventilator post CT with settings of AC 12 500 30% peep 5. Findings were negative.  Patient weaned off sedation and extubated without incident.  Placed on 4lpm nc.  No stridor heard.  99% spao2.  Patient awake and talking with family.
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
## 2020-01-16 NOTE — PROGRESS NOTES
Supervising the extubation for Dr. Layne.  Patient was weaned off the propofol.  Had good inspiratory effort.  Patient fighting the ET tube and trying to self extubate.  Respiratory deflated the tube and putted the tube.  Lungs clear but diminished B/L, heart RRR no r/g/m, oral pharynx clear, no missing teeth, no obvious injury to the oral pharynx.  Saturations were monitored and were 98% on 4L NC.  Patient is to be admitted for observation and tertiary exam per Dr. Layne

## 2020-01-17 VITALS
WEIGHT: 165.79 LBS | RESPIRATION RATE: 18 BRPM | OXYGEN SATURATION: 96 % | TEMPERATURE: 99.3 F | SYSTOLIC BLOOD PRESSURE: 104 MMHG | BODY MASS INDEX: 26.76 KG/M2 | HEART RATE: 70 BPM | DIASTOLIC BLOOD PRESSURE: 65 MMHG

## 2020-01-17 LAB
ANION GAP SERPL CALCULATED.3IONS-SCNC: 7 MMOL/L (ref 3–14)
BUN SERPL-MCNC: 8 MG/DL (ref 7–30)
CALCIUM SERPL-MCNC: 7.9 MG/DL (ref 8.5–10.1)
CHLORIDE SERPL-SCNC: 115 MMOL/L (ref 94–109)
CO2 SERPL-SCNC: 23 MMOL/L (ref 20–32)
CREAT SERPL-MCNC: 0.9 MG/DL (ref 0.52–1.04)
ERYTHROCYTE [DISTWIDTH] IN BLOOD BY AUTOMATED COUNT: 12.7 % (ref 10–15)
GFR SERPL CREATININE-BSD FRML MDRD: 70 ML/MIN/{1.73_M2}
GLUCOSE SERPL-MCNC: 96 MG/DL (ref 70–99)
HCT VFR BLD AUTO: 34 % (ref 35–47)
HGB BLD-MCNC: 11.5 G/DL (ref 11.7–15.7)
HGB BLD-MCNC: 11.7 G/DL (ref 11.7–15.7)
HGB BLD-MCNC: 11.8 G/DL (ref 11.7–15.7)
HGB BLD-MCNC: 12.3 G/DL (ref 11.7–15.7)
MCH RBC QN AUTO: 30.3 PG (ref 26.5–33)
MCHC RBC AUTO-ENTMCNC: 34.4 G/DL (ref 31.5–36.5)
MCV RBC AUTO: 88 FL (ref 78–100)
PLATELET # BLD AUTO: 130 10E9/L (ref 150–450)
POTASSIUM SERPL-SCNC: 3.8 MMOL/L (ref 3.4–5.3)
RBC # BLD AUTO: 3.86 10E12/L (ref 3.8–5.2)
SODIUM SERPL-SCNC: 145 MMOL/L (ref 133–144)
WBC # BLD AUTO: 4.9 10E9/L (ref 4–11)

## 2020-01-17 PROCEDURE — 25800030 ZZH RX IP 258 OP 636: Performed by: SURGERY

## 2020-01-17 PROCEDURE — 36415 COLL VENOUS BLD VENIPUNCTURE: CPT | Performed by: SURGERY

## 2020-01-17 PROCEDURE — 80048 BASIC METABOLIC PNL TOTAL CA: CPT | Performed by: SURGERY

## 2020-01-17 PROCEDURE — G0378 HOSPITAL OBSERVATION PER HR: HCPCS

## 2020-01-17 PROCEDURE — 85018 HEMOGLOBIN: CPT | Performed by: SURGERY

## 2020-01-17 PROCEDURE — 99217 ZZC OBSERVATION CARE DISCHARGE: CPT | Performed by: SURGERY

## 2020-01-17 PROCEDURE — 25000132 ZZH RX MED GY IP 250 OP 250 PS 637: Performed by: SURGERY

## 2020-01-17 PROCEDURE — 85027 COMPLETE CBC AUTOMATED: CPT | Performed by: SURGERY

## 2020-01-17 RX ORDER — DIAZEPAM 5 MG
TABLET ORAL
Qty: 30 TABLET | Refills: 0 | Status: SHIPPED | OUTPATIENT
Start: 2020-01-17 | End: 2020-02-06

## 2020-01-17 RX ORDER — METHOCARBAMOL 500 MG/1
500 TABLET, FILM COATED ORAL 4 TIMES DAILY
Qty: 40 TABLET | Refills: 0 | Status: SHIPPED | OUTPATIENT
Start: 2020-01-17 | End: 2020-01-17

## 2020-01-17 RX ADMIN — SUCRALFATE 1 G: 1 SUSPENSION ORAL at 13:03

## 2020-01-17 RX ADMIN — PREGABALIN 150 MG: 100 CAPSULE ORAL at 13:03

## 2020-01-17 RX ADMIN — PREGABALIN 150 MG: 100 CAPSULE ORAL at 08:58

## 2020-01-17 RX ADMIN — SUCRALFATE 1 G: 1 SUSPENSION ORAL at 08:57

## 2020-01-17 RX ADMIN — HYDROCODONE BITARTRATE AND ACETAMINOPHEN 1 TABLET: 7.5; 325 TABLET ORAL at 10:38

## 2020-01-17 RX ADMIN — FLUOXETINE 60 MG: 20 CAPSULE ORAL at 08:58

## 2020-01-17 RX ADMIN — PANTOPRAZOLE SODIUM 40 MG: 20 TABLET, DELAYED RELEASE ORAL at 06:29

## 2020-01-17 RX ADMIN — HYDROCODONE BITARTRATE AND ACETAMINOPHEN 1 TABLET: 7.5; 325 TABLET ORAL at 14:38

## 2020-01-17 RX ADMIN — METHOCARBAMOL 500 MG: 500 TABLET, FILM COATED ORAL at 13:03

## 2020-01-17 RX ADMIN — SODIUM CHLORIDE, POTASSIUM CHLORIDE, SODIUM LACTATE AND CALCIUM CHLORIDE: 600; 310; 30; 20 INJECTION, SOLUTION INTRAVENOUS at 00:18

## 2020-01-17 RX ADMIN — HYDROCODONE BITARTRATE AND ACETAMINOPHEN 1 TABLET: 7.5; 325 TABLET ORAL at 06:29

## 2020-01-17 RX ADMIN — METHOCARBAMOL 500 MG: 500 TABLET, FILM COATED ORAL at 08:58

## 2020-01-17 RX ADMIN — TOPIRAMATE 100 MG: 25 TABLET, FILM COATED ORAL at 08:57

## 2020-01-17 NOTE — PLAN OF CARE
"[facility] Observation Note:  Patient is registered to observation and is in 3104/3104-1 at approximately 1520 PM via cart accompanied by sister from emergency room . Report received from Sherly in SBAR format. Patient ambulated to bed via self.. Patient is alert and oriented X 3, reports pain; rates at 8 on 0-10 scale.  Patient oriented to room, unit, hourly rounding, and plan of care. Explained the admission packet including \"What is Observation Status\" and patient handbook with patient bill of rights brochure. Will continue to monitor and document as needed.  Nursing Observation criteria listed below was met:  Health care directives status obtained and documented: Yes  MRSA swab completed for patient 65 years and older: N/A  Patient identifies a surrogate decision maker: No If yes, who:NA Contact Information:EMMANUEL  Skin issues/needs documented:NA  Isolation Patient: no Education given and documented, correct sign in place and documentation row added to PCS:  NA    Fall Prevention: Observation fall risk completed:  Yes Fall risk: Low Education given and documented, sticker in place: Yes    General Care Plan initiated with observation goal(s): Yes  Education (including assessment) Documented: Yes  New medication information given to patient and documented: na  Patient elects to use own medications from home during hospitalization:  No If yes, a MD order was obtained to use Medications from Home:  NA and home medications were sent to Pharmacy for verification for use during hospitalization: NA  Patient has discharge needs (If yes, please explain): No        "

## 2020-01-17 NOTE — ED NOTES
Patient required frequent adjustments to Propofol gtt. Due to increased agitation. MD verbalized desire to wean sedation drip and ventilator in anticipation of extubation. Patient became increasingly agitated with dry heaves. Doses of antinausea medications were requested, see MAR. Patient shows no improvement. Dr. Olivares and RT at bedside at 1200 for extubation. Patient currently doing well extubated. She is awake and alert but drowsy. Patient is oriented to self but otherwise disoriented.

## 2020-01-17 NOTE — ED NOTES
Patient extubated. She tolerated well. Patient breathing independently and able to verbalize her name and follow simple commands. Will continue to monitor. VSS.

## 2020-01-17 NOTE — PLAN OF CARE
Patient discharged at 5:14 PM via wheel chair accompanied by daughter and staff. Prescriptions sent to patients preferred pharmacy. All belongings sent with patient.     Discharge instructions reviewed with pt. Listed belongings gathered and returned to patient. yes    Patient discharged to home with sister.   Report called to n/a    Core Measures and Vaccines  Core Measures applicable during stay: No. If yes, state diagnosis: n/a  Pneumonia and Influenza given prior to discharge, if indicated: N/A    Surgical Patient   Surgical Procedures during stay: n/a  Did patient receive discharge instruction on wound care and recognition of infection symptoms? No    MISC  Follow up appointment made:  Yes  Home and hospital aquired medications returned to patient: N/A  Patient reports pain was well managed at discharge: Yes

## 2020-01-17 NOTE — PROVIDER NOTIFICATION
Updated Dr. Layne of new hemoglobin of 11.5, previous two hours prior 11.8. SBP have been mid 80s- low 90s with DBP 40-50s. No new orders at this time, continue to monitor and notify of any changes.

## 2020-01-17 NOTE — PROGRESS NOTES
[facility]  Trauma Service Progress Note    Date of Service (when I saw the patient): 01/17/2020     Assessment & Plan     Trauma Mechanism:  Known Injuries:  1. Concussion                   Other diagnoses:  1. Chest pain  2. Neck pain  3. Right hand tingling      Plan:  1. Continue home medications  2. Resume PRN narcotics  3. Hemoglobin stable this morning with no signs/symptoms of bleeding  4. Saline lock IVF  5. Continue C collar for para-spinal tenderness    General Cares:    PPI/H2 blocker:  Protonix   DVT prophylaxis: SCDs   Bowel Regimen/Date of last stool: docusate       Dispo: home with sister or other support system for next 24-48 hours, anticipate discharge home today    Interval History   Patient states that overnight she had worsening pain. She was not given her narcotics due to hypotension, but has had some this morning and is feeling better. She has ongoing generalized chest, neck, and back pain. She describes a tingling sensation to her right middle, ring and pinky finger on the dorsal aspect.    ROS x 8 negative with exception of those things listed in interval hx    Physical Exam   Temp: 100.4  F (38  C) Temp src: Tympanic BP: 107/67 Pulse: 70 Heart Rate: 81 Resp: 18 SpO2: 94 % O2 Device: None (Room air)    Vitals:    01/17/20 0534   Weight: 75.2 kg (165 lb 12.6 oz)     Vital Signs with Ranges  Temp:  [97.1  F (36.2  C)-100.4  F (38  C)] 100.4  F (38  C)  Pulse:  [54-73] 70  Heart Rate:  [53-81] 81  Resp:  [6-18] 18  BP: ()/() 107/67  SpO2:  [94 %-100 %] 94 %  I/O last 3 completed shifts:  In: 2095 [P.O.:1120; I.V.:975]  Out: 1150 [Urine:1150]    Erwin Coma Scale - Total 15/15  Eye Response (E): 4   4= spontaneous, 3= to verbal/voice, 2= to pain, 1= No response   Verbal Response (V): 5   5= Orientated, converses, 4= Confused, converses, 3= Inappropriate words, 2= Incomprehensible sounds, 1=No response   Motor Response (M): 6   6= Obeys commands, 5= Localizes to pain, 4=  Withdrawal to pain, 3=Fexion to pain, 2= Extension to pain, 1= No response   Constitutional: Awake, alert, cooperative, no apparent distress  Eyes: Lids and lashes normal, pupils equal, round and reactive to light, extra ocular muscles intact, sclera clear, conjunctiva normal.  ENT: Normocephalic, atraumatic, paraspinal tenderness   Respiratory: No increased work of breathing, good air exchange, clear to auscultation bilaterally, no crackles or wheezing.  Cardiovascular:  regular rate and rhythm, normal S1 and S2, no S3 or S4, and no murmur noted.  GI: Normal bowel sounds, soft, non-distended, non-tender, no guarding  Skin:  Normal skin color, no redness, warmth, or swelling, no ecchymosis, no abrasions, and no jaundice.  Musculoskeletal: There is no redness, warmth, or swelling of the joints.  Pedal pulse palpated  Neurologic: Awake, alert, oriented.  Cranial nerves II-XII are grossly intact.  Strength and sensory is intact.  No focal deficits  Neuropsychiatric: Calm, normal eye contact, alert, affect appropriate to situation, oriented, thought process normal.    Quinton Layne MD
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
## 2020-01-17 NOTE — PROVIDER NOTIFICATION
Updated Dr. Layne of new hemoglobin of 11.8, previous from yesterday 14.2. Current BP  80/44. Ordered to continue to monitor blood pressures and recheck hemoglobin at 0200. Also try to hold off on narcotic pain medications and ibuprofen.

## 2020-01-17 NOTE — ED NOTES
Patient returned from CT with RN x2 and RT escort. Patient appears comfortable currently. Primary and secondary surveys re-done. Patient remains intubated and sedated. New findings include bruising across chest and bilateral areas over hips. This was discussed with MD. Awaiting final CT read.

## 2020-01-17 NOTE — PROVIDER NOTIFICATION
Dr Layne notified of post bolus BPs left 80/44  Right 82/52; both manually checked. Draw Hgb now and notify with results.

## 2020-01-17 NOTE — PLAN OF CARE
Pt alert and oriented. [place] J collar remains in place. BP soft 80-90s/40-50. HR 70s and regular. Pulses 2+ bilaterally. RR 16 and sating greater than 92% on room air. Lung sounds are clear. Bowel sounds active, denies any tenderness with palpation of abdomen. Does c/o some dizziness when sitting up and standing. Transferred to [facility] with assist of two for safety. Voiding well. IVF continued. Hemoglobin 11.8 and 11.5, provider updated. Pain managed with rest, heat and cold. Pt did sleep off and on throughout night. Call light within reach and pt calls appropriately.     Face to face report given with opportunity to observe patient.    Report given to NAHID Villanueva RN   1/17/2020  7:17 AM

## 2020-01-17 NOTE — PLAN OF CARE
Face to face report given with opportunity to observe patient.    Report given to NAHID Marcelino RN   1/17/2020  3:19 PM

## 2020-01-17 NOTE — PHARMACY - DISCHARGE MEDICATION RECONCILIATION
I spoke with Dr. Layne concerning methocarbamol (Robaxin), diazepam (Valium), and baclofen use in this patient.  Upon discussion, Dr. Layne decided to stop methocarbamol and just have the patient use the diazepam and baclofen she has at home.  Diazepam was last filled on 11/7/19 for a quantity of 60 tablets.  Keiko SANTOS is checking with the patient to see if she still has some tablets left. If not, Dr. Layne said he would have to prescribe some.     Rossana Barboza, PharmD on 1/17/2020

## 2020-01-17 NOTE — PROVIDER NOTIFICATION
Dr Layne called with concerns regarding bp 76/40 right arm   (79/50 manual) and 88/57 left arm (82/44 manual), HR 70's. Neuros remain intact , no c/o dizziness, Orders received for LR bolus 500ml over one hour folloewd by LR at 100ml/hr. Will hold scheduled baclofen and PRN NORCO at this time. Recheck post bolus bp call Dr Layne accordingly.

## 2020-01-17 NOTE — PLAN OF CARE
A&O. VSS/AF with initial assessment. Endorses neck pain posterior radiating down into bilateral shoulders rates 10/10 Denies nausea. [place] J collar in place and will remain in place until tomorrow per conversation with Dr Layne. Medicated with NORCO x 1 for previously stated pain with decrease in pain. Ate small meal, then reports slight nausea with no emesis. Medicated with zofran 4 mg x 1 with stated relief. Upon checking BPs prior to baclofen and NORCO administration BPs noted to be right arm 76/40 (82/44 manually); left arm 88/57  (79/50 manually). Dr Layne notified, see note. Medications held.  Recheck after bolus left 80/44, right 82/52 both manually checked. Dr Layne notified; see note.   Face to face report given with opportunity to observe patient.    Report given to Ophelia Hernández RN   1/17/2020  12:37 AM

## 2020-01-17 NOTE — PROGRESS NOTES
Met with Sadaf to complete assessment. She declined assessment . Stated she was probably discharging home this afternoon and would be staying with her sister Ciera until her follow up appointment. Stated  Ciera  would be transporting her on discharge. She indicated she had no other needs . CTS will remain available if anything should arise.

## 2020-01-17 NOTE — PLAN OF CARE
1520: day shift rn d/c'd iv lock.  1600:  Pt dressed and ready to [id number]:discharge instructions given to pt. All questions answered. Pt discharged from unit with  Daughter. Pt aware to leave collar on until she sees. Dr. rod on jan 30th.

## 2020-01-17 NOTE — PLAN OF CARE
Chari Chandra, RN   Registered Nurse      Plan of Care   Signed   Date of Service:  1/17/2020  5:14 PM   Creation Time:  1/17/2020  5:14 PM                 []Hide copied text    [][place] for details  Patient discharged at 5:14 PM via wheel chair accompanied by daughter and staff. Prescriptions sent to patients preferred pharmacy. All belongings sent with patient.      Discharge instructions reviewed with pt. Listed belongings gathered and returned to patient. yes     Patient discharged to home with sister.   Report called to n/a     Core Measures and Vaccines  Core Measures applicable during stay: No. If yes, state diagnosis: n/a  Pneumonia and Influenza given prior to discharge, if indicated: N/A     Surgical Patient   Surgical Procedures during stay: n/a  Did patient receive discharge instruction on wound care and recognition of infection symptoms? No     MISC  Follow up appointment made:  Yes  Home and hospital aquired medications returned to patient: N/A  Patient reports pain was well managed at discharge: Yes           1530:Pt was dressed and ready for discharge at 1530: iv lock was taken out from day shift without difficulty. Pt is waiting for her ride.    1700: pharmacy talked with viola regarding robaxin prescription as pt is already on baclofen and valium. Dr. Layne disconitinued the roboxin and stated that pt can use the valium. He sent prescription to [facility] and pt aware. Discharged instruction given to pt. All questions were answered about medications and appointments.

## 2020-01-17 NOTE — PLAN OF CARE
Pt alert and oriented, calm and pleasant. VSS, afebrile and rating pain 7-10/10 to general neck/head and shoulder area with radiating pain and tingling to R arm and receiving Norco p.o. and applying heat with adequate relief. HRR, lungs with fine crackles Bi- basilary- encouraging TCDB, up in chair for meals. Bowel sounds active, tolerating a regular diet with a good appetite, no c/o N/V. C- Collar on and IV SL. Free from falls/ injury, call light within reach and family at bedside.

## 2020-01-20 ENCOUNTER — OFFICE VISIT (OUTPATIENT)
Dept: FAMILY MEDICINE | Facility: OTHER | Age: 60
End: 2020-01-20
Attending: FAMILY MEDICINE
Payer: COMMERCIAL

## 2020-01-20 ENCOUNTER — ANCILLARY PROCEDURE (OUTPATIENT)
Dept: GENERAL RADIOLOGY | Facility: OTHER | Age: 60
End: 2020-01-20
Attending: FAMILY MEDICINE
Payer: COMMERCIAL

## 2020-01-20 ENCOUNTER — TELEPHONE (OUTPATIENT)
Dept: FAMILY MEDICINE | Facility: OTHER | Age: 60
End: 2020-01-20

## 2020-01-20 VITALS
OXYGEN SATURATION: 94 % | HEART RATE: 64 BPM | SYSTOLIC BLOOD PRESSURE: 101 MMHG | BODY MASS INDEX: 26.52 KG/M2 | TEMPERATURE: 97.3 F | RESPIRATION RATE: 17 BRPM | WEIGHT: 165 LBS | HEIGHT: 66 IN | DIASTOLIC BLOOD PRESSURE: 68 MMHG
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
## 2020-01-20 DIAGNOSIS — R05.9 COUGH: ICD-10-CM

## 2020-01-20 DIAGNOSIS — M54.2 CHRONIC NECK PAIN: ICD-10-CM

## 2020-01-20 DIAGNOSIS — R20.2 PARESTHESIAS: ICD-10-CM

## 2020-01-20 DIAGNOSIS — G89.29 CHRONIC NECK PAIN: ICD-10-CM

## 2020-01-20 DIAGNOSIS — J40 BRONCHITIS: ICD-10-CM

## 2020-01-20 DIAGNOSIS — R05.9 COUGH: Primary | ICD-10-CM

## 2020-01-20 LAB — TSH SERPL DL<=0.005 MIU/L-ACNC: 2.28 MU/L (ref 0.4–4)

## 2020-01-20 PROCEDURE — 82746 ASSAY OF FOLIC ACID SERUM: CPT | Mod: ZL | Performed by: FAMILY MEDICINE

## 2020-01-20 PROCEDURE — G0463 HOSPITAL OUTPT CLINIC VISIT: HCPCS

## 2020-01-20 PROCEDURE — G0463 HOSPITAL OUTPT CLINIC VISIT: HCPCS | Mod: 25

## 2020-01-20 PROCEDURE — 82607 VITAMIN B-12: CPT | Mod: ZL | Performed by: FAMILY MEDICINE

## 2020-01-20 PROCEDURE — 36415 COLL VENOUS BLD VENIPUNCTURE: CPT | Mod: ZL | Performed by: FAMILY MEDICINE

## 2020-01-20 PROCEDURE — 71046 X-RAY EXAM CHEST 2 VIEWS: CPT | Mod: TC

## 2020-01-20 PROCEDURE — 99214 OFFICE O/P EST MOD 30 MIN: CPT | Performed by: FAMILY MEDICINE

## 2020-01-20 PROCEDURE — 84443 ASSAY THYROID STIM HORMONE: CPT | Mod: ZL | Performed by: FAMILY MEDICINE

## 2020-01-20 RX ORDER — CEFPROZIL 500 MG/1
500 TABLET, FILM COATED ORAL 2 TIMES DAILY
Qty: 20 TABLET | Refills: 0 | Status: SHIPPED | OUTPATIENT
Start: 2020-01-20 | End: 2020-02-06

## 2020-01-20 RX ORDER — HYDROCODONE BITARTRATE AND ACETAMINOPHEN 7.5; 325 MG/1; MG/1
TABLET ORAL
Qty: 60 TABLET | Refills: 0 | Status: SHIPPED | OUTPATIENT
Start: 2020-01-20 | End: 2020-03-26

## 2020-01-20 ASSESSMENT — MIFFLIN-ST. JEOR: SCORE: 1345.19

## 2020-01-20 ASSESSMENT — PAIN SCALES - GENERAL: PAINLEVEL: EXTREME PAIN (8)
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
## 2020-01-20 NOTE — TELEPHONE ENCOUNTER
Please schedule patient for date/time: can see today at 2:00 for cough only.  Have patient go to ER/Urgent Care Center. Urgent Care hours are 9:30 am to 8 pm, open 7 days a week. No.    Provider will call patient.No.    Other:

## 2020-01-20 NOTE — TELEPHONE ENCOUNTER
Patient calling and is requesting an overbook for today or tomorrow,  for a cough she has had for two days. Patient is currently in a neck brace and was advised to be seen by her PCP.     Patient's phone number is [phone] and it is ok to leave a message.

## 2020-01-20 NOTE — NURSING NOTE
"Chief Complaint   Patient presents with     Cough       Initial /68 (BP Location: Left arm, Patient Position: Sitting, Cuff Size: Adult Regular)   Pulse 64   Temp 97.3  F (36.3  C) (Tympanic)   Resp 17   Ht 1.676 m (5' 6\")   Wt 74.8 kg (165 lb)   SpO2 94%   BMI 26.63 kg/m   Estimated body mass index is 26.63 kg/m  as calculated from the following:    Height as of this encounter: 1.676 m (5' 6\").    Weight as of this encounter: 74.8 kg (165 lb).  Medication Reconciliation: complete  óMnica Goyal LPN  "
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
## 2020-01-20 NOTE — LETTER
January 21, 2020      Sadaf MARJ Krzysztof  [address] [facility] [address]        Dear ,    We are writing to inform you of your test results.    Your test results fall within the expected range(s) or remain unchanged from previous results.  Please continue with current treatment plan.    Resulted Orders   TSH with free T4 reflex   Result Value Ref Range    TSH 2.28 0.40 - 4.00 mU/L       If you have any questions or concerns, please call the clinic at the number listed above.       Sincerely,        Henna Cruz MD

## 2020-01-21 LAB
FOLATE SERPL-MCNC: 42.5 NG/ML
VIT B12 SERPL-MCNC: 416 PG/ML (ref 193–986)

## 2020-01-24 ENCOUNTER — TELEPHONE (OUTPATIENT)
Dept: FAMILY MEDICINE | Facility: OTHER | Age: 60
End: 2020-01-24

## 2020-01-24 NOTE — TELEPHONE ENCOUNTER
To: Nurse of Dr. Henna Cruz for Monday 1/27/2020  Patient needs to be seen around Feb 11th per Dr. Henna Cardenas... please call her back to accommodate this request.  Her phone is [phone]

## 2020-01-28 ENCOUNTER — TELEPHONE (OUTPATIENT)
Dept: FAMILY MEDICINE | Facility: OTHER | Age: 60
End: 2020-01-28

## 2020-01-28 NOTE — TELEPHONE ENCOUNTER
PA request received from [facility] for Hydrocodone Acetaminophen 7.5-325MG tablets. Submitted as urgent request through [facility]. Waiting for response from [facility].

## 2020-01-28 NOTE — TELEPHONE ENCOUNTER
APPROVAL received from [employer] for Hydrocodone Acetaminophen. Approval dates 12/29/19 through 1/27/21. Pharmacy advised. Documentation scanned to Epic.

## 2020-01-28 NOTE — PATIENT INSTRUCTIONS
Thank you for allowing Dr. Layne and our surgical team to participate in your care. Please call our health unit coordinator at [phone] with scheduling questions or the nurse at [phone] with any other questions or concerns.

## 2020-01-29 ENCOUNTER — HOSPITAL ENCOUNTER (OUTPATIENT)
Dept: MRI IMAGING | Facility: HOSPITAL | Age: 60
Discharge: HOME | End: 2020-01-29
Attending: SURGERY | Admitting: SURGERY
Payer: COMMERCIAL

## 2020-01-29 ENCOUNTER — OFFICE VISIT (OUTPATIENT)
Dept: SURGERY | Facility: OTHER | Age: 60
End: 2020-01-29
Attending: SURGERY
Payer: COMMERCIAL

## 2020-01-29 ENCOUNTER — ANCILLARY PROCEDURE (OUTPATIENT)
Dept: GENERAL RADIOLOGY | Facility: OTHER | Age: 60
End: 2020-01-29
Attending: SURGERY
Payer: COMMERCIAL

## 2020-01-29 VITALS
HEART RATE: 79 BPM | WEIGHT: 163 LBS | TEMPERATURE: 98.7 F | DIASTOLIC BLOOD PRESSURE: 62 MMHG | HEIGHT: 66 IN | SYSTOLIC BLOOD PRESSURE: 102 MMHG | OXYGEN SATURATION: 97 % | BODY MASS INDEX: 26.2 KG/M2

## 2020-01-29 DIAGNOSIS — M54.2 CERVICAL PAIN: ICD-10-CM

## 2020-01-29 DIAGNOSIS — M25.562 ACUTE PAIN OF LEFT KNEE: ICD-10-CM

## 2020-01-29 DIAGNOSIS — M54.2 CERVICAL PAIN: Primary | ICD-10-CM

## 2020-01-29 PROCEDURE — 99213 OFFICE O/P EST LOW 20 MIN: CPT | Performed by: SURGERY

## 2020-01-29 PROCEDURE — 72141 MRI NECK SPINE W/O DYE: CPT | Mod: TC

## 2020-01-29 PROCEDURE — 73560 X-RAY EXAM OF KNEE 1 OR 2: CPT | Mod: TC,LT

## 2020-01-29 PROCEDURE — G0463 HOSPITAL OUTPT CLINIC VISIT: HCPCS

## 2020-01-29 ASSESSMENT — MIFFLIN-ST. JEOR: SCORE: 1336.11

## 2020-01-29 ASSESSMENT — PAIN SCALES - GENERAL: PAINLEVEL: EXTREME PAIN (9)
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
## 2020-02-03 ENCOUNTER — TELEPHONE (OUTPATIENT)
Dept: SURGERY | Facility: OTHER | Age: 60
End: 2020-02-03

## 2020-02-03 NOTE — TELEPHONE ENCOUNTER
Melanie is calling for her Cervical spine MRI results she had on 1/29/2020. Patient would like a phone call back on Tuesday when doctor is in the clinic.

## 2020-02-04 ENCOUNTER — TELEPHONE (OUTPATIENT)
Dept: SURGERY | Facility: CLINIC | Age: 60
End: 2020-02-04

## 2020-02-04 DIAGNOSIS — S22.030D COMPRESSION FRACTURE OF T3 VERTEBRA WITH ROUTINE HEALING, SUBSEQUENT ENCOUNTER: Primary | ICD-10-CM

## 2020-02-04 NOTE — TELEPHONE ENCOUNTER
I called to discuss the recent MRI results. At this time as she has acute compression fractures I will place a referral to radiology for evaluation and discussion about a kyphoplasty. All questions and concerns were addressed with adequate time spent answering all concerns.
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
## 2020-02-05 ENCOUNTER — HOSPITAL ENCOUNTER (OUTPATIENT)
Dept: GENERAL RADIOLOGY | Facility: HOSPITAL | Age: 60
End: 2020-02-05
Attending: SURGERY
Payer: COMMERCIAL

## 2020-02-05 ENCOUNTER — NURSE TRIAGE (OUTPATIENT)
Dept: FAMILY MEDICINE | Facility: OTHER | Age: 60
End: 2020-02-05

## 2020-02-05 DIAGNOSIS — S22.030D COMPRESSION FRACTURE OF T3 VERTEBRA WITH ROUTINE HEALING, SUBSEQUENT ENCOUNTER: ICD-10-CM

## 2020-02-05 PROCEDURE — G0463 HOSPITAL OUTPT CLINIC VISIT: HCPCS | Mod: TC

## 2020-02-05 NOTE — TELEPHONE ENCOUNTER
Cough remains and she is now coughing up up green stuff.Completed ABX on the 30th. She was told she has a cracked sternum today. No SOB, or difficulty breathing.Scheduled and advised if increase in s/s or SOB, difficulty breathing she needs to go to ED.She verbalized understanding.      Reason for Disposition    Cough has been present for > 3 weeks    Additional Information    Negative: Severe difficulty breathing (e.g., struggling for each breath, speaks in single words)    Negative: Bluish (or gray) lips or face now    Negative: [1] Difficulty breathing AND [2] exposure to flames, smoke, or fumes    Negative: [1] Stridor AND [2] difficulty breathing    Negative: Sounds like a life-threatening emergency to the triager    Negative: [1] Previous asthma attacks AND [2] this feels like asthma attack    Negative: Dry (non-productive) cough (i.e., no sputum or minimal clear sputum)    Negative: Difficulty breathing    Negative: Chest pain  (Exception: MILD central chest pain, present only when coughing)    Negative: [1] Coughed up blood AND [2] > 1 tablespoon (15 ml) (Exception: blood-tinged sputum)    Negative: Fever > 103 F (39.4 C)    Negative: [1] Fever > 101 F (38.3 C) AND [2] age > 60    Negative: [1] Fever > 100.0 F (37.8 C) AND [2] bedridden (e.g., nursing home patient, CVA, chronic illness, recovering from surgery)    Negative: [1] Fever > 100.0 F (37.8 C) AND [2] diabetes mellitus or weak immune system (e.g., HIV positive, cancer chemo, splenectomy, chronic steroids)    Negative: Wheezing is present    Negative: Patient sounds very sick or weak to the triager    Negative: SEVERE coughing spells (e.g., whooping sound after coughing, vomiting after coughing)    Negative: [1] Continuous (nonstop) coughing interferes with work or school AND [2] no improvement using cough treatment per Care Advice    Negative: Coughing up cholo-colored (reddish-brown) sputum    Negative: Fever present > 3 days (72 hours)     Negative: [1] Fever returns after gone for over 24 hours AND [2] symptoms worse or not improved    Negative: [1] Using nasal washes and pain medicine > 24 hours AND [2] sinus pain (around cheekbone or eye) persists    Negative: Earache    Negative: [1] Known COPD or other severe lung disease (i.e., bronchiectasis, cystic fibrosis, lung surgery) AND [2] worsening symptoms (i.e., increased sputum purulence or amount, increased breathing difficulty    Negative: [1] Nasal discharge AND [2] present > 10 days    Negative: [1] Coughed up blood-tinged sputum AND [2] more than once    Negative: Exposure to TB (Tuberculosis)    Protocols used: COUGH - ACUTE [id number]

## 2020-02-06 ENCOUNTER — OFFICE VISIT (OUTPATIENT)
Dept: FAMILY MEDICINE | Facility: OTHER | Age: 60
End: 2020-02-06
Attending: FAMILY MEDICINE
Payer: COMMERCIAL

## 2020-02-06 ENCOUNTER — ANCILLARY PROCEDURE (OUTPATIENT)
Dept: GENERAL RADIOLOGY | Facility: OTHER | Age: 60
End: 2020-02-06
Attending: FAMILY MEDICINE
Payer: COMMERCIAL

## 2020-02-06 ENCOUNTER — HOSPITAL ENCOUNTER (OUTPATIENT)
Facility: HOSPITAL | Age: 60
End: 2020-02-06
Payer: COMMERCIAL

## 2020-02-06 VITALS
BODY MASS INDEX: 26.2 KG/M2 | SYSTOLIC BLOOD PRESSURE: 124 MMHG | OXYGEN SATURATION: 98 % | TEMPERATURE: 98.2 F | HEIGHT: 66 IN | HEART RATE: 75 BPM | WEIGHT: 163 LBS | RESPIRATION RATE: 19 BRPM | DIASTOLIC BLOOD PRESSURE: 60 MMHG

## 2020-02-06 DIAGNOSIS — J01.10 ACUTE NON-RECURRENT FRONTAL SINUSITIS: ICD-10-CM

## 2020-02-06 DIAGNOSIS — F51.01 PRIMARY INSOMNIA: ICD-10-CM

## 2020-02-06 DIAGNOSIS — M79.18 MYOFASCIAL PAIN SYNDROME, CERVICAL: Primary | ICD-10-CM

## 2020-02-06 DIAGNOSIS — F43.21 SITUATIONAL DEPRESSION: ICD-10-CM

## 2020-02-06 DIAGNOSIS — J18.1 CONSOLIDATION LUNG (H): ICD-10-CM

## 2020-02-06 DIAGNOSIS — M62.838 MUSCLE SPASM: ICD-10-CM

## 2020-02-06 PROCEDURE — G0463 HOSPITAL OUTPT CLINIC VISIT: HCPCS | Mod: 25

## 2020-02-06 PROCEDURE — 99214 OFFICE O/P EST MOD 30 MIN: CPT | Performed by: FAMILY MEDICINE

## 2020-02-06 PROCEDURE — 71046 X-RAY EXAM CHEST 2 VIEWS: CPT | Mod: TC

## 2020-02-06 RX ORDER — DIAZEPAM 5 MG
TABLET ORAL
Qty: 30 TABLET | Refills: 0 | Status: SHIPPED | OUTPATIENT
Start: 2020-02-06 | End: 2020-03-26

## 2020-02-06 RX ORDER — TRAZODONE HYDROCHLORIDE 50 MG/1
50 TABLET, FILM COATED ORAL AT BEDTIME
Qty: 40 TABLET | Refills: 3 | Status: SHIPPED | OUTPATIENT
Start: 2020-02-06 | End: 2020-06-02

## 2020-02-06 ASSESSMENT — MIFFLIN-ST. JEOR: SCORE: 1336.11

## 2020-02-06 ASSESSMENT — PAIN SCALES - GENERAL: PAINLEVEL: EXTREME PAIN (8)

## 2020-02-06 NOTE — NURSING NOTE
"Chief Complaint   Patient presents with     Cough       Initial /60   Pulse 75   Temp 98.2  F (36.8  C) (Tympanic)   Resp 19   Ht 1.676 m (5' 6\")   Wt 73.9 kg (163 lb)   SpO2 98%   BMI 26.31 kg/m   Estimated body mass index is 26.31 kg/m  as calculated from the following:    Height as of this encounter: 1.676 m (5' 6\").    Weight as of this encounter: 73.9 kg (163 lb).  Medication Reconciliation: complete  Liudmila Rivera LPN    "
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
## 2020-02-07 ENCOUNTER — HOSPITAL ENCOUNTER (OUTPATIENT)
Dept: INTERVENTIONAL RADIOLOGY/VASCULAR | Facility: HOSPITAL | Age: 60
Discharge: HOME | End: 2020-02-07
Attending: RADIOLOGY | Admitting: SURGERY
Payer: COMMERCIAL

## 2020-02-07 ENCOUNTER — HOSPITAL ENCOUNTER (OUTPATIENT)
Dept: CT IMAGING | Facility: HOSPITAL | Age: 60
End: 2020-02-07
Attending: SURGERY
Payer: COMMERCIAL

## 2020-02-07 ENCOUNTER — HOSPITAL ENCOUNTER (OUTPATIENT)
Dept: MRI IMAGING | Facility: HOSPITAL | Age: 60
Discharge: HOME | End: 2020-02-07
Attending: SURGERY | Admitting: SURGERY
Payer: COMMERCIAL

## 2020-02-07 DIAGNOSIS — S22.040A: ICD-10-CM

## 2020-02-07 DIAGNOSIS — M54.6 THORACIC BACK PAIN: ICD-10-CM

## 2020-02-07 DIAGNOSIS — S22.030A: ICD-10-CM

## 2020-02-07 DIAGNOSIS — S22.020A: ICD-10-CM

## 2020-02-07 PROCEDURE — 76000 FLUOROSCOPY <1 HR PHYS/QHP: CPT | Mod: TC

## 2020-02-07 PROCEDURE — 72146 MRI CHEST SPINE W/O DYE: CPT | Mod: TC

## 2020-02-07 PROCEDURE — 72128 CT CHEST SPINE W/O DYE: CPT | Mod: TC

## 2020-02-10 ENCOUNTER — DOCUMENTATION ONLY (OUTPATIENT)
Dept: INTERVENTIONAL RADIOLOGY/VASCULAR | Facility: HOSPITAL | Age: 60
End: 2020-02-10

## 2020-02-10 ENCOUNTER — TELEPHONE (OUTPATIENT)
Dept: FAMILY MEDICINE | Facility: OTHER | Age: 60
End: 2020-02-10
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
## 2020-02-11 ENCOUNTER — OFFICE VISIT (OUTPATIENT)
Dept: PSYCHIATRY | Facility: OTHER | Age: 60
End: 2020-02-11
Attending: PSYCHIATRY & NEUROLOGY
Payer: COMMERCIAL

## 2020-02-11 ENCOUNTER — OFFICE VISIT (OUTPATIENT)
Dept: FAMILY MEDICINE | Facility: OTHER | Age: 60
End: 2020-02-11
Attending: FAMILY MEDICINE
Payer: COMMERCIAL

## 2020-02-11 VITALS
OXYGEN SATURATION: 98 % | TEMPERATURE: 98.7 F | DIASTOLIC BLOOD PRESSURE: 78 MMHG | SYSTOLIC BLOOD PRESSURE: 128 MMHG | BODY MASS INDEX: 26.2 KG/M2 | HEIGHT: 66 IN | HEART RATE: 75 BPM | WEIGHT: 163 LBS

## 2020-02-11 VITALS
RESPIRATION RATE: 19 BRPM | HEIGHT: 66 IN | OXYGEN SATURATION: 98 % | TEMPERATURE: 98.7 F | WEIGHT: 163 LBS | DIASTOLIC BLOOD PRESSURE: 78 MMHG | HEART RATE: 75 BPM | SYSTOLIC BLOOD PRESSURE: 128 MMHG | BODY MASS INDEX: 26.2 KG/M2

## 2020-02-11 DIAGNOSIS — S22.040D COMPRESSION FRACTURE OF T4 VERTEBRA WITH ROUTINE HEALING, SUBSEQUENT ENCOUNTER: ICD-10-CM

## 2020-02-11 DIAGNOSIS — G89.29 CHRONIC RADICULAR CERVICAL PAIN: ICD-10-CM

## 2020-02-11 DIAGNOSIS — M54.12 CHRONIC RADICULAR CERVICAL PAIN: ICD-10-CM

## 2020-02-11 DIAGNOSIS — F33.2 SEVERE EPISODE OF RECURRENT MAJOR DEPRESSIVE DISORDER, WITHOUT PSYCHOTIC FEATURES (H): ICD-10-CM

## 2020-02-11 DIAGNOSIS — S22.020D COMPRESSION FRACTURE OF T2 VERTEBRA WITH ROUTINE HEALING, SUBSEQUENT ENCOUNTER: ICD-10-CM

## 2020-02-11 DIAGNOSIS — F33.2 MAJOR DEPRESSIVE DISORDER, RECURRENT SEVERE WITHOUT PSYCHOTIC FEATURES (H): Primary | ICD-10-CM

## 2020-02-11 DIAGNOSIS — Z98.1 S/P CERVICAL SPINAL FUSION: ICD-10-CM

## 2020-02-11 DIAGNOSIS — M79.2 RADICULAR PAIN IN RIGHT ARM: Primary | ICD-10-CM

## 2020-02-11 DIAGNOSIS — S22.030D COMPRESSION FRACTURE OF T3 VERTEBRA WITH ROUTINE HEALING, SUBSEQUENT ENCOUNTER: ICD-10-CM

## 2020-02-11 PROCEDURE — G0463 HOSPITAL OUTPT CLINIC VISIT: HCPCS

## 2020-02-11 PROCEDURE — G0463 HOSPITAL OUTPT CLINIC VISIT: HCPCS | Performed by: FAMILY MEDICINE

## 2020-02-11 PROCEDURE — 99213 OFFICE O/P EST LOW 20 MIN: CPT | Performed by: PSYCHIATRY & NEUROLOGY

## 2020-02-11 PROCEDURE — 99214 OFFICE O/P EST MOD 30 MIN: CPT | Performed by: FAMILY MEDICINE

## 2020-02-11 RX ORDER — PREDNISONE 20 MG/1
TABLET ORAL
Qty: 20 TABLET | Refills: 0 | Status: SHIPPED | OUTPATIENT
Start: 2020-02-11 | End: 2020-03-23

## 2020-02-11 ASSESSMENT — ANXIETY QUESTIONNAIRES
2. NOT BEING ABLE TO STOP OR CONTROL WORRYING: NEARLY EVERY DAY
6. BECOMING EASILY ANNOYED OR IRRITABLE: NEARLY EVERY DAY
GAD7 TOTAL SCORE: 21
7. FEELING AFRAID AS IF SOMETHING AWFUL MIGHT HAPPEN: NEARLY EVERY DAY
3. WORRYING TOO MUCH ABOUT DIFFERENT THINGS: NEARLY EVERY DAY
1. FEELING NERVOUS, ANXIOUS, OR ON EDGE: NEARLY EVERY DAY
5. BEING SO RESTLESS THAT IT IS HARD TO SIT STILL: NEARLY EVERY DAY

## 2020-02-11 ASSESSMENT — PAIN SCALES - GENERAL
PAINLEVEL: EXTREME PAIN (8)
PAINLEVEL: WORST PAIN (10)

## 2020-02-11 ASSESSMENT — PATIENT HEALTH QUESTIONNAIRE - PHQ9
SUM OF ALL RESPONSES TO PHQ QUESTIONS 1-9: 27
5. POOR APPETITE OR OVEREATING: NEARLY EVERY DAY

## 2020-02-11 ASSESSMENT — MIFFLIN-ST. JEOR
SCORE: 1336.11
SCORE: 1336.11

## 2020-02-11 NOTE — NURSING NOTE
"Chief Complaint   Patient presents with     RECHECK     Depression, anxiety.       Initial /78 (BP Location: Right arm, Patient Position: Sitting, Cuff Size: Adult Regular)   Pulse 75   Temp 98.7  F (37.1  C) (Tympanic)   Ht 1.676 m (5' 6\")   Wt 73.9 kg (163 lb)   SpO2 98%   BMI 26.31 kg/m   Estimated body mass index is 26.31 kg/m  as calculated from the following:    Height as of this encounter: 1.676 m (5' 6\").    Weight as of this encounter: 73.9 kg (163 lb).  Medication Reconciliation: complete  JANICE ROMO LPN    "
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
## 2020-02-12 ENCOUNTER — TELEPHONE (OUTPATIENT)
Dept: FAMILY MEDICINE | Facility: OTHER | Age: 60
End: 2020-02-12

## 2020-02-12 ASSESSMENT — ANXIETY QUESTIONNAIRES: GAD7 TOTAL SCORE: 21

## 2020-02-17 ENCOUNTER — OFFICE VISIT (OUTPATIENT)
Dept: FAMILY MEDICINE | Facility: OTHER | Age: 60
End: 2020-02-17
Attending: FAMILY MEDICINE
Payer: COMMERCIAL

## 2020-02-17 VITALS
DIASTOLIC BLOOD PRESSURE: 62 MMHG | TEMPERATURE: 98.5 F | OXYGEN SATURATION: 97 % | HEART RATE: 73 BPM | BODY MASS INDEX: 26.79 KG/M2 | SYSTOLIC BLOOD PRESSURE: 84 MMHG | WEIGHT: 166 LBS

## 2020-02-17 DIAGNOSIS — R07.89 MID STERNAL CHEST PAIN: ICD-10-CM

## 2020-02-17 DIAGNOSIS — M54.12 CHRONIC RADICULAR CERVICAL PAIN: Primary | ICD-10-CM
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
## 2020-02-17 DIAGNOSIS — S22.040D COMPRESSION FRACTURE OF T4 VERTEBRA WITH ROUTINE HEALING, SUBSEQUENT ENCOUNTER: ICD-10-CM

## 2020-02-17 DIAGNOSIS — M79.18 MYOFASCIAL PAIN SYNDROME, CERVICAL: ICD-10-CM

## 2020-02-17 DIAGNOSIS — E87.6 HYPOKALEMIA: ICD-10-CM

## 2020-02-17 DIAGNOSIS — G89.29 CHRONIC RADICULAR CERVICAL PAIN: Primary | ICD-10-CM

## 2020-02-17 DIAGNOSIS — F33.2 SEVERE EPISODE OF RECURRENT MAJOR DEPRESSIVE DISORDER, WITHOUT PSYCHOTIC FEATURES (H): ICD-10-CM

## 2020-02-17 LAB
ANION GAP SERPL CALCULATED.3IONS-SCNC: 6 MMOL/L (ref 3–14)
BASOPHILS # BLD AUTO: 0 10E9/L (ref 0–0.2)
BASOPHILS NFR BLD AUTO: 0.6 %
BUN SERPL-MCNC: 14 MG/DL (ref 7–30)
CALCIUM SERPL-MCNC: 8.5 MG/DL (ref 8.5–10.1)
CHLORIDE SERPL-SCNC: 111 MMOL/L (ref 94–109)
CO2 SERPL-SCNC: 25 MMOL/L (ref 20–32)
CREAT SERPL-MCNC: 1.01 MG/DL (ref 0.52–1.04)
DIFFERENTIAL METHOD BLD: NORMAL
EOSINOPHIL # BLD AUTO: 0.1 10E9/L (ref 0–0.7)
EOSINOPHIL NFR BLD AUTO: 1.5 %
ERYTHROCYTE [DISTWIDTH] IN BLOOD BY AUTOMATED COUNT: 12.8 % (ref 10–15)
GFR SERPL CREATININE-BSD FRML MDRD: 61 ML/MIN/{1.73_M2}
GLUCOSE SERPL-MCNC: 92 MG/DL (ref 70–99)
HCT VFR BLD AUTO: 40.7 % (ref 35–47)
HGB BLD-MCNC: 13.7 G/DL (ref 11.7–15.7)
IMM GRANULOCYTES # BLD: 0 10E9/L (ref 0–0.4)
IMM GRANULOCYTES NFR BLD: 0.4 %
LYMPHOCYTES # BLD AUTO: 1.7 10E9/L (ref 0.8–5.3)
LYMPHOCYTES NFR BLD AUTO: 24.9 %
MCH RBC QN AUTO: 30.3 PG (ref 26.5–33)
MCHC RBC AUTO-ENTMCNC: 33.7 G/DL (ref 31.5–36.5)
MCV RBC AUTO: 90 FL (ref 78–100)
MONOCYTES # BLD AUTO: 0.5 10E9/L (ref 0–1.3)
MONOCYTES NFR BLD AUTO: 6.8 %
NEUTROPHILS # BLD AUTO: 4.5 10E9/L (ref 1.6–8.3)
NEUTROPHILS NFR BLD AUTO: 65.8 %
NRBC # BLD AUTO: 0 10*3/UL
NRBC BLD AUTO-RTO: 0 /100
PLATELET # BLD AUTO: 179 10E9/L (ref 150–450)
POTASSIUM SERPL-SCNC: 3.1 MMOL/L (ref 3.4–5.3)
RBC # BLD AUTO: 4.52 10E12/L (ref 3.8–5.2)
SODIUM SERPL-SCNC: 142 MMOL/L (ref 133–144)
WBC # BLD AUTO: 6.8 10E9/L (ref 4–11)

## 2020-02-17 PROCEDURE — 80048 BASIC METABOLIC PNL TOTAL CA: CPT | Mod: ZL | Performed by: FAMILY MEDICINE

## 2020-02-17 PROCEDURE — 85025 COMPLETE CBC W/AUTO DIFF WBC: CPT | Mod: ZL | Performed by: FAMILY MEDICINE

## 2020-02-17 PROCEDURE — 36415 COLL VENOUS BLD VENIPUNCTURE: CPT | Mod: ZL | Performed by: FAMILY MEDICINE

## 2020-02-17 PROCEDURE — 99214 OFFICE O/P EST MOD 30 MIN: CPT | Performed by: FAMILY MEDICINE

## 2020-02-17 PROCEDURE — G0463 HOSPITAL OUTPT CLINIC VISIT: HCPCS | Performed by: FAMILY MEDICINE

## 2020-02-17 RX ORDER — POTASSIUM CHLORIDE 1500 MG/1
20 TABLET, EXTENDED RELEASE ORAL 2 TIMES DAILY
Qty: 30 TABLET | Refills: 3 | Status: SHIPPED | OUTPATIENT
Start: 2020-02-17 | End: 2020-03-23

## 2020-02-17 ASSESSMENT — PAIN SCALES - GENERAL: PAINLEVEL: MODERATE PAIN (4)
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
## 2020-03-02 ENCOUNTER — TRANSFERRED RECORDS (OUTPATIENT)
Dept: HEALTH INFORMATION MANAGEMENT | Facility: CLINIC | Age: 60
End: 2020-03-02

## 2020-03-03 DIAGNOSIS — E87.6 HYPOKALEMIA: ICD-10-CM

## 2020-03-03 LAB — POTASSIUM SERPL-SCNC: 3.6 MMOL/L (ref 3.4–5.3)

## 2020-03-03 PROCEDURE — 36415 COLL VENOUS BLD VENIPUNCTURE: CPT | Mod: ZL | Performed by: FAMILY MEDICINE

## 2020-03-03 PROCEDURE — 84132 ASSAY OF SERUM POTASSIUM: CPT | Mod: ZL | Performed by: FAMILY MEDICINE

## 2020-03-13 ENCOUNTER — HOSPITAL ENCOUNTER (OUTPATIENT)
Dept: PHYSICAL THERAPY | Facility: HOSPITAL | Age: 60
Setting detail: THERAPIES SERIES
End: 2020-03-13
Attending: SPECIALIST
Payer: COMMERCIAL

## 2020-03-13 PROCEDURE — 97162 PT EVAL MOD COMPLEX 30 MIN: CPT | Mod: GP

## 2020-03-13 PROCEDURE — 97530 THERAPEUTIC ACTIVITIES: CPT | Mod: GP,59

## 2020-03-13 PROCEDURE — 97163 PT EVAL HIGH COMPLEX 45 MIN: CPT | Mod: GP

## 2020-03-13 PROCEDURE — 97110 THERAPEUTIC EXERCISES: CPT | Mod: GP
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
## 2020-03-14 DIAGNOSIS — G89.29 CHRONIC RADICULAR CERVICAL PAIN: ICD-10-CM

## 2020-03-14 DIAGNOSIS — M54.12 CHRONIC RADICULAR CERVICAL PAIN: ICD-10-CM

## 2020-03-14 DIAGNOSIS — K22.10 ULCER OF ESOPHAGUS WITHOUT BLEEDING: ICD-10-CM

## 2020-03-16 ENCOUNTER — E-VISIT (OUTPATIENT)
Dept: FAMILY MEDICINE | Facility: OTHER | Age: 60
End: 2020-03-16
Payer: COMMERCIAL

## 2020-03-16 DIAGNOSIS — J01.90 ACUTE SINUSITIS WITH SYMPTOMS > 10 DAYS: Primary | ICD-10-CM

## 2020-03-16 PROCEDURE — 99421 OL DIG E/M SVC 5-10 MIN: CPT | Performed by: FAMILY MEDICINE

## 2020-03-16 NOTE — TELEPHONE ENCOUNTER
ELECTRONIC VISIT DOCUMENTATION:    SUBJECTIVE:  Note above accurately captures the substance of my conversation with the patient.    ASSESSMENT/ PLAN:  (J01.90) Acute sinusitis with symptoms > 10 days  (primary encounter diagnosis)  Comment:   Plan: amoxicillin-clavulanate (AUGMENTIN) 875-125 MG         tablet        Bid for 10 days        Provider E-Visit time total (minutes): 10

## 2020-03-16 NOTE — PATIENT INSTRUCTIONS
Thank you for choosing us for your care. I have placed an order for a prescription so that you can start treatment. View your full visit summary for details by clicking on the link below. Your pharmacist will able to address any questions you may have about the medication.     If you're not feeling better within 5-7 days, please schedule an appointment.  You can schedule an appointment right here in [facility], or call [phone]  If the visit is for the same symptoms as your e-visit, we'll refund the cost of your e-visit if seen within seven days.      Sinusitis (Antibiotic Treatment)    The sinuses are air-filled spaces within the bones of the face. They connect to the inside of the nose. Sinusitis is an inflammation of the tissue that lines the sinuses. Sinusitis can occur during a cold. It can also happen due to allergies to pollens and other particles in the air. Sinusitis can cause symptoms of sinus congestion and a feeling of fullness. A sinus infection causes fever, headache, and facial pain. There is often green or yellow fluid draining from the nose or into the back of the throat (post-nasal drip). You have been given antibiotics to treat this condition.  Home care    Take the full course of antibiotics as instructed. Do not stop taking them, even when you feel better.    Drink plenty of water, hot tea, and other liquids. This may help thin nasal mucus. It also may help your sinuses drain fluids.    Heat may help soothe painful areas of your face. Use a towel soaked in hot water. Or,  the shower and direct the warm spray onto your face. Using a vaporizer along with a menthol rub at night may also help soothe symptoms.     An expectorant with guaifenesin may help thin nasal mucus and help your sinuses drain fluids.    You can use an over-the-counter decongestant, unless a similar medicine was prescribed to you. Nasal sprays work the fastest. Use one that contains phenylephrine or oxymetazoline.  First blow your nose gently. Then use the spray. Do not use these medicines more often than directed on the label. If you do, your symptoms may get worse. You may also take pills that contain pseudoephedrine. Don t use products that combine multiple medicines. This is because side effects may be increased. Read labels. You can also ask the pharmacist for help. (People with high blood pressure should not use decongestants. They can raise blood pressure.)    Over-the-counter antihistamines may help if allergies contributed to your sinusitis.      Do not use nasal rinses or irrigation during an acute sinus infection, unless your healthcare provider tells you to. Rinsing may spread the infection to other areas in your sinuses.    Use acetaminophen or ibuprofen to control pain, unless another pain medicine was prescribed to you. If you have chronic liver or kidney disease or ever had a stomach ulcer, talk with your healthcare provider before using these medicines. (Aspirin should never be taken by anyone under age 18 who is ill with a fever. It may cause severe liver damage.)    Don't smoke. This can make symptoms worse.  Follow-up care  Follow up with your healthcare provider or our staff if you are not better in 1 week.  When to seek medical advice  Call your healthcare provider if any of these occur:    Facial pain or headache that gets worse    Stiff neck    Unusual drowsiness or confusion    Swelling of your forehead or eyelids    Vision problems, such as blurred or double vision    Fever of 100.4 F (38 C) or higher, or as directed by your healthcare provider    Seizure    Breathing problems    Symptoms don't go away in 10 days  Prevention  Here are steps you can take to help prevent an infection:    Keep good hand washing habits.    Don t have close contact with people who have sore throats, colds, or other upper respiratory infections.    Don t smoke, and stay away from secondhand smoke.    Stay up to date with of  your vaccines.  Date Last Reviewed: 11/1/2017 2000-2019 [employer]. [address] [place] [address], [place] [address]. All rights reserved. This information is not intended as a substitute for professional medical care. Always follow your healthcare professional's instructions.

## 2020-03-17 RX ORDER — DEXLANSOPRAZOLE 60 MG/1
CAPSULE, DELAYED RELEASE ORAL
Qty: 30 CAPSULE | Refills: 10 | Status: SHIPPED | OUTPATIENT
Start: 2020-03-17 | End: 2021-01-26

## 2020-03-17 RX ORDER — BACLOFEN 10 MG/1
TABLET ORAL
Qty: 30 TABLET | Refills: 3 | Status: SHIPPED | OUTPATIENT
Start: 2020-03-17 | End: 2020-07-14

## 2020-03-17 NOTE — TELEPHONE ENCOUNTER
Baclofen      Last Written Prescription Date:  12/05/2019  Last Fill Quantity: 30,   # refills: 3  Last Office Visit: 2/17/2020  Future Office visit:    Next 5 appointments (look out 90 days)    Mar 23, 2020  8:00 AM CDT  (Arrive by 7:45 AM)  Return Visit with Zoe Herbert MD  [facility] ([facility] ) [address] [place], [place] [address] [address]  [phone]             Dexilant     Last Written Prescription Date:  12/09/2019  Last Fill Quantity: 30,   # refills: 3  Last Office Visit: 2/17/2020  Future Office visit:    Next 5 appointments (look out 90 days)    Mar 23, 2020  8:00 AM CDT  (Arrive by 7:45 AM)  Return Visit with Zoe Herbert MD  [facility] ([facility] ) [address] [place], [place] [address] [address]  [phone]

## 2020-03-23 ENCOUNTER — VIRTUAL VISIT (OUTPATIENT)
Dept: PSYCHIATRY | Facility: OTHER | Age: 60
End: 2020-03-23
Attending: PSYCHIATRY & NEUROLOGY
Payer: COMMERCIAL

## 2020-03-23 ENCOUNTER — HOSPITAL ENCOUNTER (OUTPATIENT)
Dept: PHYSICAL THERAPY | Facility: HOSPITAL | Age: 60
Setting detail: THERAPIES SERIES
End: 2020-03-23
Attending: SPECIALIST
Payer: COMMERCIAL

## 2020-03-23 DIAGNOSIS — F33.2 MAJOR DEPRESSIVE DISORDER, RECURRENT SEVERE WITHOUT PSYCHOTIC FEATURES (H): Primary | ICD-10-CM

## 2020-03-23 PROCEDURE — 97110 THERAPEUTIC EXERCISES: CPT | Mod: GP

## 2020-03-23 PROCEDURE — 99443 ZZC PHYSICIAN TELEPHONE EVALUATION 21-30 MIN: CPT | Performed by: PSYCHIATRY & NEUROLOGY

## 2020-03-23 ASSESSMENT — ANXIETY QUESTIONNAIRES
6. BECOMING EASILY ANNOYED OR IRRITABLE: NEARLY EVERY DAY
5. BEING SO RESTLESS THAT IT IS HARD TO SIT STILL: NOT AT ALL
3. WORRYING TOO MUCH ABOUT DIFFERENT THINGS: NEARLY EVERY DAY
2. NOT BEING ABLE TO STOP OR CONTROL WORRYING: NOT AT ALL
7. FEELING AFRAID AS IF SOMETHING AWFUL MIGHT HAPPEN: NEARLY EVERY DAY
1. FEELING NERVOUS, ANXIOUS, OR ON EDGE: MORE THAN HALF THE DAYS
GAD7 TOTAL SCORE: 14

## 2020-03-23 ASSESSMENT — PATIENT HEALTH QUESTIONNAIRE - PHQ9
5. POOR APPETITE OR OVEREATING: NEARLY EVERY DAY
SUM OF ALL RESPONSES TO PHQ QUESTIONS 1-9: 14
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
## 2020-03-24 ASSESSMENT — ANXIETY QUESTIONNAIRES: GAD7 TOTAL SCORE: 14

## 2020-03-26 DIAGNOSIS — G89.29 CHRONIC NECK PAIN: ICD-10-CM

## 2020-03-26 DIAGNOSIS — M62.838 MUSCLE SPASM: ICD-10-CM

## 2020-03-26 DIAGNOSIS — M54.2 CHRONIC NECK PAIN: ICD-10-CM

## 2020-03-26 RX ORDER — HYDROCODONE BITARTRATE AND ACETAMINOPHEN 7.5; 325 MG/1; MG/1
TABLET ORAL
Qty: 60 TABLET | Refills: 0 | Status: SHIPPED | OUTPATIENT
Start: 2020-03-26 | End: 2020-06-02

## 2020-03-26 RX ORDER — DIAZEPAM 5 MG
TABLET ORAL
Qty: 30 TABLET | Refills: 0 | Status: SHIPPED | OUTPATIENT
Start: 2020-03-26 | End: 2020-06-02

## 2020-03-26 NOTE — TELEPHONE ENCOUNTER
Diazepam      Last Written Prescription Date:  2-6-2020  Last Fill Quantity: 30 tab,   # refills: 0  Last Office Visit: 3-  Future Office visit:    Next 5 appointments (look out 90 days)    Apr 24, 2020  8:40 AM CDT  Telephone Visit with Zoe Herbert MD  [facility] ([facility] ) [address] [place] [phone]  [phone]           Routing refill request to provider for review/approval because:  Medication is reported/historical      Hydrocodone- acetaminophen       Last Written Prescription Date:  1/20/2020  Last Fill Quantity: 60 tab,   # refills: 0  Last Office Visit: 3-  Future Office visit:    Next 5 appointments (look out 90 days)    Apr 24, 2020  8:40 AM CDT  Telephone Visit with Zoe Herbert MD  [facility] ([facility] ) [address] [place] [phone] [phone]           Routing refill request to provider for review/approval because:  Drug not on the [facility], [facility] or [facility] refill protocol or controlled substance

## 2020-04-24 ENCOUNTER — VIRTUAL VISIT (OUTPATIENT)
Dept: PSYCHIATRY | Facility: OTHER | Age: 60
End: 2020-04-24
Attending: PSYCHIATRY & NEUROLOGY
Payer: COMMERCIAL

## 2020-04-24 DIAGNOSIS — F33.2 MAJOR DEPRESSIVE DISORDER, RECURRENT SEVERE WITHOUT PSYCHOTIC FEATURES (H): Primary | ICD-10-CM

## 2020-04-24 PROCEDURE — 99443 ZZC PHYSICIAN TELEPHONE EVALUATION 21-30 MIN: CPT | Performed by: PSYCHIATRY & NEUROLOGY

## 2020-04-24 ASSESSMENT — ANXIETY QUESTIONNAIRES
GAD7 TOTAL SCORE: 21
1. FEELING NERVOUS, ANXIOUS, OR ON EDGE: NEARLY EVERY DAY
3. WORRYING TOO MUCH ABOUT DIFFERENT THINGS: NEARLY EVERY DAY
6. BECOMING EASILY ANNOYED OR IRRITABLE: NEARLY EVERY DAY
5. BEING SO RESTLESS THAT IT IS HARD TO SIT STILL: NEARLY EVERY DAY
2. NOT BEING ABLE TO STOP OR CONTROL WORRYING: NEARLY EVERY DAY
7. FEELING AFRAID AS IF SOMETHING AWFUL MIGHT HAPPEN: NEARLY EVERY DAY

## 2020-04-24 ASSESSMENT — PATIENT HEALTH QUESTIONNAIRE - PHQ9
5. POOR APPETITE OR OVEREATING: NEARLY EVERY DAY
SUM OF ALL RESPONSES TO PHQ QUESTIONS 1-9: 14
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
## 2020-04-24 NOTE — NURSING NOTE
"Chief Complaint   Patient presents with     RECHECK     Telephone visit.       Initial There were no vitals taken for this visit. Estimated body mass index is 26.79 kg/m  as calculated from the following:    Height as of 2/11/20: 1.676 m (5' 6\").    Weight as of 2/17/20: 75.3 kg (166 lb).  Medication Reconciliation: complete  JANICE ROMO LPN    "

## 2020-04-25 ASSESSMENT — ANXIETY QUESTIONNAIRES: GAD7 TOTAL SCORE: 21

## 2020-05-06 ENCOUNTER — HOSPITAL ENCOUNTER (OUTPATIENT)
Dept: PHYSICAL THERAPY | Facility: HOSPITAL | Age: 60
Setting detail: THERAPIES SERIES
End: 2020-05-06
Attending: FAMILY MEDICINE
Payer: COMMERCIAL

## 2020-05-06 PROCEDURE — 97110 THERAPEUTIC EXERCISES: CPT | Mod: GP

## 2020-05-19 ENCOUNTER — HOSPITAL ENCOUNTER (OUTPATIENT)
Dept: PHYSICAL THERAPY | Facility: HOSPITAL | Age: 60
Setting detail: THERAPIES SERIES
End: 2020-05-19
Attending: FAMILY MEDICINE
Payer: COMMERCIAL

## 2020-05-19 PROCEDURE — 40000185 ZZHC STATISTIC PT OUTPT VISIT

## 2020-05-26 ENCOUNTER — VIRTUAL VISIT (OUTPATIENT)
Dept: PSYCHIATRY | Facility: OTHER | Age: 60
End: 2020-05-26
Attending: PSYCHIATRY & NEUROLOGY
Payer: COMMERCIAL

## 2020-05-26 DIAGNOSIS — F33.1 MAJOR DEPRESSIVE DISORDER, RECURRENT EPISODE, MODERATE (H): Primary | ICD-10-CM

## 2020-05-26 PROCEDURE — 99213 OFFICE O/P EST LOW 20 MIN: CPT | Mod: TEL | Performed by: PSYCHIATRY & NEUROLOGY

## 2020-05-26 ASSESSMENT — ANXIETY QUESTIONNAIRES
5. BEING SO RESTLESS THAT IT IS HARD TO SIT STILL: NOT AT ALL
1. FEELING NERVOUS, ANXIOUS, OR ON EDGE: NOT AT ALL
7. FEELING AFRAID AS IF SOMETHING AWFUL MIGHT HAPPEN: NOT AT ALL
2. NOT BEING ABLE TO STOP OR CONTROL WORRYING: NOT AT ALL
6. BECOMING EASILY ANNOYED OR IRRITABLE: MORE THAN HALF THE DAYS
3. WORRYING TOO MUCH ABOUT DIFFERENT THINGS: NOT AT ALL
GAD7 TOTAL SCORE: 2
4. TROUBLE RELAXING: NOT AT ALL

## 2020-05-26 ASSESSMENT — PATIENT HEALTH QUESTIONNAIRE - PHQ9: SUM OF ALL RESPONSES TO PHQ QUESTIONS 1-9: 7

## 2020-05-26 NOTE — NURSING NOTE
"Chief Complaint   Patient presents with     RECHECK       Initial There were no vitals taken for this visit. Estimated body mass index is 26.79 kg/m  as calculated from the following:    Height as of 2/11/20: 1.676 m (5' 6\").    Weight as of 2/17/20: 75.3 kg (166 lb).  Medication Reconciliation: complete  Jamila Chandra LPN  "
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
## 2020-05-27 ASSESSMENT — ANXIETY QUESTIONNAIRES: GAD7 TOTAL SCORE: 2

## 2020-06-01 ENCOUNTER — TELEPHONE (OUTPATIENT)
Dept: FAMILY MEDICINE | Facility: OTHER | Age: 60
End: 2020-06-01

## 2020-06-01 NOTE — TELEPHONE ENCOUNTER
Pt is in pain; could not get an appt for the next 2 weeks with Dr. Henna Cruz. Pt needs to discuss the pain she's having in her feet, shoulders and neck. Please call her ASAP.

## 2020-06-02 ENCOUNTER — HOSPITAL ENCOUNTER (OUTPATIENT)
Dept: PHYSICAL THERAPY | Facility: HOSPITAL | Age: 60
Setting detail: THERAPIES SERIES
End: 2020-06-02
Attending: FAMILY MEDICINE
Payer: COMMERCIAL

## 2020-06-02 ENCOUNTER — VIRTUAL VISIT (OUTPATIENT)
Dept: FAMILY MEDICINE | Facility: OTHER | Age: 60
End: 2020-06-02
Attending: FAMILY MEDICINE
Payer: COMMERCIAL

## 2020-06-02 DIAGNOSIS — G89.29 CHRONIC RADICULAR CERVICAL PAIN: Primary | ICD-10-CM

## 2020-06-02 DIAGNOSIS — M79.18 MYOFASCIAL PAIN: ICD-10-CM

## 2020-06-02 DIAGNOSIS — G89.29 CHRONIC NECK PAIN: ICD-10-CM

## 2020-06-02 DIAGNOSIS — F33.0 MILD EPISODE OF RECURRENT MAJOR DEPRESSIVE DISORDER (H): ICD-10-CM

## 2020-06-02 DIAGNOSIS — G62.9 NEUROPATHY: ICD-10-CM

## 2020-06-02 DIAGNOSIS — R25.1 TREMOR: ICD-10-CM

## 2020-06-02 DIAGNOSIS — M54.12 CHRONIC RADICULAR CERVICAL PAIN: Primary | ICD-10-CM

## 2020-06-02 DIAGNOSIS — M62.838 MUSCLE SPASM: ICD-10-CM

## 2020-06-02 DIAGNOSIS — M54.2 CHRONIC NECK PAIN: ICD-10-CM

## 2020-06-02 PROCEDURE — 99214 OFFICE O/P EST MOD 30 MIN: CPT | Mod: TEL | Performed by: FAMILY MEDICINE

## 2020-06-02 PROCEDURE — 97110 THERAPEUTIC EXERCISES: CPT | Mod: GP

## 2020-06-02 RX ORDER — HYDROCODONE BITARTRATE AND ACETAMINOPHEN 7.5; 325 MG/1; MG/1
TABLET ORAL
Qty: 60 TABLET | Refills: 0 | Status: SHIPPED | OUTPATIENT
Start: 2020-06-02 | End: 2020-07-23

## 2020-06-02 RX ORDER — DIAZEPAM 5 MG
5 TABLET ORAL EVERY 6 HOURS PRN
Qty: 30 TABLET | Refills: 0 | Status: SHIPPED | OUTPATIENT
Start: 2020-06-02 | End: 2020-07-23

## 2020-06-02 NOTE — PROGRESS NOTES
"Sadaf Blankenship is a 60 year old female who is being evaluated via a billable telephone visit.      The patient has been notified of following:     \"This telephone visit will be conducted via a call between you and your physician/provider. We have found that certain health care needs can be provided without the need for a physical exam.  This service lets us provide the care you need with a short phone conversation.  If a prescription is necessary we can send it directly to your pharmacy.  If lab work is needed we can place an order for that and you can then stop by our lab to have the test done at a later time.    Telephone visits are billed at different rates depending on your insurance coverage. During this emergency period, for some insurers they may be billed the same as an in-person visit.  Please reach out to your insurance provider with any questions.    If during the course of the call the physician/provider feels a telephone visit is not appropriate, you will not be charged for this service.\"    Patient has given verbal consent for Telephone visit?  Yes    What phone number would you like to be contacted at? [phone]    How would you like to obtain your AVS? Nik    Subjective     Sadaf Blankenship is a 60 year old female who presents via phone visit today for the following health issues:    HPI  Chronic Pain Follow-Up    Where in your body do you have pain? Neck and shoulders  How has your pain affected your ability to work? Not applicable  Which of these pain treatments have you tried since your last clinic visit? Physical Therapy, Psychologist and Relaxation techniques / Biofeedback  How well are you sleeping? Poor  How has your mood been since your last visit? Much worse  Have you had a significant life event? No  Other aggravating factors: therabands made pain worse  Taking medication as directed? Yes    PHQ-9 SCORE 3/23/2020 4/24/2020 5/26/2020   PHQ-9 Total Score 14 14 7     ESTRELLA-7 " SCORE 3/23/2020 4/24/2020 5/26/2020   Total Score 14 21 2     No flowsheet data found.  Encounter-Level CSA - 07/11/2017:    Controlled Substance Agreement - Scan on 7/14/2017 12:56 PM: CONTROLLED SUBSTANCE AGREEMENT     Patient-Level CSA:    There are no patient-level csa.      Pain is worse after using theraband treatment at PT. She tried different methods but this still caused recurrent neck pain radiating to the right arm. She has changed methods with her therapist    Tremors  Shaking bed at night, and during day when sitting in chair when waking up. They seem to be getting worse.      Burning pain of feet  Ankles to toes, burning pain.     Depression  She continues to follow with Dr Herbert.                       Patient Active Problem List   Diagnosis     Degenerative disc disease, cervical     Pseudoarthrosis of cervical spine     Cervical pain     Migraine     UTI (urinary tract infection)     Advanced care planning/counseling discussion     Thoracic sprain and strain     Sprain and strain of shoulder and upper arm     Irritable bowel syndrome     Myofascial pain syndrome, cervical     Depression, major     Chronic, continuous use of opioids     Uvulitis     Chronic rhinitis     Madina bullosa     Chronic maxillary sinusitis     Hypertrophy of inferior nasal turbinate     Long uvula     Chronic neck pain     Chronic pain     Chronic tension-type headache, intractable     Migraine aura without headache     History of arthrodesis     Myofascial pain     Disuse syndrome     Intractable chronic migraine without aura and with status migrainosus     Ulcer of esophagus without bleeding     Gastroesophageal reflux disease with esophagitis     Intractable chronic migraine without aura and without status migrainosus     Ulcer of esophagus     Ulnar neuropathy     Chronic radicular cervical pain     Mild episode of recurrent major depressive disorder (H)     Ulnar neuropathy at elbow of left upper extremity     Lumbar  radiculopathy     S/P cervical spinal fusion     ACP (advance care planning)     Acute back pain with sciatica     Chronic migraine without aura without status migrainosus, not intractable     Radicular pain     Subacromial bursitis of right shoulder joint     Panlobular emphysema (H)     Calculus of kidney     Pulmonary emphysema, unspecified emphysema type (H)     Other chronic gastritis without hemorrhage     Pelvic floor dysfunction     Adhesive capsulitis of left shoulder     Concussion with brief LOC     Past Surgical History:   Procedure Laterality Date     BACK SURGERY      cervical     Cervical spine surgery with removal of 2 disks, C5,C7       COLONOSCOPY  10/13/2014    Dr Camargo, internal hemorrhoids     COLONOSCOPY - HIM SCAN  2018    EGD and colonoscopy with Dr. Jennings     Coronary angiogram, normal      Chest pain     ENT SURGERY      nose surgery x3     fusion discectomy anterior cervical  2011     HC ESOPHAGOSCOPY, DIAGNOSTIC  10/31/2014    Dr Camargo, mild erythema of antrum, negative biopsies     IR CONSULTATION FOR IR EXAM  2020     IR FLUORO 0-1 HOUR  2020     NOSE SURGERY      x3            Posterior decompression , fusion C3-5      Psuedoarthrosis     Supracervical hysterectomy with right salpingoophorectomy  2002    Endometriosis       Social History     Tobacco Use     Smoking status: Former Smoker     Years: 8.00     Types: Cigarettes     Last attempt to quit:      Years since quittin.4     Smokeless tobacco: Never Used     Tobacco comment: quit . no passive exposure   Substance Use Topics     Alcohol use: No     Family History   Problem Relation Age of Onset     Cancer Father         lung, also a heavy smoker     Diabetes Mother      Heart Disease Mother 58        MI; cause of death; heavy smoker. had first MI at 40     Coronary Artery Disease Mother      Hypertension Mother      Cancer Other         strong history     Heart Disease Other          heart disease     Heart Disease Brother         x3     Hypertension Brother      Diabetes Brother      Coronary Artery Disease Brother      Hypertension Sister          Current Outpatient Medications   Medication Sig Dispense Refill     baclofen (LIORESAL) 10 MG tablet TAKE 1 TABLET BY MOUTH NIGHTLY AT BEDTIME 30 tablet 3     butalbital-acetaminophen-caffeine (FIORICET/ESGIC) -40 MG tablet TAKE 1 TO 2 TABLETS BY MOUTH AT THE ONSET OF A MIGRAINE HEADACHE, LIMIT NO MORE THAN 3 TIMES PER WEEK. ACETAMINOPHEN SHOULD BE LIMITED TO 40  5     Cholecalciferol (VITAMIN D3) 1000 UNITS CAPS Take 1,000 Units by mouth       DEXILANT 60 MG CPDR CR capsule TAKE 1 CAPSULE BY MOUTH DAILY 30 capsule 10     diazepam (VALIUM) 5 MG tablet Take 1 tablet (5 mg) by mouth every 6 hours as needed for anxiety 30 tablet 0     docusate sodium (COLACE) 100 MG capsule Take 100 mg by mouth daily        FLUoxetine (PROZAC) 20 MG capsule Take 1 capsule daily along with 40 mg capsule 30 capsule 11     FLUoxetine (PROZAC) 40 MG capsule TAKE 1 CAPSULE DAILY BY MOUTH WITH 20 MG CAPSULE 30 capsule 11     HYDROcodone-acetaminophen (NORCO) 7.5-325 MG per tablet TAKE 1 TABLET BY MOUTH EVER Y 4 TO 6 HOURS AS NEEDED FO R PAIN 60 tablet 0     ibuprofen (ADVIL/MOTRIN) 800 MG tablet Take 1 tablet (800 mg) by mouth every 8 hours as needed for moderate pain 90 tablet 1     LYRICA 150 MG capsule Take 150 mg by mouth 3 times daily   5     Multiple Vitamins-Minerals (CENTRUM SILVER ULTRA WOMENS) TABS Take 1 tablet by mouth daily        ondansetron (ZOFRAN-ODT) 4 MG ODT tab Take 4 mg by mouth every 6 hours as needed (after migraine medication)        Probiotic Product (PROBIOTIC DAILY PO) Take by mouth At Bedtime        sucralfate (CARAFATE) 1 GM/10ML suspension Take 10 mLs (1 g) by mouth 4 times daily 420 mL 3     topiramate (TOPAMAX) 50 MG tablet Take 2 tablets in the morning and 2 tablets at night       Allergies   Allergen Reactions     Aspirin Hives      "Ibuprofen      Dye in the otc form causes headaches  \"patient states over the counter ibuprofen  bothers her\"     Lansoprazole Other (See Comments) and Visual Disturbance     Changes in eyesight  Prevacid  Change in eyesight     Red Dye Hives     Bupropion Rash     Bupropion Hcl Hives and Rash     Wellbutrin     Demerol [Meperidine] Other (See Comments)     Made migraine worse     BP Readings from Last 3 Encounters:   02/17/20 (!) 84/62   02/11/20 128/78   02/11/20 128/78    Wt Readings from Last 3 Encounters:   02/17/20 75.3 kg (166 lb)   02/11/20 73.9 kg (163 lb)   02/11/20 73.9 kg (163 lb)                    Reviewed and updated as needed this visit by Provider         Review of Systems   Constitutional, HEENT, cardiovascular, pulmonary, gi and gu systems are negative, except as otherwise noted.       Objective   Reported vitals:  There were no vitals taken for this visit.   alert and moderate distress  PSYCH: Alert and oriented times 3; coherent speech, normal   rate and volume, able to articulate logical thoughts, able   to abstract reason, no tangential thoughts, no hallucinations   or delusions  Her affect is anxious  RESP: No cough, no audible wheezing, able to talk in full sentences  Remainder of exam unable to be completed due to telephone visits            Assessment/Plan:  1. Chronic radicular cervical pain  Ongoing. Continue lyrica. Change up her PT routine which she has done. She has seen multiple specialists for this over the years and has her appeal hearing on Wednesday for SSD    2. Myofascial pain  Chronic, continue current medications and gentle PT    3. Neuropathy  Of her feet bilaterally. This is new. Discussed checking labs and EMG however she would like to travel to [place] to see family and would prefer to defer this. Will schedule back in August and obtain labs and EMG with referral to Dr Chavez at that time     4. Tremor  Could be related to medications. Dr Finney at [facility], neurologist, who " does her Botox injections wasn't concerned per Tesha. Would like to have her see Dr Chavez to evaluate this further. She is getting them during the day as well.     5. Mild episode of recurrent major depressive disorder (H)  Continues to follow with Dr Herbert. She would like to go to [place] to see family and is given the OK to go. She is having trouble with Noemy, her daughter, and her boyfriend who she thinks are using drugs. Discussed     6. Muscle spasm  Renewed   - diazepam (VALIUM) 5 MG tablet; Take 1 tablet (5 mg) by mouth every 6 hours as needed for anxiety  Dispense: 30 tablet; Refill: 0    7. Chronic neck pain  Renewed   - HYDROcodone-acetaminophen (NORCO) 7.5-325 MG per tablet; TAKE 1 TABLET BY MOUTH EVER Y 4 TO 6 HOURS AS NEEDED FO R PAIN  Dispense: 60 tablet; Refill: 0    Return in about 3 months (around 9/2/2020) for neuropathy, tremor, pain.she will call to schedule this and cancel other appointments she has in the interim       Phone call duration:  24:24  minutes    Henna Cruz MD

## 2020-06-02 NOTE — NURSING NOTE
"Chief Complaint   Patient presents with     Pain       Initial There were no vitals taken for this visit. Estimated body mass index is 26.79 kg/m  as calculated from the following:    Height as of 2/11/20: 1.676 m (5' 6\").    Weight as of 2/17/20: 75.3 kg (166 lb).  Medication Reconciliation: complete  Amaury Anthony LPN  "

## 2020-07-10 DIAGNOSIS — G89.29 CHRONIC RADICULAR CERVICAL PAIN: ICD-10-CM

## 2020-07-10 DIAGNOSIS — M54.12 CHRONIC RADICULAR CERVICAL PAIN: ICD-10-CM

## 2020-07-14 RX ORDER — BACLOFEN 10 MG/1
TABLET ORAL
Qty: 30 TABLET | Refills: 3 | Status: SHIPPED | OUTPATIENT
Start: 2020-07-14 | End: 2020-11-03

## 2020-07-14 NOTE — TELEPHONE ENCOUNTER
baclofen      Last Written Prescription Date:  3/17/20  Last Fill Quantity: 30,   # refills: 3  Last Office Visit: 6/2/20  Future Office visit:    Next 5 appointments (look out 90 days)    Aug 11, 2020  2:00 PM CDT  (Arrive by 1:45 PM)  Return Visit with Zoe Herbert MD  [facility] ([facility] ) [address] [place] [id number] [phone]   Aug 18, 2020 11:00 AM CDT  (Arrive by 10:45 AM)  SHORT with Henna Cruz MD  [facility] ([facility] ) [facility] [address]  [phone]

## 2020-07-23 ENCOUNTER — VIRTUAL VISIT (OUTPATIENT)
Dept: FAMILY MEDICINE | Facility: OTHER | Age: 60
End: 2020-07-23
Attending: FAMILY MEDICINE
Payer: COMMERCIAL

## 2020-07-23 DIAGNOSIS — M62.838 MUSCLE SPASM: ICD-10-CM

## 2020-07-23 DIAGNOSIS — F33.0 MILD EPISODE OF RECURRENT MAJOR DEPRESSIVE DISORDER (H): ICD-10-CM

## 2020-07-23 DIAGNOSIS — M54.2 CHRONIC NECK PAIN: ICD-10-CM

## 2020-07-23 DIAGNOSIS — G89.29 CHRONIC NECK PAIN: ICD-10-CM

## 2020-07-23 DIAGNOSIS — G44.221 CHRONIC TENSION-TYPE HEADACHE, INTRACTABLE: ICD-10-CM

## 2020-07-23 DIAGNOSIS — J06.9 UPPER RESPIRATORY TRACT INFECTION, UNSPECIFIED TYPE: ICD-10-CM

## 2020-07-23 DIAGNOSIS — M79.18 MYOFASCIAL PAIN SYNDROME, CERVICAL: Primary | ICD-10-CM

## 2020-07-23 PROCEDURE — 99214 OFFICE O/P EST MOD 30 MIN: CPT | Mod: TEL | Performed by: FAMILY MEDICINE

## 2020-07-23 RX ORDER — HYDROCODONE BITARTRATE AND ACETAMINOPHEN 7.5; 325 MG/1; MG/1
TABLET ORAL
Qty: 60 TABLET | Refills: 0 | Status: SHIPPED | OUTPATIENT
Start: 2020-07-23 | End: 2020-10-26

## 2020-07-23 RX ORDER — DIAZEPAM 5 MG
5 TABLET ORAL EVERY 6 HOURS PRN
Qty: 30 TABLET | Refills: 0 | Status: SHIPPED | OUTPATIENT
Start: 2020-07-23 | End: 2020-10-26

## 2020-07-23 RX ORDER — CEFPROZIL 500 MG/1
500 TABLET, FILM COATED ORAL 2 TIMES DAILY
Qty: 20 TABLET | Refills: 0 | Status: SHIPPED | OUTPATIENT
Start: 2020-07-23 | End: 2020-08-11

## 2020-07-23 ASSESSMENT — PAIN SCALES - GENERAL: PAINLEVEL: EXTREME PAIN (8)

## 2020-07-23 ASSESSMENT — ANXIETY QUESTIONNAIRES
5. BEING SO RESTLESS THAT IT IS HARD TO SIT STILL: NOT AT ALL
6. BECOMING EASILY ANNOYED OR IRRITABLE: NOT AT ALL
2. NOT BEING ABLE TO STOP OR CONTROL WORRYING: NOT AT ALL
7. FEELING AFRAID AS IF SOMETHING AWFUL MIGHT HAPPEN: NOT AT ALL
3. WORRYING TOO MUCH ABOUT DIFFERENT THINGS: NOT AT ALL
GAD7 TOTAL SCORE: 1
IF YOU CHECKED OFF ANY PROBLEMS ON THIS QUESTIONNAIRE, HOW DIFFICULT HAVE THESE PROBLEMS MADE IT FOR YOU TO DO YOUR WORK, TAKE CARE OF THINGS AT HOME, OR GET ALONG WITH OTHER PEOPLE: NOT DIFFICULT AT ALL
1. FEELING NERVOUS, ANXIOUS, OR ON EDGE: SEVERAL DAYS

## 2020-07-23 ASSESSMENT — PATIENT HEALTH QUESTIONNAIRE - PHQ9
SUM OF ALL RESPONSES TO PHQ QUESTIONS 1-9: 2
5. POOR APPETITE OR OVEREATING: NOT AT ALL

## 2020-07-23 NOTE — PROGRESS NOTES
"Sadaf Blankenship is a 60 year old female who is being evaluated via a billable telephone visit.      The patient has been notified of following:     \"This telephone visit will be conducted via a call between you and your physician/provider. We have found that certain health care needs can be provided without the need for a physical exam.  This service lets us provide the care you need with a short phone conversation.  If a prescription is necessary we can send it directly to your pharmacy.  If lab work is needed we can place an order for that and you can then stop by our lab to have the test done at a later time.    Telephone visits are billed at different rates depending on your insurance coverage. During this emergency period, for some insurers they may be billed the same as an in-person visit.  Please reach out to your insurance provider with any questions.    If during the course of the call the physician/provider feels a telephone visit is not appropriate, you will not be charged for this service.\"    Patient has given verbal consent for Telephone visit?  Yes    What phone number would you like to be contacted at? [phone]    How would you like to obtain your AVS? Ryliehart    Subjective     Sadaf Blankenship is a 60 year old female who presents via phone visit today for the following health issues:    HPI    Chronic Pain Follow-Up  Dr. Pérez wants us to take over prescribing her Lyrica 150 mg TID    Where in your body do you have pain? Neck and head today  How has your pain affected your ability to work? Unable to work  Which of these pain treatments have you tried since your last clinic visit? Chiropractor, Cold, Heat, Massage, Physical Therapy and Rest  How well are you sleeping? Fair, doesn't like taking Trazadone as it causes nightmares   How has your mood been since your last visit? Better  Has been in [place] for the last month visiting family. Doing great.  Have you had a significant life event? " No  Other aggravating factors: none  Taking medication as directed? Yes    PHQ-9 SCORE 3/23/2020 4/24/2020 5/26/2020   PHQ-9 Total Score 14 14 7     ESTRELLA-7 SCORE 3/23/2020 4/24/2020 5/26/2020   Total Score 14 21 2     No flowsheet data found.  Encounter-Level CSA - 07/11/2017:    Controlled Substance Agreement - Scan on 7/14/2017 12:56 PM: CONTROLLED SUBSTANCE AGREEMENT     Patient-Level CSA:    There are no patient-level csa.       She will continue to have Botox injections from Dr Frost, neurologist     RESPIRATORY SYMPTOMS      Duration: about a month    Description  nasal congestion, rhinorrhea, sore throat, cough, headache and fatigue/malaise    Severity: moderate    Accompanying signs and symptoms: None    History (predisposing factors):  none    Precipitating or alleviating factors: None    Therapies tried and outcome:  rest and fluids allegra in the morning. claritin in the evening, and Tylenol sinus during the day      She was granted disability for her chronic neck pain on her appeal in June 2020 after a long legal course.              Patient Active Problem List   Diagnosis     Degenerative disc disease, cervical     Pseudoarthrosis of cervical spine     Cervical pain     Migraine     UTI (urinary tract infection)     Advanced care planning/counseling discussion     Thoracic sprain and strain     Sprain and strain of shoulder and upper arm     Irritable bowel syndrome     Myofascial pain syndrome, cervical     Depression, major     Chronic, continuous use of opioids     Uvulitis     Chronic rhinitis     Madina bullosa     Chronic maxillary sinusitis     Hypertrophy of inferior nasal turbinate     Long uvula     Chronic neck pain     Chronic pain     Chronic tension-type headache, intractable     Migraine aura without headache     History of arthrodesis     Myofascial pain     Disuse syndrome     Intractable chronic migraine without aura and with status migrainosus     Ulcer of esophagus without bleeding      Gastroesophageal reflux disease with esophagitis     Intractable chronic migraine without aura and without status migrainosus     Ulcer of esophagus     Ulnar neuropathy     Chronic radicular cervical pain     Mild episode of recurrent major depressive disorder (H)     Ulnar neuropathy at elbow of left upper extremity     Lumbar radiculopathy     S/P cervical spinal fusion     ACP (advance care planning)     Acute back pain with sciatica     Chronic migraine without aura without status migrainosus, not intractable     Radicular pain     Subacromial bursitis of right shoulder joint     Panlobular emphysema (H)     Calculus of kidney     Pulmonary emphysema, unspecified emphysema type (H)     Other chronic gastritis without hemorrhage     Pelvic floor dysfunction     Adhesive capsulitis of left shoulder     Concussion with brief LOC     Past Surgical History:   Procedure Laterality Date     BACK SURGERY      cervical     Cervical spine surgery with removal of 2 disks, C5,C7       COLONOSCOPY  10/13/2014    Dr Camargo, internal hemorrhoids     COLONOSCOPY - HIM SCAN  2018    EGD and colonoscopy with Dr. Jennings     Coronary angiogram, normal      Chest pain     ENT SURGERY      nose surgery x3     fusion discectomy anterior cervical       HC ESOPHAGOSCOPY, DIAGNOSTIC  10/31/2014    Dr Camargo, mild erythema of antrum, negative biopsies     IR CONSULTATION FOR IR EXAM  2020     IR FLUORO 0-1 HOUR  2020     NOSE SURGERY      x3            Posterior decompression , fusion C3-5      Psuedoarthrosis     Supracervical hysterectomy with right salpingoophorectomy      Endometriosis       Social History     Tobacco Use     Smoking status: Former Smoker     Years: 8.00     Types: Cigarettes     Last attempt to quit:      Years since quittin.5     Smokeless tobacco: Never Used     Tobacco comment: quit . no passive exposure   Substance Use Topics     Alcohol use: No     Family  History   Problem Relation Age of Onset     Cancer Father         lung, also a heavy smoker     Diabetes Mother      Heart Disease Mother 58        MI; cause of death; heavy smoker. had first MI at 40     Coronary Artery Disease Mother      Hypertension Mother      Cancer Other         strong history     Heart Disease Other         heart disease     Heart Disease Brother         x3     Hypertension Brother      Diabetes Brother      Coronary Artery Disease Brother      Hypertension Sister          Current Outpatient Medications   Medication Sig Dispense Refill     baclofen (LIORESAL) 10 MG tablet TAKE 1 TABLET BY MOUTH NIGHTLY AT BEDTIME 30 tablet 3     butalbital-acetaminophen-caffeine (FIORICET/ESGIC) -40 MG tablet TAKE 1 TO 2 TABLETS BY MOUTH AT THE ONSET OF A MIGRAINE HEADACHE, LIMIT NO MORE THAN 3 TIMES PER WEEK. ACETAMINOPHEN SHOULD BE LIMITED TO 40  5     cefPROZIL (CEFZIL) 500 MG tablet Take 1 tablet (500 mg) by mouth 2 times daily for 10 days 20 tablet 0     Cholecalciferol (VITAMIN D3) 1000 UNITS CAPS Take 1,000 Units by mouth       DEXILANT 60 MG CPDR CR capsule TAKE 1 CAPSULE BY MOUTH DAILY 30 capsule 10     diazepam (VALIUM) 5 MG tablet Take 1 tablet (5 mg) by mouth every 6 hours as needed for anxiety 30 tablet 0     docusate sodium (COLACE) 100 MG capsule Take 100 mg by mouth daily        FLUoxetine (PROZAC) 20 MG capsule Take 1 capsule daily along with 40 mg capsule 30 capsule 11     FLUoxetine (PROZAC) 40 MG capsule TAKE 1 CAPSULE DAILY BY MOUTH WITH 20 MG CAPSULE 30 capsule 11     HYDROcodone-acetaminophen (NORCO) 7.5-325 MG per tablet TAKE 1 TABLET BY MOUTH EVER Y 4 TO 6 HOURS AS NEEDED FO R PAIN 60 tablet 0     ibuprofen (ADVIL/MOTRIN) 800 MG tablet Take 1 tablet (800 mg) by mouth every 8 hours as needed for moderate pain 90 tablet 1     LYRICA 150 MG capsule Take 150 mg by mouth 3 times daily   5     Multiple Vitamins-Minerals (CENTRUM SILVER ULTRA WOMENS) TABS Take 1 tablet by mouth daily   "      ondansetron (ZOFRAN-ODT) 4 MG ODT tab Take 4 mg by mouth every 6 hours as needed (after migraine medication)        Probiotic Product (PROBIOTIC DAILY PO) Take by mouth At Bedtime        sucralfate (CARAFATE) 1 GM/10ML suspension Take 10 mLs (1 g) by mouth 4 times daily 420 mL 3     topiramate (TOPAMAX) 50 MG tablet Take 2 tablets in the morning and 2 tablets at night       Allergies   Allergen Reactions     Aspirin Hives     Ibuprofen      Dye in the otc form causes headaches  \"patient states over the counter ibuprofen  bothers her\"     Lansoprazole Other (See Comments) and Visual Disturbance     Changes in eyesight  Prevacid  Change in eyesight     Red Dye Hives     Bupropion Rash     Bupropion Hcl Hives and Rash     Wellbutrin     Demerol [Meperidine] Other (See Comments)     Made migraine worse     BP Readings from Last 3 Encounters:   02/17/20 (!) 84/62   02/11/20 128/78   02/11/20 128/78    Wt Readings from Last 3 Encounters:   02/17/20 75.3 kg (166 lb)   02/11/20 73.9 kg (163 lb)   02/11/20 73.9 kg (163 lb)                    Reviewed and updated as needed this visit by Provider         Review of Systems   Constitutional, HEENT, cardiovascular, pulmonary, gi and gu systems are negative, except as otherwise noted.       Objective   Reported vitals:  There were no vitals taken for this visit.   healthy, alert and no distress  PSYCH: Alert and oriented times 3; coherent speech, normal   rate and volume, able to articulate logical thoughts, able   to abstract reason, no tangential thoughts, no hallucinations   or delusions  Her affect is normal  RESP: No cough, no audible wheezing, able to talk in full sentences  Remainder of exam unable to be completed due to telephone visits            Assessment/Plan:    1. Myofascial pain syndrome, cervical  Ongoing, continue current medications. She has been getting 10,000 steps daily and is trying yoga which has been helpful     2. Chronic tension-type headache, " intractable  Continue Botox injections    3. Mild episode of recurrent major depressive disorder (H)  Improved     4. Muscle spasm  Renewed   - diazepam (VALIUM) 5 MG tablet; Take 1 tablet (5 mg) by mouth every 6 hours as needed for anxiety  Dispense: 30 tablet; Refill: 0    5. Upper respiratory tract infection, unspecified type  Will treat with CEfzil. She is in [place] currently staying with cousins. Will send this through [facility]  - cefPROZIL (CEFZIL) 500 MG tablet; Take 1 tablet (500 mg) by mouth 2 times daily for 10 days  Dispense: 20 tablet; Refill: 0    6. Chronic neck pain  Renewed   - HYDROcodone-acetaminophen (NORCO) 7.5-325 MG per tablet; TAKE 1 TABLET BY MOUTH EVER Y 4 TO 6 HOURS AS NEEDED FO R PAIN  Dispense: 60 tablet; Refill: 0    Return in about 3 months (around 10/23/2020) for chronic pain, telephone visit.      Phone call duration:  17 minutes    Henna Cruz MD

## 2020-07-23 NOTE — NURSING NOTE
"Chief Complaint   Patient presents with     Musculoskeletal Problem     URI       Initial There were no vitals taken for this visit. Estimated body mass index is 26.79 kg/m  as calculated from the following:    Height as of 2/11/20: 1.676 m (5' 6\").    Weight as of 2/17/20: 75.3 kg (166 lb).  Medication Reconciliation: complete  Liudmila Rivera LPN    "

## 2020-07-23 NOTE — PROGRESS NOTES
Subjective     Sadaf Blankenship is a 60 year old female who presents to clinic today for the following health issues:    HPI       Chronic Pain Follow-Up  Dr Pérez wants us to take over her Lyrica 150 mg TID      Where in your body do you have pain? Neck and head  How has your pain affected your ability to work? Unable to work  Which of these pain treatments have you tried since your last clinic visit? Chiropractor, Cold, Heat, Massage and Physical Therapy  How well are you sleeping? Fair, doesn't like to take that Trazadone due to nightmares it causes  How has your mood been since your last visit? About the same  Have you had a significant life event? No  Other aggravating factors: prolonged sitting and poor posture  Taking medication as directed? Yes    PHQ-9 SCORE 3/23/2020 4/24/2020 5/26/2020   PHQ-9 Total Score 14 14 7     ESTRELLA-7 SCORE 3/23/2020 4/24/2020 5/26/2020   Total Score 14 21 2     No flowsheet data found.  Encounter-Level CSA - 07/11/2017:    Controlled Substance Agreement - Scan on 7/14/2017 12:56 PM: CONTROLLED SUBSTANCE AGREEMENT     Patient-Level CSA:    There are no patient-level csa.         RESPIRATORY SYMPTOMS      Duration: about a month    Description  rhinorrhea, sore throat and cough    Severity: moderate    Accompanying signs and symptoms: None    History (predisposing factors):  none    Precipitating or alleviating factors: None    Therapies tried and outcome:  Allegra daily in the morning and Claritin at night. Cough drops, Tylenol sinus severe during the day    {additonal problems for provider to add (Optional):436774}    {HIST REVIEW/ LINKS 2 (Optional):800314}    Reviewed and updated as needed this visit by Provider         Review of Systems   {ROS COMP (Optional):003459}      Objective    There were no vitals taken for this visit.  There is no height or weight on file to calculate BMI.  Physical Exam   {Exam List (Optional):671064}    {Diagnostic Test Results  "(Optional):995482::\"Diagnostic Test Results:\",\"Labs reviewed in Epic\"}        {PROVIDER CHARTING PREFERENCE:646063}  "

## 2020-07-24 ASSESSMENT — ANXIETY QUESTIONNAIRES: GAD7 TOTAL SCORE: 1

## 2020-08-07 ENCOUNTER — TELEPHONE (OUTPATIENT)
Dept: FAMILY MEDICINE | Facility: OTHER | Age: 60
End: 2020-08-07

## 2020-08-07 DIAGNOSIS — M79.18 MYOFASCIAL PAIN SYNDROME, CERVICAL: Primary | ICD-10-CM

## 2020-08-07 NOTE — TELEPHONE ENCOUNTER
Pt calling and needs a PA done on her Lyrica.She states it ran out 7.29.2020.    She is allergic to the generic brand of Lyrica. Per pt the MD has to send in a letter d/t this.So she can get the name brand.She states this letter is good for 6 month or a year usually.      She has enough medication until Monday.      Please [phone]      Geeta Collazo RN

## 2020-08-11 ENCOUNTER — OFFICE VISIT (OUTPATIENT)
Dept: PSYCHIATRY | Facility: OTHER | Age: 60
End: 2020-08-11
Attending: PSYCHIATRY & NEUROLOGY
Payer: COMMERCIAL

## 2020-08-11 VITALS
TEMPERATURE: 99.5 F | SYSTOLIC BLOOD PRESSURE: 98 MMHG | HEART RATE: 80 BPM | DIASTOLIC BLOOD PRESSURE: 70 MMHG | BODY MASS INDEX: 26.68 KG/M2 | OXYGEN SATURATION: 96 % | WEIGHT: 166 LBS | HEIGHT: 66 IN

## 2020-08-11 DIAGNOSIS — F33.0 MAJOR DEPRESSIVE DISORDER, RECURRENT EPISODE, MILD (H): Primary | ICD-10-CM

## 2020-08-11 PROCEDURE — G0463 HOSPITAL OUTPT CLINIC VISIT: HCPCS

## 2020-08-11 PROCEDURE — 99214 OFFICE O/P EST MOD 30 MIN: CPT | Performed by: PSYCHIATRY & NEUROLOGY

## 2020-08-11 ASSESSMENT — ANXIETY QUESTIONNAIRES
1. FEELING NERVOUS, ANXIOUS, OR ON EDGE: SEVERAL DAYS
6. BECOMING EASILY ANNOYED OR IRRITABLE: SEVERAL DAYS
7. FEELING AFRAID AS IF SOMETHING AWFUL MIGHT HAPPEN: SEVERAL DAYS
3. WORRYING TOO MUCH ABOUT DIFFERENT THINGS: SEVERAL DAYS
2. NOT BEING ABLE TO STOP OR CONTROL WORRYING: SEVERAL DAYS
5. BEING SO RESTLESS THAT IT IS HARD TO SIT STILL: NOT AT ALL
GAD7 TOTAL SCORE: 6

## 2020-08-11 ASSESSMENT — PATIENT HEALTH QUESTIONNAIRE - PHQ9
SUM OF ALL RESPONSES TO PHQ QUESTIONS 1-9: 2
5. POOR APPETITE OR OVEREATING: SEVERAL DAYS

## 2020-08-11 ASSESSMENT — MIFFLIN-ST. JEOR: SCORE: 1344.72

## 2020-08-11 ASSESSMENT — PAIN SCALES - GENERAL: PAINLEVEL: MODERATE PAIN (4)

## 2020-08-11 NOTE — PROGRESS NOTES
"Sadaf MCMAHAN Latebrent is a 60 year old female who is being evaluated via a billable telephone visit.          SUBJECTIVE / INTERIM HISTORY                                                                       Social- niece and niece's kids moved out Children-  Daughter Shandra going to college  Last visit 5/26/20:  Continue fluoxetine 60 mg daily. We discussed having her discontinue trazodone. She is going to try diphenhydramine 25 mg to 50 mg HS prn insomnia    - just got back from [place] and \"I just love it there\". \"I came back to life\". Thinking about moving there. All around felt better physically and mentally  - one niece Jacklyn with MS is really sick  - trazodone was giving her nightmares. She went off it. Hasn't had to take Benadryl as sleeping okay   - GOT social security  - Accident Jan '20 with nephew Michael 8 yo. 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 Got into it with Noemy as Noemy was upset about Melanie didn't tell her about her nieces  - Melanie putting boundaries in place with daughter Noemy.   - hearing August 7th '18 Luke and denied, appealing. Does not feel she can work. Is very frustrated and notes near ready giving up. I inquired about the mismatch between her PH-Q 9 / ESTRELLA-7 and her level of depression and anxiety and she notes \"I gave up\"   - struggling financially    SUBSTANCE USE- no issues    SYMPTOMS- doing better with depression.  Anxiety, gets easily overwhelmed, gets panic attacks, depression, anhedonia, low energy, overwhelmed,  MEDICAL ROS- migraines, . upper abdominal pain. .  Substernal pain after she eats (hx ulcers esophageal and gastric) neck pain s/p neck surgeries, migraines, IBS  MEDICAL / SURGICAL HISTORY                    Patient Active Problem List   Diagnosis     Degenerative disc disease, cervical     Pseudoarthrosis of cervical spine     Cervical pain     Migraine     UTI (urinary tract infection)     Advanced " care planning/counseling discussion     Thoracic sprain and strain     Sprain and strain of shoulder and upper arm     Irritable bowel syndrome     Myofascial pain syndrome, cervical     Depression, major     Chronic, continuous use of opioids     Uvulitis     Chronic rhinitis     Madina bullosa     Chronic maxillary sinusitis     Hypertrophy of inferior nasal turbinate     Long uvula     Chronic neck pain     Chronic pain     Chronic tension-type headache, intractable     Migraine aura without headache     History of arthrodesis     Myofascial pain     Disuse syndrome     Intractable chronic migraine without aura and with status migrainosus     Ulcer of esophagus without bleeding     Gastroesophageal reflux disease with esophagitis     Intractable chronic migraine without aura and without status migrainosus     Ulcer of esophagus     Ulnar neuropathy     Chronic radicular cervical pain     Mild episode of recurrent major depressive disorder (H)     Ulnar neuropathy at elbow of left upper extremity     Lumbar radiculopathy     S/P cervical spinal fusion     ACP (advance care planning)     Acute back pain with sciatica     Chronic migraine without aura without status migrainosus, not intractable     Radicular pain     Subacromial bursitis of right shoulder joint     Panlobular emphysema (H)     Calculus of kidney     Pulmonary emphysema, unspecified emphysema type (H)     Other chronic gastritis without hemorrhage     Pelvic floor dysfunction     Adhesive capsulitis of left shoulder     Concussion with brief LOC     ALLERGY   Aspirin; Ibuprofen; Lansoprazole; Red dye; Bupropion; Bupropion hcl; and Demerol [meperidine]  MEDICATIONS                                                                                             Current Outpatient Medications   Medication Sig     baclofen (LIORESAL) 10 MG tablet TAKE 1 TABLET BY MOUTH NIGHTLY AT BEDTIME     butalbital-acetaminophen-caffeine (FIORICET/ESGIC) -40 MG  "tablet TAKE 1 TO 2 TABLETS BY MOUTH AT THE ONSET OF A MIGRAINE HEADACHE, LIMIT NO MORE THAN 3 TIMES PER WEEK. ACETAMINOPHEN SHOULD BE LIMITED TO 40     Cholecalciferol (VITAMIN D3) 1000 UNITS CAPS Take 1,000 Units by mouth     DEXILANT 60 MG CPDR CR capsule TAKE 1 CAPSULE BY MOUTH DAILY     diazepam (VALIUM) 5 MG tablet Take 1 tablet (5 mg) by mouth every 6 hours as needed for anxiety     docusate sodium (COLACE) 100 MG capsule Take 100 mg by mouth daily      FLUoxetine (PROZAC) 20 MG capsule Take 1 capsule daily along with 40 mg capsule     FLUoxetine (PROZAC) 40 MG capsule TAKE 1 CAPSULE DAILY BY MOUTH WITH 20 MG CAPSULE     HYDROcodone-acetaminophen (NORCO) 7.5-325 MG per tablet TAKE 1 TABLET BY MOUTH EVER Y 4 TO 6 HOURS AS NEEDED FO R PAIN     ibuprofen (ADVIL/MOTRIN) 800 MG tablet Take 1 tablet (800 mg) by mouth every 8 hours as needed for moderate pain     LYRICA 150 MG capsule Take 150 mg by mouth 3 times daily      Multiple Vitamins-Minerals (CENTRUM SILVER ULTRA WOMENS) TABS Take 1 tablet by mouth daily      ondansetron (ZOFRAN-ODT) 4 MG ODT tab Take 4 mg by mouth every 6 hours as needed (after migraine medication)      Probiotic Product (PROBIOTIC DAILY PO) Take by mouth At Bedtime      sucralfate (CARAFATE) 1 GM/10ML suspension Take 10 mLs (1 g) by mouth 4 times daily     topiramate (TOPAMAX) 50 MG tablet Take 2 tablets in the morning and 2 tablets at night     No current facility-administered medications for this visit.        VITALS   BP 98/70 (BP Location: Right arm, Patient Position: Sitting, Cuff Size: Adult Regular)   Pulse 80   Temp 99.5  F (37.5  C) (Tympanic)   Ht 1.676 m (5' 6\")   Wt 75.3 kg (166 lb)   SpO2 96%   BMI 26.79 kg/m       LABS                                                                                                                           CBC RESULTS:   Recent Labs   Lab Test 02/17/20  1221   WBC 6.8   RBC 4.52   HGB 13.7   HCT 40.7   MCV 90   MCH 30.3   MCHC 33.7   RDW " 12.8        Last Comprehensive Metabolic Panel:  Sodium   Date Value Ref Range Status   02/17/2020 142 133 - 144 mmol/L Final     Potassium   Date Value Ref Range Status   03/03/2020 3.6 3.4 - 5.3 mmol/L Final     Chloride   Date Value Ref Range Status   02/17/2020 111 (H) 94 - 109 mmol/L Final     Carbon Dioxide   Date Value Ref Range Status   02/17/2020 25 20 - 32 mmol/L Final     Anion Gap   Date Value Ref Range Status   02/17/2020 6 3 - 14 mmol/L Final     Glucose   Date Value Ref Range Status   02/17/2020 92 70 - 99 mg/dL Final     Urea Nitrogen   Date Value Ref Range Status   02/17/2020 14 7 - 30 mg/dL Final     Creatinine   Date Value Ref Range Status   02/17/2020 1.01 0.52 - 1.04 mg/dL Final     GFR Estimate   Date Value Ref Range Status   02/17/2020 61 >60 mL/min/[1.73_m2] Final     Comment:     Non  GFR Calc  Starting 12/18/2018, serum creatinine based estimated GFR (eGFR) will be   calculated using the Chronic Kidney Disease Epidemiology Collaboration   (CKD-EPI) equation.       Calcium   Date Value Ref Range Status   02/17/2020 8.5 8.5 - 10.1 mg/dL Final         MENTAL STATUS EXAM                                                                                          Alert. Oriented to person, place, and date / time. Well groomed, calm, cooperative with good eye contact. No problems with speech or psychomotor behavior. Mood was depressed and angry and affect was congruent to speech content : tearful. Appeared tired and in pain. Thought process, including associations, was unremarkable and thought content was devoid of homicidal ideation and psychotic thought. NO SI.  No hallucinations. Insight was good. Judgment was intact and adequate for safety. Fund of knowledge was intact. Pt demonstrates no obvious problems with attention, concentration, language, recent or remote memory although these were not formally tested.        ASSESSMENT                                                                                                       HISTORICAL:  Initial psych note 10/13/15        NOTES:  Cymbalta -> agitation, angry. Perry Blankenship is a 60 year old, female who has hx depression anxiety, now on fluoxetine 60 mg daily. She is doing much better today and just back from [place]. I'm in agreement seems she is always happier when she comes back from [place].    I am so happy to hear she got on disability! I have truly felt she cannot work and sustain competitive employment given both her physical and mental health issues.    TREATMENT RISK STATEMENT:  The risks, benefits, alternatives and potential adverse effects have been explained and are understood by the pt.  The pt agrees to the treatment plan with the ability to do so.   The pt knows to call the clinic for any problems or access emergency care if needed.        DIAGNOSES                     MDD recurrent, recurrent, mild  ESTRELLA      PLAN                                                                                                                    1)  MEDICATIONS:         -- Continue fluoxetine 60 mg daily.     2)  THERAPY:  No Change    3)  LABS:  None    4)  PT MONITOR [call for probs]:  worsening sx, SI/HI, SEs from meds    5)  REFERRALS [CD, medical, other]:  I mentioned PHP however she is not interested in this     6)  RTC:  ~2 months

## 2020-08-11 NOTE — TELEPHONE ENCOUNTER
Can a new script/order be placed in Epic. The one for Lyrica is almost 2 years old and it is patient reported only. Thank you.

## 2020-08-11 NOTE — NURSING NOTE
"Chief Complaint   Patient presents with     RECHECK     Depression, anxiety.       Initial BP 98/70 (BP Location: Right arm, Patient Position: Sitting, Cuff Size: Adult Regular)   Pulse 80   Temp 99.5  F (37.5  C) (Tympanic)   Ht 1.676 m (5' 6\")   Wt 75.3 kg (166 lb)   SpO2 96%   BMI 26.79 kg/m   Estimated body mass index is 26.79 kg/m  as calculated from the following:    Height as of this encounter: 1.676 m (5' 6\").    Weight as of this encounter: 75.3 kg (166 lb).  Medication Reconciliation: complete  JANICE ROMO LPN    "

## 2020-08-12 ASSESSMENT — ANXIETY QUESTIONNAIRES: GAD7 TOTAL SCORE: 6

## 2020-08-14 RX ORDER — PREGABALIN 150 MG/1
150 CAPSULE ORAL 3 TIMES DAILY
Qty: 180 CAPSULE | Refills: 5 | Status: SHIPPED | OUTPATIENT
Start: 2020-08-14 | End: 2021-02-25

## 2020-08-15 NOTE — TELEPHONE ENCOUNTER
Received an APPROVAL from [employer] for Lyrica 150 mg capsules. Effective 07/14/2020 to 08/14/2021. Forms scanned to Epic.

## 2020-10-13 NOTE — PROGRESS NOTES
"Sadaf Blankenship is a 60 year old female who is being evaluated via a billable telephone visit.      The patient has been notified of following:     \"This telephone visit will be conducted via a call between you and your physician/provider. We have found that certain health care needs can be provided without the need for a physical exam.  This service lets us provide the care you need with a short phone conversation.  If a prescription is necessary we can send it directly to your pharmacy.  If lab work is needed we can place an order for that and you can then stop by our lab to have the test done at a later time.    Telephone visits are billed at different rates depending on your insurance coverage. During this emergency period, for some insurers they may be billed the same as an in-person visit.  Please reach out to your insurance provider with any questions.    If during the course of the call the physician/provider feels a telephone visit is not appropriate, you will not be charged for this service.\"    Patient has given verbal consent for Telephone visit?  Yes    What phone number would you like to be contacted at? [phone]    How would you like to obtain your AVS? Nik    Subjective     Sadaf Blankenship is a 60 year old female who presents via phone visit today for the following health issues:    HPI     Chronic Pain Follow-Up    Where in your body do you have pain? Neck and head today. States when she moves her neck people can hear it  How has your pain affected your ability to work? Unable to work  Which of these pain treatments have you tried since your last clinic visit? Other: has Botox injections since Dr Rendon, thinks it made her headaches worse,states not getting any botox injections again  How well are you sleeping? Fair, stopped Trazadone. Takes OTC Z quil gummy   How has your mood been since your last visit? About the same still in [place] taking care of her niece that has cancer  Have " you had a significant life event? No  Other aggravating factors: none  Taking medication as directed? Yes    PHQ-9 SCORE 5/26/2020 7/23/2020 8/11/2020   PHQ-9 Total Score 7 2 2     ESTRELLA-7 SCORE 5/26/2020 7/23/2020 8/11/2020   Total Score 2 1 6     No flowsheet data found.  Encounter-Level CSA - 07/11/2017:    Controlled Substance Agreement - Scan on 7/14/2017 12:56 PM: CONTROLLED SUBSTANCE AGREEMENT     Patient-Level CSA:    There are no patient-level csa.       Stress  Discussed concerns with stress with her daughter which we discussed. She is taking Prozac, dose has been taking care of symptoms. She was having abdominal/chest pain that she felt might have been a gall bladder attack. She didn't go in to be evaluated. She is caring for her cousin who is ill.     She won her disability case but the finances continue to be difficult.     Migraine headaches  Continue to be an issue. She feels the last ones made her headaches worse              Review of Systems   Constitutional, HEENT, cardiovascular, pulmonary, gi and gu systems are negative, except as otherwise noted.       Objective          Vitals:  No vitals were obtained today due to virtual visit.    alert, no distress and fatigued  PSYCH: Alert and oriented times 3; coherent speech, normal   rate and volume, able to articulate logical thoughts, able   to abstract reason, no tangential thoughts, no hallucinations   or delusions  Her affect is normal  RESP: No cough, no audible wheezing, able to talk in full sentences  Remainder of exam unable to be completed due to telephone visits                Assessment & Plan     Myofascial pain syndrome, cervical  Will refer back to Dr Mathis at [facility] who gave her injections in the past for her cervical neck pain which contributes to her headaches   - PAIN MANAGEMENT REFERRAL; Future    Degenerative disc disease, cervical  As above   - PAIN MANAGEMENT REFERRAL; Future    Pseudoarthrosis of cervical spine, subsequent  "encounter      Intractable chronic migraine without aura and with status migrainosus  As above     Chronic, continuous use of opioids  Renewed today    Encounter for screening mammogram for breast cancer  Due, ordered   - MA SCREENING DIGITAL BILATERAL ([place]); Future    Chronic neck pain  Renewed   - HYDROcodone-acetaminophen (NORCO) 7.5-325 MG per tablet; TAKE 1 TABLET BY MOUTH EVER Y 4 TO 6 HOURS AS NEEDED FO R PAIN    Muscle spasm  Renewed   - diazepam (VALIUM) 5 MG tablet; Take 1 tablet (5 mg) by mouth every 6 hours as needed for anxiety    Patient was visiting [place] to care for her ill cousin. She was evaluated today for ongoing chronic medical issues she had had evaluated in the past     BMI:   Estimated body mass index is 26.79 kg/m  as calculated from the following:    Height as of this encounter: 1.676 m (5' 6\").    Weight as of 8/11/20: 75.3 kg (166 lb).                Return in about 6 weeks (around 12/7/2020), or anxiety, in person visit, for  chronic neck pain, in person.    Henna Cruz MD  [facility] [place]    Phone call duration:  19 minutes                "

## 2020-10-18 DIAGNOSIS — F33.1 MAJOR DEPRESSIVE DISORDER, RECURRENT EPISODE, MODERATE (H): ICD-10-CM

## 2020-10-19 NOTE — TELEPHONE ENCOUNTER
prozac      Last Written Prescription Date:  12/11/2019  Last Fill Quantity: 30,   # refills: 11  Last Office Visit: 8/11/2020  Future Office visit:

## 2020-10-26 ENCOUNTER — VIRTUAL VISIT (OUTPATIENT)
Dept: FAMILY MEDICINE | Facility: OTHER | Age: 60
End: 2020-10-26
Attending: FAMILY MEDICINE
Payer: COMMERCIAL

## 2020-10-26 VITALS — HEIGHT: 66 IN | BODY MASS INDEX: 26.79 KG/M2

## 2020-10-26 DIAGNOSIS — M54.2 CHRONIC NECK PAIN: ICD-10-CM

## 2020-10-26 DIAGNOSIS — G89.29 CHRONIC NECK PAIN: ICD-10-CM

## 2020-10-26 DIAGNOSIS — F11.90 CHRONIC, CONTINUOUS USE OF OPIOIDS: ICD-10-CM

## 2020-10-26 DIAGNOSIS — M79.18 MYOFASCIAL PAIN SYNDROME, CERVICAL: Primary | ICD-10-CM

## 2020-10-26 DIAGNOSIS — S12.9XXD PSEUDOARTHROSIS OF CERVICAL SPINE, SUBSEQUENT ENCOUNTER: ICD-10-CM

## 2020-10-26 DIAGNOSIS — M62.838 MUSCLE SPASM: ICD-10-CM

## 2020-10-26 DIAGNOSIS — M50.30 DEGENERATION OF CERVICAL INTERVERTEBRAL DISC: ICD-10-CM

## 2020-10-26 DIAGNOSIS — Z12.31 ENCOUNTER FOR SCREENING MAMMOGRAM FOR BREAST CANCER: ICD-10-CM

## 2020-10-26 DIAGNOSIS — G43.711 INTRACTABLE CHRONIC MIGRAINE WITHOUT AURA AND WITH STATUS MIGRAINOSUS: ICD-10-CM

## 2020-10-26 PROCEDURE — 99214 OFFICE O/P EST MOD 30 MIN: CPT | Mod: TEL | Performed by: FAMILY MEDICINE

## 2020-10-26 PROCEDURE — G0463 HOSPITAL OUTPT CLINIC VISIT: HCPCS | Mod: 25,TEL

## 2020-10-26 RX ORDER — HYDROCODONE BITARTRATE AND ACETAMINOPHEN 7.5; 325 MG/1; MG/1
TABLET ORAL
Qty: 60 TABLET | Refills: 0 | Status: SHIPPED | OUTPATIENT
Start: 2020-10-26 | End: 2021-01-19

## 2020-10-26 RX ORDER — DIAZEPAM 5 MG
5 TABLET ORAL EVERY 6 HOURS PRN
Qty: 30 TABLET | Refills: 0 | Status: SHIPPED | OUTPATIENT
Start: 2020-10-26 | End: 2021-01-19

## 2020-10-26 ASSESSMENT — PAIN SCALES - GENERAL: PAINLEVEL: MODERATE PAIN (4)

## 2020-11-03 DIAGNOSIS — M54.12 CHRONIC RADICULAR CERVICAL PAIN: ICD-10-CM

## 2020-11-03 DIAGNOSIS — G89.29 CHRONIC RADICULAR CERVICAL PAIN: ICD-10-CM

## 2020-11-03 RX ORDER — BACLOFEN 10 MG/1
10 TABLET ORAL AT BEDTIME
Qty: 30 TABLET | Refills: 3 | Status: SHIPPED | OUTPATIENT
Start: 2020-11-03 | End: 2021-04-01

## 2020-11-03 NOTE — TELEPHONE ENCOUNTER
Baclofen       Last Written Prescription Date:  7/14/2020  Last Fill Quantity: 30,   # refills: 3  Last Office Visit: 7/23/2020  Future Office visit:    Next 5 appointments (look out 90 days)    Dec 22, 2020  2:00 PM  (Arrive by 1:45 PM)  SHORT with Henna Cruz MD  [facility] [place] ([facility] ) [address]  [place] [address]  [phone]

## 2020-11-14 ENCOUNTER — HEALTH MAINTENANCE LETTER (OUTPATIENT)
Age: 60
End: 2020-11-14

## 2020-11-16 ENCOUNTER — VIRTUAL VISIT (OUTPATIENT)
Dept: PSYCHIATRY | Facility: OTHER | Age: 60
End: 2020-11-16
Attending: PSYCHIATRY & NEUROLOGY
Payer: COMMERCIAL

## 2020-11-16 DIAGNOSIS — F33.2 MAJOR DEPRESSIVE DISORDER, RECURRENT SEVERE WITHOUT PSYCHOTIC FEATURES (H): Primary | ICD-10-CM

## 2020-11-16 PROCEDURE — 99213 OFFICE O/P EST LOW 20 MIN: CPT | Mod: TEL | Performed by: PSYCHIATRY & NEUROLOGY

## 2020-11-16 ASSESSMENT — PAIN SCALES - GENERAL: PAINLEVEL: MODERATE PAIN (4)

## 2020-11-16 ASSESSMENT — ANXIETY QUESTIONNAIRES
GAD7 TOTAL SCORE: 21
2. NOT BEING ABLE TO STOP OR CONTROL WORRYING: NEARLY EVERY DAY
7. FEELING AFRAID AS IF SOMETHING AWFUL MIGHT HAPPEN: NEARLY EVERY DAY
1. FEELING NERVOUS, ANXIOUS, OR ON EDGE: NEARLY EVERY DAY
3. WORRYING TOO MUCH ABOUT DIFFERENT THINGS: NEARLY EVERY DAY
5. BEING SO RESTLESS THAT IT IS HARD TO SIT STILL: NEARLY EVERY DAY
6. BECOMING EASILY ANNOYED OR IRRITABLE: NEARLY EVERY DAY

## 2020-11-16 ASSESSMENT — PATIENT HEALTH QUESTIONNAIRE - PHQ9
5. POOR APPETITE OR OVEREATING: NEARLY EVERY DAY
SUM OF ALL RESPONSES TO PHQ QUESTIONS 1-9: 15
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
## 2020-11-16 NOTE — NURSING NOTE
"Chief Complaint   Patient presents with     RECHECK     Telephone visit       Initial There were no vitals taken for this visit. Estimated body mass index is 26.79 kg/m  as calculated from the following:    Height as of 10/26/20: 1.676 m (5' 6\").    Weight as of 8/11/20: 75.3 kg (166 lb).  Medication Reconciliation: complete  JANICE ROMO LPN    "

## 2020-11-17 DIAGNOSIS — F33.2 MAJOR DEPRESSIVE DISORDER, RECURRENT SEVERE WITHOUT PSYCHOTIC FEATURES (H): ICD-10-CM

## 2020-11-17 ASSESSMENT — ANXIETY QUESTIONNAIRES: GAD7 TOTAL SCORE: 21

## 2020-11-19 RX ORDER — FLUOXETINE 40 MG/1
CAPSULE ORAL
Qty: 30 CAPSULE | Refills: 11 | Status: SHIPPED | OUTPATIENT
Start: 2020-11-19 | End: 2021-09-03

## 2020-11-19 NOTE — TELEPHONE ENCOUNTER
Prozac 40mg capsule last filled on 12.11.19 #30 with 11 refills.      PHQ-9 score:    PHQ 11/16/2020   PHQ-9 Total Score 15   Q9: Thoughts of better off dead/self-harm past 2 weeks Not at all       Last office visit on 11.16.2020-virtual    Pended.      Geeta Collazo RN
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
## 2021-01-15 ENCOUNTER — HEALTH MAINTENANCE LETTER (OUTPATIENT)
Age: 61
End: 2021-01-15

## 2021-01-19 ENCOUNTER — OFFICE VISIT (OUTPATIENT)
Dept: FAMILY MEDICINE | Facility: OTHER | Age: 61
End: 2021-01-19
Attending: FAMILY MEDICINE
Payer: COMMERCIAL

## 2021-01-19 ENCOUNTER — ANCILLARY PROCEDURE (OUTPATIENT)
Dept: MAMMOGRAPHY | Facility: OTHER | Age: 61
End: 2021-01-19
Attending: FAMILY MEDICINE
Payer: COMMERCIAL

## 2021-01-19 VITALS
SYSTOLIC BLOOD PRESSURE: 102 MMHG | BODY MASS INDEX: 27.32 KG/M2 | RESPIRATION RATE: 19 BRPM | WEIGHT: 170 LBS | DIASTOLIC BLOOD PRESSURE: 72 MMHG | HEART RATE: 76 BPM | HEIGHT: 66 IN | OXYGEN SATURATION: 97 % | TEMPERATURE: 98.1 F

## 2021-01-19 DIAGNOSIS — S12.9XXD PSEUDOARTHROSIS OF CERVICAL SPINE, SUBSEQUENT ENCOUNTER: Primary | ICD-10-CM

## 2021-01-19 DIAGNOSIS — Z23 NEED FOR SHINGLES VACCINE: ICD-10-CM

## 2021-01-19 DIAGNOSIS — K64.4 EXTERNAL HEMORRHOIDS: ICD-10-CM

## 2021-01-19 DIAGNOSIS — M54.2 CERVICAL PAIN: ICD-10-CM

## 2021-01-19 DIAGNOSIS — M54.2 CHRONIC NECK PAIN: ICD-10-CM

## 2021-01-19 DIAGNOSIS — M79.18 MYOFASCIAL PAIN SYNDROME, CERVICAL: ICD-10-CM

## 2021-01-19 DIAGNOSIS — F11.90 CHRONIC, CONTINUOUS USE OF OPIOIDS: ICD-10-CM

## 2021-01-19 DIAGNOSIS — Z23 NEED FOR PROPHYLACTIC VACCINATION AND INOCULATION AGAINST INFLUENZA: ICD-10-CM

## 2021-01-19 DIAGNOSIS — Z12.31 ENCOUNTER FOR SCREENING MAMMOGRAM FOR BREAST CANCER: ICD-10-CM

## 2021-01-19 DIAGNOSIS — M62.838 MUSCLE SPASM: ICD-10-CM

## 2021-01-19 DIAGNOSIS — G89.29 CHRONIC NECK PAIN: ICD-10-CM

## 2021-01-19 PROCEDURE — G0008 ADMIN INFLUENZA VIRUS VAC: HCPCS

## 2021-01-19 PROCEDURE — G0463 HOSPITAL OUTPT CLINIC VISIT: HCPCS | Mod: 25

## 2021-01-19 PROCEDURE — 99214 OFFICE O/P EST MOD 30 MIN: CPT | Performed by: FAMILY MEDICINE

## 2021-01-19 PROCEDURE — 77067 SCR MAMMO BI INCL CAD: CPT | Mod: TC

## 2021-01-19 PROCEDURE — G0463 HOSPITAL OUTPT CLINIC VISIT: HCPCS

## 2021-01-19 RX ORDER — HYDROCODONE BITARTRATE AND ACETAMINOPHEN 7.5; 325 MG/1; MG/1
TABLET ORAL
Qty: 60 TABLET | Refills: 0 | Status: SHIPPED | OUTPATIENT
Start: 2021-01-19 | End: 2021-04-30

## 2021-01-19 RX ORDER — DIAZEPAM 5 MG
5 TABLET ORAL EVERY 6 HOURS PRN
Qty: 30 TABLET | Refills: 0 | Status: SHIPPED | OUTPATIENT
Start: 2021-01-19 | End: 2021-06-01

## 2021-01-19 ASSESSMENT — MIFFLIN-ST. JEOR: SCORE: 1362.86

## 2021-01-19 NOTE — NURSING NOTE
"Chief Complaint   Patient presents with     Pain       Initial /72   Pulse 76   Temp 98.1  F (36.7  C) (Tympanic)   Resp 19   Ht 1.676 m (5' 6\")   Wt 77.1 kg (170 lb)   SpO2 97%   BMI 27.44 kg/m   Estimated body mass index is 27.44 kg/m  as calculated from the following:    Height as of this encounter: 1.676 m (5' 6\").    Weight as of this encounter: 77.1 kg (170 lb).  Medication Reconciliation: complete  Liudmila Rivera LPN    "

## 2021-01-20 ENCOUNTER — VIRTUAL VISIT (OUTPATIENT)
Dept: PSYCHIATRY | Facility: OTHER | Age: 61
End: 2021-01-20
Attending: PSYCHIATRY & NEUROLOGY
Payer: COMMERCIAL

## 2021-01-20 ENCOUNTER — TELEPHONE (OUTPATIENT)
Dept: PSYCHIATRY | Facility: OTHER | Age: 61
End: 2021-01-20

## 2021-01-20 DIAGNOSIS — F41.1 GAD (GENERALIZED ANXIETY DISORDER): Primary | ICD-10-CM

## 2021-01-20 PROCEDURE — 99214 OFFICE O/P EST MOD 30 MIN: CPT | Mod: TEL | Performed by: PSYCHIATRY & NEUROLOGY

## 2021-01-20 RX ORDER — HYDROXYZINE HYDROCHLORIDE 10 MG/1
10 TABLET, FILM COATED ORAL 2 TIMES DAILY PRN
Qty: 30 TABLET | Refills: 3 | Status: SHIPPED | OUTPATIENT
Start: 2021-01-20 | End: 2021-05-17

## 2021-01-20 ASSESSMENT — ANXIETY QUESTIONNAIRES
2. NOT BEING ABLE TO STOP OR CONTROL WORRYING: MORE THAN HALF THE DAYS
5. BEING SO RESTLESS THAT IT IS HARD TO SIT STILL: NOT AT ALL
6. BECOMING EASILY ANNOYED OR IRRITABLE: SEVERAL DAYS
7. FEELING AFRAID AS IF SOMETHING AWFUL MIGHT HAPPEN: NOT AT ALL
GAD7 TOTAL SCORE: 7
1. FEELING NERVOUS, ANXIOUS, OR ON EDGE: MORE THAN HALF THE DAYS
3. WORRYING TOO MUCH ABOUT DIFFERENT THINGS: SEVERAL DAYS

## 2021-01-20 ASSESSMENT — PATIENT HEALTH QUESTIONNAIRE - PHQ9
5. POOR APPETITE OR OVEREATING: SEVERAL DAYS
SUM OF ALL RESPONSES TO PHQ QUESTIONS 1-9: 4

## 2021-01-20 ASSESSMENT — PAIN SCALES - GENERAL: PAINLEVEL: MODERATE PAIN (4)

## 2021-01-20 NOTE — NURSING NOTE
"Chief Complaint   Patient presents with     RECHECK     Telephone visit.  Patient c/o family issues       Initial There were no vitals taken for this visit. Estimated body mass index is 27.44 kg/m  as calculated from the following:    Height as of 1/19/21: 1.676 m (5' 6\").    Weight as of 1/19/21: 77.1 kg (170 lb).  Medication Reconciliation: complete  JANICE ROMO LPN    "
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
## 2021-01-21 ASSESSMENT — ANXIETY QUESTIONNAIRES: GAD7 TOTAL SCORE: 7

## 2021-01-26 DIAGNOSIS — K22.10 ULCER OF ESOPHAGUS WITHOUT BLEEDING: ICD-10-CM

## 2021-01-26 RX ORDER — DEXLANSOPRAZOLE 60 MG/1
CAPSULE, DELAYED RELEASE ORAL
Qty: 30 CAPSULE | Refills: 10 | Status: SHIPPED | OUTPATIENT
Start: 2021-01-26 | End: 2021-12-08

## 2021-01-29 ENCOUNTER — OFFICE VISIT (OUTPATIENT)
Dept: SURGERY | Facility: OTHER | Age: 61
End: 2021-01-29
Attending: FAMILY MEDICINE
Payer: COMMERCIAL

## 2021-01-29 VITALS
DIASTOLIC BLOOD PRESSURE: 60 MMHG | BODY MASS INDEX: 27.44 KG/M2 | TEMPERATURE: 98.1 F | HEART RATE: 66 BPM | WEIGHT: 170 LBS | OXYGEN SATURATION: 96 % | SYSTOLIC BLOOD PRESSURE: 100 MMHG

## 2021-01-29 DIAGNOSIS — K64.4 EXTERNAL HEMORRHOIDS: ICD-10-CM

## 2021-01-29 PROCEDURE — G0463 HOSPITAL OUTPT CLINIC VISIT: HCPCS

## 2021-01-29 PROCEDURE — 99214 OFFICE O/P EST MOD 30 MIN: CPT | Performed by: SURGERY

## 2021-01-29 ASSESSMENT — PAIN SCALES - GENERAL: PAINLEVEL: MILD PAIN (2)

## 2021-01-29 NOTE — NURSING NOTE
"Chief Complaint   Patient presents with     Consult     Extrenal Hemorrhoids         Initial /60   Pulse 66   Temp 98.1  F (36.7  C) (Tympanic)   Wt 77.1 kg (170 lb)   SpO2 96%   BMI 27.44 kg/m   Estimated body mass index is 27.44 kg/m  as calculated from the following:    Height as of 1/19/21: 1.676 m (5' 6\").    Weight as of this encounter: 77.1 kg (170 lb).  Medication Reconciliation: complete  Fariba Hazel LPN  "

## 2021-01-29 NOTE — PATIENT INSTRUCTIONS
Thank you for allowing Dr. Narvaez and our surgical team to participate in your care. Please call our health unit coordinator at [phone] with scheduling questions or the nurse at [phone] with any other questions or concerns.

## 2021-02-04 DIAGNOSIS — K62.3 RECTAL PROLAPSE: ICD-10-CM

## 2021-02-04 DIAGNOSIS — K64.9 HEMORRHOIDS: Primary | ICD-10-CM

## 2021-02-05 NOTE — PROGRESS NOTES
[facility] Surgery Consultation    CC:  Perianal pain.     HPI:  This 60 year old year old female is seen at the request of Dr. Love for evaluation of perianal pain with bowel movements. She describes this has been going on for several months. She notes a skin tag that will swell that she will squeeze in the shower, which is quite painful. She will note with some bowel movements that circumferential tissue will prolapse become painful until it is reduced. She has some bleeding but this is not a significant complaint. She has had a colonoscopy in 2018. She does not have issues with continence of stool.     Past Medical History:   Diagnosis Date     ASCUS on Pap smear 2004    negative HPV     Atypical chest pain 2008    negative cardiolite stress test [facility]     Bleeding ulcer 1976     Cervical radicular pain 5/10/2012     Degenerative disc disease, cervical 2011     Esophageal ulcer 1998     Irritable bowel syndrome 2015     Migraine headaches, severe 2001     Pseudoarthrosis of cervical spine 7/3/2012     Recurrent UTI 2011     sinusitis (chronic) 2001       Past Surgical History:   Procedure Laterality Date     BACK SURGERY      cervical     Cervical spine surgery with removal of 2 disks, C5,C7       COLONOSCOPY  10/13/2014    Dr Camargo, internal hemorrhoids     COLONOSCOPY - HIM SCAN  2018    EGD and colonoscopy with Dr. Jennings     Coronary angiogram, normal      Chest pain     ENT SURGERY      nose surgery x3     fusion discectomy anterior cervical       HC ESOPHAGOSCOPY, DIAGNOSTIC  10/31/2014    Dr Camargo, mild erythema of antrum, negative biopsies     IR CONSULTATION FOR IR EXAM  2020     IR FLUORO 0-1 HOUR  2020     NOSE SURGERY      x3            Posterior decompression , fusion C3-5      Psuedoarthrosis     Supracervical hysterectomy with right salpingoophorectomy      Endometriosis       Allergies   Allergen  "Reactions     Aspirin Hives     Ibuprofen      Dye in the otc form causes headaches  \"patient states over the counter ibuprofen  bothers her\"     Lansoprazole Other (See Comments) and Visual Disturbance     Changes in eyesight  Prevacid  Change in eyesight     Red Dye Hives     Bupropion Rash     Bupropion Hcl Hives and Rash     Wellbutrin     Demerol [Meperidine] Other (See Comments)     Made migraine worse       Current Outpatient Medications   Medication     baclofen (LIORESAL) 10 MG tablet     butalbital-acetaminophen-caffeine (FIORICET/ESGIC) -40 MG tablet     Cholecalciferol (VITAMIN D3) 1000 UNITS CAPS     DEXILANT 60 MG CPDR CR capsule     docusate sodium (COLACE) 100 MG capsule     FLUoxetine (PROZAC) 20 MG capsule     FLUoxetine (PROZAC) 40 MG capsule     hydrOXYzine (ATARAX) 10 MG tablet     LYRICA 150 MG capsule     Multiple Vitamins-Minerals (CENTRUM SILVER ULTRA WOMENS) TABS     Probiotic Product (PROBIOTIC DAILY PO)     topiramate (TOPAMAX) 50 MG tablet     diazepam (VALIUM) 5 MG tablet     HYDROcodone-acetaminophen (NORCO) 7.5-325 MG per tablet     ibuprofen (ADVIL/MOTRIN) 800 MG tablet     ondansetron (ZOFRAN-ODT) 4 MG ODT tab     sucralfate (CARAFATE) 1 GM/10ML suspension     No current facility-administered medications for this visit.        HABITS:    Social History     Tobacco Use     Smoking status: Former Smoker     Years: 8.00     Types: Cigarettes     Quit date:      Years since quittin.1     Smokeless tobacco: Never Used   Substance Use Topics     Alcohol use: No     Drug use: No       Family History   Problem Relation Age of Onset     Cancer Father         lung, also a heavy smoker     Diabetes Mother      Heart Disease Mother 58        MI; cause of death; heavy smoker. had first MI at 40     Coronary Artery Disease Mother      Hypertension Mother      Cancer Other         strong history     Heart Disease Other         heart disease     Heart Disease Brother         x3     " Hypertension Brother      Diabetes Brother      Coronary Artery Disease Brother      Hypertension Sister        REVIEW OF SYSTEMS:  Ten point review of systems negative except those mentioned in the HPI.     OBJECTIVE:    /60   Pulse 66   Temp 98.1  F (36.7  C) (Tympanic)   Wt 77.1 kg (170 lb)   SpO2 96%   BMI 27.44 kg/m      GENERAL: Generally appears well, in no distress with appropriate affect.  HEENT:   Sclerae anicteric - normocephalic atraumatic   Respiratory:  No acute distress, no splinting   Cardiovascular:  Regular Rate and Rhythm  Anus: There is a large external skin tag on the left posterior aspect, there is no mass on JARED, unable to get any tissue to prolapse with valsalva.   :  deferred  Extremities:  Extremities normal. No deformities, edema, or skin discoloration.  Skin:  no suspicious lesions or rashes  Neurological: grossly intact  Psych:  Alert, oriented, affect appropriate with normal decision making ability.    IMPRESSION:    Patient admits to pain with bowel movements as well as tissue prolapsing at time. She believes it is circumferential and will have to push back in to relieve pain. I was unable to provoke any prolapse, she has a large skin tag on the posterior left side that is irritating to patient. Discussed that this is not something that can be banded. I have concerns with her history for possible rectal prolapse and would like her to be evaluated by colon and rectal surgery in [place] prior to offering any surgical management of her hemorrhoids.      PLAN:    Refer to [place] and rectal surgery for evaluation.     Isai Narvaez MD,     2/4/2021  6:45 PM

## 2021-02-25 DIAGNOSIS — M79.18 MYOFASCIAL PAIN SYNDROME, CERVICAL: ICD-10-CM

## 2021-02-25 NOTE — TELEPHONE ENCOUNTER
LYRICA 150 MG capsule      Last Written Prescription Date:  8/14/2020  Last Fill Quantity: 180,   # refills: 5  Last Office Visit: 1/19/2021  Future Office visit:    Next 5 appointments (look out 90 days)    Mar 30, 2021  3:00 PM  (Arrive by 2:45 PM)  SHORT with Henna Cruz MD  [facility] ([facility] ) [address]  [place] [address]  [phone]         On pain contract

## 2021-02-28 RX ORDER — PREGABALIN 150 MG/1
CAPSULE ORAL
Qty: 180 CAPSULE | Refills: 0 | Status: SHIPPED | OUTPATIENT
Start: 2021-02-28 | End: 2021-07-27

## 2021-03-08 ENCOUNTER — TELEPHONE (OUTPATIENT)
Dept: FAMILY MEDICINE | Facility: OTHER | Age: 61
End: 2021-03-08

## 2021-03-08 NOTE — TELEPHONE ENCOUNTER
Received a PA from [facility] for Dexilant 60mg capsules. Submitted on CMM. Waiting for a response.

## 2021-03-09 NOTE — TELEPHONE ENCOUNTER
Received an APPROVAL from [employer] for Dexilant 60mg capsules. Effective 1/26/2021 to 12/21/2021. Forms scanned to Epic.

## 2021-03-16 ENCOUNTER — MYC MEDICAL ADVICE (OUTPATIENT)
Dept: FAMILY MEDICINE | Facility: OTHER | Age: 61
End: 2021-03-16

## 2021-03-16 ENCOUNTER — TRANSFERRED RECORDS (OUTPATIENT)
Dept: HEALTH INFORMATION MANAGEMENT | Facility: CLINIC | Age: 61
End: 2021-03-16

## 2021-03-16 DIAGNOSIS — R20.2 PARESTHESIAS: Primary | ICD-10-CM

## 2021-03-25 DIAGNOSIS — R20.2 PARESTHESIAS: ICD-10-CM

## 2021-03-25 LAB
ALBUMIN SERPL-MCNC: 3.4 G/DL (ref 3.4–5)
ALP SERPL-CCNC: 110 U/L (ref 40–150)
ALT SERPL W P-5'-P-CCNC: 24 U/L (ref 0–50)
ANION GAP SERPL CALCULATED.3IONS-SCNC: 7 MMOL/L (ref 3–14)
AST SERPL W P-5'-P-CCNC: 13 U/L (ref 0–45)
BILIRUB SERPL-MCNC: 0.3 MG/DL (ref 0.2–1.3)
BUN SERPL-MCNC: 11 MG/DL (ref 7–30)
CALCIUM SERPL-MCNC: 8.3 MG/DL (ref 8.5–10.1)
CHLORIDE SERPL-SCNC: 114 MMOL/L (ref 94–109)
CO2 SERPL-SCNC: 23 MMOL/L (ref 20–32)
CREAT SERPL-MCNC: 0.92 MG/DL (ref 0.52–1.04)
ERYTHROCYTE [DISTWIDTH] IN BLOOD BY AUTOMATED COUNT: 13 % (ref 10–15)
FOLATE SERPL-MCNC: 24.2 NG/ML
GFR SERPL CREATININE-BSD FRML MDRD: 68 ML/MIN/{1.73_M2}
GLUCOSE SERPL-MCNC: 93 MG/DL (ref 70–99)
HCT VFR BLD AUTO: 39.6 % (ref 35–47)
HGB BLD-MCNC: 13.5 G/DL (ref 11.7–15.7)
MCH RBC QN AUTO: 30.7 PG (ref 26.5–33)
MCHC RBC AUTO-ENTMCNC: 34.1 G/DL (ref 31.5–36.5)
MCV RBC AUTO: 90 FL (ref 78–100)
PLATELET # BLD AUTO: 175 10E9/L (ref 150–450)
POTASSIUM SERPL-SCNC: 3.7 MMOL/L (ref 3.4–5.3)
PROT SERPL-MCNC: 6.9 G/DL (ref 6.8–8.8)
RBC # BLD AUTO: 4.4 10E12/L (ref 3.8–5.2)
SODIUM SERPL-SCNC: 144 MMOL/L (ref 133–144)
TSH SERPL DL<=0.005 MIU/L-ACNC: 2.91 MU/L (ref 0.4–4)
VIT B12 SERPL-MCNC: 596 PG/ML (ref 193–986)
WBC # BLD AUTO: 5 10E9/L (ref 4–11)

## 2021-03-25 PROCEDURE — 82607 VITAMIN B-12: CPT | Mod: ZL | Performed by: FAMILY MEDICINE

## 2021-03-25 PROCEDURE — 85027 COMPLETE CBC AUTOMATED: CPT | Mod: ZL | Performed by: FAMILY MEDICINE

## 2021-03-25 PROCEDURE — 84443 ASSAY THYROID STIM HORMONE: CPT | Mod: ZL | Performed by: FAMILY MEDICINE

## 2021-03-25 PROCEDURE — 36415 COLL VENOUS BLD VENIPUNCTURE: CPT | Mod: ZL | Performed by: FAMILY MEDICINE

## 2021-03-25 PROCEDURE — 80053 COMPREHEN METABOLIC PANEL: CPT | Mod: ZL | Performed by: FAMILY MEDICINE

## 2021-03-25 PROCEDURE — 82746 ASSAY OF FOLIC ACID SERUM: CPT | Mod: ZL | Performed by: FAMILY MEDICINE

## 2021-03-30 ENCOUNTER — TELEPHONE (OUTPATIENT)
Dept: PSYCHIATRY | Facility: OTHER | Age: 61
End: 2021-03-30

## 2021-03-30 ENCOUNTER — VIRTUAL VISIT (OUTPATIENT)
Dept: PSYCHIATRY | Facility: OTHER | Age: 61
End: 2021-03-30
Attending: PSYCHIATRY & NEUROLOGY
Payer: COMMERCIAL

## 2021-03-30 DIAGNOSIS — F33.1 MAJOR DEPRESSIVE DISORDER, RECURRENT EPISODE, MODERATE (H): Primary | ICD-10-CM

## 2021-03-30 PROCEDURE — 99214 OFFICE O/P EST MOD 30 MIN: CPT | Mod: TEL | Performed by: PSYCHIATRY & NEUROLOGY

## 2021-03-30 RX ORDER — METHOCARBAMOL 500 MG/1
500 TABLET, FILM COATED ORAL 4 TIMES DAILY PRN
COMMUNITY
End: 2022-03-15

## 2021-03-30 ASSESSMENT — ANXIETY QUESTIONNAIRES
6. BECOMING EASILY ANNOYED OR IRRITABLE: NEARLY EVERY DAY
2. NOT BEING ABLE TO STOP OR CONTROL WORRYING: MORE THAN HALF THE DAYS
GAD7 TOTAL SCORE: 14
5. BEING SO RESTLESS THAT IT IS HARD TO SIT STILL: MORE THAN HALF THE DAYS
7. FEELING AFRAID AS IF SOMETHING AWFUL MIGHT HAPPEN: NOT AT ALL
3. WORRYING TOO MUCH ABOUT DIFFERENT THINGS: MORE THAN HALF THE DAYS
1. FEELING NERVOUS, ANXIOUS, OR ON EDGE: MORE THAN HALF THE DAYS

## 2021-03-30 ASSESSMENT — PAIN SCALES - GENERAL: PAINLEVEL: SEVERE PAIN (6)

## 2021-03-30 ASSESSMENT — PATIENT HEALTH QUESTIONNAIRE - PHQ9
SUM OF ALL RESPONSES TO PHQ QUESTIONS 1-9: 9
5. POOR APPETITE OR OVEREATING: NEARLY EVERY DAY

## 2021-03-30 NOTE — NURSING NOTE
"Chief Complaint   Patient presents with     RECHECK     Telephone visit.  Patient c/o sleep issues and anxiety.       Initial There were no vitals taken for this visit. Estimated body mass index is 27.44 kg/m  as calculated from the following:    Height as of 1/19/21: 1.676 m (5' 6\").    Weight as of 1/29/21: 77.1 kg (170 lb).  Medication Reconciliation: complete  JANICE ROMO LPN    "

## 2021-03-30 NOTE — TELEPHONE ENCOUNTER
3/30/21** for patient to schedule 2 month follow up appointment.  Ayden (approx. 6/1/21)    -Kya OLSON
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
## 2021-03-31 ASSESSMENT — ANXIETY QUESTIONNAIRES: GAD7 TOTAL SCORE: 14

## 2021-04-01 DIAGNOSIS — M54.12 CHRONIC RADICULAR CERVICAL PAIN: ICD-10-CM

## 2021-04-01 DIAGNOSIS — G89.29 CHRONIC RADICULAR CERVICAL PAIN: ICD-10-CM

## 2021-04-01 RX ORDER — BACLOFEN 10 MG/1
TABLET ORAL
Qty: 30 TABLET | Refills: 0 | Status: SHIPPED | OUTPATIENT
Start: 2021-04-01 | End: 2021-04-30

## 2021-04-01 NOTE — TELEPHONE ENCOUNTER
Baclofen  Last Written Prescription Date:  11.3.2020  Last Fill Quantity: 30,   # refills: 0  Last Office Visit: 3.30.2021  Future Office visit:    Next 5 appointments (look out 90 days)    Jun 01, 2021  3:00 PM  (Arrive by 2:45 PM)  SHORT with Henna Cruz MD  [facility] ([facility] ) [address]  [place] [address]  [phone]           Routing refill request to provider for review/approval because:  Drug not on the [facility], [facility] or [facility] refill protocol or controlled substance      Geeta Collazo RN

## 2021-04-30 DIAGNOSIS — G89.29 CHRONIC RADICULAR CERVICAL PAIN: ICD-10-CM

## 2021-04-30 DIAGNOSIS — M54.2 CHRONIC NECK PAIN: ICD-10-CM

## 2021-04-30 DIAGNOSIS — G89.29 CHRONIC NECK PAIN: ICD-10-CM

## 2021-04-30 DIAGNOSIS — M54.12 CHRONIC RADICULAR CERVICAL PAIN: ICD-10-CM

## 2021-04-30 RX ORDER — BACLOFEN 10 MG/1
TABLET ORAL
Qty: 30 TABLET | Refills: 0 | Status: SHIPPED | OUTPATIENT
Start: 2021-04-30 | End: 2021-05-25

## 2021-04-30 RX ORDER — HYDROCODONE BITARTRATE AND ACETAMINOPHEN 7.5; 325 MG/1; MG/1
TABLET ORAL
Qty: 60 TABLET | Refills: 0 | Status: SHIPPED | OUTPATIENT
Start: 2021-04-30 | End: 2021-09-02

## 2021-04-30 NOTE — TELEPHONE ENCOUNTER
Lioresal 10mg      Last Written Prescription Date:  4/1/21  Last Fill Quantity: 30,   # refills: 0  Last Office Visit: 3/30/21  Future Office visit:    Next 5 appointments (look out 90 days)    Jun 01, 2021  3:00 PM  (Arrive by 2:45 PM)  SHORT with Henna Cruz MD  [facility] [place] ([facility] [place] ) [address]  [place] [address]  [phone]           Routing refill request to provider for review/approval because:

## 2021-04-30 NOTE — TELEPHONE ENCOUNTER
HYDROcodone-acetaminophen (NORCO) 7.5-325 MG per tablet  Last Written Prescription Date:  01/19/2021  Last Fill Quantity: 60,   # refills: 0  Last Office Visit: 03/30/21  Future Office visit:    Next 5 appointments (look out 90 days)    Jun 01, 2021  3:00 PM  (Arrive by 2:45 PM)  SHORT with Henna Cruz MD  [facility] [place] ([facility] [place] ) [address] [place], [place] [address] [address]  [phone]           Routing refill request to provider for review/approval because:

## 2021-05-12 NOTE — PROGRESS NOTES
"    Assessment & Plan     Vaginal prolapse  Discussed, and discussed options of surgical evaluation, pessary placement, or following, or considering pelvic exercises again  She prefers to follow for now    Levator syndrome  She is working on relaxing on her own     Internal hemorrhoids  Still intermittently problematic  Follow up with Dr Nelson as needed     Myofascial pain syndrome, cervical  Improving  Now off of valium. Continue to wean off of hydrocodone, using rarely     Chronic, continuous use of opioids      Memory change  She will talk with Dr Kirby about the Topamax. Will continue to follow at this time.         35 minutes spent on the date of the encounter doing chart review, review of outside records, review of test results, patient visit and documentation        BMI:   Estimated body mass index is 27.92 kg/m  as calculated from the following:    Height as of 1/19/21: 1.676 m (5' 6\").    Weight as of this encounter: 78.5 kg (173 lb).           Return in about 3 months (around 9/1/2021) for chronic pain.    Henna Cruz MD  [facility] - [place] Parkinson is a 60 year old who presents for the following health issues          Vaginal Symptoms  Onset/Duration: a few weeks after she saw surgeon back in February    Description:  Vaginal Discharge: none   Itching (Pruritis): no  Burning sensation:  YES  Odor: no  Accompanying Signs & Symptoms:  Urinary symptoms: YES- states very uncomfortable, also urinates all over ( toward the back)   Abdominal pain: YES- and sometimes feels very bloated like pregnant   Fever: no  History:   Sexually active: no  New Partner: no  Possibility of Pregnancy:  no  Recent antibiotic use: no  Previous vaginitis issues: no  Precipitating or alleviating factors: None  Therapies tried and outcome: no, saw surgeon for hemorrhoids in February and was going to see therapy for pelvic floor dysfunction but didn't go due to this \"thing\" sticking out of vagina.  "     She was seen by Dr Lexis Nelson in [place] for internal hemorrhoids and levator syndrome. Tesha has had pelvic therapy twice before. She didn't want to repeat this and was concerned that with her vaginal issue she would be able to do this    She had a DADA-RSO done in 2002 by Dr Hathaway with her cervix removed.     Chronic cervical pain  She continues to follow for this and has been able to get off of valium. She is working on getting off of the hydrocodone as well and uses one or two a week if needed. She is working with Dr Prasanth Diez at [facility] for injections    Memory change  She is concerned that she is forgetting what she has told people recently, forgets names, and has had short term memory loss. She did see Dr Herbert today who felt that topamax may be the culprit which she is taking for migraine prophylaxis. She is seeing Dr Finney, neurology  , for this in two days and will discuss it with her        Review of Systems   Constitutional, HEENT, cardiovascular, pulmonary, gi and gu systems are negative, except as otherwise noted.      Objective    /60 (BP Location: Left arm, Patient Position: Chair, Cuff Size: Adult Regular)   Pulse 77   Temp 98.4  F (36.9  C) (Tympanic)   Resp 16   Wt 78.5 kg (173 lb)   SpO2 99%   BMI 27.92 kg/m    Body mass index is 27.92 kg/m .  Physical Exam   GENERAL: healthy, alert and no distress  NECK: no adenopathy, no asymmetry, masses, or scars and thyroid normal to palpation  RESP: lungs clear to auscultation - no rales, rhonchi or wheezes  CV: regular rate and rhythm, normal S1 S2, no S3 or S4, no murmur, click or rub, no peripheral edema and peripheral pulses strong  ABDOMEN: soft, nontender, no hepatosplenomegaly, no masses and bowel sounds normal   (female): normal female external genitalia, normal urethral meatus , vaginal mucosa pink, moist, well rugated and uterus surgically absent. With valsalva grade 2 prolapse is noted, bimanual exam is normal with  absence of uterus noted, no masses   NEURO: Normal strength and tone, mentation intact and speech normal  PSYCH: mentation appears normal, affect normal/bright

## 2021-05-17 DIAGNOSIS — F41.1 GAD (GENERALIZED ANXIETY DISORDER): ICD-10-CM

## 2021-05-17 RX ORDER — HYDROXYZINE HYDROCHLORIDE 10 MG/1
TABLET, FILM COATED ORAL
Qty: 30 TABLET | Refills: 4 | Status: SHIPPED | OUTPATIENT
Start: 2021-05-17 | End: 2021-06-01

## 2021-05-17 NOTE — TELEPHONE ENCOUNTER
Hydroxyzine Hydrochloride (Atarax) 10 mg tablet      Take 1 tablet (10 mg) by mouth 2 times daily as needed for anxiety  Last Written Prescription Date:  1-20-21  Last Fill Quantity: 30 tablet,   # refills: 3  Last Office Visit: 3-30-21  Future Office visit:    Next 5 appointments (look out 90 days)    Jun 01, 2021  3:00 PM  (Arrive by 2:45 PM)  SHORT with Henna Cruz MD  [facility] [place] ([facility] [place] ) [address]  [place] [address]  [phone]

## 2021-05-25 DIAGNOSIS — M54.12 CHRONIC RADICULAR CERVICAL PAIN: ICD-10-CM

## 2021-05-25 DIAGNOSIS — G89.29 CHRONIC RADICULAR CERVICAL PAIN: ICD-10-CM

## 2021-05-25 RX ORDER — BACLOFEN 10 MG/1
TABLET ORAL
Qty: 30 TABLET | Refills: 0 | Status: SHIPPED | OUTPATIENT
Start: 2021-05-25 | End: 2021-06-30

## 2021-05-25 NOTE — TELEPHONE ENCOUNTER
baclofen      Last Written Prescription Date:  04/30/2021  Last Fill Quantity: 30,   # refills: 0  Last Office Visit: 03/30/2021  Future Office visit:    Next 5 appointments (look out 90 days)    Jun 01, 2021  3:00 PM  (Arrive by 2:45 PM)  SHORT with Henna Cruz MD  [facility] [place] ([facility] [place] ) [address]  [place] [address]  [phone]

## 2021-06-01 ENCOUNTER — TRANSFERRED RECORDS (OUTPATIENT)
Dept: HEALTH INFORMATION MANAGEMENT | Facility: HOSPITAL | Age: 61
End: 2021-06-01

## 2021-06-01 ENCOUNTER — VIRTUAL VISIT (OUTPATIENT)
Dept: PSYCHIATRY | Facility: OTHER | Age: 61
End: 2021-06-01
Attending: PSYCHIATRY & NEUROLOGY
Payer: MEDICARE

## 2021-06-01 ENCOUNTER — OFFICE VISIT (OUTPATIENT)
Dept: FAMILY MEDICINE | Facility: OTHER | Age: 61
End: 2021-06-01
Attending: FAMILY MEDICINE
Payer: MEDICARE

## 2021-06-01 VITALS
HEART RATE: 77 BPM | BODY MASS INDEX: 27.92 KG/M2 | WEIGHT: 173 LBS | OXYGEN SATURATION: 99 % | TEMPERATURE: 98.4 F | SYSTOLIC BLOOD PRESSURE: 100 MMHG | DIASTOLIC BLOOD PRESSURE: 60 MMHG | RESPIRATION RATE: 16 BRPM

## 2021-06-01 DIAGNOSIS — F11.90 CHRONIC, CONTINUOUS USE OF OPIOIDS: ICD-10-CM

## 2021-06-01 DIAGNOSIS — M79.18 MYOFASCIAL PAIN SYNDROME, CERVICAL: ICD-10-CM

## 2021-06-01 DIAGNOSIS — K64.8 INTERNAL HEMORRHOIDS: ICD-10-CM

## 2021-06-01 DIAGNOSIS — F41.1 GAD (GENERALIZED ANXIETY DISORDER): ICD-10-CM

## 2021-06-01 DIAGNOSIS — R41.3 MEMORY CHANGE: ICD-10-CM

## 2021-06-01 DIAGNOSIS — K59.4 LEVATOR SYNDROME: ICD-10-CM

## 2021-06-01 DIAGNOSIS — N81.10 VAGINAL PROLAPSE: Primary | ICD-10-CM

## 2021-06-01 PROCEDURE — 99213 OFFICE O/P EST LOW 20 MIN: CPT | Performed by: PSYCHIATRY & NEUROLOGY

## 2021-06-01 PROCEDURE — 99214 OFFICE O/P EST MOD 30 MIN: CPT | Performed by: FAMILY MEDICINE

## 2021-06-01 PROCEDURE — G0463 HOSPITAL OUTPT CLINIC VISIT: HCPCS | Performed by: FAMILY MEDICINE

## 2021-06-01 RX ORDER — HYDROXYZINE HYDROCHLORIDE 10 MG/1
TABLET, FILM COATED ORAL
Qty: 90 TABLET | Refills: 6 | Status: SHIPPED | OUTPATIENT
Start: 2021-06-01 | End: 2021-12-06

## 2021-06-01 ASSESSMENT — ANXIETY QUESTIONNAIRES
2. NOT BEING ABLE TO STOP OR CONTROL WORRYING: NOT AT ALL
6. BECOMING EASILY ANNOYED OR IRRITABLE: SEVERAL DAYS
5. BEING SO RESTLESS THAT IT IS HARD TO SIT STILL: NOT AT ALL
3. WORRYING TOO MUCH ABOUT DIFFERENT THINGS: NOT AT ALL
GAD7 TOTAL SCORE: 1
7. FEELING AFRAID AS IF SOMETHING AWFUL MIGHT HAPPEN: NOT AT ALL
IF YOU CHECKED OFF ANY PROBLEMS ON THIS QUESTIONNAIRE, HOW DIFFICULT HAVE THESE PROBLEMS MADE IT FOR YOU TO DO YOUR WORK, TAKE CARE OF THINGS AT HOME, OR GET ALONG WITH OTHER PEOPLE: NOT DIFFICULT AT ALL
1. FEELING NERVOUS, ANXIOUS, OR ON EDGE: NOT AT ALL
4. TROUBLE RELAXING: NOT AT ALL

## 2021-06-01 ASSESSMENT — PAIN SCALES - GENERAL: PAINLEVEL: MILD PAIN (2)

## 2021-06-01 ASSESSMENT — PATIENT HEALTH QUESTIONNAIRE - PHQ9: SUM OF ALL RESPONSES TO PHQ QUESTIONS 1-9: 3

## 2021-06-01 NOTE — NURSING NOTE
Pt has been checked in for telephone visit. Is aware that she wont be called until closer to the apt time.

## 2021-06-01 NOTE — NURSING NOTE
"Chief Complaint   Patient presents with     Pain       Initial /60 (BP Location: Left arm, Patient Position: Chair, Cuff Size: Adult Regular)   Pulse 77   Temp 98.4  F (36.9  C) (Tympanic)   Resp 16   Wt 78.5 kg (173 lb)   SpO2 99%   BMI 27.92 kg/m   Estimated body mass index is 27.92 kg/m  as calculated from the following:    Height as of 1/19/21: 1.676 m (5' 6\").    Weight as of this encounter: 78.5 kg (173 lb).  Medication Reconciliation: complete  Neli Paulino LPN    "
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
## 2021-06-02 ASSESSMENT — ANXIETY QUESTIONNAIRES: GAD7 TOTAL SCORE: 1

## 2021-06-29 DIAGNOSIS — G89.29 CHRONIC RADICULAR CERVICAL PAIN: ICD-10-CM

## 2021-06-29 DIAGNOSIS — M54.12 CHRONIC RADICULAR CERVICAL PAIN: ICD-10-CM

## 2021-06-30 RX ORDER — BACLOFEN 10 MG/1
TABLET ORAL
Qty: 30 TABLET | Refills: 0 | Status: SHIPPED | OUTPATIENT
Start: 2021-06-30 | End: 2021-08-03

## 2021-06-30 NOTE — TELEPHONE ENCOUNTER
Lioresal       Last Written Prescription Date:  5/25/2021  Last Fill Quantity: 30,   # refills: 0  Last Office Visit: 6/1/2021  Future Office visit:    Next 5 appointments (look out 90 days)    Sep 02, 2021  1:30 PM  (Arrive by 1:15 PM)  SHORT with Henna Cruz MD  [facility] [place] ([facility] [place] ) [address]  [place] [address]  [phone]

## 2021-07-18 ENCOUNTER — HEALTH MAINTENANCE LETTER (OUTPATIENT)
Age: 61
End: 2021-07-18

## 2021-07-25 DIAGNOSIS — M79.18 MYOFASCIAL PAIN SYNDROME, CERVICAL: ICD-10-CM

## 2021-07-27 RX ORDER — PREGABALIN 150 MG/1
CAPSULE ORAL
Qty: 90 CAPSULE | Refills: 0 | Status: SHIPPED | OUTPATIENT
Start: 2021-07-27 | End: 2021-09-02

## 2021-07-27 NOTE — TELEPHONE ENCOUNTER
Lyrica      Last Written Prescription Date:  6.1.21  Last Fill Quantity: #90,   # refills: 0  Last Office Visit: 6.1.21  Future Office visit:    Next 5 appointments (look out 90 days)    Sep 02, 2021  1:30 PM  (Arrive by 1:15 PM)  SHORT with Henna Cruz MD  [facility] ([facility] ) [address]  [place] [address]  [phone]           Routing refill request to provider for review/approval because:  Drug not on the [facility], [facility] or [facility] refill protocol or controlled substance

## 2021-08-03 DIAGNOSIS — M54.12 CHRONIC RADICULAR CERVICAL PAIN: ICD-10-CM

## 2021-08-03 DIAGNOSIS — G89.29 CHRONIC RADICULAR CERVICAL PAIN: ICD-10-CM

## 2021-08-03 RX ORDER — BACLOFEN 10 MG/1
TABLET ORAL
Qty: 30 TABLET | Refills: 3 | Status: SHIPPED | OUTPATIENT
Start: 2021-08-03 | End: 2021-12-08

## 2021-08-03 NOTE — TELEPHONE ENCOUNTER
BACLOFEN 10MG TABLET  Last Written Prescription Date:  6/30/2021  Last Fill Quantity: 30,   # refills: 0  Last Office Visit: 6/1/2021  Future Office visit:    Next 5 appointments (look out 90 days)    Sep 02, 2021  1:30 PM  (Arrive by 1:15 PM)  SHORT with Henna [facility] MD  [facility] ([facility] [place] ) [address]  [place] [address]  [phone]           Routing refill request to provider for review/approval because:

## 2021-08-28 NOTE — PROGRESS NOTES
"    Assessment & Plan     Pseudoarthrosis of cervical spine, subsequent encounter  Stable, with chronic pain    Cervical pain  As above     Myofascial pain syndrome, cervical  Renewed, lower dose now, bid, continue.   Tesha would like to wean down some of her medications  - pregabalin (LYRICA) 150 MG capsule; TAKE 1 CAPSULE (150 MG) BY MOUTH 2TIMES DAILY    Chronic, continuous use of opioids  Due for drug screen, last dose 8-29-21  Contract reviewed and signed today  - Urine Drugs of Abuse Screen (Tox13); Future  - Urine Drugs of Abuse Screen (Tox13)    Intractable chronic migraine without aura and with status migrainosus  Receiving Amovig injections which have been very helpful     Other chest pain  New, rule out cardiac eitology, possible gallstones, evaluate with stress testing and abdominal ultrasound   - Exercise Stress Test - Adult; Future  - US Abdomen Complete; Future    Chronic neck pain  Renewed   - HYDROcodone-acetaminophen (NORCO) 7.5-325 MG per tablet; TAKE 1 TABLET BY MOUTH EVERY 4 TO 6 HOURS AS NEEDED FOR PAIN    Encounter for therapeutic drug level monitoring     - Urine Drugs of Abuse Screen (Tox13); Future  - Urine Drugs of Abuse Screen (Tox13)    External hemorrhoids  Discussed, prefers to wait at this time for surgical referral         36 minutes spent on the date of the encounter doing chart review, interpretation of tests, patient visit and documentation        BMI:   Estimated body mass index is 29.05 kg/m  as calculated from the following:    Height as of this encounter: 1.676 m (5' 6\").    Weight as of this encounter: 81.6 kg (180 lb).           Return in about 2 months (around 11/2/2021) for Physical Exam, pap.    Henna Cruz MD  [facility] [place]   Tesha is a 61 year old who presents for the following health issues     HPI     Chronic Pain Follow-Up    Where in your body do you have pain? Neck  How has your pain affected your ability to work? Not " "applicable  Which of these pain treatments have you tried since your last clinic visit?   How well are you sleeping? Fair  How has your mood been since your last visit? About the same  Have you had a significant life event? No  Other aggravating factors: none  Taking medication as directed? Yes    PHQ-9 SCORE 1/20/2021 3/30/2021 6/1/2021   PHQ-9 Total Score 4 9 3     ESTRELLA-7 SCORE 1/20/2021 3/30/2021 6/1/2021   Total Score 7 14 1     No flowsheet data found.  Encounter-Level CSA - 07/11/2017:    Controlled Substance Agreement - Scan on 7/14/2017 12:56 PM: CONTROLLED SUBSTANCE AGREEMENT     Patient-Level CSA:    There are no patient-level csa.        She has reduced her lyrica to bid and rarely uses hydrocodone. Last was August 29, 2021. Pain is chronic but stable    Chest pressure  She has had 3 episodes of this while at rest over the last 1 1/2 weeks,  each lasting a couple of hours over the left chest. The last was in the middle of the night, not relieved with mylanta, tylenol, but finally relieved with 1/2 hydrocodone. She has a strong FH of cardiac disease with her brother having had a heart transplant. She is ready to leave town today to visit her relative in [place].     daughter Noemy had a new baby girl, Jess, last month    Hemorrhoids  She has a picture today which shows thrombosed hemorrhoids which are painful. She isn't ready to consider a surgical evaluation at this time             Review of Systems   Constitutional, HEENT, cardiovascular, pulmonary, gi and gu systems are negative, except as otherwise noted.      Objective    /62   Pulse 80   Temp 97.2  F (36.2  C) (Tympanic)   Resp 19   Ht 1.676 m (5' 6\")   Wt 81.6 kg (180 lb)   SpO2 98%   BMI 29.05 kg/m    Body mass index is 29.05 kg/m .  Physical Exam   GENERAL APPEARANCE: healthy, alert and no distress  RESP: lungs clear to auscultation - no rales, rhonchi or wheezes  CV: regular rates and rhythm, normal S1 S2, no S3 or S4 and no murmur, " click or rub  ABDOMEN: soft, nontender, without hepatosplenomegaly or masses and bowel sounds normal  ORTHO: Cervical Spine Exam: Inspection: increased cervical lordosis  Tender:  occipital nerves, spinous processes, left paracervical muscles, right paracervical muscles, left trapezius muscles, right trapezius muscles  Non-tender:  none  Range of Motion:  flexion:  decreased, painful, extension: decreased, painful, left lateral rotation:  decreased, painful, right lateral rotation:  decreased, painful  Strength: Full strength of all neck muscles  SKIN: no suspicious lesions or rashes  NEURO: Normal strength and tone, mentation intact and speech normal  PSYCH: mentation appears normal and affect normal/bright

## 2021-09-02 ENCOUNTER — OFFICE VISIT (OUTPATIENT)
Dept: FAMILY MEDICINE | Facility: OTHER | Age: 61
End: 2021-09-02
Attending: FAMILY MEDICINE
Payer: MEDICARE

## 2021-09-02 VITALS
DIASTOLIC BLOOD PRESSURE: 62 MMHG | SYSTOLIC BLOOD PRESSURE: 104 MMHG | RESPIRATION RATE: 19 BRPM | WEIGHT: 180 LBS | OXYGEN SATURATION: 98 % | TEMPERATURE: 97.2 F | HEIGHT: 66 IN | HEART RATE: 80 BPM | BODY MASS INDEX: 28.93 KG/M2

## 2021-09-02 DIAGNOSIS — M54.2 CHRONIC NECK PAIN: ICD-10-CM

## 2021-09-02 DIAGNOSIS — M54.2 CERVICAL PAIN: ICD-10-CM

## 2021-09-02 DIAGNOSIS — Z51.81 ENCOUNTER FOR THERAPEUTIC DRUG LEVEL MONITORING: ICD-10-CM

## 2021-09-02 DIAGNOSIS — F11.90 CHRONIC, CONTINUOUS USE OF OPIOIDS: ICD-10-CM

## 2021-09-02 DIAGNOSIS — M79.18 MYOFASCIAL PAIN SYNDROME, CERVICAL: ICD-10-CM

## 2021-09-02 DIAGNOSIS — R07.89 OTHER CHEST PAIN: ICD-10-CM

## 2021-09-02 DIAGNOSIS — S12.9XXD PSEUDOARTHROSIS OF CERVICAL SPINE, SUBSEQUENT ENCOUNTER: Primary | ICD-10-CM

## 2021-09-02 DIAGNOSIS — G89.29 CHRONIC NECK PAIN: ICD-10-CM

## 2021-09-02 DIAGNOSIS — G43.711 INTRACTABLE CHRONIC MIGRAINE WITHOUT AURA AND WITH STATUS MIGRAINOSUS: ICD-10-CM

## 2021-09-02 DIAGNOSIS — K64.4 EXTERNAL HEMORRHOIDS: ICD-10-CM

## 2021-09-02 LAB
AMPHETAMINES UR QL: NOT DETECTED
BARBITURATES UR QL SCN: NOT DETECTED
BENZODIAZ UR QL SCN: DETECTED
BUPRENORPHINE UR QL: NOT DETECTED
CANNABINOIDS UR QL: NOT DETECTED
COCAINE UR QL SCN: NOT DETECTED
D-METHAMPHET UR QL: NOT DETECTED
METHADONE UR QL SCN: NOT DETECTED
OPIATES UR QL SCN: NOT DETECTED
OXYCODONE UR QL SCN: NOT DETECTED
PCP UR QL SCN: NOT DETECTED
PROPOXYPH UR QL: NOT DETECTED
TRICYCLICS UR QL SCN: NOT DETECTED

## 2021-09-02 PROCEDURE — G0463 HOSPITAL OUTPT CLINIC VISIT: HCPCS | Mod: 25

## 2021-09-02 PROCEDURE — 99214 OFFICE O/P EST MOD 30 MIN: CPT | Performed by: FAMILY MEDICINE

## 2021-09-02 PROCEDURE — 80306 DRUG TEST PRSMV INSTRMNT: CPT | Mod: ZL | Performed by: FAMILY MEDICINE

## 2021-09-02 RX ORDER — HYDROCODONE BITARTRATE AND ACETAMINOPHEN 7.5; 325 MG/1; MG/1
TABLET ORAL
Qty: 60 TABLET | Refills: 0 | Status: SHIPPED | OUTPATIENT
Start: 2021-09-02 | End: 2021-12-06

## 2021-09-02 RX ORDER — PREGABALIN 150 MG/1
CAPSULE ORAL
Qty: 90 CAPSULE | Refills: 0 | OUTPATIENT
Start: 2021-09-02

## 2021-09-02 RX ORDER — PREGABALIN 150 MG/1
CAPSULE ORAL
Qty: 60 CAPSULE | Refills: 3 | Status: SHIPPED | OUTPATIENT
Start: 2021-09-02 | End: 2022-02-04

## 2021-09-02 RX ORDER — HYDROCODONE BITARTRATE AND ACETAMINOPHEN 7.5; 325 MG/1; MG/1
TABLET ORAL
Qty: 60 TABLET | Refills: 0 | OUTPATIENT
Start: 2021-09-02

## 2021-09-02 ASSESSMENT — MIFFLIN-ST. JEOR: SCORE: 1403.22

## 2021-09-02 ASSESSMENT — PAIN SCALES - GENERAL: PAINLEVEL: MILD PAIN (2)

## 2021-09-02 NOTE — NURSING NOTE
"Chief Complaint   Patient presents with     Pain       Initial /62   Pulse 80   Temp 97.2  F (36.2  C) (Tympanic)   Resp 19   Ht 1.676 m (5' 6\")   Wt 81.6 kg (180 lb)   SpO2 98%   BMI 29.05 kg/m   Estimated body mass index is 29.05 kg/m  as calculated from the following:    Height as of this encounter: 1.676 m (5' 6\").    Weight as of this encounter: 81.6 kg (180 lb).  Medication Reconciliation: complete  Liudmila Rivera LPN    "

## 2021-09-02 NOTE — LETTER
Opioid / Opioid Plus Controlled Substance Agreement    This is an agreement between you and your provider about the safe and appropriate use of controlled substance/opioids prescribed by your care team. Controlled substances are medicines that can cause physical and mental dependence (abuse).    There are strict laws about having and using these medicines. We here at [facility] are committing to working with you in your efforts to get better. To support you in this work, we ll help you schedule regular office appointments for medicine refills. If we must cancel or change your appointment for any reason, we ll make sure you have enough medicine to last until your next appointment.     As a Provider, I will:    Listen carefully to your concerns and treat you with respect.     Recommend a treatment plan that I believe is in your best interest. This plan may involve therapies other than opioid pain medication.     Talk with you often about the possible benefits, and the risk of harm of any medicine that we prescribe for you.     Provide a plan on how to taper (discontinue or go off) using this medicine if the decision is made to stop its use.    As a Patient, I understand that opioid(s):     Are a controlled substance prescribed by my care team to help me function or work and manage my condition(s).     Are strong medicines and can cause serious side effects such as:    Drowsiness, which can seriously affect my driving ability    A lower breathing rate, enough to cause death    Harm to my thinking ability     Depression     Abuse of and addiction to this medicine    Need to be taken exactly as prescribed. Combining opioids with certain medicines or chemicals (such as illegal drugs, sedatives, sleeping pills, and benzodiazepines) can be dangerous or even fatal. If I stop opioids suddenly, I may have severe withdrawal symptoms.    Do not work for all types of pain nor for all patients. If they re not helpful, I may  be asked to stop them.        The risks, benefits and side effects of these medicine(s) were explained to me. I agree that:  1. I will take part in other treatments as advised by my care team. This may be psychiatry or counseling, physical therapy, behavioral therapy, group treatment or a referral to a specialist.     2. I will keep all my appointments. I understand that this is part of the monitoring of opioids. My care team may require an office visit for EVERY opioid/controlled substance refill. If I miss appointments or don t follow instructions, my care team may stop my medicine.    3. I will take my medicines as prescribed. I will not change the dose or schedule unless my care team tells me to. There will be no refills if I run out early.     4. I may be asked to come to the clinic and complete a urine drug test or complete a pill count at any time. If I don t give a urine sample or participate in a pill count, the care team may stop my medicine.    5. I will only receive prescriptions from this clinic for chronic pain. If I am treated by another provider for acute pain issues, I will tell them that I am taking opioid pain medication for chronic pain and that I have a treatment agreement with this provider. I will inform my [facility] care team within one business day if I am given a prescription for any pain medication by another healthcare provider. My [facility] care team can contact other providers and pharmacists about my use of any medicines.    6. It is up to me to make sure that I don t run out of my medicines on weekends or holidays. If my care team is willing to refill my opioid prescription without a visit, I must request refills only during office hours. Refills may take up to 3 business days to process. I will use one pharmacy to fill all my opioid and other controlled substance prescriptions. I will notify the clinic about any changes to my insurance or medication  availability.    7. I am responsible for my prescriptions. If the medicine/prescription is lost, stolen or destroyed, it will not be replaced. I also agree not to share controlled substance medicines with anyone.    8. I am aware I should not use any illegal or recreational drugs. I agree not to drink alcohol unless my care team says I can.       9. If I enroll in the [place] Medical Cannabis program, I will tell my care team prior to my next refill.     10. I will tell my care team right away if I become pregnant, have a new medical problem treated outside of my regular clinic, or have a change in my medications.    11. I understand that this medicine can affect my thinking, judgment and reaction time. Alcohol and drugs affect the brain and body, which can affect the safety of my driving. Being under the influence of alcohol or drugs can affect my decision-making, behaviors, personal safety, and the safety of others. Driving while impaired (DWI) can occur if a person is driving, operating, or in physical control of a car, motorcycle, boat, snowmobile, ATV, motorbike, off-road vehicle, or any other motor vehicle ([place] Statute 169A.20). I understand the risk if I choose to drive or operate any vehicle or machinery.    I understand that if I do not follow any of the conditions above, my prescriptions or treatment may be stopped or changed.          Opioids  What You Need to Know    What are opioids?   Opioids are pain medicines that must be prescribed by a doctor. They are also known as narcotics.     Examples are:   1. morphine (MS Contin, Shantell)  2. oxycodone (Oxycontin)  3. oxycodone and acetaminophen (Percocet)  4. hydrocodone and acetaminophen (Vicodin, Norco)   5. fentanyl patch (Duragesic)   6. hydromorphone (Dilaudid)   7. methadone  8. codeine (Tylenol #3)     What do opioids do well?   Opioids are best for severe short-term pain such as after a surgery or injury. They may work well for cancer pain. They may  help some people with long-lasting (chronic) pain.     What do opioids NOT do well?   Opioids never get rid of pain entirely, and they don t work well for most patients with chronic pain. Opioids don t reduce swelling, one of the causes of pain.                                    Other ways to manage chronic pain and improve function include:       Treat the health problem that may be causing pain    Anti-inflammation medicines, which reduce swelling and tenderness, such as ibuprofen (Advil, Motrin) or naproxen (Aleve)    Acetaminophen (Tylenol)    Antidepressants and anti-seizure medicines, especially for nerve pain    Topical treatments such as patches or creams    Injections or nerve blocks    Chiropractic or osteopathic treatment    Acupuncture, massage, deep breathing, meditation, visual imagery, aromatherapy    Use heat or ice at the pain site    Physical therapy     Exercise    Stop smoking    Take part in therapy       Risks and side effects     Talk to your doctor before you start or decide to keep taking opioids. Possible side effects include:      Lowering your breathing rate enough to cause death    Overdose, including death, especially if taking higher than prescribed doses    Worse depression symptoms; less pleasure in things you usually enjoy    Feeling tired or sluggish    Slower thoughts or cloudy thinking    Being more sensitive to pain over time; pain is harder to control    Trouble sleeping or restless sleep    Changes in hormone levels (for example, less testosterone)    Changes in sex drive or ability to have sex    Constipation    Unsafe driving    Itching and sweating    Dizziness    Nausea, throwing up and dry mouth    What else should I know about opioids?    Opioids may lead to dependence, tolerance, or addiction.      Dependence means that if you stop or reduce the medicine too quickly, you will have withdrawal symptoms. These include loose poop (diarrhea), jitters, flu-like symptoms,  nervousness and tremors. Dependence is not the same as addiction.                       Tolerance means needing higher doses over time to get the same effect. This may increase the chance of serious side effects.      Addiction is when people improperly use a substance that harms their body, their mind or their relations with others. Use of opiates can cause a relapse of addiction if you have a history of drug or alcohol abuse.      People who have used opioids for a long time may have a lower quality of life, worse depression, higher levels of pain and more visits to doctors.    You can overdose on opioids. Take these steps to lower your risk of overdose:    1. Recognize the signs:  Signs of overdose include decrease or loss of consciousness (blackout), slowed breathing, trouble waking up and blue lips. If someone is worried about overdose, they should call 911.    2. Talk to your doctor about Narcan (naloxone).   If you are at risk for overdose, you may be given a prescription for Narcan. This medicine very quickly reverses the effects of opioids.   If you overdose, a friend or family member can give you Narcan while waiting for the ambulance. They need to know the signs of overdose and how to give Narcan.     3. Don't use alcohol or street drugs.   Taking them with opioids can cause death.    4. Do not take any of these medicines unless your doctor says it s OK. Taking these with opioids can cause death:    Benzodiazepines, such as lorazepam (Ativan), alprazolam (Xanax) or diazepam (Valium)    Muscle relaxers, such as cyclobenzaprine (Flexeril)    Sleeping pills like zolpidem (Ambien)     Other opioids      How to keep you and other people safe while taking opioids:    1. Never share your opioids with others.  Opioid medicines are regulated by the Drug Enforcement Agency (CALLIE). Selling or sharing medications is a criminal act.    2. Be sure to store opioids in a secure place, locked up if possible. Young children  can easily swallow them and overdose.    3. When you are traveling with your medicines, keep them in the original bottles. If you use a pill box, be sure you also carry a copy of your medicine list from your clinic or pharmacy.    4. Safe disposal of opioids    Most pharmacies have places to get rid of medicine, called disposal kiosks. Medicine disposal options are also available in every [facility]. Search your county and  medication disposal  to find more options. You can find more details at:  [email or web address]     I agree that my provider, clinic care team, and pharmacy may work with any city, state or federal law enforcement agency that investigates the misuse, sale, or other diversion of my controlled medicine. I will allow my provider to discuss my care with, or share a copy of, this agreement with any other treating provider, pharmacy or emergency room where I receive care.    I have read this agreement and have asked questions about anything I did not understand.    _______________________________________________________  Patient Signature - Sadaf Blankenship _____________________                   Date     _______________________________________________________  Provider Signature - Henna Cruz MD   _____________________                   Date     _______________________________________________________  Witness Signature (required if provider not present while patient signing)   _____________________                   Date

## 2021-09-03 ENCOUNTER — VIRTUAL VISIT (OUTPATIENT)
Dept: PSYCHIATRY | Facility: OTHER | Age: 61
End: 2021-09-03
Attending: PSYCHIATRY & NEUROLOGY
Payer: MEDICARE

## 2021-09-03 ENCOUNTER — MYC MEDICAL ADVICE (OUTPATIENT)
Dept: FAMILY MEDICINE | Facility: OTHER | Age: 61
End: 2021-09-03

## 2021-09-03 DIAGNOSIS — F33.1 MAJOR DEPRESSIVE DISORDER, RECURRENT EPISODE, MODERATE (H): ICD-10-CM

## 2021-09-03 DIAGNOSIS — F33.2 MAJOR DEPRESSIVE DISORDER, RECURRENT SEVERE WITHOUT PSYCHOTIC FEATURES (H): ICD-10-CM

## 2021-09-03 PROCEDURE — 99443 PR PHYSICIAN TELEPHONE EVALUATION 21-30 MIN: CPT | Mod: 95 | Performed by: PSYCHIATRY & NEUROLOGY

## 2021-09-03 RX ORDER — FLUOXETINE 40 MG/1
CAPSULE ORAL
Qty: 30 CAPSULE | Refills: 11 | Status: SHIPPED | OUTPATIENT
Start: 2021-10-01 | End: 2021-11-04

## 2021-09-03 RX ORDER — DIAZEPAM 5 MG
5 TABLET ORAL EVERY 6 HOURS PRN
Qty: 20 TABLET | Refills: 0 | COMMUNITY
Start: 2021-09-03 | End: 2022-01-04

## 2021-09-03 ASSESSMENT — ANXIETY QUESTIONNAIRES
1. FEELING NERVOUS, ANXIOUS, OR ON EDGE: NEARLY EVERY DAY
7. FEELING AFRAID AS IF SOMETHING AWFUL MIGHT HAPPEN: NOT AT ALL
GAD7 TOTAL SCORE: 15
3. WORRYING TOO MUCH ABOUT DIFFERENT THINGS: NEARLY EVERY DAY
5. BEING SO RESTLESS THAT IT IS HARD TO SIT STILL: NOT AT ALL
6. BECOMING EASILY ANNOYED OR IRRITABLE: NEARLY EVERY DAY
2. NOT BEING ABLE TO STOP OR CONTROL WORRYING: NEARLY EVERY DAY

## 2021-09-03 ASSESSMENT — PATIENT HEALTH QUESTIONNAIRE - PHQ9
5. POOR APPETITE OR OVEREATING: NEARLY EVERY DAY
SUM OF ALL RESPONSES TO PHQ QUESTIONS 1-9: 4

## 2021-09-03 ASSESSMENT — PAIN SCALES - GENERAL: PAINLEVEL: MILD PAIN (3)
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
## 2021-09-03 NOTE — NURSING NOTE
"Chief Complaint   Patient presents with     RECHECK     Telephone visit       Initial There were no vitals taken for this visit. Estimated body mass index is 29.05 kg/m  as calculated from the following:    Height as of 9/2/21: 1.676 m (5' 6\").    Weight as of 9/2/21: 81.6 kg (180 lb).  Medication Reconciliation: complete  JANICE ROMO LPN    "

## 2021-09-04 ASSESSMENT — ANXIETY QUESTIONNAIRES: GAD7 TOTAL SCORE: 15

## 2021-09-07 ENCOUNTER — HOSPITAL ENCOUNTER (OUTPATIENT)
Dept: CARDIOLOGY | Facility: HOSPITAL | Age: 61
Discharge: HOME | End: 2021-09-07
Attending: FAMILY MEDICINE | Admitting: INTERNAL MEDICINE
Payer: MEDICARE

## 2021-09-07 DIAGNOSIS — R07.89 OTHER CHEST PAIN: ICD-10-CM

## 2021-09-07 LAB
CV STRESS MAX HR HE: 149
RATE PRESSURE PRODUCT: NORMAL
STRESS ECHO BASELINE DIASTOLIC HE: 78
STRESS ECHO BASELINE HR: 83
STRESS ECHO BASELINE SYSTOLIC BP: 110
STRESS ECHO CALCULATED PERCENT HR: 93 %
STRESS ECHO LAST STRESS DIASTOLIC BP: 74
STRESS ECHO LAST STRESS SYSTOLIC BP: 142
STRESS ECHO POST ESTIMATED WORKLOAD: 7 METS
STRESS ECHO POST EXERCISE DUR MIN: 4 MIN
STRESS ECHO POST EXERCISE DUR SEC: 44 SEC
STRESS ECHO TARGET HR: 160

## 2021-09-07 PROCEDURE — 93017 CV STRESS TEST TRACING ONLY: CPT

## 2021-09-07 PROCEDURE — 93016 CV STRESS TEST SUPVJ ONLY: CPT | Performed by: INTERNAL MEDICINE

## 2021-09-07 PROCEDURE — 93018 CV STRESS TEST I&R ONLY: CPT | Performed by: INTERNAL MEDICINE

## 2021-09-09 ENCOUNTER — MYC MEDICAL ADVICE (OUTPATIENT)
Dept: FAMILY MEDICINE | Facility: OTHER | Age: 61
End: 2021-09-09

## 2021-09-09 DIAGNOSIS — R07.89 OTHER CHEST PAIN: Primary | ICD-10-CM

## 2021-09-09 RX ORDER — ISOSORBIDE MONONITRATE 30 MG/1
30 TABLET, EXTENDED RELEASE ORAL DAILY
Qty: 30 TABLET | Refills: 3 | Status: SHIPPED | OUTPATIENT
Start: 2021-09-09 | End: 2022-01-06

## 2021-09-10 ENCOUNTER — NURSE TRIAGE (OUTPATIENT)
Dept: FAMILY MEDICINE | Facility: OTHER | Age: 61
End: 2021-09-10

## 2021-09-10 ENCOUNTER — APPOINTMENT (OUTPATIENT)
Dept: CT IMAGING | Facility: HOSPITAL | Age: 61
End: 2021-09-10
Attending: NURSE PRACTITIONER
Payer: MEDICARE

## 2021-09-10 ENCOUNTER — HOSPITAL ENCOUNTER (EMERGENCY)
Facility: HOSPITAL | Age: 61
Discharge: HOME | End: 2021-09-10
Attending: NURSE PRACTITIONER | Admitting: NURSE PRACTITIONER
Payer: MEDICARE

## 2021-09-10 VITALS
RESPIRATION RATE: 18 BRPM | SYSTOLIC BLOOD PRESSURE: 104 MMHG | DIASTOLIC BLOOD PRESSURE: 86 MMHG | WEIGHT: 180 LBS | HEART RATE: 65 BPM | TEMPERATURE: 98 F | BODY MASS INDEX: 28.93 KG/M2 | HEIGHT: 66 IN | OXYGEN SATURATION: 96 %

## 2021-09-10 DIAGNOSIS — R07.9 CHEST PAIN, UNSPECIFIED TYPE: ICD-10-CM

## 2021-09-10 DIAGNOSIS — M54.6 ACUTE THORACIC BACK PAIN, UNSPECIFIED BACK PAIN LATERALITY: ICD-10-CM

## 2021-09-10 LAB
ALBUMIN SERPL-MCNC: 3.3 G/DL (ref 3.4–5)
ALP SERPL-CCNC: 105 U/L (ref 40–150)
ALT SERPL W P-5'-P-CCNC: 34 U/L (ref 0–50)
ANION GAP SERPL CALCULATED.3IONS-SCNC: 4 MMOL/L (ref 3–14)
AST SERPL W P-5'-P-CCNC: 18 U/L (ref 0–45)
BASOPHILS # BLD AUTO: 0 10E3/UL (ref 0–0.2)
BASOPHILS NFR BLD AUTO: 1 %
BILIRUB SERPL-MCNC: 0.3 MG/DL (ref 0.2–1.3)
BUN SERPL-MCNC: 10 MG/DL (ref 7–30)
CALCIUM SERPL-MCNC: 8.3 MG/DL (ref 8.5–10.1)
CHLORIDE BLD-SCNC: 111 MMOL/L (ref 94–109)
CO2 SERPL-SCNC: 25 MMOL/L (ref 20–32)
CREAT SERPL-MCNC: 1.02 MG/DL (ref 0.52–1.04)
EOSINOPHIL # BLD AUTO: 0.1 10E3/UL (ref 0–0.7)
EOSINOPHIL NFR BLD AUTO: 3 %
ERYTHROCYTE [DISTWIDTH] IN BLOOD BY AUTOMATED COUNT: 13.2 % (ref 10–15)
GFR SERPL CREATININE-BSD FRML MDRD: 60 ML/MIN/1.73M2
GLUCOSE BLD-MCNC: 92 MG/DL (ref 70–99)
HCT VFR BLD AUTO: 38 % (ref 35–47)
HGB BLD-MCNC: 12.7 G/DL (ref 11.7–15.7)
HOLD SPECIMEN: NORMAL
IMM GRANULOCYTES # BLD: 0 10E3/UL
IMM GRANULOCYTES NFR BLD: 0 %
LYMPHOCYTES # BLD AUTO: 1.5 10E3/UL (ref 0.8–5.3)
LYMPHOCYTES NFR BLD AUTO: 30 %
MCH RBC QN AUTO: 30.1 PG (ref 26.5–33)
MCHC RBC AUTO-ENTMCNC: 33.4 G/DL (ref 31.5–36.5)
MCV RBC AUTO: 90 FL (ref 78–100)
MONOCYTES # BLD AUTO: 0.5 10E3/UL (ref 0–1.3)
MONOCYTES NFR BLD AUTO: 9 %
NEUTROPHILS # BLD AUTO: 3 10E3/UL (ref 1.6–8.3)
NEUTROPHILS NFR BLD AUTO: 57 %
NRBC # BLD AUTO: 0 10E3/UL
NRBC BLD AUTO-RTO: 0 /100
PLATELET # BLD AUTO: 180 10E3/UL (ref 150–450)
POTASSIUM BLD-SCNC: 3.7 MMOL/L (ref 3.4–5.3)
PROT SERPL-MCNC: 6.8 G/DL (ref 6.8–8.8)
RBC # BLD AUTO: 4.22 10E6/UL (ref 3.8–5.2)
SODIUM SERPL-SCNC: 140 MMOL/L (ref 133–144)
TROPONIN I SERPL-MCNC: <0.015 UG/L (ref 0–0.04)
TROPONIN I SERPL-MCNC: <0.015 UG/L (ref 0–0.04)
WBC # BLD AUTO: 5.2 10E3/UL (ref 4–11)

## 2021-09-10 PROCEDURE — 93010 ELECTROCARDIOGRAM REPORT: CPT | Mod: 76 | Performed by: INTERNAL MEDICINE

## 2021-09-10 PROCEDURE — 84484 ASSAY OF TROPONIN QUANT: CPT | Performed by: NURSE PRACTITIONER

## 2021-09-10 PROCEDURE — 250N000013 HC RX MED GY IP 250 OP 250 PS 637: Performed by: NURSE PRACTITIONER

## 2021-09-10 PROCEDURE — 36415 COLL VENOUS BLD VENIPUNCTURE: CPT | Performed by: NURSE PRACTITIONER

## 2021-09-10 PROCEDURE — 71275 CT ANGIOGRAPHY CHEST: CPT

## 2021-09-10 PROCEDURE — 99285 EMERGENCY DEPT VISIT HI MDM: CPT | Performed by: NURSE PRACTITIONER

## 2021-09-10 PROCEDURE — 93005 ELECTROCARDIOGRAM TRACING: CPT

## 2021-09-10 PROCEDURE — 99285 EMERGENCY DEPT VISIT HI MDM: CPT | Mod: 25

## 2021-09-10 PROCEDURE — 84075 ASSAY ALKALINE PHOSPHATASE: CPT | Performed by: NURSE PRACTITIONER

## 2021-09-10 PROCEDURE — 85004 AUTOMATED DIFF WBC COUNT: CPT | Performed by: NURSE PRACTITIONER

## 2021-09-10 PROCEDURE — 250N000011 HC RX IP 250 OP 636: Performed by: RADIOLOGY

## 2021-09-10 RX ORDER — IOPAMIDOL 755 MG/ML
100 INJECTION, SOLUTION INTRAVASCULAR ONCE
Status: COMPLETED | OUTPATIENT
Start: 2021-09-10 | End: 2021-09-10

## 2021-09-10 RX ORDER — HYDROCODONE BITARTRATE AND ACETAMINOPHEN 5; 325 MG/1; MG/1
1 TABLET ORAL ONCE
Status: COMPLETED | OUTPATIENT
Start: 2021-09-10 | End: 2021-09-10

## 2021-09-10 RX ADMIN — HYDROCODONE BITARTRATE AND ACETAMINOPHEN 1 TABLET: 5; 325 TABLET ORAL at 16:40

## 2021-09-10 RX ADMIN — IOPAMIDOL 100 ML: 755 INJECTION, SOLUTION INTRAVENOUS at 14:26

## 2021-09-10 ASSESSMENT — ENCOUNTER SYMPTOMS
NAUSEA: 1
CHILLS: 0
DIFFICULTY URINATING: 0
NECK PAIN: 1
FEVER: 0
ABDOMINAL PAIN: 1
DIARRHEA: 0
HEMATURIA: 0
PALPITATIONS: 0
COLOR CHANGE: 0
WEAKNESS: 0
COUGH: 0
NUMBNESS: 0
SORE THROAT: 0
LIGHT-HEADEDNESS: 1
DIZZINESS: 0
HEADACHES: 1
FREQUENCY: 0
VOMITING: 0
DYSURIA: 0
SHORTNESS OF BREATH: 1
BLOOD IN STOOL: 0
BACK PAIN: 1
MYALGIAS: 0
CONSTIPATION: 0
ARTHRALGIAS: 0
RHINORRHEA: 0

## 2021-09-10 ASSESSMENT — MIFFLIN-ST. JEOR: SCORE: 1403.22

## 2021-09-10 NOTE — DISCHARGE INSTRUCTIONS
(R07.9) Chest pain, unspecified type  (M54.6) Acute thoracic back pain, unspecified back pain laterality  Pleasant 61-year old female presents ambulatory from home in no acute distress for evaluation of mid-sternal chest discomfort and back pain. She has had ongoing chest discomfort with recent cardiac stress testing showing no inducible ischemic but some nonspecific ST-T wave changes in the recovery phase- sees cardiology on Monday. Was started on IMDUR by PCP and took first dose this morning around 0900 prior to onset of chest/back/head discomfort that started around 1100. Work-up as above: she has no acute leukocytosis or anemia. Mild hypocalcemia but no other acute electrolyte, renal or hepatic abnormalities. She has concerns about her gallbladder- total bili and alt/ast are normal today and there is no RUQ/epigastric tenderness concerning for acute cholecystitis warranting emergent work-up. Recommend she keep upcoming appointment for US of RUQ for further evaluation. Serial EKGs and troponin are normal in the ED. CTA chest is without acute concerns, air trapping consistent with history of COPD- emphysema. Plan for discharge home- stop IMDUR, keep cardiology appointment on Monday, return to ED if new or worsening symptoms.       RETURN TO THE ED WITH NEW OR WORSENING SYMPTOMS.    FOLLOW-UP WITH YOUR PRIMARY CARE PROVIDER IN 5-7 DAYS.      Jenny Horn CNP    Results for orders placed or performed during the hospital encounter of 09/10/21   CTA Chest with Contrast     Status: None    Narrative    CTA CHEST WITH CONTRAST  9/10/2021 2:39 PM    CLINICAL HISTORY: Female, age 61 years,  Thoracic aortic aneurysm  (TAA) suspected;    Comparison:  CT scan chest abdomen pelvis 1/16/2020    TECHNIQUE:  CT was performed of the chest  with IV contrast.   Sagittal, coronal, axial and MIP reconstructions were reviewed.     FINDINGS:  Chest CT:    There is excellent opacification of the pulmonary arteries and  thoracic  aorta without evidence of acute abnormality. No evidence of  pulmonary embolus or aortic dissection.    Lungs demonstrate mosaic attenuation suggesting patchy areas of air  trapping throughout both lungs. Increased density in the dependent  portions of both lungs are felt to likely represent atelectasis. There  are areas of interstitial thickening also seen in the periphery of  both lungs.    Borderline enlarged lymph nodes of the mediastinum and hilar regions  are similar in appearance.    The thyroid gland and esophagus are grossly normal.    Visualized portions of the upper abdomen demonstrate a small hiatal  hernia and no acute abnormality.      Visualized bony structures demonstrate no acute adenopathy. There are  mild degenerative changes of the thoracic spine.      Impression    IMPRESSION:   No evidence of acute vascular abnormality. No evidence of aneurysm.    Mosaic attenuation of the lungs appears to be related to patchy areas  of air trapping intermixed with areas of atelectasis.    MARY JO ALCOCER MD         SYSTEM ID:  [id number]   CBC with platelets differential     Status: None    Narrative    The following orders were created for panel order CBC with platelets differential.  Procedure                               Abnormality         Status                     ---------                               -----------         ------                     CBC with platelets and d...[[id number]]                      Final result                 Please view results for these tests on the individual orders.   Troponin I     Status: Normal   Result Value Ref Range    Troponin I <0.015 0.000 - 0.045 ug/L   Comprehensive metabolic panel     Status: Abnormal   Result Value Ref Range    Sodium 140 133 - 144 mmol/L    Potassium 3.7 3.4 - 5.3 mmol/L    Chloride 111 (H) 94 - 109 mmol/L    Carbon Dioxide (CO2) 25 20 - 32 mmol/L    Anion Gap 4 3 - 14 mmol/L    Urea Nitrogen 10 7 - 30 mg/dL    Creatinine 1.02 0.52 - 1.04  mg/dL    Calcium 8.3 (L) 8.5 - 10.1 mg/dL    Glucose 92 70 - 99 mg/dL    Alkaline Phosphatase 105 40 - 150 U/L    AST 18 0 - 45 U/L    ALT 34 0 - 50 U/L    Protein Total 6.8 6.8 - 8.8 g/dL    Albumin 3.3 (L) 3.4 - 5.0 g/dL    Bilirubin Total 0.3 0.2 - 1.3 mg/dL    GFR Estimate 60 (L) >60 mL/min/1.73m2   CBC with platelets and differential     Status: None   Result Value Ref Range    WBC Count 5.2 4.0 - 11.0 10e3/uL    RBC Count 4.22 3.80 - 5.20 10e6/uL    Hemoglobin 12.7 11.7 - 15.7 g/dL    Hematocrit 38.0 35.0 - 47.0 %    MCV 90 78 - 100 fL    MCH 30.1 26.5 - 33.0 pg    MCHC 33.4 31.5 - 36.5 g/dL    RDW 13.2 10.0 - 15.0 %    Platelet Count 180 150 - 450 10e3/uL    % Neutrophils 57 %    % Lymphocytes 30 %    % Monocytes 9 %    % Eosinophils 3 %    % Basophils 1 %    % Immature Granulocytes 0 %    NRBCs per 100 WBC 0 <1 /100    Absolute Neutrophils 3.0 1.6 - 8.3 10e3/uL    Absolute Lymphocytes 1.5 0.8 - 5.3 10e3/uL    Absolute Monocytes 0.5 0.0 - 1.3 10e3/uL    Absolute Eosinophils 0.1 0.0 - 0.7 10e3/uL    Absolute Basophils 0.0 0.0 - 0.2 10e3/uL    Absolute Immature Granulocytes 0.0 <=0.0 10e3/uL    Absolute NRBCs 0.0 10e3/uL   Extra Blue Top Tube     Status: None   Result Value Ref Range    Hold Specimen JIC    Extra Red Top Tube     Status: None   Result Value Ref Range    Hold Specimen JIC    Extra Green Top (Lithium Heparin) Tube     Status: None   Result Value Ref Range    Hold Specimen JIC    Troponin I (second draw)     Status: Normal   Result Value Ref Range    Troponin I <0.015 0.000 - 0.045 ug/L   [place] Draw     Status: None    Narrative    The following orders were created for panel order [place] Draw.  Procedure                               Abnormality         Status                     ---------                               -----------         ------                     Extra Blue Top Tube[[id number]]                              Final result               Extra Red Top Tube[[id number]]                                Final result               Extra Green Top (Lithium...[[id number]]                      Final result                 Please view results for these tests on the individual orders.

## 2021-09-10 NOTE — ED PROVIDER NOTES
"  History     Chief Complaint   Patient presents with     Chest Pain     chest pain started last night.      Back Pain     HPI     Sadaf Blankenship is a 61 year old female who presents ambulatory from home for evaluation of mid-sternal chest pain and sharp upper back pain that started around 1100 today while sitting at home. She recently was prescribed IMDUR by PCP and took first dose at 0900. She denies reproducible chest pain with exertion, deep breaths, movement. She tried her hydrocodone at home around time of onset of pain without improvement. Given her family history of significant CAD in mother, 2 younger brothers, sister with atrial fibrillation in her 50s- she is here for evaluation.     She has recently been having some chest discomfort and dyspnea on exertion. PCP ordered an exercise stress test which was negative for inducible ischemia but did have some nonspecific ST-T wave changes in the recovery phase so she was referred to cardiology. She has appointment with cardiology on Monday.     She is a former smoker. Quit 25 years ago.  She does not have a history of CAD, HTN, hyperlipidemia, DM, obesity.         Allergies:  Allergies   Allergen Reactions     Aspirin Hives     Ibuprofen      Dye in the otc form causes headaches  \"patient states over the counter ibuprofen  bothers her\"     Lansoprazole Other (See Comments) and Visual Disturbance     Changes in eyesight  Prevacid  Change in eyesight     Red Dye Hives     Bupropion Rash     Bupropion Hcl Hives and Rash     Wellbutrin     Demerol [Meperidine] Other (See Comments)     Made migraine worse       Problem List:    Patient Active Problem List    Diagnosis Date Noted     Concussion with brief LOC 01/16/2020     Priority: Medium     Adhesive capsulitis of left shoulder 12/06/2018     Priority: Medium     Pelvic floor dysfunction 05/01/2018     Priority: Medium     Other chronic gastritis without hemorrhage 02/26/2018     Priority: Medium     " Calculus of kidney 07/11/2017     Priority: Medium     Pulmonary emphysema, unspecified emphysema type (H) 07/11/2017     Priority: Medium     Panlobular emphysema (H) 04/22/2017     Priority: Medium     Mild, noted on CT 4/21/17       ACP (advance care planning) 02/09/2017     Priority: Medium     Advance Care Planning 2/9/2017: ACP Review of Chart / Resources Provided:  Reviewed chart for advance care plan.  Sadaf Blankenship has no plan or code status on file. Discussed available resources and provided with information. Confirmed code status reflects current choices pending further ACP discussions.  Confirmed/documented legally designated decision makers.  Added by Liudmila Rivera             Subacromial bursitis of right shoulder joint 01/26/2017     Priority: Medium     Acute back pain with sciatica 01/05/2017     Priority: Medium     Chronic migraine without aura without status migrainosus, not intractable 01/05/2017     Priority: Medium     Radicular pain 01/05/2017     Priority: Medium     Chronic radicular cervical pain 11/14/2016     Priority: Medium     Right arm       Mild episode of recurrent major depressive disorder (H) 11/14/2016     Priority: Medium     Ulnar neuropathy at elbow of left upper extremity 11/14/2016     Priority: Medium     Lumbar radiculopathy 11/14/2016     Priority: Medium     bilateral       S/P cervical spinal fusion 11/14/2016     Priority: Medium     Ulnar neuropathy 11/11/2016     Priority: Medium     Ulcer of esophagus without bleeding 10/14/2016     Priority: Medium     Gastroesophageal reflux disease with esophagitis 10/14/2016     Priority: Medium     Intractable chronic migraine without aura and without status migrainosus 10/14/2016     Priority: Medium     Intractable chronic migraine without aura and with status migrainosus 09/13/2016     Priority: Medium     Myofascial pain 05/09/2016     Priority: Medium     Disuse syndrome 02/12/2016     Priority: Medium     Chronic  pain 02/05/2016     Priority: Medium     Uvulitis 01/22/2016     Priority: Medium     Chronic rhinitis 01/22/2016     Priority: Medium     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     Priority: Medium     Chronic maxillary sinusitis 01/22/2016     Priority: Medium     Hypertrophy of inferior nasal turbinate 01/22/2016     Priority: Medium     Long uvula 01/22/2016     Priority: Medium     improved       Chronic, continuous use of opioids 12/07/2015     Priority: Medium     Patient is followed by Henna Cruz MD for ongoing prescription of pain medication.  All refills should only be approved by this provider, or covering partner.    Medication(s): Norco 7.5/325mg.   Maximum quantity per month: #60  Clinic visit frequency required: Q 3 months     Controlled substance agreement:  Encounter-Level CSA - 07/11/2017:          Controlled Substance Agreement - Scan on 7/14/2017 12:56 PM : CONTROLLED SUBSTANCE AGREEMENT (below)              Pain Clinic evaluation in the past: No    DIRE Total Score(s):  No flowsheet data found.    Last [facility] website verification:  done on 7.10.17   [email or web address]         Chronic neck pain 10/05/2015     Priority: Medium     Chronic tension-type headache, intractable 10/05/2015     Priority: Medium     Migraine aura without headache 10/05/2015     Priority: Medium     History of arthrodesis 10/05/2015     Priority: Medium     Myofascial pain syndrome, cervical 09/17/2015     Priority: Medium     Depression, major 09/17/2015     Priority: Medium     Irritable bowel syndrome 01/26/2015     Priority: Medium     Thoracic sprain and strain 11/06/2013     Priority: Medium     Sprain and strain of shoulder and upper arm 11/06/2013     Priority: Medium     Pseudoarthrosis of cervical spine (H) 07/03/2012     Priority: Medium     Ulcer of esophagus 06/25/2012     Priority: Medium     Cervical pain 05/10/2012     Priority: Medium     With radicuopathy         Advanced care planning/counseling  discussion 2012     Priority: Medium     UTI (urinary tract infection) 2011     Priority: Medium     Problem list name updated by automated process. Provider to review       Degenerative disc disease, cervical 2011     Priority: Medium     Migraine 2001     Priority: Medium     Problem list name updated by automated process. Provider to review          Past Medical History:    Past Medical History:   Diagnosis Date     ASCUS on Pap smear 2004     Atypical chest pain 2008     Bleeding ulcer 1976     Cervical radicular pain 5/10/2012     Degenerative disc disease, cervical 2011     Esophageal ulcer 1998     Irritable bowel syndrome 2015     Migraine headaches, severe 2001     Pseudoarthrosis of cervical spine 7/3/2012     Recurrent UTI 2011     sinusitis (chronic) 2001       Past Surgical History:    Past Surgical History:   Procedure Laterality Date     BACK SURGERY      cervical     Cervical spine surgery with removal of 2 disks, C5,C7       COLONOSCOPY  10/13/2014    Dr Camargo, internal hemorrhoids     COLONOSCOPY - HIM SCAN  2018    EGD and colonoscopy with Dr. Jennings     Coronary angiogram, normal      Chest pain     ENT SURGERY      nose surgery x3     fusion discectomy anterior cervical       HC ESOPHAGOSCOPY, DIAGNOSTIC  10/31/2014    Dr Camargo, mild erythema of antrum, negative biopsies     IR CONSULTATION FOR IR EXAM  2020     IR FLUORO 0-1 HOUR  2020     NOSE SURGERY      x3            Posterior decompression , fusion C3-5      Psuedoarthrosis     Total abdominal hysterectomy with right salpingectomy      Endometriosis, cervix removed, Dr Hathaway       Family History:    Family History   Problem Relation Age of Onset     Cancer Father         lung, also a heavy smoker     Diabetes Mother      Heart Disease Mother 58        MI; cause of death; heavy smoker. had first MI at 40     Coronary Artery  Disease Mother      Hypertension Mother      Cancer Other         strong history     Heart Disease Other         heart disease     Heart Disease Brother         x3     Hypertension Brother      Diabetes Brother      Coronary Artery Disease Brother      Hypertension Sister        Social History:  Marital Status:   [4]  Social History     Tobacco Use     Smoking status: Former Smoker     Years: 8.00     Types: Cigarettes     Quit date:      Years since quittin.7     Smokeless tobacco: Never Used   Substance Use Topics     Alcohol use: No     Drug use: No        Medications:    baclofen (LIORESAL) 10 MG tablet  butalbital-acetaminophen-caffeine (FIORICET/ESGIC) -40 MG tablet  Cholecalciferol (VITAMIN D3) 1000 UNITS CAPS  DEXILANT 60 MG CPDR CR capsule  diazepam (VALIUM) 5 MG tablet  docusate sodium (COLACE) 100 MG capsule  Erenumab-aooe (AIMOVIG SC)  [START ON 10/1/2021] FLUoxetine (PROZAC) 20 MG capsule  [START ON 10/1/2021] FLUoxetine (PROZAC) 40 MG capsule  HYDROcodone-acetaminophen (NORCO) 7.5-325 MG per tablet  hydrOXYzine (ATARAX) 10 MG tablet  ibuprofen (ADVIL/MOTRIN) 800 MG tablet  isosorbide mononitrate (IMDUR) 30 MG 24 hr tablet  methocarbamol (ROBAXIN) 500 MG tablet  Multiple Vitamins-Minerals (CENTRUM SILVER ULTRA WOMENS) TABS  ondansetron (ZOFRAN-ODT) 4 MG ODT tab  pregabalin (LYRICA) 150 MG capsule  Probiotic Product (PROBIOTIC DAILY PO)  sucralfate (CARAFATE) 1 GM/10ML suspension  topiramate (TOPAMAX) 50 MG tablet          Review of Systems   Constitutional: Negative for chills and fever.        + general malaise x 1 month   HENT: Negative for congestion, ear pain, rhinorrhea and sore throat.    Eyes: Negative for visual disturbance.   Respiratory: Positive for shortness of breath. Negative for cough.    Cardiovascular: Positive for chest pain. Negative for palpitations and leg swelling.   Gastrointestinal: Positive for abdominal pain (intermittent over the last month- thinks this  "is her gallbladder) and nausea (intermittent). Negative for blood in stool, constipation, diarrhea and vomiting.   Genitourinary: Negative for difficulty urinating, dysuria, frequency and hematuria.   Musculoskeletal: Positive for back pain (sharp, mid upper back) and neck pain (chronic - has had several neck surgeries). Negative for arthralgias and myalgias.   Skin: Negative for color change and rash.   Neurological: Positive for light-headedness and headaches. Negative for dizziness, syncope, weakness and numbness.       Physical Exam   BP: 96/77  Pulse: 68  Temp: 97.2  F (36.2  C)  Resp: 18  Height: 167.6 cm (5' 6\")  Weight: 81.6 kg (180 lb)  SpO2: 96 %      Physical Exam  Constitutional:       General: She is not in acute distress.     Appearance: Normal appearance. She is not ill-appearing or toxic-appearing.   HENT:      Head: Normocephalic and atraumatic.      Nose: Nose normal.      Mouth/Throat:      Lips: Pink.      Mouth: Mucous membranes are moist.      Tongue: Tongue does not deviate from midline.      Pharynx: Oropharynx is clear. Uvula midline.   Eyes:      General: Lids are normal.      Extraocular Movements: Extraocular movements intact.      Conjunctiva/sclera: Conjunctivae normal.   Cardiovascular:      Rate and Rhythm: Normal rate and regular rhythm.      Heart sounds: S1 normal and S2 normal. No murmur heard.   No friction rub. No gallop.    Pulmonary:      Effort: Pulmonary effort is normal.      Breath sounds: Normal breath sounds.   Chest:      Chest wall: No tenderness.   Abdominal:      Palpations: Abdomen is soft.      Tenderness: There is no abdominal tenderness.   Musculoskeletal:      Cervical back: No tenderness.      Thoracic back: No tenderness.      Lumbar back: No tenderness.      Right lower leg: No edema.      Left lower leg: No edema.   Skin:     General: Skin is warm and dry.      Capillary Refill: Capillary refill takes less than 2 seconds.      Coloration: Skin is not pale. " "  Neurological:      Mental Status: She is alert and oriented to person, place, and time.      Gait: Gait is intact.   Psychiatric:         Mood and Affect: Mood is anxious.         Speech: Speech normal.         Behavior: Behavior is cooperative.         ED Course     ED Course as of Sep 10 2131   Fri Sep 10, 2021   1525 Patient without chest or back pain at this time. Reviewed all results available at this time and plan to repeat EKG and troponin.   Jenny Horn CNP on 9/10/2021 at 3:25 PM        1613 Re-evaluated. Having 3/10 chest pressure.  Sharp back discomfort and 8/10 headache. Blood pressure 80s over 40s. Orthostatics negative but standing does worsen headache. I did discuss with her I really think her headache is from the Imdur she took this morning as well as lower than usual blood pressures.   Reviewed CTA results and repeat EKG. Awaiting CTA chest results.   Jenny Horn CNP on 9/10/2021 at 4:15 PM        1659 Reviewed HPI, exam, labs with ED MD. No further recommendations at this time.   Jenny Horn CNP on 9/10/2021 at 4:59 PM        1722 Updated on all results and plan for discharge. States she has her brothers and sister texting her and \"telling her what to say.\" They would all prefer cardiology be consulted. Plan to ambulate around ED and consult with cardiology.   Jenny Horn CNP on 9/10/2021 at 5:23 PM        1731 Up ambulating - denies back or chest pain. States she feels at her baseline. She does endorse continued headache which I again think is likely from IMDUR which we will be stopping today. Given she is denying any chest discomfort, back pain or dyspnea while up ambulating plan to discharge home and have her follow-up with cardiology. No cardiology consult.   Jenny Horn CNP on 9/10/2021 at 5:32 PM          Procedures              EKG Interpretation: #1     Interpreted by Jenny Horn CNP  Time reviewed: 1340  Symptoms at time of EKG: chest pain, upper back " pain   Rhythm: normal sinus   Rate: normal  Axis: normal  Ectopy: none  Conduction: normal JOHN 150 ms, normal QRS 78 ms, normal  ms, normal QTc 444 ms  ST Segments/ T Waves: No ST-T wave changes  Q Waves: none  Comparison to prior: No old EKG available    Clinical Impression: normal EKG           EKG Interpretation: #2     EKG Number: 2  Interpreted by Jenny Horn CNP  Rhythm: normal sinus   Rate: normal  Axis: NORMAL  Ectopy: none  Conduction: normal JOHN 158 ms, normal QRS 76 ms, normal  ms, normal QTc 458 ms  ST Segments/ T Waves: No ST-T wave changes  Q Waves: none  Comparison to prior: Unchanged from EKG #1    Clinical Impression: normal EKG           Results for orders placed or performed during the hospital encounter of 09/10/21 (from the past 24 hour(s))   CBC with platelets differential    Narrative    The following orders were created for panel order CBC with platelets differential.  Procedure                               Abnormality         Status                     ---------                               -----------         ------                     CBC with platelets and d...[[id number]]                      Final result                 Please view results for these tests on the individual orders.   Troponin I   Result Value Ref Range    Troponin I <0.015 0.000 - 0.045 ug/L   Comprehensive metabolic panel   Result Value Ref Range    Sodium 140 133 - 144 mmol/L    Potassium 3.7 3.4 - 5.3 mmol/L    Chloride 111 (H) 94 - 109 mmol/L    Carbon Dioxide (CO2) 25 20 - 32 mmol/L    Anion Gap 4 3 - 14 mmol/L    Urea Nitrogen 10 7 - 30 mg/dL    Creatinine 1.02 0.52 - 1.04 mg/dL    Calcium 8.3 (L) 8.5 - 10.1 mg/dL    Glucose 92 70 - 99 mg/dL    Alkaline Phosphatase 105 40 - 150 U/L    AST 18 0 - 45 U/L    ALT 34 0 - 50 U/L    Protein Total 6.8 6.8 - 8.8 g/dL    Albumin 3.3 (L) 3.4 - 5.0 g/dL    Bilirubin Total 0.3 0.2 - 1.3 mg/dL    GFR Estimate 60 (L) >60 mL/min/1.73m2   CBC with platelets and  differential   Result Value Ref Range    WBC Count 5.2 4.0 - 11.0 10e3/uL    RBC Count 4.22 3.80 - 5.20 10e6/uL    Hemoglobin 12.7 11.7 - 15.7 g/dL    Hematocrit 38.0 35.0 - 47.0 %    MCV 90 78 - 100 fL    MCH 30.1 26.5 - 33.0 pg    MCHC 33.4 31.5 - 36.5 g/dL    RDW 13.2 10.0 - 15.0 %    Platelet Count 180 150 - 450 10e3/uL    % Neutrophils 57 %    % Lymphocytes 30 %    % Monocytes 9 %    % Eosinophils 3 %    % Basophils 1 %    % Immature Granulocytes 0 %    NRBCs per 100 WBC 0 <1 /100    Absolute Neutrophils 3.0 1.6 - 8.3 10e3/uL    Absolute Lymphocytes 1.5 0.8 - 5.3 10e3/uL    Absolute Monocytes 0.5 0.0 - 1.3 10e3/uL    Absolute Eosinophils 0.1 0.0 - 0.7 10e3/uL    Absolute Basophils 0.0 0.0 - 0.2 10e3/uL    Absolute Immature Granulocytes 0.0 <=0.0 10e3/uL    Absolute NRBCs 0.0 10e3/uL   [place] Draw    Narrative    The following orders were created for panel order [place] Draw.  Procedure                               Abnormality         Status                     ---------                               -----------         ------                     Extra Blue Top Tube[[id number]]                              Final result               Extra Red Top Tube[[id number]]                               Final result               Extra Green Top (Lithium...[[id number]]                      Final result                 Please view results for these tests on the individual orders.   Extra Blue Top Tube   Result Value Ref Range    Hold Specimen JIC    Extra Red Top Tube   Result Value Ref Range    Hold Specimen JIC    Extra Green Top (Lithium Heparin) Tube   Result Value Ref Range    Hold Specimen JIC    CTA Chest with Contrast    Narrative    CTA CHEST WITH CONTRAST  9/10/2021 2:39 PM    CLINICAL HISTORY: Female, age 61 years,  Thoracic aortic aneurysm  (TAA) suspected;    Comparison:  CT scan chest abdomen pelvis 1/16/2020    TECHNIQUE:  CT was performed of the chest  with IV contrast.   Sagittal, coronal, axial and MIP  reconstructions were reviewed.     FINDINGS:  Chest CT:    There is excellent opacification of the pulmonary arteries and  thoracic aorta without evidence of acute abnormality. No evidence of  pulmonary embolus or aortic dissection.    Lungs demonstrate mosaic attenuation suggesting patchy areas of air  trapping throughout both lungs. Increased density in the dependent  portions of both lungs are felt to likely represent atelectasis. There  are areas of interstitial thickening also seen in the periphery of  both lungs.    Borderline enlarged lymph nodes of the mediastinum and hilar regions  are similar in appearance.    The thyroid gland and esophagus are grossly normal.    Visualized portions of the upper abdomen demonstrate a small hiatal  hernia and no acute abnormality.      Visualized bony structures demonstrate no acute adenopathy. There are  mild degenerative changes of the thoracic spine.      Impression    IMPRESSION:   No evidence of acute vascular abnormality. No evidence of aneurysm.    Mosaic attenuation of the lungs appears to be related to patchy areas  of air trapping intermixed with areas of atelectasis.    MARY JO ALCOCER MD         SYSTEM ID:  [id number]   Troponin I (second draw)   Result Value Ref Range    Troponin I <0.015 0.000 - 0.045 ug/L       Medications   iopamidol (ISOVUE-370) solution 100 mL (100 mLs Intravenous Given 9/10/21 1426)   sodium chloride (PF) 0.9% PF flush 100 mL (100 mLs Intravenous Given 9/10/21 1426)   HYDROcodone-acetaminophen (NORCO) 5-325 MG per tablet 1 tablet (1 tablet Oral Given 9/10/21 1640)       Assessments & Plan (with Medical Decision Making)     I have reviewed the nursing notes.    I have reviewed the findings, diagnosis, plan and need for follow up with the patient.  (R07.9) Chest pain, unspecified type  (M54.6) Acute thoracic back pain, unspecified back pain laterality  Pleasant 61-year old female presents ambulatory from home in no acute distress for  evaluation of mid-sternal chest discomfort and back pain. She has had ongoing chest discomfort with recent cardiac stress testing showing no inducible ischemic but some nonspecific ST-T wave changes in the recovery phase- sees cardiology on Monday. Was started on IMDUR by PCP and took first dose this morning around 0900 prior to onset of chest/back/head discomfort that started around 1100. Work-up as above: she has no acute leukocytosis or anemia. Mild hypocalcemia but no other acute electrolyte, renal or hepatic abnormalities. She has concerns about her gallbladder- total bili and alt/ast are normal today and there is no RUQ/epigastric tenderness concerning for acute cholecystitis warranting emergent work-up. Recommend she keep upcoming appointment for US of RUQ for further evaluation. Serial EKGs and troponin are normal in the ED. CTA chest is without acute concerns, air trapping consistent with history of COPD- emphysema. Plan for discharge home- stop IMDUR, keep cardiology appointment on Monday, return to ED if new or worsening symptoms.       Jenny Horn CNP        Discharge Medication List as of 9/10/2021  5:18 PM          Final diagnoses:   Chest pain, unspecified type   Acute thoracic back pain, unspecified back pain laterality       9/10/2021   [facility]     Jenny Horn CNP  09/10/21 2130

## 2021-09-10 NOTE — TELEPHONE ENCOUNTER
"Pt reports she is having back pain, now took norco. Pt also reports she feels weak. Constant chest tightness. Chest tightness began this morning and back pain. Pt reports she is on her way to the ED now pt's daughter driving.     Reason for Disposition    [1] Chest pain lasts > 5 minutes AND [2] age > 50    Answer Assessment - Initial Assessment Questions  1. LOCATION: \"Where does it hurt?\"        Chest tightness all over chest  2. RADIATION: \"Does the pain go anywhere else?\" (e.g., into neck, jaw, arms, back)      back  3. ONSET: \"When did the chest pain begin?\" (Minutes, hours or days)       morning  4. PATTERN \"Does the pain come and go, or has it been constant since it started?\"  \"Does it get worse with exertion?\"       constant  5. DURATION: \"How long does it last\" (e.g., seconds, minutes, hours)      3 hours  6. SEVERITY: \"How bad is the pain?\"  (e.g., Scale 1-10; mild, moderate, or severe)     - MILD (1-3): doesn't interfere with normal activities      - MODERATE (4-7): interferes with normal activities or awakens from sleep     - SEVERE (8-10): excruciating pain, unable to do any normal activities        moderate  7. CARDIAC RISK FACTORS: \"Do you have any history of heart problems or risk factors for heart disease?\" (e.g., prior heart attack, angina; high blood pressure, diabetes, being overweight, high cholesterol, smoking, or strong family history of heart disease)      no  8. PULMONARY RISK FACTORS: \"Do you have any history of lung disease?\"  (e.g., blood clots in lung, asthma, emphysema, birth control pills)      no  9. CAUSE: \"What do you think is causing the chest pain?\"      unknown  10. OTHER SYMPTOMS: \"Do you have any other symptoms?\" (e.g., dizziness, nausea, vomiting, sweating, fever, difficulty breathing, cough)        Feeling heart palpitations  11. PREGNANCY: \"Is there any chance you are pregnant?\" \"When was your last menstrual period?\"        no    Protocols used: CHEST PAIN-A-AH      "

## 2021-09-10 NOTE — ED TRIAGE NOTES
"\" Here for middle back pain which started this morning at 0900 and chest tightness which started last night around 2030 and it subsided last night.  Chest tight returned this morning at around 0630.    "

## 2021-09-12 ENCOUNTER — HEALTH MAINTENANCE LETTER (OUTPATIENT)
Age: 61
End: 2021-09-12

## 2021-09-13 ENCOUNTER — TRANSFERRED RECORDS (OUTPATIENT)
Dept: HEALTH INFORMATION MANAGEMENT | Facility: CLINIC | Age: 61
End: 2021-09-13

## 2021-09-16 ENCOUNTER — HOSPITAL ENCOUNTER (OUTPATIENT)
Dept: ULTRASOUND IMAGING | Facility: HOSPITAL | Age: 61
Discharge: HOME | End: 2021-09-16
Attending: FAMILY MEDICINE | Admitting: FAMILY MEDICINE
Payer: MEDICARE

## 2021-09-16 DIAGNOSIS — R07.89 OTHER CHEST PAIN: ICD-10-CM

## 2021-09-16 PROCEDURE — 76705 ECHO EXAM OF ABDOMEN: CPT

## 2021-09-19 ENCOUNTER — MYC MEDICAL ADVICE (OUTPATIENT)
Dept: FAMILY MEDICINE | Facility: OTHER | Age: 61
End: 2021-09-19

## 2021-09-19 DIAGNOSIS — R07.89 OTHER CHEST PAIN: Primary | ICD-10-CM

## 2021-09-20 ENCOUNTER — LAB (OUTPATIENT)
Dept: LAB | Facility: OTHER | Age: 61
End: 2021-09-20
Payer: MEDICARE

## 2021-09-20 DIAGNOSIS — R07.89 OTHER CHEST PAIN: ICD-10-CM

## 2021-09-20 LAB
CHOLEST SERPL-MCNC: 288 MG/DL
FASTING STATUS PATIENT QL REPORTED: YES
HDLC SERPL-MCNC: 70 MG/DL
LDLC SERPL CALC-MCNC: 184 MG/DL
NONHDLC SERPL-MCNC: 218 MG/DL
TRIGL SERPL-MCNC: 169 MG/DL

## 2021-09-20 PROCEDURE — 80061 LIPID PANEL: CPT | Mod: ZL

## 2021-09-20 PROCEDURE — 36415 COLL VENOUS BLD VENIPUNCTURE: CPT | Mod: ZL

## 2021-10-01 ENCOUNTER — TRANSFERRED RECORDS (OUTPATIENT)
Dept: HEALTH INFORMATION MANAGEMENT | Facility: CLINIC | Age: 61
End: 2021-10-01

## 2021-10-05 ENCOUNTER — TRANSFERRED RECORDS (OUTPATIENT)
Dept: HEALTH INFORMATION MANAGEMENT | Facility: CLINIC | Age: 61
End: 2021-10-05

## 2021-10-06 ENCOUNTER — MYC MEDICAL ADVICE (OUTPATIENT)
Dept: FAMILY MEDICINE | Facility: OTHER | Age: 61
End: 2021-10-06

## 2021-10-06 DIAGNOSIS — K21.00 GASTROESOPHAGEAL REFLUX DISEASE WITH ESOPHAGITIS WITHOUT HEMORRHAGE: Primary | ICD-10-CM

## 2021-10-25 ENCOUNTER — TRANSFERRED RECORDS (OUTPATIENT)
Dept: HEALTH INFORMATION MANAGEMENT | Facility: CLINIC | Age: 61
End: 2021-10-25

## 2021-11-03 DIAGNOSIS — F33.1 MAJOR DEPRESSIVE DISORDER, RECURRENT EPISODE, MODERATE (H): ICD-10-CM

## 2021-11-03 DIAGNOSIS — F33.2 MAJOR DEPRESSIVE DISORDER, RECURRENT SEVERE WITHOUT PSYCHOTIC FEATURES (H): ICD-10-CM

## 2021-11-04 RX ORDER — FLUOXETINE 40 MG/1
CAPSULE ORAL
Qty: 30 CAPSULE | Refills: 11 | Status: SHIPPED | OUTPATIENT
Start: 2021-11-04 | End: 2022-05-13

## 2021-11-04 NOTE — TELEPHONE ENCOUNTER
Fluoxetine HCL (Prozac) 20 mg capsule   take 1 capsule by mouth daily with 40 mg capsule    Last Written Prescription Date:  10-1-21  Last Fill Quantity: 30 capsule,   # refills: 11  Last Office Visit: 9-3-21  Future Office visit: 12-6-21  Dr. Herbert   Next 5 appointments (look out 90 days)    Dec 09, 2021  9:45 AM  (Arrive by 9:30 AM)  SHORT with Henna Cruz MD  [facility] ([facility] [place] ) [address]  [place] [address]  [phone]

## 2021-11-09 ENCOUNTER — MEDICAL CORRESPONDENCE (OUTPATIENT)
Dept: HEALTH INFORMATION MANAGEMENT | Facility: CLINIC | Age: 61
End: 2021-11-09

## 2021-11-24 ENCOUNTER — TRANSFERRED RECORDS (OUTPATIENT)
Dept: HEALTH INFORMATION MANAGEMENT | Facility: CLINIC | Age: 61
End: 2021-11-24

## 2021-12-01 ENCOUNTER — MYC MEDICAL ADVICE (OUTPATIENT)
Dept: FAMILY MEDICINE | Facility: OTHER | Age: 61
End: 2021-12-01
Payer: MEDICARE

## 2021-12-02 ENCOUNTER — TRANSFERRED RECORDS (OUTPATIENT)
Dept: HEALTH INFORMATION MANAGEMENT | Facility: CLINIC | Age: 61
End: 2021-12-02

## 2021-12-02 PROBLEM — K82.8 BILIARY DYSKINESIA: Status: ACTIVE | Noted: 2021-12-02

## 2021-12-02 NOTE — PATIENT INSTRUCTIONS

## 2021-12-02 NOTE — PROGRESS NOTES
[facility]  [address]  [facility] [address]  Phone: [phone]  Primary Provider: Henna Boyd  Pre-op Performing Provider: HENNA BOYD      PREOPERATIVE EVALUATION:  Today's date: 12/6/2021    Sadaf Blankenship is a 61 year old female who presents for a preoperative evaluation.    Surgical Information:  Surgery/Procedure: Laparoscopic Cholecystectomy and hemorrhoidectomy   Surgery Location: [facility]   Surgeon: Bhavin  Surgery Date: 12/10/2021  Time of Surgery: TBD  Where patient plans to recover: At home with family  Fax number for surgical facility: Note does not need to be faxed, will be available electronically in Epic.    Type of Anesthesia Anticipated: to be determined    Covid testing was done at [facility] yesterday    Assessment & Plan     The proposed surgical procedure is considered INTERMEDIATE risk.    Preop general physical exam    - CBC with platelets; Future  - Comprehensive metabolic panel; Future  - EKG 12-lead complete w/read - (Clinic Performed)  - CBC with platelets  - Comprehensive metabolic panel    Calculus of gallbladder without cholecystitis without obstruction  Symptomatic     Pulmonary emphysema, unspecified emphysema type (H)  Stable     Chronic radicular cervical pain  Ongoing, obtaining injections currently    S/P cervical spinal fusion  \    Gastroesophageal reflux disease with esophagitis without hemorrhage  Stable     Intractable chronic migraine without aura and with status migrainosus  Stable     Myofascial pain  ongoing    Chronic neck pain  Renewed, she uses this sparingly   - HYDROcodone-acetaminophen (NORCO) 7.5-325 MG per tablet; Take 1 tablet by mouth every 6 hours as needed for severe pain TAKE 1 TABLET BY MOUTH EVERY 4 TO 6 HOURS AS NEEDED FOR PAIN         Risks and Recommendations:  The patient has the following additional risks and recommendations for perioperative complications:   - No identified additional risk factors  other than previously addressed    Medication Instructions:  Patient is to take all scheduled medications on the day of surgery    RECOMMENDATION:  APPROVAL GIVEN to proceed with proposed procedure, without further diagnostic evaluation.              45 minutes spent on the date of the encounter doing chart review, review of outside records, review of test results, interpretation of tests, patient visit and documentation         Subjective     HPI related to upcoming procedure: ongoing abdominal pain, severe hemorrhoids      Preop Questions 12/2/2021   1. Have you ever had a heart attack or stroke? No   2. Have you ever had surgery on your heart or blood vessels, such as a stent placement, a coronary artery bypass, or surgery on an artery in your head, neck, heart, or legs? No   3. Do you have chest pain with activity? No   4. Do you have a history of  heart failure? No   5. Do you currently have a cold, bronchitis or symptoms of other infection? No   6. Do you have a cough, shortness of breath, or wheezing? No   7. Do you or anyone in your family have previous history of blood clots? No   8. Do you or does anyone in your family have a serious bleeding problem such as prolonged bleeding following surgeries or cuts? No   9. Have you ever had problems with anemia or been told to take iron pills? No   10. Have you had any abnormal blood loss such as black, tarry or bloody stools, or abnormal vaginal bleeding? No   11. Have you ever had a blood transfusion? No   12. Are you willing to have a blood transfusion if it is medically needed before, during, or after your surgery? Yes   13. Have you or any of your relatives ever had problems with anesthesia? No   14. Do you have sleep apnea, excessive snoring or daytime drowsiness? No   15. Do you have any artifical heart valves or other implanted medical devices like a pacemaker, defibrillator, or continuous glucose monitor? No   16. Do you have artificial joints? No   17. Are  you allergic to latex? No   18. Is there any chance that you may be pregnant? No     Health Care Directive:  Patient does not have a Health Care Directive or Living Will: Discussed advance care planning with patient; however, patient declined at this time.    Preoperative Review of :   reviewed - controlled substances reflected in medication list.      Status of Chronic Conditions:  Chronic pain of the cervical spine and myofascial pain    Review of Systems  Constitutional, neuro, ENT, endocrine, pulmonary, cardiac, gastrointestinal, genitourinary, musculoskeletal, integument and psychiatric systems are negative, except as otherwise noted.    Patient Active Problem List    Diagnosis Date Noted     Biliary dyskinesia 12/02/2021     Priority: Medium     Formatting of this note might be different from the original.  Added automatically from request for surgery [id number]       Concussion with brief LOC 01/16/2020     Priority: Medium     Adhesive capsulitis of left shoulder 12/06/2018     Priority: Medium     Pelvic floor dysfunction 05/01/2018     Priority: Medium     Other chronic gastritis without hemorrhage 02/26/2018     Priority: Medium     Calculus of kidney 07/11/2017     Priority: Medium     Pulmonary emphysema, unspecified emphysema type (H) 07/11/2017     Priority: Medium     Panlobular emphysema (H) 04/22/2017     Priority: Medium     Mild, noted on CT 4/21/17       ACP (advance care planning) 02/09/2017     Priority: Medium     Advance Care Planning 2/9/2017: ACP Review of Chart / Resources Provided:  Reviewed chart for advance care plan.  Sadaf MCMAHAN Thadeduar has no plan or code status on file. Discussed available resources and provided with information. Confirmed code status reflects current choices pending further ACP discussions.  Confirmed/documented legally designated decision makers.  Added by Liudmila Rivera             Subacromial bursitis of right shoulder joint 01/26/2017     Priority: Medium      Acute back pain with sciatica 01/05/2017     Priority: Medium     Chronic migraine without aura without status migrainosus, not intractable 01/05/2017     Priority: Medium     Radicular pain 01/05/2017     Priority: Medium     Chronic radicular cervical pain 11/14/2016     Priority: Medium     Right arm       Mild episode of recurrent major depressive disorder (H) 11/14/2016     Priority: Medium     Ulnar neuropathy at elbow of left upper extremity 11/14/2016     Priority: Medium     Lumbar radiculopathy 11/14/2016     Priority: Medium     bilateral       S/P cervical spinal fusion 11/14/2016     Priority: Medium     Ulnar neuropathy 11/11/2016     Priority: Medium     Ulcer of esophagus without bleeding 10/14/2016     Priority: Medium     Gastroesophageal reflux disease with esophagitis 10/14/2016     Priority: Medium     Intractable chronic migraine without aura and without status migrainosus 10/14/2016     Priority: Medium     Intractable chronic migraine without aura and with status migrainosus 09/13/2016     Priority: Medium     Myofascial pain 05/09/2016     Priority: Medium     Disuse syndrome 02/12/2016     Priority: Medium     Chronic pain 02/05/2016     Priority: Medium     Uvulitis 01/22/2016     Priority: Medium     Chronic rhinitis 01/22/2016     Priority: Medium     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     Priority: Medium     Chronic maxillary sinusitis 01/22/2016     Priority: Medium     Hypertrophy of inferior nasal turbinate 01/22/2016     Priority: Medium     Long uvula 01/22/2016     Priority: Medium     improved       Chronic, continuous use of opioids 12/07/2015     Priority: Medium     Patient is followed by Henna Cruz MD for ongoing prescription of pain medication.  All refills should only be approved by this provider, or covering partner.    Medication(s): Norco 7.5/325mg.   Maximum quantity per month: #60  Clinic visit frequency required: Q 3 months     Controlled substance  agreement:  Encounter-Level CSA - 07/11/2017:          Controlled Substance Agreement - Scan on 7/14/2017 12:56 PM : CONTROLLED SUBSTANCE AGREEMENT (below)              Pain Clinic evaluation in the past: No    DIRE Total Score(s):  No flowsheet data found.    Last [facility] website verification:  done on 7.10.17   [email or web address]         Chronic neck pain 10/05/2015     Priority: Medium     Chronic tension-type headache, intractable 10/05/2015     Priority: Medium     Migraine aura without headache 10/05/2015     Priority: Medium     History of arthrodesis 10/05/2015     Priority: Medium     Myofascial pain syndrome, cervical 09/17/2015     Priority: Medium     Depression, major 09/17/2015     Priority: Medium     Irritable bowel syndrome 01/26/2015     Priority: Medium     Thoracic sprain and strain 11/06/2013     Priority: Medium     Sprain and strain of shoulder and upper arm 11/06/2013     Priority: Medium     Pseudoarthrosis of cervical spine (H) 07/03/2012     Priority: Medium     Ulcer of esophagus 06/25/2012     Priority: Medium     Cervical pain 05/10/2012     Priority: Medium     With radicuopathy         Advanced care planning/counseling discussion 02/22/2012     Priority: Medium     UTI (urinary tract infection) 05/26/2011     Priority: Medium     Problem list name updated by automated process. Provider to review       Degenerative disc disease, cervical 01/01/2011     Priority: Medium     Migraine 07/09/2001     Priority: Medium     Problem list name updated by automated process. Provider to review        Past Medical History:   Diagnosis Date     ASCUS on Pap smear 5/26/2004    negative HPV     Atypical chest pain 5/26/2008    negative cardiolite stress test [facility]     Bleeding ulcer 5/26/1976     Cervical radicular pain 5/10/2012     Degenerative disc disease, cervical 1/1/2011     Esophageal ulcer 5/26/1998     Irritable bowel syndrome 1/26/2015     Migraine headaches, severe 7/9/2001      Pseudoarthrosis of cervical spine 7/3/2012     Recurrent UTI 2011     sinusitis (chronic) 2001     Past Surgical History:   Procedure Laterality Date     BACK SURGERY      cervical     Cervical spine surgery with removal of 2 disks, C5,C7       COLONOSCOPY  10/13/2014    Dr Camargo, internal hemorrhoids     COLONOSCOPY - HIM SCAN  2018    EGD and colonoscopy with Dr. Jennings     Coronary angiogram, normal      Chest pain     ENT SURGERY      nose surgery x3     fusion discectomy anterior cervical  2011     HC ESOPHAGOSCOPY, DIAGNOSTIC  10/31/2014    Dr Camargo, mild erythema of antrum, negative biopsies     IR CONSULTATION FOR IR EXAM  2020     IR FLUORO 0-1 HOUR  2020     NOSE SURGERY      x3            Posterior decompression , fusion C3-5      Psuedoarthrosis     Total abdominal hysterectomy with right salpingectomy      Endometriosis, cervix removed, Dr Hathaway     Current Outpatient Medications   Medication Sig Dispense Refill     baclofen (LIORESAL) 10 MG tablet TAKE ONE (1) TABLET BY MOUTH AT BEDTIME  30 tablet 3     butalbital-acetaminophen-caffeine (FIORICET/ESGIC) -40 MG tablet TAKE 1 TO 2 TABLETS BY MOUTH AT THE ONSET OF A MIGRAINE HEADACHE, LIMIT NO MORE THAN 3 TIMES PER WEEK. ACETAMINOPHEN SHOULD BE LIMITED TO 40  5     Cholecalciferol (VITAMIN D3) 1000 UNITS CAPS Take 1,000 Units by mouth Takes 5000 unit capsule daily       DEXILANT 60 MG CPDR CR capsule TAKE 1 CAPSULE BY MOUTH DAILY 30 capsule 10     diazepam (VALIUM) 5 MG tablet Take 1 tablet (5 mg) by mouth every 6 hours as needed for anxiety 20 tablet 0     docusate sodium (COLACE) 100 MG capsule Take 100 mg by mouth daily        Erenumab-aooe (AIMOVIG SC) Takes once a month       FLUoxetine (PROZAC) 20 MG capsule TAKE 1 TABLET BY MOUTH EVERY DAY WITH 40MG CAPSULE 30 capsule 11     FLUoxetine (PROZAC) 40 MG capsule Take 1 capsule daily along with 20 mg capsule 30 capsule 11      "HYDROcodone-acetaminophen (NORCO) 7.5-325 MG per tablet Take 1 tablet by mouth every 6 hours as needed for severe pain TAKE 1 TABLET BY MOUTH EVERY 4 TO 6 HOURS AS NEEDED FOR PAIN 60 tablet 0     hydrOXYzine (ATARAX) 10 MG tablet Take 1 tablets daily as needed for anxiety and takes 2 tablets in evening as needed for insomnia (Patient taking differently: Takes 1 tablet at night) 90 tablet 6     ibuprofen (ADVIL/MOTRIN) 800 MG tablet Take 1 tablet (800 mg) by mouth every 8 hours as needed for moderate pain 90 tablet 1     isosorbide mononitrate (IMDUR) 30 MG 24 hr tablet Take 1 tablet (30 mg) by mouth daily 30 tablet 3     methocarbamol (ROBAXIN) 500 MG tablet Take 500 mg by mouth 4 times daily as needed for muscle spasms Patient takes as needed.  Prescribed by Dr. Prasanth Diez       Multiple Vitamins-Minerals (CENTRUM SILVER ULTRA WOMENS) TABS Take 1 tablet by mouth daily        ondansetron (ZOFRAN-ODT) 4 MG ODT tab Take 4 mg by mouth every 6 hours as needed (after migraine medication)        pregabalin (LYRICA) 150 MG capsule TAKE 1 CAPSULE (150 MG) BY MOUTH 2TIMES DAILY 60 capsule 3     Probiotic Product (PROBIOTIC DAILY PO) Take by mouth At Bedtime        sucralfate (CARAFATE) 1 GM/10ML suspension Take 10 mLs (1 g) by mouth 4 times daily 420 mL 3     topiramate (TOPAMAX) 50 MG tablet Takes 2 tabs at night         Allergies   Allergen Reactions     Aspirin Hives     Ibuprofen      Dye in the otc form causes headaches  \"patient states over the counter ibuprofen  bothers her\"     Lansoprazole Other (See Comments) and Visual Disturbance     Changes in eyesight  Prevacid  Change in eyesight     Red Dye Hives     Bupropion Rash     Bupropion Hcl Hives and Rash     Wellbutrin     Demerol [Meperidine] Other (See Comments)     Made migraine worse        Social History     Tobacco Use     Smoking status: Former Smoker     Years: 8.00     Types: Cigarettes     Quit date:      Years since quittin.9     Smokeless tobacco: " "Never Used   Substance Use Topics     Alcohol use: No     Family History   Problem Relation Age of Onset     Cancer Father         lung, also a heavy smoker     Diabetes Mother      Heart Disease Mother 58        MI; cause of death; heavy smoker. had first MI at 40     Coronary Artery Disease Mother      Hypertension Mother      Cancer Other         strong history     Heart Disease Other         heart disease     Heart Disease Brother         x3     Hypertension Brother      Diabetes Brother      Coronary Artery Disease Brother      Hypertension Sister      History   Drug Use No         Objective     /74   Pulse 75   Temp 98.3  F (36.8  C) (Tympanic)   Resp 19   Ht 1.676 m (5' 6\")   Wt 83.5 kg (184 lb)   SpO2 99%   BMI 29.70 kg/m      Physical Exam    GENERAL APPEARANCE: healthy, alert and no distress     EYES: EOMI, PERRL     HENT: ear canals and TM's normal and nose and mouth without ulcers or lesions     NECK: no adenopathy, no asymmetry, masses, or scars and thyroid normal to palpation     RESP: lungs clear to auscultation - no rales, rhonchi or wheezes     CV: regular rates and rhythm, normal S1 S2, no S3 or S4 and no murmur, click or rub     ABDOMEN:  soft, nontender, no HSM or masses and bowel sounds normal     MS: extremities normal- no gross deformities noted, no evidence of inflammation in joints, FROM in all extremities.     SKIN: no suspicious lesions or rashes     NEURO: Normal strength and tone, sensory exam grossly normal, mentation intact and speech normal     PSYCH: mentation appears normal. and affect normal/bright     LYMPHATICS: No cervical adenopathy    Recent Labs   Lab Test 09/10/21  1344 03/25/21  0810 01/17/20  0006 01/16/20  1050   HGB 12.7 13.5   < > 14.2    175   < > 171   INR  --   --   --  1.00    144   < > 141   POTASSIUM 3.7 3.7   < > 3.8   CR 1.02 0.92   < > 0.96    < > = values in this interval not displayed.        Diagnostics:  Recent Results (from the " past 24 hour(s))   CBC with platelets    Collection Time: 12/06/21  4:29 PM   Result Value Ref Range    WBC Count 5.4 4.0 - 11.0 10e3/uL    RBC Count 4.42 3.80 - 5.20 10e6/uL    Hemoglobin 13.2 11.7 - 15.7 g/dL    Hematocrit 40.2 35.0 - 47.0 %    MCV 91 78 - 100 fL    MCH 29.9 26.5 - 33.0 pg    MCHC 32.8 31.5 - 36.5 g/dL    RDW 13.2 10.0 - 15.0 %    Platelet Count 190 150 - 450 10e3/uL   Comprehensive metabolic panel    Collection Time: 12/06/21  4:29 PM   Result Value Ref Range    Sodium 141 133 - 144 mmol/L    Potassium 4.3 3.4 - 5.3 mmol/L    Chloride 113 (H) 94 - 109 mmol/L    Carbon Dioxide (CO2) 25 20 - 32 mmol/L    Anion Gap 3 3 - 14 mmol/L    Urea Nitrogen 7 7 - 30 mg/dL    Creatinine 0.92 0.52 - 1.04 mg/dL    Calcium 8.8 8.5 - 10.1 mg/dL    Glucose 113 (H) 70 - 99 mg/dL    Alkaline Phosphatase 107 40 - 150 U/L    AST 18 0 - 45 U/L    ALT 27 0 - 50 U/L    Protein Total 7.4 6.8 - 8.8 g/dL    Albumin 3.5 3.4 - 5.0 g/dL    Bilirubin Total 0.2 0.2 - 1.3 mg/dL    GFR Estimate 68 >60 mL/min/1.73m2      EKG: sinus bradycardia, normal axis, normal intervals, no acute ST/T changes c/w ischemia, no LVH by voltage criteria    Revised Cardiac Risk Index (RCRI):  The patient has the following serious cardiovascular risks for perioperative complications:   - No serious cardiac risks = 0 points     RCRI Interpretation: 0 points: Class I (very low risk - 0.4% complication rate)           Signed Electronically by: Henna Cruz MD  Copy of this evaluation report is provided to requesting physician.

## 2021-12-06 ENCOUNTER — OFFICE VISIT (OUTPATIENT)
Dept: FAMILY MEDICINE | Facility: OTHER | Age: 61
End: 2021-12-06
Attending: FAMILY MEDICINE
Payer: MEDICARE

## 2021-12-06 ENCOUNTER — VIRTUAL VISIT (OUTPATIENT)
Dept: PSYCHIATRY | Facility: OTHER | Age: 61
End: 2021-12-06
Attending: PSYCHIATRY & NEUROLOGY
Payer: MEDICARE

## 2021-12-06 VITALS
WEIGHT: 184 LBS | BODY MASS INDEX: 29.57 KG/M2 | RESPIRATION RATE: 19 BRPM | OXYGEN SATURATION: 99 % | DIASTOLIC BLOOD PRESSURE: 74 MMHG | SYSTOLIC BLOOD PRESSURE: 110 MMHG | TEMPERATURE: 98.3 F | HEIGHT: 66 IN | HEART RATE: 75 BPM

## 2021-12-06 DIAGNOSIS — K80.20 CALCULUS OF GALLBLADDER WITHOUT CHOLECYSTITIS WITHOUT OBSTRUCTION: ICD-10-CM

## 2021-12-06 DIAGNOSIS — F41.1 GAD (GENERALIZED ANXIETY DISORDER): Primary | ICD-10-CM

## 2021-12-06 DIAGNOSIS — G89.29 CHRONIC NECK PAIN: ICD-10-CM

## 2021-12-06 DIAGNOSIS — M54.2 CHRONIC NECK PAIN: ICD-10-CM

## 2021-12-06 DIAGNOSIS — Z98.1 S/P CERVICAL SPINAL FUSION: ICD-10-CM

## 2021-12-06 DIAGNOSIS — F41.1 GAD (GENERALIZED ANXIETY DISORDER): ICD-10-CM

## 2021-12-06 DIAGNOSIS — M79.18 MYOFASCIAL PAIN: ICD-10-CM

## 2021-12-06 DIAGNOSIS — J43.9 PULMONARY EMPHYSEMA, UNSPECIFIED EMPHYSEMA TYPE (H): ICD-10-CM

## 2021-12-06 DIAGNOSIS — G89.29 CHRONIC RADICULAR CERVICAL PAIN: ICD-10-CM

## 2021-12-06 DIAGNOSIS — K21.00 GASTROESOPHAGEAL REFLUX DISEASE WITH ESOPHAGITIS WITHOUT HEMORRHAGE: ICD-10-CM

## 2021-12-06 DIAGNOSIS — G43.711 INTRACTABLE CHRONIC MIGRAINE WITHOUT AURA AND WITH STATUS MIGRAINOSUS: ICD-10-CM

## 2021-12-06 DIAGNOSIS — M54.12 CHRONIC RADICULAR CERVICAL PAIN: ICD-10-CM

## 2021-12-06 DIAGNOSIS — Z01.818 PREOP GENERAL PHYSICAL EXAM: Primary | ICD-10-CM

## 2021-12-06 LAB
ALBUMIN SERPL-MCNC: 3.5 G/DL (ref 3.4–5)
ALP SERPL-CCNC: 107 U/L (ref 40–150)
ALT SERPL W P-5'-P-CCNC: 27 U/L (ref 0–50)
ANION GAP SERPL CALCULATED.3IONS-SCNC: 3 MMOL/L (ref 3–14)
AST SERPL W P-5'-P-CCNC: 18 U/L (ref 0–45)
BILIRUB SERPL-MCNC: 0.2 MG/DL (ref 0.2–1.3)
BUN SERPL-MCNC: 7 MG/DL (ref 7–30)
CALCIUM SERPL-MCNC: 8.8 MG/DL (ref 8.5–10.1)
CHLORIDE BLD-SCNC: 113 MMOL/L (ref 94–109)
CO2 SERPL-SCNC: 25 MMOL/L (ref 20–32)
CREAT SERPL-MCNC: 0.92 MG/DL (ref 0.52–1.04)
ERYTHROCYTE [DISTWIDTH] IN BLOOD BY AUTOMATED COUNT: 13.2 % (ref 10–15)
GFR SERPL CREATININE-BSD FRML MDRD: 68 ML/MIN/1.73M2
GLUCOSE BLD-MCNC: 113 MG/DL (ref 70–99)
HCT VFR BLD AUTO: 40.2 % (ref 35–47)
HGB BLD-MCNC: 13.2 G/DL (ref 11.7–15.7)
MCH RBC QN AUTO: 29.9 PG (ref 26.5–33)
MCHC RBC AUTO-ENTMCNC: 32.8 G/DL (ref 31.5–36.5)
MCV RBC AUTO: 91 FL (ref 78–100)
PLATELET # BLD AUTO: 190 10E3/UL (ref 150–450)
POTASSIUM BLD-SCNC: 4.3 MMOL/L (ref 3.4–5.3)
PROT SERPL-MCNC: 7.4 G/DL (ref 6.8–8.8)
RBC # BLD AUTO: 4.42 10E6/UL (ref 3.8–5.2)
SODIUM SERPL-SCNC: 141 MMOL/L (ref 133–144)
WBC # BLD AUTO: 5.4 10E3/UL (ref 4–11)

## 2021-12-06 PROCEDURE — 80053 COMPREHEN METABOLIC PANEL: CPT | Mod: ZL | Performed by: FAMILY MEDICINE

## 2021-12-06 PROCEDURE — 99215 OFFICE O/P EST HI 40 MIN: CPT | Performed by: FAMILY MEDICINE

## 2021-12-06 PROCEDURE — 36415 COLL VENOUS BLD VENIPUNCTURE: CPT | Mod: ZL | Performed by: FAMILY MEDICINE

## 2021-12-06 PROCEDURE — 99443 PR PHYSICIAN TELEPHONE EVALUATION 21-30 MIN: CPT | Mod: 95 | Performed by: PSYCHIATRY & NEUROLOGY

## 2021-12-06 PROCEDURE — 93010 ELECTROCARDIOGRAM REPORT: CPT | Mod: 77 | Performed by: INTERNAL MEDICINE

## 2021-12-06 PROCEDURE — 93005 ELECTROCARDIOGRAM TRACING: CPT

## 2021-12-06 PROCEDURE — G0463 HOSPITAL OUTPT CLINIC VISIT: HCPCS | Mod: 25

## 2021-12-06 PROCEDURE — 85027 COMPLETE CBC AUTOMATED: CPT | Mod: ZL | Performed by: FAMILY MEDICINE

## 2021-12-06 RX ORDER — HYDROCODONE BITARTRATE AND ACETAMINOPHEN 7.5; 325 MG/1; MG/1
1 TABLET ORAL EVERY 6 HOURS PRN
Qty: 60 TABLET | Refills: 0 | Status: SHIPPED | OUTPATIENT
Start: 2021-12-06 | End: 2022-03-15

## 2021-12-06 RX ORDER — HYDROXYZINE HYDROCHLORIDE 10 MG/1
TABLET, FILM COATED ORAL
Qty: 90 TABLET | Refills: 6 | COMMUNITY
Start: 2021-12-06 | End: 2022-02-15 | Stop reason: DRUGHIGH

## 2021-12-06 ASSESSMENT — PATIENT HEALTH QUESTIONNAIRE - PHQ9: 5. POOR APPETITE OR OVEREATING: NOT AT ALL

## 2021-12-06 ASSESSMENT — ANXIETY QUESTIONNAIRES
5. BEING SO RESTLESS THAT IT IS HARD TO SIT STILL: NOT AT ALL
7. FEELING AFRAID AS IF SOMETHING AWFUL MIGHT HAPPEN: NOT AT ALL
6. BECOMING EASILY ANNOYED OR IRRITABLE: SEVERAL DAYS
1. FEELING NERVOUS, ANXIOUS, OR ON EDGE: NEARLY EVERY DAY
2. NOT BEING ABLE TO STOP OR CONTROL WORRYING: MORE THAN HALF THE DAYS
GAD7 TOTAL SCORE: 8
3. WORRYING TOO MUCH ABOUT DIFFERENT THINGS: MORE THAN HALF THE DAYS

## 2021-12-06 ASSESSMENT — PAIN SCALES - GENERAL: PAINLEVEL: SEVERE PAIN (6)

## 2021-12-06 ASSESSMENT — MIFFLIN-ST. JEOR: SCORE: 1421.37

## 2021-12-06 NOTE — NURSING NOTE
"Chief Complaint   Patient presents with     RECHECK     Depression, anxiety.  Telephone visit.  Patient c/o difficulty sleeping.       Initial There were no vitals taken for this visit. Estimated body mass index is 29.05 kg/m  as calculated from the following:    Height as of 9/10/21: 1.676 m (5' 6\").    Weight as of 9/10/21: 81.6 kg (180 lb).  Medication Reconciliation: complete  JANICE ROMO LPN    "

## 2021-12-06 NOTE — NURSING NOTE
"Chief Complaint   Patient presents with     Pre-Op Exam       Initial /74   Pulse 75   Temp 98.3  F (36.8  C) (Tympanic)   Resp 19   Ht 1.676 m (5' 6\")   Wt 83.5 kg (184 lb)   SpO2 99%   BMI 29.70 kg/m   Estimated body mass index is 29.7 kg/m  as calculated from the following:    Height as of this encounter: 1.676 m (5' 6\").    Weight as of this encounter: 83.5 kg (184 lb).  Medication Reconciliation: complete  Liudmila Rivera LPN    "
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
## 2021-12-07 ENCOUNTER — TELEPHONE (OUTPATIENT)
Dept: FAMILY MEDICINE | Facility: OTHER | Age: 61
End: 2021-12-07
Payer: MEDICARE

## 2021-12-07 ENCOUNTER — MEDICAL CORRESPONDENCE (OUTPATIENT)
Dept: HEALTH INFORMATION MANAGEMENT | Facility: CLINIC | Age: 61
End: 2021-12-07

## 2021-12-07 DIAGNOSIS — M54.12 CHRONIC RADICULAR CERVICAL PAIN: ICD-10-CM

## 2021-12-07 DIAGNOSIS — G89.29 CHRONIC RADICULAR CERVICAL PAIN: ICD-10-CM

## 2021-12-07 ASSESSMENT — PATIENT HEALTH QUESTIONNAIRE - PHQ9: SUM OF ALL RESPONSES TO PHQ QUESTIONS 1-9: 6

## 2021-12-07 ASSESSMENT — ANXIETY QUESTIONNAIRES: GAD7 TOTAL SCORE: 8

## 2021-12-08 DIAGNOSIS — K22.10 ULCER OF ESOPHAGUS WITHOUT BLEEDING: ICD-10-CM

## 2021-12-08 RX ORDER — BACLOFEN 10 MG/1
TABLET ORAL
Qty: 30 TABLET | Refills: 0 | Status: SHIPPED | OUTPATIENT
Start: 2021-12-08 | End: 2022-01-06

## 2021-12-08 RX ORDER — DEXLANSOPRAZOLE 60 MG/1
CAPSULE, DELAYED RELEASE ORAL
Qty: 30 CAPSULE | Refills: 11 | Status: SHIPPED | OUTPATIENT
Start: 2021-12-08 | End: 2022-02-04

## 2021-12-08 NOTE — TELEPHONE ENCOUNTER
Baclofen       Last Written Prescription Date:  8/3/21  Last Fill Quantity: 30,   # refills: 0  Last Office Visit: 12/6/21  Future Office visit:    Next 5 appointments (look out 90 days)    Jan 06, 2022  9:00 AM  (Arrive by 8:45 AM)  SHORT with Henna Moyer MD  [facility] ([facility] ) [address]  [place] [address]  [phone]           Routing refill request to provider for review/approval because:  Drug not on the [facility], [facility] or [facility] refill protocol or controlled substance

## 2021-12-10 ENCOUNTER — TRANSFERRED RECORDS (OUTPATIENT)
Dept: HEALTH INFORMATION MANAGEMENT | Facility: CLINIC | Age: 61
End: 2021-12-10

## 2021-12-13 ENCOUNTER — TELEPHONE (OUTPATIENT)
Dept: FAMILY MEDICINE | Facility: OTHER | Age: 61
End: 2021-12-13

## 2021-12-13 NOTE — TELEPHONE ENCOUNTER
I reached out to the pt to try & setup an [facility] appt for pt as Dr RAMOS Cruz is retiring @ the end of this year.  Pt has a surgery followup w/Dr Moyer on 1/6/22 but pt stated she needs to be seen sooner for her hospital followup then 1/6/22.  Pt asked if Dr RAMOS Cruz can see pt before she retires for a hospital followup? I mentioned to the pt that I did see that Dr RAMOS Lopez schedule was full but pt insisted that I put a note back to the nurse.

## 2021-12-13 NOTE — TELEPHONE ENCOUNTER
Spoke with PCP, unfortunately we don't have any openings this month. Per PCP she should follow up with Dr Moyer on 1/6/22.

## 2021-12-13 NOTE — TELEPHONE ENCOUNTER
Called pt; she was confused as she thought her appt on 1/6 was for an establish care. She has an appt on 12/17 with anais

## 2021-12-16 DIAGNOSIS — G89.29 CHRONIC RADICULAR CERVICAL PAIN: ICD-10-CM

## 2021-12-16 DIAGNOSIS — M54.12 CHRONIC RADICULAR CERVICAL PAIN: ICD-10-CM

## 2021-12-22 RX ORDER — BACLOFEN 10 MG/1
TABLET ORAL
Qty: 30 TABLET | Refills: 2 | OUTPATIENT
Start: 2021-12-22

## 2022-01-04 ENCOUNTER — VIRTUAL VISIT (OUTPATIENT)
Dept: PSYCHIATRY | Facility: OTHER | Age: 62
End: 2022-01-04
Attending: PSYCHIATRY & NEUROLOGY
Payer: MEDICARE

## 2022-01-04 DIAGNOSIS — M54.12 CHRONIC RADICULAR CERVICAL PAIN: ICD-10-CM

## 2022-01-04 DIAGNOSIS — F41.1 GAD (GENERALIZED ANXIETY DISORDER): Primary | ICD-10-CM

## 2022-01-04 DIAGNOSIS — G89.29 CHRONIC RADICULAR CERVICAL PAIN: ICD-10-CM

## 2022-01-04 PROCEDURE — 99443 PR PHYSICIAN TELEPHONE EVALUATION 21-30 MIN: CPT | Mod: 95 | Performed by: PSYCHIATRY & NEUROLOGY

## 2022-01-04 RX ORDER — DIAZEPAM 5 MG
5 TABLET ORAL EVERY 6 HOURS PRN
Qty: 20 TABLET | Refills: 0 | Status: SHIPPED | OUTPATIENT
Start: 2022-01-04 | End: 2022-09-26

## 2022-01-04 ASSESSMENT — ANXIETY QUESTIONNAIRES
6. BECOMING EASILY ANNOYED OR IRRITABLE: NOT AT ALL
7. FEELING AFRAID AS IF SOMETHING AWFUL MIGHT HAPPEN: NOT AT ALL
1. FEELING NERVOUS, ANXIOUS, OR ON EDGE: NEARLY EVERY DAY
3. WORRYING TOO MUCH ABOUT DIFFERENT THINGS: NEARLY EVERY DAY
5. BEING SO RESTLESS THAT IT IS HARD TO SIT STILL: NEARLY EVERY DAY
GAD7 TOTAL SCORE: 15
2. NOT BEING ABLE TO STOP OR CONTROL WORRYING: NEARLY EVERY DAY

## 2022-01-04 ASSESSMENT — PAIN SCALES - GENERAL: PAINLEVEL: MODERATE PAIN (4)

## 2022-01-04 ASSESSMENT — PATIENT HEALTH QUESTIONNAIRE - PHQ9
5. POOR APPETITE OR OVEREATING: NEARLY EVERY DAY
SUM OF ALL RESPONSES TO PHQ QUESTIONS 1-9: 10
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
## 2022-01-04 NOTE — NURSING NOTE
"Chief Complaint   Patient presents with     RECHECK     Telephone visit  Medication review.       Initial There were no vitals taken for this visit. Estimated body mass index is 29.7 kg/m  as calculated from the following:    Height as of 12/6/21: 1.676 m (5' 6\").    Weight as of 12/6/21: 83.5 kg (184 lb).  Medication Reconciliation: complete  JANICE ROMO LPN    "

## 2022-01-05 ASSESSMENT — ANXIETY QUESTIONNAIRES: GAD7 TOTAL SCORE: 15

## 2022-01-06 ENCOUNTER — OFFICE VISIT (OUTPATIENT)
Dept: FAMILY MEDICINE | Facility: OTHER | Age: 62
End: 2022-01-06
Attending: FAMILY MEDICINE
Payer: MEDICARE

## 2022-01-06 ENCOUNTER — MYC MEDICAL ADVICE (OUTPATIENT)
Dept: FAMILY MEDICINE | Facility: OTHER | Age: 62
End: 2022-01-06

## 2022-01-06 VITALS
WEIGHT: 184 LBS | DIASTOLIC BLOOD PRESSURE: 62 MMHG | HEART RATE: 87 BPM | TEMPERATURE: 98.1 F | OXYGEN SATURATION: 98 % | BODY MASS INDEX: 29.7 KG/M2 | SYSTOLIC BLOOD PRESSURE: 98 MMHG | RESPIRATION RATE: 18 BRPM

## 2022-01-06 DIAGNOSIS — M54.2 CERVICAL PAIN: Primary | ICD-10-CM

## 2022-01-06 DIAGNOSIS — F11.90 CHRONIC, CONTINUOUS USE OF OPIOIDS: ICD-10-CM

## 2022-01-06 DIAGNOSIS — G43.719 INTRACTABLE CHRONIC MIGRAINE WITHOUT AURA AND WITHOUT STATUS MIGRAINOSUS: ICD-10-CM

## 2022-01-06 DIAGNOSIS — F33.2 SEVERE EPISODE OF RECURRENT MAJOR DEPRESSIVE DISORDER, WITHOUT PSYCHOTIC FEATURES (H): ICD-10-CM

## 2022-01-06 DIAGNOSIS — J43.1 PANLOBULAR EMPHYSEMA (H): ICD-10-CM

## 2022-01-06 DIAGNOSIS — K64.4 EXTERNAL HEMORRHOIDS: ICD-10-CM

## 2022-01-06 DIAGNOSIS — M70.51 BURSITIS OF OTHER BURSA OF RIGHT KNEE: ICD-10-CM

## 2022-01-06 DIAGNOSIS — E78.2 MODERATE MIXED HYPERLIPIDEMIA NOT REQUIRING STATIN THERAPY: ICD-10-CM

## 2022-01-06 PROCEDURE — 99215 OFFICE O/P EST HI 40 MIN: CPT | Performed by: FAMILY MEDICINE

## 2022-01-06 PROCEDURE — G0463 HOSPITAL OUTPT CLINIC VISIT: HCPCS | Mod: 25

## 2022-01-06 RX ORDER — BUTALBITAL, ACETAMINOPHEN AND CAFFEINE 50; 325; 40 MG/1; MG/1; MG/1
1 TABLET ORAL EVERY 4 HOURS PRN
COMMUNITY

## 2022-01-06 RX ORDER — IBUPROFEN 800 MG/1
800 TABLET, FILM COATED ORAL EVERY 8 HOURS PRN
Qty: 90 TABLET | Refills: 1 | Status: SHIPPED | OUTPATIENT
Start: 2022-01-06 | End: 2023-09-08

## 2022-01-06 RX ORDER — BACLOFEN 10 MG/1
TABLET ORAL
Qty: 30 TABLET | Refills: 5 | Status: SHIPPED | OUTPATIENT
Start: 2022-01-06 | End: 2022-06-21

## 2022-01-06 ASSESSMENT — ENCOUNTER SYMPTOMS
CHILLS: 0
WHEEZING: 1
PALPITATIONS: 0
SHORTNESS OF BREATH: 0
ABDOMINAL PAIN: 0
NECK PAIN: 1
ARTHRALGIAS: 1
BACK PAIN: 1
FEVER: 0
HEADACHES: 1

## 2022-01-06 ASSESSMENT — PAIN SCALES - GENERAL: PAINLEVEL: MILD PAIN (3)

## 2022-01-06 NOTE — LETTER
My Migraine Action Plan      Date: 1/6/2022     My Name: Sadaf Blankenship   YOB: 1960  My Pharmacy:    [id number] - [place], [place] - [address] [facility], [place] - [address]     My Preventive Medicine(s):  Fioricept  My Rescue Medicine(s):  Ondansetron (Zofran) ODT 4 mg My Doctor: Henna Cruz     My Clinic: [facility] [place]  [address]  [place] [id number] [phone]        GREEN ZONE = Good Control    My headache plan is working.   I can do what I need to do.         I WILL:     ? Keep managing my triggers.  ? Write in my migraine diary each time I have a headache.  ? Keep taking my preventive medicine daily.  ? Take my rescue medicine as needed.             YELLOW ZONE = Not Enough Control    My headache plan isn t always working.   My headaches keep me from doing   some of the things I need to do.   I WILL:     ? Set goals to control my triggers and act on them.  ? Write in my migraine diary each time I have a headache and review it for                     patterns or new triggers.  ? Keep taking my preventive medicine daily.  ? Take my rescue medicine as needed.  l Make sure I stay hydrated.  ? Call my provider or clinic if my rescue medication did not help after taking it on             at least two separate headache days  ? Call my provider or clinic if I need to use my rescue medicine more than an                  average of 2 times per week over the course of one month.               RED ZONE = Poor or No Control    My headache plan has  failed. I can t do anything  when I have one. My  medicines aren t working.   I WILL:   ? Keep taking my preventive medicine daily.  ? Make sure I stay hydrated.  ? Call my provider or clinic if my medicines are not helping or if I am not able to              keep fluids down because of nausea.  ? Let my provider or clinic know within 2 weeks if I have gone to an urgent care or           emergency department.          Provider specific instructions:      Do not take over the counter pain relievers more than 14 days per month. This includes NSAIDS (Ibuprofen, Motrin, Advil, Naproxen, Aleve, etc), acetaminophen (Tylenol), aspirin, and Excedrin.     If you use a triptan medicine (sumatriptan, rizatriptan, frovatriptan, zolmitriptan, eletriptan etc) take it as soon as you notice the migraine starting. You can take a second dose 2 hours after the first dose if you still have any migraine symptoms.    It is fine to take 600 mg of ibuprofen (Advil) or 440 mg of naproxen (Aleve) with the triptan medicine.  Try not to use triptan medicines more than 2 days a week.    If you use your rescue medicine more than 2 times per week over the course of 1 month, set up an appointment with your provider to talk about starting a medication to prevent migraines.               Other Things I Can Do to Help My Migraines   Track Migraines and Triggers     Keep a headache journal of your symptoms. Try to track how often you have a headache, how long it lasts and what medicines you used. You can also track severity of headache.    You can use a smart phone shyann: My Migraine Danial. The information that you track in the shyann can be uploaded for your provider through mySkin.     You can also track your migraines on paper. The clinic can print a copy of the Migraine Diary for you. Link: [email or web address]      Lifestyle Changes 1. Try to drink enough water each day (48-70 fluid ounces a day)  2. Limit caffeine intact-one caffeinated beverage a day  3. Eat regular meals  4. Try to get cardiovascular exercise (walking, swimming, biking) 4-5 times a week  5. Try to have a routine sleep schedule going to bed at the same time each night and getting up the same time each morning with enough time to sleep in between  6. Sometimes foods can trigger migraines you can try to eliminate them if you think they make symptoms worse:  dairy, gluten, nuts (cashews, almonds), artificial sweeteners, artificial colors, high sugar content foods, certain alcohol, chocolate, and preservatives like MSG and nitrates.      Other Therapies  Talk to your doctor about adding other therapies to your headache treatment plan including:   - Cognitive behavioral therapy  - Biofeedback  - Practice therapeutic techniques at home such as Progressive Relaxation and Deep Breathing    Research suggests that combining one or more of these therapies with medication can be helpful to reduce the number and severity of migraines.     Learn More To learn more about headaches you can go to the following websites:  https://americanmigrainefoundation.org/   http://www.headaches.org/

## 2022-01-06 NOTE — LETTER
My COPD Action Plan     Name: Sadaf Blankenship    YOB: 1960   Date: 1/6/2022    My doctor: Henna Moyer MD   My clinic: [facility] [place]    [address]  [place] [address]  [phone]  My Controller Medicine: none   Dose:      My Rescue Medicine: None   Dose:      My Flare Up Medicine: None   Dose:     My COPD Severity:       Use of Oxygen: Oxygen Not Prescribed      Make sure you've had your pneumonia   vaccines.          GREEN ZONE       Doing well today      Usual level of activity and exercise    Usual amount of cough and mucus    No shortness of breath    Usual level of health (thinking clearly, sleeping well, feel like eating) Actions:      Take daily medicines    Use oxygen as prescribed    Follow regular exercise and diet plan    Avoid cigarette smoke and other irritants that harm the lungs           YELLOW ZONE          Having a bad day or flare up      Short of breath more than usual    A lot more sputum (mucus) than usual    Sputum looks yellow, green, tan, brown or bloody    More coughing or wheezing    Fever or chills    Less energy; trouble completing activities    Trouble thinking or focusing    Using quick relief inhaler or nebulizer more often    Poor sleep; symptoms wake me up    Do not feel like eating Actions:      Get plenty of rest    Take daily medicines    Use quick relief inhaler every  hours    If you use oxygen, call you doctor to see if you should adjust your oxygen    Do breathing exercises or other things to help you relax    Let a loved one, friend or neighbor know you are feeling worse    Call your care team if you have 2 or more symptoms.  Start taking steroids or antibiotics if directed by your care team           RED ZONE       Need medical care now      Severe shortness of breath (feel you can't breathe)    Fever, chills    Not enough breath to do any activity    Trouble coughing up mucus, walking or talking    Blood in  mucus    Frequent coughing   Rescue medicines are not working    Not able to sleep because of breathing    Feel confused or drowsy    Chest pain    Actions:      Call your health care team.  If you cannot reach your care team, call 911 or go to the emergency room.        Annual Reminders:  Meet with Care Team, Flu Shot every Fall  Pharmacy:    [id number] [place] - [address]

## 2022-01-06 NOTE — NURSING NOTE
"Chief Complaint   Patient presents with     Establish Care     Anxiety     Depression     COPD     Pain       Initial BP 98/62 (BP Location: Right arm, Patient Position: Chair, Cuff Size: Adult Regular)   Pulse 87   Temp 98.1  F (36.7  C) (Tympanic)   Resp 18   Wt 83.5 kg (184 lb)   SpO2 98%   BMI 29.70 kg/m   Estimated body mass index is 29.7 kg/m  as calculated from the following:    Height as of 12/6/21: 1.676 m (5' 6\").    Weight as of this encounter: 83.5 kg (184 lb).  Medication Reconciliation: complete  Araceli Keys LPN  "

## 2022-01-13 ENCOUNTER — E-VISIT (OUTPATIENT)
Dept: FAMILY MEDICINE | Facility: OTHER | Age: 62
End: 2022-01-13
Attending: NURSE PRACTITIONER
Payer: MEDICARE

## 2022-01-13 DIAGNOSIS — R69 DIAGNOSIS UNKNOWN: Primary | ICD-10-CM

## 2022-01-14 NOTE — TELEPHONE ENCOUNTER
Provider E-Visit time total (minutes): 0  No symptoms. No indication for testing.  If having symptoms, needs to be seen in clinic.    Henna Moyer MD

## 2022-02-03 DIAGNOSIS — K22.10 ULCER OF ESOPHAGUS WITHOUT BLEEDING: ICD-10-CM

## 2022-02-03 DIAGNOSIS — M79.18 MYOFASCIAL PAIN SYNDROME, CERVICAL: ICD-10-CM

## 2022-02-04 RX ORDER — PREGABALIN 150 MG/1
CAPSULE ORAL
Qty: 60 CAPSULE | Refills: 2 | Status: SHIPPED | OUTPATIENT
Start: 2022-02-04 | End: 2022-03-15

## 2022-02-04 RX ORDER — DEXLANSOPRAZOLE 60 MG/1
CAPSULE, DELAYED RELEASE ORAL
Qty: 30 CAPSULE | Refills: 0 | Status: SHIPPED | OUTPATIENT
Start: 2022-02-04 | End: 2022-03-03

## 2022-02-04 NOTE — TELEPHONE ENCOUNTER
PREGABALIN 150MG CAPSULE       Last Written Prescription Date:  9/2/21  Last Fill Quantity: 60,   # refills: 3  Last Office Visit: 1/13/22  Future Office visit:    Next 5 appointments (look out 90 days)    Mar 15, 2022 10:00 AM  (Arrive by 9:45 AM)  SHORT with Henna Moyer MD  [facility] ([facility] ) [address]  [place] [address]  [phone]           Routing refill request to provider for review/approval because:    Drug not on the [facility], P or [facility] refill protocol or controlled substance     cleansed/copious irrigation

## 2022-02-15 ENCOUNTER — VIRTUAL VISIT (OUTPATIENT)
Dept: PSYCHIATRY | Facility: OTHER | Age: 62
End: 2022-02-15
Attending: PSYCHIATRY & NEUROLOGY
Payer: MEDICARE

## 2022-02-15 DIAGNOSIS — G47.00 PERSISTENT INSOMNIA: Primary | ICD-10-CM

## 2022-02-15 PROCEDURE — 99443 PR PHYSICIAN TELEPHONE EVALUATION 21-30 MIN: CPT | Mod: 95 | Performed by: PSYCHIATRY & NEUROLOGY

## 2022-02-15 RX ORDER — HYDROXYZINE HYDROCHLORIDE 25 MG/1
25 TABLET, FILM COATED ORAL
Qty: 30 TABLET | Refills: 4 | Status: SHIPPED | OUTPATIENT
Start: 2022-02-15 | End: 2022-06-29

## 2022-02-15 ASSESSMENT — PATIENT HEALTH QUESTIONNAIRE - PHQ9
SUM OF ALL RESPONSES TO PHQ QUESTIONS 1-9: 2
5. POOR APPETITE OR OVEREATING: NOT AT ALL

## 2022-02-15 ASSESSMENT — ANXIETY QUESTIONNAIRES
2. NOT BEING ABLE TO STOP OR CONTROL WORRYING: NOT AT ALL
3. WORRYING TOO MUCH ABOUT DIFFERENT THINGS: SEVERAL DAYS
GAD7 TOTAL SCORE: 2
7. FEELING AFRAID AS IF SOMETHING AWFUL MIGHT HAPPEN: NOT AT ALL
6. BECOMING EASILY ANNOYED OR IRRITABLE: NOT AT ALL
5. BEING SO RESTLESS THAT IT IS HARD TO SIT STILL: NOT AT ALL
1. FEELING NERVOUS, ANXIOUS, OR ON EDGE: SEVERAL DAYS

## 2022-02-15 ASSESSMENT — PAIN SCALES - GENERAL: PAINLEVEL: MODERATE PAIN (4)
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
## 2022-02-15 NOTE — NURSING NOTE
"Chief Complaint   Patient presents with     RECHECK     Telephone visit.  Depression, anxiety.       Initial There were no vitals taken for this visit. Estimated body mass index is 29.7 kg/m  as calculated from the following:    Height as of 12/6/21: 1.676 m (5' 6\").    Weight as of 1/6/22: 83.5 kg (184 lb).  Medication Reconciliation: complete  JANICE ROMO LPN    "

## 2022-02-16 ASSESSMENT — ANXIETY QUESTIONNAIRES: GAD7 TOTAL SCORE: 2

## 2022-03-03 DIAGNOSIS — K22.10 ULCER OF ESOPHAGUS WITHOUT BLEEDING: ICD-10-CM

## 2022-03-03 RX ORDER — DEXLANSOPRAZOLE 60 MG/1
60 CAPSULE, DELAYED RELEASE ORAL DAILY
Qty: 90 CAPSULE | Refills: 1 | Status: SHIPPED | OUTPATIENT
Start: 2022-03-03 | End: 2022-07-26

## 2022-03-03 NOTE — TELEPHONE ENCOUNTER
Dexilant 60 mG      Last Written Prescription Date:  02/04/22  Last Fill Quantity: 30,   # refills: 0  Last Office Visit: 01/06/22  Future Office visit:    Next 5 appointments (look out 90 days)    Mar 15, 2022 10:00 AM  (Arrive by 9:45 AM)  SHORT with Henna Moyer MD  [facility] ([facility] ) [address] [facility] [place] [address]  [phone]           Routing refill request to provider for review/approval because:  Phone call  PCP dr. moyer

## 2022-03-04 DIAGNOSIS — K22.10 ULCER OF ESOPHAGUS WITHOUT BLEEDING: ICD-10-CM

## 2022-03-04 RX ORDER — DEXLANSOPRAZOLE 60 MG/1
CAPSULE, DELAYED RELEASE ORAL
Qty: 30 CAPSULE | Refills: 0 | OUTPATIENT
Start: 2022-03-04

## 2022-03-12 NOTE — PROGRESS NOTES
Assessment & Plan     Intractable chronic migraine without aura and without status migrainosus  Follows with [facility] neurology, Dr. Pérez with next visit 6/2022  Tesha will send a message to her neurologist with an update and request for ESGIC    Chronic neck pain / Myofascial pain syndrome, cervical / Cervical pain  Tesha weaned off topamax resulting in increased pain. Tesha has tolerated lyrica 150mg tid   [place] PDMP reviewed and appropriate  - c/w HYDROcodone-acetaminophen (NORCO) 7.5-325 MG per tablet; Take 1 tablet by mouth 2 times daily as needed for severe pain TAKE 1 TABLET BY MOUTH EVERY 4 TO 6 HOURS AS NEEDED FOR PAIN  - increase pregabalin (LYRICA) 150 MG capsule; Take 1 capsule (150 mg) by mouth 3 times daily TAKE ONE CAPSULE TWICE A DAY BY MOUTH  - c/w methocarbamol (ROBAXIN) 500 MG tablet; Take 1 tablet (500 mg) by mouth 4 times daily as needed for muscle spasms Patient takes as needed. Prescribed by Dr. Prasanth Diez    Greater trochanteric bursitis of both hips  - Physical Therapy Referral; Future    Severe episode of recurrent major depressive disorder, without psychotic features (H)  Follows with Dr. Herbert     Encounter for screening mammogram for breast cancer  - MA SCREENING DIGITAL BILATERAL ([place]); Future    40 minutes spent on the date of the encounter doing chart review, review of test results, interpretation of tests, patient visit, documentation       See Patient Instructions    Return in about 2 months (around 5/15/2022) for Routine Visit, chronic pain .    Henna Moyer MD  [facility] [place]    Subjective   Tesha is a 61 year old who presents for the following health issues     HPI     Depression Followup    How are you doing with your depression since your last visit? No change    Are you having other symptoms that might be associated with depression? No    Have you had a significant life event?  No     Are you feeling anxious or having panic attacks?   Yes:   does not understand where it is coming from - spoke with Zoe    Do you have any concerns with your use of alcohol or other drugs? No    - increased anxiety and depression d/t pain   - follows with Dr. Herbert with last visit 2/15/2022    Social History     Tobacco Use     Smoking status: Former Smoker     Years: 8.00     Types: Cigarettes     Start date:      Quit date:      Years since quittin.2     Smokeless tobacco: Never Used   Substance Use Topics     Alcohol use: No     Drug use: No     PHQ 2021 2022 2/15/2022   PHQ-9 Total Score 6 10 2   Q9: Thoughts of better off dead/self-harm past 2 weeks Not at all Not at all Not at all     ESTRELLA-7 SCORE 2021 2022 2/15/2022   Total Score 8 15 2     Last PHQ-9 2/15/2022   1.  Little interest or pleasure in doing things 0   2.  Feeling down, depressed, or hopeless 0   3.  Trouble falling or staying asleep, or sleeping too much 0   4.  Feeling tired or having little energy 2   5.  Poor appetite or overeating 0   6.  Feeling bad about yourself 0   7.  Trouble concentrating 0   8.  Moving slowly or restless 0   Q9: Thoughts of better off dead/self-harm past 2 weeks 0   PHQ-9 Total Score 2   Difficulty at work, home, or with people -     ESTRELLA-7  2/15/2022   1. Feeling nervous, anxious, or on edge 1   2. Not being able to stop or control worrying 0   3. Worrying too much about different things 1   4. Trouble relaxing 0   5. Being so restless that it is hard to sit still 0   6. Becoming easily annoyed or irritable 0   7. Feeling afraid, as if something awful might happen 0   ESTRELLA-7 Total Score 2   If you checked any problems, how difficult have they made it for you to do your work, take care of things at home, or get along with other people? -     [id number]}  Migraine     Since your last clinic visit, how have your headaches changed?  Worsened    How often are you getting headaches or migraines? Twice a week     Are you able to do normal daily activities  when you have a migraine? No    Are you taking rescue/relief medications? (Select all that apply) Other: ESGIC    How helpful is your rescue/relief medication?  I get only a small amount of relief    Are you taking any medications to prevent migraines? (Select all that apply)  No    In the past 4 weeks, how often have you gone to urgent care or the emergency room because of your headaches?  0    - amivog, doing well  - weaned off topamax d/t memory issues. No changes in memory   - Follows with [facility] neurology, Dr. Pérez, with next visit 6/2022  - ESGIC filled by [facility] neurology     Pain History:  When did you first notice your pain? - More than 6 weeks   Have you seen this provider for your pain in the past?   Yes   Where in your body do you have pain? Cervical Spine  Are you seeing anyone else for your pain? Yes - Dr. Diez [facility]    PHQ-9 SCORE 12/6/2021 1/4/2022 2/15/2022   PHQ-9 Total Score 6 10 2       ESTRELLA-7 SCORE 12/6/2021 1/4/2022 2/15/2022   Total Score 8 15 2           PEG Score 1/6/2022 3/15/2022   PEG Total Score 4 5       Chronic Pain Follow Up:    Location of pain: headache, shoulder pain R>L and radiates to right hand, hip pain b/l, feet  Analgesia/pain control:    - Recent changes:  worsening   - Overall control: Inadequate pain control    - Current treatments: see medication list   Adherence:     - Do you ever take more pain medicine than prescribed? No    - When did you take your last dose of pain medicine?  Yesterday afternoon  Adverse effects: No     - methocarbamol   - baclofen 10mg   - lyrica 150mg bid, was previously on three per day, but decreased to bid per Tesha's request to reduce medications   - hip pain b/l, radiates to feet  - feet - nerve pain, feel like they are on fire  - January 7th, 2022 cervical rhizotomy   - taking norco once to twice per week which is an increase   - Tesha does not take norco and valium   - weaned off topamax and pain has increased         PDMP  Review       Value Time User    State PDMP site checked  Yes 2/4/2022 12:48 PM Henna Moyer MD        Last CSA Agreement:   CSA -- Patient Level:    Controlled Substance Agreement - Opioid - Scan on 9/7/2021 10:15 AM: OPIOD/OPIOD PLUS CONTROLLED SUBSTANCE AGREEMENT       Last UDS: 9/2/2021      # Mammogram due **      Review of Systems   Constitutional: Negative for chills and fever.   HENT: Negative for congestion.    Respiratory: Negative for shortness of breath.    Cardiovascular: Negative for chest pain and palpitations.   Gastrointestinal: Negative for abdominal pain.   Musculoskeletal: Positive for arthralgias (shoulders) and back pain.   Neurological: Positive for headaches and paresthesias.   Psychiatric/Behavioral: Positive for decreased concentration, dysphoric mood and mood changes. The patient is nervous/anxious.           Objective     BP 96/64   Pulse 78   Temp 98.9  F (37.2  C) (Tympanic)   Resp 17   Wt 83.5 kg (184 lb)   SpO2 96%   BMI 29.70 kg/m    Body mass index is 29.7 kg/m .  Physical Exam  Constitutional:       General: She is not in acute distress.     Appearance: She is not ill-appearing.   Cardiovascular:      Rate and Rhythm: Normal rate and regular rhythm.      Heart sounds: No murmur heard.  Pulmonary:      Effort: Pulmonary effort is normal. No respiratory distress.      Breath sounds: No wheezing or rales.   Musculoskeletal:      Comments: Hip: flexion to 120 deg w/o issues b/l. Internal rotation decreased to ~ 10deg w/ pain. External rotation full. No TTP over right greater trochanter. TTP over left greater trochanter. SI joints TTP b/l     Neurological:      Mental Status: She is alert.

## 2022-03-15 ENCOUNTER — OFFICE VISIT (OUTPATIENT)
Dept: FAMILY MEDICINE | Facility: OTHER | Age: 62
End: 2022-03-15
Attending: FAMILY MEDICINE
Payer: MEDICARE

## 2022-03-15 ENCOUNTER — TRANSCRIBE ORDERS (OUTPATIENT)
Dept: FAMILY MEDICINE | Facility: OTHER | Age: 62
End: 2022-03-15
Payer: MEDICARE

## 2022-03-15 VITALS
SYSTOLIC BLOOD PRESSURE: 96 MMHG | BODY MASS INDEX: 29.7 KG/M2 | OXYGEN SATURATION: 96 % | RESPIRATION RATE: 17 BRPM | DIASTOLIC BLOOD PRESSURE: 64 MMHG | TEMPERATURE: 98.9 F | WEIGHT: 184 LBS | HEART RATE: 78 BPM

## 2022-03-15 DIAGNOSIS — G89.29 CHRONIC NECK PAIN: ICD-10-CM

## 2022-03-15 DIAGNOSIS — M79.18 MYOFASCIAL PAIN SYNDROME, CERVICAL: ICD-10-CM

## 2022-03-15 DIAGNOSIS — F33.2 SEVERE EPISODE OF RECURRENT MAJOR DEPRESSIVE DISORDER, WITHOUT PSYCHOTIC FEATURES (H): ICD-10-CM

## 2022-03-15 DIAGNOSIS — G43.719 INTRACTABLE CHRONIC MIGRAINE WITHOUT AURA AND WITHOUT STATUS MIGRAINOSUS: Primary | ICD-10-CM

## 2022-03-15 DIAGNOSIS — M70.62 GREATER TROCHANTERIC BURSITIS OF BOTH HIPS: ICD-10-CM

## 2022-03-15 DIAGNOSIS — Z12.31 ENCOUNTER FOR SCREENING MAMMOGRAM FOR BREAST CANCER: ICD-10-CM

## 2022-03-15 DIAGNOSIS — M54.2 CERVICAL PAIN: ICD-10-CM

## 2022-03-15 DIAGNOSIS — M54.2 CHRONIC NECK PAIN: ICD-10-CM

## 2022-03-15 DIAGNOSIS — M70.61 GREATER TROCHANTERIC BURSITIS OF BOTH HIPS: ICD-10-CM

## 2022-03-15 DIAGNOSIS — Z12.31 VISIT FOR SCREENING MAMMOGRAM: Primary | ICD-10-CM

## 2022-03-15 PROCEDURE — G0463 HOSPITAL OUTPT CLINIC VISIT: HCPCS | Mod: 25

## 2022-03-15 PROCEDURE — 99215 OFFICE O/P EST HI 40 MIN: CPT | Performed by: FAMILY MEDICINE

## 2022-03-15 PROCEDURE — G0463 HOSPITAL OUTPT CLINIC VISIT: HCPCS

## 2022-03-15 RX ORDER — HYDROCODONE BITARTRATE AND ACETAMINOPHEN 7.5; 325 MG/1; MG/1
1 TABLET ORAL 2 TIMES DAILY PRN
Qty: 60 TABLET | Refills: 0 | Status: SHIPPED | OUTPATIENT
Start: 2022-03-15 | End: 2022-03-16

## 2022-03-15 RX ORDER — METHOCARBAMOL 500 MG/1
500 TABLET, FILM COATED ORAL 4 TIMES DAILY PRN
Qty: 60 TABLET | Refills: 2 | Status: SHIPPED | OUTPATIENT
Start: 2022-03-15 | End: 2022-08-03

## 2022-03-15 RX ORDER — BUTALBITAL, ACETAMINOPHEN AND CAFFEINE 50; 325; 40 MG/1; MG/1; MG/1
1 TABLET ORAL EVERY 4 HOURS PRN
Qty: 40 TABLET | Refills: 0 | Status: CANCELLED | OUTPATIENT
Start: 2022-03-15 | End: 2022-04-24

## 2022-03-15 RX ORDER — PREGABALIN 150 MG/1
150 CAPSULE ORAL 3 TIMES DAILY
Qty: 60 CAPSULE | Refills: 2
Start: 2022-03-15 | End: 2022-05-09

## 2022-03-15 ASSESSMENT — PAIN SCALES - GENERAL: PAINLEVEL: MILD PAIN (3)

## 2022-03-15 ASSESSMENT — ENCOUNTER SYMPTOMS
NERVOUS/ANXIOUS: 1
BACK PAIN: 1
CHILLS: 0
ARTHRALGIAS: 1
DYSPHORIC MOOD: 1
HEADACHES: 1
PALPITATIONS: 0
ABDOMINAL PAIN: 0
DECREASED CONCENTRATION: 1
FEVER: 0
PARESTHESIAS: 1
SHORTNESS OF BREATH: 0

## 2022-03-15 NOTE — NURSING NOTE
"Chief Complaint   Patient presents with     Pain     Depression     Anxiety     Headache       Initial BP 96/64   Pulse 78   Temp 98.9  F (37.2  C) (Tympanic)   Resp 17   Wt 83.5 kg (184 lb)   SpO2 96%   BMI 29.70 kg/m   Estimated body mass index is 29.7 kg/m  as calculated from the following:    Height as of 12/6/21: 1.676 m (5' 6\").    Weight as of this encounter: 83.5 kg (184 lb).  Medication Reconciliation: complete  Araceli Keys LPN  "

## 2022-03-15 NOTE — PATIENT INSTRUCTIONS
Please send Dr. Pérez a message with how you are doing with your headaches.     Increase your lyrcia to 150mg three times per day    We put in a referral to physical therapy     We put in an order for mammogram

## 2022-03-16 ENCOUNTER — TELEPHONE (OUTPATIENT)
Dept: FAMILY MEDICINE | Facility: OTHER | Age: 62
End: 2022-03-16
Payer: MEDICARE

## 2022-03-16 RX ORDER — HYDROCODONE BITARTRATE AND ACETAMINOPHEN 7.5; 325 MG/1; MG/1
1 TABLET ORAL 2 TIMES DAILY PRN
Qty: 60 TABLET | Refills: 0 | Status: SHIPPED | OUTPATIENT
Start: 2022-03-16 | End: 2022-05-19

## 2022-03-16 NOTE — TELEPHONE ENCOUNTER
Received a PA from [facility] for Methocarbamol 500mg tablets. Submitted on CMM. Waiting for a response.

## 2022-03-16 NOTE — TELEPHONE ENCOUNTER
Marilyn with [employer] calling and needs clarification on the Norco from yesterday. It has two sets of directions on it.    Need new RX?    Called pt and she is out but she can wait until MD is back tomorrow to address. Per pt she takes every 4-6 hours as needed for pain.    Call back Marilyn at [phone]    Geeta Collazo RN

## 2022-03-17 NOTE — TELEPHONE ENCOUNTER
Received an APPROVAL from [employer] for Methocarbamol 500mg tablets. Effective 02/14/2022 until further notice. Forms scanned to Epic.

## 2022-04-25 ENCOUNTER — HOSPITAL ENCOUNTER (OUTPATIENT)
Dept: PHYSICAL THERAPY | Facility: HOSPITAL | Age: 62
Setting detail: THERAPIES SERIES
Discharge: HOME | End: 2022-04-25
Attending: FAMILY MEDICINE
Payer: MEDICARE

## 2022-04-25 DIAGNOSIS — M70.61 GREATER TROCHANTERIC BURSITIS OF BOTH HIPS: ICD-10-CM

## 2022-04-25 DIAGNOSIS — M70.62 GREATER TROCHANTERIC BURSITIS OF BOTH HIPS: ICD-10-CM

## 2022-04-25 PROCEDURE — 97110 THERAPEUTIC EXERCISES: CPT | Mod: GP

## 2022-04-25 PROCEDURE — 97035 APP MDLTY 1+ULTRASOUND EA 15: CPT | Mod: GP

## 2022-04-25 PROCEDURE — 97140 MANUAL THERAPY 1/> REGIONS: CPT | Mod: GP

## 2022-04-25 PROCEDURE — 97162 PT EVAL MOD COMPLEX 30 MIN: CPT | Mod: GP

## 2022-04-25 PROCEDURE — 97161 PT EVAL LOW COMPLEX 20 MIN: CPT | Mod: GP

## 2022-04-26 ENCOUNTER — ANCILLARY PROCEDURE (OUTPATIENT)
Dept: MAMMOGRAPHY | Facility: OTHER | Age: 62
End: 2022-04-26
Attending: FAMILY MEDICINE
Payer: MEDICARE

## 2022-04-26 DIAGNOSIS — Z12.31 VISIT FOR SCREENING MAMMOGRAM: ICD-10-CM

## 2022-04-26 PROCEDURE — 77067 SCR MAMMO BI INCL CAD: CPT | Mod: TC

## 2022-04-26 NOTE — PROGRESS NOTES
04/25/22 1000   General Information   Type of Visit Initial OP Ortho PT Evaluation   Start of Care Date 04/25/22   Referring Physician Dr. Moyer   Orders Evaluate and Treat   Medical Diagnosis Greater trochanteric bursitis   Surgical/Medical history reviewed Yes   Precautions/Limitations no known precautions/limitations   General Information Comments h/o chronic pain, chronic opiod use, cervical fusion, depression, migraines   Body Part(s)   Body Part(s) Hip   Presentation and Etiology   Pertinent history of current problem (include personal factors and/or comorbidities that impact the POC) Pt presents to PT with c/o pain everywhere. She has been having bilateral hip pain R worse thans L for a long time but it has worsened since she went off of topomax. When she sleeps on her sides the pain worsens. She has tried exercises but hasn't been able to tolerate it. Pt has h/o neck surgeries and injections. Plans to have injections in her hips on May 5th. Would like to be able to go on walks with her niece again.   Impairments A. Pain;C. Swelling;D. Decreased ROM;E. Decreased flexibility;F. Decreased strength and endurance;G. Impaired balance;H. Impaired gait;L. Tingling;N. Headaches   Functional Limitations perform activities of daily living   Symptom Location Bilateral hips, but has pain everywhere   How/Where did it occur From insidious onset   Chronicity Chronic   Pain rating (0-10 point scale) Best (/10);Worst (/10)   Best (/10) 2   Worst (/10) 9   Pain quality C. Aching;E. Shooting;D. Burning;F. Stabbing;G. Cramping   Frequency of pain/symptoms A. Constant   Pain/symptoms are: The same all the time   Pain/symptoms exacerbated by B. Walking;C. Lifting;D. Carrying;E. Rest;G. Certain positions  (Stairs)   Pain/symptoms eased by G. Heat;I. OTC medication(s)   Progression of symptoms since onset: Worsened   Current Level of Function   Patient role/employment history G. Disabled   [facility] [place]    Fall Risk Screen   Have you fallen 2 or more times in the past year? No   Have you fallen and had an injury in the past year? No   Is patient a fall risk? No   Hip Objective Findings   Gait/Locomotion Antalgic gait   Balance/Proprioception (Single Leg Stance) Unable to tolerate   Side (if bilateral, select both right and left) Left;Right   Scour Test Negative   Palpation + for pain to palpation greater trochanter bilaterally, R worse than L. Pt unable to tolerated sidelying on R   Posture Normal   Right Hamstring Flexibility Good   Left Hamstring Flexibility Good   Right Piriformis Flexibility Impaired   Left Piriformis Flexibility Impaired   Right Hip Flexion PROM WFL   Left Hip Flexion PROM WFL   Right Hip Abduction PROM WFL   Left Hip Abduction PROM WFL   Right Hip Adduction PROM WFL   Left Hip Adduction PROM WFL   Right Hip ER PROM WFL   Left Hip ER PROM WFL   Right Hip IR PROM WFL   Left Hip IR PROM WFL   Left Hip Ext PROM WFL   Right Hip Flexion Strength 4/5   Left Hip Flexion Strength 4/5   Right Hip Abduction Strength 3+/5   Left Hip Abduction Strength 4-/5   Right Hip Extension Strength 3+/5   Left Hip Extension Strength 4-/5   Right Hip IR Strength 5/5   Left Hip IR Strength 5/5   Right Hip ER Strength 4/5   Left Hip ER Strength 5/5   Right Knee Flexion Strength 5/5   Left Knee Flexion Strength 5/5   Right Knee Extension Strength 5/5   Left Knee Extension Strength 5/5   Planned Therapy Interventions   Planned Therapy Interventions gait training;joint mobilization;manual therapy;ROM;strengthening;stretching   Planned Modality Interventions   Planned Modality Interventions Electrical stimulation;Hot packs;Iontophoresis;Ultrasound;Cryotherapy   Planned Modality Interventions Comments As needed for pain   Clinical Impression   Criteria for Skilled Therapeutic Interventions Met yes, treatment indicated   PT Diagnosis Hip pain   Influenced by the following impairments Hip pain limiting function and sleep,  impaired gait, weakness   Functional limitations due to impairments Decreased tolerance for ambulation   Clinical Presentation Evolving/Changing   Clinical Presentation Rationale Clinical judgement, [facility]   Clinical Decision Making (Complexity) Moderate complexity   Therapy Frequency   (1-2x/week)   Predicted Duration of Therapy Intervention (days/wks) up to 90 days   Risk & Benefits of therapy have been explained Yes   Patient, Family & other staff in agreement with plan of care Yes   Clinical Impression Comments Symptoms consistent with hip bursitis that is expected to improve with skilled PT   Ortho Goal 1   Goal Identifier STG 1   Goal Description Pt will demonstrate knowledge/understanding of HEP and report daily compliance   Target Date 05/10/22   Ortho Goal 2   Goal Identifier LTG 1   Goal Description Pt will tolerated ambulation both functionally and recreationally without being limited by hip pain   Target Date 07/25/22   Ortho Goal 3   Target Date 07/25/22   Goal Identifier LTG 2   Goal Description Pt will sleep through night without being woken up by hip pain   Total Evaluation Time   PT Eval, Moderate Complexity Minutes (76539) 20     I certify the need for these services furnished under this plan of treatment and while under my care. (Physician co-signature of this document indicates review and certification of the therapy plan).

## 2022-04-28 ENCOUNTER — OFFICE VISIT (OUTPATIENT)
Dept: FAMILY MEDICINE | Facility: OTHER | Age: 62
End: 2022-04-28
Attending: FAMILY MEDICINE
Payer: MEDICARE

## 2022-04-28 ENCOUNTER — NURSE TRIAGE (OUTPATIENT)
Dept: FAMILY MEDICINE | Facility: OTHER | Age: 62
End: 2022-04-28
Payer: MEDICARE

## 2022-04-28 VITALS
SYSTOLIC BLOOD PRESSURE: 100 MMHG | WEIGHT: 191 LBS | BODY MASS INDEX: 30.83 KG/M2 | RESPIRATION RATE: 18 BRPM | HEART RATE: 85 BPM | OXYGEN SATURATION: 95 % | TEMPERATURE: 98 F | DIASTOLIC BLOOD PRESSURE: 78 MMHG

## 2022-04-28 DIAGNOSIS — J01.00 SUBACUTE MAXILLARY SINUSITIS: Primary | ICD-10-CM

## 2022-04-28 PROCEDURE — G0463 HOSPITAL OUTPT CLINIC VISIT: HCPCS | Performed by: FAMILY MEDICINE

## 2022-04-28 PROCEDURE — 99213 OFFICE O/P EST LOW 20 MIN: CPT | Performed by: FAMILY MEDICINE

## 2022-04-28 ASSESSMENT — ENCOUNTER SYMPTOMS
SHORTNESS OF BREATH: 0
FATIGUE: 1
CHILLS: 1
PALPITATIONS: 0
DIARRHEA: 1
HEADACHES: 1
SORE THROAT: 1
NAUSEA: 1
ABDOMINAL PAIN: 0
MYALGIAS: 1
SINUS PRESSURE: 1
FEVER: 1
SINUS PAIN: 1
VOMITING: 0
DYSURIA: 0

## 2022-04-28 ASSESSMENT — PAIN SCALES - GENERAL: PAINLEVEL: MODERATE PAIN (4)

## 2022-04-28 NOTE — NURSING NOTE
"Chief Complaint   Patient presents with     URI       Initial /78   Pulse 85   Temp 98  F (36.7  C) (Tympanic)   Resp 18   Wt 86.6 kg (191 lb)   SpO2 95%   BMI 30.83 kg/m   Estimated body mass index is 30.83 kg/m  as calculated from the following:    Height as of 12/6/21: 1.676 m (5' 6\").    Weight as of this encounter: 86.6 kg (191 lb).  Medication Reconciliation: complete  Mariel Candelaria LPN    "

## 2022-04-28 NOTE — TELEPHONE ENCOUNTER
This Patient has requested an appointment for 4/28/22 or 4/29/22    Patient is having the following symptoms cough- productive, sinus congestion (yellowish drainage)    Patient has been having these symptoms for the following Duration:x 2 days     Please contact the patient at the following phone number [phone]      Cough - productive greenish phlegm, sinus congestion with drainage (yellowish - green in color).    Reason for Disposition    Patient wants to be seen    Additional Information    Negative: Bluish (or gray) lips or face    Negative: Severe difficulty breathing (e.g., struggling for each breath, speaks in single words)    Negative: Rapid onset of cough and has hives    Negative: Coughing started suddenly after medicine, an allergic food or bee sting    Negative: Difficulty breathing after exposure to flames, smoke, or fumes    Negative: Sounds like a life-threatening emergency to the triager    Negative: Previous asthma attacks and this feels like asthma attack    Negative: Chest pain present when not coughing    Negative: Difficulty breathing    Negative: Passed out (i.e., fainted, collapsed and was not responding)    Negative: Patient sounds very sick or weak to the triager    Negative: Coughed up > 1 tablespoon (15 ml) blood (Exception: blood-tinged sputum)    Negative: Fever > 103 F (39.4 C)    Negative: Fever > 101 F (38.3 C) and over 60 years of age    Negative: Fever > 100.0 F (37.8 C) and has diabetes mellitus or a weak immune system (e.g., HIV positive, cancer chemotherapy, organ transplant, splenectomy, chronic steroids)    Negative: Fever > 100.0 F (37.8 C) and bedridden (e.g., nursing home patient, stroke, chronic illness, recovering from surgery)    Negative: Increasing ankle swelling    Negative: Wheezing is present    Negative: SEVERE coughing spells (e.g., whooping sound after coughing, vomiting after coughing)    Negative: Coughing up cholo-colored (reddish-brown) or blood-tinged  "sputum    Negative: Fever present > 3 days (72 hours)    Negative: Fever returns after gone for over 24 hours and symptoms worse or not improved    Negative: Using nasal washes and pain medicine > 24 hours and sinus pain persists    Negative: Known COPD or other severe lung disease (i.e., bronchiectasis, cystic fibrosis, lung surgery) and worsening symptoms (i.e., increased sputum purulence or amount, increased breathing difficulty)    Negative: Continuous (nonstop) coughing interferes with work or school and no improvement using cough treatment per Care Advice    Answer Assessment - Initial Assessment Questions  1. ONSET: \"When did the cough begin?\"       X 2 days     2. SEVERITY: \"How bad is the cough today?\"       Cough is productive (greenish phlegm)    3. RESPIRATORY DISTRESS: \"Describe your breathing.\"       No shortness of breath.     4. FEVER: \"Do you have a fever?\" If so, ask: \"What is your temperature, how was it measured, and when did it start?\"      No     5. HEMOPTYSIS: \"Are you coughing up any blood?\" If so ask: \"How much?\" (flecks, streaks, tablespoons, etc.)      No     6. TREATMENT: \"What have you done so far to treat the cough?\" (e.g., meds, fluids, humidifier)      muccinex and tylenol    7. CARDIAC HISTORY: \"Do you have any history of heart disease?\" (e.g., heart attack, congestive heart failure)       No    8. LUNG HISTORY: \"Do you have any history of lung disease?\"  (e.g., pulmonary embolus, asthma, emphysema)      No    9. PE RISK FACTORS: \"Do you have a history of blood clots?\" (or: recent major surgery, recent prolonged travel, bedridden)      No    10. OTHER SYMPTOMS: \"Do you have any other symptoms? (e.g., runny nose, wheezing, chest pain)        Sinus drainage     11. PREGNANCY: \"Is there any chance you are pregnant?\" \"When was your last menstrual period?\"        No    12. TRAVEL: \"Have you traveled out of the country in the last month?\" (e.g., travel history, exposures)        " no    Protocols used: COUGH-A-[place]

## 2022-04-28 NOTE — PROGRESS NOTES
Assessment & Plan     Subacute maxillary sinusitis  Duration of over 10 days, will tx with abx  - amoxicillin-clavulanate (AUGMENTIN) 875-125 MG tablet; Take 1 tablet by mouth 2 times daily for 10 days    See Patient Instructions    Return if symptoms worsen or fail to improve.    Henna Moyer MD  [facility] - [place] Parkinson is a 61 year old who presents for the following health issues     HPI     Took a home Covid Test and came back Negative      Acute Illness  Acute illness concerns: sinus infection, URI  Onset/Duration: about 2 weeks  Symptoms:  Fever: no  Chills/Sweats: YES  Headache (location?): YES  Sinus Pressure: YES  Conjunctivitis:  no  Ear Pain: YES: bilateral  Rhinorrhea: no  Congestion: YES  Sore Throat: YES  Cough: YES-productive of yellow sputum, barking  Wheeze: no  Decreased Appetite: no  Nausea: YES  Vomiting: no  Diarrhea: YES  Dysuria/Freq.: no  Dysuria or Hematuria: no  Fatigue/Achiness: YES  Sick/Strep Exposure: no  Therapies tried and outcome: MUCINEX dm, TYLENOL ( DID A HOME COVID TEST AND IT WAS NEGATIVE.           Review of Systems   Constitutional: Positive for chills, fatigue and fever.   HENT: Positive for congestion, ear pain, sinus pressure, sinus pain and sore throat.    Respiratory: Negative for shortness of breath.    Cardiovascular: Negative for chest pain and palpitations.   Gastrointestinal: Positive for diarrhea (had IBS attack yesterday) and nausea. Negative for abdominal pain and vomiting.   Genitourinary: Negative for dysuria.   Musculoskeletal: Positive for myalgias.   Neurological: Positive for headaches.          Objective    /78   Pulse 85   Temp 98  F (36.7  C) (Tympanic)   Resp 18   Wt 86.6 kg (191 lb)   SpO2 95%   BMI 30.83 kg/m    Body mass index is 30.83 kg/m .  Physical Exam  Constitutional:       General: She is not in acute distress.     Appearance: She is well-developed.   HENT:      Head: Normocephalic and  atraumatic.      Right Ear: Hearing normal. Tympanic membrane is bulging.      Left Ear: Hearing normal. Tympanic membrane is bulging.      Nose:      Right Sinus: Maxillary sinus tenderness and frontal sinus tenderness present.      Left Sinus: Maxillary sinus tenderness and frontal sinus tenderness present.      Mouth/Throat:      Mouth: Mucous membranes are moist.      Pharynx: No oropharyngeal exudate.   Eyes:      Extraocular Movements: Extraocular movements intact.      Conjunctiva/sclera: Conjunctivae normal.   Cardiovascular:      Rate and Rhythm: Normal rate and regular rhythm.      Pulses: Normal pulses.      Heart sounds: Normal heart sounds. No murmur heard.  Pulmonary:      Effort: Pulmonary effort is normal. No respiratory distress.      Breath sounds: Normal breath sounds. No wheezing or rales.   Musculoskeletal:      Cervical back: Normal range of motion and neck supple.   Lymphadenopathy:      Cervical: No cervical adenopathy.   Neurological:      Mental Status: She is alert.

## 2022-05-09 ENCOUNTER — E-VISIT (OUTPATIENT)
Dept: URGENT CARE | Facility: URGENT CARE | Age: 62
End: 2022-05-09
Payer: MEDICARE

## 2022-05-09 ENCOUNTER — LAB (OUTPATIENT)
Dept: LAB | Facility: OTHER | Age: 62
End: 2022-05-09
Payer: MEDICARE

## 2022-05-09 ENCOUNTER — HOSPITAL ENCOUNTER (OUTPATIENT)
Dept: PHYSICAL THERAPY | Facility: HOSPITAL | Age: 62
Setting detail: THERAPIES SERIES
Discharge: HOME | End: 2022-05-09
Attending: FAMILY MEDICINE
Payer: MEDICARE

## 2022-05-09 DIAGNOSIS — N30.01 ACUTE CYSTITIS WITH HEMATURIA: Primary | ICD-10-CM

## 2022-05-09 DIAGNOSIS — N89.8 VAGINAL DISCHARGE: ICD-10-CM

## 2022-05-09 LAB
ALBUMIN UR-MCNC: NEGATIVE MG/DL
AMORPH CRY #/AREA URNS HPF: ABNORMAL /HPF
APPEARANCE UR: CLEAR
BILIRUB UR QL STRIP: NEGATIVE
CLUE CELLS: ABNORMAL
COLOR UR AUTO: YELLOW
GLUCOSE UR STRIP-MCNC: NEGATIVE MG/DL
HGB UR QL STRIP: NEGATIVE
KETONES UR STRIP-MCNC: NEGATIVE MG/DL
LEUKOCYTE ESTERASE UR QL STRIP: ABNORMAL
MUCOUS THREADS #/AREA URNS LPF: PRESENT /LPF
NITRATE UR QL: NEGATIVE
PH UR STRIP: 6 [PH] (ref 4.7–8)
RBC URINE: 5 /HPF
SP GR UR STRIP: 1.02 (ref 1–1.03)
SQUAMOUS EPITHELIAL: 1 /HPF
TRICHOMONAS, WET PREP: ABNORMAL
UROBILINOGEN UR STRIP-MCNC: NORMAL MG/DL
WBC URINE: 3 /HPF
WBC'S/HIGH POWER FIELD, WET PREP: ABNORMAL
YEAST, WET PREP: ABNORMAL

## 2022-05-09 PROCEDURE — 97035 APP MDLTY 1+ULTRASOUND EA 15: CPT | Mod: GP

## 2022-05-09 PROCEDURE — 87086 URINE CULTURE/COLONY COUNT: CPT | Mod: ZL

## 2022-05-09 PROCEDURE — 97110 THERAPEUTIC EXERCISES: CPT | Mod: GP

## 2022-05-09 PROCEDURE — 87210 SMEAR WET MOUNT SALINE/INK: CPT | Performed by: PHYSICIAN ASSISTANT

## 2022-05-09 PROCEDURE — 81001 URINALYSIS AUTO W/SCOPE: CPT | Mod: ZL

## 2022-05-09 PROCEDURE — 99421 OL DIG E/M SVC 5-10 MIN: CPT | Performed by: PHYSICIAN ASSISTANT

## 2022-05-09 RX ORDER — SULFAMETHOXAZOLE/TRIMETHOPRIM 800-160 MG
1 TABLET ORAL 2 TIMES DAILY
Qty: 10 TABLET | Refills: 0 | Status: SHIPPED | OUTPATIENT
Start: 2022-05-09 | End: 2022-05-13

## 2022-05-11 LAB — BACTERIA UR CULT: NO GROWTH

## 2022-05-12 ENCOUNTER — HOSPITAL ENCOUNTER (OUTPATIENT)
Dept: PHYSICAL THERAPY | Facility: HOSPITAL | Age: 62
Setting detail: THERAPIES SERIES
Discharge: HOME | End: 2022-05-12
Attending: FAMILY MEDICINE
Payer: MEDICARE

## 2022-05-12 PROCEDURE — 999N000214 HC STATISTIC PT OUTPT VISIT

## 2022-05-12 NOTE — PROGRESS NOTES
Assessment & Plan     Other chronic pain/ Cervical pain  [facility] Tesha has been off prozac for one week. Due to long half life of prozac,we will wait one more week before starting cymbalta  - start DULoxetine (CYMBALTA) 30 MG capsule; Take 1 capsule (30 mg) by mouth daily  - c/w baclofen 10mg at bedtime  - c/w IBU  - c/w methocarbaml   - c/w lyrcia 150mg id      Myalgia  Will start rheumatologic work up  - Anti Nuclear Abbey IgG by IFA with Reflex  - Rheumatoid factor  - Cyclic Citrullinated Peptide Antibody IgG  - DULoxetine (CYMBALTA) 30 MG capsule; Take 1 capsule (30 mg) by mouth daily    Intractable chronic migraine without aura and without status migrainosus  Worsening since being off topamax, but memory has improved  Follows with [facility] neurology with next visit 6/28/2022  - started on aimovig    Severe episode of recurrent major depressive disorder, without psychotic features (H)  Mood swings with being off prozac  Follows with Dr. Herbert. Notes reviewed, will start cymbalta in one week  - DULoxetine (CYMBALTA) 30 MG capsule; Take 1 capsule (30 mg) by mouth daily      See Patient Instructions    Return in about 4 weeks (around 6/10/2022) for Recheck.    Henna Moyer MD  [facility] - DELIA Parkinson is a 61 year old who presents for the following health issues    HPI     Pain History:  When did you first notice your pain? - Chronic Pain   Have you seen this provider for your pain in the past?   Yes   Where in your body do you have pain? Whole body  Are you seeing anyone else for your pain? Yes - Dr. Diez - Neurology    PHQ-9 SCORE 1/4/2022 2/15/2022 5/13/2022   PHQ-9 Total Score 10 2 16       ESTRELLA-7 SCORE 1/4/2022 2/15/2022 5/13/2022   Total Score 15 2 18           PHQ-9 SCORE 1/4/2022 2/15/2022 5/13/2022   PHQ-9 Total Score 10 2 16     ESTRELLA-7 SCORE 1/4/2022 2/15/2022 5/13/2022   Total Score 15 2 18     PEG Score 1/6/2022 3/15/2022 5/13/2022   PEG  Total Score 4 5 6       Chronic Pain Follow Up:    Location of pain: migraines, neck pain, all over pain   Analgesia/pain control:    - Recent changes:  Worsening    - Overall control: Inadequate pain control    - Current treatments: see below   Adherence:     - Do you ever take more pain medicine than prescribed? No    - When did you take your last dose of pain medicine?  Last night    Adverse effects: No     - norco 7.5mg taking daily   - increased lyrica to 150mg tiid   - methocarbamol 500mg qid  - follows with [facility] Diez with next visit 5/18/2022    - requesting naltrexone, but taking norco every day  - pain has increased since stopping topamax. Memory has improved.   - neurology appointment on 6/28/2022  - weaned off prozac, going on cymbalta. Reviewed MyChart notes with Dr. Herbert's responses  - has been off prozac one week.         PDMP Review       Value Time User    State PDMP site checked  Yes 3/16/2022  4:52 PM Henna Moyer MD        Last CSA Agreement:   CSA -- Patient Level:    Controlled Substance Agreement - Opioid - Scan on 9/7/2021 10:15 AM: OPIOD/OPIOD PLUS CONTROLLED SUBSTANCE AGREEMENT       Last UDS: 9/2/2021      Review of Systems   Constitutional: Negative for chills and fever.   HENT: Negative for congestion.    Respiratory: Negative for shortness of breath.    Cardiovascular: Negative for chest pain and palpitations.   Gastrointestinal: Negative for abdominal pain.   Musculoskeletal: Positive for arthralgias and myalgias.   Neurological: Positive for headaches.            Objective    /70   Pulse 88   Temp 98.1  F (36.7  C) (Tympanic)   Resp 18   Wt 87.1 kg (192 lb)   SpO2 96%   BMI 30.99 kg/m    Body mass index is 30.99 kg/m .  Physical Exam  Constitutional:       General: She is not in acute distress.     Appearance: She is not ill-appearing.   Cardiovascular:      Rate and Rhythm: Normal rate and regular rhythm.      Heart sounds: No murmur  heard.  Pulmonary:      Effort: Pulmonary effort is normal. No respiratory distress.      Breath sounds: No wheezing or rales.   Musculoskeletal:      Comments: Hands: pain with flexion. Joints not grossly enlarged.    Neurological:      Mental Status: She is alert.        No rheumatologic labs on review

## 2022-05-12 NOTE — PROGRESS NOTES
"Pt arrived to PT c c/o \"the worst pain she has ever had.\" Pt also stated that she \"cannot live like this.\" Pt does not follow up with Dr. Moyer until next week. Spoke with Dr. Moyer's nurse who was able to schedule pt tomorrow at 9 AM. Also asked me to tell pt that if things got worse to call her. Pt declined PT treatment today for fear of making pain worse.   "

## 2022-05-13 ENCOUNTER — OFFICE VISIT (OUTPATIENT)
Dept: FAMILY MEDICINE | Facility: OTHER | Age: 62
End: 2022-05-13
Attending: FAMILY MEDICINE
Payer: MEDICARE

## 2022-05-13 VITALS
HEART RATE: 88 BPM | OXYGEN SATURATION: 96 % | SYSTOLIC BLOOD PRESSURE: 110 MMHG | TEMPERATURE: 98.1 F | RESPIRATION RATE: 18 BRPM | DIASTOLIC BLOOD PRESSURE: 70 MMHG | BODY MASS INDEX: 30.99 KG/M2 | WEIGHT: 192 LBS

## 2022-05-13 DIAGNOSIS — M79.10 MYALGIA: ICD-10-CM

## 2022-05-13 DIAGNOSIS — G89.29 OTHER CHRONIC PAIN: Primary | ICD-10-CM

## 2022-05-13 DIAGNOSIS — G43.719 INTRACTABLE CHRONIC MIGRAINE WITHOUT AURA AND WITHOUT STATUS MIGRAINOSUS: ICD-10-CM

## 2022-05-13 DIAGNOSIS — F33.2 SEVERE EPISODE OF RECURRENT MAJOR DEPRESSIVE DISORDER, WITHOUT PSYCHOTIC FEATURES (H): ICD-10-CM

## 2022-05-13 DIAGNOSIS — M54.2 CERVICAL PAIN: ICD-10-CM

## 2022-05-13 PROCEDURE — 36415 COLL VENOUS BLD VENIPUNCTURE: CPT | Mod: ZL | Performed by: FAMILY MEDICINE

## 2022-05-13 PROCEDURE — 86200 CCP ANTIBODY: CPT | Mod: ZL | Performed by: FAMILY MEDICINE

## 2022-05-13 PROCEDURE — 86038 ANTINUCLEAR ANTIBODIES: CPT | Mod: ZL | Performed by: FAMILY MEDICINE

## 2022-05-13 PROCEDURE — 99214 OFFICE O/P EST MOD 30 MIN: CPT | Performed by: FAMILY MEDICINE

## 2022-05-13 PROCEDURE — G0463 HOSPITAL OUTPT CLINIC VISIT: HCPCS | Mod: 25

## 2022-05-13 PROCEDURE — 86431 RHEUMATOID FACTOR QUANT: CPT | Mod: ZL | Performed by: FAMILY MEDICINE

## 2022-05-13 PROCEDURE — G0463 HOSPITAL OUTPT CLINIC VISIT: HCPCS

## 2022-05-13 RX ORDER — DULOXETIN HYDROCHLORIDE 30 MG/1
30 CAPSULE, DELAYED RELEASE ORAL DAILY
Qty: 30 CAPSULE | Refills: 2 | Status: SHIPPED | OUTPATIENT
Start: 2022-05-19 | End: 2022-06-21 | Stop reason: DRUGHIGH

## 2022-05-13 ASSESSMENT — ANXIETY QUESTIONNAIRES
3. WORRYING TOO MUCH ABOUT DIFFERENT THINGS: MORE THAN HALF THE DAYS
7. FEELING AFRAID AS IF SOMETHING AWFUL MIGHT HAPPEN: NEARLY EVERY DAY
5. BEING SO RESTLESS THAT IT IS HARD TO SIT STILL: MORE THAN HALF THE DAYS
6. BECOMING EASILY ANNOYED OR IRRITABLE: NEARLY EVERY DAY
2. NOT BEING ABLE TO STOP OR CONTROL WORRYING: NEARLY EVERY DAY
GAD7 TOTAL SCORE: 18
1. FEELING NERVOUS, ANXIOUS, OR ON EDGE: MORE THAN HALF THE DAYS
IF YOU CHECKED OFF ANY PROBLEMS ON THIS QUESTIONNAIRE, HOW DIFFICULT HAVE THESE PROBLEMS MADE IT FOR YOU TO DO YOUR WORK, TAKE CARE OF THINGS AT HOME, OR GET ALONG WITH OTHER PEOPLE: VERY DIFFICULT

## 2022-05-13 ASSESSMENT — ENCOUNTER SYMPTOMS
CHILLS: 0
HEADACHES: 1
ABDOMINAL PAIN: 0
MYALGIAS: 1
PALPITATIONS: 0
FEVER: 0
SHORTNESS OF BREATH: 0
ARTHRALGIAS: 1

## 2022-05-13 ASSESSMENT — PATIENT HEALTH QUESTIONNAIRE - PHQ9
SUM OF ALL RESPONSES TO PHQ QUESTIONS 1-9: 16
5. POOR APPETITE OR OVEREATING: NEARLY EVERY DAY

## 2022-05-13 ASSESSMENT — PAIN SCALES - GENERAL: PAINLEVEL: MODERATE PAIN (5)

## 2022-05-13 NOTE — NURSING NOTE
"Chief Complaint   Patient presents with     Pain       Initial /70   Pulse 88   Temp 98.1  F (36.7  C) (Tympanic)   Resp 18   Wt 87.1 kg (192 lb)   SpO2 96%   BMI 30.99 kg/m   Estimated body mass index is 30.99 kg/m  as calculated from the following:    Height as of 12/6/21: 1.676 m (5' 6\").    Weight as of this encounter: 87.1 kg (192 lb).  Medication Reconciliation: complete  Araceli Keys LPN  "

## 2022-05-14 ASSESSMENT — ANXIETY QUESTIONNAIRES: GAD7 TOTAL SCORE: 18

## 2022-05-16 LAB
ANA SER QL IF: NEGATIVE
RHEUMATOID FACT SER NEPH-ACNC: 14 IU/ML

## 2022-05-17 DIAGNOSIS — G89.29 CHRONIC NECK PAIN: ICD-10-CM

## 2022-05-17 DIAGNOSIS — M54.2 CHRONIC NECK PAIN: ICD-10-CM

## 2022-05-18 DIAGNOSIS — M79.10 MYALGIA: ICD-10-CM

## 2022-05-18 DIAGNOSIS — R76.8 RHEUMATOID FACTOR POSITIVE: Primary | ICD-10-CM

## 2022-05-18 LAB — CCP AB SER IA-ACNC: 1.7 U/ML

## 2022-05-18 NOTE — TELEPHONE ENCOUNTER
Andreico      Last Written Prescription Date:  3.18.22  Last Fill Quantity: #60,   # refills: 0  Last Office Visit: 5.13.22  Future Office visit:    Next 5 appointments (look out 90 days)    Jun 21, 2022 10:00 AM  (Arrive by 9:45 AM)  SHORT with Henna Moyer MD  [facility] ([facility] [place] ) [address]  [place] [address]  [phone]           Routing refill request to provider for review/approval because:  Drug not on the [facility], [facility] or [facility] refill protocol or controlled substance

## 2022-05-19 RX ORDER — HYDROCODONE BITARTRATE AND ACETAMINOPHEN 7.5; 325 MG/1; MG/1
1 TABLET ORAL 2 TIMES DAILY PRN
Qty: 60 TABLET | Refills: 0 | Status: SHIPPED | OUTPATIENT
Start: 2022-05-19 | End: 2022-07-26

## 2022-06-20 NOTE — PROGRESS NOTES
Assessment & Plan     Chronic radicular cervical pain / Other chronic pain  [place] PDMP reviewed and appropriate  - c/w baclofen (LIORESAL) 10 MG tablet; Take 1 tablet (10 mg) by mouth daily  - c/w norco 7.5/325 bid prn, generally used at most every day prn  - increase DULoxetine (CYMBALTA) 60 MG capsule; Take 1 capsule (60 mg) by mouth daily  - c/w lyrica 150mg bid  - c/w methocarbamol prn  - c/w IBU    Moderate episode of recurrent major depressive disorder (H)  - increase DULoxetine (CYMBALTA) 60 MG capsule; Take 1 capsule (60 mg) by mouth daily    Rheumatoid factor positive. MARISEL and anti CCP negative  S/p evaluation by [facility] rheumatology  Non specific   Recommendation for MRI lumbar which Tesha will talk with Dr. Diez regarding    See Patient Instructions    Return in about 6 weeks (around 2022) for chronic pain.    Henna Moyer MD  [facility] - [place] Parkinson is a 62 year old, presenting for the following health issues:  Anxiety, Depression, and Pain      HPI     Depression and Anxiety Follow-Up    How are you doing with your depression since your last visit? No change    How are you doing with your anxiety since your last visit?  No change    Are you having other symptoms that might be associated with depression or anxiety? No    Have you had a significant life event? Health Concerns     Do you have any concerns with your use of alcohol or other drugs? No    - tapered off prozac and started cymbalta  - tolerating cymbalta 30mg, but not as effective as prozac     Social History     Tobacco Use     Smoking status: Former Smoker     Years: 8.00     Types: Cigarettes     Start date:      Quit date:      Years since quittin.4     Smokeless tobacco: Never Used   Substance Use Topics     Alcohol use: No     Drug use: No     PHQ 2022 2/15/2022 2022   PHQ-9 Total Score 10 2 16   Q9: Thoughts of better off dead/self-harm past 2 weeks Not at all Not at all  Not at all     ESTRELLA-7 SCORE 1/4/2022 2/15/2022 5/13/2022   Total Score 15 2 18     Last PHQ-9 5/13/2022   1.  Little interest or pleasure in doing things 2   2.  Feeling down, depressed, or hopeless 2   3.  Trouble falling or staying asleep, or sleeping too much 2   4.  Feeling tired or having little energy 2   5.  Poor appetite or overeating 3   6.  Feeling bad about yourself 1   7.  Trouble concentrating 1   8.  Moving slowly or restless 3   Q9: Thoughts of better off dead/self-harm past 2 weeks 0   PHQ-9 Total Score 16   Difficulty at work, home, or with people Very difficult     ESTRELLA-7  5/13/2022   1. Feeling nervous, anxious, or on edge 2   2. Not being able to stop or control worrying 3   3. Worrying too much about different things 2   4. Trouble relaxing 3   5. Being so restless that it is hard to sit still 2   6. Becoming easily annoyed or irritable 3   7. Feeling afraid, as if something awful might happen 3   ESTRELLA-7 Total Score 18   If you checked any problems, how difficult have they made it for you to do your work, take care of things at home, or get along with other people? Very difficult       }  Pain History:  When did you first notice your pain? - Chronic Pain   Have you seen this provider for your pain in the past?   Yes   Where in your body do you have pain? Whole body   Are you seeing anyone else for your pain? Yes - Dr. Diez and  (Migraines)    PHQ-9 SCORE 1/4/2022 2/15/2022 5/13/2022   PHQ-9 Total Score 10 2 16       ESTRELLA-7 SCORE 1/4/2022 2/15/2022 5/13/2022   Total Score 15 2 18         Chronic Pain Follow Up:    Location of pain: migraines, neck pain, all over pain   Analgesia/pain control:    - Recent changes:  No changes from last visit   - Overall control: Tolerable with discomfort    - Current treatments: baclofen at bedtime, started cymbalta at last visit, norco 7.5, methocarbamol, lyrcia 150mg bid  Adherence:     - Do you ever take more pain medicine than prescribed? No   - When did  you take your last dose of pain medicine?  Yesterday   Adverse effects: No     - tapered off topamax ~ 3months ago d/t brain fog, which resulted in worsening pain  - Follows with Dr. Diez   - increased use of norco to daily    # RF positive / anti ccp negative / MARISEL negative   - established with [facility] rheumatology on 6/16/22022  - copied forward: discussed that rheumatoid factor is an antibody which can be seen in rheumatoid arthritis but may also be seen in chronic infections (especially hepatitis C), fibrotic liver and lung disorders, and is not as specific as the anti-ccp antibody to RA. Inflammatory markers(ESR/CRP) may or may not be elevated as well.  - xrays normal  - hepC negative  - no follow up    PDMP Review       Value Time User    State PDMP site checked  Yes 5/19/2022  4:35 AM Henna Moyer MD        Last CSA Agreement:   CSA -- Patient Level:    Controlled Substance Agreement - Opioid - Scan on 9/7/2021 10:15 AM: OPIOD/OPIOD PLUS CONTROLLED SUBSTANCE AGREEMENT       Last UDS: 9/2/2021    Review of Systems   Constitutional: Negative for chills and fever.   HENT: Negative for congestion.    Respiratory: Negative for shortness of breath.    Cardiovascular: Negative for chest pain and palpitations.   Gastrointestinal: Negative for abdominal pain.   Musculoskeletal: Positive for arthralgias (feet), myalgias and neck pain.   Psychiatric/Behavioral: Positive for dysphoric mood. The patient is nervous/anxious.             Objective    /74   Pulse 93   Temp 98.7  F (37.1  C) (Tympanic)   Resp 18   Wt 88 kg (194 lb)   SpO2 96%   BMI 31.31 kg/m    Body mass index is 31.31 kg/m .  Physical Exam  Constitutional:       General: She is not in acute distress.     Appearance: She is not ill-appearing.   Cardiovascular:      Rate and Rhythm: Normal rate and regular rhythm.      Heart sounds: No murmur heard.  Pulmonary:      Effort: Pulmonary effort is normal. No respiratory distress.      Breath  sounds: No wheezing or rales.   Musculoskeletal:      Comments: Left knee: TTP medial joint line   Neurological:      Mental Status: She is alert.   Psychiatric:         Mood and Affect: Mood is anxious and depressed.            .  ..

## 2022-06-21 ENCOUNTER — OFFICE VISIT (OUTPATIENT)
Dept: FAMILY MEDICINE | Facility: OTHER | Age: 62
End: 2022-06-21
Attending: FAMILY MEDICINE
Payer: MEDICARE

## 2022-06-21 VITALS
OXYGEN SATURATION: 96 % | DIASTOLIC BLOOD PRESSURE: 74 MMHG | HEART RATE: 93 BPM | RESPIRATION RATE: 18 BRPM | SYSTOLIC BLOOD PRESSURE: 102 MMHG | TEMPERATURE: 98.7 F | BODY MASS INDEX: 31.31 KG/M2 | WEIGHT: 194 LBS

## 2022-06-21 DIAGNOSIS — G89.29 CHRONIC RADICULAR CERVICAL PAIN: Primary | ICD-10-CM

## 2022-06-21 DIAGNOSIS — M54.12 CHRONIC RADICULAR CERVICAL PAIN: Primary | ICD-10-CM

## 2022-06-21 DIAGNOSIS — R76.8 RHEUMATOID FACTOR POSITIVE: ICD-10-CM

## 2022-06-21 DIAGNOSIS — F33.1 MODERATE EPISODE OF RECURRENT MAJOR DEPRESSIVE DISORDER (H): ICD-10-CM

## 2022-06-21 DIAGNOSIS — G89.29 OTHER CHRONIC PAIN: ICD-10-CM

## 2022-06-21 PROCEDURE — 99214 OFFICE O/P EST MOD 30 MIN: CPT | Performed by: FAMILY MEDICINE

## 2022-06-21 PROCEDURE — G0463 HOSPITAL OUTPT CLINIC VISIT: HCPCS

## 2022-06-21 PROCEDURE — G0463 HOSPITAL OUTPT CLINIC VISIT: HCPCS | Mod: 25

## 2022-06-21 RX ORDER — DULOXETIN HYDROCHLORIDE 60 MG/1
60 CAPSULE, DELAYED RELEASE ORAL DAILY
Qty: 30 CAPSULE | Refills: 1 | Status: SHIPPED | OUTPATIENT
Start: 2022-06-21 | End: 2022-08-02

## 2022-06-21 RX ORDER — BACLOFEN 10 MG/1
10 TABLET ORAL DAILY
Qty: 90 TABLET | Refills: 3 | Status: SHIPPED | OUTPATIENT
Start: 2022-06-21 | End: 2023-05-31

## 2022-06-21 ASSESSMENT — ENCOUNTER SYMPTOMS
NECK PAIN: 1
SHORTNESS OF BREATH: 0
ARTHRALGIAS: 1
NERVOUS/ANXIOUS: 1
DYSPHORIC MOOD: 1
CHILLS: 0
FEVER: 0
PALPITATIONS: 0
ABDOMINAL PAIN: 0
MYALGIAS: 1

## 2022-06-21 NOTE — PATIENT INSTRUCTIONS
Increase your cymbalta to 60mg once per day    Consider visiting with Dr. Reyes for your hip pain

## 2022-06-21 NOTE — NURSING NOTE
"Chief Complaint   Patient presents with     Anxiety     Depression     Pain       Initial /74   Pulse 93   Temp 98.7  F (37.1  C) (Tympanic)   Resp 18   Wt 88 kg (194 lb)   SpO2 96%   BMI 31.31 kg/m   Estimated body mass index is 31.31 kg/m  as calculated from the following:    Height as of 12/6/21: 1.676 m (5' 6\").    Weight as of this encounter: 88 kg (194 lb).  Medication Reconciliation: complete  Araceli Keys LPN  "

## 2022-06-27 ENCOUNTER — TELEPHONE (OUTPATIENT)
Dept: PSYCHIATRY | Facility: OTHER | Age: 62
End: 2022-06-27

## 2022-06-29 DIAGNOSIS — G47.00 PERSISTENT INSOMNIA: ICD-10-CM

## 2022-06-29 RX ORDER — HYDROXYZINE HYDROCHLORIDE 25 MG/1
25 TABLET, FILM COATED ORAL
Qty: 30 TABLET | Refills: 4 | Status: SHIPPED | OUTPATIENT
Start: 2022-06-29 | End: 2022-08-18

## 2022-06-29 NOTE — TELEPHONE ENCOUNTER
Hydroxyzine (Atarax) 25 mg tablet  Take 1 tablet (25 mg) by mouth nightly as needed (insomnia)     Last Written Prescription Date:  2-  Last Fill Quantity: 30 tablet,   # refills: 4  Last Office Visit: 2-  Future Office visit:    Next 5 appointments (look out 90 days)    Aug 02, 2022  4:00 PM  (Arrive by 3:45 PM)  SHORT with Henna Moyer MD  [facility] [place] ([facility] [place] ) [address]  [place] [address]  [phone]

## 2022-07-26 DIAGNOSIS — M54.2 CHRONIC NECK PAIN: ICD-10-CM

## 2022-07-26 DIAGNOSIS — G89.29 CHRONIC NECK PAIN: ICD-10-CM

## 2022-07-26 DIAGNOSIS — K22.10 ULCER OF ESOPHAGUS WITHOUT BLEEDING: ICD-10-CM

## 2022-07-26 RX ORDER — DEXLANSOPRAZOLE 60 MG/1
60 CAPSULE, DELAYED RELEASE ORAL DAILY
Qty: 90 CAPSULE | Refills: 3 | Status: SHIPPED | OUTPATIENT
Start: 2022-07-26 | End: 2023-06-14

## 2022-07-26 RX ORDER — HYDROCODONE BITARTRATE AND ACETAMINOPHEN 7.5; 325 MG/1; MG/1
1 TABLET ORAL 2 TIMES DAILY PRN
Qty: 60 TABLET | Refills: 0 | Status: SHIPPED | OUTPATIENT
Start: 2022-07-26 | End: 2022-08-03

## 2022-07-26 NOTE — TELEPHONE ENCOUNTER
Dexilant 60 Mg      Last Written Prescription Date:  03/03/22  Last Fill Quantity: 90,   # refills: 1  Last Office Visit: 06/12/22  Future Office visit:    Next 5 appointments (look out 90 days)    Aug 02, 2022  4:00 PM  (Arrive by 3:45 PM)  SHORT with Henna Moyer MD  [facility] ([facility] ) [address]  [place] [address]  [phone]           Routing refill request to provider for review/approval because:  Phone call

## 2022-07-26 NOTE — TELEPHONE ENCOUNTER
HYDROcodone-acetaminophen (NORCO) 7.5-325 MG per tablet      Last Written Prescription Date:  5/19/22  Last Fill Quantity: 60,   # refills: 0  Last Office Visit: 6/21/22  Future Office visit:    Next 5 appointments (look out 90 days)    Aug 02, 2022  4:00 PM  (Arrive by 3:45 PM)  SHORT with Henna Moyer MD  [facility] ([facility] [place] ) [address] [place] [address] AVE  [place] [address]  [phone]           Routing refill request to provider for review/approval because:  Drug not on the [facility], P or  Health refill protocol or controlled substance

## 2022-08-01 DIAGNOSIS — M79.18 MYOFASCIAL PAIN SYNDROME, CERVICAL: ICD-10-CM

## 2022-08-01 DIAGNOSIS — G89.29 CHRONIC NECK PAIN: ICD-10-CM

## 2022-08-01 DIAGNOSIS — M54.2 CERVICAL PAIN: ICD-10-CM

## 2022-08-01 DIAGNOSIS — M54.2 CHRONIC NECK PAIN: ICD-10-CM

## 2022-08-02 ENCOUNTER — OFFICE VISIT (OUTPATIENT)
Dept: FAMILY MEDICINE | Facility: OTHER | Age: 62
End: 2022-08-02
Attending: FAMILY MEDICINE
Payer: MEDICARE

## 2022-08-02 VITALS
OXYGEN SATURATION: 96 % | DIASTOLIC BLOOD PRESSURE: 62 MMHG | TEMPERATURE: 98.8 F | HEART RATE: 86 BPM | RESPIRATION RATE: 17 BRPM | WEIGHT: 193 LBS | BODY MASS INDEX: 31.15 KG/M2 | SYSTOLIC BLOOD PRESSURE: 113 MMHG

## 2022-08-02 DIAGNOSIS — R76.8 RHEUMATOID FACTOR POSITIVE: ICD-10-CM

## 2022-08-02 DIAGNOSIS — F33.1 MODERATE EPISODE OF RECURRENT MAJOR DEPRESSIVE DISORDER (H): Primary | ICD-10-CM

## 2022-08-02 DIAGNOSIS — M79.10 MYALGIA: ICD-10-CM

## 2022-08-02 DIAGNOSIS — G89.29 OTHER CHRONIC PAIN: ICD-10-CM

## 2022-08-02 PROCEDURE — 99214 OFFICE O/P EST MOD 30 MIN: CPT | Performed by: FAMILY MEDICINE

## 2022-08-02 PROCEDURE — G0463 HOSPITAL OUTPT CLINIC VISIT: HCPCS | Mod: 25

## 2022-08-02 PROCEDURE — G0463 HOSPITAL OUTPT CLINIC VISIT: HCPCS

## 2022-08-02 RX ORDER — DULOXETIN HYDROCHLORIDE 60 MG/1
60 CAPSULE, DELAYED RELEASE ORAL DAILY
Qty: 30 CAPSULE | Refills: 1 | Status: SHIPPED | OUTPATIENT
Start: 2022-08-02 | End: 2022-09-01

## 2022-08-02 RX ORDER — DULOXETIN HYDROCHLORIDE 30 MG/1
CAPSULE, DELAYED RELEASE ORAL
Qty: 60 CAPSULE | Refills: 0 | Status: SHIPPED | OUTPATIENT
Start: 2022-08-02 | End: 2022-08-31

## 2022-08-02 ASSESSMENT — ANXIETY QUESTIONNAIRES
5. BEING SO RESTLESS THAT IT IS HARD TO SIT STILL: SEVERAL DAYS
1. FEELING NERVOUS, ANXIOUS, OR ON EDGE: SEVERAL DAYS
2. NOT BEING ABLE TO STOP OR CONTROL WORRYING: SEVERAL DAYS
3. WORRYING TOO MUCH ABOUT DIFFERENT THINGS: SEVERAL DAYS
GAD7 TOTAL SCORE: 8
GAD7 TOTAL SCORE: 8
7. FEELING AFRAID AS IF SOMETHING AWFUL MIGHT HAPPEN: SEVERAL DAYS
6. BECOMING EASILY ANNOYED OR IRRITABLE: SEVERAL DAYS

## 2022-08-02 ASSESSMENT — ENCOUNTER SYMPTOMS
CHILLS: 0
BACK PAIN: 1
FEVER: 0
PALPITATIONS: 0
SINUS PAIN: 1
SINUS PRESSURE: 1
ARTHRALGIAS: 1
SHORTNESS OF BREATH: 0
MYALGIAS: 1
ABDOMINAL PAIN: 0
DYSPHORIC MOOD: 1
NECK PAIN: 1
NERVOUS/ANXIOUS: 1

## 2022-08-02 ASSESSMENT — PATIENT HEALTH QUESTIONNAIRE - PHQ9
5. POOR APPETITE OR OVEREATING: MORE THAN HALF THE DAYS
SUM OF ALL RESPONSES TO PHQ QUESTIONS 1-9: 9

## 2022-08-02 ASSESSMENT — PAIN SCALES - GENERAL: PAINLEVEL: MODERATE PAIN (4)

## 2022-08-02 NOTE — COMMUNITY RESOURCES LIST (ENGLISH)
08/02/2022   [facility] [place] - Outpatient Clinics  N/A  For questions about this resource list or additional care needs, please contact your primary care clinic or care manager.  Phone: [phone]   Email: N/A   Address: [address] [place] [id number]   Hours: N/A        Mental Health       Individual counseling  1  [facility] [place] Distance: 4.86 miles      COVID-19 Status: Regular Operations   [address] [facility] [address] [place] [address]  Language: English  Hours: Mon - Thu 9:00 AM - 8:00 PM , Fri - Sat 9:00 AM - 5:00 PM Appt. Only  Fees: Insurance, Self Pay   Phone: [phone] Ext.1 Website: [email or web address]     2  [facility] [place] Distance: 4.96 miles      COVID-19 Status: Regular Operations, COVID-19 Status: Phone/Virtual   [address] [facility] [address] [place] [address]  Language: English  Hours: Mon - Thu 8:00 AM - 5:00 PM  Fees: Insurance, Self Pay, Sliding Fee   Phone: [phone] Website: [email or web address]          Important Numbers & Websites       Emergency Services   911  City Services   311  Poison Control   (751) 843-8402  Suicide Prevention Lifeline   (782) 803-4599 (TALK)  Child Abuse Hotline   (570) 669-3297 (4-A-Child)  Sexual Assault Hotline   (403) 611-7869 (HOPE)  National Runaway Safeline   (983) 195-7715 (RUNAWAY)  All-Options Talkline   (876) 673-9825  Substance Abuse Referral   (377) 258-5668 (HELP)

## 2022-08-02 NOTE — PROGRESS NOTES
Assessment & Plan     Moderate episode of recurrent major depressive disorder (H)  Follows with Dr. Herbert with next visit 10/7/2022  - c/w DULoxetine (CYMBALTA) 60 MG capsule; Take 1 capsule (60 mg) by mouth daily  - start DULoxetine (CYMBALTA) 30 MG capsule; Take 1 capsule (30 mg) by mouth At Bedtime for 7 days, THEN 2 capsules (60 mg) At Bedtime for 30 days. Continue to take 60mg in the morning  - ramping up to cymbalta 60mg bid    Other chronic pain /Rheumatoid factor positive. MARISEL and anti CCP negative / Myalgia  Worsening   - Adult Rheumat[facility]  [facility]  - DULoxetine (CYMBALTA) 60 MG capsule; Take 1 capsule (60 mg) by mouth daily  - DULoxetine (CYMBALTA) 30 MG capsule; Take 1 capsule (30 mg) by mouth At Bedtime for 7 days, THEN 2 capsules (60 mg) At Bedtime for 30 days. Continue to take 60mg in the morning  - on lyrcia, methocarbamol, baclofen, ibuprofen   - rare use of norco    See Patient Instructions    Return in about 2 months (around 10/2/2022) for Recheck.    Henna Moyer MD  [facility] - [place] Parkinson is a 62 year old, presenting for the following health issues:  Anxiety, Depression, and Pain      HPI     Depression and Anxiety Follow-Up    How are you doing with your depression since your last visit? Worsened hurts so bad    How are you doing with your anxiety since your last visit?  Worsened d/t pain    Are you having other symptoms that might be associated with depression or anxiety? Yes:  pain, sleeping issues    Have you had a significant life event? Health Concerns     Do you have any concerns with your use of alcohol or other drugs? No    - follows with Dr. Herbert with next visit 10/7/2022  - cymbalta helps with depression    Social History     Tobacco Use     Smoking status: Former Smoker     Years: 8.00     Types: Cigarettes     Start date:      Quit date:      Years since quittin.6     Smokeless tobacco: Never  Used   Substance Use Topics     Alcohol use: No     Drug use: No     PHQ 2/15/2022 5/13/2022 8/2/2022   PHQ-9 Total Score 2 16 9   Q9: Thoughts of better off dead/self-harm past 2 weeks Not at all Not at all Not at all     ESTRELLA-7 SCORE 2/15/2022 5/13/2022 8/2/2022   Total Score 2 18 8     Last PHQ-9 8/2/2022   1.  Little interest or pleasure in doing things 1   2.  Feeling down, depressed, or hopeless 1   3.  Trouble falling or staying asleep, or sleeping too much 1   4.  Feeling tired or having little energy 1   5.  Poor appetite or overeating 3   6.  Feeling bad about yourself 1   7.  Trouble concentrating 0   8.  Moving slowly or restless 1   Q9: Thoughts of better off dead/self-harm past 2 weeks 0   PHQ-9 Total Score 9   Difficulty at work, home, or with people -     ESTRELLA-7  8/2/2022   1. Feeling nervous, anxious, or on edge 1   2. Not being able to stop or control worrying 1   3. Worrying too much about different things 1   4. Trouble relaxing 2   5. Being so restless that it is hard to sit still 1   6. Becoming easily annoyed or irritable 1   7. Feeling afraid, as if something awful might happen 1   ESTRELLA-7 Total Score 8   If you checked any problems, how difficult have they made it for you to do your work, take care of things at home, or get along with other people? -       0  956}  Pain History:  When did you first notice your pain? - Chronic Pain   Have you seen this provider for your pain in the past?   Yes   Where in your body do you have pain? Left Knee and lower back, right shoulder  Are you seeing anyone else for your pain? No    PHQ-9 SCORE 2/15/2022 5/13/2022 8/2/2022   PHQ-9 Total Score 2 16 9       ESTRELLA-7 SCORE 2/15/2022 5/13/2022 8/2/2022   Total Score 2 18 8         PEG Score 3/15/2022 5/13/2022 8/2/2022   PEG Total Score 5 6 9       Chronic Pain Follow Up:    Location of pain: all over body pain, knee, feet  Analgesia/pain control:    - Recent changes:  Worsening    - Overall control: Inadequate pain  control    - Current treatments: baclofen, cymbalta (has not helped) , norco, methocarbamol, lyrica, ibuprofen    Adherence:     - Do you ever take more pain medicine than prescribed? No    - When did you take your last dose of pain medicine?  Sunday for norco   Adverse effects: No     - increased cymbalta to 60mg at last visit, tolerates cymbalta  -     PDMP Review       Value Time User    State PDMP site checked  Yes 7/26/2022  6:27 PM Henna Moyer MD        Last CSA Agreement:   CSA -- Patient Level:    Controlled Substance Agreement - Opioid - Scan on 9/7/2021 10:15 AM: OPIOD/OPIOD PLUS CONTROLLED SUBSTANCE AGREEMENT       Last UDS: 9/2/2021      Review of Systems   Constitutional: Negative for chills and fever.   HENT: Positive for congestion, sinus pressure and sinus pain.    Respiratory: Negative for shortness of breath.    Cardiovascular: Negative for chest pain and palpitations.   Gastrointestinal: Negative for abdominal pain.   Musculoskeletal: Positive for arthralgias (knees, hips), back pain, myalgias and neck pain (chronic).   Psychiatric/Behavioral: Positive for dysphoric mood. The patient is nervous/anxious.           Objective    /62   Pulse 86   Temp 98.8  F (37.1  C) (Tympanic)   Resp 17   Wt 87.5 kg (193 lb)   SpO2 96%   BMI 31.15 kg/m    Body mass index is 31.15 kg/m .  Physical Exam  Constitutional:       General: She is not in acute distress.     Appearance: She is not ill-appearing.   Cardiovascular:      Rate and Rhythm: Normal rate and regular rhythm.      Heart sounds: No murmur heard.  Pulmonary:      Effort: Pulmonary effort is normal. No respiratory distress.      Breath sounds: No wheezing or rales.   Neurological:      Mental Status: She is alert.   Psychiatric:         Mood and Affect: Mood is depressed.                    .  ..

## 2022-08-02 NOTE — PATIENT INSTRUCTIONS
We put in a referral to Dr. Francisco, [facility] rheumatology     Continue with your cymbalta 60mg in the morning  Add 30mg at night for 7 days, then increased to 60mg.at night (total of 60mg twice per day)    Nasal Hygiene: [employer]    Nasal saline (salt water) 1 to 2 sprays 10 times per day  Nasal saline sinus rinses 1 to 2 time per day (over the counter packets or top water/salt packets)  Over the counter nasal gels/ oil 2 to 3 times per day  Increase oral hydration (carry a water bottle with you)  Cool air humidifier at bedside at night  If instructed, use antibiotic ointment around each nostril 2 times per ay or at night (apply with finger or q-tip)  Avoid or minimize allergic / irritant triggers if possible

## 2022-08-02 NOTE — NURSING NOTE
"Chief Complaint   Patient presents with     Anxiety     Depression     Pain       Initial /62   Pulse 86   Temp 98.8  F (37.1  C) (Tympanic)   Resp 17   Wt 87.5 kg (193 lb)   SpO2 96%   BMI 31.15 kg/m   Estimated body mass index is 31.15 kg/m  as calculated from the following:    Height as of 12/6/21: 1.676 m (5' 6\").    Weight as of this encounter: 87.5 kg (193 lb).  Medication Reconciliation: complete  Araceli Keys LPN  "

## 2022-08-03 RX ORDER — HYDROCODONE BITARTRATE AND ACETAMINOPHEN 7.5; 325 MG/1; MG/1
1 TABLET ORAL 2 TIMES DAILY PRN
Qty: 60 TABLET | Refills: 0 | Status: SHIPPED | OUTPATIENT
Start: 2022-08-03 | End: 2022-09-30

## 2022-08-03 RX ORDER — PREGABALIN 150 MG/1
CAPSULE ORAL
Qty: 180 CAPSULE | Refills: 0 | Status: SHIPPED | OUTPATIENT
Start: 2022-08-03 | End: 2022-11-17

## 2022-08-03 RX ORDER — METHOCARBAMOL 500 MG/1
TABLET, FILM COATED ORAL
Qty: 60 TABLET | Refills: 2 | Status: SHIPPED | OUTPATIENT
Start: 2022-08-03 | End: 2022-09-30

## 2022-08-03 NOTE — TELEPHONE ENCOUNTER
Robaxin      Last Written Prescription Date:  3/15/22  Last Fill Quantity: 60,   # refills: 2  Last Office Visit: 8/2/22  Future Office visit:    Next 5 appointments (look out 90 days)    Oct 06, 2022 11:30 AM  (Arrive by 11:15 AM)  SHORT with Henna Moyer MD  [facility] [place] ([facility] [place] ) [address]  [place] [address]  [phone]           Routing refill request to provider for review/approval because:      Norco      Last Written Prescription Date:  7/26/22  Last Fill Quantity: 60,   # refills: 0  Last Office Visit: 8/2/22  Future Office visit:    Next 5 appointments (look out 90 days)    Oct 06, 2022 11:30 AM  (Arrive by 11:15 AM)  SHORT with Henna Moyer MD  [facility] [place] ([facility] [place] ) [address]  [place] [address]  [phone]           Routing refill request to provider for review/approval because:

## 2022-08-03 NOTE — TELEPHONE ENCOUNTER
Lyrica      Last Written Prescription Date:  5/9/22  Last Fill Quantity: 180,   # refills: 1  Last Office Visit: 8/2/22  Future Office visit:    Next 5 appointments (look out 90 days)    Oct 06, 2022 11:30 AM  (Arrive by 11:15 AM)  SHORT with Henna Moyer MD  [facility] [place] ([facility] [place] ) [address]  [place] [address]  [phone]           Routing refill request to provider for review/approval because:

## 2022-08-10 ENCOUNTER — TRANSFERRED RECORDS (OUTPATIENT)
Dept: HEALTH INFORMATION MANAGEMENT | Facility: CLINIC | Age: 62
End: 2022-08-10

## 2022-08-14 ENCOUNTER — HEALTH MAINTENANCE LETTER (OUTPATIENT)
Age: 62
End: 2022-08-14

## 2022-08-18 ENCOUNTER — MYC MEDICAL ADVICE (OUTPATIENT)
Dept: FAMILY MEDICINE | Facility: OTHER | Age: 62
End: 2022-08-18

## 2022-08-18 DIAGNOSIS — G47.00 PERSISTENT INSOMNIA: ICD-10-CM

## 2022-08-18 RX ORDER — HYDROXYZINE HYDROCHLORIDE 25 MG/1
TABLET, FILM COATED ORAL
Qty: 45 TABLET | Refills: 4 | Status: SHIPPED | OUTPATIENT
Start: 2022-08-18 | End: 2023-01-23

## 2022-08-23 ENCOUNTER — MEDICAL CORRESPONDENCE (OUTPATIENT)
Dept: MRI IMAGING | Facility: HOSPITAL | Age: 62
End: 2022-08-23

## 2022-08-29 ENCOUNTER — MYC MEDICAL ADVICE (OUTPATIENT)
Dept: FAMILY MEDICINE | Facility: OTHER | Age: 62
End: 2022-08-29

## 2022-08-30 ENCOUNTER — MYC MEDICAL ADVICE (OUTPATIENT)
Dept: FAMILY MEDICINE | Facility: OTHER | Age: 62
End: 2022-08-30

## 2022-08-30 DIAGNOSIS — F33.1 MODERATE EPISODE OF RECURRENT MAJOR DEPRESSIVE DISORDER (H): ICD-10-CM

## 2022-08-30 DIAGNOSIS — G89.29 OTHER CHRONIC PAIN: ICD-10-CM

## 2022-08-31 RX ORDER — DULOXETIN HYDROCHLORIDE 30 MG/1
CAPSULE, DELAYED RELEASE ORAL
Qty: 60 CAPSULE | Refills: 0 | Status: SHIPPED | OUTPATIENT
Start: 2022-08-31 | End: 2022-10-07 | Stop reason: DRUGHIGH

## 2022-08-31 NOTE — TELEPHONE ENCOUNTER
1. Acute symptoms of illness started Sunday 8/28.  Fever is resolved as of today.   States throat pain has decreased & is able to eat & drink adequately today.  Bilat nasal congestion is decreased to slightly moderate from severe.   Denies GI upset. BM's are normal consistency for her.  Taking OTC Tylenol cold.  Using a Netti pot for nasal rinse  Using Vicks vaporub on nose, chest, & feet.  Writer suggested to add an OTC expectorant   Pt states she is feeling much better & will continue home care remedies          2. Cymbalta order clarification:  Pt will take last night dose of  60 mg at on Sunday 9/4  Should patient continue with one capsule (30mg) at night?  Will continue taking 2 capsules 60mg in the morning.      T: [phone]   6

## 2022-08-31 NOTE — TELEPHONE ENCOUNTER
Cymbalta      Last Written Prescription Date:  8/2/22  Last Fill Quantity: 60,   # refills: 0  Last Office Visit: 8/2/22  Future Office visit:    Next 5 appointments (look out 90 days)    Oct 06, 2022 11:30 AM  (Arrive by 11:15 AM)  SHORT with Henna Moyer MD  [facility] [place] ([facility] [place] ) [address]  [place] [address]  [phone]           Routing refill request to provider for review/approval because:

## 2022-09-01 ENCOUNTER — OFFICE VISIT (OUTPATIENT)
Dept: FAMILY MEDICINE | Facility: OTHER | Age: 62
End: 2022-09-01
Attending: FAMILY MEDICINE
Payer: MEDICARE

## 2022-09-01 ENCOUNTER — NURSE TRIAGE (OUTPATIENT)
Dept: FAMILY MEDICINE | Facility: OTHER | Age: 62
End: 2022-09-01

## 2022-09-01 VITALS
DIASTOLIC BLOOD PRESSURE: 82 MMHG | SYSTOLIC BLOOD PRESSURE: 118 MMHG | HEART RATE: 98 BPM | HEIGHT: 66 IN | TEMPERATURE: 99.5 F | RESPIRATION RATE: 16 BRPM | WEIGHT: 193 LBS | OXYGEN SATURATION: 98 % | BODY MASS INDEX: 31.02 KG/M2

## 2022-09-01 DIAGNOSIS — J02.9 SORE THROAT: ICD-10-CM

## 2022-09-01 DIAGNOSIS — F33.1 MODERATE EPISODE OF RECURRENT MAJOR DEPRESSIVE DISORDER (H): ICD-10-CM

## 2022-09-01 DIAGNOSIS — G89.29 OTHER CHRONIC PAIN: ICD-10-CM

## 2022-09-01 DIAGNOSIS — J02.9 ACUTE PHARYNGITIS, UNSPECIFIED ETIOLOGY: Primary | ICD-10-CM

## 2022-09-01 LAB — GROUP A STREP BY PCR: NOT DETECTED

## 2022-09-01 PROCEDURE — 87651 STREP A DNA AMP PROBE: CPT | Mod: ZL | Performed by: FAMILY MEDICINE

## 2022-09-01 PROCEDURE — G0463 HOSPITAL OUTPT CLINIC VISIT: HCPCS

## 2022-09-01 PROCEDURE — U0005 INFEC AGEN DETEC AMPLI PROBE: HCPCS | Mod: ZL | Performed by: FAMILY MEDICINE

## 2022-09-01 PROCEDURE — 99213 OFFICE O/P EST LOW 20 MIN: CPT | Mod: CS | Performed by: FAMILY MEDICINE

## 2022-09-01 RX ORDER — DULOXETIN HYDROCHLORIDE 60 MG/1
60 CAPSULE, DELAYED RELEASE ORAL 2 TIMES DAILY
Qty: 180 CAPSULE | Refills: 1 | Status: SHIPPED | OUTPATIENT
Start: 2022-09-01 | End: 2022-10-20

## 2022-09-01 RX ORDER — AMOXICILLIN 875 MG
875 TABLET ORAL 2 TIMES DAILY
Qty: 20 TABLET | Refills: 0 | Status: SHIPPED | OUTPATIENT
Start: 2022-09-01 | End: 2022-09-11

## 2022-09-01 ASSESSMENT — PAIN SCALES - GENERAL: PAINLEVEL: MILD PAIN (3)

## 2022-09-01 NOTE — NURSING NOTE
"Chief Complaint   Patient presents with     URI       Initial /82 (BP Location: Left arm, Patient Position: Sitting, Cuff Size: Adult Regular)   Pulse 98   Temp 99.5  F (37.5  C) (Tympanic)   Resp 16   Ht 1.676 m (5' 6\")   Wt 87.5 kg (193 lb)   SpO2 98%   BMI 31.15 kg/m   Estimated body mass index is 31.15 kg/m  as calculated from the following:    Height as of this encounter: 1.676 m (5' 6\").    Weight as of this encounter: 87.5 kg (193 lb).  Medication Reconciliation: complete  Jenny Castro LPN  "

## 2022-09-01 NOTE — PROGRESS NOTES
"  Assessment & Plan     Sore throat  - Group A Streptococcus PCR Throat Swab ([place] ONLY)  - Symptomatic; Yes; 8/28/2022 COVID-19 Virus (Coronavirus) by PCR Nose    Acute pharyngitis, unspecified etiology  Starting Amox presumptively pending lab results.  Supportive care as well.  Follow-up as needed.  Likely a viral illness.  Visibly very well appearing with only a mildly red throat.  - amoxicillin (AMOXIL) 875 MG tablet; Take 1 tablet (875 mg) by mouth 2 times daily for 10 days      HENRY GASTELUM, DO  [facility] [place]    Danni Parkinson is a 62 year old presenting for the following health issues:  URI      HPI     63yo female not feeling well x5 days.  Tmax 101 once, forehead and maxillary pressure L > R, feels weak and short of breath.  Also sore red throat.  Has been using Mucinex DM and Neti Pot.    Acute Illness  Acute illness concerns: Sore throat  Onset/Duration: 5 days  Symptoms:  Fever: YES- 99.5  Chills/Sweats: No  Headache (location?): YES- temporal pressure  Sinus Pressure: YES  Conjunctivitis:  No  Ear Pain: no  Rhinorrhea: No  Congestion: YES  Sore Throat: YES  Cough: YES-productive of green sputum  Wheeze: No  Decreased Appetite: No  Nausea: No  Vomiting: No  Diarrhea: No  Dysuria/Freq.: No  Dysuria or Hematuria: No  Fatigue/Achiness: YES  Sick/Strep Exposure: No  Therapies tried and outcome: None    Review of Systems   HENT: Negative for ear pain.    Cardiovascular: Negative for peripheral edema.   Neurological: Positive for headaches.          Objective    /82 (BP Location: Left arm, Patient Position: Sitting, Cuff Size: Adult Regular)   Pulse 98   Temp 99.5  F (37.5  C) (Tympanic)   Resp 16   Ht 1.676 m (5' 6\")   Wt 87.5 kg (193 lb)   SpO2 98%   BMI 31.15 kg/m    Body mass index is 31.15 kg/m .  Physical Exam  Constitutional:       General: She is not in acute distress.     Appearance: Normal appearance.   HENT:      Head: Normocephalic and atraumatic.      Right " Ear: Tympanic membrane and external ear normal.      Left Ear: Tympanic membrane and external ear normal.      Nose: No congestion or rhinorrhea.      Mouth/Throat:      Pharynx: Posterior oropharyngeal erythema present. No oropharyngeal exudate.   Eyes:      Extraocular Movements: Extraocular movements intact.      Conjunctiva/sclera: Conjunctivae normal.      Pupils: Pupils are equal, round, and reactive to light.   Cardiovascular:      Rate and Rhythm: Normal rate and regular rhythm.      Heart sounds: Normal heart sounds. No murmur heard.  Pulmonary:      Effort: Pulmonary effort is normal.      Breath sounds: Normal breath sounds. No wheezing.   Abdominal:      Palpations: Abdomen is soft.      Tenderness: There is no abdominal tenderness.   Musculoskeletal:      Cervical back: Neck supple.      Right lower leg: No edema.      Left lower leg: No edema.   Lymphadenopathy:      Cervical: No cervical adenopathy.   Neurological:      Mental Status: She is alert and oriented to person, place, and time.                    .  ..

## 2022-09-01 NOTE — TELEPHONE ENCOUNTER
"Patient calling requesting an overbook today with provider.    Reason for Disposition    Patient wants to be seen    Answer Assessment - Initial Assessment Questions  1. ONSET: \"When did the throat start hurting?\" (Hours or days ago)       sunday  2. SEVERITY: \"How bad is the sore throat?\" (Scale 1-10; mild, moderate or severe)    - MILD (1-3):  doesn't interfere with eating or normal activities    - MODERATE (4-7): interferes with eating some solids and normal activities    - SEVERE (8-10):  excruciating pain, interferes with most normal activities    - SEVERE DYSPHAGIA: can't swallow liquids, drooling      moderate  3. STREP EXPOSURE: \"Has there been any exposure to strep within the past week?\" If Yes, ask: \"What type of contact occurred?\"       no  4.  VIRAL SYMPTOMS: \"Are there any symptoms of a cold, such as a runny nose, cough, hoarse voice or red eyes?\"       Hoarse  Cough with green phlegm  5. FEVER: \"Do you have a fever?\" If Yes, ask: \"What is your temperature, how was it measured, and when did it start?\"      101 last night 100 now  6. PUS ON THE TONSILS: \"Is there pus on the tonsils in the back of your throat?\"      Yes yellow spots on back of throat and red  7. OTHER SYMPTOMS: \"Do you have any other symptoms?\" (e.g., difficulty breathing, headache, rash)      Feel's congested headache  8. PREGNANCY: \"Is there any chance you are pregnant?\" \"When was your last menstrual period?\"      no    Protocols used: SORE THROAT-A-OH      "

## 2022-09-02 LAB — SARS-COV-2 RNA RESP QL NAA+PROBE: NEGATIVE

## 2022-09-02 ASSESSMENT — ENCOUNTER SYMPTOMS: HEADACHES: 1

## 2022-09-13 ENCOUNTER — HOSPITAL ENCOUNTER (OUTPATIENT)
Dept: MRI IMAGING | Facility: HOSPITAL | Age: 62
Discharge: HOME | End: 2022-09-13
Attending: ORTHOPAEDIC SURGERY | Admitting: ORTHOPAEDIC SURGERY
Payer: MEDICARE

## 2022-09-13 DIAGNOSIS — R52 PAIN: ICD-10-CM

## 2022-09-13 DIAGNOSIS — M25.662 DECREASED ROM OF LEFT KNEE: ICD-10-CM

## 2022-09-13 DIAGNOSIS — R26.2 DIFFICULTY WALKING: ICD-10-CM

## 2022-09-13 PROCEDURE — 73721 MRI JNT OF LWR EXTRE W/O DYE: CPT | Mod: LT

## 2022-09-15 ENCOUNTER — TELEPHONE (OUTPATIENT)
Dept: FAMILY MEDICINE | Facility: OTHER | Age: 62
End: 2022-09-15

## 2022-09-15 NOTE — TELEPHONE ENCOUNTER
"Patient calling in regards to cymbalta. Patient reports \"horrible headaches, tired all the time. I have an issue with peeing. I can't go tot the bathroom and sit there for a long time and then I pee a little bit. I have this pain in my abdomen. I was taking an antibiotic and when I stopped taking it I still have the pain in my abdomen\". Patient reports she is unable to be seen by rheumatology until 10/21. Patient states she is in pain everyday.     Patient is requesting to be seen by PCP. Patient has an appointment scheduled for 10/6, but is requesting to be seen sooner. Patient states she can do a video visit if able to.     Please advise, thank you.   "

## 2022-09-16 NOTE — TELEPHONE ENCOUNTER
LM for patient to call back. Please see previous note and schedule patient when patient calls back. Thank you.

## 2022-09-16 NOTE — TELEPHONE ENCOUNTER
Next 5 appointments (look out 90 days)    Sep 30, 2022  3:00 PM  (Arrive by 2:45 PM)  SHORT with Henna Moyer MD  [facility] [place] ([facility] [place] ) [address]  [place] [address]  [phone]

## 2022-09-23 ENCOUNTER — HOSPITAL ENCOUNTER (EMERGENCY)
Facility: HOSPITAL | Age: 62
Discharge: HOME | End: 2022-09-23
Attending: EMERGENCY MEDICINE | Admitting: EMERGENCY MEDICINE
Payer: MEDICARE

## 2022-09-23 ENCOUNTER — APPOINTMENT (OUTPATIENT)
Dept: ULTRASOUND IMAGING | Facility: HOSPITAL | Age: 62
End: 2022-09-23
Attending: EMERGENCY MEDICINE
Payer: MEDICARE

## 2022-09-23 VITALS
HEART RATE: 103 BPM | OXYGEN SATURATION: 94 % | SYSTOLIC BLOOD PRESSURE: 127 MMHG | RESPIRATION RATE: 22 BRPM | DIASTOLIC BLOOD PRESSURE: 66 MMHG | TEMPERATURE: 99.6 F

## 2022-09-23 DIAGNOSIS — G43.809 OTHER MIGRAINE WITHOUT STATUS MIGRAINOSUS, NOT INTRACTABLE: ICD-10-CM

## 2022-09-23 DIAGNOSIS — R07.9 CHEST PAIN, UNSPECIFIED TYPE: ICD-10-CM

## 2022-09-23 LAB
ALBUMIN SERPL-MCNC: 3.5 G/DL (ref 3.4–5)
ALP SERPL-CCNC: 111 U/L (ref 40–150)
ALT SERPL W P-5'-P-CCNC: 36 U/L (ref 0–50)
ANION GAP SERPL CALCULATED.3IONS-SCNC: 6 MMOL/L (ref 3–14)
AST SERPL W P-5'-P-CCNC: 28 U/L (ref 0–45)
BASE EXCESS BLDV CALC-SCNC: 0.9 MMOL/L (ref -7.7–1.9)
BASOPHILS # BLD AUTO: 0.1 10E3/UL (ref 0–0.2)
BASOPHILS NFR BLD AUTO: 1 %
BILIRUB SERPL-MCNC: 0.3 MG/DL (ref 0.2–1.3)
BUN SERPL-MCNC: 9 MG/DL (ref 7–30)
CALCIUM SERPL-MCNC: 9 MG/DL (ref 8.5–10.1)
CHLORIDE BLD-SCNC: 109 MMOL/L (ref 94–109)
CO2 SERPL-SCNC: 26 MMOL/L (ref 20–32)
CREAT SERPL-MCNC: 0.83 MG/DL (ref 0.52–1.04)
EOSINOPHIL # BLD AUTO: 0.2 10E3/UL (ref 0–0.7)
EOSINOPHIL NFR BLD AUTO: 2 %
ERYTHROCYTE [DISTWIDTH] IN BLOOD BY AUTOMATED COUNT: 13.1 % (ref 10–15)
ETHANOL SERPL-MCNC: <0.01 G/DL
GFR SERPL CREATININE-BSD FRML MDRD: 80 ML/MIN/1.73M2
GLUCOSE BLD-MCNC: 129 MG/DL (ref 70–99)
HCO3 BLDV-SCNC: 28 MMOL/L (ref 21–28)
HCT VFR BLD AUTO: 39.7 % (ref 35–47)
HGB BLD-MCNC: 13.4 G/DL (ref 11.7–15.7)
HOLD SPECIMEN: NORMAL
IMM GRANULOCYTES # BLD: 0 10E3/UL
IMM GRANULOCYTES NFR BLD: 0 %
INR PPP: 0.88 (ref 0.85–1.15)
LIPASE SERPL-CCNC: 118 U/L (ref 73–393)
LYMPHOCYTES # BLD AUTO: 1.8 10E3/UL (ref 0.8–5.3)
LYMPHOCYTES NFR BLD AUTO: 24 %
MCH RBC QN AUTO: 29.8 PG (ref 26.5–33)
MCHC RBC AUTO-ENTMCNC: 33.8 G/DL (ref 31.5–36.5)
MCV RBC AUTO: 88 FL (ref 78–100)
MONOCYTES # BLD AUTO: 0.7 10E3/UL (ref 0–1.3)
MONOCYTES NFR BLD AUTO: 9 %
NEUTROPHILS # BLD AUTO: 4.9 10E3/UL (ref 1.6–8.3)
NEUTROPHILS NFR BLD AUTO: 64 %
NRBC # BLD AUTO: 0 10E3/UL
NRBC BLD AUTO-RTO: 0 /100
NT-PROBNP SERPL-MCNC: 40 PG/ML (ref 0–900)
O2/TOTAL GAS SETTING VFR VENT: 21 %
OXYHGB MFR BLDV: 92 % (ref 70–75)
PCO2 BLDV: 51 MM HG (ref 40–50)
PH BLDV: 7.34 [PH] (ref 7.32–7.43)
PLATELET # BLD AUTO: 232 10E3/UL (ref 150–450)
PO2 BLDV: 69 MM HG (ref 25–47)
POTASSIUM BLD-SCNC: 4 MMOL/L (ref 3.4–5.3)
PROT SERPL-MCNC: 7.4 G/DL (ref 6.8–8.8)
RBC # BLD AUTO: 4.49 10E6/UL (ref 3.8–5.2)
SODIUM SERPL-SCNC: 141 MMOL/L (ref 133–144)
TROPONIN I SERPL HS-MCNC: 4 NG/L
WBC # BLD AUTO: 7.7 10E3/UL (ref 4–11)

## 2022-09-23 PROCEDURE — 85610 PROTHROMBIN TIME: CPT | Performed by: EMERGENCY MEDICINE

## 2022-09-23 PROCEDURE — 84484 ASSAY OF TROPONIN QUANT: CPT | Performed by: EMERGENCY MEDICINE

## 2022-09-23 PROCEDURE — 96374 THER/PROPH/DIAG INJ IV PUSH: CPT | Mod: XU

## 2022-09-23 PROCEDURE — 99285 EMERGENCY DEPT VISIT HI MDM: CPT | Mod: 25 | Performed by: EMERGENCY MEDICINE

## 2022-09-23 PROCEDURE — 250N000011 HC RX IP 250 OP 636: Performed by: EMERGENCY MEDICINE

## 2022-09-23 PROCEDURE — 99285 EMERGENCY DEPT VISIT HI MDM: CPT | Mod: 25

## 2022-09-23 PROCEDURE — 36415 COLL VENOUS BLD VENIPUNCTURE: CPT | Performed by: EMERGENCY MEDICINE

## 2022-09-23 PROCEDURE — 96361 HYDRATE IV INFUSION ADD-ON: CPT

## 2022-09-23 PROCEDURE — 85025 COMPLETE CBC W/AUTO DIFF WBC: CPT | Performed by: EMERGENCY MEDICINE

## 2022-09-23 PROCEDURE — 83880 ASSAY OF NATRIURETIC PEPTIDE: CPT | Performed by: EMERGENCY MEDICINE

## 2022-09-23 PROCEDURE — 96375 TX/PRO/DX INJ NEW DRUG ADDON: CPT

## 2022-09-23 PROCEDURE — 82805 BLOOD GASES W/O2 SATURATION: CPT | Performed by: EMERGENCY MEDICINE

## 2022-09-23 PROCEDURE — 93010 ELECTROCARDIOGRAM REPORT: CPT | Performed by: INTERNAL MEDICINE

## 2022-09-23 PROCEDURE — 93308 TTE F-UP OR LMTD: CPT | Mod: TC

## 2022-09-23 PROCEDURE — 80053 COMPREHEN METABOLIC PANEL: CPT | Performed by: EMERGENCY MEDICINE

## 2022-09-23 PROCEDURE — 258N000003 HC RX IP 258 OP 636: Performed by: EMERGENCY MEDICINE

## 2022-09-23 PROCEDURE — 83690 ASSAY OF LIPASE: CPT | Performed by: EMERGENCY MEDICINE

## 2022-09-23 PROCEDURE — 93308 TTE F-UP OR LMTD: CPT | Mod: 26 | Performed by: EMERGENCY MEDICINE

## 2022-09-23 PROCEDURE — 93005 ELECTROCARDIOGRAM TRACING: CPT

## 2022-09-23 PROCEDURE — 82077 ASSAY SPEC XCP UR&BREATH IA: CPT | Performed by: EMERGENCY MEDICINE

## 2022-09-23 RX ORDER — KETOROLAC TROMETHAMINE 15 MG/ML
15 INJECTION, SOLUTION INTRAMUSCULAR; INTRAVENOUS ONCE
Status: COMPLETED | OUTPATIENT
Start: 2022-09-23 | End: 2022-09-23

## 2022-09-23 RX ORDER — DROPERIDOL 2.5 MG/ML
2.5 INJECTION, SOLUTION INTRAMUSCULAR; INTRAVENOUS ONCE
Status: COMPLETED | OUTPATIENT
Start: 2022-09-23 | End: 2022-09-23

## 2022-09-23 RX ADMIN — SODIUM CHLORIDE 1000 ML: 9 INJECTION, SOLUTION INTRAVENOUS at 21:33

## 2022-09-23 RX ADMIN — KETOROLAC TROMETHAMINE 15 MG: 15 INJECTION, SOLUTION INTRAMUSCULAR; INTRAVENOUS at 21:33

## 2022-09-23 RX ADMIN — DROPERIDOL 2.5 MG: 2.5 INJECTION, SOLUTION INTRAMUSCULAR; INTRAVENOUS at 21:33

## 2022-09-23 ASSESSMENT — ACTIVITIES OF DAILY LIVING (ADL): ADLS_ACUITY_SCORE: 35

## 2022-09-24 NOTE — ED NOTES
This writer spoke to pt regarding sx. Pt has wide ranging sx, including migraine starting yesterday, chest pain that radiates into neck and arm starting today, abdominal pain on and off for past week, and chronic neck pain. She states she takes many medications for migraines, and chronic pain, but changed migraine med to Cymbalta recently. She believes this is the cause of all or most of her symptoms. Her migraine is positional. It's to the right side of her head and is worse when she is lying down. Pt is very knowledgeable of her medication history. She is also concerned that her brother had a heart transplant after having no symptoms of heart failure, and she's wondering if this is happening with her as well.

## 2022-09-24 NOTE — ED TRIAGE NOTES
Pt presents with migraine headaches over the last couple of days.  Pt has been out of her Cymbalta for the past couple of days.  She states that she got chest pain today and has been having high blood pressure.  Pt has been nauseated and lightheaded.  Pt had jaw pain last night and arm pain.

## 2022-09-25 ENCOUNTER — APPOINTMENT (OUTPATIENT)
Dept: GENERAL RADIOLOGY | Facility: HOSPITAL | Age: 62
End: 2022-09-25
Attending: INTERNAL MEDICINE
Payer: MEDICARE

## 2022-09-25 ENCOUNTER — HOSPITAL ENCOUNTER (EMERGENCY)
Facility: HOSPITAL | Age: 62
Discharge: HOME | End: 2022-09-25
Attending: PHYSICIAN ASSISTANT | Admitting: PHYSICIAN ASSISTANT
Payer: MEDICARE

## 2022-09-25 VITALS
HEART RATE: 89 BPM | RESPIRATION RATE: 18 BRPM | OXYGEN SATURATION: 95 % | TEMPERATURE: 98.1 F | SYSTOLIC BLOOD PRESSURE: 126 MMHG | DIASTOLIC BLOOD PRESSURE: 85 MMHG

## 2022-09-25 DIAGNOSIS — R07.89 ATYPICAL CHEST PAIN: ICD-10-CM

## 2022-09-25 DIAGNOSIS — N39.0 URINARY TRACT INFECTION WITHOUT HEMATURIA, SITE UNSPECIFIED: ICD-10-CM

## 2022-09-25 DIAGNOSIS — K22.10 ULCER OF ESOPHAGUS WITHOUT BLEEDING: ICD-10-CM

## 2022-09-25 DIAGNOSIS — F41.0 ANXIETY ATTACK: ICD-10-CM

## 2022-09-25 DIAGNOSIS — J98.01 ACUTE BRONCHOSPASM: ICD-10-CM

## 2022-09-25 DIAGNOSIS — J44.1 COPD EXACERBATION (H): ICD-10-CM

## 2022-09-25 DIAGNOSIS — R10.13 EPIGASTRIC PAIN: ICD-10-CM

## 2022-09-25 LAB
ALBUMIN SERPL-MCNC: 3.5 G/DL (ref 3.4–5)
ALBUMIN UR-MCNC: NEGATIVE MG/DL
ALP SERPL-CCNC: 101 U/L (ref 40–150)
ALT SERPL W P-5'-P-CCNC: 32 U/L (ref 0–50)
ANION GAP SERPL CALCULATED.3IONS-SCNC: 4 MMOL/L (ref 3–14)
APPEARANCE UR: CLEAR
AST SERPL W P-5'-P-CCNC: 22 U/L (ref 0–45)
BASE EXCESS BLDA CALC-SCNC: 1.3 MMOL/L (ref -9–1.8)
BASOPHILS # BLD AUTO: 0.1 10E3/UL (ref 0–0.2)
BASOPHILS NFR BLD AUTO: 1 %
BILIRUB SERPL-MCNC: 0.3 MG/DL (ref 0.2–1.3)
BILIRUB UR QL STRIP: NEGATIVE
BUN SERPL-MCNC: 10 MG/DL (ref 7–30)
CALCIUM SERPL-MCNC: 8.8 MG/DL (ref 8.5–10.1)
CHLORIDE BLD-SCNC: 108 MMOL/L (ref 94–109)
CO2 SERPL-SCNC: 28 MMOL/L (ref 20–32)
COLOR UR AUTO: ABNORMAL
CREAT SERPL-MCNC: 0.98 MG/DL (ref 0.52–1.04)
CRP SERPL-MCNC: <2.9 MG/L (ref 0–8)
D DIMER PPP FEU-MCNC: 0.4 UG/ML FEU (ref 0–0.5)
EOSINOPHIL # BLD AUTO: 0.2 10E3/UL (ref 0–0.7)
EOSINOPHIL NFR BLD AUTO: 3 %
ERYTHROCYTE [DISTWIDTH] IN BLOOD BY AUTOMATED COUNT: 13 % (ref 10–15)
FLUAV RNA SPEC QL NAA+PROBE: NEGATIVE
FLUBV RNA RESP QL NAA+PROBE: NEGATIVE
GFR SERPL CREATININE-BSD FRML MDRD: 65 ML/MIN/1.73M2
GLUCOSE BLD-MCNC: 110 MG/DL (ref 70–99)
GLUCOSE UR STRIP-MCNC: NEGATIVE MG/DL
HCO3 BLD-SCNC: 25 MMOL/L (ref 21–28)
HCT VFR BLD AUTO: 40.7 % (ref 35–47)
HGB BLD-MCNC: 13.5 G/DL (ref 11.7–15.7)
HGB UR QL STRIP: NEGATIVE
IMM GRANULOCYTES # BLD: 0 10E3/UL
IMM GRANULOCYTES NFR BLD: 0 %
KETONES UR STRIP-MCNC: NEGATIVE MG/DL
LACTATE SERPL-SCNC: 1.7 MMOL/L (ref 0.7–2)
LEUKOCYTE ESTERASE UR QL STRIP: ABNORMAL
LYMPHOCYTES # BLD AUTO: 1.7 10E3/UL (ref 0.8–5.3)
LYMPHOCYTES NFR BLD AUTO: 28 %
MAGNESIUM SERPL-MCNC: 2.2 MG/DL (ref 1.6–2.3)
MCH RBC QN AUTO: 29.6 PG (ref 26.5–33)
MCHC RBC AUTO-ENTMCNC: 33.2 G/DL (ref 31.5–36.5)
MCV RBC AUTO: 89 FL (ref 78–100)
MONOCYTES # BLD AUTO: 0.5 10E3/UL (ref 0–1.3)
MONOCYTES NFR BLD AUTO: 8 %
MUCOUS THREADS #/AREA URNS LPF: PRESENT /LPF
NEUTROPHILS # BLD AUTO: 3.7 10E3/UL (ref 1.6–8.3)
NEUTROPHILS NFR BLD AUTO: 60 %
NITRATE UR QL: NEGATIVE
NRBC # BLD AUTO: 0 10E3/UL
NRBC BLD AUTO-RTO: 0 /100
NT-PROBNP SERPL-MCNC: 69 PG/ML (ref 0–900)
O2/TOTAL GAS SETTING VFR VENT: 21 %
OXYHGB MFR BLD: 91 % (ref 92–100)
PCO2 BLD: 37 MM HG (ref 35–45)
PH BLD: 7.44 [PH] (ref 7.35–7.45)
PH UR STRIP: 6.5 [PH] (ref 4.7–8)
PLATELET # BLD AUTO: 198 10E3/UL (ref 150–450)
PO2 BLD: 59 MM HG (ref 80–105)
POTASSIUM BLD-SCNC: 3.7 MMOL/L (ref 3.4–5.3)
PROT SERPL-MCNC: 7.3 G/DL (ref 6.8–8.8)
RBC # BLD AUTO: 4.56 10E6/UL (ref 3.8–5.2)
RBC URINE: 1 /HPF
RSV RNA SPEC NAA+PROBE: NEGATIVE
SARS-COV-2 RNA RESP QL NAA+PROBE: NEGATIVE
SODIUM SERPL-SCNC: 140 MMOL/L (ref 133–144)
SP GR UR STRIP: 1.01 (ref 1–1.03)
SQUAMOUS EPITHELIAL: 1 /HPF
TROPONIN I SERPL HS-MCNC: 4 NG/L
TROPONIN I SERPL HS-MCNC: 4 NG/L
UROBILINOGEN UR STRIP-MCNC: NORMAL MG/DL
WBC # BLD AUTO: 6.1 10E3/UL (ref 4–11)
WBC URINE: 7 /HPF

## 2022-09-25 PROCEDURE — 96376 TX/PRO/DX INJ SAME DRUG ADON: CPT

## 2022-09-25 PROCEDURE — 93010 ELECTROCARDIOGRAM REPORT: CPT | Performed by: INTERNAL MEDICINE

## 2022-09-25 PROCEDURE — 85025 COMPLETE CBC W/AUTO DIFF WBC: CPT | Performed by: PHYSICIAN ASSISTANT

## 2022-09-25 PROCEDURE — 85379 FIBRIN DEGRADATION QUANT: CPT | Performed by: PHYSICIAN ASSISTANT

## 2022-09-25 PROCEDURE — 250N000013 HC RX MED GY IP 250 OP 250 PS 637

## 2022-09-25 PROCEDURE — C9803 HOPD COVID-19 SPEC COLLECT: HCPCS

## 2022-09-25 PROCEDURE — 71046 X-RAY EXAM CHEST 2 VIEWS: CPT

## 2022-09-25 PROCEDURE — 87637 SARSCOV2&INF A&B&RSV AMP PRB: CPT | Performed by: PHYSICIAN ASSISTANT

## 2022-09-25 PROCEDURE — 80053 COMPREHEN METABOLIC PANEL: CPT | Performed by: PHYSICIAN ASSISTANT

## 2022-09-25 PROCEDURE — 96361 HYDRATE IV INFUSION ADD-ON: CPT

## 2022-09-25 PROCEDURE — 250N000013 HC RX MED GY IP 250 OP 250 PS 637: Performed by: PHYSICIAN ASSISTANT

## 2022-09-25 PROCEDURE — 250N000011 HC RX IP 250 OP 636: Performed by: PHYSICIAN ASSISTANT

## 2022-09-25 PROCEDURE — 81001 URINALYSIS AUTO W/SCOPE: CPT | Performed by: PHYSICIAN ASSISTANT

## 2022-09-25 PROCEDURE — 99285 EMERGENCY DEPT VISIT HI MDM: CPT | Mod: 25

## 2022-09-25 PROCEDURE — 84484 ASSAY OF TROPONIN QUANT: CPT | Performed by: PHYSICIAN ASSISTANT

## 2022-09-25 PROCEDURE — 96375 TX/PRO/DX INJ NEW DRUG ADDON: CPT

## 2022-09-25 PROCEDURE — 87040 BLOOD CULTURE FOR BACTERIA: CPT | Performed by: PHYSICIAN ASSISTANT

## 2022-09-25 PROCEDURE — 258N000003 HC RX IP 258 OP 636: Performed by: PHYSICIAN ASSISTANT

## 2022-09-25 PROCEDURE — 86140 C-REACTIVE PROTEIN: CPT | Performed by: PHYSICIAN ASSISTANT

## 2022-09-25 PROCEDURE — 87086 URINE CULTURE/COLONY COUNT: CPT | Performed by: PHYSICIAN ASSISTANT

## 2022-09-25 PROCEDURE — 82040 ASSAY OF SERUM ALBUMIN: CPT | Performed by: PHYSICIAN ASSISTANT

## 2022-09-25 PROCEDURE — 83735 ASSAY OF MAGNESIUM: CPT | Performed by: PHYSICIAN ASSISTANT

## 2022-09-25 PROCEDURE — 96374 THER/PROPH/DIAG INJ IV PUSH: CPT

## 2022-09-25 PROCEDURE — 82805 BLOOD GASES W/O2 SATURATION: CPT | Performed by: PHYSICIAN ASSISTANT

## 2022-09-25 PROCEDURE — 36600 WITHDRAWAL OF ARTERIAL BLOOD: CPT

## 2022-09-25 PROCEDURE — 36415 COLL VENOUS BLD VENIPUNCTURE: CPT | Performed by: PHYSICIAN ASSISTANT

## 2022-09-25 PROCEDURE — 99285 EMERGENCY DEPT VISIT HI MDM: CPT | Performed by: PHYSICIAN ASSISTANT

## 2022-09-25 PROCEDURE — 83605 ASSAY OF LACTIC ACID: CPT | Performed by: PHYSICIAN ASSISTANT

## 2022-09-25 PROCEDURE — 93005 ELECTROCARDIOGRAM TRACING: CPT

## 2022-09-25 PROCEDURE — 94640 AIRWAY INHALATION TREATMENT: CPT

## 2022-09-25 PROCEDURE — 250N000009 HC RX 250: Performed by: PHYSICIAN ASSISTANT

## 2022-09-25 PROCEDURE — 83880 ASSAY OF NATRIURETIC PEPTIDE: CPT | Performed by: PHYSICIAN ASSISTANT

## 2022-09-25 RX ORDER — BUDESONIDE 0.5 MG/2ML
0.5 INHALANT ORAL ONCE
Status: COMPLETED | OUTPATIENT
Start: 2022-09-25 | End: 2022-09-25

## 2022-09-25 RX ORDER — METHYLPREDNISOLONE SODIUM SUCCINATE 125 MG/2ML
125 INJECTION, POWDER, LYOPHILIZED, FOR SOLUTION INTRAMUSCULAR; INTRAVENOUS ONCE
Status: COMPLETED | OUTPATIENT
Start: 2022-09-25 | End: 2022-09-25

## 2022-09-25 RX ORDER — ALBUTEROL SULFATE 90 UG/1
2 AEROSOL, METERED RESPIRATORY (INHALATION) EVERY 4 HOURS PRN
Qty: 8 G | Refills: 0 | Status: SHIPPED | OUTPATIENT
Start: 2022-09-25 | End: 2023-09-08

## 2022-09-25 RX ORDER — IPRATROPIUM BROMIDE AND ALBUTEROL SULFATE 2.5; .5 MG/3ML; MG/3ML
3 SOLUTION RESPIRATORY (INHALATION) ONCE
Status: COMPLETED | OUTPATIENT
Start: 2022-09-25 | End: 2022-09-25

## 2022-09-25 RX ORDER — LORAZEPAM 2 MG/ML
0.5 INJECTION INTRAMUSCULAR ONCE
Status: COMPLETED | OUTPATIENT
Start: 2022-09-25 | End: 2022-09-25

## 2022-09-25 RX ORDER — SODIUM CHLORIDE 9 MG/ML
INJECTION, SOLUTION INTRAVENOUS CONTINUOUS
Status: DISCONTINUED | OUTPATIENT
Start: 2022-09-25 | End: 2022-09-26 | Stop reason: HOSPADM

## 2022-09-25 RX ORDER — LORAZEPAM 0.5 MG/1
0.5 TABLET ORAL ONCE
Status: COMPLETED | OUTPATIENT
Start: 2022-09-25 | End: 2022-09-25

## 2022-09-25 RX ORDER — FAMOTIDINE 40 MG/1
40 TABLET, FILM COATED ORAL 2 TIMES DAILY
Qty: 60 TABLET | Refills: 0 | Status: SHIPPED | OUTPATIENT
Start: 2022-09-25 | End: 2022-09-30

## 2022-09-25 RX ORDER — SULFAMETHOXAZOLE/TRIMETHOPRIM 800-160 MG
1 TABLET ORAL 2 TIMES DAILY
Qty: 10 TABLET | Refills: 0 | Status: SHIPPED | OUTPATIENT
Start: 2022-09-25 | End: 2022-09-30

## 2022-09-25 RX ORDER — SULFAMETHOXAZOLE/TRIMETHOPRIM 800-160 MG
1 TABLET ORAL ONCE
Status: COMPLETED | OUTPATIENT
Start: 2022-09-25 | End: 2022-09-25

## 2022-09-25 RX ORDER — SACCHAROMYCES BOULARDII 250 MG
250 CAPSULE ORAL ONCE
Status: COMPLETED | OUTPATIENT
Start: 2022-09-25 | End: 2022-09-25

## 2022-09-25 RX ORDER — SACCHAROMYCES BOULARDII 250 MG
250 CAPSULE ORAL 2 TIMES DAILY
Qty: 10 CAPSULE | Refills: 0 | Status: SHIPPED | OUTPATIENT
Start: 2022-09-25 | End: 2022-09-30

## 2022-09-25 RX ORDER — SACCHAROMYCES BOULARDII 250 MG
CAPSULE ORAL
Status: COMPLETED
Start: 2022-09-25 | End: 2022-09-25

## 2022-09-25 RX ADMIN — Medication 250 MG: at 22:48

## 2022-09-25 RX ADMIN — LIDOCAINE HYDROCHLORIDE 30 ML: 20 SOLUTION ORAL; TOPICAL at 22:29

## 2022-09-25 RX ADMIN — SULFAMETHOXAZOLE AND TRIMETHOPRIM 1 TABLET: 800; 160 TABLET ORAL at 22:28

## 2022-09-25 RX ADMIN — METHYLPREDNISOLONE SODIUM SUCCINATE 125 MG: 125 INJECTION, POWDER, FOR SOLUTION INTRAMUSCULAR; INTRAVENOUS at 20:14

## 2022-09-25 RX ADMIN — LORAZEPAM 0.5 MG: 2 INJECTION, SOLUTION INTRAMUSCULAR; INTRAVENOUS at 21:43

## 2022-09-25 RX ADMIN — LORAZEPAM 0.5 MG: 2 INJECTION, SOLUTION INTRAMUSCULAR; INTRAVENOUS at 22:29

## 2022-09-25 RX ADMIN — IPRATROPIUM BROMIDE AND ALBUTEROL SULFATE 3 ML: 2.5; .5 SOLUTION RESPIRATORY (INHALATION) at 20:57

## 2022-09-25 RX ADMIN — SODIUM CHLORIDE: 9 INJECTION, SOLUTION INTRAVENOUS at 20:14

## 2022-09-25 RX ADMIN — BUDESONIDE INHALATION 0.5 MG: 0.5 SUSPENSION RESPIRATORY (INHALATION) at 20:59

## 2022-09-25 RX ADMIN — LORAZEPAM 0.5 MG: 0.5 TABLET ORAL at 21:17

## 2022-09-25 ASSESSMENT — ENCOUNTER SYMPTOMS
AGITATION: 0
ABDOMINAL PAIN: 0
DIZZINESS: 0
FACIAL SWELLING: 0
FEVER: 1
NAUSEA: 0
BACK PAIN: 0
VOMITING: 0
SHORTNESS OF BREATH: 1
NECK PAIN: 0
ACTIVITY CHANGE: 0
SORE THROAT: 0
FLANK PAIN: 0
SEIZURES: 0
WHEEZING: 0
CHILLS: 0
COUGH: 1
CONFUSION: 0
EYE PAIN: 0
TREMORS: 0
LIGHT-HEADEDNESS: 1
STRIDOR: 0
APPETITE CHANGE: 0

## 2022-09-25 ASSESSMENT — ACTIVITIES OF DAILY LIVING (ADL): ADLS_ACUITY_SCORE: 35

## 2022-09-25 NOTE — ED TRIAGE NOTES
Seen on Friday for chest pain, negative workup. Increasing shortness of breath.  Using a nebulizer at home.

## 2022-09-26 ENCOUNTER — MYC MEDICAL ADVICE (OUTPATIENT)
Dept: FAMILY MEDICINE | Facility: OTHER | Age: 62
End: 2022-09-26

## 2022-09-26 NOTE — ED NOTES
Pt presents with SOB and a cough that started at 1230 last night.  Pt states that she just wasn't feeling well today so she was checking her O2 sats which she reports ranged from 84%-96%.  Pt states that her daughter brought a nebulizer over for her which helped briefly.

## 2022-09-26 NOTE — ED PROVIDER NOTES
"  History     Chief Complaint   Patient presents with     Shortness of Breath     HPI  Sadaf MCMAHAN Latendresse is a 62 year old female who reports that she was seen last Friday for a headache as well as chest pain.  Her work-up was unremarkable.  She is here today with increasing shortness of breath that seem to start yesterday.  She reports that her daughter  had a nebulizer and she tried this but this did not seem to help either.  She does not have an MDI at home however at her last clinic visit her provider mentioned to the patient that she was going to prescribe her 1 but the patient never did receive one.  The patient states her shortness of breath and coughing spasm started at 1230 last night.  She just was not feeling well all day today.  She kept checking her SaO2 because she has a pulse oximetry device and it was between 84% at 96%.  She reports she is up-to-date on her COVID vaccination.  Denies any dizziness but some lightheadedness.  No sore throat.  No chest pain.  She does not appear to be in any distress.  Furthermore she states that she has had elevated temp at home in the 99.3 range.  Past medical history is extensive see her chart.  Pertinent past medical history is significant for COPD, she is not oxygen dependent, panlobular emphysema,  And pulmonary emphysema,    Allergies:  Allergies   Allergen Reactions     Aspirin Hives     Ibuprofen      Dye in the otc form causes headaches  \"patient states over the counter ibuprofen  bothers her\"     Lansoprazole Other (See Comments) and Visual Disturbance     Changes in eyesight  Prevacid  Change in eyesight     Red Dye Hives     Bupropion Rash     Bupropion Hcl Hives and Rash     Wellbutrin     Demerol [Meperidine] Other (See Comments)     Made migraine worse       Problem List:    Patient Active Problem List    Diagnosis Date Noted     Rheumatoid factor positive. MARISEL and anti CCP negative 06/21/2022     Priority: Medium     Moderate mixed hyperlipidemia " not requiring statin therapy 01/06/2022     Priority: Medium     Biliary dyskinesia 12/02/2021     Priority: Medium     Formatting of this note might be different from the original.  Added automatically from request for surgery [id number]       Concussion with brief LOC 01/16/2020     Priority: Medium     Adhesive capsulitis of left shoulder 12/06/2018     Priority: Medium     Pelvic floor dysfunction 05/01/2018     Priority: Medium     Other chronic gastritis without hemorrhage 02/26/2018     Priority: Medium     Calculus of kidney 07/11/2017     Priority: Medium     Pulmonary emphysema, unspecified emphysema type (H) 07/11/2017     Priority: Medium     Panlobular emphysema (H) 04/22/2017     Priority: Medium     Mild, noted on CT 4/21/17       ACP (advance care planning) 02/09/2017     Priority: Medium     Advance Care Planning 2/9/2017: ACP Review of Chart / Resources Provided:  Reviewed chart for advance care plan.  Sadaf MCMAHAN Timbrent has no plan or code status on file. Discussed available resources and provided with information. Confirmed code status reflects current choices pending further ACP discussions.  Confirmed/documented legally designated decision makers.  Added by Liudmila Rivera             Subacromial bursitis of right shoulder joint 01/26/2017     Priority: Medium     Acute back pain with sciatica 01/05/2017     Priority: Medium     Chronic migraine without aura without status migrainosus, not intractable 01/05/2017     Priority: Medium     Radicular pain 01/05/2017     Priority: Medium     Chronic radicular cervical pain 11/14/2016     Priority: Medium     Right arm       Mild episode of recurrent major depressive disorder (H) 11/14/2016     Priority: Medium     Ulnar neuropathy at elbow of left upper extremity 11/14/2016     Priority: Medium     Lumbar radiculopathy 11/14/2016     Priority: Medium     bilateral       S/P cervical spinal fusion 11/14/2016     Priority: Medium     Ulnar neuropathy  11/11/2016     Priority: Medium     Ulcer of esophagus without bleeding 10/14/2016     Priority: Medium     Gastroesophageal reflux disease with esophagitis 10/14/2016     Priority: Medium     Intractable chronic migraine without aura and without status migrainosus 10/14/2016     Priority: Medium     Intractable chronic migraine without aura and with status migrainosus 09/13/2016     Priority: Medium     Myofascial pain 05/09/2016     Priority: Medium     Disuse syndrome 02/12/2016     Priority: Medium     Chronic pain 02/05/2016     Priority: Medium     Uvulitis 01/22/2016     Priority: Medium     Chronic rhinitis 01/22/2016     Priority: Medium     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     Priority: Medium     Chronic maxillary sinusitis 01/22/2016     Priority: Medium     Hypertrophy of inferior nasal turbinate 01/22/2016     Priority: Medium     Long uvula 01/22/2016     Priority: Medium     improved       Chronic, continuous use of opioids 12/07/2015     Priority: Medium     Patient is followed by Henna Cruz MD for ongoing prescription of pain medication.  All refills should only be approved by this provider, or covering partner.    Medication(s): Norco 7.5/325mg.   Maximum quantity per month: #60  Clinic visit frequency required: Q 3 months     Controlled substance agreement:  Encounter-Level CSA - 07/11/2017:          Controlled Substance Agreement - Scan on 7/14/2017 12:56 PM : CONTROLLED SUBSTANCE AGREEMENT (below)              Pain Clinic evaluation in the past: No    DIRE Total Score(s):  No flowsheet data found.    Last [facility] website verification:  done on 7.10.17   [email or web address]         Chronic neck pain 10/05/2015     Priority: Medium     Chronic tension-type headache, intractable 10/05/2015     Priority: Medium     Migraine aura without headache 10/05/2015     Priority: Medium     History of arthrodesis 10/05/2015     Priority: Medium     Myofascial pain syndrome, cervical 09/17/2015      Priority: Medium     Depression, major 09/17/2015     Priority: Medium     Irritable bowel syndrome 01/26/2015     Priority: Medium     Thoracic sprain and strain 11/06/2013     Priority: Medium     Sprain and strain of shoulder and upper arm 11/06/2013     Priority: Medium     Pseudoarthrosis of cervical spine (H) 07/03/2012     Priority: Medium     Ulcer of esophagus 06/25/2012     Priority: Medium     Cervical pain 05/10/2012     Priority: Medium     With radicuopathy         Advanced care planning/counseling discussion 02/22/2012     Priority: Medium     Degenerative disc disease, cervical 01/01/2011     Priority: Medium     Migraine 07/09/2001     Priority: Medium     Problem list name updated by automated process. Provider to review          Past Medical History:    Past Medical History:   Diagnosis Date     ASCUS on Pap smear 05/26/2004     Atypical chest pain 05/26/2008     Bleeding ulcer 05/26/1976     Cervical radicular pain 05/10/2012     Degenerative disc disease, cervical 01/01/2011     Esophageal ulcer 05/26/1998     Irritable bowel syndrome 01/26/2015     Migraine headaches, severe 07/09/2001     Pseudoarthrosis of cervical spine 07/03/2012     Recurrent UTI 05/26/2011     sinusitis (chronic) 04/16/2001       Past Surgical History:    Past Surgical History:   Procedure Laterality Date     BACK SURGERY      cervical     Cervical spine surgery with removal of 2 disks, C5,C7       CHOLECYSTECTOMY  12/10/2021    [facility]     COLONOSCOPY  10/13/2014    Dr Camargo, internal hemorrhoids     COLONOSCOPY - HIM SCAN  02/09/2018    EGD and colonoscopy with Dr. Jennings     Coronary angiogram, normal  2003    Chest pain     ENT SURGERY      nose surgery x3     fusion discectomy anterior cervical  2011      ESOPHAGOSCOPY, DIAGNOSTIC  10/31/2014    Dr Camargo, mild erythema of antrum, negative biopsies     HEMORRHOIDECTOMY  12/10/2021    [facility]     HYSTERECTOMY TOTAL ABDOMINAL, SALPINGECTOMY Right 01/01/2002     Dr. Hathaway. Endometriosis.     HYSTERECTOMY, PAP NO LONGER INDICATED N/A 2002    Cervix removed.     IR CONSULTATION FOR IR EXAM  2020     IR FLUORO 0-1 HOUR  2020     NOSE SURGERY      x3            Posterior decompression , fusion C3-5      Psuedoarthrosis       Family History:    Family History   Problem Relation Age of Onset     Cancer Father         lung, also a heavy smoker     Diabetes Mother      Heart Disease Mother 58        MI; cause of death; heavy smoker. had first MI at 40     Coronary Artery Disease Mother      Hypertension Mother      Cancer Other         strong history     Heart Disease Other         heart disease     Heart Disease Brother         x3     Hypertension Brother      Diabetes Brother      Coronary Artery Disease Brother      Hypertension Sister        Social History:  Marital Status:   [4]  Social History     Tobacco Use     Smoking status: Former Smoker     Years: 8.00     Types: Cigarettes     Start date:      Quit date:      Years since quittin.7     Smokeless tobacco: Never Used   Substance Use Topics     Alcohol use: No     Drug use: No        Medications:    baclofen (LIORESAL) 10 MG tablet  butalbital-acetaminophen-caffeine (ESGIC) -40 MG tablet  Calcium Carbonate Antacid 1177 MG CHEW  Cholecalciferol (VITAMIN D3) 1000 UNITS CAPS  dexlansoprazole (DEXILANT) 60 MG CPDR CR capsule  diazepam (VALIUM) 5 MG tablet  docusate sodium (COLACE) 100 MG capsule  DULoxetine (CYMBALTA) 30 MG capsule  DULoxetine (CYMBALTA) 60 MG capsule  Erenumab-aooe (AIMOVIG SC)  HYDROcodone-acetaminophen (NORCO) 7.5-325 MG per tablet  hydrOXYzine (ATARAX) 25 MG tablet  ibuprofen (ADVIL/MOTRIN) 800 MG tablet  methocarbamol (ROBAXIN) 500 MG tablet  Multiple Vitamins-Minerals (CENTRUM SILVER ULTRA WOMENS) TABS  ondansetron (ZOFRAN-ODT) 4 MG ODT tab  pregabalin (LYRICA) 150 MG capsule  sucralfate (CARAFATE) 1 GM/10ML suspension          Review of Systems    Constitutional: Positive for fever. Negative for activity change, appetite change and chills.   HENT: Negative for facial swelling and sore throat.    Eyes: Negative for pain.   Respiratory: Positive for cough and shortness of breath. Negative for wheezing and stridor.    Cardiovascular: Negative for chest pain.   Gastrointestinal: Negative for abdominal pain, nausea and vomiting.   Genitourinary: Negative for flank pain.   Musculoskeletal: Negative for back pain and neck pain.   Skin: Negative for pallor.   Neurological: Positive for light-headedness. Negative for dizziness, tremors and seizures.   Psychiatric/Behavioral: Negative for agitation and confusion.   All other systems reviewed and are negative.      Physical Exam   BP: (!) 161/103  Pulse: 78  Temp: 98.1  F (36.7  C)  Resp: 20  SpO2: 96 %    Vitals:    09/25/22 1945 09/25/22 2000 09/25/22 2030 09/25/22 2100   BP: 142/93 101/69 144/86 129/85   Pulse:  90 83 86   Resp:       Temp:       SpO2: 98%  98% 100%       Physical Exam  Vitals and nursing note reviewed.   Constitutional:       General: She is not in acute distress.     Appearance: Normal appearance. She is not ill-appearing or toxic-appearing.   HENT:      Head: Normocephalic.      Nose: Nose normal.   Eyes:      General: No scleral icterus.     Extraocular Movements: Extraocular movements intact.   Neck:      Trachea: No tracheal deviation.   Cardiovascular:      Rate and Rhythm: Normal rate.   Pulmonary:      Effort: Pulmonary effort is normal. No respiratory distress.      Breath sounds: No stridor.      Comments: Lung sounds are clear but decreased throughout.  SaO2 is 96% on room air.  She does not appear to be in any respiratory distress.  No tachypnea.  Musculoskeletal:         General: Normal range of motion.      Cervical back: Normal range of motion.   Skin:     General: Skin is warm and dry.      Coloration: Skin is not jaundiced or pale.   Neurological:      General: No focal deficit  present.      Mental Status: She is alert and oriented to person, place, and time.   Psychiatric:         Attention and Perception: Attention normal.         Mood and Affect: Mood normal.         ED Course     EKG shows normal sinus rhythm with heart rate 85.    Results for orders placed or performed during the hospital encounter of 09/25/22 (from the past 24 hour(s))   CBC with Platelets & Differential    Narrative    The following orders were created for panel order CBC with Platelets & Differential.  Procedure                               Abnormality         Status                     ---------                               -----------         ------                     CBC with platelets and d...[[id number]]                      Final result                 Please view results for these tests on the individual orders.   CBC with platelets and differential   Result Value Ref Range    WBC Count 6.1 4.0 - 11.0 10e3/uL    RBC Count 4.56 3.80 - 5.20 10e6/uL    Hemoglobin 13.5 11.7 - 15.7 g/dL    Hematocrit 40.7 35.0 - 47.0 %    MCV 89 78 - 100 fL    MCH 29.6 26.5 - 33.0 pg    MCHC 33.2 31.5 - 36.5 g/dL    RDW 13.0 10.0 - 15.0 %    Platelet Count 198 150 - 450 10e3/uL    % Neutrophils 60 %    % Lymphocytes 28 %    % Monocytes 8 %    % Eosinophils 3 %    % Basophils 1 %    % Immature Granulocytes 0 %    NRBCs per 100 WBC 0 <1 /100    Absolute Neutrophils 3.7 1.6 - 8.3 10e3/uL    Absolute Lymphocytes 1.7 0.8 - 5.3 10e3/uL    Absolute Monocytes 0.5 0.0 - 1.3 10e3/uL    Absolute Eosinophils 0.2 0.0 - 0.7 10e3/uL    Absolute Basophils 0.1 0.0 - 0.2 10e3/uL    Absolute Immature Granulocytes 0.0 <=0.4 10e3/uL    Absolute NRBCs 0.0 10e3/uL   Comprehensive metabolic panel   Result Value Ref Range    Sodium 140 133 - 144 mmol/L    Potassium 3.7 3.4 - 5.3 mmol/L    Chloride 108 94 - 109 mmol/L    Carbon Dioxide (CO2) 28 20 - 32 mmol/L    Anion Gap 4 3 - 14 mmol/L    Urea Nitrogen 10 7 - 30 mg/dL    Creatinine 0.98 0.52 - 1.04  mg/dL    Calcium 8.8 8.5 - 10.1 mg/dL    Glucose 110 (H) 70 - 99 mg/dL    Alkaline Phosphatase 101 40 - 150 U/L    AST 22 0 - 45 U/L    ALT 32 0 - 50 U/L    Protein Total 7.3 6.8 - 8.8 g/dL    Albumin 3.5 3.4 - 5.0 g/dL    Bilirubin Total 0.3 0.2 - 1.3 mg/dL    GFR Estimate 65 >60 mL/min/1.73m2   Troponin I   Result Value Ref Range    Troponin I High Sensitivity 4 <54 ng/L   Magnesium   Result Value Ref Range    Magnesium 2.2 1.6 - 2.3 mg/dL   Nt probnp inpatient (BNP)   Result Value Ref Range    N terminal Pro BNP Inpatient 69 0 - 900 pg/mL   CRP inflammation   Result Value Ref Range    CRP Inflammation <2.9 0.0 - 8.0 mg/L   Symptomatic; Unknown Influenza A/B & SARS-CoV2 (COVID-19) Virus PCR Multiplex Nasopharyngeal    Specimen: Nasopharyngeal; Swab   Result Value Ref Range    Influenza A PCR Negative Negative    Influenza B PCR Negative Negative    RSV PCR Negative Negative    SARS CoV2 PCR Negative Negative    Narrative    Testing was performed using the Xpert Xpress CoV2/Flu/RSV Assay on the Cepheid GeneXpert Instrument. This test should be ordered for the detection of SARS-CoV-2 and influenza viruses in individuals who meet clinical and/or epidemiological criteria. Test performance is unknown in asymptomatic patients. This test is for in vitro diagnostic use under the FDA EUA for laboratories certified under CLIA to perform high or moderate complexity testing. This test has not been FDA cleared or approved. A negative result does not rule out the presence of PCR inhibitors in the specimen or target RNA in concentration below the limit of detection for the assay. If only one viral target is positive but coinfection with multiple targets is suspected, the sample should be re-tested with another FDA cleared, approved, or authorized test, if coinfection would change clinical management. This test was validated by the [facility]. These laboratories are certified under the Clinical  Laboratory  Improvement Amendments of 1988 (CLIA-88) as qualified to perform high complexity laboratory testing.   D dimer quantitative   Result Value Ref Range    D-Dimer Quantitative 0.40 0.00 - 0.50 ug/mL FEU    Narrative    This D-dimer assay is intended for use in conjunction with a clinical pretest probability assessment model to exclude pulmonary embolism (PE) and deep venous thrombosis (DVT) in outpatients suspected of PE or DVT. The cut-off value is 0.50 ug/mL FEU.   Lactic acid whole blood   Result Value Ref Range    Lactic Acid 1.7 0.7 - 2.0 mmol/L   Blood gas arterial and oxyhgb   Result Value Ref Range    pH Arterial 7.44 7.35 - 7.45    pCO2 Arterial 37 35 - 45 mm Hg    pO2 Arterial 59 (L) 80 - 105 mm Hg    Bicarbonate Arterial 25 21 - 28 mmol/L    Oxyhemoglobin Arterial 91 (L) 92 - 100 %    Base Excess/Deficit (+/-) 1.3 -9.0 - 1.8 mmol/L    FIO2 21    UA with Microscopic reflex to Culture    Specimen: Urine, NOS   Result Value Ref Range    Color Urine Light Yellow Colorless, Straw, Light Yellow, Yellow    Appearance Urine Clear Clear    Glucose Urine Negative Negative mg/dL    Bilirubin Urine Negative Negative    Ketones Urine Negative Negative mg/dL    Specific Gravity Urine 1.009 1.003 - 1.035    Blood Urine Negative Negative    pH Urine 6.5 4.7 - 8.0    Protein Albumin Urine Negative Negative mg/dL    Urobilinogen Urine Normal Normal, 2.0 mg/dL    Nitrite Urine Negative Negative    Leukocyte Esterase Urine Moderate (A) Negative    Mucus Urine Present (A) None Seen /LPF    RBC Urine 1 <=2 /HPF    WBC Urine 7 (H) <=5 /HPF    Squamous Epithelials Urine 1 <=1 /HPF    Narrative    Urine Culture ordered based on laboratory criteria   Troponin I   Result Value Ref Range    Troponin I High Sensitivity 4 <54 ng/L       Medications   sodium chloride 0.9% infusion ( Intravenous Stopped 9/25/22 2250)   ipratropium - albuterol 0.5 mg/2.5 mg/3 mL (DUONEB) neb solution 3 mL (3 mLs Nebulization Given 9/25/22 2057)   budesonide  (PULMICORT) neb solution 0.5 mg (0.5 mg Nebulization Given 9/25/22 2059)   methylPREDNISolone sodium succinate (solu-MEDROL) injection 125 mg (125 mg Intravenous Given 9/25/22 2014)   LORazepam (ATIVAN) tablet 0.5 mg (0.5 mg Oral Given 9/25/22 2117)   LORazepam (ATIVAN) injection 0.5 mg (0.5 mg Intravenous Given 9/25/22 2143)   sulfamethoxazole-trimethoprim (BACTRIM DS) 800-160 MG per tablet 1 tablet (1 tablet Oral Given 9/25/22 2228)   saccharomyces boulardii (FLORASTOR) capsule 250 mg (250 mg Oral Given 9/25/22 2248)   lidocaine (viscous) (XYLOCAINE) 2 % 15 mL, alum & mag hydroxide-simethicone (MAALOX) 15 mL GI Cocktail (30 mLs Oral Given 9/25/22 2229)   LORazepam (ATIVAN) injection 0.5 mg (0.5 mg Intravenous Given 9/25/22 2229)       Assessments & Plan (with Medical Decision Making)     I have reviewed the nursing notes.    I have reviewed the findings, diagnosis, plan and need for follow up with the patient.      New Prescriptions    ALBUTEROL (PROAIR HFA/PROVENTIL HFA/VENTOLIN HFA) 108 (90 BASE) MCG/ACT INHALER    Inhale 2 puffs into the lungs every 4 hours as needed for shortness of breath / dyspnea or wheezing    FAMOTIDINE (PEPCID) 40 MG TABLET    Take 1 tablet (40 mg) by mouth 2 times daily for 30 days    SACCHAROMYCES BOULARDII (FLORASTOR) 250 MG CAPSULE    Take 1 capsule (250 mg) by mouth 2 times daily for 5 days    SULFAMETHOXAZOLE-TRIMETHOPRIM (BACTRIM DS) 800-160 MG TABLET    Take 1 tablet by mouth 2 times daily for 5 days       Final diagnoses:   COPD exacerbation (H)   Acute bronchospasm   Atypical chest pain   Ulcer of esophagus without bleeding   Epigastric pain   Anxiety attack   Urinary tract infection without hematuria, site unspecified     Sadaf MARJ Latendresse is a 62 year old female who reports that she was seen last Friday for a headache as well as chest pain.  Her work-up was unremarkable.  She is here today with increasing shortness of breath that seem to start yesterday.  She reports  that her daughter  had a nebulizer and she tried this but this did not seem to help either.  She does not have an MDI at home however at her last clinic visit her provider mentioned to the patient that she was going to prescribe her 1 but the patient never did receive one.  The patient states her shortness of breath and coughing spasm started at 1230 last night.  She just was not feeling well all day today.  She kept checking her SaO2 because she has a pulse oximetry device and it was between 84% at 96%.  She reports she is up-to-date on her COVID vaccination.  Denies any dizziness but some lightheadedness.  No sore throat.  No chest pain.  She does not appear to be in any distress.  Furthermore she states that she has had elevated temp at home in the 99.3 range.  However here her temp is 98.1.  Differential diagnosis for SOB include but are not limited to: asthma, COPD exacerbation, bronchitis, pulmonary edema, acute coronary syndromes, pulmonary embolism, URI's, pneumonia, and pneumothorax.  Afebrile.  Vital signs stable, but initial blood pressures elevated 161/103.  SaO2 is 96% on room air.  Physical exam is essentially unremarkable.  She does not appear to be in any respiratory distress.  IV established and she was given fluids and Solu-Medrol initially as well as a DuoNeb with Pulmicort.  EKG shows normal sinus rhythm with heart rate 85.  Initial troponin is normal.  BNP is normal.  D-dimer is normal at 0.4.  CRP is normal.  CBC shows normal white blood cells no left shift.  Magnesium is normal at 2.2.  Lactic acid is normal at 1.7.  Blood cultures are pending.  CMP is unremarkable.  The patient's ABG shows a pH of 7.44, PCO2 is 37, PO2 is 59, HCO3 is 25, base excess is 1.3, and her oxyhemoglobin is 91 with FiO2 of 21.  Patient's UA returns and she has a moderate amount of leukocyte Estrace and 7 white blood cells.  When I discussed these findings with the patient she reports that she has been having some  burning with urination which is unusual for her.  She was given Bactrim orally in the ER.  She was given Florastor as well.  The patient reports she was having some chest pain with the albuterol.  She has had an albuterol MDI in the past with no issues.  We will recheck her troponin but most likely this is anxiety related since she rapidly admits she has anxiety.  She was given Ativan IV.  Her 90-minute troponin returns normal reassurance was given.  She was given a GI cocktail which seemed to help with her symptoms.  COPD exacerbation.  Acute bronchospasm.  Atypical chest pain.  Esophageal ulcer.  Epigastric pain.  Anxiety reaction.  And UTI.  Rx for albuterol MDI, Pepcid, Bactrim and Florastor to prevent GI upset.  The patient has close follow-up arranged with her primary care provider next week.  Continue to monitor symptoms return if there is any concerns for further evaluation as needed.  I explained my diagnostic considerations and recommendations and the patient voiced an understanding and was in agreement with the treatment plan. All questions were answered to the best of my ability.  We discussed potential side effects of any prescribed or recommended therapies, as well as expectations for response to treatments.          9/25/2022   [facility]     Vadim Figueroa PA-C  09/25/22 [id number]

## 2022-09-27 ENCOUNTER — TRANSFERRED RECORDS (OUTPATIENT)
Dept: HEALTH INFORMATION MANAGEMENT | Facility: CLINIC | Age: 62
End: 2022-09-27

## 2022-09-28 LAB — BACTERIA UR CULT: NO GROWTH

## 2022-09-30 ENCOUNTER — OFFICE VISIT (OUTPATIENT)
Dept: FAMILY MEDICINE | Facility: OTHER | Age: 62
End: 2022-09-30
Attending: FAMILY MEDICINE
Payer: MEDICARE

## 2022-09-30 VITALS
HEART RATE: 85 BPM | SYSTOLIC BLOOD PRESSURE: 130 MMHG | OXYGEN SATURATION: 97 % | TEMPERATURE: 98.1 F | DIASTOLIC BLOOD PRESSURE: 84 MMHG | WEIGHT: 195.5 LBS | BODY MASS INDEX: 31.55 KG/M2 | RESPIRATION RATE: 18 BRPM

## 2022-09-30 DIAGNOSIS — F33.1 MODERATE EPISODE OF RECURRENT MAJOR DEPRESSIVE DISORDER (H): ICD-10-CM

## 2022-09-30 DIAGNOSIS — G89.29 CHRONIC NECK PAIN: ICD-10-CM

## 2022-09-30 DIAGNOSIS — K21.00 GASTROESOPHAGEAL REFLUX DISEASE WITH ESOPHAGITIS WITHOUT HEMORRHAGE: ICD-10-CM

## 2022-09-30 DIAGNOSIS — M54.2 CHRONIC NECK PAIN: ICD-10-CM

## 2022-09-30 DIAGNOSIS — M54.2 CERVICAL PAIN: Primary | ICD-10-CM

## 2022-09-30 DIAGNOSIS — J43.1 PANLOBULAR EMPHYSEMA (H): ICD-10-CM

## 2022-09-30 PROCEDURE — G0463 HOSPITAL OUTPT CLINIC VISIT: HCPCS | Performed by: FAMILY MEDICINE

## 2022-09-30 PROCEDURE — 99214 OFFICE O/P EST MOD 30 MIN: CPT | Performed by: FAMILY MEDICINE

## 2022-09-30 RX ORDER — FAMOTIDINE 40 MG/1
40 TABLET, FILM COATED ORAL AT BEDTIME
Qty: 30 TABLET | Refills: 0 | Status: SHIPPED | OUTPATIENT
Start: 2022-09-30 | End: 2022-10-20

## 2022-09-30 RX ORDER — METHOCARBAMOL 500 MG/1
TABLET, FILM COATED ORAL
Qty: 60 TABLET | Refills: 2 | Status: SHIPPED | OUTPATIENT
Start: 2022-09-30 | End: 2023-06-15

## 2022-09-30 RX ORDER — BUDESONIDE AND FORMOTEROL FUMARATE DIHYDRATE 80; 4.5 UG/1; UG/1
2 AEROSOL RESPIRATORY (INHALATION) 2 TIMES DAILY
Qty: 10.2 G | Refills: 1 | Status: SHIPPED | OUTPATIENT
Start: 2022-09-30

## 2022-09-30 RX ORDER — SACCHAROMYCES BOULARDII 250 MG
CAPSULE ORAL
COMMUNITY
Start: 2022-09-26 | End: 2022-10-07

## 2022-09-30 RX ORDER — HYDROCODONE BITARTRATE AND ACETAMINOPHEN 7.5; 325 MG/1; MG/1
1 TABLET ORAL 2 TIMES DAILY PRN
Qty: 60 TABLET | Refills: 0 | Status: SHIPPED | OUTPATIENT
Start: 2022-09-30 | End: 2022-11-17

## 2022-09-30 ASSESSMENT — ENCOUNTER SYMPTOMS
ARTHRALGIAS: 1
FEVER: 0
PALPITATIONS: 0
DIFFICULTY URINATING: 0
NERVOUS/ANXIOUS: 1
DYSPHORIC MOOD: 1
CHILLS: 0
ABDOMINAL PAIN: 0
SHORTNESS OF BREATH: 0
BACK PAIN: 1

## 2022-09-30 ASSESSMENT — PAIN SCALES - GENERAL: PAINLEVEL: MILD PAIN (2)

## 2022-09-30 NOTE — PROGRESS NOTES
Assessment & Plan     Cervical pain / Chronic neck pain  [place] PDMP reviewed and appropriate   - c/w methocarbamol (ROBAXIN) 500 MG tablet; TAKE 1 TABLET (500 MG) BY MOUTH 4 TIMES DAILY AS NEEDED FOR MUSCLE SPASMS PATIENT TAKES AS NEEDED.  - c/w HYDROcodone-acetaminophen (NORCO) 7.5-325 MG per tablet; Take 1 tablet by mouth 2 times daily as needed for severe pain  - c/w lyrica, baclofen, cymbalta    Panlobular emphysema (H)  Consideration for asthma underlying. Limited smoking history   - General PFT Lab (Please always keep checked); Future  - Pulmonary Function Test; Future  - General PFT Lab (Please always keep checked)  - start budesonide-formoterol (SYMBICORT) 80-4.5 MCG/ACT Inhaler; Inhale 2 puffs into the lungs 2 times daily    Moderate episode of recurrent major depressive disorder (H)  Follows with Dr. Herbert with next visit 10/7/2022  - Tesha would like to go off cymbalta and restart her prozac     Gastroesophageal reflux disease with esophagitis without hemorrhage  - famotidine (PEPCID) 40 MG tablet; Take 1 tablet (40 mg) by mouth At Bedtime  - c/w dexlansoprazole     See Patient Instructions    Return in about 2 months (around 11/30/2022) for recheck .    Henna Moyer MD  [facility] - DELIA Parkinson is a 62 year old, presenting for the following health issues:  Depression, Anxiety, ER F/U, and Pain      HPI     ED/UC Followup:    Facility:  [facility] [place]  Date of visit: 09/25/2022   Reason for visit: COPD EXACERBATION  Current Status: Doing better, using her inhaler, and UTI is much better  - Tesha is wondering if symbicort would help her respiratory issues   - no h/o PFTs    Depression and Anxiety Follow-Up    How are you doing with your depression since your last visit? No change    How are you doing with your anxiety since your last visit?  No change    Are you having other symptoms that might be associated with depression or anxiety? Yes:  pain    Have you had a significant  life event? Health Concerns     Do you have any concerns with your use of alcohol or other drugs? No    Social History     Tobacco Use     Smoking status: Former Smoker     Years: 8.00     Types: Cigarettes     Start date:      Quit date:      Years since quittin.7     Smokeless tobacco: Never Used   Substance Use Topics     Alcohol use: No     Drug use: No     PHQ 2/15/2022 2022 2022   PHQ-9 Total Score 2 16 9   Q9: Thoughts of better off dead/self-harm past 2 weeks Not at all Not at all Not at all     ESTRELLA-7 SCORE 2/15/2022 2022 2022   Total Score 2 18 8     Last PHQ-9 2022   1.  Little interest or pleasure in doing things 1   2.  Feeling down, depressed, or hopeless 1   3.  Trouble falling or staying asleep, or sleeping too much 1   4.  Feeling tired or having little energy 1   5.  Poor appetite or overeating 3   6.  Feeling bad about yourself 1   7.  Trouble concentrating 0   8.  Moving slowly or restless 1   Q9: Thoughts of better off dead/self-harm past 2 weeks 0   PHQ-9 Total Score 9   Difficulty at work, home, or with people -     ESTRELLA-7  2022   1. Feeling nervous, anxious, or on edge 1   2. Not being able to stop or control worrying 1   3. Worrying too much about different things 1   4. Trouble relaxing 2   5. Being so restless that it is hard to sit still 1   6. Becoming easily annoyed or irritable 1   7. Feeling afraid, as if something awful might happen 1   ESTRELLA-7 Total Score 8   If you checked any problems, how difficult have they made it for you to do your work, take care of things at home, or get along with other people? -       [id number]}  Pain History:  When did you first notice your pain? - Chronic Pain   Have you seen this provider for your pain in the past?   Yes   Where in your body do you have pain? Joints, neck and shoulder  Are you seeing anyone else for your pain? No    PHQ-9 SCORE 2/15/2022 2022 2022   PHQ-9 Total Score 2 16 9       ESTRELLA-7 SCORE  2/15/2022 5/13/2022 8/2/2022   Total Score 2 18 8         PEG Score 5/13/2022 8/2/2022 9/30/2022   PEG Total Score 6 9 7.33       Chronic Pain Follow Up:    Location of pain: neck, joints  Analgesia/pain control:    - Recent changes:  No changes    - Overall control: Tolerable with discomfort    - Current treatments: norco, cymbalta, baclofen, methocarbamol, lyrica   Adherence:     - Do you ever take more pain medicine than prescribed? No    - When did you take your last dose of pain medicine?     Adverse effects: No     - [facility] orthopedics for left knee pain and got an injection (this week on Tuesday) w/ improvement   - MRI left knee (9/13/2022):   1. Tiny radial tear posterior horn medial meniscus.  2. Degenerative changes including chondromalacia over the lateral patellar facet.  3. Probable strain type injury involving the medial collateral ligament with mild increased signal intensity and mild surrounding fluid.    - was also put on solu medrol     PDMP Review       Value Time User    State PDMP site checked  Yes 8/3/2022 11:45 AM Henna Moyer MD        Last CSA Agreement:   CSA -- Patient Level:    Controlled Substance Agreement - Opioid - Scan on 9/7/2021 10:15 AM: OPIOD/OPIOD PLUS CONTROLLED SUBSTANCE AGREEMENT       Last UDS: 9/2/2021         Review of Systems   Constitutional: Negative for chills and fever.   HENT: Negative for congestion.    Respiratory: Negative for shortness of breath.    Cardiovascular: Positive for chest pain (w/ ER episode, resolved). Negative for palpitations.   Gastrointestinal: Negative for abdominal pain.   Genitourinary: Negative for difficulty urinating.   Musculoskeletal: Positive for arthralgias and back pain.   Psychiatric/Behavioral: Positive for dysphoric mood. The patient is nervous/anxious.           Objective    /84   Pulse 85   Temp 98.1  F (36.7  C) (Tympanic)   Resp 18   Wt 88.7 kg (195 lb 8 oz)   SpO2 97%   BMI 31.55 kg/m    Body mass  index is 31.55 kg/m .  Physical Exam  Constitutional:       General: She is not in acute distress.     Appearance: She is not ill-appearing.   Cardiovascular:      Rate and Rhythm: Normal rate and regular rhythm.      Heart sounds: No murmur heard.  Pulmonary:      Effort: Pulmonary effort is normal. No respiratory distress.      Breath sounds: No wheezing or rales.   Neurological:      Mental Status: She is alert.   Psychiatric:         Mood and Affect: Mood is anxious and depressed.        Admission on 09/25/2022, Discharged on 09/25/2022   Component Date Value Ref Range Status     WBC Count 09/25/2022 6.1  4.0 - 11.0 10e3/uL Final     RBC Count 09/25/2022 4.56  3.80 - 5.20 10e6/uL Final     Hemoglobin 09/25/2022 13.5  11.7 - 15.7 g/dL Final     Hematocrit 09/25/2022 40.7  35.0 - 47.0 % Final     MCV 09/25/2022 89  78 - 100 fL Final     MCH 09/25/2022 29.6  26.5 - 33.0 pg Final     MCHC 09/25/2022 33.2  31.5 - 36.5 g/dL Final     RDW 09/25/2022 13.0  10.0 - 15.0 % Final     Platelet Count 09/25/2022 198  150 - 450 10e3/uL Final     % Neutrophils 09/25/2022 60  % Final     % Lymphocytes 09/25/2022 28  % Final     % Monocytes 09/25/2022 8  % Final     % Eosinophils 09/25/2022 3  % Final     % Basophils 09/25/2022 1  % Final     % Immature Granulocytes 09/25/2022 0  % Final     NRBCs per 100 WBC 09/25/2022 0  <1 /100 Final     Absolute Neutrophils 09/25/2022 3.7  1.6 - 8.3 10e3/uL Final     Absolute Lymphocytes 09/25/2022 1.7  0.8 - 5.3 10e3/uL Final     Absolute Monocytes 09/25/2022 0.5  0.0 - 1.3 10e3/uL Final     Absolute Eosinophils 09/25/2022 0.2  0.0 - 0.7 10e3/uL Final     Absolute Basophils 09/25/2022 0.1  0.0 - 0.2 10e3/uL Final     Absolute Immature Granulocytes 09/25/2022 0.0  <=0.4 10e3/uL Final     Absolute NRBCs 09/25/2022 0.0  10e3/uL Final     Sodium 09/25/2022 140  133 - 144 mmol/L Final     Potassium 09/25/2022 3.7  3.4 - 5.3 mmol/L Final     Chloride 09/25/2022 108  94 - 109 mmol/L Final     Carbon  Dioxide (CO2) 09/25/2022 28  20 - 32 mmol/L Final     Anion Gap 09/25/2022 4  3 - 14 mmol/L Final     Urea Nitrogen 09/25/2022 10  7 - 30 mg/dL Final     Creatinine 09/25/2022 0.98  0.52 - 1.04 mg/dL Final     Calcium 09/25/2022 8.8  8.5 - 10.1 mg/dL Final     Glucose 09/25/2022 110 (A) 70 - 99 mg/dL Final     Alkaline Phosphatase 09/25/2022 101  40 - 150 U/L Final     AST 09/25/2022 22  0 - 45 U/L Final     ALT 09/25/2022 32  0 - 50 U/L Final     Protein Total 09/25/2022 7.3  6.8 - 8.8 g/dL Final     Albumin 09/25/2022 3.5  3.4 - 5.0 g/dL Final     Bilirubin Total 09/25/2022 0.3  0.2 - 1.3 mg/dL Final     GFR Estimate 09/25/2022 65  >60 mL/min/1.73m2 Final    Effective December 21, 2021 eGFRcr in adults is calculated using the 2021 CKD-EPI creatinine equation which includes age and gender (Anibal et al., NEJ, DOI: 10.1056/XMWKrn0866496)     D-Dimer Quantitative 09/25/2022 0.40  0.00 - 0.50 ug/mL FEU Final     Lactic Acid 09/25/2022 1.7  0.7 - 2.0 mmol/L Final     Troponin I High Sensitivity 09/25/2022 4  <54 ng/L Final    This Troponin-I result was obtained using a Siemens Dimension Vista High Sensitivity Troponin-I assay (TNIH). Effective 11/23/21, nine labs/sites in the [facility] switched from a Siemens [place] Contemporary Troponin I assay (CTNI) to a Siemens [place] High-Sensitivity Troponin I assay (TNIH).     Magnesium 09/25/2022 2.2  1.6 - 2.3 mg/dL Final     N terminal Pro BNP Inpatient 09/25/2022 69  0 - 900 pg/mL Final    Reference range shown and results flagged as abnormal are suggested inpatient cut points for confirming diagnosis if CHF in an acute setting. Establishing a baseline value for each individual patient is useful for follow-up. An inpatient or emergency department NT-proPBNP <300 pg/mL effectively rules out acute CHF, with 99% negative predictive value.    The outpatient non-acute reference range for ruling out CHF is:  0-125 pg/mL (age 18 to less than 75)  0-450 pg/mL (age 75 yrs and  older)      CRP Inflammation 09/25/2022 <2.9  0.0 - 8.0 mg/L Final     Color Urine 09/25/2022 Light Yellow  Colorless, Straw, Light Yellow, Yellow Final     Appearance Urine 09/25/2022 Clear  Clear Final     Glucose Urine 09/25/2022 Negative  Negative mg/dL Final     Bilirubin Urine 09/25/2022 Negative  Negative Final     Ketones Urine 09/25/2022 Negative  Negative mg/dL Final     Specific Gravity Urine 09/25/2022 1.009  1.003 - 1.035 Final     Blood Urine 09/25/2022 Negative  Negative Final     pH Urine 09/25/2022 6.5  4.7 - 8.0 Final     Protein Albumin Urine 09/25/2022 Negative  Negative mg/dL Final     Urobilinogen Urine 09/25/2022 Normal  Normal, 2.0 mg/dL Final     Nitrite Urine 09/25/2022 Negative  Negative Final     Leukocyte Esterase Urine 09/25/2022 Moderate (A) Negative Final     Mucus Urine 09/25/2022 Present (A) None Seen /LPF Final     RBC Urine 09/25/2022 1  <=2 /HPF Final     WBC Urine 09/25/2022 7 (A) <=5 /HPF Final     Squamous Epithelials Urine 09/25/2022 1  <=1 /HPF Final     Culture 09/25/2022 No growth after 2 days   Preliminary     Influenza A PCR 09/25/2022 Negative  Negative Final     Influenza B PCR 09/25/2022 Negative  Negative Final     RSV PCR 09/25/2022 Negative  Negative Final     SARS CoV2 PCR 09/25/2022 Negative  Negative Final    NEGATIVE: SARS-CoV-2 (COVID-19) RNA not detected, presumed negative.     Culture 09/25/2022 No growth after 2 days   Preliminary     pH Arterial 09/25/2022 7.44  7.35 - 7.45 Final     pCO2 Arterial 09/25/2022 37  35 - 45 mm Hg Final     pO2 Arterial 09/25/2022 59 (A) 80 - 105 mm Hg Final     Bicarbonate Arterial 09/25/2022 25  21 - 28 mmol/L Final     Oxyhemoglobin Arterial 09/25/2022 91 (A) 92 - 100 % Final     Base Excess/Deficit (+/-) 09/25/2022 1.3  -9.0 - 1.8 mmol/L Final     FIO2 09/25/2022 21   Final     Troponin I High Sensitivity 09/25/2022 4  <54 ng/L Final    This Troponin-I result was obtained using a Siemens Dimension Vista High Sensitivity  Troponin-I assay (TNIH). Effective 11/23/21, nine labs/sites in the [facility] switched from a Siemens [place] Contemporary Troponin I assay (CTNI) to a Siemens [place] High-Sensitivity Troponin I assay (TNIH).     Culture 09/25/2022 No Growth   Final

## 2022-09-30 NOTE — NURSING NOTE
"Chief Complaint   Patient presents with     Depression     Anxiety     ER F/U     Pain       Initial /84   Pulse 85   Temp 98.1  F (36.7  C) (Tympanic)   Resp 18   Wt 88.7 kg (195 lb 8 oz)   SpO2 97%   BMI 31.55 kg/m   Estimated body mass index is 31.55 kg/m  as calculated from the following:    Height as of 9/1/22: 1.676 m (5' 6\").    Weight as of this encounter: 88.7 kg (195 lb 8 oz).  Medication Reconciliation: complete  Araceli Keys LPN  "

## 2022-10-01 LAB
BACTERIA BLD CULT: NO GROWTH
BACTERIA BLD CULT: NO GROWTH

## 2022-10-04 ASSESSMENT — ENCOUNTER SYMPTOMS
CHILLS: 0
FEVER: 0
COUGH: 0
SHORTNESS OF BREATH: 0

## 2022-10-04 NOTE — ED PROVIDER NOTES
"  History     Chief Complaint   Patient presents with     Chest Pain     Headache     HPI  Sadaf MCMAHAN Latendresse is a 62 year old female who is here w/ chest pain.Gradual onset. Sternal to L arm and L side of neck. Sharp. Also w/ abd pain for about a week, intermittent. Some nausea but no vomiting. HA is her typical migraine but more persistent, not change in character otherwise. Nothing makes the pain better or worse. No pain/swelling of LE that is asymmetric.  No recent travel, sx or other immobility. No family h/o DVT/PE. Some h/o AAA.     Allergies:  Allergies   Allergen Reactions     Aspirin Hives     Ibuprofen      Dye in the otc form causes headaches  \"patient states over the counter ibuprofen  bothers her\"     Lansoprazole Other (See Comments) and Visual Disturbance     Changes in eyesight  Prevacid  Change in eyesight     Red Dye Hives     Bupropion Rash     Bupropion Hcl Hives and Rash     Wellbutrin     Demerol [Meperidine] Other (See Comments)     Made migraine worse       Problem List:    Patient Active Problem List    Diagnosis Date Noted     Rheumatoid factor positive. MARISEL and anti CCP negative 06/21/2022     Priority: Medium     Moderate mixed hyperlipidemia not requiring statin therapy 01/06/2022     Priority: Medium     Biliary dyskinesia 12/02/2021     Priority: Medium     Formatting of this note might be different from the original.  Added automatically from request for surgery [id number]       Concussion with brief LOC 01/16/2020     Priority: Medium     Adhesive capsulitis of left shoulder 12/06/2018     Priority: Medium     Pelvic floor dysfunction 05/01/2018     Priority: Medium     Other chronic gastritis without hemorrhage 02/26/2018     Priority: Medium     Calculus of kidney 07/11/2017     Priority: Medium     Pulmonary emphysema, unspecified emphysema type (H) 07/11/2017     Priority: Medium     Panlobular emphysema (H) 04/22/2017     Priority: Medium     Mild, noted on CT 4/21/17       " ACP (advance care planning) 02/09/2017     Priority: Medium     Advance Care Planning 2/9/2017: ACP Review of Chart / Resources Provided:  Reviewed chart for advance care plan.  Sadaf Blankenship has no plan or code status on file. Discussed available resources and provided with information. Confirmed code status reflects current choices pending further ACP discussions.  Confirmed/documented legally designated decision makers.  Added by Liudmila Rivera             Subacromial bursitis of right shoulder joint 01/26/2017     Priority: Medium     Acute back pain with sciatica 01/05/2017     Priority: Medium     Chronic migraine without aura without status migrainosus, not intractable 01/05/2017     Priority: Medium     Radicular pain 01/05/2017     Priority: Medium     Chronic radicular cervical pain 11/14/2016     Priority: Medium     Right arm       Mild episode of recurrent major depressive disorder (H) 11/14/2016     Priority: Medium     Ulnar neuropathy at elbow of left upper extremity 11/14/2016     Priority: Medium     Lumbar radiculopathy 11/14/2016     Priority: Medium     bilateral       S/P cervical spinal fusion 11/14/2016     Priority: Medium     Ulnar neuropathy 11/11/2016     Priority: Medium     Ulcer of esophagus without bleeding 10/14/2016     Priority: Medium     Gastroesophageal reflux disease with esophagitis 10/14/2016     Priority: Medium     Intractable chronic migraine without aura and without status migrainosus 10/14/2016     Priority: Medium     Intractable chronic migraine without aura and with status migrainosus 09/13/2016     Priority: Medium     Myofascial pain 05/09/2016     Priority: Medium     Disuse syndrome 02/12/2016     Priority: Medium     Chronic pain 02/05/2016     Priority: Medium     Uvulitis 01/22/2016     Priority: Medium     Chronic rhinitis 01/22/2016     Priority: Medium     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     Priority: Medium     Chronic maxillary sinusitis 01/22/2016      Priority: Medium     Hypertrophy of inferior nasal turbinate 01/22/2016     Priority: Medium     Long uvula 01/22/2016     Priority: Medium     improved       Chronic, continuous use of opioids 12/07/2015     Priority: Medium     Patient is followed by Henna Cruz MD for ongoing prescription of pain medication.  All refills should only be approved by this provider, or covering partner.    Medication(s): Norco 7.5/325mg.   Maximum quantity per month: #60  Clinic visit frequency required: Q 3 months     Controlled substance agreement:  Encounter-Level CSA - 07/11/2017:          Controlled Substance Agreement - Scan on 7/14/2017 12:56 PM : CONTROLLED SUBSTANCE AGREEMENT (below)              Pain Clinic evaluation in the past: No    DIRE Total Score(s):  No flowsheet data found.    Last [facility] website verification:  done on 7.10.17   [email or web address]         Chronic neck pain 10/05/2015     Priority: Medium     Chronic tension-type headache, intractable 10/05/2015     Priority: Medium     Migraine aura without headache 10/05/2015     Priority: Medium     History of arthrodesis 10/05/2015     Priority: Medium     Myofascial pain syndrome, cervical 09/17/2015     Priority: Medium     Depression, major 09/17/2015     Priority: Medium     Irritable bowel syndrome 01/26/2015     Priority: Medium     Thoracic sprain and strain 11/06/2013     Priority: Medium     Sprain and strain of shoulder and upper arm 11/06/2013     Priority: Medium     Pseudoarthrosis of cervical spine (H) 07/03/2012     Priority: Medium     Ulcer of esophagus 06/25/2012     Priority: Medium     Cervical pain 05/10/2012     Priority: Medium     With radicuopathy         Advanced care planning/counseling discussion 02/22/2012     Priority: Medium     Degenerative disc disease, cervical 01/01/2011     Priority: Medium     Migraine 07/09/2001     Priority: Medium     Problem list name updated by automated process. Provider to review           Past Medical History:    Past Medical History:   Diagnosis Date     ASCUS on Pap smear 2004     Atypical chest pain 2008     Bleeding ulcer 1976     Cervical radicular pain 05/10/2012     Degenerative disc disease, cervical 2011     Esophageal ulcer 1998     Irritable bowel syndrome 2015     Migraine headaches, severe 2001     Pseudoarthrosis of cervical spine 2012     Recurrent UTI 2011     sinusitis (chronic) 2001       Past Surgical History:    Past Surgical History:   Procedure Laterality Date     BACK SURGERY      cervical     Cervical spine surgery with removal of 2 disks, C5,C7       CHOLECYSTECTOMY  12/10/2021    [facility]     COLONOSCOPY  10/13/2014    Dr Camargo, internal hemorrhoids     COLONOSCOPY - HIM SCAN  2018    EGD and colonoscopy with Dr. Jennings     Coronary angiogram, normal      Chest pain     ENT SURGERY      nose surgery x3     fusion discectomy anterior cervical  2011     HC ESOPHAGOSCOPY, DIAGNOSTIC  10/31/2014    Dr Camargo, mild erythema of antrum, negative biopsies     HEMORRHOIDECTOMY  12/10/2021    [facility]     HYSTERECTOMY TOTAL ABDOMINAL, SALPINGECTOMY Right 2002    Dr. Hathaway. Endometriosis.     HYSTERECTOMY, PAP NO LONGER INDICATED N/A 2002    Cervix removed.     IR CONSULTATION FOR IR EXAM  2020     IR FLUORO 0-1 HOUR  2020     NOSE SURGERY      x3            Posterior decompression , fusion C3-5      Psuedoarthrosis       Family History:    Family History   Problem Relation Age of Onset     Cancer Father         lung, also a heavy smoker     Diabetes Mother      Heart Disease Mother 58        MI; cause of death; heavy smoker. had first MI at 40     Coronary Artery Disease Mother      Hypertension Mother      Cancer Other         strong history     Heart Disease Other         heart disease     Heart Disease Brother         x3     Hypertension Brother      Diabetes Brother       Coronary Artery Disease Brother      Hypertension Sister        Social History:  Marital Status:   [4]  Social History     Tobacco Use     Smoking status: Former Smoker     Years: 8.00     Types: Cigarettes     Start date:      Quit date:      Years since quittin.7     Smokeless tobacco: Never Used   Substance Use Topics     Alcohol use: No     Drug use: No        Medications:    albuterol (PROAIR HFA/PROVENTIL HFA/VENTOLIN HFA) 108 (90 Base) MCG/ACT inhaler  baclofen (LIORESAL) 10 MG tablet  budesonide-formoterol (SYMBICORT) 80-4.5 MCG/ACT Inhaler  butalbital-acetaminophen-caffeine (ESGIC) -40 MG tablet  Calcium Carbonate Antacid 1177 MG CHEW  Cholecalciferol (VITAMIN D3) 1000 UNITS CAPS  dexlansoprazole (DEXILANT) 60 MG CPDR CR capsule  diazepam (VALIUM) 5 MG tablet  docusate sodium (COLACE) 100 MG capsule  DULoxetine (CYMBALTA) 30 MG capsule  DULoxetine (CYMBALTA) 60 MG capsule  Erenumab-aooe (AIMOVIG SC)  famotidine (PEPCID) 40 MG tablet  FLORASTOR 250 MG capsule  HYDROcodone-acetaminophen (NORCO) 7.5-325 MG per tablet  hydrOXYzine (ATARAX) 25 MG tablet  ibuprofen (ADVIL/MOTRIN) 800 MG tablet  methocarbamol (ROBAXIN) 500 MG tablet  Multiple Vitamins-Minerals (CENTRUM SILVER ULTRA WOMENS) TABS  ondansetron (ZOFRAN-ODT) 4 MG ODT tab  pregabalin (LYRICA) 150 MG capsule  sucralfate (CARAFATE) 1 GM/10ML suspension          Review of Systems   Constitutional: Negative for chills and fever.   Respiratory: Negative for cough and shortness of breath.    All other systems reviewed and are negative.      Physical Exam   BP: 143/89  Pulse: 98  Temp: 99.6  F (37.6  C)  Resp: 20  SpO2: 95 %      Physical Exam  Constitutional:       General: She is not in acute distress.     Appearance: She is not diaphoretic.   HENT:      Head: Normocephalic and atraumatic.      Right Ear: External ear normal.      Left Ear: External ear normal.      Nose: No congestion or rhinorrhea.      Mouth/Throat:       Pharynx: Oropharynx is clear. No oropharyngeal exudate.   Eyes:      General: No scleral icterus.     Pupils: Pupils are equal, round, and reactive to light.   Cardiovascular:      Rate and Rhythm: Normal rate and regular rhythm.      Pulses:           Carotid pulses are 2+ on the right side and 2+ on the left side.       Radial pulses are 2+ on the right side and 2+ on the left side.        Dorsalis pedis pulses are 2+ on the right side and 2+ on the left side.        Posterior tibial pulses are 2+ on the right side and 2+ on the left side.      Heart sounds: Normal heart sounds.   Pulmonary:      Effort: No respiratory distress.      Breath sounds: Normal breath sounds.   Abdominal:      General: Bowel sounds are normal.      Palpations: Abdomen is soft.      Tenderness: There is no abdominal tenderness.   Musculoskeletal:         General: No tenderness.      Cervical back: Normal range of motion and neck supple.      Right lower leg: No edema.      Left lower leg: No edema.   Skin:     General: Skin is warm.      Capillary Refill: Capillary refill takes less than 2 seconds.      Findings: No rash.   Neurological:      Mental Status: Mental status is at baseline.      Cranial Nerves: No cranial nerve deficit.      Comments: Cn 2-12 intact, finger to nose quick and coordinated, gait steady, 5/5 strength to b/l UE and LE   Psychiatric:         Mood and Affect: Mood normal.         Behavior: Behavior normal.         ED Course                 Procedures    Results for orders placed during the hospital encounter of 09/23/22    POC US ECHO LIMITED    [facility] Procedure Note    Limited Bedside ED Cardiac Ultrasound:    PROCEDURE: PERFORMED BY: Dr. Oc Jose MD  INDICATIONS/SYMPTOM:  Chest Pain  PROBE: Cardiac phased array probe  BODY LOCATION: Chest  FINDINGS:  The ultrasound was performed utilizing the subcostal, parasternal long axis, parasternal short axis and apical 4 chamber views.  Cardiac  contractility:  Present  Gross estimation of cardiac kinesis: normal  Pericardial Effusion:  None  RV:LV ratio: LV > RV  INTERPRETATION:    Chamber size and motion were grossly normal with LV > RV, normal cardiac kinesis.  No pericardial effusion was found.  IVC visualized and findings indicate normovolemia.  IMAGE DOCUMENTATION: Images were archived to PACs system.            EKG Interpretation:      Interpreted by Oc Jose MD  Time reviewed:   Symptoms at time of EKG: chest pain   Rhythm: normal sinus   Rate: normal  Axis: normal  Ectopy: none  Conduction: normal  ST Segments/ T Waves: No ST-T wave changes  Q Waves: none  Comparison to prior:     Clinical Impression: normal EKG          Critical Care time:               No results found for this or any previous visit (from the past 24 hour(s)).    Medications   0.9% sodium chloride BOLUS (0 mLs Intravenous Stopped 9/23/22 2209)   ketorolac (TORADOL) injection 15 mg (15 mg Intravenous Given 9/23/22 2133)   droperidol (INAPSINE) injection 2.5 mg (2.5 mg Intravenous Given 9/23/22 2133)       Assessments & Plan (with Medical Decision Making)     I have reviewed the nursing notes.    I have reviewed the findings, diagnosis, plan and need for follow up with the patient.  63 yo f here w/ chest pain, HA and abd pain. No ttp to abd on exam so no CT scan. Two troponin wnl. EKG w/o ischemia. Given pain persisted to chest and absence of abnormal troponin and wnl ekg, unlikely cardiac ischemia. Migraine sx resolved after droperidol.    Discharge Medication List as of 9/23/2022 10:41 PM          Final diagnoses:   Other migraine without status migrainosus, not intractable   Chest pain, unspecified type       9/23/2022   [facility]     Oc Jose MD  10/04/22 [id number]

## 2022-10-05 ENCOUNTER — HOSPITAL ENCOUNTER (OUTPATIENT)
Dept: RESPIRATORY THERAPY | Facility: HOSPITAL | Age: 62
Discharge: HOME | End: 2022-10-05
Attending: FAMILY MEDICINE | Admitting: INTERNAL MEDICINE
Payer: MEDICARE

## 2022-10-05 DIAGNOSIS — G89.29 OTHER CHRONIC PAIN: ICD-10-CM

## 2022-10-05 DIAGNOSIS — J43.1 PANLOBULAR EMPHYSEMA (H): ICD-10-CM

## 2022-10-05 DIAGNOSIS — F33.1 MODERATE EPISODE OF RECURRENT MAJOR DEPRESSIVE DISORDER (H): ICD-10-CM

## 2022-10-05 PROCEDURE — 94726 PLETHYSMOGRAPHY LUNG VOLUMES: CPT

## 2022-10-05 PROCEDURE — 94726 PLETHYSMOGRAPHY LUNG VOLUMES: CPT | Mod: 26 | Performed by: INTERNAL MEDICINE

## 2022-10-05 PROCEDURE — 94010 BREATHING CAPACITY TEST: CPT | Mod: 26 | Performed by: INTERNAL MEDICINE

## 2022-10-05 PROCEDURE — 94729 DIFFUSING CAPACITY: CPT

## 2022-10-05 PROCEDURE — 94729 DIFFUSING CAPACITY: CPT | Mod: 26 | Performed by: INTERNAL MEDICINE

## 2022-10-05 PROCEDURE — 94010 BREATHING CAPACITY TEST: CPT

## 2022-10-05 RX ORDER — ALBUTEROL SULFATE 0.83 MG/ML
2.5 SOLUTION RESPIRATORY (INHALATION)
Status: DISCONTINUED | OUTPATIENT
Start: 2022-10-05 | End: 2022-10-06 | Stop reason: HOSPADM

## 2022-10-07 ENCOUNTER — VIRTUAL VISIT (OUTPATIENT)
Dept: PSYCHIATRY | Facility: OTHER | Age: 62
End: 2022-10-07
Attending: PSYCHIATRY & NEUROLOGY
Payer: MEDICARE

## 2022-10-07 DIAGNOSIS — F33.1 MAJOR DEPRESSIVE DISORDER, RECURRENT EPISODE, MODERATE (H): Primary | ICD-10-CM

## 2022-10-07 PROCEDURE — 99443 PR PHYSICIAN TELEPHONE EVALUATION 21-30 MIN: CPT | Mod: 93 | Performed by: PSYCHIATRY & NEUROLOGY

## 2022-10-07 RX ORDER — DULOXETIN HYDROCHLORIDE 30 MG/1
CAPSULE, DELAYED RELEASE ORAL
Qty: 60 CAPSULE | Refills: 0 | Status: SHIPPED | OUTPATIENT
Start: 2022-10-07 | End: 2022-10-20

## 2022-10-07 ASSESSMENT — PATIENT HEALTH QUESTIONNAIRE - PHQ9
5. POOR APPETITE OR OVEREATING: NEARLY EVERY DAY
SUM OF ALL RESPONSES TO PHQ QUESTIONS 1-9: 14

## 2022-10-07 ASSESSMENT — ANXIETY QUESTIONNAIRES
GAD7 TOTAL SCORE: 14
6. BECOMING EASILY ANNOYED OR IRRITABLE: NEARLY EVERY DAY
2. NOT BEING ABLE TO STOP OR CONTROL WORRYING: SEVERAL DAYS
5. BEING SO RESTLESS THAT IT IS HARD TO SIT STILL: NEARLY EVERY DAY
GAD7 TOTAL SCORE: 14
7. FEELING AFRAID AS IF SOMETHING AWFUL MIGHT HAPPEN: NOT AT ALL
1. FEELING NERVOUS, ANXIOUS, OR ON EDGE: NEARLY EVERY DAY
3. WORRYING TOO MUCH ABOUT DIFFERENT THINGS: SEVERAL DAYS

## 2022-10-07 ASSESSMENT — PAIN SCALES - GENERAL: PAINLEVEL: MODERATE PAIN (4)
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
## 2022-10-07 NOTE — NURSING NOTE
"Chief Complaint   Patient presents with     Follow Up     Telephone visit.  Depression, anxiety.  Medication review.  Patient would like to go off the Cymbalta.  C/o weight gain       Initial There were no vitals taken for this visit. Estimated body mass index is 31.55 kg/m  as calculated from the following:    Height as of 9/1/22: 1.676 m (5' 6\").    Weight as of 9/30/22: 88.7 kg (195 lb 8 oz).  Medication Reconciliation: complete  JANICE ROMO LPN    "

## 2022-10-07 NOTE — TELEPHONE ENCOUNTER
cymbalta      Last Written Prescription Date:  9/1/22  Last Fill Quantity: 180,   # refills: 2  Last Office Visit: 9/30/22  Future Office visit:    Next 5 appointments (look out 90 days)    Dec 01, 2022  1:00 PM  (Arrive by 12:45 PM)  SHORT with Henna Moyer MD  [facility] [place] ([facility] [place] ) [address]  [place] [address]  [phone]

## 2022-10-12 ENCOUNTER — TELEPHONE (OUTPATIENT)
Dept: FAMILY MEDICINE | Facility: OTHER | Age: 62
End: 2022-10-12

## 2022-10-12 NOTE — TELEPHONE ENCOUNTER
"Call placed to patient to discuss positive covid 19 results.   Patients states overall doing well with home management.  Will call tomorrow 10/13 to further discuss antiviral tx if symptoms increase or become unmanageable at home.  No further concerns at calls end.       Underlying Medical Conditions: Depression    Patient testing positive on 10/12/22     Test by:  Home test kit    Start of Symptoms: 10/11/22    Symptoms to include: cough, difficulty breathing with cough    Covid 19 Vaccination Status:  Fully boosted one booster    Are you pregnant, breastfeeding or trying to conceive?     Symptom Management:  Municex, Vicks Vapo rub, increased fluids,     Appointment Scheduled:      Pharmacy:     Confirmed with Pharmacy: Paxlovid & Molnupiravir in stock    Relayed instructions below. Patient relayed understanding. No further concerns with Quarantine guidelines.     Instructions for Patients  Your COVID-19 test was positive. This means you have the virus. Please follow the \"How can I take care of myself\" and \"How do I self-isolate?\" guidelines in these instructions.     What treatments are available?  Over-the-counter medicines may help with your symptoms such as runny or stuffy nose, cough, chills, and fever. Talk to your care team about your options.      Some people are at high risk for severe illness (for example if you have a weak immune system, you're 65 or older, or you have certain medical problems). If your risk it high and your symptoms started in the last 5 to 7 days, we strongly recommend for you to get COVID treatment as soon as possible before you get really sick. Paxlovid, Molnupiravir and the monoclonal antibody treatments are proven safe and effective, make you feel better faster, and prevent hospitalization and death.           What are the symptoms of COVID-19?  Symptoms can include fever, cough, shortness of breath, chills, headache, muscle pain sore throat, fatigue, runny or stuffy nose, and " loss of taste and smell. Other less common symptoms include nausea, vomiting, or diarrhea (watery stools).     Know when to call 911. Emergency warning signs include:    Trouble breathing or shortness of breath    Pain or pressure in the chest that doesn't go away    Feeling confused like you haven't felt before, or not being able to wake up    Bluish-colored lips or face     How can I take care of myself?  1. Get lots of rest. Drink extra fluids (unless a doctor has told you not to).  2. Take Tylenol (acetaminophen) for fever or pain. If you have liver or kidney problems, ask your family doctor if it's okay to take Tylenol              Adults:              650 mg (two 325 mg pills or tablets) every 4 to 6 hours, or...              1,000 mg (two 500 mg pills or tablets) every 8 hours as needed.  Note: Don't take more than 3,000 mg in one day. Acetaminophen is found in many medicines (both prescribed and over the counter). Read all labels to be sure you don't take too much.  For children, check the Tylenol bottle for the right dose. The dose is based on the child's age or weight.  3. Take over the counter medicines for your symptoms as needed. Talk to your pharmacist.  4. If you have other health problems (like cancer, heart failure, an organ transplant, or severe kidney disease): Call your specialty clinic if you don't feel better in the next 2 days.     These guidelines are for isolating and quarantining before returning to work, school or .     For employers, schools and day cares: This is an official notice for this person's medical guidelines for returning in-person.     For health care sites: The CDC gives different isolation and quarantine guidelines for healthcare sites, please check with these sites before arriving.      How do I self-isolate?  You isolate when you have symptoms of COVID or a test shows you have COVID, even if you don't have symptoms.   1. If you DO have symptoms:  ? Stay home and  away from others  ? For at least 5 days after your symptoms started, AND   ? You are fever free for 24 hours (with no medicine that reduces fever), AND  ? Your other symptoms are better.  ? Wear a mask for 10 full days any time you are around others.  2. If you DON'T have symptoms:  ? Stay at home and away from others for at least 5 days after your positive test.  ? Wear a mask for 10 full days any time you are around others.     How and when do I quarantine?  You quarantine when you may have been exposed to the virus and DON'T have symptoms.   1. Stay home and away from others.   2. You must quarantine for 5 days after your last contact with a person who has COVID.  ? Note: If you are fully vaccinated, you don't need to quarantine. You should still follow the steps below.   3. Wear a mask for 10 full days any time you're around others.  4. Get tested at least 5 days after you were exposed, even if you don't have symptoms.   5. If you start to have symptoms, isolate right away and get tested.     Where can I get more information?    [facility] COVID-19 Resource Hub: [email or web address]     CDC Quarantine & Isolation: https://www.cdc.gov/coronavirus/2019-ncov/your-health/quarantine-isolation.html     [facility] - What to Do If You're Sick: https://www.cdc.gov/coronavirus/2019-ncov/if-you-are-sick/index.html    [facility] clinical trials (COVID-19 research studies): [email or web address]/umn-clinical-trials    [place] Department of Health COVID-19 Public Hotline: 1-504.958.7153

## 2022-10-18 ENCOUNTER — MYC MEDICAL ADVICE (OUTPATIENT)
Dept: FAMILY MEDICINE | Facility: OTHER | Age: 62
End: 2022-10-18

## 2022-10-19 PROBLEM — E78.2 MODERATE MIXED HYPERLIPIDEMIA NOT REQUIRING STATIN THERAPY: Chronic | Status: ACTIVE | Noted: 2022-01-06

## 2022-10-19 PROBLEM — M75.02 ADHESIVE CAPSULITIS OF LEFT SHOULDER: Status: RESOLVED | Noted: 2018-12-06 | Resolved: 2022-10-19

## 2022-10-19 PROBLEM — S06.0X1A CONCUSSION WITH BRIEF LOC: Status: RESOLVED | Noted: 2020-01-16 | Resolved: 2022-10-19

## 2022-10-19 PROBLEM — J43.1 PANLOBULAR EMPHYSEMA (H): Chronic | Status: ACTIVE | Noted: 2017-04-22

## 2022-10-19 PROBLEM — J43.9 PULMONARY EMPHYSEMA, UNSPECIFIED EMPHYSEMA TYPE (H): Chronic | Status: ACTIVE | Noted: 2017-07-11

## 2022-10-19 PROBLEM — K82.8 BILIARY DYSKINESIA: Status: RESOLVED | Noted: 2021-12-02 | Resolved: 2022-10-19

## 2022-10-19 PROBLEM — M75.51 SUBACROMIAL BURSITIS OF RIGHT SHOULDER JOINT: Status: RESOLVED | Noted: 2017-01-26 | Resolved: 2022-10-19

## 2022-10-19 NOTE — TELEPHONE ENCOUNTER
Lvm for pt to adi back to schedule with covering provider; if pt calls please offer with dr machado on same day either tomorrow or friday

## 2022-10-20 ENCOUNTER — OFFICE VISIT (OUTPATIENT)
Dept: FAMILY MEDICINE | Facility: OTHER | Age: 62
End: 2022-10-20
Attending: STUDENT IN AN ORGANIZED HEALTH CARE EDUCATION/TRAINING PROGRAM
Payer: MEDICARE

## 2022-10-20 VITALS
TEMPERATURE: 98.8 F | BODY MASS INDEX: 31.02 KG/M2 | HEART RATE: 84 BPM | SYSTOLIC BLOOD PRESSURE: 116 MMHG | HEIGHT: 66 IN | WEIGHT: 193 LBS | OXYGEN SATURATION: 97 % | DIASTOLIC BLOOD PRESSURE: 64 MMHG | RESPIRATION RATE: 18 BRPM

## 2022-10-20 DIAGNOSIS — J01.01 ACUTE RECURRENT MAXILLARY SINUSITIS: Primary | ICD-10-CM

## 2022-10-20 DIAGNOSIS — J44.9 CHRONIC OBSTRUCTIVE PULMONARY DISEASE, UNSPECIFIED COPD TYPE (H): ICD-10-CM

## 2022-10-20 DIAGNOSIS — Z86.16 HISTORY OF COVID-19: ICD-10-CM

## 2022-10-20 DIAGNOSIS — K21.00 GASTROESOPHAGEAL REFLUX DISEASE WITH ESOPHAGITIS WITHOUT HEMORRHAGE: ICD-10-CM

## 2022-10-20 PROCEDURE — 99213 OFFICE O/P EST LOW 20 MIN: CPT | Performed by: STUDENT IN AN ORGANIZED HEALTH CARE EDUCATION/TRAINING PROGRAM

## 2022-10-20 PROCEDURE — G0463 HOSPITAL OUTPT CLINIC VISIT: HCPCS

## 2022-10-20 RX ORDER — DOXYCYCLINE 100 MG/1
100 CAPSULE ORAL 2 TIMES DAILY
Qty: 10 CAPSULE | Refills: 0 | Status: SHIPPED | OUTPATIENT
Start: 2022-10-20 | End: 2022-10-25

## 2022-10-20 RX ORDER — FAMOTIDINE 40 MG/1
40 TABLET, FILM COATED ORAL AT BEDTIME
Qty: 30 TABLET | Refills: 0 | Status: SHIPPED | OUTPATIENT
Start: 2022-10-20 | End: 2022-12-14

## 2022-10-20 ASSESSMENT — PAIN SCALES - GENERAL: PAINLEVEL: MILD PAIN (2)

## 2022-10-20 NOTE — PROGRESS NOTES
"  Assessment & Plan     Acute recurrent maxillary sinusitis  Chronic obstructive pulmonary disease, unspecified COPD type (H)  10-12 day history of progressive maxillary sinus infection in the setting of recurrent sinusitis.  Also reporting some thickened mucus with intermittent wheezing, may be very mild COPD flare in the setting of recent COVID-19 infection.  Vitals normal, exam reassuring, did not feel labs or CXR would  at this time.  Will treat with course of doxycycline, supportive cares, and follow-up as needed.  - doxycycline hyclate (VIBRAMYCIN) 100 MG capsule; Take 1 capsule (100 mg) by mouth 2 times daily for 5 days  - Symbicort, albuterol  - Mucinex, Robitussin, antihistamine  - Reviewed S/S that warrant reevaluation    History of COVID-19  Home test positive for COVID 10/12.  Symptoms fairly significant but did not require medical attention.  Suspect some of her post-COVID symptoms contributing to current presentation.  - Continue supportive cares    Gastroesophageal reflux disease with esophagitis without hemorrhage  Needs refill. Recently reordered by PCP but had issues at the pharmacy.  - famotidine (PEPCID) 40 MG tablet; Take 1 tablet (40 mg) by mouth At Bedtime     BMI:   Estimated body mass index is 31.15 kg/m  as calculated from the following:    Height as of this encounter: 1.676 m (5' 6\").    Weight as of this encounter: 87.5 kg (193 lb).   Weight management plan: Discussed healthy diet and exercise guidelines    Return if symptoms worsen or fail to improve.    Matt Resendez MD  [facility] [place] Parkinson is a 62 year old female with history of chronic pain, COPD, IBS, degenerative disc disease, depression presenting for the following health issues:  URI    HPI     Acute Illness  Acute illness concerns: Lingering s/s covid  Onset/Duration: 10/12/22  Symptoms:  Fever: No  Chills/Sweats: No  Headache (location?): YES  Sinus Pressure: " "YES  Conjunctivitis:  No  Ear Pain: no  Rhinorrhea: YES  Congestion: YES  Sore Throat: No  Cough: YES-productive of green sputum  Wheeze: YES  Decreased Appetite: YES  Nausea: YES  Vomiting: No  Diarrhea: YES  Dysuria/Freq.: No  Dysuria or Hematuria: No  Fatigue/Achiness: YES  Sick/Strep Exposure: No  Therapies tried and outcome: Mucinex tylenol cold and flu    -Developed URI symptoms 8 to 10 days ago, home COVID test + 10/12/2022  -Became acutely ill, stuck in bed with lack of energy, but did not seek medical evaluation  -Slightly improved over the following several days  -Has ongoing productive cough of green sputum  -Significant head congestion, maxillary sinus pain, postnasal  -No hemoptysis  -Mucinex and Robitussin providing some relief  -Using Symbicort, albuterol 3-4 times daily with some relief  -Does not particularly feel short of breath but has had increased episodes of wheezing  -History of chronic headaches, chronic sinusitis, recent COPD diagnosis  -Was initially having some nausea and diarrhea but this is gradually improved  -Energy level very slowly improving    Review of Systems   See HPI for pertinent positives and negatives.      Objective    /64 (BP Location: Right arm, Patient Position: Sitting, Cuff Size: Adult Regular)   Pulse 84   Temp 98.8  F (37.1  C) (Tympanic)   Resp 18   Ht 1.676 m (5' 6\")   Wt 87.5 kg (193 lb)   SpO2 97%   BMI 31.15 kg/m    Body mass index is 31.15 kg/m .  Physical Exam   GENERAL: healthy, alert and no distress  EYES: Eyes grossly normal to inspection, PERRL and conjunctivae and sclerae normal  HENT: ear canals and TM's normal, nose and mouth without ulcers or lesions, maxillary sinuses tender to palpation bilaterally  NECK: no adenopathy, no asymmetry, masses, or scars and thyroid normal to palpation  RESP: lungs overall clear with very few scattered expiratory wheeze, some accessory upper airway sounds  CV: regular rate and rhythm, normal S1 S2, no S3 or " S4, no murmur, click or rub, no peripheral edema and peripheral pulses strong  ABDOMEN: soft, nontender, no hepatosplenomegaly, no masses and bowel sounds normal  MS: no gross musculoskeletal defects noted, no edema  SKIN: no suspicious lesions or rashes  NEURO: Normal strength and tone, mentation intact and speech normal  PSYCH: mentation appears normal, affect normal/bright    No results found for this or any previous visit (from the past 24 hour(s)).

## 2022-10-20 NOTE — NURSING NOTE
"Chief Complaint   Patient presents with     URI       Initial /64 (BP Location: Right arm, Patient Position: Sitting, Cuff Size: Adult Regular)   Pulse 84   Temp 98.8  F (37.1  C) (Tympanic)   Resp 18   Ht 1.676 m (5' 6\")   Wt 87.5 kg (193 lb)   SpO2 97%   BMI 31.15 kg/m   Estimated body mass index is 31.15 kg/m  as calculated from the following:    Height as of this encounter: 1.676 m (5' 6\").    Weight as of this encounter: 87.5 kg (193 lb).  Medication Reconciliation: complete  Jenny Castro LPN  "

## 2022-10-22 ENCOUNTER — MYC MEDICAL ADVICE (OUTPATIENT)
Dept: FAMILY MEDICINE | Facility: OTHER | Age: 62
End: 2022-10-22

## 2022-10-24 ENCOUNTER — TRANSFERRED RECORDS (OUTPATIENT)
Dept: HEALTH INFORMATION MANAGEMENT | Facility: CLINIC | Age: 62
End: 2022-10-24

## 2022-11-09 ENCOUNTER — TRANSFERRED RECORDS (OUTPATIENT)
Dept: HEALTH INFORMATION MANAGEMENT | Facility: CLINIC | Age: 62
End: 2022-11-09

## 2022-11-11 ENCOUNTER — VIRTUAL VISIT (OUTPATIENT)
Dept: PSYCHIATRY | Facility: OTHER | Age: 62
End: 2022-11-11
Attending: PSYCHIATRY & NEUROLOGY
Payer: MEDICARE

## 2022-11-11 DIAGNOSIS — F33.1 MAJOR DEPRESSIVE DISORDER, RECURRENT EPISODE, MODERATE (H): Primary | ICD-10-CM

## 2022-11-11 PROCEDURE — 99443 PR PHYSICIAN TELEPHONE EVALUATION 21-30 MIN: CPT | Mod: 95 | Performed by: PSYCHIATRY & NEUROLOGY

## 2022-11-11 RX ORDER — FLUOXETINE 40 MG/1
40 CAPSULE ORAL DAILY
Qty: 30 CAPSULE | Refills: 3 | Status: SHIPPED | OUTPATIENT
Start: 2022-11-11 | End: 2023-03-24

## 2022-11-11 RX ORDER — FOLIC ACID 1 MG/1
1 TABLET ORAL DAILY
COMMUNITY
End: 2023-12-04

## 2022-11-11 ASSESSMENT — ANXIETY QUESTIONNAIRES
GAD7 TOTAL SCORE: 6
7. FEELING AFRAID AS IF SOMETHING AWFUL MIGHT HAPPEN: NOT AT ALL
6. BECOMING EASILY ANNOYED OR IRRITABLE: MORE THAN HALF THE DAYS
3. WORRYING TOO MUCH ABOUT DIFFERENT THINGS: SEVERAL DAYS
GAD7 TOTAL SCORE: 6
5. BEING SO RESTLESS THAT IT IS HARD TO SIT STILL: NOT AT ALL
1. FEELING NERVOUS, ANXIOUS, OR ON EDGE: SEVERAL DAYS
2. NOT BEING ABLE TO STOP OR CONTROL WORRYING: SEVERAL DAYS

## 2022-11-11 ASSESSMENT — PATIENT HEALTH QUESTIONNAIRE - PHQ9
SUM OF ALL RESPONSES TO PHQ QUESTIONS 1-9: 15
5. POOR APPETITE OR OVEREATING: SEVERAL DAYS

## 2022-11-11 ASSESSMENT — PAIN SCALES - GENERAL: PAINLEVEL: MODERATE PAIN (4)
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
## 2022-11-11 NOTE — NURSING NOTE
"Chief Complaint   Patient presents with     RECHECK     Telephone visit.  Depression, anxiety.  Medication review.  Patient feels she is doing alright with the Prozac, but would like to discuss a dose increase.       Initial There were no vitals taken for this visit. Estimated body mass index is 31.15 kg/m  as calculated from the following:    Height as of 10/20/22: 1.676 m (5' 6\").    Weight as of 10/20/22: 87.5 kg (193 lb).  Medication Reconciliation: complete  JANICE ROMO LPN    "

## 2022-11-15 ENCOUNTER — MYC MEDICAL ADVICE (OUTPATIENT)
Dept: FAMILY MEDICINE | Facility: OTHER | Age: 62
End: 2022-11-15

## 2022-11-15 DIAGNOSIS — F41.1 GAD (GENERALIZED ANXIETY DISORDER): ICD-10-CM

## 2022-11-16 DIAGNOSIS — G89.29 OTHER CHRONIC PAIN: Primary | Chronic | ICD-10-CM

## 2022-11-16 DIAGNOSIS — G89.29 CHRONIC NECK PAIN: ICD-10-CM

## 2022-11-16 DIAGNOSIS — M54.2 CHRONIC NECK PAIN: ICD-10-CM

## 2022-11-16 DIAGNOSIS — M79.18 MYOFASCIAL PAIN SYNDROME, CERVICAL: ICD-10-CM

## 2022-11-17 ENCOUNTER — NURSE TRIAGE (OUTPATIENT)
Dept: FAMILY MEDICINE | Facility: OTHER | Age: 62
End: 2022-11-17

## 2022-11-17 DIAGNOSIS — F41.1 GAD (GENERALIZED ANXIETY DISORDER): ICD-10-CM

## 2022-11-17 RX ORDER — DIAZEPAM 5 MG
5 TABLET ORAL EVERY 6 HOURS PRN
Qty: 30 TABLET | Refills: 0 | Status: CANCELLED | OUTPATIENT
Start: 2022-11-17

## 2022-11-17 RX ORDER — METHYLPREDNISOLONE 4 MG
TABLET, DOSE PACK ORAL
Qty: 21 TABLET | Refills: 0 | Status: SHIPPED | OUTPATIENT
Start: 2022-11-17 | End: 2022-12-22

## 2022-11-17 RX ORDER — HYDROCODONE BITARTRATE AND ACETAMINOPHEN 7.5; 325 MG/1; MG/1
1 TABLET ORAL 2 TIMES DAILY PRN
Qty: 60 TABLET | Refills: 0 | Status: SHIPPED | OUTPATIENT
Start: 2022-11-17 | End: 2023-01-13

## 2022-11-17 RX ORDER — PREGABALIN 150 MG/1
150 CAPSULE ORAL 2 TIMES DAILY
Qty: 180 CAPSULE | Refills: 0 | Status: SHIPPED | OUTPATIENT
Start: 2022-11-17 | End: 2023-04-27

## 2022-11-17 NOTE — TELEPHONE ENCOUNTER
Valium      Last Written Prescription Date:  9.26.2022  Last Fill Quantity: 30,   # refills: 0  Last Office Visit: 11.11.2022  Future Office visit:    Next 5 appointments (look out 90 days)    Dec 22, 2022 11:30 AM  (Arrive by 11:15 AM)  SHORT with Henna Moyer MD  [facility] ) [address] [place] [id number]  [phone]   Jan 23, 2023  9:20 AM  (Arrive by 9:05 AM)  Return Visit with Zoe Herbert MD  [facility] ([facility] ) [address] [place] [id number] [phone]           Routing refill request to provider for review/approval because:  Drug not on the [facility], [facility] or  Health refill protocol or controlled substance    Geeta Collazo RN

## 2022-11-17 NOTE — TELEPHONE ENCOUNTER
Pain medication, lyrica, and medrol dose pack signed.   If symptoms do not improve with above, will need to be seen.     Henna Moyer MD

## 2022-11-17 NOTE — TELEPHONE ENCOUNTER
YURY for call back.Will update on new orders below. If pt needs Valium will need to go thru [facility].    Update with call back.    Geeta Collazo RN

## 2022-11-17 NOTE — TELEPHONE ENCOUNTER
Valium is not on active medication list.   Review of Dr. Herbert's note does not mention valium  Last fill of valium was 9/26/2022 by Dr Castaneda.  Will defer to Dr. Keon Moyer MD

## 2022-11-17 NOTE — TELEPHONE ENCOUNTER
Pt calling and is leaving out of town this Saturday and will not be back until 12.6.2022. She is asking for these to be filled before Saturday.    She is also asking for prednisone d/t flare of her arthritis.    Norco  Last Written Prescription Date:  9.30.2022  Last Fill Quantity: 60,   # refills: 0  Last Office Visit: 10.20.2022  Future Office visit:    Next 5 appointments (look out 90 days)    Dec 22, 2022 11:30 AM  (Arrive by 11:15 AM)  SHORT with Henna Moyer MD  [facility] [place] ([facility] [place] ) [address]  [place] [address]  [phone]   Jan 23, 2023  9:20 AM  (Arrive by 9:05 AM)  Return Visit with Zoe Herbert MD  [facility] [place] ([facility] [place] ) [address] [place] [address]  [place] [address]  [phone]           Routing refill request to provider for review/approval because:  Drug not on the [facility] refill protocol or controlled substance    Valium     Last Written Prescription Date:  9.26.2022  Last Fill Quantity: 30,   # refills: 0  Last Office Visit:   Future Office visit:    Next 5 appointments (look out 90 days)    Dec 22, 2022 11:30 AM  (Arrive by 11:15 AM)  SHORT with Henna Moyer MD  [facility] [place] ([facility] [place] ) [address]  [place] [address]  [phone]   Jan 23, 2023  9:20 AM  (Arrive by 9:05 AM)  Return Visit with Zoe Herbert MD  [facility] [place] ([facility] [place] ) [address]  [place] [address]  [phone]           Routing refill request to provider for review/approval because:  Drug not on the [facility] refill protocol or controlled substance    Geeta Collazo RN

## 2022-11-17 NOTE — TELEPHONE ENCOUNTER
"Pt is having flare of her arthritis since Monday.Hands,feet, and hips. She is asking for a Prednisone taper. Pain severe. She states her Rheum MD pain has to go thru PCP.    Advised [facility] as PCP out of office.     She states she will wait until PCP back into office.    She states a [employer] message was sent 11.15.2022 and that day she was sobbing d/t pain.    Unsure if she will take care advice of UC.    Please advise.    Call back [phone]    Geeta Collazo RN    Reason for Disposition    [1] SEVERE pain (e.g., excruciating, unable to use hand at all) AND [2] not improved after 2 hours of pain medicine    Additional Information    Negative: [1] Similar pain previously AND [2] it was from \"heart attack\"    Negative: [1] Similar pain previously AND [2] it was from \"angina\" AND [3] not relieved by nitroglycerin    Negative: Sounds like a life-threatening emergency to the triager    Negative: Followed a hand or wrist injury    Negative: Chest pain    Negative: Caused by an animal bite    Negative: Caused by a human bite    Negative: Wound looks infected    Negative: Finger pain is main symptom    Negative: Arm pain is main symptom    Negative: [1] Swollen joint AND [2] fever    Negative: [1] Red area or streak AND [2] fever    Negative: Patient sounds very sick or weak to the triager    Negative: [1] Looks infected (spreading redness, pus) AND [2] large red area (> 2 in. or 5 cm)    Negative: Weakness (i.e., loss of strength) of new-onset in hand or fingers  (Exceptions: not truly weak, hand feels weak because of pain; weakness present > 2 weeks)    Negative: Numbness (i.e., loss of sensation) in hand or fingers (Exception: just tingling; numbness present > 2 weeks)    Negative: Looks like a boil, infected sore, deep ulcer or other infected rash (spreading redness, pus)    Negative: [1] Localized rash is very painful AND [2] no fever    Answer Assessment - Initial Assessment Questions  1. ONSET: \"When did the pain " "start?\"      Flare on Monday from arthritis  2. LOCATION: \"Where is the pain located?\"      Hands and feetmhips  3. PAIN: \"How bad is the pain?\" (Scale 1-10; or mild, moderate, severe)    - MILD (1-3): doesn't interfere with normal activities    - MODERATE (4-7): interferes with normal activities (e.g., work or school) or awakens from sleep    - SEVERE (8-10): excruciating pain, unable to use hand at all      8/10  4. WORK OR EXERCISE: \"Has there been any recent work or exercise that involved this part (i.e., hand or wrist) of the body?\"      no  5. CAUSE: \"What do you think is causing the pain?\"      arthrits  6. AGGRAVATING FACTORS: \"What makes the pain worse?\" (e.g., using computer)      no  7. OTHER SYMPTOMS: \"Do you have any other symptoms?\" (e.g., neck pain, swelling, rash, numbness, fever)      no  8. PREGNANCY: \"Is there any chance you are pregnant?\" \"When was your last menstrual period?\"      na    Protocols used: HAND AND WRIST PAIN-A-AH      "

## 2022-11-17 NOTE — TELEPHONE ENCOUNTER
Valium  Last Written Prescription Date:  09/26/2022  Last Fill Quantity: 30,   # refills: 0  Last Office Visit: 10/20/2022

## 2022-11-18 RX ORDER — DIAZEPAM 5 MG
5 TABLET ORAL DAILY PRN
Qty: 30 TABLET | Refills: 0 | Status: SHIPPED | OUTPATIENT
Start: 2022-11-18 | End: 2023-09-05

## 2022-11-19 ENCOUNTER — HEALTH MAINTENANCE LETTER (OUTPATIENT)
Age: 62
End: 2022-11-19

## 2022-12-12 NOTE — PROGRESS NOTES
"Sadaf Blankenship is a 59 year old female who is being evaluated via a telephone visit.      The patient has been notified of following (by TERRELL Gutierrez LPN     \"We have found that certain health care needs can be provided without the need for a physical exam.  This service lets us provide the care you need with a short phone conversation.  If a prescription is necessary we can send it directly to your pharmacy.  If lab work is needed we can place an order for that and you can then stop by our lab to have the test done at a later time.    This telephone visit will be conducted via 3 way call with the you (the patient) , the physician/provider, and a me all on the line at the same time.  This allows your physician/provider to have the phone conversation with you while I will be taking notes for your medical record.  We will have full access to your [facility] medical record during this entire phone call.    Since this is like an office visit,  will bill your insurance company for this service.  Please check with your medical insurance if this type of telephone/virtual is covered . You may be responsible for the cost of this service if insurance coverage is denied.  The typical cost is $30 (10min), $59(11-20min) and $85 (21-30min)     If during the course of the call the physician/provider feels a telephone visit is not appropriate, you will not be charged for this service\"    Consent has been obtained for this service by care team member: yes.  See the scanned image in the medical record.       Total time of call between patient and provider was 28 minutes     Zoe Herbert (MD signature)  ===================================================    I have reviewed the note as documented above.  This accurately captures the substance of my conversation with the patient,                " [de-identified] : 58 y/o male presents for annual wellness exam.  He has no significant past medical history other than benign colon polyps.  His last colonoscopy was in 2019 and that again revealed benign polyp.\par Has been generally well otherwise without any recent illness.

## 2022-12-21 NOTE — PROGRESS NOTES
Assessment & Plan     Moderate mixed hyperlipidemia not requiring statin therapy  Cholesterol panel elevated but risk of cardiac event at 2.8%. encourage dietary changes   - Lipid Profile (Chol, Trig, HDL, LDL calc)  - Comprehensive metabolic panel (BMP + Alb, Alk Phos, ALT, AST, Total. Bili, TP)    Moderate episode of recurrent major depressive disorder (H)  Stable. Tesha feels better off cymbalta and back on prozac  Follows with Dr Herbert with next visit 1/23/2022    Chronic, continuous use of opioids / Encounter for therapeutic drug level monitoring  - Drug Confirmation Panel Urine with Creat; Future  - Drug Confirmation Panel Urine with Creat  - okay to continue with baclofen, methocarbamol, norco, ibu    Need for prophylactic vaccination and inoculation against influenza  - INFLUENZA QUAD, RECOMBINANT, P-FREE (RIV4) (FLUBLOK)  - ADMIN INFLUENZA (For MEDICARE Patients ONLY) []    High priority for 2019-nCoV vaccine  - COVID-19,PF,PFIZER BOOSTER BIVALENT 12+Yrs    Rheumatoid factor positive. MARISEL and anti CCP negative  Follows with [facility] rheumatology, Dr. Francisco  Started on methotrexate with improvement of foot pain   - Tesha is also taking folic acid        See Patient Instructions    Return in about 3 months (around 3/22/2023).    Henna Moyer MD  [facility] - DELIA Parkinson is a 62 year old presenting for the following health issues:  Lipids, Anxiety, Depression, and Pain      HPI     Hyperlipidemia Follow-Up      Are you regularly taking any medication or supplement to lower your cholesterol?   No    Are you having muscle aches or other side effects that you think could be caused by your cholesterol lowering medication?  No      Depression and Anxiety Follow-Up    How are you doing with your depression since your last visit? No change    How are you doing with your anxiety since your last visit?  No change    Are you having other symptoms that might be associated  with depression or anxiety? No    Have you had a significant life event? Health Concerns     Do you have any concerns with your use of alcohol or other drugs? No    Social History     Tobacco Use     Smoking status: Former     Years: 8.00     Types: Cigarettes     Start date:      Quit date:      Years since quittin.9     Smokeless tobacco: Never   Vaping Use     Vaping Use: Never used   Substance Use Topics     Alcohol use: No     Drug use: No     PHQ 2022 10/7/2022 2022   PHQ-9 Total Score 9 14 15   Q9: Thoughts of better off dead/self-harm past 2 weeks Not at all Not at all Not at all     ESTRELLA-7 SCORE 2022 10/7/2022 2022   Total Score 8 14 6           Pain History:  When did you first notice your pain? - Chronic Pain   Have you seen this provider for your pain in the past?   Yes   Where in your body do you have pain? hip  Are you seeing anyone else for your pain? Yes - Dr Diez for injections, Dr Sebastian migraine (neurology), and Dr Rehman for RA    PHQ-9 SCORE 2022 10/7/2022 2022   PHQ-9 Total Score 9 14 15       ESTRELLA-7 SCORE 2022 10/7/2022 2022   Total Score 8 14 6           Chronic Pain Follow Up:    Location of pain: neck and joint  Analgesia/pain control:    - Recent changes:  Worse with weather    - Overall control: Inadequate pain control    - Current treatments: norco, baclofen, methocarbamol, asha, sees neurology   Adherence:     - Do you ever take more pain medicine than prescribed? No     Adverse effects: No     - ddx between psoriatic arthritis vs RA  - on methotrexate / folic acid, with improvement of foot pain. Takes medication on Thursday night   - feels ill the next day injection     - last use of pain medication was yesterday. Last use of valium was a couple of days ago   - UDS: no surprise   - CSA discussed and signed today     PDMP Review       Value Time User    State PDMP site checked  Yes 2022 11:43 AM Henna Moyer MD        Last  CSA Agreement:   CSA -- Patient Level:     [Media Unavailable] Controlled Substance Agreement - Opioid - Scan on 9/7/2021 10:15 AM: OPIOD/OPIOD PLUS CONTROLLED SUBSTANCE AGREEMENT       Last UDS: 9/2/2021    # Immunizations: influenza, covid - updated today     Review of Systems   Constitutional: Negative for chills and fever.   HENT: Negative for congestion.    Respiratory: Negative for shortness of breath.    Cardiovascular: Negative for chest pain and palpitations.   Gastrointestinal: Negative for abdominal pain.   Musculoskeletal: Positive for arthralgias.   Skin:        Improvement of psoriasis on scalp   Psychiatric/Behavioral: Positive for sleep disturbance (d/t pain ). Negative for mood changes.          Objective    /62 (BP Location: Left arm, Patient Position: Sitting, Cuff Size: Adult Regular)   Pulse 72   Temp 98.2  F (36.8  C)   Wt 89.8 kg (198 lb)   SpO2 99%   BMI 31.96 kg/m    Body mass index is 31.96 kg/m .  Physical Exam  Constitutional:       General: She is not in acute distress.     Appearance: She is not ill-appearing.   Cardiovascular:      Rate and Rhythm: Normal rate and regular rhythm.      Heart sounds: No murmur heard.  Pulmonary:      Effort: Pulmonary effort is normal. No respiratory distress.      Breath sounds: No wheezing or rales.   Neurological:      Mental Status: She is alert.        Results for orders placed or performed in visit on 12/22/22 (from the past 24 hour(s))   Lipid Profile (Chol, Trig, HDL, LDL calc)   Result Value Ref Range    Cholesterol 272 (H) <200 mg/dL    Triglycerides 169 (H) <150 mg/dL    Direct Measure HDL 60 >=50 mg/dL    LDL Cholesterol Calculated 178 (H) <=100 mg/dL    Non HDL Cholesterol 212 (H) <130 mg/dL    Narrative    Cholesterol  Desirable:  <200 mg/dL    Triglycerides  Normal:  Less than 150 mg/dL  Borderline High:  150-199 mg/dL  High:  200-499 mg/dL  Very High:  Greater than or equal to 500 mg/dL    Direct Measure HDL  Female:  Greater  than or equal to 50 mg/dL   Male:  Greater than or equal to 40 mg/dL    LDL Cholesterol  Desirable:  <100mg/dL  Above Desirable:  100-129 mg/dL   Borderline High:  130-159 mg/dL   High:  160-189 mg/dL   Very High:  >= 190 mg/dL    Non HDL Cholesterol  Desirable:  130 mg/dL  Above Desirable:  130-159 mg/dL  Borderline High:  160-189 mg/dL  High:  190-219 mg/dL  Very High:  Greater than or equal to 220 mg/dL   Comprehensive metabolic panel (BMP + Alb, Alk Phos, ALT, AST, Total. Bili, TP)   Result Value Ref Range    Sodium 136 136 - 145 mmol/L    Potassium 3.6 3.4 - 5.3 mmol/L    Chloride 101 98 - 107 mmol/L    Carbon Dioxide (CO2) 22 22 - 29 mmol/L    Anion Gap 13 7 - 15 mmol/L    Urea Nitrogen 13.8 8.0 - 23.0 mg/dL    Creatinine 1.02 (H) 0.51 - 0.95 mg/dL    Calcium 9.5 8.8 - 10.2 mg/dL    Glucose 135 (H) 70 - 99 mg/dL    Alkaline Phosphatase 97 35 - 104 U/L    AST 22 10 - 35 U/L    ALT 26 10 - 35 U/L    Protein Total 7.2 6.4 - 8.3 g/dL    Albumin 4.2 3.5 - 5.2 g/dL    Bilirubin Total 0.3 <=1.2 mg/dL    GFR Estimate 62 >60 mL/min/1.73m2                 The 10-year ASCVD risk score (Pedro MAYES, et al., 2019) is: 2.8%    Values used to calculate the score:      Age: 62 years      Sex: Female      Is Non- : No      Diabetic: No      Tobacco smoker: No      Systolic Blood Pressure: 102 mmHg      Is BP treated: No      HDL Cholesterol: 60 mg/dL      Total Cholesterol: 272 mg/dL

## 2022-12-22 ENCOUNTER — OFFICE VISIT (OUTPATIENT)
Dept: FAMILY MEDICINE | Facility: OTHER | Age: 62
End: 2022-12-22
Attending: FAMILY MEDICINE
Payer: MEDICARE

## 2022-12-22 ENCOUNTER — APPOINTMENT (OUTPATIENT)
Dept: LAB | Facility: OTHER | Age: 62
End: 2022-12-22
Attending: FAMILY MEDICINE
Payer: MEDICARE

## 2022-12-22 VITALS
HEART RATE: 72 BPM | OXYGEN SATURATION: 99 % | BODY MASS INDEX: 31.96 KG/M2 | SYSTOLIC BLOOD PRESSURE: 102 MMHG | DIASTOLIC BLOOD PRESSURE: 62 MMHG | WEIGHT: 198 LBS | TEMPERATURE: 98.2 F

## 2022-12-22 DIAGNOSIS — Z23 NEED FOR PROPHYLACTIC VACCINATION AND INOCULATION AGAINST INFLUENZA: ICD-10-CM

## 2022-12-22 DIAGNOSIS — F33.1 MODERATE EPISODE OF RECURRENT MAJOR DEPRESSIVE DISORDER (H): Chronic | ICD-10-CM

## 2022-12-22 DIAGNOSIS — Z51.81 ENCOUNTER FOR THERAPEUTIC DRUG LEVEL MONITORING: ICD-10-CM

## 2022-12-22 DIAGNOSIS — F11.90 CHRONIC, CONTINUOUS USE OF OPIOIDS: ICD-10-CM

## 2022-12-22 DIAGNOSIS — E78.2 MODERATE MIXED HYPERLIPIDEMIA NOT REQUIRING STATIN THERAPY: Primary | ICD-10-CM

## 2022-12-22 DIAGNOSIS — Z23 HIGH PRIORITY FOR 2019-NCOV VACCINE: ICD-10-CM

## 2022-12-22 DIAGNOSIS — R76.8 RHEUMATOID FACTOR POSITIVE: ICD-10-CM

## 2022-12-22 LAB
ALBUMIN SERPL BCG-MCNC: 4.2 G/DL (ref 3.5–5.2)
ALP SERPL-CCNC: 97 U/L (ref 35–104)
ALT SERPL W P-5'-P-CCNC: 26 U/L (ref 10–35)
ANION GAP SERPL CALCULATED.3IONS-SCNC: 13 MMOL/L (ref 7–15)
AST SERPL W P-5'-P-CCNC: 22 U/L (ref 10–35)
BILIRUB SERPL-MCNC: 0.3 MG/DL
BUN SERPL-MCNC: 13.8 MG/DL (ref 8–23)
CALCIUM SERPL-MCNC: 9.5 MG/DL (ref 8.8–10.2)
CHLORIDE SERPL-SCNC: 101 MMOL/L (ref 98–107)
CHOLEST SERPL-MCNC: 272 MG/DL
CREAT SERPL-MCNC: 1.02 MG/DL (ref 0.51–0.95)
CREAT UR-MCNC: 51 MG/DL
DEPRECATED HCO3 PLAS-SCNC: 22 MMOL/L (ref 22–29)
GFR SERPL CREATININE-BSD FRML MDRD: 62 ML/MIN/1.73M2
GLUCOSE SERPL-MCNC: 135 MG/DL (ref 70–99)
HDLC SERPL-MCNC: 60 MG/DL
LDLC SERPL CALC-MCNC: 178 MG/DL
NONHDLC SERPL-MCNC: 212 MG/DL
POTASSIUM SERPL-SCNC: 3.6 MMOL/L (ref 3.4–5.3)
PROT SERPL-MCNC: 7.2 G/DL (ref 6.4–8.3)
SODIUM SERPL-SCNC: 136 MMOL/L (ref 136–145)
TRIGL SERPL-MCNC: 169 MG/DL

## 2022-12-22 PROCEDURE — 36415 COLL VENOUS BLD VENIPUNCTURE: CPT | Mod: ZL | Performed by: FAMILY MEDICINE

## 2022-12-22 PROCEDURE — 80053 COMPREHEN METABOLIC PANEL: CPT | Mod: ZL | Performed by: FAMILY MEDICINE

## 2022-12-22 PROCEDURE — 83718 ASSAY OF LIPOPROTEIN: CPT | Mod: ZL | Performed by: FAMILY MEDICINE

## 2022-12-22 PROCEDURE — 99214 OFFICE O/P EST MOD 30 MIN: CPT | Performed by: FAMILY MEDICINE

## 2022-12-22 PROCEDURE — G0008 ADMIN INFLUENZA VIRUS VAC: HCPCS

## 2022-12-22 PROCEDURE — 80307 DRUG TEST PRSMV CHEM ANLYZR: CPT | Mod: ZL | Performed by: FAMILY MEDICINE

## 2022-12-22 PROCEDURE — 0124A COVID-19 VACCINE BIVALENT BOOSTER 12+ (PFIZER): CPT

## 2022-12-22 PROCEDURE — G0463 HOSPITAL OUTPT CLINIC VISIT: HCPCS | Mod: 25 | Performed by: FAMILY MEDICINE

## 2022-12-22 PROCEDURE — 91312 COVID-19 VACCINE BIVALENT BOOSTER 12+ (PFIZER): CPT

## 2022-12-22 ASSESSMENT — ENCOUNTER SYMPTOMS
ARTHRALGIAS: 1
CHILLS: 0
PALPITATIONS: 0
SHORTNESS OF BREATH: 0
ABDOMINAL PAIN: 0
FEVER: 0
SLEEP DISTURBANCE: 1

## 2022-12-22 NOTE — LETTER
Opioid / Opioid Plus Controlled Substance Agreement    This is an agreement between you and your provider about the safe and appropriate use of controlled substance/opioids prescribed by your care team. Controlled substances are medicines that can cause physical and mental dependence (abuse).    There are strict laws about having and using these medicines. We here at [facility] are committing to working with you in your efforts to get better. To support you in this work, we ll help you schedule regular office appointments for medicine refills. If we must cancel or change your appointment for any reason, we ll make sure you have enough medicine to last until your next appointment.     As a Provider, I will:    Listen carefully to your concerns and treat you with respect.     Recommend a treatment plan that I believe is in your best interest. This plan may involve therapies other than opioid pain medication.     Talk with you often about the possible benefits, and the risk of harm of any medicine that we prescribe for you.     Provide a plan on how to taper (discontinue or go off) using this medicine if the decision is made to stop its use.    As a Patient, I understand that opioid(s):     Are a controlled substance prescribed by my care team to help me function or work and manage my condition(s).     Are strong medicines and can cause serious side effects such as:    Drowsiness, which can seriously affect my driving ability    A lower breathing rate, enough to cause death    Harm to my thinking ability     Depression     Abuse of and addiction to this medicine    Need to be taken exactly as prescribed. Combining opioids with certain medicines or chemicals (such as illegal drugs, sedatives, sleeping pills, and benzodiazepines) can be dangerous or even fatal. If I stop opioids suddenly, I may have severe withdrawal symptoms.    Do not work for all types of pain nor for all patients. If they re not helpful, I may  be asked to stop them.        The risks, benefits and side effects of these medicine(s) were explained to me. I agree that:  1. I will take part in other treatments as advised by my care team. This may be psychiatry or counseling, physical therapy, behavioral therapy, group treatment or a referral to a specialist.     2. I will keep all my appointments. I understand that this is part of the monitoring of opioids. My care team may require an office visit for EVERY opioid/controlled substance refill. If I miss appointments or don t follow instructions, my care team may stop my medicine.    3. I will take my medicines as prescribed. I will not change the dose or schedule unless my care team tells me to. There will be no refills if I run out early.     4. I may be asked to come to the clinic and complete a urine drug test or complete a pill count at any time. If I don t give a urine sample or participate in a pill count, the care team may stop my medicine.    5. I will only receive prescriptions from this clinic for chronic pain. If I am treated by another provider for acute pain issues, I will tell them that I am taking opioid pain medication for chronic pain and that I have a treatment agreement with this provider. I will inform my [facility] care team within one business day if I am given a prescription for any pain medication by another healthcare provider. My [facility] care team can contact other providers and pharmacists about my use of any medicines.    6. It is up to me to make sure that I don t run out of my medicines on weekends or holidays. If my care team is willing to refill my opioid prescription without a visit, I must request refills only during office hours. Refills may take up to 3 business days to process. I will use one pharmacy to fill all my opioid and other controlled substance prescriptions. I will notify the clinic about any changes to my insurance or medication  availability.    7. I am responsible for my prescriptions. If the medicine/prescription is lost, stolen or destroyed, it will not be replaced. I also agree not to share controlled substance medicines with anyone.    8. I am aware I should not use any illegal or recreational drugs. I agree not to drink alcohol unless my care team says I can.       9. If I enroll in the [place] [facility] Cann[facility] program, I will tell my care team prior to my next refill.     10. I will tell my care team right away if I become pregnant, have a new medical problem treated outside of my regular clinic, or have a change in my medications.    11. I understand that this medicine can affect my thinking, judgment and reaction time. Alcohol and drugs affect the brain and body, which can affect the safety of my driving. Being under the influence of alcohol or drugs can affect my decision-making, behaviors, personal safety, and the safety of others. Driving while impaired (DWI) can occur if a person is driving, operating, or in physical control of a car, motorcycle, boat, snowmobile, ATV, motorbike, off-road vehicle, or any other motor vehicle ([place] Statute 169A.20). I understand the risk if I choose to drive or operate any vehicle or machinery.    I understand that if I do not follow any of the conditions above, my prescriptions or treatment may be stopped or changed.          Opioids  What You Need to Know    What are opioids?   Opioids are pain medicines that must be prescribed by a doctor. They are also known as narcotics.     Examples are:   1. morphine (MS Contin, Shantell)  2. oxycodone (Oxycontin)  3. oxycodone and acetaminophen (Percocet)  4. hydrocodone and acetaminophen (Vicodin, Norco)   5. fentanyl patch (Duragesic)   6. hydromorphone (Dilaudid)   7. methadone  8. codeine (Tylenol #3)     What do opioids do well?   Opioids are best for severe short-term pain such as after a surgery or injury. They may work well for cancer pain. They may  help some people with long-lasting (chronic) pain.     What do opioids NOT do well?   Opioids never get rid of pain entirely, and they don t work well for most patients with chronic pain. Opioids don t reduce swelling, one of the causes of pain.                                    Other ways to manage chronic pain and improve function include:       Treat the health problem that may be causing pain    Anti-inflammation medicines, which reduce swelling and tenderness, such as ibuprofen (Advil, Motrin) or naproxen (Aleve)    Acetaminophen (Tylenol)    Antidepressants and anti-seizure medicines, especially for nerve pain    Topical treatments such as patches or creams    Injections or nerve blocks    Chiropractic or osteopathic treatment    Acupuncture, massage, deep breathing, meditation, visual imagery, aromatherapy    Use heat or ice at the pain site    Physical therapy     Exercise    Stop smoking    Take part in therapy       Risks and side effects     Talk to your doctor before you start or decide to keep taking opioids. Possible side effects include:      Lowering your breathing rate enough to cause death    Overdose, including death, especially if taking higher than prescribed doses    Worse depression symptoms; less pleasure in things you usually enjoy    Feeling tired or sluggish    Slower thoughts or cloudy thinking    Being more sensitive to pain over time; pain is harder to control    Trouble sleeping or restless sleep    Changes in hormone levels (for example, less testosterone)    Changes in sex drive or ability to have sex    Constipation    Unsafe driving    Itching and sweating    Dizziness    Nausea, throwing up and dry mouth    What else should I know about opioids?    Opioids may lead to dependence, tolerance, or addiction.      Dependence means that if you stop or reduce the medicine too quickly, you will have withdrawal symptoms. These include loose poop (diarrhea), jitters, flu-like symptoms,  nervousness and tremors. Dependence is not the same as addiction.                       Tolerance means needing higher doses over time to get the same effect. This may increase the chance of serious side effects.      Addiction is when people improperly use a substance that harms their body, their mind or their relations with others. Use of opiates can cause a relapse of addiction if you have a history of drug or alcohol abuse.      People who have used opioids for a long time may have a lower quality of life, worse depression, higher levels of pain and more visits to doctors.    You can overdose on opioids. Take these steps to lower your risk of overdose:    1. Recognize the signs:  Signs of overdose include decrease or loss of consciousness (blackout), slowed breathing, trouble waking up and blue lips. If someone is worried about overdose, they should call 911.    2. Talk to your doctor about Narcan (naloxone).   If you are at risk for overdose, you may be given a prescription for Narcan. This medicine very quickly reverses the effects of opioids.   If you overdose, a friend or family member can give you Narcan while waiting for the ambulance. They need to know the signs of overdose and how to give Narcan.     3. Don't use alcohol or street drugs.   Taking them with opioids can cause death.    4. Do not take any of these medicines unless your doctor says it s OK. Taking these with opioids can cause death:    Benzodiazepines, such as lorazepam (Ativan), alprazolam (Xanax) or diazepam (Valium)    Muscle relaxers, such as cyclobenzaprine (Flexeril)    Sleeping pills like zolpidem (Ambien)     Other opioids      How to keep you and other people safe while taking opioids:    1. Never share your opioids with others.  Opioid medicines are regulated by the Drug Enforcement Agency (CALLIE). Selling or sharing medications is a criminal act.    2. Be sure to store opioids in a secure place, locked up if possible. Young children  can easily swallow them and overdose.    3. When you are traveling with your medicines, keep them in the original bottles. If you use a pill box, be sure you also carry a copy of your medicine list from your clinic or pharmacy.    4. Safe disposal of opioids    Most pharmacies have places to get rid of medicine, called disposal kiosks. Medicine disposal options are also available in every [facility]. Search your county and  medication disposal  to find more options. You can find more details at:  [email or web address]     I agree that my provider, clinic care team, and pharmacy may work with any city, state or federal law enforcement agency that investigates the misuse, sale, or other diversion of my controlled medicine. I will allow my provider to discuss my care with, or share a copy of, this agreement with any other treating provider, pharmacy or emergency room where I receive care.    I have read this agreement and have asked questions about anything I did not understand.    _______________________________________________________  Patient Signature - Sadaf Blankenship _____________________                   Date     _______________________________________________________  Provider Signature - Henna Moyer MD   _____________________                   Date     _______________________________________________________  Witness Signature (required if provider not present while patient signing)   _____________________                   Date

## 2022-12-22 NOTE — NURSING NOTE
/62 (BP Location: Left arm, Patient Position: Sitting, Cuff Size: Adult Regular)   Pulse 72   Temp 98.2  F (36.8  C)   Wt 89.8 kg (198 lb)   SpO2 99%   BMI 31.96 kg/m    Jessa Behrman, LPN

## 2022-12-26 LAB
DHC UR CFM-MCNC: 69 NG/ML
DHC/CREAT UR: 135 NG/MG {CREAT}
NORDIAZEPAM UR QL CFM: PRESENT
OXAZEPAM UR QL CFM: PRESENT
PREGABALIN UR QL CFM: PRESENT
TEMAZEPAM UR QL CFM: PRESENT

## 2023-01-13 DIAGNOSIS — G89.29 CHRONIC NECK PAIN: ICD-10-CM

## 2023-01-13 DIAGNOSIS — M54.2 CHRONIC NECK PAIN: ICD-10-CM

## 2023-01-13 RX ORDER — HYDROCODONE BITARTRATE AND ACETAMINOPHEN 7.5; 325 MG/1; MG/1
1 TABLET ORAL 2 TIMES DAILY PRN
Qty: 60 TABLET | Refills: 0 | Status: SHIPPED | OUTPATIENT
Start: 2023-01-13 | End: 2023-03-24

## 2023-01-13 NOTE — TELEPHONE ENCOUNTER
Hydrocodone-acetaminophen 7.5-325 mg      Last Written Prescription Date:  11-  Last Fill Quantity: 60,   # refills: 0  Last Office Visit: 12-22-22  Future Office visit:    Next 5 appointments (look out 90 days)    Jan 23, 2023  9:20 AM  (Arrive by 9:05 AM)  Return Visit with Zoe Herbert MD  [facility] ([facility] ) [address] [place] [id number] [phone]   Mar 23, 2023 10:30 AM  (Arrive by 10:15 AM)  SHORT with Henna Moyer MD  [facility] ([facility] ) [address] [place] [id number]  [phone]

## 2023-01-22 ASSESSMENT — ANXIETY QUESTIONNAIRES
5. BEING SO RESTLESS THAT IT IS HARD TO SIT STILL: SEVERAL DAYS
7. FEELING AFRAID AS IF SOMETHING AWFUL MIGHT HAPPEN: MORE THAN HALF THE DAYS
6. BECOMING EASILY ANNOYED OR IRRITABLE: MORE THAN HALF THE DAYS
8. IF YOU CHECKED OFF ANY PROBLEMS, HOW DIFFICULT HAVE THESE MADE IT FOR YOU TO DO YOUR WORK, TAKE CARE OF THINGS AT HOME, OR GET ALONG WITH OTHER PEOPLE?: VERY DIFFICULT
1. FEELING NERVOUS, ANXIOUS, OR ON EDGE: SEVERAL DAYS
GAD7 TOTAL SCORE: 9
2. NOT BEING ABLE TO STOP OR CONTROL WORRYING: SEVERAL DAYS
7. FEELING AFRAID AS IF SOMETHING AWFUL MIGHT HAPPEN: MORE THAN HALF THE DAYS
4. TROUBLE RELAXING: SEVERAL DAYS
3. WORRYING TOO MUCH ABOUT DIFFERENT THINGS: SEVERAL DAYS
GAD7 TOTAL SCORE: 9
GAD7 TOTAL SCORE: 9

## 2023-01-22 ASSESSMENT — PATIENT HEALTH QUESTIONNAIRE - PHQ9
SUM OF ALL RESPONSES TO PHQ QUESTIONS 1-9: 10
SUM OF ALL RESPONSES TO PHQ QUESTIONS 1-9: 10
10. IF YOU CHECKED OFF ANY PROBLEMS, HOW DIFFICULT HAVE THESE PROBLEMS MADE IT FOR YOU TO DO YOUR WORK, TAKE CARE OF THINGS AT HOME, OR GET ALONG WITH OTHER PEOPLE: SOMEWHAT DIFFICULT

## 2023-01-23 ENCOUNTER — OFFICE VISIT (OUTPATIENT)
Dept: PSYCHIATRY | Facility: OTHER | Age: 63
End: 2023-01-23
Attending: PSYCHIATRY & NEUROLOGY
Payer: MEDICARE

## 2023-01-23 VITALS
OXYGEN SATURATION: 98 % | HEART RATE: 77 BPM | DIASTOLIC BLOOD PRESSURE: 70 MMHG | BODY MASS INDEX: 31.47 KG/M2 | TEMPERATURE: 97.4 F | SYSTOLIC BLOOD PRESSURE: 116 MMHG | WEIGHT: 195 LBS

## 2023-01-23 DIAGNOSIS — F41.1 GAD (GENERALIZED ANXIETY DISORDER): Primary | ICD-10-CM

## 2023-01-23 DIAGNOSIS — G47.00 PERSISTENT INSOMNIA: ICD-10-CM

## 2023-01-23 PROCEDURE — G0463 HOSPITAL OUTPT CLINIC VISIT: HCPCS

## 2023-01-23 PROCEDURE — 99214 OFFICE O/P EST MOD 30 MIN: CPT | Performed by: PSYCHIATRY & NEUROLOGY

## 2023-01-23 RX ORDER — HYDROXYZINE HYDROCHLORIDE 25 MG/1
50 TABLET, FILM COATED ORAL AT BEDTIME
Qty: 60 TABLET | Refills: 4 | Status: SHIPPED | OUTPATIENT
Start: 2023-01-23 | End: 2023-07-03

## 2023-01-23 ASSESSMENT — PAIN SCALES - GENERAL: PAINLEVEL: MODERATE PAIN (4)

## 2023-01-23 ASSESSMENT — ANXIETY QUESTIONNAIRES: GAD7 TOTAL SCORE: 9
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
## 2023-02-14 ENCOUNTER — TRANSFERRED RECORDS (OUTPATIENT)
Dept: HEALTH INFORMATION MANAGEMENT | Facility: CLINIC | Age: 63
End: 2023-02-14

## 2023-02-19 ENCOUNTER — HOSPITAL ENCOUNTER (EMERGENCY)
Facility: HOSPITAL | Age: 63
Discharge: HOME | End: 2023-02-20
Attending: EMERGENCY MEDICINE | Admitting: EMERGENCY MEDICINE
Payer: MEDICARE

## 2023-02-19 DIAGNOSIS — R13.10 DYSPHAGIA, UNSPECIFIED TYPE: Primary | ICD-10-CM

## 2023-02-19 PROCEDURE — 93005 ELECTROCARDIOGRAM TRACING: CPT

## 2023-02-19 PROCEDURE — 99291 CRITICAL CARE FIRST HOUR: CPT | Performed by: EMERGENCY MEDICINE

## 2023-02-20 ENCOUNTER — APPOINTMENT (OUTPATIENT)
Dept: CT IMAGING | Facility: HOSPITAL | Age: 63
End: 2023-02-20
Attending: EMERGENCY MEDICINE
Payer: MEDICARE

## 2023-02-20 ENCOUNTER — APPOINTMENT (OUTPATIENT)
Dept: GENERAL RADIOLOGY | Facility: HOSPITAL | Age: 63
End: 2023-02-20
Attending: EMERGENCY MEDICINE
Payer: MEDICARE

## 2023-02-20 ENCOUNTER — ANESTHESIA EVENT (OUTPATIENT)
Dept: SURGERY | Facility: HOSPITAL | Age: 63
End: 2023-02-20
Payer: MEDICARE

## 2023-02-20 ENCOUNTER — PATIENT OUTREACH (OUTPATIENT)
Dept: OTHER | Facility: HOSPITAL | Age: 63
End: 2023-02-20

## 2023-02-20 ENCOUNTER — ANESTHESIA (OUTPATIENT)
Dept: SURGERY | Facility: HOSPITAL | Age: 63
End: 2023-02-20
Payer: MEDICARE

## 2023-02-20 VITALS
DIASTOLIC BLOOD PRESSURE: 64 MMHG | HEIGHT: 66 IN | OXYGEN SATURATION: 97 % | BODY MASS INDEX: 30.53 KG/M2 | TEMPERATURE: 98.5 F | RESPIRATION RATE: 16 BRPM | HEART RATE: 76 BPM | SYSTOLIC BLOOD PRESSURE: 114 MMHG | WEIGHT: 190 LBS

## 2023-02-20 LAB
ALBUMIN SERPL BCG-MCNC: 4.1 G/DL (ref 3.5–5.2)
ALP SERPL-CCNC: 102 U/L (ref 35–104)
ALT SERPL W P-5'-P-CCNC: 21 U/L (ref 10–35)
AMPHETAMINES UR QL: NOT DETECTED
ANION GAP SERPL CALCULATED.3IONS-SCNC: 13 MMOL/L (ref 7–15)
AST SERPL W P-5'-P-CCNC: 17 U/L (ref 10–35)
BARBITURATES UR QL SCN: NOT DETECTED
BASE EXCESS BLDV CALC-SCNC: -3.3 MMOL/L (ref -7.7–1.9)
BASOPHILS # BLD AUTO: 0 10E3/UL (ref 0–0.2)
BASOPHILS NFR BLD AUTO: 1 %
BENZODIAZ UR QL SCN: DETECTED
BILIRUB SERPL-MCNC: 0.3 MG/DL
BUN SERPL-MCNC: 13.8 MG/DL (ref 8–23)
BUPRENORPHINE UR QL: NOT DETECTED
CALCIUM SERPL-MCNC: 9.8 MG/DL (ref 8.8–10.2)
CANNABINOIDS UR QL: NOT DETECTED
CHLORIDE SERPL-SCNC: 106 MMOL/L (ref 98–107)
COCAINE UR QL SCN: NOT DETECTED
CREAT SERPL-MCNC: 1 MG/DL (ref 0.51–0.95)
D-METHAMPHET UR QL: NOT DETECTED
DEPRECATED HCO3 PLAS-SCNC: 21 MMOL/L (ref 22–29)
EOSINOPHIL # BLD AUTO: 0.2 10E3/UL (ref 0–0.7)
EOSINOPHIL NFR BLD AUTO: 3 %
ERYTHROCYTE [DISTWIDTH] IN BLOOD BY AUTOMATED COUNT: 14.1 % (ref 10–15)
ETHANOL SERPL-MCNC: <0.01 G/DL
FLUAV RNA SPEC QL NAA+PROBE: NEGATIVE
FLUBV RNA RESP QL NAA+PROBE: NEGATIVE
GFR SERPL CREATININE-BSD FRML MDRD: 64 ML/MIN/1.73M2
GLUCOSE SERPL-MCNC: 101 MG/DL (ref 70–99)
HCO3 BLDV-SCNC: 22 MMOL/L (ref 21–28)
HCT VFR BLD AUTO: 38.9 % (ref 35–47)
HGB BLD-MCNC: 13.3 G/DL (ref 11.7–15.7)
HOLD SPECIMEN: NORMAL
IMM GRANULOCYTES # BLD: 0 10E3/UL
IMM GRANULOCYTES NFR BLD: 0 %
INR PPP: 1 (ref 0.85–1.15)
LACTATE SERPL-SCNC: 1.1 MMOL/L (ref 0.7–2)
LYMPHOCYTES # BLD AUTO: 1.9 10E3/UL (ref 0.8–5.3)
LYMPHOCYTES NFR BLD AUTO: 30 %
MCH RBC QN AUTO: 30.3 PG (ref 26.5–33)
MCHC RBC AUTO-ENTMCNC: 34.2 G/DL (ref 31.5–36.5)
MCV RBC AUTO: 89 FL (ref 78–100)
METHADONE UR QL SCN: NOT DETECTED
MONOCYTES # BLD AUTO: 0.5 10E3/UL (ref 0–1.3)
MONOCYTES NFR BLD AUTO: 8 %
NEUTROPHILS # BLD AUTO: 3.8 10E3/UL (ref 1.6–8.3)
NEUTROPHILS NFR BLD AUTO: 58 %
NRBC # BLD AUTO: 0 10E3/UL
NRBC BLD AUTO-RTO: 0 /100
NT-PROBNP SERPL-MCNC: 84 PG/ML (ref 0–900)
O2/TOTAL GAS SETTING VFR VENT: 2 %
OPIATES UR QL SCN: NOT DETECTED
OXYCODONE UR QL SCN: NOT DETECTED
OXYHGB MFR BLDV: 80 % (ref 70–75)
PCO2 BLDV: 42 MM HG (ref 40–50)
PCP UR QL SCN: NOT DETECTED
PH BLDV: 7.34 [PH] (ref 7.32–7.43)
PLATELET # BLD AUTO: 202 10E3/UL (ref 150–450)
PO2 BLDV: 46 MM HG (ref 25–47)
POTASSIUM SERPL-SCNC: 3.9 MMOL/L (ref 3.4–5.3)
PROPOXYPH UR QL: NOT DETECTED
PROT SERPL-MCNC: 7 G/DL (ref 6.4–8.3)
RBC # BLD AUTO: 4.39 10E6/UL (ref 3.8–5.2)
RSV RNA SPEC NAA+PROBE: NEGATIVE
SARS-COV-2 RNA RESP QL NAA+PROBE: NEGATIVE
SODIUM SERPL-SCNC: 140 MMOL/L (ref 136–145)
TRICYCLICS UR QL SCN: NOT DETECTED
TROPONIN T SERPL HS-MCNC: <6 NG/L
WBC # BLD AUTO: 6.4 10E3/UL (ref 4–11)

## 2023-02-20 PROCEDURE — 80053 COMPREHEN METABOLIC PANEL: CPT | Performed by: EMERGENCY MEDICINE

## 2023-02-20 PROCEDURE — 96374 THER/PROPH/DIAG INJ IV PUSH: CPT | Mod: 59

## 2023-02-20 PROCEDURE — 83605 ASSAY OF LACTIC ACID: CPT | Performed by: EMERGENCY MEDICINE

## 2023-02-20 PROCEDURE — 96361 HYDRATE IV INFUSION ADD-ON: CPT | Mod: 59

## 2023-02-20 PROCEDURE — 250N000025 HC SEVOFLURANE, PER MIN: Performed by: SURGERY

## 2023-02-20 PROCEDURE — G1010 CDSM STANSON: HCPCS

## 2023-02-20 PROCEDURE — C9803 HOPD COVID-19 SPEC COLLECT: HCPCS

## 2023-02-20 PROCEDURE — 370N000017 HC ANESTHESIA TECHNICAL FEE, PER MIN: Performed by: SURGERY

## 2023-02-20 PROCEDURE — 85610 PROTHROMBIN TIME: CPT | Performed by: EMERGENCY MEDICINE

## 2023-02-20 PROCEDURE — 250N000011 HC RX IP 250 OP 636: Performed by: EMERGENCY MEDICINE

## 2023-02-20 PROCEDURE — 84484 ASSAY OF TROPONIN QUANT: CPT | Performed by: EMERGENCY MEDICINE

## 2023-02-20 PROCEDURE — 250N000011 HC RX IP 250 OP 636: Performed by: NURSE ANESTHETIST, CERTIFIED REGISTERED

## 2023-02-20 PROCEDURE — 99140 ANES COMP EMERGENCY COND: CPT | Performed by: NURSE ANESTHETIST, CERTIFIED REGISTERED

## 2023-02-20 PROCEDURE — 96375 TX/PRO/DX INJ NEW DRUG ADDON: CPT | Mod: 59

## 2023-02-20 PROCEDURE — 71045 X-RAY EXAM CHEST 1 VIEW: CPT

## 2023-02-20 PROCEDURE — 71250 CT THORAX DX C-: CPT | Mod: MG

## 2023-02-20 PROCEDURE — 710N000012 HC RECOVERY PHASE 2, PER MINUTE: Performed by: SURGERY

## 2023-02-20 PROCEDURE — 258N000003 HC RX IP 258 OP 636: Performed by: EMERGENCY MEDICINE

## 2023-02-20 PROCEDURE — 999N000141 HC STATISTIC PRE-PROCEDURE NURSING ASSESSMENT: Performed by: SURGERY

## 2023-02-20 PROCEDURE — 360N000075 HC SURGERY LEVEL 2, PER MIN: Performed by: SURGERY

## 2023-02-20 PROCEDURE — 31622 DX BRONCHOSCOPE/WASH: CPT | Performed by: SURGERY

## 2023-02-20 PROCEDURE — 43239 EGD BIOPSY SINGLE/MULTIPLE: CPT | Performed by: NURSE ANESTHETIST, CERTIFIED REGISTERED

## 2023-02-20 PROCEDURE — 250N000009 HC RX 250: Performed by: NURSE ANESTHETIST, CERTIFIED REGISTERED

## 2023-02-20 PROCEDURE — 80306 DRUG TEST PRSMV INSTRMNT: CPT | Mod: GZ | Performed by: EMERGENCY MEDICINE

## 2023-02-20 PROCEDURE — 43239 EGD BIOPSY SINGLE/MULTIPLE: CPT | Performed by: SURGERY

## 2023-02-20 PROCEDURE — 70450 CT HEAD/BRAIN W/O DYE: CPT | Mod: MA

## 2023-02-20 PROCEDURE — 710N000010 HC RECOVERY PHASE 1, LEVEL 2, PER MIN: Performed by: SURGERY

## 2023-02-20 PROCEDURE — 72125 CT NECK SPINE W/O DYE: CPT | Mod: MA

## 2023-02-20 PROCEDURE — 36415 COLL VENOUS BLD VENIPUNCTURE: CPT | Performed by: EMERGENCY MEDICINE

## 2023-02-20 PROCEDURE — 272N000001 HC OR GENERAL SUPPLY STERILE: Performed by: SURGERY

## 2023-02-20 PROCEDURE — 83880 ASSAY OF NATRIURETIC PEPTIDE: CPT | Performed by: EMERGENCY MEDICINE

## 2023-02-20 PROCEDURE — 82805 BLOOD GASES W/O2 SATURATION: CPT | Performed by: EMERGENCY MEDICINE

## 2023-02-20 PROCEDURE — 258N000003 HC RX IP 258 OP 636: Performed by: NURSE ANESTHETIST, CERTIFIED REGISTERED

## 2023-02-20 PROCEDURE — 82077 ASSAY SPEC XCP UR&BREATH IA: CPT | Performed by: EMERGENCY MEDICINE

## 2023-02-20 PROCEDURE — 87637 SARSCOV2&INF A&B&RSV AMP PRB: CPT | Performed by: EMERGENCY MEDICINE

## 2023-02-20 PROCEDURE — 85025 COMPLETE CBC W/AUTO DIFF WBC: CPT | Performed by: EMERGENCY MEDICINE

## 2023-02-20 RX ORDER — LABETALOL 20 MG/4 ML (5 MG/ML) INTRAVENOUS SYRINGE
10
Status: DISCONTINUED | OUTPATIENT
Start: 2023-02-20 | End: 2023-02-20 | Stop reason: HOSPADM

## 2023-02-20 RX ORDER — NALOXONE HYDROCHLORIDE 0.4 MG/ML
0.4 INJECTION, SOLUTION INTRAMUSCULAR; INTRAVENOUS; SUBCUTANEOUS
Status: DISCONTINUED | OUTPATIENT
Start: 2023-02-20 | End: 2023-02-20 | Stop reason: HOSPADM

## 2023-02-20 RX ORDER — OXYCODONE HYDROCHLORIDE 5 MG/1
10 TABLET ORAL EVERY 4 HOURS PRN
Status: DISCONTINUED | OUTPATIENT
Start: 2023-02-20 | End: 2023-02-20 | Stop reason: HOSPADM

## 2023-02-20 RX ORDER — FENTANYL CITRATE 50 UG/ML
50 INJECTION, SOLUTION INTRAMUSCULAR; INTRAVENOUS EVERY 5 MIN PRN
Status: DISCONTINUED | OUTPATIENT
Start: 2023-02-20 | End: 2023-02-20 | Stop reason: HOSPADM

## 2023-02-20 RX ORDER — DEXAMETHASONE SODIUM PHOSPHATE 10 MG/ML
INJECTION, SOLUTION INTRAMUSCULAR; INTRAVENOUS PRN
Status: DISCONTINUED | OUTPATIENT
Start: 2023-02-20 | End: 2023-02-20

## 2023-02-20 RX ORDER — LIDOCAINE HYDROCHLORIDE 20 MG/ML
INJECTION, SOLUTION INFILTRATION; PERINEURAL PRN
Status: DISCONTINUED | OUTPATIENT
Start: 2023-02-20 | End: 2023-02-20

## 2023-02-20 RX ORDER — LIDOCAINE 40 MG/G
CREAM TOPICAL
Status: DISCONTINUED | OUTPATIENT
Start: 2023-02-20 | End: 2023-02-20 | Stop reason: HOSPADM

## 2023-02-20 RX ORDER — HYDRALAZINE HYDROCHLORIDE 20 MG/ML
2.5-5 INJECTION INTRAMUSCULAR; INTRAVENOUS EVERY 10 MIN PRN
Status: DISCONTINUED | OUTPATIENT
Start: 2023-02-20 | End: 2023-02-20 | Stop reason: HOSPADM

## 2023-02-20 RX ORDER — ONDANSETRON 4 MG/1
4 TABLET, ORALLY DISINTEGRATING ORAL EVERY 30 MIN PRN
Status: DISCONTINUED | OUTPATIENT
Start: 2023-02-20 | End: 2023-02-20 | Stop reason: HOSPADM

## 2023-02-20 RX ORDER — OXYCODONE HYDROCHLORIDE 5 MG/1
5 TABLET ORAL EVERY 4 HOURS PRN
Status: DISCONTINUED | OUTPATIENT
Start: 2023-02-20 | End: 2023-02-20 | Stop reason: HOSPADM

## 2023-02-20 RX ORDER — NALOXONE HYDROCHLORIDE 0.4 MG/ML
0.2 INJECTION, SOLUTION INTRAMUSCULAR; INTRAVENOUS; SUBCUTANEOUS
Status: DISCONTINUED | OUTPATIENT
Start: 2023-02-20 | End: 2023-02-20 | Stop reason: HOSPADM

## 2023-02-20 RX ORDER — HYDROMORPHONE HYDROCHLORIDE 1 MG/ML
0.5 INJECTION, SOLUTION INTRAMUSCULAR; INTRAVENOUS; SUBCUTANEOUS EVERY 5 MIN PRN
Status: DISCONTINUED | OUTPATIENT
Start: 2023-02-20 | End: 2023-02-20 | Stop reason: HOSPADM

## 2023-02-20 RX ORDER — FENTANYL CITRATE 50 UG/ML
INJECTION, SOLUTION INTRAMUSCULAR; INTRAVENOUS PRN
Status: DISCONTINUED | OUTPATIENT
Start: 2023-02-20 | End: 2023-02-20

## 2023-02-20 RX ORDER — SODIUM CHLORIDE, SODIUM LACTATE, POTASSIUM CHLORIDE, CALCIUM CHLORIDE 600; 310; 30; 20 MG/100ML; MG/100ML; MG/100ML; MG/100ML
INJECTION, SOLUTION INTRAVENOUS CONTINUOUS
Status: DISCONTINUED | OUTPATIENT
Start: 2023-02-20 | End: 2023-02-20 | Stop reason: HOSPADM

## 2023-02-20 RX ORDER — PROPOFOL 10 MG/ML
INJECTION, EMULSION INTRAVENOUS PRN
Status: DISCONTINUED | OUTPATIENT
Start: 2023-02-20 | End: 2023-02-20

## 2023-02-20 RX ORDER — ONDANSETRON 2 MG/ML
4 INJECTION INTRAMUSCULAR; INTRAVENOUS EVERY 30 MIN PRN
Status: DISCONTINUED | OUTPATIENT
Start: 2023-02-20 | End: 2023-02-20 | Stop reason: HOSPADM

## 2023-02-20 RX ADMIN — Medication 100 MG: at 09:49

## 2023-02-20 RX ADMIN — GLUCAGON 2 MG: KIT at 00:29

## 2023-02-20 RX ADMIN — FENTANYL CITRATE 50 MCG: 50 INJECTION, SOLUTION INTRAMUSCULAR; INTRAVENOUS at 09:43

## 2023-02-20 RX ADMIN — DEXAMETHASONE SODIUM PHOSPHATE 12 MG: 10 INJECTION, SOLUTION INTRAMUSCULAR; INTRAVENOUS at 09:55

## 2023-02-20 RX ADMIN — MIDAZOLAM 2 MG: 1 INJECTION INTRAMUSCULAR; INTRAVENOUS at 09:38

## 2023-02-20 RX ADMIN — PROPOFOL 200 MG: 10 INJECTION, EMULSION INTRAVENOUS at 09:49

## 2023-02-20 RX ADMIN — MIDAZOLAM 2 MG: 1 INJECTION INTRAMUSCULAR; INTRAVENOUS at 00:39

## 2023-02-20 RX ADMIN — SODIUM CHLORIDE, POTASSIUM CHLORIDE, SODIUM LACTATE AND CALCIUM CHLORIDE: 600; 310; 30; 20 INJECTION, SOLUTION INTRAVENOUS at 08:32

## 2023-02-20 RX ADMIN — DEXTROSE AND SODIUM CHLORIDE: 5; 900 INJECTION, SOLUTION INTRAVENOUS at 06:05

## 2023-02-20 RX ADMIN — LIDOCAINE HYDROCHLORIDE 60 MG: 20 INJECTION, SOLUTION INFILTRATION; PERINEURAL at 09:48

## 2023-02-20 ASSESSMENT — ACTIVITIES OF DAILY LIVING (ADL)
ADLS_ACUITY_SCORE: 35

## 2023-02-20 ASSESSMENT — COPD QUESTIONNAIRES
COPD: 1
CAT_SEVERITY: MILD

## 2023-02-20 ASSESSMENT — LIFESTYLE VARIABLES: TOBACCO_USE: 1

## 2023-02-20 NOTE — ANESTHESIA PROCEDURE NOTES
Airway       Patient location during procedure: OR       Procedure Start/Stop Times: 2/20/2023 9:49 AM  Staff -        CRNA: Idalmis Saha APRN CRNA       Performed By: CRNA  Consent for Airway        Urgency: elective  Indications and Patient Condition       Indications for airway management: lizzy-procedural       Induction type:RSI       Mask difficulty assessment: 0 - not attempted    Final Airway Details       Final airway type: endotracheal airway       Successful airway: ETT - single  Endotracheal Airway Details        ETT size (mm): 7.5       Cuffed: yes       Successful intubation technique: video laryngoscopy       VL Blade Size: Glidescope 3       Grade View of Cords: 1       Position: Right       Measured from: lips       Secured at (cm): 21       Bite block used: None    Post intubation assessment        Placement verified by: capnometry, equal breath sounds and chest rise        Number of attempts at approach: 1       Secured with: commercial tube terry and plastic tape       Ease of procedure: easy       Dentition: Intact and Unchanged    Medication(s) Administered   Medication Administration Time: 2/20/2023 9:49 AM

## 2023-02-20 NOTE — TELEPHONE ENCOUNTER
Patient Quality Outreach    Patient is due for the following:   Depression  -  PHQ-9 needed    Next Steps:   Patient was assigned appropriate questionnaire to complete    Type of outreach:    Sent Targeted [employer] message.      Questions for provider review:    None     Negra Prabhakar RN
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
## 2023-02-20 NOTE — ED TRIAGE NOTES
Pt arrives via ambulance unresponsive but breathing. Pt does respond to painful stimuli. Per EMS pt was found on a stairwell. Unsure of situation and events leading up to call. Per initial EMS page was reported by 3rd party of shortness of breath and difficulty breathing.

## 2023-02-20 NOTE — OR NURSING
Patient with occasional choking spells; unable to bring anything up.  Patient up to bathroom to void with minimal assist.  Tolerated activity well.

## 2023-02-20 NOTE — OP NOTE
REPORT OF OPERATION  DATE OF PROCEDURE: 2/20/2023    PATIENT: Sadaf Blankenship    SURGERY PERFORMED: Esophagogastroduodenoscopy and bronchoscopy     PREOPERATIVE DIAGNOSIS: Questionable foreign body    POSTOPERATIVE DIAGNOSIS:    Normal Esophagogastroduodenoscopy   No evidence of foreign body in the esophagus.  Squamous columnar junction at 40cm   No evidence of foreign body in the trachea, or bronchus bilaterally.    SURGEON: Harish Haley MD    ASSISTANTS: None    ANESTHESIA: General Endotracheal Anesthesia    COMPLICATIONS: None apparent    TRANSFUSIONS: None    TISSUE TO PATHOLOGY: None    FINDINGS: Normal upper endoscopy.  No evidence of foreign body.  Normal bronchoscopy.  No evidence of foreign body.    INDICATIONS: This is a 62 year old female who presented to the emergency room with the inability to swallow.  He also has a component of stridor.  The foreign body in question were medications.  The patient will be taken the operating room for an upper endoscopy and possible bronchoscopy.     DESCRIPTIONS OF PROCEDURE IN DETAIL: After consent was obtained the patient was taken to the operative suite and graciela in the left lateral decubitus position.  The patient was identified and the correct patient was confirmed. General Endotracheal Anesthesia was given by anesthesia. A time out was performed verifying the correct patient and the correct procedure.  The entire operative team was in agreement.  All necessary equipment and supplies were in the room.    The endoscope was inserted into the mouth and passed without difficulty to the third portion of the duodenum.  No evidence of foreign body was noted.  Duodenal biopsies were not taken.  The endoscope was then withdrawn through the duodenum, the duodenal bulb and pyloric channel and no abnormalities were noted.  The endoscope was brought back into the stomach and antral biopsies were not obtained.  The endoscope was then retroflexed and no lesions of the  fundus body or antrum were seen.  The endoscope was straightened back out and brought into the distal esophagus and a well-defined squamocolumnar junction was identified at 40 cm. Biopsies of the distal esophagus were not taken.  The endoscope was slowly withdrawn through the remaining esophagus no other abnormalities are seen, again no evidence of a foreign body or irritation were noted.  The endoscope was withdrawn from the patient the patient.     It was decided to do a bronchoscopy to verify that there was no aspiration.  The bronchoscope was inserted through the endotracheal tube down both left and wrong right bronchus.  This was passed down to the level of the takeoffs to the individual lobes.  No foreign body or abnormalities were noted.  Bronchoscope was removed and the patient was then taken to the recovery room in stable condition tolerated the procedure well.  \

## 2023-02-20 NOTE — ED NOTES
Dr. Haley at bedside at 0720.  Verbal report given to NAHID Stout at bedside then patient was transferred off floor

## 2023-02-20 NOTE — DISCHARGE INSTRUCTIONS
UPPER ENDOSCOPY    AFTER THE PROCEDURE  You will return to Same Day Surgery to rest for about an hour before you go home.  The doctor will talk with you and your family.  A family member/friend may visit with you.  You may burp up any air remaining in your stomach.  You may feel dizzy or light-headed from the medicine.  Your nurse will go over the discharge instructions with you and your caregiver and answer any of your questions.    You will be contacted the next day to check on how you are doing.  If biopsies were taken, you will be contacted with the results usually within 3 days.  BACK AT HOME  Rest for an hour or two after you get home.  When your throat is no longer numb and you have a gag reflex, take a few sips of cool water.  If you can swallow comfortably, you may start eating again.  You may have a mild sore throat for the rest of the day.  You will be contacted with results by the surgeons office within a week. If not contacted in one week, call the surgeons office.  WHAT TO WATCH FOR:  Problems rarely occur after the exam, but it is important to be aware of the early signs of a complication.  Call your doctor immediately if you have:  Difficulty swallowing or breathing  Unusual pain in your stomach or chest  Vomiting blood or dark material that looks like coffee grounds  Black or tarry stools  Temperature over 101.5 degrees    MORE QUESTIONS?  Please ask your doctor or nurse before the exam begins  or call your doctor at the clinic.    IF YOU MUST CANCEL YOUR PROCEDURE THE EVENING/NIGHT BEFORE, PLEASE CALL HOSPITAL ADMITTING -[phone] OR TOLL FREE [phone], [phone]. [phone].    Phone Numbers:  [facility] - [phone]  [facility] - [phone]  Surgery Patient Education - [phone] or toll free [phone]    Post-Anesthesia Patient Instructions    IMMEDIATELY FOLLOWING SURGERY:  Do not drive or operate machinery for the first twenty four hours after surgery.  Do not  make any important decisions for twenty four hours after surgery or while taking narcotic pain medications or sedatives.  If you develop intractable nausea and vomiting or a severe headache please notify your doctor immediately.    FOLLOW-UP:  Please make an appointment with your surgeon as instructed. You do not need to follow up with anesthesia unless specifically instructed to do so.    WOUND CARE INSTRUCTIONS (if applicable):  Keep a dry clean dressing on the anesthesia/puncture wound site if there is drainage.  Once the wound has quit draining you may leave it open to air.  Generally you should leave the bandage intact for twenty four hours unless there is drainage.  If the epidural site drains for more than 36-48 hours please call the anesthesia department.    QUESTIONS?:  Please feel free to call your physician or the hospital  if you have any questions, and they will be happy to assist you.

## 2023-02-20 NOTE — ANESTHESIA PREPROCEDURE EVALUATION
Anesthesia Pre-Procedure Evaluation    Patient: Sadaf Blankenship   MRN: [id number] : 1960        Procedure : Procedure(s):  ESOPHAGOGASTRODUODENOSCOPY, WITH FOREIGN BODY REMOVAL possible broncoscopy          Past Medical History:   Diagnosis Date     Adhesive capsulitis of left shoulder 2018     ASCUS on Pap smear 2004    negative HPV     Atypical chest pain 2008    negative cardiolite stress test [facility]     Biliary dyskinesia 2021    Formatting of this note might be different from the original. Added automatically from request for surgery [id number]     Bleeding ulcer 1976     Cervical radicular pain 05/10/2012     Madina bullosa 2016     Concussion with brief LOC 2020     Degenerative disc disease, cervical 2011     Esophageal ulcer 1998     Irritable bowel syndrome 2015     Long uvula 2016    improved     Migraine headaches, severe 2001     Pseudoarthrosis of cervical spine 2012     Recurrent UTI 2011     sinusitis (chronic) 2001     Subacromial bursitis of right shoulder joint 2017     Ulcer of esophagus 2012     Uvulitis 2016      Past Surgical History:   Procedure Laterality Date     BACK SURGERY      cervical     Cervical spine surgery with removal of 2 disks, C5,C7       CHOLECYSTECTOMY  12/10/2021    [facility]     COLONOSCOPY  10/13/2014    Dr Camargo, internal hemorrhoids     COLONOSCOPY - HIM SCAN  2018    EGD and colonoscopy with Dr. Jennings     Coronary angiogram, normal      Chest pain     ENT SURGERY      nose surgery x3     fusion discectomy anterior cervical        ESOPHAGOSCOPY, DIAGNOSTIC  10/31/2014    Dr Camargo, mild erythema of antrum, negative biopsies     HEMORRHOIDECTOMY  12/10/2021    [facility]     HYSTERECTOMY TOTAL ABDOMINAL, SALPINGECTOMY Right 2002    Dr. Hathaway. Endometriosis.     HYSTERECTOMY, PAP NO LONGER INDICATED N/A 2002    Cervix removed.      "IR CONSULTATION FOR IR EXAM  2020     IR FLUORO 0-1 HOUR  2020     NOSE SURGERY      x3            Posterior decompression , fusion C3-5      Psuedoarthrosis      Allergies   Allergen Reactions     Aspirin Hives     Ibuprofen      Dye in the otc form causes headaches  \"patient states over the counter ibuprofen  bothers her\"     Lansoprazole Other (See Comments) and Visual Disturbance     Changes in eyesight  Prevacid  Change in eyesight     Red Dye Hives     Bupropion Rash     Bupropion Hcl Hives and Rash     Wellbutrin     Demerol [Meperidine] Other (See Comments)     Made migraine worse      Social History     Tobacco Use     Smoking status: Former     Years: 8.00     Types: Cigarettes     Start date:      Quit date:      Years since quittin.1     Smokeless tobacco: Never   Substance Use Topics     Alcohol use: No      Wt Readings from Last 1 Encounters:   23 88.5 kg (195 lb)        Anesthesia Evaluation   Pt has had prior anesthetic. Type: MAC and General.        ROS/MED HX  ENT/Pulmonary:     (+) tobacco use, Past use, mild,  COPD,     Neurologic:     (+) peripheral neuropathy, migraines,     Cardiovascular:     (+) Dyslipidemia -----    METS/Exercise Tolerance: >4 METS    Hematologic:  - neg hematologic  ROS     Musculoskeletal: Comment: DJD  S/p cervical spinal fusion C5-7      GI/Hepatic: Comment: Hx ulcer  IBS  Hx biliary dyskinesia     (+) GERD,     Renal/Genitourinary:     (+) Nephrolithiasis ,     Endo:  - neg endo ROS     Psychiatric/Substance Use:     (+) psychiatric history depression and anxiety H/O chronic opiod use .     Infectious Disease:  - neg infectious disease ROS     Malignancy:  - neg malignancy ROS     Other:  - neg other ROS    (+) , H/O Chronic Pain,        Physical Exam    Airway        Mallampati: III   TM distance: > 3 FB   Neck ROM: limited   Mouth opening: < 3 cm    Respiratory Devices and Support         Dental       (+) Minor Abnormalities " - some fillings, tiny chips      Cardiovascular   cardiovascular exam normal          Pulmonary   pulmonary exam normal                OUTSIDE LABS:  CBC:   Lab Results   Component Value Date    WBC 6.4 02/20/2023    WBC 6.1 09/25/2022    HGB 13.3 02/20/2023    HGB 13.5 09/25/2022    HCT 38.9 02/20/2023    HCT 40.7 09/25/2022     02/20/2023     09/25/2022     BMP:   Lab Results   Component Value Date     02/20/2023     12/22/2022    POTASSIUM 3.9 02/20/2023    POTASSIUM 3.6 12/22/2022    CHLORIDE 106 02/20/2023    CHLORIDE 101 12/22/2022    CO2 21 (L) 02/20/2023    CO2 22 12/22/2022    BUN 13.8 02/20/2023    BUN 13.8 12/22/2022    CR 1.00 (H) 02/20/2023    CR 1.02 (H) 12/22/2022     (H) 02/20/2023     (H) 12/22/2022     COAGS:   Lab Results   Component Value Date    PTT 31 01/16/2020    INR 1.00 02/20/2023    FIBR 380 01/16/2020     POC: No results found for: BGM, HCG, HCGS  HEPATIC:   Lab Results   Component Value Date    ALBUMIN 4.1 02/20/2023    PROTTOTAL 7.0 02/20/2023    ALT 21 02/20/2023    AST 17 02/20/2023    ALKPHOS 102 02/20/2023    BILITOTAL 0.3 02/20/2023     OTHER:   Lab Results   Component Value Date    PH 7.44 09/25/2022    LACT 1.1 02/20/2023    SALOME 9.8 02/20/2023    MAG 2.2 09/25/2022    LIPASE 118 09/23/2022    AMYLASE 55 10/26/2014    TSH 2.91 03/25/2021    CRP <2.9 09/25/2022    SED 8 04/11/2015       Anesthesia Plan    ASA Status:  3, emergent    NPO Status:  ELEVATED Aspiration Risk/Unknown    Anesthesia Type: General.     - Airway: ETT   Induction: RSI.   Maintenance: Balanced.        Consents    Anesthesia Plan(s) and associated risks, benefits, and realistic alternatives discussed. Questions answered and patient/representative(s) expressed understanding.     - Discussed: Risks, Benefits and Alternatives for BOTH SEDATION and the PROCEDURE were discussed     - Discussed with:  Patient      - Extended Intubation/Ventilatory Support Discussed: Yes.      -  Patient is DNR/DNI Status: No    Use of blood products discussed: No .     Postoperative Care    Pain management: IV analgesics.   PONV prophylaxis: Dexamethasone or Solumedrol, Ondansetron (or other 5HT-3)     Comments:    Other Comments: Discussed risks and benefits with patient for general anesthesia including sore throat, nausea, vomiting, aspiration, dental damage, loss of airway, CV complications, stroke, MI, death. Pt wishes to proceed.             JULIO Vargas CRNA

## 2023-02-20 NOTE — ANESTHESIA POSTPROCEDURE EVALUATION
Patient: Sadaf Dumontndjonh    Procedure: Procedure(s):  ESOPHAGOGASTRODUODENOSCOPY, bronchoscopy       Anesthesia Type:  General    Note:  Disposition: Outpatient   Postop Pain Control: Uneventful            Sign Out: Well controlled pain   PONV: No   Neuro/Psych: Uneventful            Sign Out: Acceptable/Baseline neuro status   Airway/Respiratory: Uneventful            Sign Out: Acceptable/Baseline resp. status   CV/Hemodynamics: Uneventful            Sign Out: Acceptable CV status; No obvious hypovolemia; No obvious fluid overload   Other NRE: NONE   DID A NON-ROUTINE EVENT OCCUR? No           Last vitals:  Vitals Value Taken Time   /73 02/20/23 1040   Temp 98.5  F (36.9  C) 02/20/23 1040   Pulse 80 02/20/23 1040   Resp 17 02/20/23 1040   SpO2 96 % 02/20/23 1040       Electronically Signed By: JULIO Vargas CRNA  February 20, 2023  12:08 PM

## 2023-02-20 NOTE — H&P
HISTORY AND PHYSICAL - ENDOSCOPY   2/20/2023    Patient : Sadaf Dumontndjonh    Planned Procedures : Esophagogastroduodenoscopy with removal of foreign body.  Possible bronchoscopy.      This is a 62 year old female who presented to the emergency room with inability to swallow.  She states that she was taking her medications last night and took them all at once and at that time not had the ability to swallow.  She was found down and brought into the emergency room.  While in the emergency room she has been pulmonary only stable.  She still has inability to swallow.  Unable to swallow her secretions.  General surgery was called.  Does have a history of GERD.  No past episodes of food impaction.     Past Medical History:  Past Medical History:   Diagnosis Date     Adhesive capsulitis of left shoulder 12/6/2018     ASCUS on Pap smear 05/26/2004    negative HPV     Atypical chest pain 05/26/2008    negative cardiolite stress test [facility]     Biliary dyskinesia 12/2/2021    Formatting of this note might be different from the original. Added automatically from request for surgery [id number]     Bleeding ulcer 05/26/1976     Cervical radicular pain 05/10/2012     Madina bullosa 1/22/2016     Concussion with brief LOC 1/16/2020     Degenerative disc disease, cervical 01/01/2011     Esophageal ulcer 05/26/1998     Irritable bowel syndrome 01/26/2015     Long uvula 1/22/2016    improved     Migraine headaches, severe 07/09/2001     Pseudoarthrosis of cervical spine 07/03/2012     Recurrent UTI 05/26/2011     sinusitis (chronic) 04/16/2001     Subacromial bursitis of right shoulder joint 1/26/2017     Ulcer of esophagus 6/25/2012     Uvulitis 1/22/2016       Past Surgical History:  Past Surgical History:   Procedure Laterality Date     BACK SURGERY      cervical     Cervical spine surgery with removal of 2 disks, C5,C7       CHOLECYSTECTOMY  12/10/2021    [facility]     COLONOSCOPY  10/13/2014    Dr Camargo, internal  hemorrhoids     COLONOSCOPY - HIM SCAN  2018    EGD and colonoscopy with Dr. Jennings     Coronary angiogram, normal      Chest pain     ENT SURGERY      nose surgery x3     fusion discectomy anterior cervical        ESOPHAGOSCOPY, DIAGNOSTIC  10/31/2014    Dr Camargo, mild erythema of antrum, negative biopsies     HEMORRHOIDECTOMY  12/10/2021    [facility]     HYSTERECTOMY TOTAL ABDOMINAL, SALPINGECTOMY Right 2002    Dr. Hathaway. Endometriosis.     HYSTERECTOMY, PAP NO LONGER INDICATED N/A 2002    Cervix removed.     IR CONSULTATION FOR IR EXAM  2020     IR FLUORO 0-1 HOUR  2020     NOSE SURGERY      x3            Posterior decompression , fusion C3-5      Psuedoarthrosis       Family History History:  Family History   Problem Relation Age of Onset     Cancer Father         lung, also a heavy smoker     Diabetes Mother      Heart Disease Mother 58        MI; cause of death; heavy smoker. had first MI at 40     Coronary Artery Disease Mother      Hypertension Mother      Cancer Other         strong history     Heart Disease Other         heart disease     Heart Disease Brother         x3     Hypertension Brother      Diabetes Brother      Coronary Artery Disease Brother      Hypertension Sister        History of Tobacco Use:  History   Smoking Status     Former     Years: 8.00     Types: Cigarettes     Start date:      Quit date:    Smokeless Tobacco     Never       Current Medications:  Current Outpatient Medications   Medication Sig Dispense Refill     albuterol (PROAIR HFA/PROVENTIL HFA/VENTOLIN HFA) 108 (90 Base) MCG/ACT inhaler Inhale 2 puffs into the lungs every 4 hours as needed for shortness of breath / dyspnea or wheezing 8 g 0     baclofen (LIORESAL) 10 MG tablet Take 1 tablet (10 mg) by mouth daily 90 tablet 3     budesonide-formoterol (SYMBICORT) 80-4.5 MCG/ACT Inhaler Inhale 2 puffs into the lungs 2 times daily 10.2 g 1      butalbital-acetaminophen-caffeine (ESGIC) -40 MG tablet Take 1 tablet by mouth every 4 hours as needed TAKE 1 TO 2 TABLETS BY MOUTH AT THE ONSET OF A MIGRAINE HEADACHE, LIMIT NO MORE THAN 3 TIMES PER WEEK. ACETAMINOPHEN SHOULD BE LIMITED TO 40       Calcium Carbonate Antacid 1177 MG CHEW        Cholecalciferol (VITAMIN D3) 1000 UNITS CAPS Take 1,000 Units by mouth Takes 5000 unit capsule daily       dexlansoprazole (DEXILANT) 60 MG CPDR CR capsule Take 1 capsule (60 mg) by mouth daily 90 capsule 3     diazepam (VALIUM) 5 MG tablet Take 1 tablet (5 mg) by mouth daily as needed for anxiety 30 tablet 0     docusate sodium (COLACE) 100 MG capsule Take 100 mg by mouth daily        Erenumab-aooe (AIMOVIG SC) Inject 140 mg Subcutaneous every 30 days Takes once a month       famotidine (PEPCID) 40 MG tablet TAKE ONE TABLET BY MOUTH DAILY AT BEDTIME 90 tablet 1     FLUoxetine (PROZAC) 20 MG capsule Take 1 capsule daily along with 40 mg capsule (total daily dose 60 mg) 30 capsule 4     FLUoxetine (PROZAC) 40 MG capsule Take 1 capsule (40 mg) by mouth daily 30 capsule 3     folic acid (FOLVITE) 1 MG tablet Take 1 mg by mouth daily       HYDROcodone-acetaminophen (NORCO) 7.5-325 MG per tablet TAKE 1 TABLET BY MOUTH 2 TIMES DAILY AS NEEDED FOR SEVERE PAIN 60 tablet 0     hydrOXYzine (ATARAX) 25 MG tablet Take 2 tablets (50 mg) by mouth At Bedtime 60 tablet 4     ibuprofen (ADVIL/MOTRIN) 800 MG tablet Take 1 tablet (800 mg) by mouth every 8 hours as needed for moderate pain 90 tablet 1     methocarbamol (ROBAXIN) 500 MG tablet TAKE 1 TABLET (500 MG) BY MOUTH 4 TIMES DAILY AS NEEDED FOR MUSCLE SPASMS PATIENT TAKES AS NEEDED. 60 tablet 2     METHOTREXATE PO Take 10 mg by mouth once a week Patient takes 4 - 10 mg every thursday       Multiple Vitamins-Minerals (CENTRUM SILVER ULTRA WOMENS) TABS Take 1 tablet by mouth daily        ondansetron (ZOFRAN-ODT) 4 MG ODT tab Take 4 mg by mouth every 6 hours as needed (after migraine  "medication)        pregabalin (LYRICA) 150 MG capsule Take 1 capsule (150 mg) by mouth 2 times daily 180 capsule 0     sucralfate (CARAFATE) 1 GM/10ML suspension Take 10 mLs (1 g) by mouth 4 times daily 420 mL 3       Allergies:  Allergies   Allergen Reactions     Aspirin Hives     Ibuprofen      Dye in the otc form causes headaches  \"patient states over the counter ibuprofen  bothers her\"     Lansoprazole Other (See Comments) and Visual Disturbance     Changes in eyesight  Prevacid  Change in eyesight     Red Dye Hives     Bupropion Rash     Bupropion Hcl Hives and Rash     Wellbutrin     Demerol [Meperidine] Other (See Comments)     Made migraine worse       ROS:  Pertinent items are noted in HPI.  All other systems are negative.    PHYSICAL EXAM:     Vital signs: /79   Pulse 73   Temp 97.9  F (36.6  C) (Oral)   Resp 18   SpO2 97%    Weight: [unfilled]   BMI: There is no height or weight on file to calculate BMI.   General: Normal, healthy, cooperative, in no acute distress, alert   Skin: no jaundice   HEENT: PERRLA and EOMI   Neck: supple   Lungs: clear to auscultation   CV: Regular rate and rhythm without murmer   Abdominal: abdomen is soft without significant tenderness, masses, organomegaly or guarding   Extremities: No cyanosis, clubbing or edema noted bilaterally in Upper and Lower Extremities   Neurological: without deficit    Assessment:   62 year old female with probable foreign body impaction of the distal esophagus.      Plan:   Will plan on doing an Esophagogastroduodenoscopy and removal of foreign body.  If necessary we will do a bronchoscopy.       The risks, benefits, and alternatives to the planned procedure were fully discussed with the patient and/or the patient's representative(s). The risks of bleeding, infection, death, missing pathology, the need for additional procedures intra-operatively, the possible need for intra-operative consults, the possible need for transfusion therapy, " cardiopulmonary compromise, the possible need for additional surgery for a complication were discussed with the patient and/or the patient's representative(s). The patient's and/or patient's representative(s) questions were addressed and answered. Informed consent was obtained from the patient and/or the patient's representative(s). The patient and/or the patient's representative(s) consent to proceed.    Specific risks:  Risks include but are not limited to bleeding, perforation, missing lesions, need for additional procedures, reaction to anesthesia.  All the patients questions were answered.  The patient consents to proceed.  The procedures will be scheduled.

## 2023-02-20 NOTE — ED NOTES
"Pt alert and awake at this time. Provider at bedside. Pt reports trouble breathing and states \"I am choking, I cant breathe, I am choking.\" Pt reassured that her oxygen is great and she is able to talk so that is reassuring. Pt very anxious at this time. States she called her sister who then called EMS as she was having difficulty breathing. Pt feels that she has a pill stuck in her throat.   "

## 2023-02-20 NOTE — ANESTHESIA CARE TRANSFER NOTE
Patient: Sadaf Blankenship    Procedure: Procedure(s):  ESOPHAGOGASTRODUODENOSCOPY, bronchoscopy       Diagnosis: Dysphagia, unspecified type [R13.10]  Diagnosis Additional Information: No value filed.    Anesthesia Type:   General     Note:    Oropharynx: oropharynx clear of all foreign objects and spontaneously breathing  Level of Consciousness: awake  Oxygen Supplementation: room air    Independent Airway: airway patency satisfactory and stable  Dentition: dentition unchanged  Vital Signs Stable: post-procedure vital signs reviewed and stable  Report to RN Given: handoff report given  Patient transferred to: PACU    Handoff Report: Identifed the Patient, Identified the Reponsible Provider, Reviewed the pertinent medical history, Discussed the surgical course, Reviewed Intra-OP anesthesia mangement and issues during anesthesia, Set expectations for post-procedure period and Allowed opportunity for questions and acknowledgement of understanding      Vitals:  Vitals Value Taken Time   BP 97/69 02/20/23 1015   Temp 97.4  F (36.3  C) 02/20/23 1005   Pulse 89 02/20/23 1016   Resp 14 02/20/23 1016   SpO2 94 % 02/20/23 1016   Vitals shown include unvalidated device data.    Electronically Signed By: JULIO Forman CRNA  February 20, 2023  10:16 AM

## 2023-02-22 ENCOUNTER — MYC MEDICAL ADVICE (OUTPATIENT)
Dept: FAMILY MEDICINE | Facility: OTHER | Age: 63
End: 2023-02-22

## 2023-02-22 ASSESSMENT — PATIENT HEALTH QUESTIONNAIRE - PHQ9
10. IF YOU CHECKED OFF ANY PROBLEMS, HOW DIFFICULT HAVE THESE PROBLEMS MADE IT FOR YOU TO DO YOUR WORK, TAKE CARE OF THINGS AT HOME, OR GET ALONG WITH OTHER PEOPLE: SOMEWHAT DIFFICULT
SUM OF ALL RESPONSES TO PHQ QUESTIONS 1-9: 8
SUM OF ALL RESPONSES TO PHQ QUESTIONS 1-9: 8

## 2023-02-22 ASSESSMENT — ANXIETY QUESTIONNAIRES
1. FEELING NERVOUS, ANXIOUS, OR ON EDGE: SEVERAL DAYS
IF YOU CHECKED OFF ANY PROBLEMS ON THIS QUESTIONNAIRE, HOW DIFFICULT HAVE THESE PROBLEMS MADE IT FOR YOU TO DO YOUR WORK, TAKE CARE OF THINGS AT HOME, OR GET ALONG WITH OTHER PEOPLE: SOMEWHAT DIFFICULT
GAD7 TOTAL SCORE: 8
8. IF YOU CHECKED OFF ANY PROBLEMS, HOW DIFFICULT HAVE THESE MADE IT FOR YOU TO DO YOUR WORK, TAKE CARE OF THINGS AT HOME, OR GET ALONG WITH OTHER PEOPLE?: SOMEWHAT DIFFICULT
GAD7 TOTAL SCORE: 8
5. BEING SO RESTLESS THAT IT IS HARD TO SIT STILL: SEVERAL DAYS
6. BECOMING EASILY ANNOYED OR IRRITABLE: SEVERAL DAYS
4. TROUBLE RELAXING: SEVERAL DAYS
GAD7 TOTAL SCORE: 8
3. WORRYING TOO MUCH ABOUT DIFFERENT THINGS: SEVERAL DAYS
2. NOT BEING ABLE TO STOP OR CONTROL WORRYING: MORE THAN HALF THE DAYS
7. FEELING AFRAID AS IF SOMETHING AWFUL MIGHT HAPPEN: SEVERAL DAYS
7. FEELING AFRAID AS IF SOMETHING AWFUL MIGHT HAPPEN: SEVERAL DAYS

## 2023-02-23 NOTE — TELEPHONE ENCOUNTER
[facility]LL[facility]7 for Depression Remission quality patient outreach per [facility] quality guidelines. This writer does not see any major mental health and/or safety concerns as seeing a slight decrease in symptom severity when comparing 2/22/2023's questionnaires to 1/22/2023's.      PHQ 11/11/2022 1/22/2023 2/22/2023   PHQ-9 Total Score 15 10 8   Q9: Thoughts of better off dead/self-harm past 2 weeks Not at all Not at all Not at all     ESTRELLA-7 SCORE 11/11/2022 1/22/2023 2/22/2023   Total Score - 9 (mild anxiety) 8 (mild anxiety)   Total Score 6 9 8     Negra Prabhakar RN

## 2023-02-24 ENCOUNTER — MYC MEDICAL ADVICE (OUTPATIENT)
Dept: FAMILY MEDICINE | Facility: OTHER | Age: 63
End: 2023-02-24

## 2023-02-27 ENCOUNTER — NURSE TRIAGE (OUTPATIENT)
Dept: FAMILY MEDICINE | Facility: OTHER | Age: 63
End: 2023-02-27

## 2023-02-27 ENCOUNTER — TELEPHONE (OUTPATIENT)
Dept: FAMILY MEDICINE | Facility: OTHER | Age: 63
End: 2023-02-27

## 2023-02-27 NOTE — TELEPHONE ENCOUNTER
Next 5 appointments (look out 90 days)    Mar 03, 2023  1:30 PM  (Arrive by 1:15 PM)  SHORT with Henna Moyer MD  [facility] [place] ([facility] [place] ) [address]  [place] [address]  [phone]   Mar 23, 2023 10:30 AM  (Arrive by 10:15 AM)  SHORT with Henna Moyer MD  [facility] [place] ([facility] [place] ) [address]  [place] [address]  [phone]        Geeta Collazo RN

## 2023-02-27 NOTE — TELEPHONE ENCOUNTER
"  Pt calling and her throat is still sore.Voice still raspy.Feels like there is something wrong with her throat or vocal cords? She is not sure if something got damaged when she was in ED on 2.19.2023.Multiple complaints regarding ED visit.  She does not think she can wait until next week to be seen by surgery.  She wants to be seen by PCP sooner or be referred to someone.  She just started eating this weekend. She has not had any choking incidents like 2.19.2023 but has coughed.  Advised if s/s worsen go back to ED.She states she will not.Adivsed her to call 911 then.    Please advise.    Call back [phone]      Geeta Collazo RN    Answer Assessment - Initial Assessment Questions  1. ONSET: \"When did the throat start hurting?\" (Hours or days ago)       2.19.2023  2. SEVERITY: \"How bad is the sore throat?\" (Scale 1-10; mild, moderate or severe)    - MILD (1-3):  doesn't interfere with eating or normal activities    - MODERATE (4-7): interferes with eating some solids and normal activities    - SEVERE (8-10):  excruciating pain, interferes with most normal activities    - SEVERE DYSPHAGIA: can't swallow liquids, drooling      Burning 3-4/10  3. STREP EXPOSURE: \"Has there been any exposure to strep within the past week?\" If Yes, ask: \"What type of contact occurred?\"       no  4.  VIRAL SYMPTOMS: \"Are there any symptoms of a cold, such as a runny nose, cough, hoarse voice or red eyes?\"       She is having a hard time talking and wondering if she has something wrong in there.If she talks to much   5. FEVER: \"Do you have a fever?\" If Yes, ask: \"What is your temperature, how was it measured, and when did it start?\"      no  6. PUS ON THE TONSILS: \"Is there pus on the tonsils in the back of your throat?\"      no  7. OTHER SYMPTOMS: \"Do you have any other symptoms?\" (e.g., difficulty breathing, headache, rash)     no  8. PREGNANCY: \"Is there any chance you are pregnant?\" \"When was your last menstrual period?\"      " no    Protocols used: SORE THROAT-A-AH

## 2023-03-02 NOTE — PROGRESS NOTES
Assessment & Plan     Nasal congestion / Suspected 2019 novel coronavirus infection  Most likely viral. Duration of 3 days.   - c/w mucinex   - Symptomatic Influenza A/B, RSV, & SARS-CoV2 PCR (COVID-19); Future  - Symptomatic Influenza A/B, RSV, & SARS-CoV2 PCR (COVID-19) Nose    Hoarse voice quality / Dysphagia, unspecified type  Consideration for esophageal spasm  ER notes, imaging, lab reviewed  General sx notes reviewed   - Speech Therapy Referral; Future    Other chronic pain  - [employer] Order for DME - ONLY FOR DME       MED REC REQUIRED  Post Medication Reconciliation Status:  Discharge medications reconciled and changed, see notes/orders      31 minutes spent on the date of the encounter doing chart review, review of test results, interpretation of tests, patient visit, documentation     See Patient Instructions    Next visit 3/23/2023    Henna Moyer MD  [facility] - DELIA Parkinson is a 62 year old, presenting for the following health issues:  ER F/U      HPI     ED/UC Followup:    [place]  [facility] [place]: [place]  Reason for visit: Dysphagia  Current Status: Able to swallow w/o issues. Still hoarse voice     - nighttime pills. ~ 7 pills   - felt like pills got stuck. And was drinking water to get the pills done  - call her sister and was yelling on the phone, but her sister could barely hear her  - Tesha was found unconscious and transported to ER, unconscious. Needed sternal rub to regain consciousness     - EGD and bronchoscopy was negative   - appt with general sx on 3/8/2023  - coughs after drinking   - h/o sinus/ facial as kiddo  - twin brother with drop attacks       # illness  - duration of 3 days  - maxillary sinus pressure       Review of Systems   Constitutional: Negative for chills and fever.   HENT: Positive for congestion, rhinorrhea, sinus pain and voice change. Negative for trouble swallowing (resolved).    Respiratory: Positive for cough,  "shortness of breath and wheezing.    Cardiovascular: Negative for chest pain and palpitations.   Gastrointestinal: Negative for abdominal pain.          Objective    /66 (BP Location: Right arm, Patient Position: Sitting, Cuff Size: Adult Large)   Pulse 74   Temp 99.2  F (37.3  C) (Tympanic)   Resp 16   Ht 1.676 m (5' 6\")   Wt 86.6 kg (191 lb)   SpO2 96%   BMI 30.83 kg/m    Body mass index is 30.83 kg/m .  Physical Exam  Constitutional:       General: She is not in acute distress.     Appearance: She is well-developed. She is not ill-appearing.   HENT:      Head: Normocephalic and atraumatic.      Right Ear: Hearing and tympanic membrane normal.      Left Ear: Hearing and tympanic membrane normal.      Mouth/Throat:      Mouth: Mucous membranes are moist.      Pharynx: No oropharyngeal exudate.   Eyes:      Extraocular Movements: Extraocular movements intact.      Conjunctiva/sclera: Conjunctivae normal.   Neck:      Thyroid: No thyromegaly.   Cardiovascular:      Rate and Rhythm: Normal rate and regular rhythm.      Pulses: Normal pulses.      Heart sounds: Normal heart sounds. No murmur heard.  Pulmonary:      Effort: Pulmonary effort is normal. No respiratory distress.      Breath sounds: Normal breath sounds. No wheezing or rales.   Abdominal:      General: Bowel sounds are normal. There is no distension.      Palpations: Abdomen is soft.      Tenderness: There is abdominal tenderness in the epigastric area. There is no guarding.   Musculoskeletal:         General: Normal range of motion.      Cervical back: Normal range of motion and neck supple.      Right lower leg: No edema.      Left lower leg: No edema.   Lymphadenopathy:      Cervical: No cervical adenopathy.   Skin:     General: Skin is dry.   Neurological:      Mental Status: She is alert.   Psychiatric:         Mood and Affect: Mood normal.        Admission on 02/19/2023, Discharged on 02/20/2023   Component Date Value Ref Range Status     " Hold Specimen 02/20/2023 JI   Final     Hold Specimen 02/20/2023 [facility]   Final     Hold Specimen 02/20/2023 [facility]   Final     Hold Specimen 02/20/2023 [facility]   Final     Hold Specimen 02/20/2023 [facility]   Final     INR 02/20/2023 1.00  0.85 - 1.15 Final     Sodium 02/20/2023 140  136 - 145 mmol/L Final     Potassium 02/20/2023 3.9  3.4 - 5.3 mmol/L Final     Chloride 02/20/2023 106  98 - 107 mmol/L Final     Carbon Dioxide (CO2) 02/20/2023 21 (L)  22 - 29 mmol/L Final     Anion Gap 02/20/2023 13  7 - 15 mmol/L Final     Urea Nitrogen 02/20/2023 13.8  8.0 - 23.0 mg/dL Final     Creatinine 02/20/2023 1.00 (H)  0.51 - 0.95 mg/dL Final     Calcium 02/20/2023 9.8  8.8 - 10.2 mg/dL Final     Glucose 02/20/2023 101 (H)  70 - 99 mg/dL Final     Alkaline Phosphatase 02/20/2023 102  35 - 104 U/L Final     AST 02/20/2023 17  10 - 35 U/L Final     ALT 02/20/2023 21  10 - 35 U/L Final     Protein Total 02/20/2023 7.0  6.4 - 8.3 g/dL Final     Albumin 02/20/2023 4.1  3.5 - 5.2 g/dL Final     Bilirubin Total 02/20/2023 0.3  <=1.2 mg/dL Final     GFR Estimate 02/20/2023 64  >60 mL/min/1.73m2 Final    eGFR calculated using 2021 CKD-EPI equation.     Lactic Acid 02/20/2023 1.1  0.7 - 2.0 mmol/L Final     Troponin T, High Sensitivity 02/20/2023 <6  <=14 ng/L Final    Either a High Sensitivity Troponin T baseline (0 hours) value = 100 ng/mL, or an increase in High Sensitivity Troponin T = 7 ng/mL at 2 hours compared to 0 hours (2-0 hours), suggests myocardial injury, and urgent clinical attention is required.    If the 2-0 hours increase is<7 ng/mL, a High Sensitivity Troponin T result above gender-specific reference ranges warrants further evaluation.   Recommendations for further evaluation include correlation with clinical decision-making tool (e.g., HEART), a 3rd High Sensitivity Troponin T test 2 hours after the 2nd (a 20% change from baseline would represent concern), admission for observation, close PCC/cardiology follow-up, or urgent  outpatient provocative testing.     pH Venous 02/20/2023 7.34  7.32 - 7.43 Final     pCO2 Venous 02/20/2023 42  40 - 50 mm Hg Final     pO2 Venous 02/20/2023 46  25 - 47 mm Hg Final     Bicarbonate Venous 02/20/2023 22  21 - 28 mmol/L Final     FIO2 02/20/2023 2   Final     Oxyhemoglobin Venous 02/20/2023 80 (H)  70 - 75 % Final     Base Excess/Deficit (+/-) 02/20/2023 -3.3  -7.7 - 1.9 mmol/L Final     N terminal Pro BNP Inpatient 02/20/2023 84  0 - 900 pg/mL Final    Reference range shown and results flagged as abnormal are suggested inpatient cut points for confirming diagnosis if CHF in an acute setting. Establishing a baseline value for each individual patient is useful for follow-up. An inpatient or emergency department NT-proPBNP <300 pg/mL effectively rules out acute CHF, with 99% negative predictive value.    The outpatient non-acute reference range for ruling out CHF is:  0-125 pg/mL (age 18 to less than 75)  0-450 pg/mL (age 75 yrs and older)      Influenza A PCR 02/20/2023 Negative  Negative Final     Influenza B PCR 02/20/2023 Negative  Negative Final     RSV PCR 02/20/2023 Negative  Negative Final     SARS CoV2 PCR 02/20/2023 Negative  Negative Final    NEGATIVE: SARS-CoV-2 (COVID-19) RNA not detected, presumed negative.     Alcohol ethyl 02/20/2023 <0.01  <=0.01 g/dL Final     WBC Count 02/20/2023 6.4  4.0 - 11.0 10e3/uL Final     RBC Count 02/20/2023 4.39  3.80 - 5.20 10e6/uL Final     Hemoglobin 02/20/2023 13.3  11.7 - 15.7 g/dL Final     Hematocrit 02/20/2023 38.9  35.0 - 47.0 % Final     MCV 02/20/2023 89  78 - 100 fL Final     MCH 02/20/2023 30.3  26.5 - 33.0 pg Final     MCHC 02/20/2023 34.2  31.5 - 36.5 g/dL Final     RDW 02/20/2023 14.1  10.0 - 15.0 % Final     Platelet Count 02/20/2023 202  150 - 450 10e3/uL Final     % Neutrophils 02/20/2023 58  % Final     % Lymphocytes 02/20/2023 30  % Final     % Monocytes 02/20/2023 8  % Final     % Eosinophils 02/20/2023 3  % Final     % Basophils  02/20/2023 1  % Final     % Immature Granulocytes 02/20/2023 0  % Final     NRBCs per 100 WBC 02/20/2023 0  <1 /100 Final     Absolute Neutrophils 02/20/2023 3.8  1.6 - 8.3 10e3/uL Final     Absolute Lymphocytes 02/20/2023 1.9  0.8 - 5.3 10e3/uL Final     Absolute Monocytes 02/20/2023 0.5  0.0 - 1.3 10e3/uL Final     Absolute Eosinophils 02/20/2023 0.2  0.0 - 0.7 10e3/uL Final     Absolute Basophils 02/20/2023 0.0  0.0 - 0.2 10e3/uL Final     Absolute Immature Granulocytes 02/20/2023 0.0  <=0.4 10e3/uL Final     Absolute NRBCs 02/20/2023 0.0  10e3/uL Final     Cannabinoids (78-nbs-2-carboxy-9-T* 02/20/2023 Not Detected  Not Detected, Indeterminate Final    Cutoff for a negative cannabinoid is 50 ng/mL or less.     Phencyclidine 02/20/2023 Not Detected  Not Detected, Indeterminate Final    Cutoff for a negative PCP is 25 ng/mL or less.     Cocaine (Benzoylecgonine) 02/20/2023 Not Detected  Not Detected, Indeterminate Final    Cutoff for a negative cocaine is 150 ng/ml or less.     Methamphetamine (d-Methamphetamine) 02/20/2023 Not Detected  Not Detected, Indeterminate Final    Cutoff for a negative methamphetamine is 500 ng/ml or less.     Opiates (Morphine) 02/20/2023 Not Detected  Not Detected, Indeterminate Final    Cutoff for a negative opiate is 100 ng/ml or less.     Amphetamine (d-Amphetamine) 02/20/2023 Not Detected  Not Detected, Indeterminate Final    Cutoff for a negative amphetamine is 500 ng/mL or less.     Benzodiazepines (Nordiazepam) 02/20/2023 Detected (A)  Not Detected, Indeterminate Final    Cutoff for a positive benzodiazepines is greater than 150 ng/ml.  This is an unconfirmed screening result to be used for medical purposes only.      Tricyclic Antidepressants (Desipra* 02/20/2023 Not Detected  Not Detected, Indeterminate Final    Cutoff for a negative tricyclic antidepressant is 300 ng/ml or less.     Methadone 02/20/2023 Not Detected  Not Detected, Indeterminate Final    Cutoff for a negative  methadone is 200 ng/ml or less.     Barbiturates (Butalbital) 02/20/2023 Not Detected  Not Detected, Indeterminate Final    Cutoff for a negative barbituate is 200 ng/ml or less.     Oxycodone 02/20/2023 Not Detected  Not Detected, Indeterminate Final    Cutoff for a negative oxycodone is 100 ng/mL or less.     Propoxyphene (Norpropoxyphene) 02/20/2023 Not Detected  Not Detected, Indeterminate Final    Cutoff for a negative propoxyphene is 300 ng/ml or less.     Buprenorphine 02/20/2023 Not Detected  Not Detected, Indeterminate Final    Cutoff for a negative buprenorphine is 10 ng/ml or less.

## 2023-03-03 ENCOUNTER — OFFICE VISIT (OUTPATIENT)
Dept: FAMILY MEDICINE | Facility: OTHER | Age: 63
End: 2023-03-03
Attending: FAMILY MEDICINE
Payer: COMMERCIAL

## 2023-03-03 VITALS
BODY MASS INDEX: 30.7 KG/M2 | WEIGHT: 191 LBS | RESPIRATION RATE: 16 BRPM | HEART RATE: 74 BPM | DIASTOLIC BLOOD PRESSURE: 66 MMHG | SYSTOLIC BLOOD PRESSURE: 112 MMHG | OXYGEN SATURATION: 96 % | HEIGHT: 66 IN | TEMPERATURE: 99.2 F

## 2023-03-03 DIAGNOSIS — G89.29 OTHER CHRONIC PAIN: Chronic | ICD-10-CM

## 2023-03-03 DIAGNOSIS — R09.81 NASAL CONGESTION: Primary | ICD-10-CM

## 2023-03-03 DIAGNOSIS — R49.0 HOARSE VOICE QUALITY: ICD-10-CM

## 2023-03-03 DIAGNOSIS — R13.10 DYSPHAGIA, UNSPECIFIED TYPE: ICD-10-CM

## 2023-03-03 DIAGNOSIS — Z20.822 SUSPECTED 2019 NOVEL CORONAVIRUS INFECTION: ICD-10-CM

## 2023-03-03 LAB
FLUAV RNA SPEC QL NAA+PROBE: NEGATIVE
FLUBV RNA RESP QL NAA+PROBE: NEGATIVE
RSV RNA SPEC NAA+PROBE: NEGATIVE
SARS-COV-2 RNA RESP QL NAA+PROBE: NEGATIVE

## 2023-03-03 PROCEDURE — 99214 OFFICE O/P EST MOD 30 MIN: CPT | Mod: CS | Performed by: FAMILY MEDICINE

## 2023-03-03 PROCEDURE — 87637 SARSCOV2&INF A&B&RSV AMP PRB: CPT | Mod: ZL | Performed by: FAMILY MEDICINE

## 2023-03-03 PROCEDURE — G0463 HOSPITAL OUTPT CLINIC VISIT: HCPCS | Performed by: FAMILY MEDICINE

## 2023-03-03 ASSESSMENT — ENCOUNTER SYMPTOMS
SINUS PAIN: 1
PALPITATIONS: 0
RHINORRHEA: 1
COUGH: 1
CHILLS: 0
ABDOMINAL PAIN: 0
VOICE CHANGE: 1
FEVER: 0
SHORTNESS OF BREATH: 1
TROUBLE SWALLOWING: 0
WHEEZING: 1

## 2023-03-03 ASSESSMENT — PAIN SCALES - GENERAL: PAINLEVEL: MILD PAIN (3)

## 2023-03-07 ENCOUNTER — HOSPITAL ENCOUNTER (OUTPATIENT)
Dept: SPEECH THERAPY | Facility: HOSPITAL | Age: 63
Setting detail: THERAPIES SERIES
Discharge: HOME | End: 2023-03-07
Attending: FAMILY MEDICINE
Payer: COMMERCIAL

## 2023-03-07 DIAGNOSIS — R13.10 DYSPHAGIA, UNSPECIFIED TYPE: ICD-10-CM

## 2023-03-07 DIAGNOSIS — R49.0 HOARSE VOICE QUALITY: ICD-10-CM

## 2023-03-07 PROCEDURE — 92610 EVALUATE SWALLOWING FUNCTION: CPT | Mod: GN

## 2023-03-08 ENCOUNTER — OFFICE VISIT (OUTPATIENT)
Dept: SURGERY | Facility: OTHER | Age: 63
End: 2023-03-08
Attending: NURSE PRACTITIONER
Payer: COMMERCIAL

## 2023-03-08 VITALS
DIASTOLIC BLOOD PRESSURE: 68 MMHG | SYSTOLIC BLOOD PRESSURE: 116 MMHG | WEIGHT: 191 LBS | BODY MASS INDEX: 30.7 KG/M2 | HEIGHT: 66 IN | HEART RATE: 78 BPM | OXYGEN SATURATION: 99 % | TEMPERATURE: 97.8 F

## 2023-03-08 DIAGNOSIS — Z87.898 HISTORY OF DYSPHAGIA: Primary | ICD-10-CM

## 2023-03-08 PROCEDURE — 99203 OFFICE O/P NEW LOW 30 MIN: CPT | Performed by: NURSE PRACTITIONER

## 2023-03-08 PROCEDURE — G0463 HOSPITAL OUTPT CLINIC VISIT: HCPCS

## 2023-03-08 ASSESSMENT — PAIN SCALES - GENERAL: PAINLEVEL: MILD PAIN (3)

## 2023-03-08 NOTE — PATIENT INSTRUCTIONS
Thank you for allowing Lexis Ortiz CNP and our surgical team to participate in your care. Please call our health unit coordinator at [phone] with scheduling questions or the nurse at [phone] with any other questions or concerns.

## 2023-03-08 NOTE — PROGRESS NOTES
CLINIC NOTE - POST-OP SURGERY  3/8/2023    Patient:Sadaf MCMAHAN Latendjonh    Procedure: Esophagogastroduodenoscopy and bronchoscopy    This is a 62 year old female who is 2 weeks s/p Esophagogastroduodenoscopy and bronchoscopy.  The patient states she is slowly advancing her diet.  She saw her PCP and is scheduled to see ENT and have a swallow evaluation.    Current Medications:  Current Outpatient Medications   Medication Sig Dispense Refill     albuterol (PROAIR HFA/PROVENTIL HFA/VENTOLIN HFA) 108 (90 Base) MCG/ACT inhaler Inhale 2 puffs into the lungs every 4 hours as needed for shortness of breath / dyspnea or wheezing 8 g 0     baclofen (LIORESAL) 10 MG tablet Take 1 tablet (10 mg) by mouth daily 90 tablet 3     budesonide-formoterol (SYMBICORT) 80-4.5 MCG/ACT Inhaler Inhale 2 puffs into the lungs 2 times daily 10.2 g 1     butalbital-acetaminophen-caffeine (ESGIC) -40 MG tablet Take 1 tablet by mouth every 4 hours as needed TAKE 1 TO 2 TABLETS BY MOUTH AT THE ONSET OF A MIGRAINE HEADACHE, LIMIT NO MORE THAN 3 TIMES PER WEEK. ACETAMINOPHEN SHOULD BE LIMITED TO 40       Calcium Carbonate Antacid 1177 MG CHEW        Cholecalciferol (VITAMIN D3) 1000 UNITS CAPS Take 1,000 Units by mouth Takes 5000 unit capsule daily       dexlansoprazole (DEXILANT) 60 MG CPDR CR capsule Take 1 capsule (60 mg) by mouth daily 90 capsule 3     diazepam (VALIUM) 5 MG tablet Take 1 tablet (5 mg) by mouth daily as needed for anxiety 30 tablet 0     docusate sodium (COLACE) 100 MG capsule Take 100 mg by mouth daily        Erenumab-aooe (AIMOVIG SC) Inject 140 mg Subcutaneous every 30 days Takes once a month       famotidine (PEPCID) 40 MG tablet TAKE ONE TABLET BY MOUTH DAILY AT BEDTIME 90 tablet 1     FLUoxetine (PROZAC) 20 MG capsule Take 1 capsule daily along with 40 mg capsule (total daily dose 60 mg) 30 capsule 4     FLUoxetine (PROZAC) 40 MG capsule Take 1 capsule (40 mg) by mouth daily 30 capsule 3     folic acid (FOLVITE) 1  "MG tablet Take 1 mg by mouth daily       HYDROcodone-acetaminophen (NORCO) 7.5-325 MG per tablet TAKE 1 TABLET BY MOUTH 2 TIMES DAILY AS NEEDED FOR SEVERE PAIN 60 tablet 0     hydrOXYzine (ATARAX) 25 MG tablet Take 2 tablets (50 mg) by mouth At Bedtime 60 tablet 4     ibuprofen (ADVIL/MOTRIN) 800 MG tablet Take 1 tablet (800 mg) by mouth every 8 hours as needed for moderate pain 90 tablet 1     methocarbamol (ROBAXIN) 500 MG tablet TAKE 1 TABLET (500 MG) BY MOUTH 4 TIMES DAILY AS NEEDED FOR MUSCLE SPASMS PATIENT TAKES AS NEEDED. 60 tablet 2     Methotrexate 15 MG/0.6ML SOSY Inject 15 mg Subcutaneous once a week       Multiple Vitamins-Minerals (CENTRUM SILVER ULTRA WOMENS) TABS Take 1 tablet by mouth daily        ondansetron (ZOFRAN-ODT) 4 MG ODT tab Take 4 mg by mouth every 6 hours as needed (after migraine medication)        pregabalin (LYRICA) 150 MG capsule Take 1 capsule (150 mg) by mouth 2 times daily 180 capsule 0     sucralfate (CARAFATE) 1 GM/10ML suspension Take 10 mLs (1 g) by mouth 4 times daily 420 mL 3       Allergies:  Allergies   Allergen Reactions     Aspirin Hives     Ibuprofen      Dye in the otc form causes headaches  \"patient states over the counter ibuprofen  bothers her\"     Lansoprazole Other (See Comments) and Visual Disturbance     Changes in eyesight  Prevacid  Change in eyesight     Red Dye Hives     Bupropion Rash     Bupropion Hcl Hives and Rash     Wellbutrin     Demerol [Meperidine] Other (See Comments)     Made migraine worse       PHYSICAL EXAM:   Vital signs: /68 (Cuff Size: Adult Regular)   Pulse 78   Temp 97.8  F (36.6  C) (Tympanic)   Ht 1.676 m (5' 6\")   Wt 86.6 kg (191 lb)   SpO2 99%   BMI 30.83 kg/m     BMI: Body mass index is 30.83 kg/m .   General: Normal, healthy, cooperative, in no acute distress, alert   Lungs: respirations are non-labored   Abdominal: non-distended       ASSESSMENT:    62 year old female who is 2 weeks s/p Esophagogastroduodenoscopy and " bronchoscopy.      PLAN:   Patient is to continue on her antiacid medication as scheduled.  Patient is to see ENT and have a swallowing study as ordered.    Follow-up with general surgery as needed with problems/concerns.

## 2023-03-09 NOTE — PROGRESS NOTES
03/08/23 0900       Present No   General Information   Type Of Visit Initial   Start Of Care Date 03/07/23   Referring Physician Henna Moyer MD   Orders Evaluate And Treat   Orders Comment Dysphagia   Medical Diagnosis Hoarse voice quality (R49.0)     Dysphagia, unspecified type (R13.10)   Onset Of Illness/injury Or Date Of Surgery 02/19/23   Precautions/limitations No Known Precautions/limitations   Hearing ,   Pertinent History of Current Problem/OT: Additional Occupational Profile Info Pt is a 62 year old female who presents today for an evaluation. She reported concerns regarding both her voice and swallowing since an incident on February 19th. Today s evaluation focused on swallowing. Pt reported that on 2/19 she was taking her night time medications and she went to take a drink of water to wash them down however she started choking. She reported that this went on for about 10-15 minutes until she realized that she was not getting any air. Pt reported that she called her sister, however her sister had difficulty understanding what she was saying. She stated that she must have passed out because her sister found her on the floor and she had to be brought to the hospital via ambulance. While in the ED pt reported that she had increased difficulty with swallowing her own saliva and that when she tried to drink something she would cough. An EGD was completed and results indicated no foreign body or abnormalities. Pt reported that since this episode occurred, she has had increased difficulty with swallowing and does not think she would be able to tolerate more advanced textures.  Pt reported history of spinal surgery with anterior incision in 1989 and 2011. Pt also expressed concerns regarding her voice since this episode occurred however she has not yet been seen by ENT services.   Respiratory Status Room air   Living Environment [place]  (Pt reported that she lives  alone.)   Patient/family Goals Check on swallowing and her voice.   General Information Comments Pt expressed that she was very unhappy with her experience in the ED during most recent admission.   Fall Risk Screen   Fall screen completed by SLP   Have you fallen 2 or more times in the past year? No   Have you fallen and had an injury in the past year? No   Is patient a fall risk? No   Abuse Screen (yes response referral indicated)   Feels Unsafe at Home or Work/School no   Feels Threatened by Someone no   Does Anyone Try to Keep You From Having Contact with Others or Doing Things Outside Your Home? no   Physical Signs of Abuse Present no   Patient needs abuse support services and resources No   Clinical Swallow Evaluation   Oral Musculature generally intact   Structural Abnormalities none present   Dentition present and adequate   Mucosal Quality good   Mandibular Strength and Mobility intact   Oral Labial Strength and Mobility WFL   Lingual Strength and Mobility WFL   Velar Elevation intact   Buccal Strength and Mobility intact   Laryngeal Function Voicing initiated;Swallow   Oral Musculature Comments Pt's oral musculature is adequate for swallow functions.   Additional Documentation Yes   Additional evaluation(s) completed today Yes;Recommended   Rationale for completing additional evaluation A clinical swallow evaluation cannot rule out silent aspiration. A video fluoroscopic swallow study is recommended to assess for aspiration and to rule out abnormal pooling during the pharyngeal phase of the swallow.   Swallow Eval   Feeding Assistance no assistance needed   Adaptive Eating Utensils N/A   Clinical Swallow Eval: Thin Liquid Texture Trial   Mode of Presentation, Thin Liquids cup;self-fed   Volume of Liquid or Food Presented 4 oz.   Oral Phase of Swallow WFL   Pharyngeal Phase of Swallow intact   Diagnostic Statement Pt tolerated 4 oz. of thin liquids via cup without demonstrating any overt signs/symptoms of  "aspiration.   Clinical Swallow Eval: Soft & Bite-sized   Mode of Presentation spoon;self-fed   Volume Presented 3 oz.   Oral Phase WFL   Pharyngeal Phase intact   Diagnostic Statement Pt tolerated 3 oz. of soft & bite sized textures without demonstrating any overt signs/symptoms of aspiration. Pt demonstrated adequate clearance of bolus from oral cavity during 100% of trials. No pharyngeal phase difficulty reported by pt.   Clinical Swallow Eval: Regular (Solid)   Diagnostic Statement Attempted regular textures however pt declined due to her stating that they would be too difficult to swallow at this time.   Swallow Compensations   Swallow Compensations No compensations were used   Results No compensations were used   Educational Assessment   Barriers to Learning No barriers   Preferred Learning Style Listening;Demonstration   Esophageal Phase of Swallow   Patient reports or presents with symptoms of esophageal dysphagia Yes   Esophageal comments Pt reports sensation of food/liquid feeling \"stuck\" in her esophagus   General Therapy Interventions   Planned Therapy Interventions Dysphagia Treatment   Dysphagia treatment Modified diet education;Instruction of safe swallow strategies;Compensatory strategies for swallowing   Swallow Eval: Clinical Impressions   Skilled Criteria for Therapy Intervention Skilled criteria met.  Treatment indicated.   Functional Assessment Scale (FAS) 6   Diet texture recommendations Thin liquids (level 0);Soft & Bite Sized diet (level 6)   Recommended Feeding/Eating Techniques alternate between small bites and sips of food/liquid;small sips/bites;maintain upright posture during/after eating for 30 mins   Rehab Potential good, to achieve stated therapy goals   Demonstrates Need for Referral to Another Service other (see comments)  (ENT and video swallow)   Therapy Frequency other (see comments)  (1x per week)   Predicted Duration of Therapy Intervention (days/wks) 3 months   Anticipated " Discharge Disposition home   Risks and Benefits of Treatment have been explained. Yes   Patient, family and/or staff in agreement with Plan of Care Yes   Clinical Impression Comments Pt seen today for clinical swallow evaluation. No overt signs/symptoms of aspiration were observed across textures that were trialed. Attempted to trial regular textures however pt declined due to her stating that they would be too difficult to swallow at this time. Recommended video fluoroscopic swallow study (VFSS) be completed to instrumentally assess the pharyngeal phase of the swallow. Discussed how it would be most beneficial during video swallow to attempt all textures and pt is in agreement with trialing regular textures during video swallow evaluation. Regarding pt's concerns with her voice, recommended that she first be evaluated by ENT services prior to completing a voice evaluation with SLP services. Pt expressed understanding with plan of care. Orders placed for ENT and video swallow.   Swallow Goals   SLP Swallow Goals 1   Swallow Goal 1   Goal Identifier LTG 1   Goal Description Pt will complete video fluoroscopic swallow study evaluation to instrumentally assess swallow functions.   Target Date 04/07/23   Total Session Time   SLP Eval: oral/pharyngeal swallow function, clinical minutes (00227) 60   Total Evaluation Time 60   Therapy Certification   Certification date from 03/07/23   Certification date to 06/05/23   Medical Diagnosis Hoarse voice quality (R49.0)     Dysphagia, unspecified type (R13.10)   Certification I certify the need for these services furnished under this plan of treatment and while under my care.  (Physician co-signature of this document indicates review and certification of the therapy plan).

## 2023-03-10 ENCOUNTER — E-VISIT (OUTPATIENT)
Dept: URGENT CARE | Facility: CLINIC | Age: 63
End: 2023-03-10
Payer: COMMERCIAL

## 2023-03-10 DIAGNOSIS — J01.90 ACUTE SINUSITIS WITH SYMPTOMS > 10 DAYS: Primary | ICD-10-CM

## 2023-03-10 PROCEDURE — 99421 OL DIG E/M SVC 5-10 MIN: CPT | Performed by: EMERGENCY MEDICINE

## 2023-03-10 NOTE — PATIENT INSTRUCTIONS
Dear Sadaf Blankenship         Based on your responses and diagnosis, I have prescribed Augmentin to treat your symptoms. I have sent this to your pharmacy.?     It is also important to stay well hydrated, get lots of rest and take over-the-counter decongestants,?tylenol?or ibuprofen if you?are able to?take those medications per your primary care provider to help relieve discomfort.?     It is important that you take?all of?your prescribed medication even if your symptoms are improving after a few doses.? Taking?all of?your medicine helps prevent the symptoms from returning.?     If your symptoms worsen, you develop severe headache, vomiting, high fever (>102), or are not improving in 7 days, please contact your primary care provider for an appointment or visit any of our convenient Walk-in Care or Urgent Care Centers to be seen which can be found on our website?here.?     Thanks again for choosing?us?as your health care partner,?   ?  Moses Ortiz MD?

## 2023-03-14 ASSESSMENT — ENCOUNTER SYMPTOMS
CHILLS: 0
SHORTNESS OF BREATH: 1
FEVER: 0
COUGH: 1

## 2023-03-15 ENCOUNTER — MYC MEDICAL ADVICE (OUTPATIENT)
Dept: FAMILY MEDICINE | Facility: OTHER | Age: 63
End: 2023-03-15

## 2023-03-15 ENCOUNTER — APPOINTMENT (OUTPATIENT)
Dept: GENERAL RADIOLOGY | Facility: HOSPITAL | Age: 63
End: 2023-03-15
Attending: STUDENT IN AN ORGANIZED HEALTH CARE EDUCATION/TRAINING PROGRAM
Payer: COMMERCIAL

## 2023-03-15 ENCOUNTER — HOSPITAL ENCOUNTER (EMERGENCY)
Facility: HOSPITAL | Age: 63
Discharge: LEFT BEFORE DISCHARGE | End: 2023-03-15
Admitting: STUDENT IN AN ORGANIZED HEALTH CARE EDUCATION/TRAINING PROGRAM
Payer: COMMERCIAL

## 2023-03-15 VITALS
OXYGEN SATURATION: 97 % | DIASTOLIC BLOOD PRESSURE: 89 MMHG | SYSTOLIC BLOOD PRESSURE: 154 MMHG | TEMPERATURE: 99.1 F | RESPIRATION RATE: 18 BRPM | HEART RATE: 89 BPM

## 2023-03-15 LAB
ANION GAP SERPL CALCULATED.3IONS-SCNC: 14 MMOL/L (ref 7–15)
BASOPHILS # BLD AUTO: 0 10E3/UL (ref 0–0.2)
BASOPHILS NFR BLD AUTO: 1 %
BUN SERPL-MCNC: 10 MG/DL (ref 8–23)
CALCIUM SERPL-MCNC: 9.4 MG/DL (ref 8.8–10.2)
CHLORIDE SERPL-SCNC: 107 MMOL/L (ref 98–107)
CREAT SERPL-MCNC: 0.92 MG/DL (ref 0.51–0.95)
DEPRECATED HCO3 PLAS-SCNC: 18 MMOL/L (ref 22–29)
EOSINOPHIL # BLD AUTO: 0.2 10E3/UL (ref 0–0.7)
EOSINOPHIL NFR BLD AUTO: 4 %
ERYTHROCYTE [DISTWIDTH] IN BLOOD BY AUTOMATED COUNT: 15.2 % (ref 10–15)
GFR SERPL CREATININE-BSD FRML MDRD: 70 ML/MIN/1.73M2
GLUCOSE SERPL-MCNC: 108 MG/DL (ref 70–99)
HCT VFR BLD AUTO: 38.9 % (ref 35–47)
HGB BLD-MCNC: 13 G/DL (ref 11.7–15.7)
HOLD SPECIMEN: NORMAL
IMM GRANULOCYTES # BLD: 0 10E3/UL
IMM GRANULOCYTES NFR BLD: 0 %
LYMPHOCYTES # BLD AUTO: 1 10E3/UL (ref 0.8–5.3)
LYMPHOCYTES NFR BLD AUTO: 18 %
MCH RBC QN AUTO: 29.7 PG (ref 26.5–33)
MCHC RBC AUTO-ENTMCNC: 33.4 G/DL (ref 31.5–36.5)
MCV RBC AUTO: 89 FL (ref 78–100)
MONOCYTES # BLD AUTO: 0.5 10E3/UL (ref 0–1.3)
MONOCYTES NFR BLD AUTO: 9 %
NEUTROPHILS # BLD AUTO: 3.8 10E3/UL (ref 1.6–8.3)
NEUTROPHILS NFR BLD AUTO: 68 %
NRBC # BLD AUTO: 0 10E3/UL
NRBC BLD AUTO-RTO: 0 /100
PLATELET # BLD AUTO: 203 10E3/UL (ref 150–450)
POTASSIUM SERPL-SCNC: 4 MMOL/L (ref 3.4–5.3)
RBC # BLD AUTO: 4.38 10E6/UL (ref 3.8–5.2)
SODIUM SERPL-SCNC: 139 MMOL/L (ref 136–145)
WBC # BLD AUTO: 5.6 10E3/UL (ref 4–11)

## 2023-03-15 PROCEDURE — 85014 HEMATOCRIT: CPT | Performed by: STUDENT IN AN ORGANIZED HEALTH CARE EDUCATION/TRAINING PROGRAM

## 2023-03-15 PROCEDURE — 36415 COLL VENOUS BLD VENIPUNCTURE: CPT | Performed by: STUDENT IN AN ORGANIZED HEALTH CARE EDUCATION/TRAINING PROGRAM

## 2023-03-15 PROCEDURE — 99284 EMERGENCY DEPT VISIT MOD MDM: CPT | Mod: 25

## 2023-03-15 PROCEDURE — 71046 X-RAY EXAM CHEST 2 VIEWS: CPT

## 2023-03-15 PROCEDURE — 99281 EMR DPT VST MAYX REQ PHY/QHP: CPT | Performed by: STUDENT IN AN ORGANIZED HEALTH CARE EDUCATION/TRAINING PROGRAM

## 2023-03-15 PROCEDURE — 86140 C-REACTIVE PROTEIN: CPT | Mod: ZL | Performed by: FAMILY MEDICINE

## 2023-03-15 PROCEDURE — 80048 BASIC METABOLIC PNL TOTAL CA: CPT | Performed by: STUDENT IN AN ORGANIZED HEALTH CARE EDUCATION/TRAINING PROGRAM

## 2023-03-15 ASSESSMENT — ACTIVITIES OF DAILY LIVING (ADL): ADLS_ACUITY_SCORE: 35

## 2023-03-15 NOTE — ED TRIAGE NOTES
"Pt reports that she has been Augmentin for a sinus infection without improvement. Pt reports she had an e-visit. Pt reports she aspirated some pills back in February and had surgery and has been ill ever since. Pt has been having SOB. Pt reports that she has been coughing up \"green and blood.\"      "

## 2023-03-16 ENCOUNTER — OFFICE VISIT (OUTPATIENT)
Dept: FAMILY MEDICINE | Facility: OTHER | Age: 63
End: 2023-03-16
Attending: FAMILY MEDICINE
Payer: COMMERCIAL

## 2023-03-16 VITALS
DIASTOLIC BLOOD PRESSURE: 74 MMHG | SYSTOLIC BLOOD PRESSURE: 110 MMHG | BODY MASS INDEX: 31.15 KG/M2 | OXYGEN SATURATION: 97 % | WEIGHT: 193 LBS | TEMPERATURE: 98.8 F | RESPIRATION RATE: 17 BRPM | HEART RATE: 88 BPM

## 2023-03-16 DIAGNOSIS — R05.2 SUBACUTE COUGH: ICD-10-CM

## 2023-03-16 DIAGNOSIS — R39.9 URINARY SYMPTOM OR SIGN: ICD-10-CM

## 2023-03-16 DIAGNOSIS — J01.00 SUBACUTE MAXILLARY SINUSITIS: Primary | ICD-10-CM

## 2023-03-16 LAB
ALBUMIN UR-MCNC: NEGATIVE MG/DL
APPEARANCE UR: CLEAR
BILIRUB UR QL STRIP: NEGATIVE
COLOR UR AUTO: ABNORMAL
CRP SERPL-MCNC: 14.23 MG/L
GLUCOSE UR STRIP-MCNC: NEGATIVE MG/DL
HGB UR QL STRIP: NEGATIVE
KETONES UR STRIP-MCNC: NEGATIVE MG/DL
LEUKOCYTE ESTERASE UR QL STRIP: ABNORMAL
MUCOUS THREADS #/AREA URNS LPF: PRESENT /LPF
NITRATE UR QL: NEGATIVE
PH UR STRIP: 6 [PH] (ref 4.7–8)
RBC URINE: 0 /HPF
SP GR UR STRIP: 1.02 (ref 1–1.03)
SQUAMOUS EPITHELIAL: 0 /HPF
UROBILINOGEN UR STRIP-MCNC: NORMAL MG/DL
WBC URINE: 1 /HPF

## 2023-03-16 PROCEDURE — 81001 URINALYSIS AUTO W/SCOPE: CPT | Mod: ZL | Performed by: FAMILY MEDICINE

## 2023-03-16 PROCEDURE — G0463 HOSPITAL OUTPT CLINIC VISIT: HCPCS | Mod: 25

## 2023-03-16 PROCEDURE — 99214 OFFICE O/P EST MOD 30 MIN: CPT | Performed by: FAMILY MEDICINE

## 2023-03-16 PROCEDURE — G0463 HOSPITAL OUTPT CLINIC VISIT: HCPCS

## 2023-03-16 RX ORDER — BENZONATATE 100 MG/1
100 CAPSULE ORAL 3 TIMES DAILY PRN
Qty: 45 CAPSULE | Refills: 0 | Status: SHIPPED | OUTPATIENT
Start: 2023-03-16 | End: 2023-05-16

## 2023-03-16 RX ORDER — DOXYCYCLINE 100 MG/1
100 CAPSULE ORAL 2 TIMES DAILY
Qty: 14 CAPSULE | Refills: 0 | Status: SHIPPED | OUTPATIENT
Start: 2023-03-16 | End: 2023-03-23

## 2023-03-16 RX ORDER — METHYLPREDNISOLONE 4 MG
TABLET, DOSE PACK ORAL
Qty: 21 TABLET | Refills: 0 | Status: SHIPPED | OUTPATIENT
Start: 2023-03-16 | End: 2023-03-23

## 2023-03-16 ASSESSMENT — ENCOUNTER SYMPTOMS
FEVER: 1
NAUSEA: 0
VOMITING: 0
COUGH: 1
SINUS PAIN: 1
DIFFICULTY URINATING: 1
CHILLS: 1
SHORTNESS OF BREATH: 1
HEADACHES: 1
SINUS PRESSURE: 1
RHINORRHEA: 1
MYALGIAS: 1

## 2023-03-16 ASSESSMENT — PAIN SCALES - GENERAL: PAINLEVEL: MILD PAIN (3)

## 2023-03-16 NOTE — PROGRESS NOTES
Assessment & Plan     Subacute maxillary sinusitis  Failed augmentin, will trial doxycycline. CRP with mild elevation, WBC wnl. Tesha has previously tolerated medrol dose pack and is aware of side effect including emotional and sleep  - CRP, inflammation  - methylPREDNISolone (MEDROL DOSEPAK) 4 MG tablet therapy pack; Follow Package Directions  - doxycycline hyclate (VIBRAMYCIN) 100 MG capsule; Take 1 capsule (100 mg) by mouth 2 times daily for 7 days  - see patient instructions for nasal cares.     Subacute cough  - benzonatate (TESSALON) 100 MG capsule; Take 1 capsule (100 mg) by mouth 3 times daily as needed for cough    Urinary symptom or sign  - UA reflex to Microscopic and Culture - [place]; Future  - UA reflex to Microscopic and Culture - [place]       MED REC REQUIRED  Post Medication Reconciliation Status:  Discharge medications reconciled and changed, see notes/orders    See Patient Instructions    Return if symptoms worsen or fail to improve.   Next appt on 3/23/2023    Henna Moyer MD  [facility] - [place]    Subjective   Tesha is a 62 year old, presenting for the following health issues:  ER F/U and URI      HPI     ED/UC Followup:    Facility:  [facility] – [place]  Date of visit: 03/15/2023 (left without being seen)  Reason for visit: Shortness of Breath  Current Status: SOB with activity      Acute Illness  Acute illness concerns: SOB, cough, congestion  Onset/Duration: approx. 4 weeks  Symptoms:  Fever: YES- comes and goes  Chills/Sweats: YES- chills and sweats which also comes and goes  Headache (location?): YES- frontal area  Sinus Pressure: YES- teeth and eyes also help  Conjunctivitis:  No  Ear Pain: YES- pressure  Rhinorrhea: YES  Congestion: YES  Sore Throat: YES  Cough: YES-non-productive, productive of green sputum  Wheeze: YES  Decreased Appetite: YES  Nausea: No  Vomiting: No  Diarrhea: No  Dysuria/Freq.: No  Dysuria or Hematuria: No  Fatigue/Achiness: YES- achiness  Sick/Strep  Exposure: No  Therapies tried and outcome: Augmentin, Robitussin, Mucinex DM    - augmentin (3/10//2023) by e visit for sinus infection. W/o improvement   - CXR (3/15/2023) negative   - BMP (3/15/2023) w/o concerns  - CBC w/ diff (3/15/2023) WBC normal, no left shift    - symptoms started 3/1/2023  - sinus pain worse in the last 4 days     - used showed to irrigate her nasal passage     Review of Systems   Constitutional: Positive for chills and fever.   HENT: Positive for congestion, rhinorrhea, sinus pressure and sinus pain.         Teeth hurts   Respiratory: Positive for cough (dry) and shortness of breath.    Gastrointestinal: Negative for nausea and vomiting.   Genitourinary: Positive for difficulty urinating.   Musculoskeletal: Positive for myalgias.   Neurological: Positive for headaches.          Objective    /74 (BP Location: Right arm, Patient Position: Chair, Cuff Size: Adult Regular)   Pulse 88   Temp 98.8  F (37.1  C) (Tympanic)   Resp 17   Wt 87.5 kg (193 lb)   SpO2 97%   BMI 31.15 kg/m    Body mass index is 31.15 kg/m .  Physical Exam  Constitutional:       General: She is not in acute distress.     Appearance: She is well-developed.   HENT:      Head: Normocephalic and atraumatic.      Right Ear: Hearing and tympanic membrane normal.      Left Ear: Hearing and tympanic membrane normal.      Mouth/Throat:      Mouth: Mucous membranes are moist.      Pharynx: No oropharyngeal exudate.   Eyes:      Extraocular Movements: Extraocular movements intact.      Conjunctiva/sclera: Conjunctivae normal.   Neck:      Thyroid: No thyromegaly.   Cardiovascular:      Rate and Rhythm: Normal rate and regular rhythm.      Pulses: Normal pulses.      Heart sounds: Normal heart sounds. No murmur heard.  Pulmonary:      Effort: Pulmonary effort is normal. No respiratory distress.      Breath sounds: Normal breath sounds. No wheezing or rales.   Musculoskeletal:         General: Normal range of motion.       Cervical back: Normal range of motion and neck supple.   Lymphadenopathy:      Cervical: No cervical adenopathy.   Skin:     General: Skin is dry.   Neurological:      Mental Status: She is alert.          Admission on 03/15/2023, Discharged on 03/15/2023   Component Date Value Ref Range Status     Sodium 03/15/2023 139  136 - 145 mmol/L Final     Potassium 03/15/2023 4.0  3.4 - 5.3 mmol/L Final     Chloride 03/15/2023 107  98 - 107 mmol/L Final     Carbon Dioxide (CO2) 03/15/2023 18 (L)  22 - 29 mmol/L Final     Anion Gap 03/15/2023 14  7 - 15 mmol/L Final     Urea Nitrogen 03/15/2023 10.0  8.0 - 23.0 mg/dL Final     Creatinine 03/15/2023 0.92  0.51 - 0.95 mg/dL Final     Calcium 03/15/2023 9.4  8.8 - 10.2 mg/dL Final     Glucose 03/15/2023 108 (H)  70 - 99 mg/dL Final     GFR Estimate 03/15/2023 70  >60 mL/min/1.73m2 Final    eGFR calculated using 2021 CKD-EPI equation.     WBC Count 03/15/2023 5.6  4.0 - 11.0 10e3/uL Final     RBC Count 03/15/2023 4.38  3.80 - 5.20 10e6/uL Final     Hemoglobin 03/15/2023 13.0  11.7 - 15.7 g/dL Final     Hematocrit 03/15/2023 38.9  35.0 - 47.0 % Final     MCV 03/15/2023 89  78 - 100 fL Final     MCH 03/15/2023 29.7  26.5 - 33.0 pg Final     MCHC 03/15/2023 33.4  31.5 - 36.5 g/dL Final     RDW 03/15/2023 15.2 (H)  10.0 - 15.0 % Final     Platelet Count 03/15/2023 203  150 - 450 10e3/uL Final     % Neutrophils 03/15/2023 68  % Final     % Lymphocytes 03/15/2023 18  % Final     % Monocytes 03/15/2023 9  % Final     % Eosinophils 03/15/2023 4  % Final     % Basophils 03/15/2023 1  % Final     % Immature Granulocytes 03/15/2023 0  % Final     NRBCs per 100 WBC 03/15/2023 0  <1 /100 Final     Absolute Neutrophils 03/15/2023 3.8  1.6 - 8.3 10e3/uL Final     Absolute Lymphocytes 03/15/2023 1.0  0.8 - 5.3 10e3/uL Final     Absolute Monocytes 03/15/2023 0.5  0.0 - 1.3 10e3/uL Final     Absolute Eosinophils 03/15/2023 0.2  0.0 - 0.7 10e3/uL Final     Absolute Basophils 03/15/2023 0.0  0.0 -  0.2 10e3/uL Final     Absolute Immature Granulocytes 03/15/2023 0.0  <=0.4 10e3/uL Final     Absolute NRBCs 03/15/2023 0.0  10e3/uL Final     Hold Specimen 03/15/2023 JIC   Final     Hold Specimen 03/15/2023 JIC   Final     Hold Specimen 03/15/2023 JI   Final           XR Chest 2 Views    Result Date: 3/15/2023  PROCEDURE:  XR CHEST 2 VIEWS HISTORY: worsening shortness of breath COMPARISON:  chest radiograph 2/20/2023 FINDINGS: PA and lateral chest radiographs Cardiomediastinal silhouette is within normal limits. No focal consolidation, effusion or pneumothorax.  No suspicious osseous lesion or subdiaphragmatic free air.     IMPRESSION:  No acute cardiopulmonary process.  SIDNEY DEMARCO MD   SYSTEM ID:  [id number]

## 2023-03-16 NOTE — PATIENT INSTRUCTIONS
Start medrol dosage pack (steroid)  Start doxycycline (antibiotic) for your sinus infection     Try tessalson perles for your cough     Nasal Hygiene: Krishna Med. Use distilled water     Nasal saline (salt water) 1 to 2 sprays 10 times per day  Nasal saline sinus rinses 1 to 2 time per day (over the counter packets or top water/salt packets)  Over the counter nasal gels/ oil 2 to 3 times per day  Increase oral hydration (carry a water bottle with you)  Cool air humidifier at bedside at night  If instructed, use antibiotic ointment around each nostril 2 times per ay or at night (apply with finger or q-tip)  Avoid or minimize allergic / irritant triggers if possible

## 2023-03-17 NOTE — PROGRESS NOTES
Otolaryngology Consultation    Patient: Sadaf Blankenship  : 1960    Patient presents with:  Consult: Referred by Dr. Moyer for hoarse voice        HPI:  Sadaf Blankenship is a 62 year old female seen today for hoarseness. Tesha was seen on 3/3/23 and was seen initially and ED on 2023 for dysphagia and hoarseness.  Patient was taking her nighttime medications and which is approximately 7 pills and felt like her pills got stuck and is drinking water to try to get the pills to pass.  She called her sister and was yelling on the phone but stated that her sister is not able to hear.  Melanie was further found unconscious and transported to ER unconscious needed sternal rub to regain consciousness.  Upper endoscopy and bronchoscopy were negative.    Today, Tesha presents for follow up exam. She has ongoing sinusitis since her last visit. Tesha reports increase in hoarseness since the incident in February (as outlined above). Following the ED visit, she was seen in General surgery completed- She was placed on liquid diet for 4 days and stayed on it and has not eaten meat since.   Reports choking on water even last night.   She had two rounds of po abx related to sinusitis.   She did see PCP on 3/15/23 started on Medrol dose pack, Doxy. Reports she quit the oral abx. Reports she has been doing better since holding medications. Tesha has been using rinses at this time.     Patient denies sore throat or throat pain  Denies chronic otalgia.   Denies fevers, night sweats or weight loss. Fever resolved last week.   She has Symbicort inhaler and does not rinse/ gargle after use.     + globus sensation.   + feeling restricted.     Water- 6-8 bottles.   Caffeine- 1 cup coffee.   ETOH- None.   Tobacco- None. Quit > 30 years ago.   Reflux- Yes and controlled w/ medications.   Dexilant, Pepcid.       Current Outpatient Rx   Medication Sig Dispense Refill     albuterol (PROAIR HFA/PROVENTIL HFA/VENTOLIN HFA) 108  (90 Base) MCG/ACT inhaler Inhale 2 puffs into the lungs every 4 hours as needed for shortness of breath / dyspnea or wheezing 8 g 0     amoxicillin-clavulanate (AUGMENTIN) 875-125 MG tablet Take 1 tablet by mouth 2 times daily for 7 days 14 tablet 0     baclofen (LIORESAL) 10 MG tablet Take 1 tablet (10 mg) by mouth daily 90 tablet 3     benzonatate (TESSALON) 100 MG capsule Take 1 capsule (100 mg) by mouth 3 times daily as needed for cough 45 capsule 0     budesonide-formoterol (SYMBICORT) 80-4.5 MCG/ACT Inhaler Inhale 2 puffs into the lungs 2 times daily 10.2 g 1     butalbital-acetaminophen-caffeine (ESGIC) -40 MG tablet Take 1 tablet by mouth every 4 hours as needed TAKE 1 TO 2 TABLETS BY MOUTH AT THE ONSET OF A MIGRAINE HEADACHE, LIMIT NO MORE THAN 3 TIMES PER WEEK. ACETAMINOPHEN SHOULD BE LIMITED TO 40       Calcium Carbonate Antacid 1177 MG CHEW        Cholecalciferol (VITAMIN D3) 1000 UNITS CAPS Take 1,000 Units by mouth Takes 5000 unit capsule daily       dexlansoprazole (DEXILANT) 60 MG CPDR CR capsule Take 1 capsule (60 mg) by mouth daily 90 capsule 3     diazepam (VALIUM) 5 MG tablet Take 1 tablet (5 mg) by mouth daily as needed for anxiety 30 tablet 0     docusate sodium (COLACE) 100 MG capsule Take 100 mg by mouth daily        doxycycline hyclate (VIBRAMYCIN) 100 MG capsule Take 1 capsule (100 mg) by mouth 2 times daily for 7 days 14 capsule 0     Erenumab-aooe (AIMOVIG SC) Inject 140 mg Subcutaneous every 30 days Takes once a month       famotidine (PEPCID) 40 MG tablet TAKE ONE TABLET BY MOUTH DAILY AT BEDTIME 90 tablet 1     FLUoxetine (PROZAC) 20 MG capsule Take 1 capsule daily along with 40 mg capsule (total daily dose 60 mg) 30 capsule 4     FLUoxetine (PROZAC) 40 MG capsule Take 1 capsule (40 mg) by mouth daily 30 capsule 3     folic acid (FOLVITE) 1 MG tablet Take 1 mg by mouth daily       HYDROcodone-acetaminophen (NORCO) 7.5-325 MG per tablet TAKE 1 TABLET BY MOUTH 2 TIMES DAILY AS  NEEDED FOR SEVERE PAIN 60 tablet 0     hydrOXYzine (ATARAX) 25 MG tablet Take 2 tablets (50 mg) by mouth At Bedtime 60 tablet 4     ibuprofen (ADVIL/MOTRIN) 800 MG tablet Take 1 tablet (800 mg) by mouth every 8 hours as needed for moderate pain 90 tablet 1     methocarbamol (ROBAXIN) 500 MG tablet TAKE 1 TABLET (500 MG) BY MOUTH 4 TIMES DAILY AS NEEDED FOR MUSCLE SPASMS PATIENT TAKES AS NEEDED. 60 tablet 2     Methotrexate 15 MG/0.6ML SOSY Inject 15 mg Subcutaneous once a week       methylPREDNISolone (MEDROL DOSEPAK) 4 MG tablet therapy pack Follow Package Directions 21 tablet 0     Multiple Vitamins-Minerals (CENTRUM SILVER ULTRA WOMENS) TABS Take 1 tablet by mouth daily        ondansetron (ZOFRAN-ODT) 4 MG ODT tab Take 4 mg by mouth every 6 hours as needed (after migraine medication)        pregabalin (LYRICA) 150 MG capsule Take 1 capsule (150 mg) by mouth 2 times daily 180 capsule 0     sucralfate (CARAFATE) 1 GM/10ML suspension Take 10 mLs (1 g) by mouth 4 times daily 420 mL 3       Allergies: Aspirin, Ibuprofen, Lansoprazole, Red dye, Bupropion, Bupropion hcl, and Demerol [meperidine]     Past Medical History:   Diagnosis Date     Adhesive capsulitis of left shoulder 12/6/2018     ASCUS on Pap smear 05/26/2004    negative HPV     Atypical chest pain 05/26/2008    negative cardiolite stress test [facility]     Biliary dyskinesia 12/2/2021    Formatting of this note might be different from the original. Added automatically from request for surgery [id number]     Bleeding ulcer 05/26/1976     Cervical radicular pain 05/10/2012     [place] bullosa 1/22/2016     Concussion with brief LOC 1/16/2020     Degenerative disc disease, cervical 01/01/2011     Esophageal ulcer 05/26/1998     Irritable bowel syndrome 01/26/2015     Long uvula 1/22/2016    improved     Migraine headaches, severe 07/09/2001     Pseudoarthrosis of cervical spine 07/03/2012     Recurrent UTI 05/26/2011     sinusitis (chronic) 04/16/2001      "Subacromial bursitis of right shoulder joint 2017     Ulcer of esophagus 2012     Uvulitis 2016       Past Surgical History:   Procedure Laterality Date     BACK SURGERY      cervical     Cervical spine surgery with removal of 2 disks, C5,C7       CHOLECYSTECTOMY  12/10/2021    [facility]     COLONOSCOPY  10/13/2014    Dr Camargo, internal hemorrhoids     COLONOSCOPY - HIM SCAN  2018    EGD and colonoscopy with Dr. Jennings     Coronary angiogram, normal      Chest pain     ENT SURGERY      nose surgery x3     ESOPHAGOSCOPY, GASTROSCOPY, DUODENOSCOPY (EGD), COMBINED N/A 2023    Procedure: ESOPHAGOGASTRODUODENOSCOPY, bronchoscopy;  Surgeon: Harish Haley MD;  Location: [facility] OR     fusion discectomy anterior cervical       [facility] ESOPHAGOSCOPY, DIAGNOSTIC  10/31/2014    Dr Camargo, mild erythema of antrum, negative biopsies     HEMORRHOIDECTOMY  12/10/2021    [facility]     HYSTERECTOMY TOTAL ABDOMINAL, SALPINGECTOMY Right 2002    Dr. Hathaway. Endometriosis.     HYSTERECTOMY, PAP NO LONGER INDICATED N/A 2002    Cervix removed.     IR CONSULTATION FOR IR EXAM  2020     IR FLUORO 0-1 HOUR  2020     NOSE SURGERY      x3            Posterior decompression , fusion C3-5      Psuedoarthrosis       ENT family history reviewed    Social History     Tobacco Use     Smoking status: Former     Years: 8.00     Types: Cigarettes     Start date:      Quit date:      Years since quittin.2     Smokeless tobacco: Never   Vaping Use     Vaping Use: Never used   Substance Use Topics     Alcohol use: No     Drug use: No       Review of Systems  ROS: 10 point ROS neg other than the symptoms noted above in the HPI     Physical Exam  /72 (Cuff Size: Adult Regular)   Pulse 89   Temp 97.3  F (36.3  C) (Tympanic)   Ht 1.676 m (5' 6\")   Wt 86.2 kg (190 lb)   SpO2 98%   BMI 30.67 kg/m      General - The patient is well nourished and well developed, and " appears to have good nutritional status.  Alert and oriented to person and place, answers questions and cooperates with examination appropriately.   Head and Face - Normocephalic and atraumatic, with no gross asymmetry noted.  The facial nerve is intact, with strong symmetric movements.  Voice and Breathing - The patient was breathing comfortably without the use of accessory muscles. There was no wheezing, stridor, or stertor.  The patients voice was clear and strong, and had appropriate pitch and quality.  Ears -The external auditory canals are patent, the tympanic membranes are intact without effusion, retraction or mass.  Bony landmarks are intact.  Eyes - Extraocular movements intact, and the pupils were reactive to light.  Sclera were not icteric or injected, conjunctiva were pink and moist.  Mouth - Examination of the oral cavity showed pink, healthy oral mucosa. No lesions or ulcerations noted.  The tongue was mobile and midline, and the dentition were in good condition.    Throat - The walls of the oropharynx were smooth, pink, moist, symmetric, and had no lesions or ulcerations.  The tonsillar pillars and soft palate were symmetric.  The uvula was midline on elevation.    Neck - Normal midline excursion of the laryngotracheal complex during swallowing.  Full range of motion on passive movement.  Palpation of the occipital, submental, submandibular, internal jugular chain, and supraclavicular nodes did not demonstrate any abnormal lymph nodes or masses.  Palpation of the thyroid was soft and smooth, with no nodules or goiter appreciated.  The trachea was mobile and midline.  Nose - External contour is symmetric, no gross deflection or scars.  Nasal mucosa is pink and moist with no abnormal mucus.        Flexible Endoscopy -     Attempts at mirror laryngoscopy were not possible due to gag reflex.  Therefore I proceeded with a fiberoptic examination.  First I sprayed both sides of the nose with a mixture of  lidocaine and neosynephrine.  I then passed the scope through the nasal cavity.    The nasal cavity was unremarkable. Post nasal drainage throughout.  The nasopharynx was mucosally covered and symmetric.  The Eustachian tube openings were unobstructed.  Going further down I had a clear view of the base of tongue which had normal appearing lingual tonsillar tissue.  The base of tongue was free of lesions, and the vallecula was open.  The epiglottis was smooth and mucosally covered.  The supraglottic larynx was then clearly visualized.  The vocal cords moved smoothly and symmetrically, they were pearly white and no lesions were seen.  The pyriform sinuses were open, and the limited view of the postcricoid region did not show any lesions.        Impression and Plan- Sadaf MARJ Latendresse is a 62 year old female with:    ICD-10-CM    1. Hoarse voice quality  R49.0 Adult ENT  Referral      2. Other dysphagia  R13.19       3. Gastroesophageal reflux disease with esophagitis without hemorrhage  K21.00       4. Chronic maxillary sinusitis  J32.0 budesonide (PULMICORT) 0.5 MG/2ML neb solution              Complete speech therapy.  Completed videofluoroscopy swallow study  On examination normal laryngeal exam.   Recommended maintain good water intake and complete swallow study. Further work w/ ST for voice therapy. I am concerned from her incident residual edema/ recovery is present and affecting her symptoms at this time. EGD was negative.     Continue with Rinses. Start Budesonide rinses daily.     Continue with Dexilant, Pepcid  Maintain good water intake.   Follow reflux precautions    Follow up in 4-6 weeks.     America gAuilar PA-C  ENT  [facility]

## 2023-03-21 ENCOUNTER — HOSPITAL ENCOUNTER (OUTPATIENT)
Dept: SPEECH THERAPY | Facility: HOSPITAL | Age: 63
Discharge: HOME | End: 2023-03-21
Attending: FAMILY MEDICINE
Payer: COMMERCIAL

## 2023-03-21 ENCOUNTER — HOSPITAL ENCOUNTER (OUTPATIENT)
Dept: GENERAL RADIOLOGY | Facility: HOSPITAL | Age: 63
Discharge: HOME | End: 2023-03-21
Attending: FAMILY MEDICINE
Payer: COMMERCIAL

## 2023-03-21 ENCOUNTER — OFFICE VISIT (OUTPATIENT)
Dept: OTOLARYNGOLOGY | Facility: OTHER | Age: 63
End: 2023-03-21
Attending: FAMILY MEDICINE
Payer: COMMERCIAL

## 2023-03-21 VITALS
HEIGHT: 66 IN | TEMPERATURE: 97.3 F | SYSTOLIC BLOOD PRESSURE: 118 MMHG | HEART RATE: 89 BPM | OXYGEN SATURATION: 98 % | WEIGHT: 190 LBS | DIASTOLIC BLOOD PRESSURE: 72 MMHG | BODY MASS INDEX: 30.53 KG/M2

## 2023-03-21 DIAGNOSIS — J32.0 CHRONIC MAXILLARY SINUSITIS: ICD-10-CM

## 2023-03-21 DIAGNOSIS — R13.19 OTHER DYSPHAGIA: ICD-10-CM

## 2023-03-21 DIAGNOSIS — K21.00 GASTROESOPHAGEAL REFLUX DISEASE WITH ESOPHAGITIS WITHOUT HEMORRHAGE: Chronic | ICD-10-CM

## 2023-03-21 DIAGNOSIS — R13.10 DYSPHAGIA, UNSPECIFIED TYPE: ICD-10-CM

## 2023-03-21 DIAGNOSIS — R49.0 HOARSE VOICE QUALITY: Primary | ICD-10-CM

## 2023-03-21 PROCEDURE — 31575 DIAGNOSTIC LARYNGOSCOPY: CPT | Performed by: PHYSICIAN ASSISTANT

## 2023-03-21 PROCEDURE — 74230 X-RAY XM SWLNG FUNCJ C+: CPT

## 2023-03-21 PROCEDURE — 92611 MOTION FLUOROSCOPY/SWALLOW: CPT | Mod: GN

## 2023-03-21 PROCEDURE — G0463 HOSPITAL OUTPT CLINIC VISIT: HCPCS | Mod: 25

## 2023-03-21 PROCEDURE — 99214 OFFICE O/P EST MOD 30 MIN: CPT | Mod: 25 | Performed by: PHYSICIAN ASSISTANT

## 2023-03-21 RX ORDER — BUDESONIDE 0.5 MG/2ML
INHALANT ORAL
Qty: 120 ML | Refills: 1 | Status: SHIPPED | OUTPATIENT
Start: 2023-03-21 | End: 2023-09-14

## 2023-03-21 ASSESSMENT — PAIN SCALES - GENERAL: PAINLEVEL: MILD PAIN (2)

## 2023-03-21 NOTE — LETTER
3/21/2023         RE: Sadaf Blankenship  [address]  [facility] [address]        Dear Colleague,    Thank you for referring your patient, Sadaf Blankenship, to the [facility]. Please see a copy of my visit note below.    Otolaryngology Consultation    Patient: Sadaf Blankenship  : 1960    Patient presents with:  Consult: Referred by Dr. Moyer for hoarse voice        HPI:  Sadaf Blankenship is a 62 year old female seen today for hoarseness. Tesha was seen on 3/3/23 and was seen initially and ED on 2023 for dysphagia and hoarseness.  Patient was taking her nighttime medications and which is approximately 7 pills and felt like her pills got stuck and is drinking water to try to get the pills to pass.  She called her sister and was yelling on the phone but stated that her sister is not able to hear.  Melanie was further found unconscious and transported to ER unconscious needed sternal rub to regain consciousness.  Upper endoscopy and bronchoscopy were negative.    Today, Tesha presents for follow up exam. She has ongoing sinusitis since her last visit. Tesha reports increase in hoarseness since the incident in February (as outlined above). Following the ED visit, she was seen in General surgery completed- She was placed on liquid diet for 4 days and stayed on it and has not eaten meat since.   Reports choking on water even last night.   She had two rounds of po abx related to sinusitis.   She did see PCP on 3/15/23 started on Medrol dose pack, Doxy. Reports she quit the oral abx. Reports she has been doing better since holding medications. Tesha has been using rinses at this time.     Patient denies sore throat or throat pain  Denies chronic otalgia.   Denies fevers, night sweats or weight loss. Fever resolved last week.   She has Symbicort inhaler and does not rinse/ gargle after use.     + globus sensation.   + feeling restricted.     Water- 6-8  bottles.   Caffeine- 1 cup coffee.   ETOH- None.   Tobacco- None. Quit > 30 years ago.   Reflux- Yes and controlled w/ medications.   Dexilant, Pepcid.       Current Outpatient Rx   Medication Sig Dispense Refill     albuterol (PROAIR HFA/PROVENTIL HFA/VENTOLIN HFA) 108 (90 Base) MCG/ACT inhaler Inhale 2 puffs into the lungs every 4 hours as needed for shortness of breath / dyspnea or wheezing 8 g 0     amoxicillin-clavulanate (AUGMENTIN) 875-125 MG tablet Take 1 tablet by mouth 2 times daily for 7 days 14 tablet 0     baclofen (LIORESAL) 10 MG tablet Take 1 tablet (10 mg) by mouth daily 90 tablet 3     benzonatate (TESSALON) 100 MG capsule Take 1 capsule (100 mg) by mouth 3 times daily as needed for cough 45 capsule 0     budesonide-formoterol (SYMBICORT) 80-4.5 MCG/ACT Inhaler Inhale 2 puffs into the lungs 2 times daily 10.2 g 1     butalbital-acetaminophen-caffeine (ESGIC) -40 MG tablet Take 1 tablet by mouth every 4 hours as needed TAKE 1 TO 2 TABLETS BY MOUTH AT THE ONSET OF A MIGRAINE HEADACHE, LIMIT NO MORE THAN 3 TIMES PER WEEK. ACETAMINOPHEN SHOULD BE LIMITED TO 40       Calcium Carbonate Antacid 1177 MG CHEW        Cholecalciferol (VITAMIN D3) 1000 UNITS CAPS Take 1,000 Units by mouth Takes 5000 unit capsule daily       dexlansoprazole (DEXILANT) 60 MG CPDR CR capsule Take 1 capsule (60 mg) by mouth daily 90 capsule 3     diazepam (VALIUM) 5 MG tablet Take 1 tablet (5 mg) by mouth daily as needed for anxiety 30 tablet 0     docusate sodium (COLACE) 100 MG capsule Take 100 mg by mouth daily        doxycycline hyclate (VIBRAMYCIN) 100 MG capsule Take 1 capsule (100 mg) by mouth 2 times daily for 7 days 14 capsule 0     Erenumab-aooe (AIMOVIG SC) Inject 140 mg Subcutaneous every 30 days Takes once a month       famotidine (PEPCID) 40 MG tablet TAKE ONE TABLET BY MOUTH DAILY AT BEDTIME 90 tablet 1     FLUoxetine (PROZAC) 20 MG capsule Take 1 capsule daily along with 40 mg capsule (total daily dose 60 mg)  30 capsule 4     FLUoxetine (PROZAC) 40 MG capsule Take 1 capsule (40 mg) by mouth daily 30 capsule 3     folic acid (FOLVITE) 1 MG tablet Take 1 mg by mouth daily       HYDROcodone-acetaminophen (NORCO) 7.5-325 MG per tablet TAKE 1 TABLET BY MOUTH 2 TIMES DAILY AS NEEDED FOR SEVERE PAIN 60 tablet 0     hydrOXYzine (ATARAX) 25 MG tablet Take 2 tablets (50 mg) by mouth At Bedtime 60 tablet 4     ibuprofen (ADVIL/MOTRIN) 800 MG tablet Take 1 tablet (800 mg) by mouth every 8 hours as needed for moderate pain 90 tablet 1     methocarbamol (ROBAXIN) 500 MG tablet TAKE 1 TABLET (500 MG) BY MOUTH 4 TIMES DAILY AS NEEDED FOR MUSCLE SPASMS PATIENT TAKES AS NEEDED. 60 tablet 2     Methotrexate 15 MG/0.6ML SOSY Inject 15 mg Subcutaneous once a week       methylPREDNISolone (MEDROL DOSEPAK) 4 MG tablet therapy pack Follow Package Directions 21 tablet 0     Multiple Vitamins-Minerals (CENTRUM SILVER ULTRA WOMENS) TABS Take 1 tablet by mouth daily        ondansetron (ZOFRAN-ODT) 4 MG ODT tab Take 4 mg by mouth every 6 hours as needed (after migraine medication)        pregabalin (LYRICA) 150 MG capsule Take 1 capsule (150 mg) by mouth 2 times daily 180 capsule 0     sucralfate (CARAFATE) 1 GM/10ML suspension Take 10 mLs (1 g) by mouth 4 times daily 420 mL 3       Allergies: Aspirin, Ibuprofen, Lansoprazole, Red dye, Bupropion, Bupropion hcl, and Demerol [meperidine]     Past Medical History:   Diagnosis Date     Adhesive capsulitis of left shoulder 12/6/2018     ASCUS on Pap smear 05/26/2004    negative HPV     Atypical chest pain 05/26/2008    negative cardiolite stress test [facility]     Biliary dyskinesia 12/2/2021    Formatting of this note might be different from the original. Added automatically from request for surgery [id number]     Bleeding ulcer 05/26/1976     Cervical radicular pain 05/10/2012     Madina bullosa 1/22/2016     Concussion with brief LOC 1/16/2020     Degenerative disc disease, cervical 01/01/2011      Esophageal ulcer 1998     Irritable bowel syndrome 2015     Long uvula 2016    improved     Migraine headaches, severe 2001     Pseudoarthrosis of cervical spine 2012     Recurrent UTI 2011     sinusitis (chronic) 2001     Subacromial bursitis of right shoulder joint 2017     Ulcer of esophagus 2012     Uvulitis 2016       Past Surgical History:   Procedure Laterality Date     BACK SURGERY      cervical     Cervical spine surgery with removal of 2 disks, C5,C7       CHOLECYSTECTOMY  12/10/2021    [facility]     COLONOSCOPY  10/13/2014    Dr Camargo, internal hemorrhoids     COLONOSCOPY - HIM SCAN  2018    EGD and colonoscopy with Dr. Jennings     Coronary angiogram, normal      Chest pain     ENT SURGERY      nose surgery x3     ESOPHAGOSCOPY, GASTROSCOPY, DUODENOSCOPY (EGD), COMBINED N/A 2023    Procedure: ESOPHAGOGASTRODUODENOSCOPY, bronchoscopy;  Surgeon: Harish Haley MD;  Location: [facility] OR     fusion discectomy anterior cervical        ESOPHAGOSCOPY, DIAGNOSTIC  10/31/2014    Dr Camargo, mild erythema of antrum, negative biopsies     HEMORRHOIDECTOMY  12/10/2021    [facility]     HYSTERECTOMY TOTAL ABDOMINAL, SALPINGECTOMY Right 2002    Dr. Hathaway. Endometriosis.     HYSTERECTOMY, PAP NO LONGER INDICATED N/A 2002    Cervix removed.     IR CONSULTATION FOR IR EXAM  2020     IR FLUORO 0-1 HOUR  2020     NOSE SURGERY      x3            Posterior decompression , fusion C3-5      Psuedoarthrosis       ENT family history reviewed    Social History     Tobacco Use     Smoking status: Former     Years: 8.00     Types: Cigarettes     Start date:      Quit date:      Years since quittin.2     Smokeless tobacco: Never   Vaping Use     Vaping Use: Never used   Substance Use Topics     Alcohol use: No     Drug use: No       Review of Systems  ROS: 10 point ROS neg other than the symptoms noted above  "in the HPI     Physical Exam  /72 (Cuff Size: Adult Regular)   Pulse 89   Temp 97.3  F (36.3  C) (Tympanic)   Ht 1.676 m (5' 6\")   Wt 86.2 kg (190 lb)   SpO2 98%   BMI 30.67 kg/m      General - The patient is well nourished and well developed, and appears to have good nutritional status.  Alert and oriented to person and place, answers questions and cooperates with examination appropriately.   Head and Face - Normocephalic and atraumatic, with no gross asymmetry noted.  The facial nerve is intact, with strong symmetric movements.  Voice and Breathing - The patient was breathing comfortably without the use of accessory muscles. There was no wheezing, stridor, or stertor.  The patients voice was clear and strong, and had appropriate pitch and quality.  Ears -The external auditory canals are patent, the tympanic membranes are intact without effusion, retraction or mass.  Bony landmarks are intact.  Eyes - Extraocular movements intact, and the pupils were reactive to light.  Sclera were not icteric or injected, conjunctiva were pink and moist.  Mouth - Examination of the oral cavity showed pink, healthy oral mucosa. No lesions or ulcerations noted.  The tongue was mobile and midline, and the dentition were in good condition.    Throat - The walls of the oropharynx were smooth, pink, moist, symmetric, and had no lesions or ulcerations.  The tonsillar pillars and soft palate were symmetric.  The uvula was midline on elevation.    Neck - Normal midline excursion of the laryngotracheal complex during swallowing.  Full range of motion on passive movement.  Palpation of the occipital, submental, submandibular, internal jugular chain, and supraclavicular nodes did not demonstrate any abnormal lymph nodes or masses.  Palpation of the thyroid was soft and smooth, with no nodules or goiter appreciated.  The trachea was mobile and midline.  Nose - External contour is symmetric, no gross deflection or scars.  Nasal " mucosa is pink and moist with no abnormal mucus.        Flexible Endoscopy -     Attempts at mirror laryngoscopy were not possible due to gag reflex.  Therefore I proceeded with a fiberoptic examination.  First I sprayed both sides of the nose with a mixture of lidocaine and neosynephrine.  I then passed the scope through the nasal cavity.    The nasal cavity was unremarkable. Post nasal drainage throughout.  The nasopharynx was mucosally covered and symmetric.  The Eustachian tube openings were unobstructed.  Going further down I had a clear view of the base of tongue which had normal appearing lingual tonsillar tissue.  The base of tongue was free of lesions, and the vallecula was open.  The epiglottis was smooth and mucosally covered.  The supraglottic larynx was then clearly visualized.  The vocal cords moved smoothly and symmetrically, they were pearly white and no lesions were seen.  The pyriform sinuses were open, and the limited view of the postcricoid region did not show any lesions.        Impression and Plan- Sadaf A Latendresse is a 62 year old female with:    ICD-10-CM    1. Hoarse voice quality  R49.0 Adult ENT  Referral      2. Other dysphagia  R13.19       3. Gastroesophageal reflux disease with esophagitis without hemorrhage  K21.00       4. Chronic maxillary sinusitis  J32.0 budesonide (PULMICORT) 0.5 MG/2ML neb solution              Complete speech therapy.  Completed videofluoroscopy swallow study  On examination normal laryngeal exam.   Recommended maintain good water intake and complete swallow study. Further work w/ ST for voice therapy. I am concerned from her incident residual edema/ recovery is present and affecting her symptoms at this time. EGD was negative.     Continue with Rinses. Start Budesonide rinses daily.     Continue with Dexilant, Pepcid  Maintain good water intake.   Follow reflux precautions    Follow up in 4-6 weeks.     America Aguilar PA-C  ENT  [facility]  [place]          Again, thank you for allowing me to participate in the care of your patient.        Sincerely,        America Aguilar PA-C

## 2023-03-21 NOTE — PATIENT INSTRUCTIONS
Continue with Rinses. Start Budesonide rinses daily.   Complete swallow evaluation with Speech therapy.   Complete voice therapy.     Continue with Dexilant, Pepcid  Maintain good water intake.   Follow reflux precautions    Follow up in 4-6 weeks.       Thank you for allowing America Aguilar PA-C and our ENT team to participate in your care.  If your medications are too expensive, please give the nurse a call.  We can possibly change this medication.  If you have a scheduling or an appointment question please contact our Health Unit Coordinator at [phone], Ext. [phone].    ALL nursing questions or concerns can be directed to your ENT nurse at: [phone] [facility]      Budesonide nasal saline irrigation per instructions:  -Obtain [employer] [facility] Sinus rinse over the counter.    -Use warm distilled water and 2 packets of the salt solution that comes with the bottle, dissolve in bottle up to the 240 mL deanne.  -Add 1 vial of budesonide.  -Irrigate each side of your nose leaning over the sink, using 1/3 to 1/2 the volume of the bottle in each nostril every irrigation.  Irrigate 2 times daily.  -If additional rinses are needed/recommended, you may use the plan [employer] [facility] Sinus irrigation without the use of added budesonide

## 2023-03-22 DIAGNOSIS — F33.1 MAJOR DEPRESSIVE DISORDER, RECURRENT EPISODE, MODERATE (H): ICD-10-CM

## 2023-03-22 NOTE — PROGRESS NOTES
"   03/21/23 1800       Present No   General Information   Type Of Visit Initial   Start Of Care Date 03/07/23   Referring Physician Henna Moyer MD   Orders Evaluate And Treat   Orders Comment Dysphagia   Medical Diagnosis Hoarse voice quality (R49.0)     Dysphagia, unspecified type (R13.10)   Onset Of Illness/injury Or Date Of Surgery 02/19/23   Precautions/limitations No Known Precautions/limitations   Pertinent History of Current Problem/OT: Additional Occupational Profile Info Pt is a 62 year old female who presents today for a video fluoroscopic swallow study. Pt was previously evaluated on 3/8/23 for clinical swallow evaluation. The following case history was obtained during previous evaluation. \"She reported concerns regarding both her voice and swallowing since an incident on February 19th. Today s evaluation focused on swallowing. Pt reported that on 2/19 she was taking her night time medications and she went to take a drink of water to wash them down however she started choking. She reported that this went on for about 10-15 minutes until she realized that she was not getting any air. Pt reported that she called her sister, however her sister had difficulty understanding what she was saying. She stated that she must have passed out because her sister found her on the floor and she had to be brought to the hospital via ambulance. While in the ED pt reported that she had increased difficulty with swallowing her own saliva and that when she tried to drink something she would cough. An EGD was completed and results indicated no foreign body or abnormalities. Pt reported that since this episode occurred, she has had increased difficulty with swallowing and does not think she would be able to tolerate more advanced textures.  Pt reported history of spinal surgery with anterior incision in 1989 and 2011. Pt also expressed concerns regarding her voice since this episode occurred " "however she has not yet been seen by ENT services.\" Since evaluation on 3/8/23 pt reported that she has been trying to eat a wider variety of foods/textures however she is still nervous about choking.   Respiratory Status Room air   Living Environment [place]  (Pt lives alone)   Patient/family Goals Assess swallowing   Fall Risk Screen   Fall screen completed by SLP   Have you fallen 2 or more times in the past year? No   Have you fallen and had an injury in the past year? No   Is patient a fall risk? No   Abuse Screen (yes response referral indicated)   Feels Unsafe at Home or Work/School no   Feels Threatened by Someone no   Does Anyone Try to Keep You From Having Contact with Others or Doing Things Outside Your Home? no   Physical Signs of Abuse Present no   Patient needs abuse support services and resources No   Clinical Swallow Evaluation   Oral Musculature generally intact   Structural Abnormalities none present   Dentition present and adequate   Mucosal Quality good   Mandibular Strength and Mobility intact   Oral Labial Strength and Mobility WFL   Lingual Strength and Mobility WFL   Velar Elevation intact   Buccal Strength and Mobility intact   Laryngeal Function Voicing initiated;Swallow   Oral Musculature Comments Pt's oral musculature is adequate for swallow functions.   Additional Documentation No   Additional evaluation(s) completed today No   Swallow Eval   Feeding Assistance no assistance needed   Adaptive Eating Utensils N/A   VFSS Evaluation   VFSS Additional Documentation Yes   VFSS Eval: Radiology   Radiologist Dr. Wheeler   Views Taken right lateral   Physical Location of Procedure DI   VFSS Eval: Thin Liquid Texture Trial   Mode of Presentation, Thin Liquid cup;self-fed   Order of Presentation 1,3,6,8 (consecutive)   Preparatory Phase WFL   Oral Phase, Thin Liquid WFL;Premature pharyngeal entry   Pharyngeal Phase, Thin Liquid WFL   Rosenbek's Penetration Aspiration Scale: Thin Liquid Trial " Results 1 - no aspiration, contrast does not enter airway   Diagnostic Statement No aspiration or penetration was witnessed during thin liquid trials. No abnormal pooling in the valleculae or pyriform sinuses after the swallow was observed.   VFSS Eval: Puree Solid Texture Trial   Mode of Presentation, Puree spoon;self-fed   Order of Presentation 2   Preparatory Phase WFL   Oral Phase, Puree WFL;Premature pharyngeal entry   Pharyngeal Phase, Puree WFL   Rosenbek's Penetration Aspiration Scale: Puree Food Trial Results 1 - no aspiration, contrast does not enter airway   Diagnostic Statement No aspiration or penetration was witnessed during pureed texture trials. No abnormal pooling in the valleculae or pyriform sinuses after the swallow was observed.   VFSS Eval: Soft & Bite Sized   Mode of Presentation spoon;self-fed   Order of Presentation 5   Preparatory Phase WFL   Oral Phase WFL;Premature pharyngeal entry   Pharyngeal Phase WFL   Rosenbek's Penetration Aspiration Scale 1 - no aspiration, contrast does not enter airway   Diagnostic Statement No aspiration or penetration was witnessed during soft and bite sized texture trials. No abnormal pooling in the valleculae or pyriform sinuses after the swallow was observed.   VFSS Eval: Regular Texture Trial (Solid)   Mode of Presentation spoon;self-fed   Order of Presentation 7,9   Preparatory Phase WFL   Oral Phase WFL;Premature pharyngeal entry   Pharyngeal Phase WFL   Rosenbek's Penetration Aspiration Scale 1 - no aspiration, contrast does not enter airway   Diagnostic Statement No aspiration or penetration was witnessed during regular texture trials. No abnormal pooling in the valleculae or pyriform sinuses after the swallow was observed.   FEES Evaluation   Additional Documentation No   Swallow Compensations   Swallow Compensations No compensations were used   Results No compensations were used   Educational Assessment   Barriers to Learning No barriers   Preferred  Learning Style Listening;Demonstration   Esophageal Phase of Swallow   Patient reports or presents with symptoms of esophageal dysphagia Yes   Esophageal sweep performed during today s vidofluoroscopic exam  Please refer to radiologist's report for details   General Therapy Interventions   Planned Therapy Interventions   (Evaluation only)   Swallow Eval: Clinical Impressions   Skilled Criteria for Therapy Intervention No problems identified which require skilled intervention   Functional Assessment Scale (FAS) 7   Dysphagia Outcome Severity Scale (DIVINE) Level 7 - DIVINE   Treatment Diagnosis WNL swallow function   Diet texture recommendations Thin liquids (level 0);Easy to Chew diet (level 7);Regular diet   Recommended Feeding/Eating Techniques alternate between small bites and sips of food/liquid;small sips/bites;maintain upright posture during/after eating for 30 mins   Rehab Potential good, to achieve stated therapy goals   Demonstrates Need for Referral to Another Service   (SLP for voice evaluation)   Therapy Frequency   (Evaluation only)   Predicted Duration of Therapy Intervention (days/wks)   (Evaluation only)   Anticipated Discharge Disposition home   Risks and Benefits of Treatment have been explained. Yes   Patient, family and/or staff in agreement with Plan of Care Yes   Clinical Impression Comments Pt is demonstrating adequate swallow functions with no difficulties outside of normal limits. No abnormal pooling in the valleculae or pyriform sinuses was noted after the swallow across consistencies. No aspiration or penetration was witnessed during video swallow assessment. Across textures premature spillage of bolus was witnessed however this did not impact her overall swallow function. Provided pt with education regarding compensatory strategies including; diet modification and compensatory strategies to use during mealtimes (alternate bites/sips, take small bites/sips, pace oral intake, and chew  thoroughly). Reviewed imaging with pt so that she was able to visualize that there was no aspiration or remaining residue after the swallow across textures that were trialed. Pt expressed that this evaluation is going to increase her confidence in trialing more advanced textures at home as she has been avoiding them since her choking episode. Reiterated that pt should continue to follow compensatory strategies at mealtimes and that if she is feeling nervous about trying a more advanced texture to have another person present. Pt expressed understanding to evaluation results and recommendations that were provided. Continued skilled services are not recommended at this time due to pt demonstrating swallow functions that are within functional limits. Pt did report that she was seen by ENT services and they recommended voice therapy. Will plan to have scheduling reach out to pt to schedule voice evaluation.   Swallow Goals   SLP Swallow Goals 1   Swallow Goal 1   Goal Identifier LTG 1   Goal Description Pt will complete video fluoroscopic swallow study evaluation to instrumentally assess swallow functions.   Goal Progress MET   Target Date 04/07/23   Date Met 03/21/23   Total Session Time   SLP Eval: VideoFluoroscopic Swallow function Minutes (91881) 10   Total Evaluation Time 35   Therapy Certification   Certification date from 03/07/23   Certification date to 06/05/23   Medical Diagnosis Hoarse voice quality (R49.0)     Dysphagia, unspecified type (R13.10)   Certification I certify the need for these services furnished under this plan of treatment and while under my care.  (Physician co-signature of this document indicates review and certification of the therapy plan).

## 2023-03-22 NOTE — PROGRESS NOTES
Assessment & Plan     Hoarse voice quality / Dysphagia, unspecified type  Improving  S/p ENT evaluation   Speech therapy on 3/29/2023  - c/w dexilant, pepcid 40mg   - EGD (2/20/2023) normal     # yeast infection   - s/p 3 doses of monostat with no improvement   - Tesha will call for an appt if symptoms do not improve    See Patient Instructions    Return in about 3 months (around 6/23/2023).    Henna Moyer MD  [facility] - DELIA Parkinson is a 62 year old, presenting for the following health issues:  Throat Problem  No flowsheet data found.  HPI     Concern - swallowing and voice issues  Onset: F/U  Description: Vocal swelling  Intensity: moderate  Progression of Symptoms:  same  Accompanying Signs & Symptoms: Pharyngitis, choice gets worse throughout the day. Talking for a long time makes it worse.   Previous history of similar problem: na  Precipitating factors:        Worsened by: na  Alleviating factors:        Improved by: na  Therapies tried and outcome: Will be seeing a speech therapist next Wednesday at 0800 am    - h/o neck surgery   - has not eaten meat yet, but plans to eat meat for supper tonight     Review of Systems   Constitutional: Negative for chills and fever.   HENT: Positive for voice change. Negative for congestion.    Respiratory: Negative for shortness of breath.    Cardiovascular: Negative for chest pain and palpitations.   Gastrointestinal: Negative for abdominal pain.          Objective    /64 (BP Location: Right arm, Patient Position: Chair, Cuff Size: Adult Regular)   Pulse 86   Temp 99.2  F (37.3  C) (Tympanic)   Resp 18   Wt 86.8 kg (191 lb 4 oz)   SpO2 97%   BMI 30.87 kg/m    Body mass index is 30.87 kg/m .  Physical Exam  Constitutional:       General: She is not in acute distress.     Appearance: She is not ill-appearing.   Cardiovascular:      Rate and Rhythm: Normal rate and regular rhythm.      Heart sounds: No murmur  heard.  Pulmonary:      Effort: Pulmonary effort is normal. No respiratory distress.      Breath sounds: No wheezing or rales.   Neurological:      Mental Status: She is alert.

## 2023-03-23 ENCOUNTER — OFFICE VISIT (OUTPATIENT)
Dept: FAMILY MEDICINE | Facility: OTHER | Age: 63
End: 2023-03-23
Attending: FAMILY MEDICINE
Payer: COMMERCIAL

## 2023-03-23 VITALS
DIASTOLIC BLOOD PRESSURE: 64 MMHG | SYSTOLIC BLOOD PRESSURE: 114 MMHG | WEIGHT: 191.25 LBS | TEMPERATURE: 99.2 F | OXYGEN SATURATION: 97 % | BODY MASS INDEX: 30.87 KG/M2 | HEART RATE: 86 BPM | RESPIRATION RATE: 18 BRPM

## 2023-03-23 DIAGNOSIS — M54.2 CHRONIC NECK PAIN: ICD-10-CM

## 2023-03-23 DIAGNOSIS — R49.0 HOARSE VOICE QUALITY: Primary | ICD-10-CM

## 2023-03-23 DIAGNOSIS — B37.31 YEAST INFECTION OF THE VAGINA: ICD-10-CM

## 2023-03-23 DIAGNOSIS — R13.10 DYSPHAGIA, UNSPECIFIED TYPE: ICD-10-CM

## 2023-03-23 DIAGNOSIS — G89.29 CHRONIC NECK PAIN: ICD-10-CM

## 2023-03-23 PROCEDURE — G0463 HOSPITAL OUTPT CLINIC VISIT: HCPCS | Mod: 25

## 2023-03-23 PROCEDURE — G0463 HOSPITAL OUTPT CLINIC VISIT: HCPCS

## 2023-03-23 PROCEDURE — 99213 OFFICE O/P EST LOW 20 MIN: CPT | Performed by: FAMILY MEDICINE

## 2023-03-23 ASSESSMENT — ENCOUNTER SYMPTOMS
VOICE CHANGE: 1
SHORTNESS OF BREATH: 0
FEVER: 0
CHILLS: 0
PALPITATIONS: 0
ABDOMINAL PAIN: 0

## 2023-03-23 ASSESSMENT — PAIN SCALES - GENERAL: PAINLEVEL: NO PAIN (0)

## 2023-03-23 NOTE — TELEPHONE ENCOUNTER
Fluoxetine (Prozac) 40 mg capsule  Take 1 capsule (40 mg) by mouth daily    Last Written Prescription Date:  11-  Last Fill Quantity: 30 capsule,   # refills: 3  Last Office Visit: 1-  Future Office visit:   3-  [place]

## 2023-03-24 RX ORDER — HYDROCODONE BITARTRATE AND ACETAMINOPHEN 7.5; 325 MG/1; MG/1
1 TABLET ORAL 2 TIMES DAILY PRN
Qty: 60 TABLET | Refills: 0 | Status: SHIPPED | OUTPATIENT
Start: 2023-03-24 | End: 2023-06-15

## 2023-03-24 RX ORDER — FLUOXETINE 40 MG/1
CAPSULE ORAL
Qty: 30 CAPSULE | Refills: 3 | Status: SHIPPED | OUTPATIENT
Start: 2023-03-24 | End: 2023-07-03

## 2023-03-24 NOTE — TELEPHONE ENCOUNTER
HYDROcodone-acetaminophen (NORCO) 7.5-325 MG per tablet      Last Written Prescription Date:  1/13/2023  Last Fill Quantity: 60,   # refills: 0  Last Office Visit: 3/24/2023  Future Office visit:       Routing refill request to provider for review/approval because:  Drug not on the [facility], [facility] or [facility] refill protocol or controlled substance

## 2023-03-28 ENCOUNTER — VIRTUAL VISIT (OUTPATIENT)
Dept: PSYCHIATRY | Facility: OTHER | Age: 63
End: 2023-03-28
Attending: PSYCHIATRY & NEUROLOGY
Payer: COMMERCIAL

## 2023-03-28 DIAGNOSIS — F41.1 GAD (GENERALIZED ANXIETY DISORDER): ICD-10-CM

## 2023-03-28 PROCEDURE — 99214 OFFICE O/P EST MOD 30 MIN: CPT | Mod: VID | Performed by: PSYCHIATRY & NEUROLOGY

## 2023-03-28 ASSESSMENT — PAIN SCALES - GENERAL: PAINLEVEL: MODERATE PAIN (5)
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
## 2023-03-29 ENCOUNTER — HOSPITAL ENCOUNTER (OUTPATIENT)
Dept: SPEECH THERAPY | Facility: HOSPITAL | Age: 63
Setting detail: THERAPIES SERIES
Discharge: HOME | End: 2023-03-29
Attending: FAMILY MEDICINE
Payer: COMMERCIAL

## 2023-03-29 PROCEDURE — 92524 BEHAVRAL QUALIT ANALYS VOICE: CPT | Mod: GN

## 2023-03-31 NOTE — PROGRESS NOTES
"   03/29/23 0900   General Information   Type of Evaluation Voice   Type Of Visit Initial   Start Of Care Date 03/29/23   Referring Physician Henna Moyer MD   Orders Evaluate And Treat   Orders Comment Dysphonia   Medical Diagnosis Hoarse voice quality (R49.0)     Dysphagia, unspecified type (R13.10)   Onset Of Illness/injury Or Date Of Surgery 02/19/23   Precautions/Limitations  no known precautions/limitations   Surgical/Medical history reviewed Yes   Pertinent History Of Current Problem Pt is a 62 year old female who presents today for a voice evaluation. Pt reported that on 2/19 she was taking her night time medications and she went to take a drink of water to wash them down however she started choking. She reported that this went on for about 10-15 minutes until she realized that she was not getting any air. Pt reported that she called her sister, however her sister had difficulty understanding what she was saying. She stated that she must have passed out because her sister found her on the floor and she had to be brought to the hospital via ambulance. While in the ED pt reported that she had increased difficulty with swallowing her own saliva and that when she tried to drink something she would cough. An EGD was completed and results indicated no foreign body or abnormalities. Pt was seen by ENT on 3/21 with the following recommendations; \"On examination normal laryngeal exam.   Recommended maintain good water intake and complete swallow study. Further work w/ ST for voice therapy. I am concerned from her incident residual edema/ recovery is present and affecting her symptoms at this time. EGD was negative.\" Regarding her voice, pt is rating her voice a 4/10 (10 being worse voice). She reported that the nasal rinses that she has been completing initially made her vocal quality increase however now it has gone back to hoarse. Pt reported that the more she talks during a day, the more she notices " herself coughing which makes her voice get increasingly hoarse. She reported that she has been avoiding talking on the phone or having long conversations with friends and family members.   [facility] [place]   Patient/family Goals Fix vocal quality   General Information Comments Patient completed the Reflux Symptom Index (RSI). Patient's total overall score was a 23/45; normative data suggests that a RSI score of greater than or equal to 13 is clinically significant. Therefore; a RSI >13 may be indicative of significant reflux. Patient completed the Voice Handicap Index (VHI). Patient's overall score was 68/120  indicating a severe severity () in voice difficulty. Patient scored 19/40 on the functional related questions indicating difficulties in all areas assessed except for  my voice problem causes me to lose income . Patient scored 22/40 on physical related questions indicating difficulties in all areas assessed. Patient scored 27/40 on the emotional related questions indicating difficulties in all areas assessed. Patient reports the following regarding vocal hygiene; typically drinks 5-6 bottles (16 oz.) of water per day and only 1 cup of coffee in the morning. She denied any other caffeine intake during the day. Pt reported that she has been using cough drops frequently throughout the day and the ones that she is using are Roger s sugar free menthol flavor. She also reported that she does frequently clear her throat throughout the day. Distant history of smoking reported however no current smoking or tobacco use reported.   Fall Risk Screen   Fall screen completed by SLP   Have you fallen 2 or more times in the past year? No   Have you fallen and had an injury in the past year? No   Is patient a fall risk? No   Abuse Screen (yes response referral indicated)   Feels Unsafe at Home or Work/School no   Feels Threatened by Someone no   Does Anyone Try to Keep You From Having Contact with Others  or Doing Things Outside Your Home? no   Physical Signs of Abuse Present no   Patient needs abuse support services and resources No   Oral Motor Sensory Function   Completed on Swallow Evaluation Completed Clinical Bedside Swallow Evaluation   Deficits noted in Labial Function (m-VII, S-V) None   Deficits noted in Lingual Function (m-XII, s-V, VII, IX, XII) None   Deficits noted in Mandibular Function (m-V) None   Deficits noted in Velar Function (m-V, X, XI, s-IX) None   Deficits noted in Laryngeal Function (m-X, s-X) None   Functional Assessment Scale (Oral Motor) No Impairment   Speech   Deficits in Speech Respiration Shallow   Deficits in Phonation Hoarse quality;Strained-strangled quality   Sustained vowel production 12.8   S/Z Ratio 1.03   Deficits in Articulation None   Underlying oral motor deficit None   Deficits in Resonance None   Deficits in Prosody None   Speech Comments Patient is demonstrating a vocal impairment characterized by the following qualities; hoarse and strained. Patient demonstrated an average maximum phonation time of 12.8 seconds. The normative data indicates that the average duration for women the same age is 21.34 seconds, with average being between 15.68-27.00 seconds. The patient's average phonation time for /s/ was 25.0 seconds and  24.25 seconds for /z/. This resulted in a 1.03 s/z ratio. A quotient greater than 1.4 in adults suggests glottal valving inefficiency. Due to time constraints unable to assess for pitch and volume during glides and reading tasks. Plan to continue to assess these areas in upcoming treatment sessions.   Education Assessment   Barriers to Learning No barriers   Preferred Learning Style Listening;Demonstration;Reading   General Therapy Interventions   Planned Therapy Interventions Voice   Voice Throat clearing elimination plan;Resonant voice techniques   Clinical Impression, SLP Eval   Criteria for Skilled Therapeutic Interventions Met (SLP Eval) Yes,  treatment indicated   SLP Diagnosis Voice dysfunction   Functional limitations due to impairments Pt is demonstrating vocal quality that is negatively impacting her quality of life. She is unable to complete daily tasks (talking on the phone with family members, completing medical visits, etc). without demonstrating a decline in vocal quality.   Rehab potential affected by Therapy attendance and adherence to home programming.   Therapy Frequency 1 time;per week   Predicted Duration of Therapy Intervention (days/wks) 8 weeks   Risks and Benefits of Treatment have been explained. Yes   Patient, Family & other staff in agreement with plan of care Yes   Clinical Impression Comments Patient is demonstrating vocal impairment in which is voice is characterized by hoarseness and increased strain. During evaluation tasks patient was found to have frequent throat clearing and reduced maximum phonation time. Due to time constraints unable to assess for pitch and volume. Plan to continue to assess these areas of patients vocal quality in upcoming treatment sessions. Patient presents with some good vocal hygiene habits however would benefit from continued education on management of vocal hygiene. Skilled written and verbal education was provided to patient regarding how the voice works, what voice therapy will consist of, vocal hygiene, and introduction to throat clearing/cough avoidance techniques. Patient s vocal quality is negatively impacting her everyday life as she is limiting the amount of time that she spends talking to her friends and family. Skilled speech-language pathology services are recommended to increase patient s vocal quality characteristics and to develop healthy vocal hygiene habits. Patient expressed agreement with plan with continued speech therapy services.   Language/Cognition Goals   Language/Cognition Goals 1;2;3   Language/Cognition Goal 1   Goal Identifier LTG 1   Goal Description Patient will  increase vocal quality and vocal hygiene habits to assist in being understood by others and improving overall quality of life.   Target Date 05/24/23   Language/Cognition Goal 2   Goal Identifier STG 1   Goal Description Patient will identify 3 vocal hygiene/coughing or throat clearing reduction strategies she is using to improve vocal quality with minimal verbal prompting.   Target Date 05/24/23   Language/Cognition Goal 3   Goal Identifier STG 2   Goal Description Patient participate resonant voice exercises with 80% accuracy to increase vocal quality when provided minimal cues.   Target Date 05/24/23   Total Session Time   Voice Minutes (65792) 60   Total Evaluation Time 60   Therapy Certification   Certification date from 03/29/23   Certification date to 05/24/23   Medical Diagnosis Hoarse voice quality (R49.0)     Dysphagia, unspecified type (R13.10)   Certification I certify the need for these services furnished under this plan of treatment and while under my care.  (Physician co-signature of this document indicates review and certification of the therapy plan).     Voice Handicap Index    The Voice Handicap Index (VHI) was administered to assess the patient's perception of their vocal quality and impact that their voice quality on their daily life.  The VHI is divided into three parts: Functional, Physical and Emotional.  Patient uses the following rating to answer questions: 0-never; 1-almost never; 2-sometimes; 3-almost always; 4-always. A total score is then calculated in order to provide a severity description.     Patient's self-report on Part 1: Functional revealed 19/40.  Patient indicated that almost always she is using the phone less often then she would like to and that she is avoiding groups of people because of her voice.     Patient's self-report on Part 2: Physical revealed 22/40.  Patient indicated that almost always her voice is worse in the evenings.     Patient's self-report on Part 3:  Emotional revealed 27/40). Patient indicated that almost always in the majority of the areas including;   I am tense when talking to others because of my voice.   My voice problem upsets me.   I am less outgoing because of my voice problem.   My voice makes me feel handicapped.   I feel annoyed when people ask me to repeat.   I feel embarrassed when people ask me to repeat.   My voice makes me feel incompetent.   I am ashamed of my voice problem.    Overall, the patient's self-report on the VHI indicates a severe severity rating for their voice quality (68/120). Their voice disorder is negatively impacting their everyday life and they would benefit from speech-language therapy services.

## 2023-04-05 ENCOUNTER — TRANSFERRED RECORDS (OUTPATIENT)
Dept: HEALTH INFORMATION MANAGEMENT | Facility: CLINIC | Age: 63
End: 2023-04-05

## 2023-04-06 ENCOUNTER — TRANSFERRED RECORDS (OUTPATIENT)
Dept: HEALTH INFORMATION MANAGEMENT | Facility: CLINIC | Age: 63
End: 2023-04-06

## 2023-04-20 ENCOUNTER — HOSPITAL ENCOUNTER (OUTPATIENT)
Dept: SPEECH THERAPY | Facility: HOSPITAL | Age: 63
Setting detail: THERAPIES SERIES
Discharge: HOME | End: 2023-04-20
Attending: FAMILY MEDICINE
Payer: COMMERCIAL

## 2023-04-20 PROCEDURE — 92507 TX SP LANG VOICE COMM INDIV: CPT | Mod: GN

## 2023-04-23 ENCOUNTER — E-VISIT (OUTPATIENT)
Dept: URGENT CARE | Facility: CLINIC | Age: 63
End: 2023-04-23
Payer: COMMERCIAL

## 2023-04-23 DIAGNOSIS — J01.90 ACUTE BACTERIAL SINUSITIS: Primary | ICD-10-CM

## 2023-04-23 DIAGNOSIS — B96.89 ACUTE BACTERIAL SINUSITIS: Primary | ICD-10-CM

## 2023-04-23 PROCEDURE — 99421 OL DIG E/M SVC 5-10 MIN: CPT | Performed by: FAMILY MEDICINE

## 2023-04-23 RX ORDER — DOXYCYCLINE HYCLATE 100 MG
100 TABLET ORAL 2 TIMES DAILY
Qty: 14 TABLET | Refills: 0 | Status: SHIPPED | OUTPATIENT
Start: 2023-04-23 | End: 2023-04-30

## 2023-04-23 NOTE — PATIENT INSTRUCTIONS
Dear Sadaf Blankenship    After reviewing your responses, I've been able to diagnose you with Acute bacterial sinusitis.      Based on your responses and diagnosis, I have prescribed   Orders Placed This Encounter     doxycycline hyclate (VIBRA-TABS) 100 MG tablet    to treat your symptoms. I have sent this to your pharmacy.?     It is also important to stay well hydrated, get lots of rest and take over-the-counter decongestants,?tylenol?or ibuprofen if you?are able to?take those medications per your primary care provider to help relieve discomfort.?     It is important that you take?all of?your prescribed medication even if your symptoms are improving after a few doses.? Taking?all of?your medicine helps prevent the symptoms from returning.?     If your symptoms worsen, you develop severe headache, vomiting, high fever (>102), or are not improving in 7 days, please contact your primary care provider for an appointment or visit any of our convenient Walk-in Care or Urgent Care Centers to be seen which can be found on our website?here.?     Thanks again for choosing?us?as your health care partner,?   ?  Cris Blanc MD?

## 2023-04-26 DIAGNOSIS — M79.18 MYOFASCIAL PAIN SYNDROME, CERVICAL: ICD-10-CM

## 2023-04-27 RX ORDER — PREGABALIN 150 MG/1
150 CAPSULE ORAL 2 TIMES DAILY
Qty: 180 CAPSULE | Refills: 0 | Status: SHIPPED | OUTPATIENT
Start: 2023-04-27 | End: 2023-07-27

## 2023-04-27 NOTE — TELEPHONE ENCOUNTER
Lyrica      Last Written Prescription Date:  1.20.23  Last Fill Quantity: #180,   # refills: 0  Last Office Visit: 3.23.23  Future Office visit:    Next 5 appointments (look out 90 days)    Jun 23, 2023  8:30 AM  (Arrive by 8:15 AM)  SHORT with Henna Moyer MD  [facility] ([facility] ) [address]  [place] [address]  [phone]           Routing refill request to provider for review/approval because:  Drug not on the [facility], [facility] or [facility] refill protocol or controlled substance

## 2023-05-01 ENCOUNTER — HOSPITAL ENCOUNTER (OUTPATIENT)
Dept: SPEECH THERAPY | Facility: HOSPITAL | Age: 63
Setting detail: THERAPIES SERIES
Discharge: HOME | End: 2023-05-01
Attending: FAMILY MEDICINE
Payer: COMMERCIAL

## 2023-05-01 PROCEDURE — 92507 TX SP LANG VOICE COMM INDIV: CPT | Mod: GN

## 2023-05-01 NOTE — PROGRESS NOTES
"[facility]    Outpatient Speech Language Pathology Discharge Note  Patient: Sadaf Blankenship  : 1960    Beginning/End Dates of Reporting Period:  23 to 23    Referring Provider: Henna Moyer Gerchman, MD     Therapy Diagnosis: Voice dysfunction     Client Self Report: (P) Pt attended skilled services from [id number] this AM. She reported that today her voice sounded more raspy however she has not been feeling well due to allergies and her sinuses. However she reported that prior to this onset of hoarseness, she felt that her voice was back to normal. During todays session she completed the Voice Handicap Index and rated her voice a +0/120, which indicates no impairment. During initial evaluation pt scored +68/120 on VHI. Discussed discharge and pt is in agreement with discharge from voice therapy at this time. Provided continued education regarding importance of continuing vocal hygiene strategies after discharge.     Recommendations:  Recommended that pt continue to follow up with ENT regarding sinuses. During previous session pt also reported \"spasms\" in her neck 3-4x per week and pain/stiffness in her neck. Discussed with PT care team and they recommended PT evaluation. Discussed with pt and she reported that she would like to complete an evaluation later in the year. Recommended that she reach out to her PCP when ready for a PT referral.     Objective Measurements:   VHI  +0/120 --indicating no impairment     Vocal Health Strategies   diaphragmatic breathing, maintaining water intake, follow reflux precautions        Goals:  Goal Identifier LTG 1   Goal Description Patient will increase vocal quality and vocal hygiene habits to assist in being understood by others and improving overall quality of life.   Target Date 23   Date Met      Progress (detail required for progress " note):       Goal Identifier STG 1   Goal Description Patient will identify 3 vocal hygiene/coughing or throat clearing reduction strategies she is using to improve vocal quality with minimal verbal prompting.   Target Date 05/24/23   Date Met      Progress (detail required for progress note): MET     Goal Identifier STG 2   Goal Description Patient participate resonant voice exercises with 80% accuracy to increase vocal quality when provided minimal cues.   Target Date 05/24/23   Date Met      Progress (detail required for progress note): NOT ADDRESSED       Plan:  Discharge from therapy.    Discharge:    Reason for Discharge: Patient has met all goals.  Pt is demonstrating vocal function that is WNL.     Equipment Issued: N/A    Discharge Plan: Patient to continue home program.

## 2023-05-09 ENCOUNTER — MYC MEDICAL ADVICE (OUTPATIENT)
Dept: FAMILY MEDICINE | Facility: OTHER | Age: 63
End: 2023-05-09

## 2023-05-09 DIAGNOSIS — M79.18 MYOFASCIAL PAIN: Chronic | ICD-10-CM

## 2023-05-09 DIAGNOSIS — M54.16 LUMBAR RADICULOPATHY: Primary | ICD-10-CM

## 2023-05-10 ENCOUNTER — TELEPHONE (OUTPATIENT)
Dept: FAMILY MEDICINE | Facility: OTHER | Age: 63
End: 2023-05-10

## 2023-05-10 NOTE — TELEPHONE ENCOUNTER
For Thurs 05/11/2023      To: Nurse to Dr. Moyer    Please return call to insurance company to discuss possible work restrictions.  Phone is [phone]    Thank you

## 2023-05-11 ENCOUNTER — OFFICE VISIT (OUTPATIENT)
Dept: FAMILY MEDICINE | Facility: OTHER | Age: 63
End: 2023-05-11
Attending: FAMILY MEDICINE
Payer: COMMERCIAL

## 2023-05-11 ENCOUNTER — TELEPHONE (OUTPATIENT)
Dept: FAMILY MEDICINE | Facility: OTHER | Age: 63
End: 2023-05-11

## 2023-05-11 VITALS
SYSTOLIC BLOOD PRESSURE: 110 MMHG | WEIGHT: 190.3 LBS | BODY MASS INDEX: 30.72 KG/M2 | OXYGEN SATURATION: 98 % | TEMPERATURE: 98.7 F | HEART RATE: 79 BPM | DIASTOLIC BLOOD PRESSURE: 75 MMHG

## 2023-05-11 DIAGNOSIS — J32.9 CHRONIC SINUSITIS, UNSPECIFIED LOCATION: Primary | ICD-10-CM

## 2023-05-11 DIAGNOSIS — Z91.09 ENVIRONMENTAL ALLERGIES: ICD-10-CM

## 2023-05-11 PROCEDURE — G0463 HOSPITAL OUTPT CLINIC VISIT: HCPCS

## 2023-05-11 PROCEDURE — 99213 OFFICE O/P EST LOW 20 MIN: CPT | Performed by: FAMILY MEDICINE

## 2023-05-11 RX ORDER — METHYLPREDNISOLONE 4 MG
TABLET, DOSE PACK ORAL
Qty: 21 TABLET | Refills: 0 | Status: SHIPPED | OUTPATIENT
Start: 2023-05-11 | End: 2023-05-16

## 2023-05-11 ASSESSMENT — ANXIETY QUESTIONNAIRES
GAD7 TOTAL SCORE: 1
IF YOU CHECKED OFF ANY PROBLEMS ON THIS QUESTIONNAIRE, HOW DIFFICULT HAVE THESE PROBLEMS MADE IT FOR YOU TO DO YOUR WORK, TAKE CARE OF THINGS AT HOME, OR GET ALONG WITH OTHER PEOPLE: NOT DIFFICULT AT ALL
5. BEING SO RESTLESS THAT IT IS HARD TO SIT STILL: NOT AT ALL
1. FEELING NERVOUS, ANXIOUS, OR ON EDGE: NOT AT ALL
3. WORRYING TOO MUCH ABOUT DIFFERENT THINGS: NOT AT ALL
2. NOT BEING ABLE TO STOP OR CONTROL WORRYING: NOT AT ALL
4. TROUBLE RELAXING: NOT AT ALL
GAD7 TOTAL SCORE: 1
6. BECOMING EASILY ANNOYED OR IRRITABLE: SEVERAL DAYS
7. FEELING AFRAID AS IF SOMETHING AWFUL MIGHT HAPPEN: NOT AT ALL

## 2023-05-11 ASSESSMENT — PAIN SCALES - GENERAL: PAINLEVEL: MILD PAIN (2)

## 2023-05-11 ASSESSMENT — PATIENT HEALTH QUESTIONNAIRE - PHQ9: SUM OF ALL RESPONSES TO PHQ QUESTIONS 1-9: 2

## 2023-05-11 NOTE — PROGRESS NOTES
"  Assessment & Plan     Chronic sinusitis, unspecified location  Acute flare on chronic sinusitis.  Frequent antibiotic use.  Just completed script less then 2 weeks ago.  Recent exposure last week - high allergen - pet dander, feces, dust, etc.  Increase antihistamine to BID x 1 week.  Encouraged her to retry her budesonide sinus rinses.  Add steroid taper.  Continue inhalers.  Keep hydrated.  Reassess if not improving.  - methylPREDNISolone (MEDROL DOSEPAK) 4 MG tablet therapy pack; Follow Package Directions    Environmental allergies  As above.     BMI:   Estimated body mass index is 30.72 kg/m  as calculated from the following:    Height as of 3/21/23: 1.676 m (5' 6\").    Weight as of this encounter: 86.3 kg (190 lb 4.8 oz).       See Patient Instructions    Return if symptoms worsen or fail to improve.    Reta Gonzales MD  [facility] is a 62 year old, presenting for the following health issues:  URI        5/11/2023     2:51 PM   Additional Questions   Roomed by Tello Barboza   Accompanied by None         5/11/2023     2:51 PM   Patient Reported Additional Medications   Patient reports taking the following new medications None     HPI       Acute Illness- URI   Acute illness concerns:   Onset/Duration: 5/6/2023   Symptoms:  Fever: Unknown   Chills/Sweats: YES  Headache (location?): YES  Sinus Pressure: YES  Conjunctivitis:  No  Ear Pain: YES: both  Rhinorrhea: No  Congestion: YES  Sore Throat: YES  Cough: YES-waxing and waning over time  Wheeze: YES  Decreased Appetite: YES  Nausea: YES  Vomiting: No  Diarrhea: No  Dysuria/Freq.: YES  Dysuria or Hematuria: No  Fatigue/Achiness: YES  Sick/Strep Exposure: YES- Grand daughter is teething and had a runny nose   Therapies tried and outcome: Cough drops, tylenol cold and cough (Day and Night), Mucinex DM cough syrup and flushes her nose.    Chronic hoarseness.  TV volume 18 -today had to be 30    4/23/23 - E visit " - Doxycycline for sinusitis.  3/10/23 - E visit - Augmentin  6 antibiotic courses past year.    Medrol dose pack - 3/16/23.    Allergies - dogs/cats - in apartment building.    Helped niece move - is hoarder.  Was evicted.   Filthy.  Possible methamphetamine exposure there.  Was there for 3 long days.  Last week.    Symbicort/Albuterol.  Pulmicort sinus rinses. Can't tolerate.    Review of Systems   Constitutional, HEENT, cardiovascular, pulmonary, gi and gu systems are negative, except as otherwise noted.      Objective    /75 (BP Location: Left arm, Patient Position: Sitting, Cuff Size: Adult Large)   Pulse 79   Temp 98.7  F (37.1  C) (Tympanic)   Wt 86.3 kg (190 lb 4.8 oz)   SpO2 98%   BMI 30.72 kg/m    Body mass index is 30.72 kg/m .  Physical Exam   GENERAL: alert and no distress  EYES: Eyes grossly normal to inspection, PERRL and conjunctivae and sclerae normal  HENT: normal cephalic/atraumatic, ear canals and TM's normal, nasal mucosa edematous and mucus drainage, thick , oropharynx clear, oral mucous membranes moist, sinuses: maxillary, frontal tenderness on bilaterally and hoarse voice  NECK: no adenopathy, no asymmetry, masses, or scars and thyroid normal to palpation  RESP: lungs clear to auscultation - no rales, rhonchi or wheezes  CV: regular rate and rhythm, normal S1 S2, no S3 or S4, no murmur, click or rub, no peripheral edema and peripheral pulses strong  MS: no gross musculoskeletal defects noted, no edema  PSYCH: mentation appears normal, affect normal/bright

## 2023-05-11 NOTE — TELEPHONE ENCOUNTER
8:30 AM    Reason for Call: OVERBOOK    Patient is having the following symptoms: URI sx for 4 days     The patient is requesting an appointment for TODAY with Dr Moyer. Patient states she is open to seeing a covering provider if her PCP is unable to work her in    Was an appointment offered for this call? No  If yes : Appointment type              Date    Preferred method for responding to this message: Telephone Call  What is your phone number ?[phone]    If we cannot reach you directly, may we leave a detailed response at the number you provided? Yes    Can this message wait until your PCP/provider returns, if unavailable today? Not applicable    Susanna Montes

## 2023-05-12 ENCOUNTER — TELEPHONE (OUTPATIENT)
Dept: FAMILY MEDICINE | Facility: OTHER | Age: 63
End: 2023-05-12

## 2023-05-12 NOTE — TELEPHONE ENCOUNTER
Please watch for a form faxed to [employer] from Insurance [employer].  They are sending it again to be filled out by provider.        Aubrie Rahman    5/10/23 11:38 AM  Note                                For Thurs 05/11/2023       To: Nurse to Dr. Moyer     Please return call to insurance company to discuss possible work restrictions.  Phone is [phone]     Thank you        Insurance Rep (Other)  to Aubrie Rahman      5/10/23 11:35 AM  questions regarding possible work restrictions

## 2023-05-15 ENCOUNTER — TELEPHONE (OUTPATIENT)
Dept: FAMILY MEDICINE | Facility: OTHER | Age: 63
End: 2023-05-15

## 2023-05-15 NOTE — TELEPHONE ENCOUNTER
"Patient calls stating she started taking ordered steroid on 5/11/23  States only feeling slightly better as of call this a.m.  States 2 other persons she was cleaning the [employer] house with have been diagnosed with bronchitic & pneumonia    Asking if Provider would recommend further treatment?  States she is leaving for a trip this Wednesday & is nervous to travel feeling \"sick\"?    Pended to Provider to review & advise    "

## 2023-05-15 NOTE — TELEPHONE ENCOUNTER
Would suggest she see her PCP - Dr Moyer.  Please forward to Dr Moyer - she is back in the office tomorrow.  UC/ER if needed.

## 2023-05-16 ENCOUNTER — OFFICE VISIT (OUTPATIENT)
Dept: FAMILY MEDICINE | Facility: OTHER | Age: 63
End: 2023-05-16
Attending: NURSE PRACTITIONER
Payer: COMMERCIAL

## 2023-05-16 VITALS — DIASTOLIC BLOOD PRESSURE: 70 MMHG | SYSTOLIC BLOOD PRESSURE: 120 MMHG | OXYGEN SATURATION: 96 % | HEART RATE: 108 BPM

## 2023-05-16 DIAGNOSIS — H10.32 ACUTE BACTERIAL CONJUNCTIVITIS OF LEFT EYE: Primary | ICD-10-CM

## 2023-05-16 DIAGNOSIS — J32.9 CHRONIC SINUSITIS, UNSPECIFIED LOCATION: ICD-10-CM

## 2023-05-16 PROCEDURE — 99213 OFFICE O/P EST LOW 20 MIN: CPT | Performed by: NURSE PRACTITIONER

## 2023-05-16 PROCEDURE — G0463 HOSPITAL OUTPT CLINIC VISIT: HCPCS | Mod: 25

## 2023-05-16 PROCEDURE — G0463 HOSPITAL OUTPT CLINIC VISIT: HCPCS

## 2023-05-16 RX ORDER — TOPIRAMATE 25 MG/1
TABLET, FILM COATED ORAL
COMMUNITY
Start: 2023-03-23 | End: 2023-05-16 | Stop reason: DRUGHIGH

## 2023-05-16 RX ORDER — POLYMYXIN B SULFATE AND TRIMETHOPRIM 1; 10000 MG/ML; [USP'U]/ML
1-2 SOLUTION OPHTHALMIC EVERY 4 HOURS
Qty: 10 ML | Refills: 0 | Status: SHIPPED | OUTPATIENT
Start: 2023-05-16 | End: 2023-12-04

## 2023-05-16 RX ORDER — TOPIRAMATE 50 MG/1
TABLET, FILM COATED ORAL 2 TIMES DAILY
COMMUNITY
Start: 2023-04-13 | End: 2024-03-04

## 2023-05-16 RX ORDER — ADALIMUMAB 40MG/0.4ML
KIT SUBCUTANEOUS
COMMUNITY
Start: 2023-05-05 | End: 2023-06-29

## 2023-05-16 NOTE — TELEPHONE ENCOUNTER
Araceli - please give a call and see if there is any additional paperwork that needs to be completed.  Henna Moyer MD

## 2023-05-16 NOTE — PATIENT INSTRUCTIONS
Use albuterol inhaler for cough as needed    Continue nasal rising     Hold off on antibiotic unless symptoms worsen  Start a probiotic   If you start the antibiotic do not take probiotic within 2 hours of antibiotic     Schedule appointment with ENT when you return

## 2023-05-16 NOTE — Clinical Note
I did prescribe her an antibiotic, but suggested she hold off unless her symptoms worsen  Selene KIM FNP-BC Family Nurse Practitioner

## 2023-05-16 NOTE — PROGRESS NOTES
"  Assessment & Plan     Acute bacterial conjunctivitis of left eye  Pain, drainage and erythema to left eye   Treatment as discussed   - trimethoprim-polymyxin b (POLYTRIM) 99434-9.1 UNIT/ML-% ophthalmic solution; Place 1-2 drops Into the left eye every 4 hours    Chronic sinusitis, unspecified location  Tesha is heading out of town later this week.  We had a long discussion about antibiotic treatment.    Margaux is at high risk for developing C. Diff with multiple recurrent antibiotic use.  She should not start antibiotic unless her symptoms worsen. She does have edema in her nasal passages without erythema and a harsh dry cough.  Sinus pressure on the left side   Encouraged to follow up with ENT   DDX:allergies, chronic sinusitis viral, bacterial or reactive to environment, bronchitis    - amoxicillin-clavulanate (AUGMENTIN) 875-125 MG tablet; Take 1 tablet by mouth 2 times daily    Tesha agrees with plan     Use albuterol inhaler for cough as needed  Continue nasal rising   Hold off on antibiotic unless symptoms worsen  Start a probiotic   If you start the antibiotic do not take probiotic within 2 hours of antibiotic   Schedule appointment with ENT when you return          BMI:   Estimated body mass index is 30.72 kg/m  as calculated from the following:    Height as of 3/21/23: 1.676 m (5' 6\").    Weight as of 5/11/23: 86.3 kg (190 lb 4.8 oz).   Weight management plan: Discussed healthy diet and exercise guidelines    See Patient Instructions    No follow-ups on file.    JULIO Lantigua CNP  [facility]    Subjective   Tesha is a 62 year old, presenting for the following health issues:  RECHECK (From 5/11/23 visit)        5/11/2023     2:51 PM   Additional Questions   Roomed by Tello Barboza   Accompanied by None     HPI     Acute Illness  Acute illness concerns: sinus pain  Onset/Duration: ongoing see 5/11/23 note  Symptoms:  Fever: No  Chills/Sweats: No  Headache (location?): " YES  Sinus Pressure: YES  Conjunctivitis:  yes  Ear Pain: YES: left  Rhinorrhea: No  Congestion: YES  Sore Throat: No  Cough: YES-productive of green sputum  Wheeze: No  Decreased Appetite: No  Nausea: No  Vomiting: No  Diarrhea: No  Dysuria/Freq.: No  Dysuria or Hematuria: No  Fatigue/Achiness: No  Sick/Strep Exposure: No  Therapies tried and outcome: None  Has seen ENT in the past - she is doing nasal rinsing with budesonide   Finishing prednisone  Started claritin and allegra     Close family has bronchitis  She states she is coughing up green sputum  Sinus are green   She is concern for possible pink eye to the left eye    CT of the head in February showed paranasal sinuses clear        Review of Systems   CONSTITUTIONAL:chills  INTEGUMENTARY/SKIN: NEGATIVE for worrisome rashes, moles or lesions  EYES: left eye red, drainage and discomfort   ENT/MOUTH: ear pain bilateral, nasal congestion, postnasal drainage, sinus pressure and sore throat  RESP:productive cough   CV: NEGATIVE for chest pain, palpitations or peripheral edema  GI: NEGATIVE for nausea, abdominal pain, heartburn, or change in bowel habits  : denies dysuria   MUSCULOSKELETAL: psoriatic arthritis   NEURO: fatigued   ENDOCRINE: NEGATIVE for temperature intolerance, skin/hair changes  PSYCHIATRIC: HX anxiety and HX depression      Objective    /70   Pulse 108   SpO2 96%   There is no height or weight on file to calculate BMI.  Physical Exam   GENERAL: alert  EYES: PERRL and erythema left eye   HENT: normal cephalic/atraumatic, ear canals and TM's normal, nasal mucosa edematous , oropharynx clear, oral mucous membranes moist and sinuses: maxillary, frontal tenderness on left  RESP: lungs clear to auscultation - no rales, rhonchi or wheezes and dry harsh cough   CV: regular rate and rhythm, normal S1 S2, no S3 or S4, no murmur, click or rub, no peripheral edema and peripheral pulses strong  ABDOMEN: soft, nontender, no hepatosplenomegaly, no  masses and bowel sounds normal  MS: no gross musculoskeletal defects noted, no edema  SKIN: no suspicious lesions or rashes  NEURO: Normal strength and tone, mentation intact and speech normal  PSYCH: mentation appears normal and anxious  LYMPH: normal ant/post cervical, supraclavicular nodes

## 2023-05-22 ENCOUNTER — MYC MEDICAL ADVICE (OUTPATIENT)
Dept: OTOLARYNGOLOGY | Facility: OTHER | Age: 63
End: 2023-05-22

## 2023-05-23 NOTE — TELEPHONE ENCOUNTER
Tesha:  Continue with leroy med rinse. When you use the rinse use 2 packets which may provide you better relief. Hold Budesonide portion.   Complete the antibiotic as directed.   If your sinus symptoms persist return to office for follow up.     Hope you continue to feel better, [place]

## 2023-05-27 DIAGNOSIS — M54.12 CHRONIC RADICULAR CERVICAL PAIN: ICD-10-CM

## 2023-05-27 DIAGNOSIS — G89.29 CHRONIC RADICULAR CERVICAL PAIN: ICD-10-CM

## 2023-05-31 DIAGNOSIS — M54.12 CHRONIC RADICULAR CERVICAL PAIN: ICD-10-CM

## 2023-05-31 DIAGNOSIS — G89.29 CHRONIC RADICULAR CERVICAL PAIN: ICD-10-CM

## 2023-05-31 RX ORDER — BACLOFEN 10 MG/1
10 TABLET ORAL DAILY
Qty: 90 TABLET | Refills: 3 | Status: SHIPPED | OUTPATIENT
Start: 2023-05-31 | End: 2024-04-18

## 2023-05-31 NOTE — TELEPHONE ENCOUNTER
baclofen (LIORESAL) 10 MG tablet 90 tablet 3 6/21/2022      Last Office Visit: 05/16/2023  Future Office visit:    Next 5 appointments (look out 90 days)    Jun 23, 2023  8:30 AM  (Arrive by 8:15 AM)  SHORT with Henna Moyer MD  [facility] [place] ([facility] [place] ) [address]  [place] [address]  [phone]           Routing refill request to provider for review/approval because:

## 2023-06-02 DIAGNOSIS — K21.00 GASTROESOPHAGEAL REFLUX DISEASE WITH ESOPHAGITIS WITHOUT HEMORRHAGE: ICD-10-CM

## 2023-06-02 RX ORDER — BACLOFEN 10 MG/1
10 TABLET ORAL DAILY
Qty: 90 TABLET | Refills: 3 | OUTPATIENT
Start: 2023-06-02

## 2023-06-06 RX ORDER — FAMOTIDINE 40 MG/1
TABLET, FILM COATED ORAL
Qty: 90 TABLET | Refills: 3 | Status: SHIPPED | OUTPATIENT
Start: 2023-06-06 | End: 2024-05-07

## 2023-06-13 DIAGNOSIS — K22.10 ULCER OF ESOPHAGUS WITHOUT BLEEDING: ICD-10-CM

## 2023-06-13 NOTE — TELEPHONE ENCOUNTER
Dexlansoprazole (Dexilant) 60 MG CPDR CR capsule    Last Written Prescription Date:  07/26/2022  Last Fill Quantity: 90,   # refills: 3  Last Office Visit: 03/23/2023

## 2023-06-14 ENCOUNTER — TELEPHONE (OUTPATIENT)
Dept: FAMILY MEDICINE | Facility: OTHER | Age: 63
End: 2023-06-14

## 2023-06-14 DIAGNOSIS — G89.29 CHRONIC NECK PAIN: ICD-10-CM

## 2023-06-14 DIAGNOSIS — M54.2 CHRONIC NECK PAIN: ICD-10-CM

## 2023-06-14 DIAGNOSIS — M54.2 CERVICAL PAIN: ICD-10-CM

## 2023-06-14 RX ORDER — DEXLANSOPRAZOLE 60 MG/1
60 CAPSULE, DELAYED RELEASE ORAL DAILY
Qty: 90 CAPSULE | Refills: 2 | Status: SHIPPED | OUTPATIENT
Start: 2023-06-14 | End: 2024-04-18

## 2023-06-14 NOTE — TELEPHONE ENCOUNTER
Received PA Request from [facility] for Dexlansoprazole 60MG dr capsules.  PA Submitted on CMM, waiting for response.

## 2023-06-15 RX ORDER — HYDROCODONE BITARTRATE AND ACETAMINOPHEN 7.5; 325 MG/1; MG/1
1 TABLET ORAL 2 TIMES DAILY PRN
Qty: 60 TABLET | Refills: 0 | Status: SHIPPED | OUTPATIENT
Start: 2023-06-15 | End: 2023-09-08

## 2023-06-15 RX ORDER — METHOCARBAMOL 500 MG/1
TABLET, FILM COATED ORAL
Qty: 60 TABLET | Refills: 2 | Status: SHIPPED | OUTPATIENT
Start: 2023-06-15 | End: 2023-10-26 | Stop reason: DRUGHIGH

## 2023-06-15 NOTE — TELEPHONE ENCOUNTER
Norco, Robaxin      Last Written Prescription Date:  4.4.23, 4.26.23  Last Fill Quantity: #60, #60,   # refills: 0  Last Office Visit: 3.23.23  Future Office visit:       Routing refill request to provider for review/approval because:  Drug not on the [facility], [facility] or [facility] refill protocol or controlled substance

## 2023-06-15 NOTE — TELEPHONE ENCOUNTER
Received Approval from [facility] for Dexlansoprazole 60MG dr capsules.  Valid Dates: 05/15/2023-06/13/2024  Forms Scanned to Epic.

## 2023-06-29 ENCOUNTER — TRANSFERRED RECORDS (OUTPATIENT)
Dept: HEALTH INFORMATION MANAGEMENT | Facility: CLINIC | Age: 63
End: 2023-06-29
Payer: COMMERCIAL

## 2023-06-29 ENCOUNTER — OFFICE VISIT (OUTPATIENT)
Dept: OTOLARYNGOLOGY | Facility: OTHER | Age: 63
End: 2023-06-29
Attending: PHYSICIAN ASSISTANT
Payer: COMMERCIAL

## 2023-06-29 VITALS
WEIGHT: 188 LBS | HEART RATE: 83 BPM | BODY MASS INDEX: 30.22 KG/M2 | SYSTOLIC BLOOD PRESSURE: 112 MMHG | OXYGEN SATURATION: 95 % | HEIGHT: 66 IN | TEMPERATURE: 98.3 F | DIASTOLIC BLOOD PRESSURE: 70 MMHG

## 2023-06-29 DIAGNOSIS — J32.0 CHRONIC MAXILLARY SINUSITIS: Primary | ICD-10-CM

## 2023-06-29 DIAGNOSIS — J30.89 PERENNIAL ALLERGIC RHINITIS: ICD-10-CM

## 2023-06-29 DIAGNOSIS — R49.0 HOARSE VOICE QUALITY: ICD-10-CM

## 2023-06-29 DIAGNOSIS — J01.30 ACUTE SPHENOIDAL SINUSITIS, RECURRENCE NOT SPECIFIED: ICD-10-CM

## 2023-06-29 PROCEDURE — G0463 HOSPITAL OUTPT CLINIC VISIT: HCPCS | Mod: 25

## 2023-06-29 PROCEDURE — 31231 NASAL ENDOSCOPY DX: CPT | Performed by: PHYSICIAN ASSISTANT

## 2023-06-29 PROCEDURE — 36415 COLL VENOUS BLD VENIPUNCTURE: CPT | Mod: ZL | Performed by: PHYSICIAN ASSISTANT

## 2023-06-29 PROCEDURE — 99214 OFFICE O/P EST MOD 30 MIN: CPT | Mod: 25 | Performed by: PHYSICIAN ASSISTANT

## 2023-06-29 PROCEDURE — 31575 DIAGNOSTIC LARYNGOSCOPY: CPT | Performed by: PHYSICIAN ASSISTANT

## 2023-06-29 PROCEDURE — 82785 ASSAY OF IGE: CPT | Mod: ZL | Performed by: PHYSICIAN ASSISTANT

## 2023-06-29 PROCEDURE — 86003 ALLG SPEC IGE CRUDE XTRC EA: CPT | Mod: ZL | Performed by: PHYSICIAN ASSISTANT

## 2023-06-29 ASSESSMENT — PAIN SCALES - GENERAL: PAINLEVEL: NO PAIN (0)

## 2023-06-29 NOTE — PROGRESS NOTES
"Chief Complaint   Patient presents with     RECHECK     Follow up hoarse voice, dysphagia, GERD, and chronic maxillary sinusitis         Patient has reported since her sinuses have returned. She feels been using Plain leroy med rinses. She had sensitivity with budesonide.   She has been   Patient denies sore throat or throat pain  Denies chronic otalgia.   Denies fevers, night sweats or weight loss. Fever resolved last week.   She has Symbicort inhaler and does not rinse/ gargle after use.        Water- 6-8 bottles.   Caffeine- 1 cup coffee.   ETOH- None.   Tobacco- None. Quit > 30 years ago.   Reflux- Yes and controlled w/ medications.   Dexilant, Pepcid.        No prior allergy testing.     Past Medical History:   Diagnosis Date     Adhesive capsulitis of left shoulder 12/06/2018     ASCUS on Pap smear 05/26/2004    negative HPV     Atypical chest pain 05/26/2008    negative cardiolite stress test [facility]     Biliary dyskinesia 12/02/2021    Formatting of this note might be different from the original. Added automatically from request for surgery [id number]     Bleeding ulcer 05/26/1976     Cervical radicular pain 05/10/2012     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     Concussion with brief LOC 01/16/2020     Degenerative disc disease, cervical 01/01/2011     Esophageal ulcer 05/26/1998     Irritable bowel syndrome 01/26/2015     Long uvula 01/22/2016    improved     Migraine headaches, severe 07/09/2001     Pseudoarthrosis of cervical spine 07/03/2012     Recurrent UTI 05/26/2011     sinusitis (chronic) 04/16/2001     Subacromial bursitis of right shoulder joint 01/26/2017     Ulcer of esophagus 06/25/2012     Uvulitis 01/22/2016        Allergies   Allergen Reactions     Aspirin Hives     Ibuprofen      Dye in the otc form causes headaches  \"patient states over the counter ibuprofen  bothers her\"     Lansoprazole Other (See Comments) and Visual Disturbance     Changes in eyesight  Prevacid  Change in eyesight     Red Dye " "Hives     Bupropion Rash     Bupropion Hcl Hives and Rash     Wellbutrin     Demerol [Meperidine] Other (See Comments)     Made migraine worse     Current Outpatient Medications   Medication     albuterol (PROAIR HFA/PROVENTIL HFA/VENTOLIN HFA) 108 (90 Base) MCG/ACT inhaler     amoxicillin-clavulanate (AUGMENTIN) 875-125 MG tablet     baclofen (LIORESAL) 10 MG tablet     budesonide (PULMICORT) 0.5 MG/2ML neb solution     budesonide-formoterol (SYMBICORT) 80-4.5 MCG/ACT Inhaler     butalbital-acetaminophen-caffeine (ESGIC) -40 MG tablet     Calcium Carbonate Antacid 1177 MG CHEW     Cholecalciferol (VITAMIN D3) 1000 UNITS CAPS     dexlansoprazole (DEXILANT) 60 MG CPDR CR capsule     diazepam (VALIUM) 5 MG tablet     docusate sodium (COLACE) 100 MG capsule     Erenumab-aooe (AIMOVIG SC)     famotidine (PEPCID) 40 MG tablet     FLUoxetine (PROZAC) 20 MG capsule     FLUoxetine (PROZAC) 40 MG capsule     folic acid (FOLVITE) 1 MG tablet     HUMIRA *CF* PEN 40 MG/0.4ML pen kit     HYDROcodone-acetaminophen (NORCO) 7.5-325 MG per tablet     hydrOXYzine (ATARAX) 25 MG tablet     ibuprofen (ADVIL/MOTRIN) 800 MG tablet     methocarbamol (ROBAXIN) 500 MG tablet     Multiple Vitamins-Minerals (CENTRUM SILVER ULTRA WOMENS) TABS     ondansetron (ZOFRAN-ODT) 4 MG ODT tab     pregabalin (LYRICA) 150 MG capsule     sucralfate (CARAFATE) 1 GM/10ML suspension     topiramate (TOPAMAX) 50 MG tablet     trimethoprim-polymyxin b (POLYTRIM) 49259-0.1 UNIT/ML-% ophthalmic solution     No current facility-administered medications for this visit.     ROS- SEE HPI  /70 (BP Location: Left arm, Cuff Size: Adult Regular)   Pulse 83   Temp 98.3  F (36.8  C) (Tympanic)   Ht 1.676 m (5' 6\")   Wt 85.3 kg (188 lb)   SpO2 95%   BMI 30.34 kg/m      General - The patient is well nourished and well developed, and appears to have good nutritional status.  Alert and oriented to person and place, answers questions and cooperates with " examination appropriately.   Head and Face - Normocephalic and atraumatic, with no gross asymmetry noted.  The facial nerve is intact, with strong symmetric movements.  Voice and Breathing - The patient was breathing comfortably without the use of accessory muscles. There was no wheezing, stridor, or stertor.  The patients voice was clear and strong, and had appropriate pitch and quality.  Ears -The external auditory canals are patent, the tympanic membranes are intact without effusion, retraction or mass.  Bony landmarks are intact.  Eyes - Extraocular movements intact, and the pupils were reactive to light.  Sclera were not icteric or injected, conjunctiva were pink and moist.  Mouth - Examination of the oral cavity showed pink, healthy oral mucosa. No lesions or ulcerations noted.  The tongue was mobile and midline, and the dentition were in good condition.    Throat - The walls of the oropharynx were smooth, pink, moist, symmetric, and had no lesions or ulcerations.  The tonsillar pillars and soft palate were symmetric.  The uvula was midline on elevation.    Neck - Normal midline excursion of the laryngotracheal complex during swallowing.  Full range of motion on passive movement.  Palpation of the occipital, submental, submandibular, internal jugular chain, and supraclavicular nodes did not demonstrate any abnormal lymph nodes or masses.  Palpation of the thyroid was soft and smooth, with no nodules or goiter appreciated.  The trachea was mobile and midline.  Nose - External contour is symmetric, no gross deflection or scars.  Nasal mucosa is pink and moist with no abnormal mucus.          To evaluate the nose and sinuses in the post operative state, I performed rigid nasal endoscopy. The nose was anesthetized with home afrin or topical lidocaine and neosynephrine in the office.    I began with the LEFT side using a 0 degree rigid nasal endoscope, and then similarly examined the RIGHT  side    Findings:  Inferior turbinates:  Edematous, moderate severe  Middle turbinate and middle meatus:  No purulence, no polyposis, no synechiae    Mucosa is  healthy throughout without polyps nor polypoid degeneration  Superior meatus is clear  Frontal recess clear  Sphenoethmoidal clear  Nasopharynx is clear    The patient tolerated procedure well    ASSESSMENT/ PLAN:    ICD-10-CM    1. Chronic maxillary sinusitis  J32.0 CT Sinus w/o Contrast      2. Perennial allergic rhinitis  J30.89 Inhalent Panel [place] (Serolab)     Total IgE (Serolab)     CT Sinus w/o Contrast      3. Hoarse voice quality  R49.0 Inhalent Panel [place] (Serolab)     Total IgE (Serolab)              Throat symptoms have greatly improved.   Continue with reflux precautions.     Complete sinus CT  Complete allergy testing.   Continue with leroy med rinses.   Start Nasonex 2 sprays to each nostril daily.     Continue w/ antihistamine.   Throat looks improved  Continue w/ pepcid for the next 2 months and then may hold.     Indications for allergy testing include:   1) Confirm suspicion of allergic rhinitis due to inhalant allergies  2) Identify the offending allergen to determine specific mode of treatment  3) In the case of chronic rhinosinusitis: when symptoms are not controlled by avoidance and pharmacotherapy  4) In the Asthma patient when exacerbations may be due to perennial allergen exposure  5) Suspect food allergy  6) Otitis Media, chronic rhinitis, atopic dermatitis, Meniere disease, headache, pharyngitis or eye symptoms    Due to allergies, modified quantitative testing (MQT) will be performed.  Signed consent was obtained, and the risks of immunotherapy were discussed, including the potential for anaphylaxis.    If immunotherapy (IT) is recommended, there is continued risk of anaphylaxis.   Anaphylaxis can cause death. The patient will need to be monitored for 30 minutes post injection.  They must present their epinephrine pen  prior to injection.  Subcutaneous as well as sublingual immunotherapy (SLIT) were discussed as potential treatment options.  The patient was told SLIT is not approved by the FDA and is cash pay.  The general time frame of immunotherapy was discussed (generally 3-5 years, sometimes longer), and the basic immunology behind IT was discussed.    America Aguilar PA-C  ENT  [facility]

## 2023-06-29 NOTE — PATIENT INSTRUCTIONS
Complete Sinus CT.   Complete Allergy testing. Results take about 10-14 days to return.   Continue with leroy med rinses.   Start Nasonex 2 sprays to each nostril daily.     Continue w/ antihistamine.   Throat looks improved  Continue w/ pepcid for the next 2 months and then may hold.     Thank you for allowing America Aguilar PA-C and our ENT team to participate in your care.  If your medications are too expensive, please give the nurse a call.  We can possibly change this medication.  If you have a scheduling or an appointment question please contact our Health Unit Coordinator at [phone], Ext. [phone].    ALL nursing questions or concerns can be directed to your ENT nurse at: [phone] Celine     Statement Selected

## 2023-06-29 NOTE — LETTER
6/29/2023         RE: Sadaf Blankenship  [address]  [facility] [address]        Dear Colleague,    Thank you for referring your patient, Sadaf Blankenship, to the [facility]. Please see a copy of my visit note below.    Chief Complaint   Patient presents with     RECHECK     Follow up hoarse voice, dysphagia, GERD, and chronic maxillary sinusitis         Patient has reported since her sinuses have returned. She feels been using Plain leroy med rinses. She had sensitivity with budesonide.   She has been   Patient denies sore throat or throat pain  Denies chronic otalgia.   Denies fevers, night sweats or weight loss. Fever resolved last week.   She has Symbicort inhaler and does not rinse/ gargle after use.        Water- 6-8 bottles.   Caffeine- 1 cup coffee.   ETOH- None.   Tobacco- None. Quit > 30 years ago.   Reflux- Yes and controlled w/ medications.   Dexilant, Pepcid.        No prior allergy testing.     Past Medical History:   Diagnosis Date     Adhesive capsulitis of left shoulder 12/06/2018     ASCUS on Pap smear 05/26/2004    negative HPV     Atypical chest pain 05/26/2008    negative cardiolite stress test [facility]     Biliary dyskinesia 12/02/2021    Formatting of this note might be different from the original. Added automatically from request for surgery [id number]     Bleeding ulcer 05/26/1976     Cervical radicular pain 05/10/2012     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     Concussion with brief LOC 01/16/2020     Degenerative disc disease, cervical 01/01/2011     Esophageal ulcer 05/26/1998     Irritable bowel syndrome 01/26/2015     Long uvula 01/22/2016    improved     Migraine headaches, severe 07/09/2001     Pseudoarthrosis of cervical spine 07/03/2012     Recurrent UTI 05/26/2011     sinusitis (chronic) 04/16/2001     Subacromial bursitis of right shoulder joint 01/26/2017     Ulcer of esophagus 06/25/2012     Uvulitis 01/22/2016        Allergies   Allergen  "Reactions     Aspirin Hives     Ibuprofen      Dye in the otc form causes headaches  \"patient states over the counter ibuprofen  bothers her\"     Lansoprazole Other (See Comments) and Visual Disturbance     Changes in eyesight  Prevacid  Change in eyesight     Red Dye Hives     Bupropion Rash     Bupropion Hcl Hives and Rash     Wellbutrin     Demerol [Meperidine] Other (See Comments)     Made migraine worse     Current Outpatient Medications   Medication     albuterol (PROAIR HFA/PROVENTIL HFA/VENTOLIN HFA) 108 (90 Base) MCG/ACT inhaler     amoxicillin-clavulanate (AUGMENTIN) 875-125 MG tablet     baclofen (LIORESAL) 10 MG tablet     budesonide (PULMICORT) 0.5 MG/2ML neb solution     budesonide-formoterol (SYMBICORT) 80-4.5 MCG/ACT Inhaler     butalbital-acetaminophen-caffeine (ESGIC) -40 MG tablet     Calcium Carbonate Antacid 1177 MG CHEW     Cholecalciferol (VITAMIN D3) 1000 UNITS CAPS     dexlansoprazole (DEXILANT) 60 MG CPDR CR capsule     diazepam (VALIUM) 5 MG tablet     docusate sodium (COLACE) 100 MG capsule     Erenumab-aooe (AIMOVIG SC)     famotidine (PEPCID) 40 MG tablet     FLUoxetine (PROZAC) 20 MG capsule     FLUoxetine (PROZAC) 40 MG capsule     folic acid (FOLVITE) 1 MG tablet     HUMIRA *CF* PEN 40 MG/0.4ML pen kit     HYDROcodone-acetaminophen (NORCO) 7.5-325 MG per tablet     hydrOXYzine (ATARAX) 25 MG tablet     ibuprofen (ADVIL/MOTRIN) 800 MG tablet     methocarbamol (ROBAXIN) 500 MG tablet     Multiple Vitamins-Minerals (CENTRUM SILVER ULTRA WOMENS) TABS     ondansetron (ZOFRAN-ODT) 4 MG ODT tab     pregabalin (LYRICA) 150 MG capsule     sucralfate (CARAFATE) 1 GM/10ML suspension     topiramate (TOPAMAX) 50 MG tablet     trimethoprim-polymyxin b (POLYTRIM) 46477-1.1 UNIT/ML-% ophthalmic solution     No current facility-administered medications for this visit.     ROS- SEE HPI  /70 (BP Location: Left arm, Cuff Size: Adult Regular)   Pulse 83   Temp 98.3  F (36.8  C) (Tympanic)  " " Ht 1.676 m (5' 6\")   Wt 85.3 kg (188 lb)   SpO2 95%   BMI 30.34 kg/m      General - The patient is well nourished and well developed, and appears to have good nutritional status.  Alert and oriented to person and place, answers questions and cooperates with examination appropriately.   Head and Face - Normocephalic and atraumatic, with no gross asymmetry noted.  The facial nerve is intact, with strong symmetric movements.  Voice and Breathing - The patient was breathing comfortably without the use of accessory muscles. There was no wheezing, stridor, or stertor.  The patients voice was clear and strong, and had appropriate pitch and quality.  Ears -The external auditory canals are patent, the tympanic membranes are intact without effusion, retraction or mass.  Bony landmarks are intact.  Eyes - Extraocular movements intact, and the pupils were reactive to light.  Sclera were not icteric or injected, conjunctiva were pink and moist.  Mouth - Examination of the oral cavity showed pink, healthy oral mucosa. No lesions or ulcerations noted.  The tongue was mobile and midline, and the dentition were in good condition.    Throat - The walls of the oropharynx were smooth, pink, moist, symmetric, and had no lesions or ulcerations.  The tonsillar pillars and soft palate were symmetric.  The uvula was midline on elevation.    Neck - Normal midline excursion of the laryngotracheal complex during swallowing.  Full range of motion on passive movement.  Palpation of the occipital, submental, submandibular, internal jugular chain, and supraclavicular nodes did not demonstrate any abnormal lymph nodes or masses.  Palpation of the thyroid was soft and smooth, with no nodules or goiter appreciated.  The trachea was mobile and midline.  Nose - External contour is symmetric, no gross deflection or scars.  Nasal mucosa is pink and moist with no abnormal mucus.          To evaluate the nose and sinuses in the post operative state, I " performed rigid nasal endoscopy. The nose was anesthetized with home afrin or topical lidocaine and neosynephrine in the office.    I began with the LEFT side using a 0 degree rigid nasal endoscope, and then similarly examined the RIGHT side    Findings:  Inferior turbinates:  Edematous, moderate severe  Middle turbinate and middle meatus:  No purulence, no polyposis, no synechiae    Mucosa is  healthy throughout without polyps nor polypoid degeneration  Superior meatus is clear  Frontal recess clear  Sphenoethmoidal clear  Nasopharynx is clear    The patient tolerated procedure well    ASSESSMENT/ PLAN:    ICD-10-CM    1. Chronic maxillary sinusitis  J32.0 CT Sinus w/o Contrast      2. Perennial allergic rhinitis  J30.89 Inhalent Panel [place] (Serolab)     Total IgE (Serolab)     CT Sinus w/o Contrast      3. Hoarse voice quality  R49.0 Inhalent Panel [place] (Serolab)     Total IgE (Serolab)              Throat symptoms have greatly improved.   Continue with reflux precautions.     Complete sinus CT  Complete allergy testing.   Continue with leroy med rinses.   Start Nasonex 2 sprays to each nostril daily.     Continue w/ antihistamine.   Throat looks improved  Continue w/ pepcid for the next 2 months and then may hold.     Indications for allergy testing include:   1) Confirm suspicion of allergic rhinitis due to inhalant allergies  2) Identify the offending allergen to determine specific mode of treatment  3) In the case of chronic rhinosinusitis: when symptoms are not controlled by avoidance and pharmacotherapy  4) In the Asthma patient when exacerbations may be due to perennial allergen exposure  5) Suspect food allergy  6) Otitis Media, chronic rhinitis, atopic dermatitis, Meniere disease, headache, pharyngitis or eye symptoms    Due to allergies, modified quantitative testing (MQT) will be performed.  Signed consent was obtained, and the risks of immunotherapy were discussed, including the potential for  anaphylaxis.    If immunotherapy (IT) is recommended, there is continued risk of anaphylaxis.   Anaphylaxis can cause death. The patient will need to be monitored for 30 minutes post injection.  They must present their epinephrine pen prior to injection.  Subcutaneous as well as sublingual immunotherapy (SLIT) were discussed as potential treatment options.  The patient was told SLIT is not approved by the FDA and is cash pay.  The general time frame of immunotherapy was discussed (generally 3-5 years, sometimes longer), and the basic immunology behind IT was discussed.    America Aguilar PA-C  [facility] [place]            Again, thank you for allowing me to participate in the care of your patient.        Sincerely,        America Aguilar PA-C

## 2023-06-30 ENCOUNTER — HOSPITAL ENCOUNTER (OUTPATIENT)
Dept: CT IMAGING | Facility: HOSPITAL | Age: 63
Discharge: HOME | End: 2023-06-30
Attending: PHYSICIAN ASSISTANT | Admitting: PHYSICIAN ASSISTANT
Payer: COMMERCIAL

## 2023-06-30 DIAGNOSIS — J30.89 PERENNIAL ALLERGIC RHINITIS: ICD-10-CM

## 2023-06-30 DIAGNOSIS — J32.0 CHRONIC MAXILLARY SINUSITIS: ICD-10-CM

## 2023-06-30 PROCEDURE — 70486 CT MAXILLOFACIAL W/O DYE: CPT

## 2023-07-03 ENCOUNTER — VIRTUAL VISIT (OUTPATIENT)
Dept: PSYCHIATRY | Facility: OTHER | Age: 63
End: 2023-07-03
Attending: PSYCHIATRY & NEUROLOGY
Payer: COMMERCIAL

## 2023-07-03 DIAGNOSIS — F41.1 GAD (GENERALIZED ANXIETY DISORDER): ICD-10-CM

## 2023-07-03 DIAGNOSIS — G47.00 PERSISTENT INSOMNIA: ICD-10-CM

## 2023-07-03 DIAGNOSIS — F33.1 MAJOR DEPRESSIVE DISORDER, RECURRENT EPISODE, MODERATE (H): ICD-10-CM

## 2023-07-03 PROCEDURE — 99214 OFFICE O/P EST MOD 30 MIN: CPT | Mod: VID | Performed by: PSYCHIATRY & NEUROLOGY

## 2023-07-03 RX ORDER — HYDROXYZINE HYDROCHLORIDE 25 MG/1
TABLET, FILM COATED ORAL
Qty: 120 TABLET | Refills: 4 | Status: SHIPPED | OUTPATIENT
Start: 2023-07-03 | End: 2023-12-13

## 2023-07-03 RX ORDER — FLUOXETINE 40 MG/1
40 CAPSULE ORAL DAILY
Qty: 30 CAPSULE | Refills: 3 | Status: SHIPPED | OUTPATIENT
Start: 2023-07-03 | End: 2023-07-03

## 2023-07-03 RX ORDER — FLUOXETINE 40 MG/1
40 CAPSULE ORAL DAILY
Qty: 90 CAPSULE | Refills: 3 | Status: SHIPPED | OUTPATIENT
Start: 2023-07-03 | End: 2024-02-28 | Stop reason: DRUGHIGH

## 2023-07-03 ASSESSMENT — PAIN SCALES - GENERAL: PAINLEVEL: SEVERE PAIN (6)
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
## 2023-07-04 ENCOUNTER — MYC MEDICAL ADVICE (OUTPATIENT)
Dept: FAMILY MEDICINE | Facility: OTHER | Age: 63
End: 2023-07-04

## 2023-07-05 ENCOUNTER — TELEPHONE (OUTPATIENT)
Dept: PHARMACY | Facility: PHYSICIAN GROUP | Age: 63
End: 2023-07-05
Payer: COMMERCIAL

## 2023-07-05 ENCOUNTER — VIRTUAL VISIT (OUTPATIENT)
Dept: FAMILY MEDICINE | Facility: OTHER | Age: 63
End: 2023-07-05
Attending: NURSE PRACTITIONER
Payer: COMMERCIAL

## 2023-07-05 DIAGNOSIS — U07.1 COVID-19 VIRUS INFECTION: Primary | ICD-10-CM

## 2023-07-05 PROCEDURE — 99442 PR PHYSICIAN TELEPHONE EVALUATION 11-20 MIN: CPT | Mod: 93 | Performed by: NURSE PRACTITIONER

## 2023-07-05 NOTE — TELEPHONE ENCOUNTER
"Paxlovid drug-drug interaction check:     1. Diazepam and Paxlovid. Paxlovid can increase the serum concentration of diazepam resulting in an increased risk for extreme sedation and respiratory depression. According to the [facility] \"Management of Paxlovid Drug-Drug Interactions\" document, hold diazepam and restart 3 days after the completion of Paxlovid therapy. Consider temporarily switching to lorazepam if needed.     2. Hydrocodone and Paxlovid. Paxlovid can increase the serum concentration of hydrocodone resulting in an increased risk for fatal respiratory depression. According to the [facility] \"Management of Paxlovid Drug-Drug Interactions\" document, use the combination of Paxlovid and hydrocodone together with caution, reduce the dose of hydrocodone by 50% while taking Paxlovid (and for 3 days after the completion of the Paxlovid course), and monitor carefully for signs of opioid overdose. A prescription for naloxone must be considered if the patient plans to take hydrocodone and Paxlovid together.     3. Budesonide and Paxlovid. Paxlovid can increase the serum concentration of certain corticosteroids (including budesonide) from all routes of administration. According to the [facility] \"Management of Paxlovid Drug-Drug Interactions\" document and the Paxlovid prescribing information, concomitant use of Paxlovid and budesonide results in an increased risk for Cushing's syndrome and adrenal suppression, and alternate corticosteroids such as beclomethasone and prednisolone should be considered. However, the Paxlovid prescribing information states that the risk of Cushing's syndrome and adrenal suppression associated with short-term use of a strong CY inhibitor is considered to be low. With this product being used intranasally, it is unclear how much systemic absorption is truly occurring. The patient may benefit from holding this medication if it is not needed while taking Paxlovid. This effect is expected to " "last for the entire course of Paxlovid and for 3 days after the completion of the Paxlovid course (8 days total).     4. Budesonide (in Symbicort - budesonide/formoterol) and Paxlovid. Paxlovid can increase the serum concentration of certain corticosteroids (including budesonide) from all routes of administration. According to the [facility] \"Management of Paxlovid Drug-Drug Interactions\" document and the Paxlovid prescribing information, concomitant use of Paxlovid and budesonide results in an increased risk for Cushing's syndrome and adrenal suppression, and alternate corticosteroids such as beclomethasone and prednisolone should be considered. However, the Paxlovid prescribing information states that the risk of Cushing's syndrome and adrenal suppression associated with short-term use of a strong CY inhibitor is considered to be low. Additionally, a retrospective review of published case reports of individuals developing a Cushing's syndrome while treated concurrently with a boosted protease inhibitor and inhaled corticosteroids indicated that this adverse effect tended to occur after several months (and more rarely 2 weeks) of concurrent administration of these drugs. With this in mind, continuation of budesonide is warranted, but the patient should be monitored for increased corticosteroid effects. This effect is expected to last for the entire course of Paxlovid and for 3 days after the completion of the Paxlovid course (8 days total).     5. Hydroxyzine and Paxlovid. Paxlovid can increase the serum concentration of hydroxyzine resulting in an increased risk of sedation. According to the [facility] drug interaction , though not listed in the [place] prescribing information for hydroxyzine, the [place] hydroxyzine prescribing information states that the use of hydroxyzine and strong CY inhibitors (like Paxlovid) is contraindicated due to the risk for QT prolongation and possible Torsades de " Pointes (TdP). Consider close monitoring of hydroxyzine or holding of hydroxyzine if it is not needed while taking Paxlovid and for 3 days after the completion of the Paxlovid course. Patient should be aware of the increased risks for sedation.     6. Fluoxetine and Paxlovid. Paxlovid may increase the serum concentration of fluoxetine to a limited extent. According to the [facility] COVID-19 interaction , the risk of QT interval prolongation is not expected to be increased, and no dose adjustment of fluoxetine is required.     7. Caffeine (in butalbital-acetaminophen-caffeine) and Paxlovid. Paxlovid may decrease the serum concentration of caffeine. This interaction is not expected to be clinically relevant. No dose adjustment of butalbital-acetaminophen-caffeine is required.     Jimbo Lala, PharmD  Medication Therapy Management Pharmacist  [facility]  Office phone: [phone]

## 2023-07-05 NOTE — TELEPHONE ENCOUNTER
Called pt and s/s started early Saturday morning with diarrhea and fever. Now she is coughing up green. No SOB or trouble breathing. She is interested in anti-viral.Sent to anti-viral nurse.    Encouraged rest,Tylenol and fluids. Advised ED with trouble breathing.    Geeta HERRON RN

## 2023-07-05 NOTE — TELEPHONE ENCOUNTER
"Call placed to patient to discuss positive covid 19 results.   Underlying Medical Conditions: Depression, anxiety, hyperlipidemia, emphysema   Patient testing positive on 7/1   Test by:  At home covid test  Start of Symptoms: 7/1/23  Covid 19 Vaccination Status:  Moderna initial and one booster and pfizer bivalent booster  Are you pregnant, breastfeeding or trying to conceive? No    COVID-19 Symptom Review      New or worsening difficulty breathing? no     Cough? yes     Dry or Productive?  productive    Fever?  No      Highest temp past 24 hours?  NA    Chills?  No     Headache:  No     Sore throat: yes    Chest pain: no     Diarrhea: no     Body aches?  yes    Nasal congestion or drainage?  Drainage in the throat     What treatments has patient tried?  mucinex DM, tylenol, Nyquil cold and cough   Does patient live in a nursing home, group home, or shelter? no   Does patient have a way to get food/medications during quarantine? yes    Appointment Scheduled:  7/5 with Brian Stack    Pharmacy: [facility] in [place]    Confirmed with Pharmacy: Paxlovid & Molnupiravir in stock      Instructions below provided to patient. Patient verbalized understanding.     Instructions for Patients  Your COVID-19 test was positive. This means you have the virus. Please follow the \"How can I take care of myself\" and \"How do I self-isolate?\" guidelines in these instructions.    What treatments are available?  Over-the-counter medicines may help with your symptoms such as runny or stuffy nose, cough, chills, and fever. Talk to your care team about your options.     Some people are at high risk for severe illness (for example if you have a weak immune system, you're 65 or older, or you have certain medical problems). If your risk it high and your symptoms started in the last 5 to 7 days, we strongly recommend for you to get COVID treatment as soon as possible before you get really sick. Paxlovid, Molnupiravir and the monoclonal " antibody treatments are proven safe and effective, make you feel better faster, and prevent hospitalization and death.       What are the symptoms of COVID-19?  Symptoms can include fever, cough, shortness of breath, chills, headache, muscle pain sore throat, fatigue, runny or stuffy nose, and loss of taste and smell. Other less common symptoms include nausea, vomiting, or diarrhea (watery stools).    Know when to call 911. Emergency warning signs include:    Trouble breathing or shortness of breath    Pain or pressure in the chest that doesn't go away    Feeling confused like you haven't felt before, or not being able to wake up    Bluish-colored lips or face    How can I take care of myself?  1. Get lots of rest. Drink extra fluids (unless a doctor has told you not to).  2. Take Tylenol (acetaminophen) for fever or pain. If you have liver or kidney problems, ask your family doctor if it's okay to take Tylenol   Adults:   650 mg (two 325 mg pills or tablets) every 4 to 6 hours, or...   1,000 mg (two 500 mg pills or tablets) every 8 hours as needed.  Note: Don't take more than 3,000 mg in one day. Acetaminophen is found in many medicines (both prescribed and over the counter). Read all labels to be sure you don't take too much.  For children, check the Tylenol bottle for the right dose. The dose is based on the child's age or weight.  3. Take over the counter medicines for your symptoms as needed. Talk to your pharmacist.  4. If you have other health problems (like cancer, heart failure, an organ transplant, or severe kidney disease): Call your specialty clinic if you don't feel better in the next 2 days.    These guidelines are for isolating and quarantining before returning to work, school or .     For employers, schools and day cares: This is an official notice for this person's medical guidelines for returning in-person.     For health care sites: The CDC gives different isolation and quarantine  guidelines for healthcare sites, please check with these sites before arriving.     How do I self-isolate?  You isolate when you have symptoms of COVID or a test shows you have COVID, even if you don't have symptoms.     If you DO have symptoms:  o Stay home and away from others  - For at least 5 days after your symptoms started, AND   - You are fever free for 24 hours (with no medicine that reduces fever), AND  - Your other symptoms are better.  o Wear a mask for 10 full days any time you are around others.    If you DON'T have symptoms:  o Stay at home and away from others for at least 5 days after your positive test.  o Wear a mask for 10 full days any time you are around others.    How and when do I quarantine?  You quarantine when you may have been exposed to the virus and DON'T have symptoms.     Stay home and away from others.     You must quarantine for 5 days after your last contact with a person who has COVID.  o Note: If you are fully vaccinated, you don't need to quarantine. You should still follow the steps below.     Wear a mask for 10 full days any time you're around others.    Get tested at least 5 days after you were exposed, even if you don't have symptoms.     If you start to have symptoms, isolate right away and get tested.    Where can I get more information?    [facility] COVID-19 Resource Hub: [email or web address]     CDC Quarantine & Isolation: https://www.cdc.gov/coronavirus/2019-ncov/your-health/quarantine-isolation.html     CDC - What to Do If You're Sick: https://www.cdc.gov/coronavirus/2019-ncov/if-you-are-sick/index.html    [facility] clinical trials (COVID-19 research studies): [email or web address]    [place] Department of Health COVID-19 Public Hotline: 1-585.530.1188

## 2023-07-05 NOTE — PROGRESS NOTES
Tesha is a 63 year old who is being evaluated via a billable telephone visit.      What phone number would you like to be contacted at? [phone]  How would you like to obtain your AVS? Ryliehart  Distant Location (provider location):  On-site    Assessment & Plan     1. COVID-19 virus infection  Fluids, rest  Continue albuterol inhaler, symbicort and pulmicort  - molnupiravir (LAGEVRIO) 200 MG capsule; Take 4 capsules (800 mg) by mouth every 12 hours for 5 days  Dispense: 40 each; Refill: 0  - tessalon pearls - has at home.       Follow-up if no improvement - to urgent care/ED over the weekend if any worsening.     Betzaida Doshi, NP  [facility] [place] THIERRY Parkinson is a 63 year old, presenting for the following health issues:  No chief complaint on file.        5/11/2023     2:51 PM   Additional Questions   Roomed by Tello Barboza   Accompanied by None     HPI       COVID-19 Symptom Review  Underlying Medical Conditions: Depression, anxiety, hyperlipidemia, emphysema   Patient testing positive on 7/1   Test by:  At home covid test  Start of Symptoms: 7/1/23  Covid 19 Vaccination Status:  Moderna initial and one booster and pfizer bivalent booster  Are you pregnant, breastfeeding or trying to conceive? No    COVID-19 Symptom Review      New or worsening difficulty breathing? no     Cough? yes     Dry or Productive?  productive    Fever?  No      Highest temp past 24 hours?  NA    Chills?  No     Headache:  No     Sore throat: yes    Chest pain: no     Diarrhea: no     Body aches?  yes    Nasal congestion or drainage?  Drainage in the throat     What treatments has patient tried?  mucinex DM, tylenol, Nyquil cold and cough   Does patient live in a nursing home, group home, or shelter? no   Does patient have a way to get food/medications during quarantine? yes    Appointment Scheduled:  7/5 with Brian Stack    Pharmacy: [facility] in [place]    Confirmed with Pharmacy:  Paxlovid & Molnupiravir in stock    Last Comprehensive Metabolic Panel:  Lab Results   Component Value Date     03/15/2023    POTASSIUM 4.0 03/15/2023    CHLORIDE 107 03/15/2023    CO2 18 (L) 03/15/2023    ANIONGAP 14 03/15/2023     (H) 03/15/2023    BUN 10.0 03/15/2023    CR 0.92 03/15/2023    GFRESTIMATED 70 03/15/2023    SALOME 9.4 03/15/2023       Liver Function Studies - Recent Labs   Lab Test 02/20/23  0001   PROTTOTAL 7.0   ALBUMIN 4.1   BILITOTAL 0.3   ALKPHOS 102   AST 17   ALT 21     Current Outpatient Medications   Medication     albuterol (PROAIR HFA/PROVENTIL HFA/VENTOLIN HFA) 108 (90 Base) MCG/ACT inhaler     baclofen (LIORESAL) 10 MG tablet     budesonide (PULMICORT) 0.5 MG/2ML neb solution     budesonide-formoterol (SYMBICORT) 80-4.5 MCG/ACT Inhaler     butalbital-acetaminophen-caffeine (ESGIC) -40 MG tablet     Calcium Carbonate Antacid 1177 MG CHEW     Cholecalciferol (VITAMIN D3) 1000 UNITS CAPS     dexlansoprazole (DEXILANT) 60 MG CPDR CR capsule     diazepam (VALIUM) 5 MG tablet     docusate sodium (COLACE) 100 MG capsule     Erenumab-aooe (AIMOVIG SC)     famotidine (PEPCID) 40 MG tablet     FLUoxetine (PROZAC) 20 MG capsule     FLUoxetine (PROZAC) 40 MG capsule     folic acid (FOLVITE) 1 MG tablet     HYDROcodone-acetaminophen (NORCO) 7.5-325 MG per tablet     hydrOXYzine (ATARAX) 25 MG tablet     ibuprofen (ADVIL/MOTRIN) 800 MG tablet     methocarbamol (ROBAXIN) 500 MG tablet     Multiple Vitamins-Minerals (CENTRUM SILVER ULTRA WOMENS) TABS     ondansetron (ZOFRAN-ODT) 4 MG ODT tab     pregabalin (LYRICA) 150 MG capsule     sucralfate (CARAFATE) 1 GM/10ML suspension     topiramate (TOPAMAX) 50 MG tablet     trimethoprim-polymyxin b (POLYTRIM) 60638-1.1 UNIT/ML-% ophthalmic solution     No current facility-administered medications for this visit.           Review of Systems   Constitutional, HEENT, cardiovascular, pulmonary, gi and gu systems are negative, except as otherwise  noted.      Objective           Vitals:  No vitals were obtained today due to virtual visit.    Physical Exam   alert, no distress but congested  PSYCH: Alert and oriented times 3; coherent speech, normal   rate and volume, able to articulate logical thoughts, able   to abstract reason, no tangential thoughts, no hallucinations   or delusions  Her affect is normal and pleasant  RESP: brochospasm cough, no audible wheezing, able to talk in full sentences  Remainder of exam unable to be completed due to telephone visits            Phone call duration: 17 minutes

## 2023-07-07 RX ORDER — CEFPROZIL 500 MG/1
500 TABLET, FILM COATED ORAL 2 TIMES DAILY
Qty: 20 TABLET | Refills: 0 | Status: SHIPPED | OUTPATIENT
Start: 2023-07-07 | End: 2023-07-17

## 2023-07-17 ENCOUNTER — TELEPHONE (OUTPATIENT)
Dept: FAMILY MEDICINE | Facility: OTHER | Age: 63
End: 2023-07-17

## 2023-07-17 NOTE — TELEPHONE ENCOUNTER
Received an APPROVAL from [facility] for  methocarbamol (ROBAXIN) 500 MG tablet. Effective dates 6/17/23-7/16/24. Scanned in Epic.

## 2023-07-17 NOTE — TELEPHONE ENCOUNTER
Received a PA request from [facility] for methocarbamol (ROBAXIN) 500 MG tablet. Submitted on CMM. Waiting for a response.

## 2023-07-19 LAB
SCANNED LAB RESULT: NORMAL
SCANNED LAB RESULT: NORMAL

## 2023-07-27 DIAGNOSIS — M79.18 MYOFASCIAL PAIN SYNDROME, CERVICAL: ICD-10-CM

## 2023-07-27 RX ORDER — PREGABALIN 150 MG/1
CAPSULE ORAL
Qty: 180 CAPSULE | Refills: 0 | Status: SHIPPED | OUTPATIENT
Start: 2023-07-27 | End: 2023-11-09

## 2023-07-27 NOTE — TELEPHONE ENCOUNTER
Lyrica 150 mg      Last Written Prescription Date:  04/27/2023  Last Fill Quantity: 180,   # refills: 0  Last Office Visit: 05/16/2023  Future Office visit:    Next 5 appointments (look out 90 days)      Sep 14, 2023  9:45 AM  (Arrive by 9:30 AM)  Return Visit with Nayeli Fong MD  [facility] ([facility] ) [address]  [place] [address]  [phone]             Routing refill request to provider for review/approval because:  Drug not on the [facility], [facility] or [facility] refill protocol or controlled substance

## 2023-08-10 ENCOUNTER — TELEPHONE (OUTPATIENT)
Dept: FAMILY MEDICINE | Facility: OTHER | Age: 63
End: 2023-08-10

## 2023-08-10 NOTE — TELEPHONE ENCOUNTER
Rishi Potter MD  [facility]    T:[phone]    Questions regarding work restrictions.  Inquiring whether PCP is currently asserting work restrictions.    Is PCP deferring her opinion on restrictions to other Provider or not answering at this time    Will send narrative letter via fax with questions above.    PCP may choose to respond via phone or letter       diabetes/neurologic

## 2023-08-15 NOTE — TELEPHONE ENCOUNTER
Paperwork completed on 5/12/2023  Please see paperwork under media tab (dated 7/2023)  Henna Moyer MD

## 2023-08-18 NOTE — TELEPHONE ENCOUNTER
August 15, 2023  Araceli Wayne LPN   to Tesha Blankenship   LM      8/15/23  9:52 AM  Sadaf  Paperwork completed on 5/12/2023. Faxed to appropriate place  Henna Moyer MD     Last read by Tesha Blankenship at  9:53 AM on 8/15/2023.

## 2023-09-05 ENCOUNTER — VIRTUAL VISIT (OUTPATIENT)
Dept: PSYCHIATRY | Facility: OTHER | Age: 63
End: 2023-09-05
Attending: PSYCHIATRY & NEUROLOGY
Payer: COMMERCIAL

## 2023-09-05 DIAGNOSIS — F41.1 GAD (GENERALIZED ANXIETY DISORDER): Primary | ICD-10-CM

## 2023-09-05 PROCEDURE — 99214 OFFICE O/P EST MOD 30 MIN: CPT | Mod: VID | Performed by: PSYCHIATRY & NEUROLOGY

## 2023-09-05 RX ORDER — BUSPIRONE HYDROCHLORIDE 10 MG/1
TABLET ORAL
Qty: 60 TABLET | Refills: 3 | Status: SHIPPED | OUTPATIENT
Start: 2023-09-05 | End: 2023-12-29

## 2023-09-05 ASSESSMENT — ANXIETY QUESTIONNAIRES
GAD7 TOTAL SCORE: 21
3. WORRYING TOO MUCH ABOUT DIFFERENT THINGS: NEARLY EVERY DAY
1. FEELING NERVOUS, ANXIOUS, OR ON EDGE: NEARLY EVERY DAY
2. NOT BEING ABLE TO STOP OR CONTROL WORRYING: NEARLY EVERY DAY
GAD7 TOTAL SCORE: 21
5. BEING SO RESTLESS THAT IT IS HARD TO SIT STILL: NEARLY EVERY DAY
7. FEELING AFRAID AS IF SOMETHING AWFUL MIGHT HAPPEN: NEARLY EVERY DAY
6. BECOMING EASILY ANNOYED OR IRRITABLE: NEARLY EVERY DAY

## 2023-09-05 ASSESSMENT — PAIN SCALES - GENERAL: PAINLEVEL: EXTREME PAIN (8)

## 2023-09-05 ASSESSMENT — PATIENT HEALTH QUESTIONNAIRE - PHQ9
SUM OF ALL RESPONSES TO PHQ QUESTIONS 1-9: 13
5. POOR APPETITE OR OVEREATING: NEARLY EVERY DAY
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
## 2023-09-07 DIAGNOSIS — M54.2 CHRONIC NECK PAIN: ICD-10-CM

## 2023-09-07 DIAGNOSIS — R05.2 SUBACUTE COUGH: Primary | ICD-10-CM

## 2023-09-07 DIAGNOSIS — M70.51 BURSITIS OF OTHER BURSA OF RIGHT KNEE: ICD-10-CM

## 2023-09-07 DIAGNOSIS — G89.29 CHRONIC NECK PAIN: ICD-10-CM

## 2023-09-08 RX ORDER — HYDROCODONE BITARTRATE AND ACETAMINOPHEN 7.5; 325 MG/1; MG/1
1 TABLET ORAL 2 TIMES DAILY PRN
Qty: 60 TABLET | Refills: 0 | Status: SHIPPED | OUTPATIENT
Start: 2023-09-08 | End: 2023-10-26

## 2023-09-08 RX ORDER — ALBUTEROL SULFATE 90 UG/1
AEROSOL, METERED RESPIRATORY (INHALATION)
Qty: 18 G | Refills: 0 | Status: SHIPPED | OUTPATIENT
Start: 2023-09-08

## 2023-09-08 RX ORDER — IBUPROFEN 800 MG/1
800 TABLET, FILM COATED ORAL EVERY 8 HOURS PRN
Qty: 90 TABLET | Refills: 1 | Status: SHIPPED | OUTPATIENT
Start: 2023-09-08

## 2023-09-08 NOTE — TELEPHONE ENCOUNTER
Norco      Last Written Prescription Date:  6.15.23  Last Fill Quantity: #60,   # refills: 0  Last Office Visit: 3.23.23  Future Office visit:    Next 5 appointments (look out 90 days)      Sep 14, 2023  9:45 AM  (Arrive by 9:30 AM)  Return Visit with Nayeli Fong MD  [facility] ([facility] ) [address] [place] [address]  [phone]             Routing refill request to provider for review/approval because:  Drug not on the [facility], [facility] or [facility] refill protocol or controlled substance

## 2023-09-09 ENCOUNTER — HEALTH MAINTENANCE LETTER (OUTPATIENT)
Age: 63
End: 2023-09-09

## 2023-09-12 ENCOUNTER — HOSPITAL ENCOUNTER (OUTPATIENT)
Dept: ULTRASOUND IMAGING | Facility: HOSPITAL | Age: 63
Discharge: HOME | End: 2023-09-12
Attending: FAMILY MEDICINE
Payer: COMMERCIAL

## 2023-09-12 ENCOUNTER — ANCILLARY PROCEDURE (OUTPATIENT)
Dept: MAMMOGRAPHY | Facility: OTHER | Age: 63
End: 2023-09-12
Attending: FAMILY MEDICINE
Payer: COMMERCIAL

## 2023-09-12 DIAGNOSIS — Z12.31 VISIT FOR SCREENING MAMMOGRAM: ICD-10-CM

## 2023-09-12 DIAGNOSIS — N64.4 BREAST TENDERNESS: ICD-10-CM

## 2023-09-12 PROCEDURE — 76642 ULTRASOUND BREAST LIMITED: CPT | Mod: RT

## 2023-09-12 PROCEDURE — 77067 SCR MAMMO BI INCL CAD: CPT | Mod: TC

## 2023-09-12 PROCEDURE — 77066 DX MAMMO INCL CAD BI: CPT | Mod: TC

## 2023-09-13 NOTE — PROGRESS NOTES
"Otolaryngology Progress Note        Melanie presents foreva chronic recurrent sinusitis.    She also has several concerns with her throat including chronic globus and dysphagia    She had a severe nasal injury at 6 yoa after a laundry cart fell on her.    She had nasal reconstruction at 19 yoa    She had a revision nasal surgery at 21 yoa describing as the 'tip of her nose' fell off.      She saw [place] at her last visit on 629    SNOT 50 with a severe problem with postnasal discharge thick nasal discharge facial pain and pressure and decreased sense of smell and taste    CT sinus dated 6/30/2023 shows mucoperiosteal ostial thickening of the frontal sinuses bilaterally anterior and posterior ethmoid sinuses and occlusion of the ostiomeatal complexes with an elongated Myra cell on the left.  There is partial opacification of the left sphenoid the right sphenoid is clear the optic nerve is not dehiscent the lamina is intact the skull base is intact Karalis to.  Bilateral inferior turbinate hypertrophy and naseem bullosa.  There is a septal perforation at the anterior and superior septum with a metal plate at the columella    3 prior nasal surgeries     She has been to  for swallow and speech, and was treated by [facility]    VFSS (Video Fluoroscopic Swallow Study) 3/21/21 negative  Has aspirated in past, no further aspiration  She has chronic globus in her lower throat  Significant history of psoriatic arthritis with cervical spine disease and prior spinal fusion with recent rhizotomy      On methotrexate     chronic dry mouth    She notes CRS with 2 courses abx/year or more often, abx do not impromve, improved with prednisone    Physical Exam  /60 (BP Location: Left arm, Patient Position: Chair, Cuff Size: Adult Regular)   Pulse 71   Temp 97.2  F (36.2  C) (Tympanic)   Ht 1.676 m (5' 6\")   Wt 81.6 kg (180 lb)   SpO2 98%   BMI 29.05 kg/m    General - The patient is well nourished and well developed, and appears " to have good nutritional status.  Alert and oriented to person and place, interactive.    Head and Face - Normocephalic and atraumatic, with no gross asymmetry noted of the contour of the facial features.  The facial nerve is intact, with strong symmetric movements.  Neck-no palpable lymphadenopathy or thyroid mass.  Trachea is midline.  Eyes - Extraocular movements intact.   Ears- External auditory canals are patent, tympanic membranes are intact without effusion or worrisome retractions   Nose -juvenile nasal shape with significant distortion of the entire dorsum and nasal tip secondary to distant surgeries.  The internal nasal valve is obstructed.  The inferior turbinates are 4+ bilateral naseem bullosa  The septum is grossly intact  Mouth - Examination of the oral cavity shows pink, healthy, moist mucosa.  No lesions or ulceration noted.  The dentition are in good repair.  The tongue is mobile and midline.  Throat - The walls of the oropharynx were smooth, pink, moist, symmetric, and had no lesions or ulcerations.  The tonsillar pillars and soft palate were symmetric.  The uvula was midline on elevation.  Tongue base symmetric on palpation    To evaluate the nose and sinuses, I performed rigid nasal endoscopy.  I applied topical nasal lidocaine and neosynephrine.    I began with the LEFT side using a 0 degree rigid nasal endoscope, and then similarly examined the RIGHT side    Findings:  Inferior turbinates:  4+  Middle turbinate and middle meatus:  No purulence, no polyposis, bilateral obstructive naseem  Superior meatus was evaluated  Frontal recess clear  Mucosa is healthy throughout,  no xiao polyps nor polypoid degeneration  Sphenoethmoidal recess without purulence   Nasopharynx clear, et patent  The patient tolerated the procedure well    I then passed a flexible scope through the nares and into the tongue base.  Symmetric grade 3 lingual tonsils.  Elongated uvula contacting the tongue base.  Palpation of  the uvula at the tongue base causes reproducible globus.  No supraglottic mass or asymmetry of the vocal cords are symmetric on phonation and respiration the pyriform sinuses are clear.  The endolaryngeal mucosa is dry.  The scope was withdrawn she tolerated this well.    Impression/Plan  Sadaf MCMAHAN Latendresse is a 63 year old female    ICD-10-CM    1. Globus pharyngeus  R09.89       2. Pharyngoesophageal dysphagia  R13.14       3. Xerostomia  K11.7       4. Nasal turbinate hypertrophy  J34.3       5. Chronic pansinusitis  J32.4       6. History of nasal septal reconstruction at 19 years of age secondary to childhood nasal trauma  Z98.890             Reassured no endolaryngeal mass    I do not feel that any of Sadaf's symptoms would be improved with sinus surgery and she is very reluctant to proceed with  surgery due to her unfortunate prior experience with nasal reconstruction as a teenager.  I explained that sinus surgery would not affect the structure of the nose itself but again at this point there is no indication.  If she has recurrent purulent discharge during episodes of sinusitis or her CAT scan worsens I would reconsider sinus surgery in the future.  I would not perform any septum surgery.  This was specifically discussed.    One of us will try to see her for her next episode of acute sinusitis for endoscopy    Continue swallow therapy home exercises  Biotine   Water intake    Irrigate your nose 2 times a day with the warm budesonide solution using 1 bottle between nostrils in the morning, and one bottle at night.      No foreign body in throat    Elongated uvula, dry mouth    Follow-up the next time you get Sinusitis    You can try Biotene spray OTC for dry mouth    Total exam time spent on the day of the visit including review of the chart, reviewing pertinent prior imaging and notes, obtaining a detailed history and physical exam, education, discussion with the patient and documenting in epic was  over 45 minutes .  This did not include procedure time.        Nayeli Fong D.O.  Otolaryngology/Head and Neck Surgery  Allergy

## 2023-09-14 ENCOUNTER — OFFICE VISIT (OUTPATIENT)
Dept: OTOLARYNGOLOGY | Facility: OTHER | Age: 63
End: 2023-09-14
Attending: OTOLARYNGOLOGY
Payer: COMMERCIAL

## 2023-09-14 VITALS
WEIGHT: 180 LBS | SYSTOLIC BLOOD PRESSURE: 120 MMHG | OXYGEN SATURATION: 98 % | HEART RATE: 71 BPM | HEIGHT: 66 IN | BODY MASS INDEX: 28.93 KG/M2 | DIASTOLIC BLOOD PRESSURE: 60 MMHG | TEMPERATURE: 97.2 F

## 2023-09-14 DIAGNOSIS — Z98.890 HISTORY OF NASAL SURGERY: ICD-10-CM

## 2023-09-14 DIAGNOSIS — K11.7 XEROSTOMIA: ICD-10-CM

## 2023-09-14 DIAGNOSIS — R13.14 PHARYNGOESOPHAGEAL DYSPHAGIA: ICD-10-CM

## 2023-09-14 DIAGNOSIS — J34.3 NASAL TURBINATE HYPERTROPHY: ICD-10-CM

## 2023-09-14 DIAGNOSIS — J32.4 CHRONIC PANSINUSITIS: ICD-10-CM

## 2023-09-14 DIAGNOSIS — R09.A2 GLOBUS PHARYNGEUS: Primary | ICD-10-CM

## 2023-09-14 PROCEDURE — G0463 HOSPITAL OUTPT CLINIC VISIT: HCPCS | Mod: 25

## 2023-09-14 PROCEDURE — 99215 OFFICE O/P EST HI 40 MIN: CPT | Mod: 25 | Performed by: OTOLARYNGOLOGY

## 2023-09-14 PROCEDURE — 31575 DIAGNOSTIC LARYNGOSCOPY: CPT | Performed by: OTOLARYNGOLOGY

## 2023-09-14 ASSESSMENT — PAIN SCALES - GENERAL: PAINLEVEL: SEVERE PAIN (6)

## 2023-09-14 NOTE — LETTER
9/14/2023         RE: Sadaf Blankenship  [address]  [facility] [address]        Dear Colleague,    Thank you for referring your patient, Sadaf Blankenship, to the [facility]. Please see a copy of my visit note below.    Otolaryngology Progress Note        Melanie presents foreva chronic recurrent sinusitis.    She also has several concerns with her throat including chronic globus and dysphagia    She had a severe nasal injury at 6 yoa after a laundry cart fell on her.    She had nasal reconstruction at 19 yoa    She had a revision nasal surgery at 21 yoa describing as the 'tip of her nose' fell off.      She saw [place] at her last visit on 629    SNOT 50 with a severe problem with postnasal discharge thick nasal discharge facial pain and pressure and decreased sense of smell and taste    CT sinus dated 6/30/2023 shows mucoperiosteal ostial thickening of the frontal sinuses bilaterally anterior and posterior ethmoid sinuses and occlusion of the ostiomeatal complexes with an elongated Myra cell on the left.  There is partial opacification of the left sphenoid the right sphenoid is clear the optic nerve is not dehiscent the lamina is intact the skull base is intact Karalis to.  Bilateral inferior turbinate hypertrophy and naseem bullosa.  There is a septal perforation at the anterior and superior septum with a metal plate at the columella    Prior history of opioid abuse and 3 prior nasal surgeries     She has been to  for swallow and speech, and was treated by [facility]    VFSS (Video Fluoroscopic Swallow Study) 3/21/21 negative  Has aspirated in past, no further aspiration  She has chronic globus in her lower throat  Significant history of psoriatic arthritis with cervical spine disease and prior spinal fusion with recent rhizotomy      On methotrexate     chronic dry mouth    She notes CRS with 2 courses abx/year or more often, abx do not impromve, improved with  "prednisone    Physical Exam  /60 (BP Location: Left arm, Patient Position: Chair, Cuff Size: Adult Regular)   Pulse 71   Temp 97.2  F (36.2  C) (Tympanic)   Ht 1.676 m (5' 6\")   Wt 81.6 kg (180 lb)   SpO2 98%   BMI 29.05 kg/m    General - The patient is well nourished and well developed, and appears to have good nutritional status.  Alert and oriented to person and place, interactive.    Head and Face - Normocephalic and atraumatic, with no gross asymmetry noted of the contour of the facial features.  The facial nerve is intact, with strong symmetric movements.  Neck-no palpable lymphadenopathy or thyroid mass.  Trachea is midline.  Eyes - Extraocular movements intact.   Ears- External auditory canals are patent, tympanic membranes are intact without effusion or worrisome retractions   Nose -juvenile nasal shape with significant distortion of the entire dorsum and nasal tip secondary to distant surgeries.  The internal nasal valve is obstructed.  The inferior turbinates are 4+ bilateral naseem bullosa  The septum is grossly intact  Mouth - Examination of the oral cavity shows pink, healthy, moist mucosa.  No lesions or ulceration noted.  The dentition are in good repair.  The tongue is mobile and midline.  Throat - The walls of the oropharynx were smooth, pink, moist, symmetric, and had no lesions or ulcerations.  The tonsillar pillars and soft palate were symmetric.  The uvula was midline on elevation.  Tongue base symmetric on palpation    To evaluate the nose and sinuses, I performed rigid nasal endoscopy.  I applied topical nasal lidocaine and neosynephrine.    I began with the LEFT side using a 0 degree rigid nasal endoscope, and then similarly examined the RIGHT side    Findings:  Inferior turbinates:  4+  Middle turbinate and middle meatus:  No purulence, no polyposis, bilateral obstructive naseem  Superior meatus was evaluated  Frontal recess clear  Mucosa is healthy throughout,  no xiao polyps " nor polypoid degeneration  Sphenoethmoidal recess without purulence   Nasopharynx clear, et patent  The patient tolerated the procedure well    I then passed a flexible scope through the nares and into the tongue base.  Symmetric grade 3 lingual tonsils.  Elongated uvula contacting the tongue base.  Palpation of the uvula at the tongue base causes reproducible globus.  No supraglottic mass or asymmetry of the vocal cords are symmetric on phonation and respiration the pyriform sinuses are clear.  The endolaryngeal mucosa is dry.  The scope was withdrawn she tolerated this well.    Impression/Plan  Sadaf Blankenship is a 63 year old female    ICD-10-CM    1. Globus pharyngeus  R09.89       2. Pharyngoesophageal dysphagia  R13.14       3. Xerostomia  K11.7       4. Nasal turbinate hypertrophy  J34.3       5. Chronic pansinusitis  J32.4       6. History of nasal septal reconstruction at 19 years of age secondary to childhood nasal trauma  Z98.890             Reassured no endolaryngeal mass    I do not feel that any of Sadaf's symptoms would be improved with sinus surgery and she is very reluctant to proceed with  surgery due to her unfortunate prior experience with nasal reconstruction as a teenager.  I explained that sinus surgery would not affect the structure of the nose itself but again at this point there is no indication.  If she has recurrent purulent discharge during episodes of sinusitis or her CAT scan worsens I would reconsider sinus surgery in the future.  I would not perform any septum surgery.  This was specifically discussed.    One of us will try to see her for her next episode of acute sinusitis for endoscopy    Continue swallow therapy home exercises  Biotine   Water intake    Irrigate your nose 2 times a day with the warm budesonide solution using 1 bottle between nostrils in the morning, and one bottle at night.      No foreign body in throat    Elongated uvula, dry mouth    Follow-up the  next time you get Sinusitis    You can try Biotene spray OTC for dry mouth    Total exam time spent on the day of the visit including review of the chart, reviewing pertinent prior imaging and notes, obtaining a detailed history and physical exam, education, discussion with the patient and documenting in epic was over 45 minutes .  This did not include procedure time.        Nayeli Fong D.O.  Otolaryngology/Head and Neck Surgery  Allergy          Again, thank you for allowing me to participate in the care of your patient.        Sincerely,        Nayeli Fong MD

## 2023-09-14 NOTE — PATIENT INSTRUCTIONS
Make the Krishna Med Saline Solution using 2 packages of salt and previously boiled or distilled water.  This will make 240 ml of saline solution.     Irrigate your nose 2 times a day with the warm budesonide solution using 1 bottle between nostrils in the morning, and one bottle at night.      No foreign body in throat    Elongated uvula, dry mouth    Follow-up the next time you get Sinusitis    You can try Biotene spray OTC for dry mouth    Thank you for allowing Dr. Fong and our ENT team to participate in your care.  If your medications are too expensive, please give the nurse a call.  We can possibly change this medication.  If you have a scheduling or an appointment question please contact our Health Unit Coordinator at their direct line [phone].   ALL nursing questions or concerns can be directed to your ENT nurse, Brain, at: [phone]

## 2023-09-22 NOTE — COMMUNITY RESOURCES LIST (ENGLISH)
09/22/2023   [facility]  N/A  For questions about this resource list or additional care needs, please contact your primary care clinic or care manager.  Phone: [phone]   Email: N/A   Address: [address] [place] [address]   Hours: N/A        Food and Nutrition       Food pantry  1  [facility] and Service [place] Distance: 5.3 miles      Pickup   [address] [place] [address]  Language: English  Hours: Mon 9:00 AM - 11:00 AM , Tue 1:00 PM - 3:00 PM , Wed - Thu 9:00 AM - 11:00 AM  Fees: Free, Self Pay   Phone: (394) 545-8160 Email: rei@[facility] Website: https://[facility].org/[facility]/[place]     2  [facility] [employer] Agency [facility] Free [facility] Distance: 16.13 miles      Pickup   [address] [place] [address]  Language: English  Hours: Mon - Sun Open 24 Hours  Fees: Free   Phone: (103) 715-1667 Email: jose@[employer].org Website: http://www.[employer].org     SNAP application assistance  3  [facility] & Human Services  Economic Services & Supports [facility] Distance: 4.78 miles      In-Person, Phone/Virtual   [address] [place] [address]  Language: English  Hours: Mon - Fri 8:00 AM - 4:30 PM  Fees: Free   Phone: (663) 988-6899 Website: https://www.[facility].gov/vrfwqhcaquu-x-h/public-health-human-services/economic-services-supports     4  [facility] & Human Services - Economic Services & [facility] Distance: 16.14 miles      In-Person, Phone/Virtual   [address] [place] [address]  Language: English  Hours: Mon - Fri 8:00 AM - 4:30 PM  Fees: Free   Phone: (507) 113-2086 Email: financialassistance@[facility].gov Website: https://www.[place].gov/ohzjdmvocoy-d-x/public-health-human-services/economic-services-supports     Soup kitchen or free meals  5  [facility] [place]  [facility] and Service Center Distance: 5.3 miles      Pickup   [address] [place] [address]  Language: English  Hours: Mon - Fri 4:00 PM - 4:45 PM  Fees: Free   Phone: (440) 355-2429 Email: rei@[facility].[employer]SA[employer] Website: https://centralusa.[facility].org/northern/[place]          Important Numbers & Websites       Emergency Services   911  [place] Services   311  Poison Control   (546) 769-4842  Suicide Prevention Lifeline   (747) 844-8370 (TALK)  Child Abuse Hotline   (545) 342-8980 (4-A-Child)  Sexual Assault Hotline   (957) 998-6686 (HOPE)  National Runaway Safeline   (607) 519-9108 (RUNAWAY)  All-Options Talkline   (181) 231-9165  Substance Abuse Referral   (910) 520-6043 (HELP)

## 2023-09-28 NOTE — PROGRESS NOTES
Assessment & Plan     Other chronic pain / Lumbar radiculopathy / Myofascial pain  Follows with Dr. Diez, [facility] pain mgt. Suspect pain is increased d/t lack of sleep  No concerning findings on lab work. CRP chronically elevated d/t rheumatologic issues of psoriasic arthritis   - Comprehensive metabolic panel (BMP + Alb, Alk Phos, ALT, AST, Total. Bili, TP)  - CRP, inflammation  - CBC with platelets and differential  - no change in pain mgt medications  - Vitamin B12  - Tesha will be starting enbrel, follows with [facility] rheumatology    Constipation, unspecified constipation type  Currently taking (3) ex-lax per day  Further discussions at next visit     Craving for particular food  BG 82 today    Moderate episode of recurrent major depressive disorder (H) / Insomnia, unspecified type  Follows with Dr. Herbert. Message sent to Dr. Herbert regarding our visit today,  Buspar w/o much success at this point. Worsening mood issues most likely d/t lack of sleep.   Safety plan in place   - increase hydroxyzine 75mg at bedtime     45 minutes spent on the date of the encounter doing chart review, review of test results, interpretation of tests, patient visit, documentation and communication with psychiatry        See Patient Instructions    Next visit 10/3/2023    Henna Moyer MD  [facility] DELIA Parkinson is a 63 year old, presenting for the following health issues:  Anxiety, Depression, and Pain      HPI       Depression and Anxiety Follow-Up  How are you doing with your depression since your last visit? Worsened   How are you doing with your anxiety since your last visit?  Worsened   Are you having other symptoms that might be associated with depression or anxiety? Yes:  Pain  Have you had a significant life event? Health Concerns   Do you have any concerns with your use of alcohol or other drugs? No    - Tesha has been cut off from long term disability   - Dr Herbert started  Tesha on buspar for anxiety  - issues with neighbors  - Tesha thought her neighbor had her apt wired     Social History     Tobacco Use    Smoking status: Former     Years: 8.00     Types: Cigarettes     Start date: 1981     Quit date: 1997     Years since quittin.7    Smokeless tobacco: Never   Vaping Use    Vaping Use: Never used   Substance Use Topics    Alcohol use: No    Drug use: No         2023     4:50 PM 2023     2:55 PM 2023     9:35 AM   PHQ   PHQ-9 Total Score 8 2 13   Q9: Thoughts of better off dead/self-harm past 2 weeks Not at all Not at all Not at all         2023     4:51 PM 2023     2:55 PM 2023     9:35 AM   ESTRELLA-7 SCORE   Total Score 8 (mild anxiety)     Total Score 8 1 21         2023     9:35 AM   Last PHQ-9   1.  Little interest or pleasure in doing things 1   2.  Feeling down, depressed, or hopeless 0   3.  Trouble falling or staying asleep, or sleeping too much 2   4.  Feeling tired or having little energy 3   5.  Poor appetite or overeating 2   6.  Feeling bad about yourself 2   7.  Trouble concentrating 1   8.  Moving slowly or restless 2   Q9: Thoughts of better off dead/self-harm past 2 weeks 0   PHQ-9 Total Score 13         2023     9:35 AM   ESTRELLA-7    1. Feeling nervous, anxious, or on edge 3   2. Not being able to stop or control worrying 3   3. Worrying too much about different things 3   4. Trouble relaxing 3   5. Being so restless that it is hard to sit still 3   6. Becoming easily annoyed or irritable 3   7. Feeling afraid, as if something awful might happen 3   ESTRELLA-7 Total Score 21         6}  Pain History:  When did you first notice your pain? F/U   Have you seen this provider for your pain in the past? Yes   Where in your body do you have pain? Lower back  Are you seeing anyone else for your pain? No        2023     4:50 PM 2023     2:55 PM 2023     9:35 AM   PHQ-9 SCORE   PHQ-9 Total Score MyChart 8 (Mild depression)      PHQ-9 Total Score 8 2 13           2/22/2023     4:51 PM 5/11/2023     2:55 PM 9/5/2023     9:35 AM   ESTRELLA-7 SCORE   Total Score 8 (mild anxiety)     Total Score 8 1 21         Chronic Pain Follow Up:    Location of pain: Lower Back  Analgesia/pain control:    - Recent changes:  None    - Overall control: Tolerable with discomfort    - Current treatments: Opioids, muscle relaxers   Adherence:     - Do you ever take more pain medicine than prescribed? No    - When did you take your last dose of pain medicine?  This day   Adverse effects: No       - feels like there is something going on with her body  - having nerve pain  - issues with sleep  - follows with Dr. Diez, s/p injection did not work  - left foot,.2nd and 3rd toes are numb - for months   - left sciatica pain   - issues with neighbor up at night which is making sleeping difficult. OCD    - RSV on 9/6/2023  - management is trying to Tesha a new apt   - s/p injection occipital nerve block right   - currently off methotrexate   - follows with Dr [facility] and going to be on enbrel   - currently on lyrica 150mg bid     # insomnia  - tried trazodone (may have resulted screaming in her sleep)  - (2) hydroxyzine, but will wake up     # falling   - duration of one year     # constipation   - taking (3) to (4) exlax per day  - s/p hemorrhoid surgery   - falls asleep after eating   - craving sweets   - trying to lose weight, but can't     # social  - leaving for [place] on 10/8/2023 (visiting cousin)  and will be back in December 1st         PDMP Review         Value Time User    State PDMP site checked  Yes 9/8/2023  4:58 PM Henna Moyer MD          Last CSA Agreement:   CSA -- Patient Level:     [Media Unavailable] Controlled Substance Agreement - Opioid - Scan on 1/3/2023 10:57 AM: OPIOID/OPIOID PLUS CONTROLLED SUBSTANCE AGREEMENT   [Media Unavailable] Controlled Substance Agreement - Opioid - Scan on 9/7/2021 10:15 AM: OPIOD/OPIOD PLUS  CONTROLLED SUBSTANCE AGREEMENT       Last UDS: 2/20/2023      Review of Systems   Constitutional:  Negative for chills and fever.   HENT:  Negative for congestion.    Respiratory:  Negative for shortness of breath.    Cardiovascular:  Negative for chest pain and palpitations.   Gastrointestinal:  Positive for constipation. Negative for abdominal pain.   Musculoskeletal:  Positive for arthralgias, back pain and neck pain.   Neurological:  Positive for headaches and paresthesias.        Memory issues   Psychiatric/Behavioral:  Positive for dysphoric mood and sleep disturbance. Negative for suicidal ideas (but close). The patient is nervous/anxious.           Objective    /82   Pulse 74   Temp 98.4  F (36.9  C) (Tympanic)   Resp 17   Wt 84.8 kg (186 lb 14.4 oz)   SpO2 98%   BMI 30.17 kg/m    Body mass index is 30.17 kg/m .  Physical Exam  Constitutional:       General: She is not in acute distress.     Appearance: She is not ill-appearing.   Cardiovascular:      Rate and Rhythm: Normal rate and regular rhythm.      Heart sounds: No murmur heard.  Pulmonary:      Effort: Pulmonary effort is normal. No respiratory distress.      Breath sounds: No wheezing or rales.   Neurological:      Mental Status: She is alert.   Psychiatric:         Mood and Affect: Mood is anxious and depressed. Affect is tearful.          Results for orders placed or performed in visit on 09/29/23 (from the past 24 hour(s))   Comprehensive metabolic panel (BMP + Alb, Alk Phos, ALT, AST, Total. Bili, TP)   Result Value Ref Range    Sodium 142 135 - 145 mmol/L    Potassium 3.9 3.4 - 5.3 mmol/L    Carbon Dioxide (CO2) 23 22 - 29 mmol/L    Anion Gap 10 7 - 15 mmol/L    Urea Nitrogen 10.1 8.0 - 23.0 mg/dL    Creatinine 1.01 (H) 0.51 - 0.95 mg/dL    GFR Estimate 63 >60 mL/min/1.73m2    Calcium 9.3 8.8 - 10.2 mg/dL    Chloride 109 (H) 98 - 107 mmol/L    Glucose 82 70 - 99 mg/dL    Alkaline Phosphatase 108 (H) 35 - 104 U/L    AST 19 0 - 45 U/L     ALT 24 0 - 50 U/L    Protein Total 7.5 6.4 - 8.3 g/dL    Albumin 4.3 3.5 - 5.2 g/dL    Bilirubin Total 0.2 <=1.2 mg/dL   CRP, inflammation   Result Value Ref Range    CRP Inflammation 5.77 (H) <5.00 mg/L   CBC with platelets and differential    Narrative    The following orders were created for panel order CBC with platelets and differential.  Procedure                               Abnormality         Status                     ---------                               -----------         ------                     CBC with platelets and d...[[id number]]  Abnormal            Final result                 Please view results for these tests on the individual orders.   CBC with platelets and differential   Result Value Ref Range    WBC Count 7.3 4.0 - 11.0 10e3/uL    RBC Count 4.55 3.80 - 5.20 10e6/uL    Hemoglobin 13.7 11.7 - 15.7 g/dL    Hematocrit 41.9 35.0 - 47.0 %    MCV 92 78 - 100 fL    MCH 30.1 26.5 - 33.0 pg    MCHC 32.7 31.5 - 36.5 g/dL    RDW 15.3 (H) 10.0 - 15.0 %    Platelet Count 214 150 - 450 10e3/uL    % Neutrophils 62 %    % Lymphocytes 26 %    % Monocytes 9 %    % Eosinophils 2 %    % Basophils 1 %    % Immature Granulocytes 0 %    NRBCs per 100 WBC 0 <1 /100    Absolute Neutrophils 4.5 1.6 - 8.3 10e3/uL    Absolute Lymphocytes 1.9 0.8 - 5.3 10e3/uL    Absolute Monocytes 0.7 0.0 - 1.3 10e3/uL    Absolute Eosinophils 0.1 0.0 - 0.7 10e3/uL    Absolute Basophils 0.1 0.0 - 0.2 10e3/uL    Absolute Immature Granulocytes 0.0 <=0.4 10e3/uL    Absolute NRBCs 0.0 10e3/uL

## 2023-09-29 ENCOUNTER — OFFICE VISIT (OUTPATIENT)
Dept: FAMILY MEDICINE | Facility: OTHER | Age: 63
End: 2023-09-29
Attending: FAMILY MEDICINE
Payer: COMMERCIAL

## 2023-09-29 VITALS
DIASTOLIC BLOOD PRESSURE: 82 MMHG | HEART RATE: 74 BPM | BODY MASS INDEX: 30.17 KG/M2 | RESPIRATION RATE: 17 BRPM | TEMPERATURE: 98.4 F | OXYGEN SATURATION: 98 % | SYSTOLIC BLOOD PRESSURE: 119 MMHG | WEIGHT: 186.9 LBS

## 2023-09-29 DIAGNOSIS — F33.1 MODERATE EPISODE OF RECURRENT MAJOR DEPRESSIVE DISORDER (H): Chronic | ICD-10-CM

## 2023-09-29 DIAGNOSIS — M54.16 LUMBAR RADICULOPATHY: ICD-10-CM

## 2023-09-29 DIAGNOSIS — G47.00 INSOMNIA, UNSPECIFIED TYPE: ICD-10-CM

## 2023-09-29 DIAGNOSIS — K59.00 CONSTIPATION, UNSPECIFIED CONSTIPATION TYPE: ICD-10-CM

## 2023-09-29 DIAGNOSIS — R63.8 CRAVING FOR PARTICULAR FOOD: ICD-10-CM

## 2023-09-29 DIAGNOSIS — G89.29 OTHER CHRONIC PAIN: Primary | ICD-10-CM

## 2023-09-29 DIAGNOSIS — M79.18 MYOFASCIAL PAIN: Chronic | ICD-10-CM

## 2023-09-29 LAB
ALBUMIN SERPL BCG-MCNC: 4.3 G/DL (ref 3.5–5.2)
ALP SERPL-CCNC: 108 U/L (ref 35–104)
ALT SERPL W P-5'-P-CCNC: 24 U/L (ref 0–50)
ANION GAP SERPL CALCULATED.3IONS-SCNC: 10 MMOL/L (ref 7–15)
AST SERPL W P-5'-P-CCNC: 19 U/L (ref 0–45)
BASOPHILS # BLD AUTO: 0.1 10E3/UL (ref 0–0.2)
BASOPHILS NFR BLD AUTO: 1 %
BILIRUB SERPL-MCNC: 0.2 MG/DL
BUN SERPL-MCNC: 10.1 MG/DL (ref 8–23)
CALCIUM SERPL-MCNC: 9.3 MG/DL (ref 8.8–10.2)
CHLORIDE SERPL-SCNC: 109 MMOL/L (ref 98–107)
CREAT SERPL-MCNC: 1.01 MG/DL (ref 0.51–0.95)
CRP SERPL-MCNC: 5.77 MG/L
DEPRECATED HCO3 PLAS-SCNC: 23 MMOL/L (ref 22–29)
EGFRCR SERPLBLD CKD-EPI 2021: 63 ML/MIN/1.73M2
EOSINOPHIL # BLD AUTO: 0.1 10E3/UL (ref 0–0.7)
EOSINOPHIL NFR BLD AUTO: 2 %
ERYTHROCYTE [DISTWIDTH] IN BLOOD BY AUTOMATED COUNT: 15.3 % (ref 10–15)
GLUCOSE SERPL-MCNC: 82 MG/DL (ref 70–99)
HCT VFR BLD AUTO: 41.9 % (ref 35–47)
HGB BLD-MCNC: 13.7 G/DL (ref 11.7–15.7)
IMM GRANULOCYTES # BLD: 0 10E3/UL
IMM GRANULOCYTES NFR BLD: 0 %
LYMPHOCYTES # BLD AUTO: 1.9 10E3/UL (ref 0.8–5.3)
LYMPHOCYTES NFR BLD AUTO: 26 %
MCH RBC QN AUTO: 30.1 PG (ref 26.5–33)
MCHC RBC AUTO-ENTMCNC: 32.7 G/DL (ref 31.5–36.5)
MCV RBC AUTO: 92 FL (ref 78–100)
MONOCYTES # BLD AUTO: 0.7 10E3/UL (ref 0–1.3)
MONOCYTES NFR BLD AUTO: 9 %
NEUTROPHILS # BLD AUTO: 4.5 10E3/UL (ref 1.6–8.3)
NEUTROPHILS NFR BLD AUTO: 62 %
NRBC # BLD AUTO: 0 10E3/UL
NRBC BLD AUTO-RTO: 0 /100
PLATELET # BLD AUTO: 214 10E3/UL (ref 150–450)
POTASSIUM SERPL-SCNC: 3.9 MMOL/L (ref 3.4–5.3)
PROT SERPL-MCNC: 7.5 G/DL (ref 6.4–8.3)
RBC # BLD AUTO: 4.55 10E6/UL (ref 3.8–5.2)
SODIUM SERPL-SCNC: 142 MMOL/L (ref 135–145)
WBC # BLD AUTO: 7.3 10E3/UL (ref 4–11)

## 2023-09-29 PROCEDURE — 80053 COMPREHEN METABOLIC PANEL: CPT | Mod: ZL | Performed by: FAMILY MEDICINE

## 2023-09-29 PROCEDURE — 86140 C-REACTIVE PROTEIN: CPT | Mod: ZL | Performed by: FAMILY MEDICINE

## 2023-09-29 PROCEDURE — 99215 OFFICE O/P EST HI 40 MIN: CPT | Performed by: FAMILY MEDICINE

## 2023-09-29 PROCEDURE — 82607 VITAMIN B-12: CPT | Mod: ZL | Performed by: FAMILY MEDICINE

## 2023-09-29 PROCEDURE — 85025 COMPLETE CBC W/AUTO DIFF WBC: CPT | Mod: ZL | Performed by: FAMILY MEDICINE

## 2023-09-29 PROCEDURE — G0463 HOSPITAL OUTPT CLINIC VISIT: HCPCS | Mod: 25

## 2023-09-29 PROCEDURE — 36415 COLL VENOUS BLD VENIPUNCTURE: CPT | Mod: ZL | Performed by: FAMILY MEDICINE

## 2023-09-29 PROCEDURE — G0463 HOSPITAL OUTPT CLINIC VISIT: HCPCS

## 2023-09-29 ASSESSMENT — ENCOUNTER SYMPTOMS
CHILLS: 0
FEVER: 0
DYSPHORIC MOOD: 1
HEADACHES: 1
SLEEP DISTURBANCE: 1
ARTHRALGIAS: 1
CONSTIPATION: 1
NERVOUS/ANXIOUS: 1
PALPITATIONS: 0
ABDOMINAL PAIN: 0
BACK PAIN: 1
PARESTHESIAS: 1
NECK PAIN: 1
SHORTNESS OF BREATH: 0

## 2023-09-29 ASSESSMENT — PAIN SCALES - GENERAL: PAINLEVEL: MODERATE PAIN (4)

## 2023-09-29 NOTE — COMMUNITY RESOURCES LIST (ENGLISH)
09/29/2023   [facility]  N/A  For additional resource needs, please contact your health insurance member services or your primary care team.  Phone: [phone]   Email: N/A   Address: [address] [place] [address]   Hours: N/A        Food and Nutrition       Food pantry  1  [facility] and Service [place] Distance: 5.3 miles      Pickup   [address] [place] [address]  Language: English  Hours: Mon 9:00 AM - 11:00 AM , Tue 1:00 PM - 3:00 PM , Wed - Thu 9:00 AM - 11:00 AM  Fees: Free, Self Pay   Phone: (609) 240-3137 Email: rei@[facility] Website: https://[facility].org/[facility]/[place]     2  [facility] [employer] Opportunity Agency [facility] Free [facility] Distance: 16.13 miles      Pickup   [address] [place] [address]  Language: English  Hours: Mon - Sun Open 24 Hours  Fees: Free   Phone: (475) 307-4823 Email: ojse@[employer].org Website: http://www.[employer].org     SNAP application assistance  3  [facility] & Human [facility] Economic Services & Supports [facility] Distance: 4.78 miles      In-Person, Phone/Virtual   [address] [place] [address]  Language: English  Hours: Mon - Fri 8:00 AM - 4:30 PM  Fees: Free   Phone: (905) 455-4930 Website: https://www.[facility].gov/jnsjdfsttmf-w-u/[facility]-human-services/economic-services-supports     4  [facility] & Human Services - Economic Services & [facility] Distance: 16.14 miles      In-Person, Phone/Virtual   [address] [place] [address]  Language: English  Hours: Mon - Fri 8:00 AM - 4:30 PM  Fees: Free   Phone: (124) 756-6991 Email: financialassistance@[facility].gov Website: https://www.[place].gov/kwoedtffndu-v-u/public-health-human-services/economic-services-supports     Soup kitchen or free meals  5  [facility]  [facility] and Service Center Distance: 5.3 miles      Pickup   [address] [place] [address]  Language: English  Hours: Mon - Fri 4:00 PM - 4:45 PM  Fees: Free   Phone: (210) 673-3293 Email: rei@[facility].[employer] Website: https://centralusa.[facility].org/northern/[place]ennifer          Important Numbers & Websites       [facility]   211 211itedway.org  Poison Control   (379) 676-5952 [place]  Suicide and Crisis Lifeline   [address] [place], [place] [address]line.org  Childhelp [place] Child Abuse Hotline   989.519.2669 Childhelphotline.org  [place] Sexual Assault Hotline   (135) 938-4441 (HOPE) [facility] Runaway Safeline   (741) 325-2175 (RUNAWAY) [facility] Hospitalrun[facility].org  Pregnancy & Postpartum Support [place]   Call/text 801-568-8076 Ppsupportmn.org  Substance Abuse National Helpline ([facility]   840-176-HELP (5507) Findtreatment.gov  Emergency Services   914

## 2023-09-29 NOTE — PATIENT INSTRUCTIONS
Okay to try hydroxyzine (3) tablets at night. Do not take with your methocarbamol     We will call you with your lab results.        *Facilities in bold italics indicate medication management  Services are offered.     Crisis support    If you or someone you know is struggling or in crisis, help is available. Call or text 457 or chat 533[employer].org    The volunteer Crisis Counselor will help you move from a 'hot moment to a cool moment'

## 2023-09-30 LAB — VIT B12 SERPL-MCNC: 1731 PG/ML (ref 232–1245)

## 2023-10-02 NOTE — PROGRESS NOTES
Assessment & Plan     Other chronic pain  Worsen d/t sleep deprivation. Pain will improve, once sleep improves  Currently off biologics  - c/w lyrica 150mg bid     Moderate episode of recurrent major depressive disorder (H) / Insomnia, unspecified type  Follows with Dr. Herbert  Appreciate Dr Herbert seeing Tesha today  - no changes made today in medications  - no improvements with (3) hydroxyzine at night   - [facility]   - encourage to keep her trip to [place]  - Will support a new apartment on top floor away from her current neighbor     See Patient Instructions    20 minutes spent on the date of the encounter doing chart review, review of test results, interpretation of tests, patient visit, documentation       Next visit 2023     Henna Moyer MD  [facility] - DELIA Parkinson is a 63 year old, presenting for the following health issues:  Anxiety, Depression, and Insomnia    Joined by daughter, Noemy, via phone     HPI     Depression and Anxiety / insomnia Follow-Up  How are you doing with your depression since your last visit? No change  How are you doing with your anxiety since your last visit?  No change  Are you having other symptoms that might be associated with depression or anxiety? Yes:  not able to sleep, beyond herself  Have you had a significant life event? Health Concerns   Do you have any concerns with your use of alcohol or other drugs? No    - appt with Dr. Herbert this afternoon  - no improvements with third hydroxyzine   - continued issues with upstairs neighbor    Social History     Tobacco Use    Smoking status: Former     Years: 8.00     Types: Cigarettes     Start date: 1981     Quit date: 1997     Years since quittin.7    Smokeless tobacco: Never   Vaping Use    Vaping Use: Never used   Substance Use Topics    Alcohol use: No    Drug use: No         2023     4:50 PM 2023     2:55 PM 2023     9:35 AM   PHQ    PHQ-9 Total Score 8 2 13   Q9: Thoughts of better off dead/self-harm past 2 weeks Not at all Not at all Not at all         2/22/2023     4:51 PM 5/11/2023     2:55 PM 9/5/2023     9:35 AM   ESTRELLA-7 SCORE   Total Score 8 (mild anxiety)     Total Score 8 1 21       Review of Systems   Constitutional:  Negative for chills and fever.   HENT:  Negative for congestion.    Respiratory:  Negative for shortness of breath.    Cardiovascular:  Negative for chest pain and palpitations.   Gastrointestinal:  Negative for abdominal pain.   Musculoskeletal:         All over body pain    Psychiatric/Behavioral:  Positive for dysphoric mood, mood changes and sleep disturbance. The patient is nervous/anxious.           Objective    /80   Pulse 68   Temp 96.9  F (36.1  C) (Tympanic)   Resp 17   Wt 84.4 kg (186 lb)   SpO2 97%   BMI 30.02 kg/m    Body mass index is 30.02 kg/m .  Physical Exam  Constitutional:       General: She is not in acute distress.     Appearance: She is not ill-appearing.   Cardiovascular:      Rate and Rhythm: Normal rate and regular rhythm.      Heart sounds: No murmur heard.  Pulmonary:      Effort: Pulmonary effort is normal. No respiratory distress.      Breath sounds: No wheezing or rales.   Neurological:      Mental Status: She is alert.   Psychiatric:         Mood and Affect: Mood is anxious and depressed.

## 2023-10-03 ENCOUNTER — TELEPHONE (OUTPATIENT)
Dept: PSYCHIATRY | Facility: OTHER | Age: 63
End: 2023-10-03

## 2023-10-03 ENCOUNTER — OFFICE VISIT (OUTPATIENT)
Dept: FAMILY MEDICINE | Facility: OTHER | Age: 63
End: 2023-10-03
Attending: FAMILY MEDICINE
Payer: COMMERCIAL

## 2023-10-03 ENCOUNTER — VIRTUAL VISIT (OUTPATIENT)
Dept: PSYCHIATRY | Facility: OTHER | Age: 63
End: 2023-10-03
Attending: PSYCHIATRY & NEUROLOGY
Payer: COMMERCIAL

## 2023-10-03 VITALS
RESPIRATION RATE: 17 BRPM | DIASTOLIC BLOOD PRESSURE: 80 MMHG | TEMPERATURE: 96.9 F | SYSTOLIC BLOOD PRESSURE: 114 MMHG | BODY MASS INDEX: 30.02 KG/M2 | WEIGHT: 186 LBS | HEART RATE: 68 BPM | OXYGEN SATURATION: 97 %

## 2023-10-03 DIAGNOSIS — G47.00 PERSISTENT INSOMNIA: Primary | ICD-10-CM

## 2023-10-03 DIAGNOSIS — G89.29 OTHER CHRONIC PAIN: Primary | Chronic | ICD-10-CM

## 2023-10-03 DIAGNOSIS — G47.00 INSOMNIA, UNSPECIFIED TYPE: ICD-10-CM

## 2023-10-03 DIAGNOSIS — F33.1 MODERATE EPISODE OF RECURRENT MAJOR DEPRESSIVE DISORDER (H): Chronic | ICD-10-CM

## 2023-10-03 PROCEDURE — 99214 OFFICE O/P EST MOD 30 MIN: CPT | Mod: VID | Performed by: PSYCHIATRY & NEUROLOGY

## 2023-10-03 PROCEDURE — G0463 HOSPITAL OUTPT CLINIC VISIT: HCPCS | Mod: GT,27

## 2023-10-03 PROCEDURE — G0463 HOSPITAL OUTPT CLINIC VISIT: HCPCS | Mod: 25

## 2023-10-03 PROCEDURE — 99213 OFFICE O/P EST LOW 20 MIN: CPT | Performed by: FAMILY MEDICINE

## 2023-10-03 PROCEDURE — G0463 HOSPITAL OUTPT CLINIC VISIT: HCPCS

## 2023-10-03 ASSESSMENT — ENCOUNTER SYMPTOMS
CHILLS: 0
FEVER: 0
DYSPHORIC MOOD: 1
SLEEP DISTURBANCE: 1
ABDOMINAL PAIN: 0
PALPITATIONS: 0
NERVOUS/ANXIOUS: 1
SHORTNESS OF BREATH: 0

## 2023-10-03 ASSESSMENT — PATIENT HEALTH QUESTIONNAIRE - PHQ9
5. POOR APPETITE OR OVEREATING: NEARLY EVERY DAY
SUM OF ALL RESPONSES TO PHQ QUESTIONS 1-9: 22

## 2023-10-03 ASSESSMENT — ANXIETY QUESTIONNAIRES
1. FEELING NERVOUS, ANXIOUS, OR ON EDGE: NEARLY EVERY DAY
GAD7 TOTAL SCORE: 21
5. BEING SO RESTLESS THAT IT IS HARD TO SIT STILL: NEARLY EVERY DAY
3. WORRYING TOO MUCH ABOUT DIFFERENT THINGS: NEARLY EVERY DAY
7. FEELING AFRAID AS IF SOMETHING AWFUL MIGHT HAPPEN: NEARLY EVERY DAY
2. NOT BEING ABLE TO STOP OR CONTROL WORRYING: NEARLY EVERY DAY
GAD7 TOTAL SCORE: 21
6. BECOMING EASILY ANNOYED OR IRRITABLE: NEARLY EVERY DAY

## 2023-10-03 ASSESSMENT — PAIN SCALES - GENERAL
PAINLEVEL: MODERATE PAIN (4)
PAINLEVEL: MODERATE PAIN (4)

## 2023-10-03 NOTE — PROGRESS NOTES
Video duration: 30 minutes. Total visit time day of visit 30 minutes (documented and ordered meds during our video)      SUBJECTIVE / INTERIM HISTORY                                                                         Last visit 9//5/23: Continue Prozac 60 mg daily. Start Buspar 5 mg 2 times daily x 7 days then increase to 10 mg 2 times dailiy.   Continue hydroxyzine (50 mg) HS and 25 mg up to 2 times daily as needed during day.     - not doing well, not sleeping well. Increaes in hydroxyzine didn't help any with insomnia. Doesn't feel buspar helped any.   - not only feeling depressed, pain everywhere getting worse. Stopped methotrexate and going to start Embril.   - taking care of Jess 3 yo  - everyone is worried about Tesha given her depression. She is hopeful when goes to [place] her mood will improve  - Noemy started school up - working on her AA. 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 Noemy's dad, Akash, down in [place] helping take care of [employer]. The roel had a stroke.  - back on methotrexate and helps with pain feet and hands but this is second time methotrexate and not getting mouth sores but is getting swollen tongue  - cousin in [place] colorectal cancer  - Accident Jan '20 with nephew Michael 8 yo. He has PTSD since    MEDICAL ROS- Covid: dizzy, headache, fevers/ chills.  neck pain s/p neck surgeries, migraines, IBS  MEDICAL / SURGICAL HISTORY                    Patient Active Problem List   Diagnosis    Degenerative disc disease, cervical    Pseudoarthrosis of cervical spine (H)    Irritable bowel syndrome    Depression, major    Chronic, continuous use of opioids    Chronic rhinitis    Chronic maxillary sinusitis    Hypertrophy of inferior nasal turbinate    Chronic pain    Chronic tension-type headache, intractable    History of arthrodesis    Myofascial pain    Disuse syndrome    Intractable chronic migraine without aura and with status migrainosus    Gastroesophageal reflux disease with  esophagitis    Mild episode of recurrent major depressive disorder (H24)    Ulnar neuropathy at elbow of left upper extremity    Lumbar radiculopathy    S/P cervical spinal fusion    ACP (advance care planning)    Calculus of kidney    Pulmonary emphysema, unspecified emphysema type (H)    Pelvic floor dysfunction    Moderate mixed hyperlipidemia not requiring statin therapy    Rheumatoid factor positive. MARISEL and anti CCP negative    Insomnia, unspecified type     ALLERGY   Aspirin, Ibuprofen, Lansoprazole, Red dye, Bupropion, Bupropion hcl, and Demerol [meperidine]  MEDICATIONS                                                                                              Current Outpatient Medications   Medication Sig    albuterol (PROAIR HFA/PROVENTIL HFA/VENTOLIN HFA) 108 (90 Base) MCG/ACT inhaler INHALE 2 PUFFS INTO THE LUNGS EVERY 4 HOURS AS NEEDED FOR SHORTNESS OF BREATH OR WHEEZING.    baclofen (LIORESAL) 10 MG tablet TAKE 1 TABLET (10 MG) BY MOUTH DAILY    budesonide-formoterol (SYMBICORT) 80-4.5 MCG/ACT Inhaler Inhale 2 puffs into the lungs 2 times daily    busPIRone (BUSPAR) 10 MG tablet Take 1/2 tablet 2 times daily x 7 days then take 1 tablet 2 times daily    butalbital-acetaminophen-caffeine (ESGIC) -40 MG tablet Take 1 tablet by mouth every 4 hours as needed TAKE 1 TO 2 TABLETS BY MOUTH AT THE ONSET OF A MIGRAINE HEADACHE, LIMIT NO MORE THAN 3 TIMES PER WEEK. ACETAMINOPHEN SHOULD BE LIMITED TO 40    Calcium Carbonate Antacid 1177 MG CHEW     Cholecalciferol (VITAMIN D3) 1000 UNITS CAPS Take 1,000 Units by mouth Takes 5000 unit capsule daily    dexlansoprazole (DEXILANT) 60 MG CPDR CR capsule TAKE 1 CAPSULE (60 MG) BY MOUTH DAILY    docusate sodium (COLACE) 100 MG capsule Take 100 mg by mouth daily     Erenumab-aooe (AIMOVIG SC) Inject 140 mg Subcutaneous every 30 days Takes once a month    famotidine (PEPCID) 40 MG tablet TAKE ONE TABLET BY MOUTH DAILY AT BEDTIME    FLUoxetine (PROZAC) 20 MG  capsule Take 1 capsule daily along with 40 mg capsule (total daily dose 60 mg)    FLUoxetine (PROZAC) 40 MG capsule Take 1 capsule (40 mg) by mouth daily    folic acid (FOLVITE) 1 MG tablet Take 1 mg by mouth daily    HYDROcodone-acetaminophen (NORCO) 7.5-325 MG per tablet TAKE 1 TABLET BY MOUTH 2 TIMES DAILY AS NEEDED FOR SEVERE PAIN    hydrOXYzine (ATARAX) 25 MG tablet Take 1 tablet up to 2 times daily as needed for anxiety and take 2 tablets at bedtime    ibuprofen (ADVIL/MOTRIN) 800 MG tablet TAKE 1 TABLET (800 MG) BY MOUTH EVERY 8 HOURS AS NEEDED FOR MODERATE PAIN    methocarbamol (ROBAXIN) 500 MG tablet TAKE 1 TABLET (500 MG) BY MOUTH 4 TIMES DAILY AS NEEDED FOR MUSCLE SPASMS PATIENT TAKES AS NEEDED.    Multiple Vitamins-Minerals (CENTRUM SILVER ULTRA WOMENS) TABS Take 1 tablet by mouth daily     ondansetron (ZOFRAN-ODT) 4 MG ODT tab Take 4 mg by mouth every 6 hours as needed (after migraine medication)     pregabalin (LYRICA) 150 MG capsule TAKE 1 CAPSULE BY MOUTH TWICE A DAY    sucralfate (CARAFATE) 1 GM/10ML suspension Take 10 mLs (1 g) by mouth 4 times daily    topiramate (TOPAMAX) 50 MG tablet 2 times daily    trimethoprim-polymyxin b (POLYTRIM) 31607-4.1 UNIT/ML-% ophthalmic solution Place 1-2 drops Into the left eye every 4 hours     No current facility-administered medications for this visit.       VITALS   There were no vitals taken for this visit.     LABS                                                                                                                         Last Comprehensive Metabolic Panel:  Sodium   Date Value Ref Range Status   09/29/2023 142 135 - 145 mmol/L Final     Comment:     Reference intervals for this test were updated on 09/26/2023 to more accurately reflect our healthy population. There may be differences in the flagging of prior results with similar values performed with this method. Interpretation of those prior results can be made in the context of the updated  reference intervals.    03/25/2021 144 133 - 144 mmol/L Final     Potassium   Date Value Ref Range Status   09/29/2023 3.9 3.4 - 5.3 mmol/L Final   09/25/2022 3.7 3.4 - 5.3 mmol/L Final   03/25/2021 3.7 3.4 - 5.3 mmol/L Final     Chloride   Date Value Ref Range Status   09/29/2023 109 (H) 98 - 107 mmol/L Final   09/25/2022 108 94 - 109 mmol/L Final   03/25/2021 114 (H) 94 - 109 mmol/L Final     Carbon Dioxide   Date Value Ref Range Status   03/25/2021 23 20 - 32 mmol/L Final     Carbon Dioxide (CO2)   Date Value Ref Range Status   09/29/2023 23 22 - 29 mmol/L Final   09/25/2022 28 20 - 32 mmol/L Final     Anion Gap   Date Value Ref Range Status   09/29/2023 10 7 - 15 mmol/L Final   09/25/2022 4 3 - 14 mmol/L Final   03/25/2021 7 3 - 14 mmol/L Final     Glucose   Date Value Ref Range Status   09/29/2023 82 70 - 99 mg/dL Final   09/25/2022 110 (H) 70 - 99 mg/dL Final   03/25/2021 93 70 - 99 mg/dL Final     Urea Nitrogen   Date Value Ref Range Status   09/29/2023 10.1 8.0 - 23.0 mg/dL Final   09/25/2022 10 7 - 30 mg/dL Final   03/25/2021 11 7 - 30 mg/dL Final     Creatinine   Date Value Ref Range Status   09/29/2023 1.01 (H) 0.51 - 0.95 mg/dL Final   03/25/2021 0.92 0.52 - 1.04 mg/dL Final     GFR Estimate   Date Value Ref Range Status   09/29/2023 63 >60 mL/min/1.73m2 Final   03/25/2021 68 >60 mL/min/[1.73_m2] Final     Comment:     Non  GFR Calc  Starting 12/18/2018, serum creatinine based estimated GFR (eGFR) will be   calculated using the Chronic Kidney Disease Epidemiology Collaboration   (CKD-EPI) equation.       Calcium   Date Value Ref Range Status   09/29/2023 9.3 8.8 - 10.2 mg/dL Final   03/25/2021 8.3 (L) 8.5 - 10.1 mg/dL Final     Bilirubin Total   Date Value Ref Range Status   09/29/2023 0.2 <=1.2 mg/dL Final   03/25/2021 0.3 0.2 - 1.3 mg/dL Final     Alkaline Phosphatase   Date Value Ref Range Status   09/29/2023 108 (H) 35 - 104 U/L Final   03/25/2021 110 40 - 150 U/L Final     ALT   Date  Value Ref Range Status   09/29/2023 24 0 - 50 U/L Final     Comment:     Reference intervals for this test were updated on 6/12/2023 to more accurately reflect our healthy population. There may be differences in the flagging of prior results with similar values performed with this method. Interpretation of those prior results can be made in the context of the updated reference intervals.     03/25/2021 24 0 - 50 U/L Final     AST   Date Value Ref Range Status   09/29/2023 19 0 - 45 U/L Final     Comment:     Reference intervals for this test were updated on 6/12/2023 to more accurately reflect our healthy population. There may be differences in the flagging of prior results with similar values performed with this method. Interpretation of those prior results can be made in the context of the updated reference intervals.   03/25/2021 13 0 - 45 U/L Final       MENTAL STATUS EXAM                                                                                          Awake, alert, oriented. Mood anxious, affect congruent to mood and speech content. No problems with speech . Thought process, including associations, was unremarkable and thought content was devoid of homicidal ideation and psychotic thought. No SI.  No hallucinations. Insight was good. Judgment was intact and adequate for safety. Fund of knowledge was intact. Pt demonstrates no obvious problems with attention, concentration, language, recent or remote memory although these were not formally tested.        ASSESSMENT                                                                                                      HISTORICAL:  Initial psych note 10/13/15        NOTES:  Cymbalta -> agitation, angry and increase in diastolic BP    Tesha Latendresse is a 63 year old, female who has depression anxiety. Tesha struggles with headaches, chronic pain.     She is struggling with pain, depression, insomnia. I'm in agreement with her family and her primary likely  lack of sleep contributing. Dr. Moyer increased hydroxyzine, didn't help. We started Buspar last visit and she questions how much helping. With that being said, I'm in agreement getting rid of it now is not a good idea.  We're going to have Tesha try Belsomra. We reviewed most common SEs.  On a positive note Tesha is going to [place] of which nearly always lifts her spirits.     TREATMENT RISK STATEMENT:  The risks, benefits, alternatives and potential adverse effects have been explained and are understood by the pt.  The pt agrees to the treatment plan with the ability to do so.   The pt knows to call the clinic for any problems or access emergency care if needed.        DIAGNOSES                     MDD recurrent, recurrent, severe without psychosis  ESTRELLA      PLAN                                                                                                                    1)  MEDICATIONS:    Continue Prozac 60 mg daily. Continue Buspar 10 mg 2 times daily. Start Belsomra 10 mg HS prn insomnia. Discontinue hydroxyzine 75 mg HS.     2)  THERAPY:  No Change    3)  LABS:  None    4)  PT MONITOR [call for probs]:  worsening sx, SI/HI, SEs from meds    5)  REFERRALS [CD, medical, other]: none    6)  RTC:  ~ 2 months

## 2023-10-03 NOTE — TELEPHONE ENCOUNTER
LM for return call from patient for an appointment for cancellation opening today.  Charity back # given.

## 2023-10-06 ENCOUNTER — MYC MEDICAL ADVICE (OUTPATIENT)
Dept: FAMILY MEDICINE | Facility: OTHER | Age: 63
End: 2023-10-06

## 2023-10-06 NOTE — TELEPHONE ENCOUNTER
Placed form in PCP paper box to complete PCP portion and then will get to pt to complete her portions.

## 2023-10-09 NOTE — TELEPHONE ENCOUNTER
There are very strict criteria for handicap parking stickers which Tesha does not meet qualifications.  I am declining to fill out paperwork for handicap sticker  Henna Moyer MD

## 2023-10-23 ENCOUNTER — TELEPHONE (OUTPATIENT)
Dept: PSYCHIATRY | Facility: OTHER | Age: 63
End: 2023-10-23

## 2023-10-23 NOTE — TELEPHONE ENCOUNTER
Incoming call from patient.  Patient sounds tearful and is asking for a return call from Dr. Herbert.  Patient mentions briefly about issues with her neighbor and zapping.  She is in [place] and is still feeling the zapping.  Patient can be reached at [phone].  Next appt is on 11-6-23.  Thank you

## 2023-10-24 ENCOUNTER — MYC MEDICAL ADVICE (OUTPATIENT)
Dept: FAMILY MEDICINE | Facility: OTHER | Age: 63
End: 2023-10-24

## 2023-10-25 RX ORDER — GABAPENTIN 100 MG/1
100 CAPSULE ORAL
COMMUNITY
Start: 2023-10-24 | End: 2023-10-26

## 2023-10-25 RX ORDER — FLUTICASONE PROPIONATE 50 MCG
1 SPRAY, SUSPENSION (ML) NASAL
COMMUNITY
Start: 2023-10-24 | End: 2023-11-03

## 2023-10-25 RX ORDER — METHYLPREDNISOLONE 4 MG
TABLET, DOSE PACK ORAL
COMMUNITY
Start: 2023-10-24 | End: 2023-10-30

## 2023-10-25 RX ORDER — MEDROXYPROGESTERONE ACETATE 150 MG/ML
INJECTION, SUSPENSION INTRAMUSCULAR
COMMUNITY
Start: 2023-10-03 | End: 2023-12-13

## 2023-10-25 NOTE — TELEPHONE ENCOUNTER
"10/25/2023 10:56 AM  Writer called pt regarding my chart message regarding nerve pain and gabapentin. Pt stated, \"I went to the ER yesterday in [place], they prescribed gabapentin, but I'm not taking the gabapentin and I didn't pick it up because the pharmacist told me I can't be on gabapentin and lyrica\".  Pt reports nerve pain is worsening.  Plan pt has questions regarding nerve pain scheduled a telephone visit tomorrow.  Harriet Oliva RN    Patient advised - If you have any new or worsening symptoms or need immediate medical attention call 911, please go to the Emergency Room and/or Urgent Care. Patient verbalizes understanding and  agrees to plan.         Future Appointments 10/25/2023 - 4/22/2024        Date Visit Type Length Department Provider     10/26/2023  8:00 AM TELEPHONE VISIT SHORT 30 min [facility] Henna Moyer MD    Location Instructions:     From [facility]: Take [address]. Turn left at [address]. Turn left at the first stoplight on [address]. At the first stop sign, take a right onto [address]. The upper level parking lot will be on the left. East Entrance Door number 10.   From [place]: Take [address]. Take a right at [address]. At the first stop sign, take a right onto [address]. The upper level parking lot will be on the left. East Entrance Door number 10.   From [place]: Take [address]. Take the [address] ramp towards [place]. Turn left onto [address]. Take a slight right onto [address]. Turn left at the first stoplight on [address]. At the first stop sign, take a right onto [address]. The upper level parking lot will be on the left. East Entrance Door number 10.              11/6/2023 10:00 AM VIDEO VISIT RETURN 40 min [facility] Zoe Herbert MD    Location Instructions:     From [facility]: Take [address]. Turn left at [address]. Turn left at the first stoplight on [address]. At the first stop sign, take a right onto [address]. The upper level parking lot will be on the left. East Entrance Door number 10.   From [place]: Take [address]. Take a right at [address]. At the first stop sign, take a right onto [address]. The upper level parking lot will be on the left. East Entrance Door number 10.   From [place]: Take [address]. Take the [address] ramp towards [place]. Turn left onto [address]. Take a slight right onto [address]. Turn left at the first stoplight on [address]. At the first stop sign, take a right onto [address]. The upper level parking lot will be on the left. East Entrance Door number 10.  COVID Swabs, please proceed to the upper level of the [facility] and call [phone] and remain in your vehicle. A nurse will call you when she is ready for you to come in for your appointment.               12/4/2023 11:30 AM SHORT 30 min  FAMILY PRACTICE Henna Moyer MD    Location Instructions:     From [place]: Take [address]. Turn left at [address] [facility] [address]. Turn left at the first stoplight on [address]. At the first stop sign, take a right onto [address]. The upper level parking lot will be on the left. East Entrance Door number 10.   From [place]: Take [address]. Take a right at [address]. At the first stop sign, take a right onto [address]. The upper level parking lot will be on the left. East Entrance Door number 10.   From [place]: Take [address]. Take the [address] ramp towards [place]. Turn left onto [address]. Take a slight right onto [address]. Turn left at the first stoplight on [address]. At the first stop sign, take a right onto [address]. The upper level parking lot will be on the left. East Entrance Door number 10.

## 2023-10-25 NOTE — PROGRESS NOTES
Tesha is a 63 year old who is being evaluated via a billable telephone visit.      What phone number would you like to be contacted at? [phone]  How would you like to obtain your AVS? MyChart    Distant Location (provider location):  On-site    Assessment & Plan     Other chronic pain / Cervical radicular pain / Cervical pain / Chronic neck pain  [place] PDMP reviewed and appropriate    Issues with cervical injection. No concerning findings on imaging at [place] [facility]  - increase methocarbamol (ROBAXIN) 750 MG tablet; Take 1 tablet (750 mg) by mouth 3 times daily as needed for muscle spasms  - add - pregabalin (LYRICA) 75 MG capsule; Take 1 capsule (75 mg) by mouth daily Take 75mg at noon. Continue with 150mg in the morning and night  - c/w HYDROcodone-acetaminophen (NORCO) 7.5-325 MG per tablet; Take 1 tablet by mouth 2 times daily as needed for severe pain  - completed medrol dose pack    Moderate episode of recurrent major depressive disorder (H)  No changes  Follows with Dr. Herbert with next visit 11/6/2023    Intractable chronic migraine without aura and with status migrainosus  - ondansetron (ZOFRAN ODT) 4 MG ODT tab; Take 1 tablet (4 mg) by mouth every 6 hours as needed (after migraine medication)    Subacute maxillary sinusitis  Improving with augmentin    Rheumatoid factor positive. MARISEL and anti CCP negative  Recent start on enbrel        See Patient Instructions    Return if symptoms worsen or fail to improve.  Next appt on 12/4/2023    Henna Moyer MD  [facility] [place]    Subjective   Tesha is a 63 year old, presenting for the following health issues:  Medication Problem      HPI       ED/UC Followup:    Facility:  [facility]  [facility]  Date of visit: 10/24/2023  Reason for visit: Nerve pain (shooting all over her body). Started after cervical injection (9/6/2023). CT lumbar / cervical (10/24/2023) no acute changes. Started on medrol dose pack and gabapentin. Also augmentin / flonase  for sinusitis    Current Status: Stated that she is not doing well. Stated that her nerves are bad. States that her rhizotomy in the neck did not help. Unable to put pressure on the back of her head.     - started enbrel - w/o improvement   - currently on lyrica 150mg bid   - add lyrica 75mg in the afternoon - Tesha agrees to try  - methocarbamol 500 every day - helps to dull the pain, takes with norco  - norco - taking every day helps to dull the pain   - has been taking ibuprofen q8h, did not take this morning. Discussed most likely contributing to rebound headaches    - medrol dose pack - on second day. No difference    - cannot put her head on back of chair   - pain is above the injection site   - right side of head, not typical of her migraines. Pain is constant.   - interfering with her sleep     - sinus issues are improving       Review of Systems   Constitutional:  Negative for chills and fever.   HENT:  Negative for congestion.    Respiratory:  Negative for shortness of breath.    Cardiovascular:  Negative for chest pain and palpitations.   Gastrointestinal:  Negative for abdominal pain.   Musculoskeletal:  Positive for back pain and neck pain.          Objective           Vitals:  No vitals were obtained today due to virtual visit.    Physical Exam   alert and no distress  PSYCH: Alert and oriented times 3; coherent speech, normal   rate and volume, able to articulate logical thoughts, able   to abstract reason, no tangential thoughts, no hallucinations   or delusions  Her affect is anxious  RESP: No cough, no audible wheezing, able to talk in full sentences  Remainder of exam unable to be completed due to telephone visits      CT c spine (10/24/2023, [place])   DISCUSSION:   Chronic remodeling of the atlantodental articulation with spurring.   Straightening of the cervical spine.   ACDF hardware C3-C4-C5.   Posterior spinal fusion hardware at C3-C4-C5.   Artifact from the metallic hardware.   Chronic appearing  fusion C3-C7.   Mild degenerative disc and facet disease at C2-C3 and C7-T1.   No obvious soft tissue swelling.   No fracture at C1, C2, C3, C4, C5, C6, C7.   There are atherosclerotic vascular calcifications.   Clear mastoid air cells.     CT lumbar (10/24/2023, [place])  DISCUSSION:   There is no significant scoliotic deformity of the lumbar spine.   There is maintenanceof the usual lumbar lordosis.   There is no significant subluxation present.   Intervertebral disc spaces appear maintained.   There is no significant osteophyte formation present.   There is no evidence of osteolytic lesion.   There is no evidence of acute fracture.   No significant compression fracture deformities are identified.   No suspicious foreign body is identified.   Visualized soft tissues appear unremarkable.           Phone call duration: 25 minutes

## 2023-10-26 ENCOUNTER — VIRTUAL VISIT (OUTPATIENT)
Dept: FAMILY MEDICINE | Facility: OTHER | Age: 63
End: 2023-10-26
Attending: FAMILY MEDICINE
Payer: COMMERCIAL

## 2023-10-26 DIAGNOSIS — M54.12 CERVICAL RADICULOPATHY: ICD-10-CM

## 2023-10-26 DIAGNOSIS — G89.29 CHRONIC NECK PAIN: ICD-10-CM

## 2023-10-26 DIAGNOSIS — M54.2 CHRONIC NECK PAIN: ICD-10-CM

## 2023-10-26 DIAGNOSIS — M54.2 CERVICAL PAIN: ICD-10-CM

## 2023-10-26 DIAGNOSIS — J01.00 SUBACUTE MAXILLARY SINUSITIS: ICD-10-CM

## 2023-10-26 DIAGNOSIS — G43.711 INTRACTABLE CHRONIC MIGRAINE WITHOUT AURA AND WITH STATUS MIGRAINOSUS: Chronic | ICD-10-CM

## 2023-10-26 DIAGNOSIS — F33.1 MODERATE EPISODE OF RECURRENT MAJOR DEPRESSIVE DISORDER (H): Chronic | ICD-10-CM

## 2023-10-26 DIAGNOSIS — R76.8 RHEUMATOID FACTOR POSITIVE: ICD-10-CM

## 2023-10-26 DIAGNOSIS — G89.29 OTHER CHRONIC PAIN: Primary | Chronic | ICD-10-CM

## 2023-10-26 PROCEDURE — G0463 HOSPITAL OUTPT CLINIC VISIT: HCPCS | Mod: 25,TEL

## 2023-10-26 PROCEDURE — 99443 PR PHYSICIAN TELEPHONE EVALUATION 21-30 MIN: CPT | Mod: 95 | Performed by: FAMILY MEDICINE

## 2023-10-26 RX ORDER — ONDANSETRON 4 MG/1
4 TABLET, ORALLY DISINTEGRATING ORAL EVERY 6 HOURS PRN
Qty: 30 TABLET | Refills: 1 | Status: SHIPPED | OUTPATIENT
Start: 2023-10-26

## 2023-10-26 RX ORDER — HYDROCODONE BITARTRATE AND ACETAMINOPHEN 7.5; 325 MG/1; MG/1
1 TABLET ORAL 2 TIMES DAILY PRN
Qty: 60 TABLET | Refills: 0 | Status: SHIPPED | OUTPATIENT
Start: 2023-10-26 | End: 2023-11-26

## 2023-10-26 RX ORDER — PREGABALIN 75 MG/1
75 CAPSULE ORAL DAILY
Qty: 30 CAPSULE | Refills: 1 | Status: SHIPPED | OUTPATIENT
Start: 2023-10-26 | End: 2024-03-04

## 2023-10-26 RX ORDER — METHOCARBAMOL 750 MG/1
750 TABLET, FILM COATED ORAL 3 TIMES DAILY PRN
Qty: 45 TABLET | Refills: 1 | Status: SHIPPED | OUTPATIENT
Start: 2023-10-26 | End: 2024-01-16

## 2023-10-26 ASSESSMENT — ENCOUNTER SYMPTOMS
SHORTNESS OF BREATH: 0
PALPITATIONS: 0
CHILLS: 0
FEVER: 0
NECK PAIN: 1
ABDOMINAL PAIN: 0
BACK PAIN: 1

## 2023-10-26 NOTE — PATIENT INSTRUCTIONS
Wean down on ibuprofen   Increase methocarbamol to 750mg twice per day as need for pain. May take an additional dosage if needed  Norco refilled  Add lyrica 75mg at noon

## 2023-10-30 ENCOUNTER — MYC MEDICAL ADVICE (OUTPATIENT)
Dept: FAMILY MEDICINE | Facility: OTHER | Age: 63
End: 2023-10-30

## 2023-11-09 ENCOUNTER — TELEPHONE (OUTPATIENT)
Dept: FAMILY MEDICINE | Facility: OTHER | Age: 63
End: 2023-11-09

## 2023-11-09 DIAGNOSIS — M79.18 MYOFASCIAL PAIN SYNDROME, CERVICAL: ICD-10-CM

## 2023-11-09 RX ORDER — PREGABALIN 150 MG/1
CAPSULE ORAL
Qty: 180 CAPSULE | Refills: 0 | Status: SHIPPED | OUTPATIENT
Start: 2023-11-09 | End: 2024-02-15

## 2023-11-09 NOTE — TELEPHONE ENCOUNTER
Reason for call:  Medication      Have you contacted your pharmacy? Yes   If patient has contacted Pharmacy and it has been over 72hrs, continue to #2  Medication Hydroxyzine, Methocarbamol 750mg, Lyrica 150mg twice a day, Lyrica 75mg in the middle of the day, Lisporin   What Pharmacy do you use? [facility]  Pt called pharmacy and they told her it will be quicker if she called here, pt also stated that she is in [place] until Tuesday so they have to mail her prescriptions to her. Pt number is [phone]      (Please note that the turn-around-time for prescriptions is 72 business hours; I am sending your request at this time. SEND TO  [facility]  )

## 2023-11-09 NOTE — TELEPHONE ENCOUNTER
Lyrica      Last Written Prescription Date:  8.8.23  Last Fill Quantity: #180,   # refills: 0  Last Office Visit: 10.26.23  Future Office visit:    Next 5 appointments (look out 90 days)      Dec 04, 2023 11:30 AM  (Arrive by 11:15 AM)  SHORT with Henna Moyer MD  [facility] ([facility] ) [address]  [place] [address]  [phone]             Routing refill request to provider for review/approval because:  Drug not on the [facility], P or [facility] refill protocol or controlled substance

## 2023-11-21 DIAGNOSIS — M54.2 CHRONIC NECK PAIN: ICD-10-CM

## 2023-11-21 DIAGNOSIS — G89.29 CHRONIC NECK PAIN: ICD-10-CM

## 2023-11-24 NOTE — TELEPHONE ENCOUNTER
Hydrocodone-acetaminophen (NORCO) 7.5-325 mg per tab      Last Written Prescription Date:  10/26/23  Last Fill Quantity: 60,   # refills: 0  Last Office Visit: 10/3/23  Future Office visit:    Next 5 appointments (look out 90 days)      Dec 04, 2023 11:30 AM  (Arrive by 11:15 AM)  SHORT with Henna Moyer MD  [facility] [place] ([facility] [place] ) [facility]  [place] [address]  [phone]

## 2023-11-26 RX ORDER — HYDROCODONE BITARTRATE AND ACETAMINOPHEN 7.5; 325 MG/1; MG/1
1 TABLET ORAL 2 TIMES DAILY PRN
Qty: 60 TABLET | Refills: 0 | Status: SHIPPED | OUTPATIENT
Start: 2023-11-26 | End: 2024-01-24

## 2023-11-27 NOTE — COMMUNITY RESOURCES LIST (ENGLISH)
11/27/2023   [facility]  N/A  For questions about this resource list or additional care needs, please contact your primary care clinic or care manager.  Phone: [phone]   Email: N/A   Address: [address] [place] [id number]   Hours: N/A        Food and Nutrition       Food pantry  1  [facility] Distance: 5.3 miles      Pickup   [address] [place] [address]  Language: English  Hours: Mon 9:00 AM - 11:00 AM , Tue 1:00 PM - 3:00 PM , Wed - Thu 9:00 AM - 11:00 AM  Fees: Free, Self Pay   Phone: (896) 349-4713 Email: rei@[email or web address] Website: https://[facility].org/[facility]/[place]     2  [facility] [employer] [facility] Free [facility] Distance: 16.13 miles      Pickup   [address] [place] [address]  Language: English  Hours: Mon - Sun Open 24 Hours  Fees: Free   Phone: (478) 943-6579 Email: jose@[employer].org Website: http://www.[employer].org     SNAP application assistance  3  [facility]  Economic Services & Supports [facility] Distance: 4.78 miles      In-Person, Phone/Virtual   [address] [place] [address]  Language: English  Hours: Mon - Fri 8:00 AM - 4:30 PM  Fees: Free   Phone: (380) 782-7058 Website: https://www.[facility].gov/jfgmopgwdad-u-m/public-health-human-services/economic-services-supports     4  [facility] - Economic Services & [facility] Distance: 16.14 miles      In-Person, Phone/Virtual   [address] [place] [address]  Language: English  Hours: Mon - Fri 8:00 AM - 4:30 PM  Fees: Free   Phone: (107) 492-3354 Email: financialassistance@[facility].gov Website: https://www.[place].gov/wikphntsctv-s-l/public-health-human-services/economic-services-supports     Soup kitchen or free meals  5  [facility]  [facility] Distance: 5.3 miles      Pickup   [address] [place] [address]  Language: English  Hours: Mon - Fri 4:00 PM - 4:45 PM  Fees: Free   Phone: (880) 173-8958 Email: rei@[facility].[employer] Website: https://[place].[facility].org/northern/[place]          Important Numbers & Websites       Emergency Services   911  [facility] Services   311  Poison Control   (552) 498-7238  Suicide Prevention Lifeline   (125) 816-1357 (TALK)  Child Abuse Hotline   (961) 414-1441 (4-A-Child)  Sexual Assault Hotline   (361) 785-1472 (HOPE)  National Runaway Safeline   (771) 699-4104 (RUNAWAY)  All-Options Talkline   (967) 651-5524  Substance Abuse Referral   (568) 679-4483 (HELP)

## 2023-11-29 NOTE — PROGRESS NOTES
Assessment & Plan     Lumbar radiculopathy / Cervical radicular pain  Follows with Dr. Rg Ruth. Encourage to make a follow up appointment for recommendations     Psoriasis with arthropathy (H) - follows with Dr Francisco  On enbrel without improvement  Follow[facility] with [facility] with next visit 12/11/2023    Moderate episode of recurrent major depressive disorder (H)  Tesha appears to do better in [place] than [place] which her daughter does agree with  Follows with Dr Herbert with next visit 12/13/2023    Memory change  Suspect mental health (anxiety) is contributing to issues. Change in medications (stopping topamax, starting enbrel, increasing lyrica) do not seem to affect memory issues. Tesha is concern for Parkinson. No masked face, no tremor, no issues with ambulation (but has h/o falls), voice is strong  We will start with OT for cognitive assessment   B12 (9/29/2023) 1731  - Occupational Therapy Referral; Future    Open wound  Recommendation for OTC hydrocortisone cream and cover with bandage     Need for prophylactic vaccination and inoculation against influenza  Updated today     55 minutes spent on the date of the encounter doing chart review, review of test results, interpretation of tests, patient visit, documentation      See Patient Instructions    Return in about 2 months (around 2/4/2024) for Physical Exam, Lab Work, mood.    Henna Moyer MD  [facility] - [place] Parkinson is a 63 year old, presenting for the following health issues:  Depression, Anxiety, and Pain        12/4/2023    11:06 AM   Additional Questions   Roomed by Della Morejon   Accompanied by DaughterNoemy         12/4/2023    11:06 AM   Patient Reported Additional Medications   Patient reports taking the following new medications none       HPI     Depression and Anxiety Follow-Up  How are you doing with your depression since your last visit? Worsened   How are you doing with your anxiety since your  last visit?  Worsened   Are you having other symptoms that might be associated with depression or anxiety? Yes:  unable to focus  Have you had a significant life event? Health Concerns   Do you have any concerns with your use of alcohol or other drugs? No    - follows with Dr Herbert with next visit 2023    Social History     Tobacco Use    Smoking status: Former     Years: 8     Types: Cigarettes     Start date: 1981     Quit date: 1997     Years since quittin.9    Smokeless tobacco: Never   Vaping Use    Vaping Use: Never used   Substance Use Topics    Alcohol use: No    Drug use: No         2023     2:55 PM 2023     9:35 AM 10/3/2023     2:37 PM   PHQ   PHQ-9 Total Score 2 13 22   Q9: Thoughts of better off dead/self-harm past 2 weeks Not at all Not at all Not at all         2023     2:55 PM 2023     9:35 AM 10/3/2023     2:37 PM   ESTRELLA-7 SCORE   Total Score 1 21 21       Pain History:  When did you first notice your pain? F/U   Have you seen this provider for your pain in the past? Yes   Where in your body do you have pain? Lower Back  Are you seeing anyone else for your pain? No        2023     2:55 PM 2023     9:35 AM 10/3/2023     2:37 PM   PHQ-9 SCORE   PHQ-9 Total Score 2 13 22           2023     2:55 PM 2023     9:35 AM 10/3/2023     2:37 PM   ESTRELLA-7 SCORE   Total Score 1 21 21             2023     2:56 PM 2023    10:28 AM 2023    11:12 AM   PEG Score   PEG Total Score 6.33 9 5.33       Chronic Pain Follow Up:    Location of pain: Lower Back  Analgesia/pain control:    - Recent changes:  feels pain is getting worse    - Overall control: Inadequate pain control    - Current treatments: norco   Adherence:     - Do you ever take more pain medicine than prescribed? No    - When did you take your last dose of pain medicine?     Adverse effects: No     - follows with Dr Rg Ruth, but does not want to go see him again d/t last injection  resulted in increased pain     Last visit  - increased methocarbamol  - adding lyrica 75mg at noon. C/w 150mg morning and night   - Tesha thinks she might be taking 75mg bid d/t the lyrica pills all look the same    PDMP Review         Value Time User    State PDMP site checked  Yes 11/26/2023  7:36 AM Henna Moyer MD          Last CSA Agreement:   CSA -- Patient Level:     [Media Unavailable] Controlled Substance Agreement - Opioid - Scan on 1/3/2023 10:57 AM: OPIOID/OPIOID PLUS CONTROLLED SUBSTANCE AGREEMENT   [Media Unavailable] Controlled Substance Agreement - Opioid - Scan on 9/7/2021 10:15 AM: OPIOD/OPIOD PLUS CONTROLLED SUBSTANCE AGREEMENT       Last UDS: 2/20/2023      # Concerns  - Daughter is getting phone call from Tesha's brother and sisters every day   - Tesha was making cookies at sister's house  - flighty, unfocused, words are jumbled   - confusion with dates / times. Getting present and history confused   - forgetfulness   - duration of 1.5 years   - following with Dr. Pérez, [facility] neurology with next visit 1/2/2024    - no changes with memory issues with enbrel or increase of lyrcia   - trial off of topamax w/o improvement of her memory. Headaches worsen, restarted topamax  - frequent falls   - enbrel is not working, appt with Dr. 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 No word issues, no memory issues     # wellness visit: 2 months    # Wellness:  - Pap: s/p hysterectomy   - Immunizations: RSV (pharm), influenza (updated today), COVID (declined), tetanus (pharmacy)   - Lipids: due 12/2023  - Dexa scan: NA d/t age  - Colon cancer screening: colonoscopy (2/9/2018, [facility]) normal. Repeat 10 years  - Lung cancer screening: quit 1997  - AAA screening:       Review of Systems   Constitutional:  Negative for chills and fever.   HENT:  Negative for congestion.     Respiratory:  Negative for shortness of breath.    Cardiovascular:  Negative for chest pain and palpitations.   Gastrointestinal:  Negative for abdominal pain.   Skin:  Positive for wound.        Skin picking   Psychiatric/Behavioral:  Positive for dysphoric mood. The patient is nervous/anxious.           Objective    /83 (BP Location: Left arm, Patient Position: Sitting, Cuff Size: Adult Regular)   Pulse 75   Temp 97.8  F (36.6  C) (Tympanic)   Wt 85.3 kg (188 lb 1.6 oz)   SpO2 97%   BMI 30.36 kg/m    Body mass index is 30.36 kg/m .  Physical Exam  Constitutional:       General: She is not in acute distress.     Appearance: She is not ill-appearing.   Cardiovascular:      Rate and Rhythm: Normal rate and regular rhythm.      Heart sounds: No murmur heard.  Pulmonary:      Effort: Pulmonary effort is normal. No respiratory distress.      Breath sounds: No wheezing or rales.   Skin:     Comments: Wound from excoriation on upper right back. No s/s of infection, but start of scarring    Neurological:      Mental Status: She is alert.      Cranial Nerves: No dysarthria.      Motor: No tremor.   Psychiatric:         Mood and Affect: Mood is anxious and depressed.         Cognition and Memory: Cognition is not impaired. Memory is not impaired.

## 2023-12-04 ENCOUNTER — OFFICE VISIT (OUTPATIENT)
Dept: FAMILY MEDICINE | Facility: OTHER | Age: 63
End: 2023-12-04
Attending: FAMILY MEDICINE
Payer: COMMERCIAL

## 2023-12-04 VITALS
SYSTOLIC BLOOD PRESSURE: 121 MMHG | WEIGHT: 188.1 LBS | TEMPERATURE: 97.8 F | HEART RATE: 75 BPM | BODY MASS INDEX: 30.36 KG/M2 | DIASTOLIC BLOOD PRESSURE: 83 MMHG | OXYGEN SATURATION: 97 %

## 2023-12-04 DIAGNOSIS — F33.1 MODERATE EPISODE OF RECURRENT MAJOR DEPRESSIVE DISORDER (H): Chronic | ICD-10-CM

## 2023-12-04 DIAGNOSIS — M54.16 LUMBAR RADICULOPATHY: Primary | ICD-10-CM

## 2023-12-04 DIAGNOSIS — M54.12 CERVICAL RADICULOPATHY: ICD-10-CM

## 2023-12-04 DIAGNOSIS — Z23 NEED FOR PROPHYLACTIC VACCINATION AND INOCULATION AGAINST INFLUENZA: ICD-10-CM

## 2023-12-04 DIAGNOSIS — T14.8XXA OPEN WOUND: ICD-10-CM

## 2023-12-04 DIAGNOSIS — R41.3 MEMORY CHANGE: ICD-10-CM

## 2023-12-04 DIAGNOSIS — L40.50 PSORIASIS WITH ARTHROPATHY (H): ICD-10-CM

## 2023-12-04 PROCEDURE — G0463 HOSPITAL OUTPT CLINIC VISIT: HCPCS | Mod: 25

## 2023-12-04 PROCEDURE — 99215 OFFICE O/P EST HI 40 MIN: CPT | Performed by: FAMILY MEDICINE

## 2023-12-04 PROCEDURE — G0008 ADMIN INFLUENZA VIRUS VAC: HCPCS

## 2023-12-04 PROCEDURE — 90686 IIV4 VACC NO PRSV 0.5 ML IM: CPT

## 2023-12-04 ASSESSMENT — ENCOUNTER SYMPTOMS
CHILLS: 0
DYSPHORIC MOOD: 1
FEVER: 0
PALPITATIONS: 0
WOUND: 1
NERVOUS/ANXIOUS: 1
ROS SKIN COMMENTS: SKIN PICKING
ABDOMINAL PAIN: 0
SHORTNESS OF BREATH: 0

## 2023-12-04 NOTE — PATIENT INSTRUCTIONS
We put in a referral to occupational therapy for memory testing     Please go to your local pharmacy for your RSV and tetanus vaccine    Please make a follow up appointment with Dr Diez    Put over the counter hydrocortisone cream on your sore on your back. Cover with bandage

## 2023-12-08 ENCOUNTER — THERAPY VISIT (OUTPATIENT)
Dept: OCCUPATIONAL THERAPY | Facility: HOSPITAL | Age: 63
End: 2023-12-08
Attending: FAMILY MEDICINE
Payer: COMMERCIAL

## 2023-12-08 DIAGNOSIS — M25.552 HIP PAIN, BILATERAL: Primary | ICD-10-CM

## 2023-12-08 DIAGNOSIS — M25.551 HIP PAIN, BILATERAL: Primary | ICD-10-CM

## 2023-12-08 DIAGNOSIS — R41.3 MEMORY CHANGE: ICD-10-CM

## 2023-12-08 PROCEDURE — 97165 OT EVAL LOW COMPLEX 30 MIN: CPT | Mod: GO

## 2023-12-08 NOTE — PROGRESS NOTES
OCCUPATIONAL THERAPY EVALUATION  Type of Visit: Evaluation    See electronic medical record for Abuse and Falls Screening details.    Danni Parkinson is a 63-year old female who presented to occupational therapy evaluation due to memory change. Patient's daughter, Noemy, was also present during the evaluation. Patient reported an extensive history of cervical surgeries, fibromyalgia, pain, and neurologic difficulties. Patient reported that she has been experiencing a lot of stress and reported that she does not have any stress management techniques and does not have any leisure activities that she enjoys. Patient's daughter reported that patient has had a lot of falls recently. Patient reported that she also feels as though the person living in the apartment above her is trying to mess with her. She stated that he will vacuum at 3 AM, make a lot of noise and she believes that he is directing radio frequencies at her, causing her to feel vibration throughout her whole body. She stated that this and the pain she experiences significantly impacts her sleep and overall health. She reported that they are working on having this addressed and she is hoping to move to a different apartment. Patient and her daughter report that they are planning on pursuing a neuropsychology evaluation as well.    Presenting condition or subjective complaint: Memory issues, not staying focused, falling, severe body pain  Date of onset: 12/04/23 (Date of physician referral; patient reported that she has been experiencing memory changes for awhile)    Relevant medical history: Arthritis; Bladder or bowel problems; COPD; Depression; Fibromyalgia; History of fractures; Menopause; Migraines or headaches; Neck injury; Pain at night or rest; Rheumatoid arthritis   Dates & types of surgery: 3 neck surgeries, 89-11-12, hysterectomy,02,nose surgery,79, gallbladder, 21, hemeroidectomy, 21    Prior diagnostic imaging/testing results: CT scan      Prior therapy history for the same diagnosis, illness or injury: No      Prior Level of Function  Transfers: Independent  Ambulation: Independent  ADL: Independent  IADL: Driving, Finances, Housekeeping, Laundry, Meal preparation, Medication management    Living Environment  Social support: Alone   Type of home: Apartment/condo   Stairs to enter the home: No       Ramp: Yes   Stairs inside the home: No       Help at home: None  Equipment owned: Bath bench    Walk-in shower with grab bars, bench seat    Employment: No    Hobbies/Interests:      Patient goals for therapy: Get my memory back, some how get this pain under control    Pain assessment: Pain present     Objective   Cognitive Status Examination  Orientation: Oriented to person, place and time   Level of Consciousness: Alert  Follows Commands and Answers Questions: Follows multi step instructions  Personal Safety and Judgement: At risk behaviors demonstrated  Memory: Impaired, please see MoCA results below   Attention: Reports problems attending, Distractible during evaluation, Quiet environment required, Difficulty with dual tasking   Organization/Problem Solving: Reports problems with problem solving during eval, Problem solving impaired, Categorization of information impaired, Prioritizing of info/tasks impaired  Executive Function: Working memory impaired, decreased storage of information for performing tasks, Cognitive flexibility impaired, Planning ability impaired    The [place] Cognitive Assessment (MoCA) was administered to assess different cognitive domains, including attention and concentration, executive functions, memory, language, visuoconstructional skills, conceptual thinking, calculations, and orientation. The total possible score is 30 points; a score of 26 or above is considered normal. The MoCA severity levels are 18-25 mild cognitive impairment (MCI), 10-17 moderate cognitive impairment, and 0-9 severe cognitive impairment. Pt scored  18, which is consistent with mild cognitive impairment. Subsections are as follows:     Visuospatial/executive 3/5  Pt demonstrated errors with copying the cube. Pt demonstrated errors with placement of hands on the clock.     Naming 2/3  Pt exhibited difficulties with naming of animals.    Attention 3/6  Pt had difficulties with Letter A vigilance. Pt had difficulties with calculations.    Language 3/3    Abstraction 0/2  Pt exhibited errors with similarities.    Delayed recall 1/5  Pt exhibited impaired memory that slightly improved with verbal cueing. Memory Index Score: 9    Orientation 6/6      VISUAL SKILLS  Visual Acuity: No deficits identified  Visual Field: Appears normal  Visual Attention: Appears normal  Oculomotor: WFL    SENSATION:  Patient reports extensive history of neuropathic damage and altered sensation    POSTURE: WNL  RANGE OF MOTION: UE AROM WNL  STRENGTH: UE Strength WNL  MUSCLE TONE: WNL  COORDINATION: WNL  BALANCE: WNL    FUNCTIONAL MOBILITY  Assistive Device(s): None  Ambulation: Independent  Wheelchair: None    BED MOBILITY: Independent    TRANSFERS: Independent    BATHING: Independent  Equipment: Grab bar, Shower chair/Tub bench, Walk-in shower    UPPER BODY DRESSING: WNL    LOWER BODY DRESSING: WNL    TOILETING: WNL  Equipment: Grab bar    GROOMING: Independent    EATING/SELF FEEDING: Independent     ACTIVITY TOLERANCE: Fair    INSTRUMENTAL ACTIVITIES OF DAILY LIVING (IADL):   Meal Planning/Prep: Independent   Home/Financial Management: Patient reported independence with home management but stated that she doesn't clean often. Daughter reported that patient's apartment is very clean and well kempt.  Communication/Computer Use: Independent   Community Mobility: Independent; patient reported that she can drive but often receives rides from family      Assessment & Plan   CLINICAL IMPRESSIONS  Medical Diagnosis: Memory change    Treatment Diagnosis: Memory change    Impression/Assessment:  "Pt is a 63 year old female presenting to Occupational Therapy due to memory change and cognitive difficulties.  The following significant findings have been identified: Impaired cognition, Impaired safety/judgement, and Impaired sensation.  These identified deficits interfere with their ability to perform self care tasks, recreational activities, and community or volunteer activities as compared to previous level of function. During session, patient became very upset and began crying when discussing the toll that her upstairs neighbor has been having on her. She also became emotional and began itching head prior to cognitive assessment but this improved with cues for breathing and following further education on what cognitive assessment entails. Patient reported that she was nervous, thinking that the cognitive assessment would say she was \"crazy\". Patient scored 18/30 on the MoCA today, suggesting mild cognitive impairment. 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 Anticipate that patient's cognitive difficulties and impaired attention are likely impacted by pain and stress that patient has been experiencing regularly as well as lack of sleep. Recommended neuropsychology evaluation and physical therapy for pain. Patient would benefit from occupational therapy to address cognition, home safety, stress management, and engagement in meaningful activities.     Clinical Decision Making (Complexity):  Assessment of Occupational Performance: 3-5 Performance Deficits  Occupational Performance Limitations: health management and maintenance, home establishment and management, leisure activities, and social participation  Clinical Decision Making (Complexity): Moderate complexity    PLAN OF CARE  Treatment Interventions:  Interventions: Cognitive Skills, " Community/Work Reintegration, Self-Care/Home Management, Therapeutic Activity    Long Term Goals   OT Goal 1  Goal Identifier: LTG 1  Goal Description: Patient will be able to identify at least 3 and participate in at least 1 leisure activity in order to improve participation in meaningful activities.  Target Date: 02/16/24  OT Goal 2  Goal Identifier: LTG 2  Goal Description: Patient will be able to identify and demonstrate at least 3 stress management techniques in order to improve engagement in daily activities.  Target Date: 02/16/24  OT Goal 3  Goal Identifier: LTG 3  Goal Description: Patient will report consistent engagement in sleep hygiene and a healthy sleep routine in order to improve sleep participation.  Target Date: 02/16/24  OT Goal 4  Goal Identifier: LTG 4  Goal Description: Patient will be able to follow 6 step written instructions in quiet environment in order to improve attention for daily activities such as following a recipe when cooking.  Target Date: 02/16/24      Frequency of Treatment: 1x/week  Duration of Treatment: 10 weeks     Recommended Referrals to Other Professionals: Physical Therapy, Psychology/Psychiatry, Neuropsychology. Patient currently seeing a psychologist. Recommend PT for hip pain and neuropsychology for further neurological assessment.   Education Assessment: Learner/Method: Patient;Family;Listening     Risks and benefits of evaluation/treatment have been explained.   Patient/Family/caregiver agrees with Plan of Care.     Evaluation Time:    OT Eval, Low Complexity Minutes (02941): 45    Signing Clinician: TON Salazar      [facility]                                                                                   OUTPATIENT OCCUPATIONAL THERAPY      PLAN OF TREATMENT FOR OUTPATIENT REHABILITATION   Patient's Last Name, First Name, ANTONIOMaritzaMARIMaritza  Sadaf Blankenship YOB: 1960   Provider's Name   [facility]   Medical Record No.  [id number]     Onset Date: 12/04/23 (Date of physician referral; patient reported that she has been experiencing memory changes for awhile) Start of Care Date: 12/08/23     Medical Diagnosis:  Memory change      OT Treatment Diagnosis:  Memory change Plan of Treatment  Frequency/Duration:1x/week/10 weeks    Certification date from 12/08/23   To 02/16/24        See note for plan of treatment details and functional goals     Marta Rouse, OTR                         I CERTIFY THE NEED FOR THESE SERVICES FURNISHED UNDER        THIS PLAN OF TREATMENT AND WHILE UNDER MY CARE     (Physician attestation of this document indicates review and certification of the therapy plan).              Referring Provider:  Henna Moyer    Initial Assessment  See Epic Evaluation- 12/08/23

## 2023-12-11 ENCOUNTER — TRANSFERRED RECORDS (OUTPATIENT)
Dept: HEALTH INFORMATION MANAGEMENT | Facility: CLINIC | Age: 63
End: 2023-12-11
Payer: COMMERCIAL

## 2023-12-11 DIAGNOSIS — G89.29 OTHER CHRONIC PAIN: Primary | Chronic | ICD-10-CM

## 2023-12-11 DIAGNOSIS — F33.1 MODERATE EPISODE OF RECURRENT MAJOR DEPRESSIVE DISORDER (H): Chronic | ICD-10-CM

## 2023-12-11 DIAGNOSIS — G47.00 INSOMNIA, UNSPECIFIED TYPE: ICD-10-CM

## 2023-12-11 DIAGNOSIS — R41.3 MEMORY CHANGE: ICD-10-CM

## 2023-12-13 ENCOUNTER — VIRTUAL VISIT (OUTPATIENT)
Dept: PSYCHIATRY | Facility: OTHER | Age: 63
End: 2023-12-13
Attending: PSYCHIATRY & NEUROLOGY
Payer: COMMERCIAL

## 2023-12-13 DIAGNOSIS — F41.1 GAD (GENERALIZED ANXIETY DISORDER): Primary | ICD-10-CM

## 2023-12-13 PROCEDURE — 99214 OFFICE O/P EST MOD 30 MIN: CPT | Mod: VID | Performed by: PSYCHIATRY & NEUROLOGY

## 2023-12-13 ASSESSMENT — ANXIETY QUESTIONNAIRES
6. BECOMING EASILY ANNOYED OR IRRITABLE: MORE THAN HALF THE DAYS
GAD7 TOTAL SCORE: 16
7. FEELING AFRAID AS IF SOMETHING AWFUL MIGHT HAPPEN: NEARLY EVERY DAY
3. WORRYING TOO MUCH ABOUT DIFFERENT THINGS: MORE THAN HALF THE DAYS
1. FEELING NERVOUS, ANXIOUS, OR ON EDGE: MORE THAN HALF THE DAYS
5. BEING SO RESTLESS THAT IT IS HARD TO SIT STILL: MORE THAN HALF THE DAYS
2. NOT BEING ABLE TO STOP OR CONTROL WORRYING: NEARLY EVERY DAY
GAD7 TOTAL SCORE: 16

## 2023-12-13 ASSESSMENT — PAIN SCALES - GENERAL: PAINLEVEL: MILD PAIN (2)

## 2023-12-13 ASSESSMENT — PATIENT HEALTH QUESTIONNAIRE - PHQ9
SUM OF ALL RESPONSES TO PHQ QUESTIONS 1-9: 16
5. POOR APPETITE OR OVEREATING: MORE THAN HALF THE DAYS

## 2023-12-13 NOTE — PROGRESS NOTES
"  Video duration: 30 minutes. Total visit time day of visit 30 minutes (documented and ordered meds during our video)      SUBJECTIVE / INTERIM HISTORY                                                                         Last visit 10/3/23: Continue Prozac 60 mg daily. Continue Buspar 10 mg 2 times daily. Start Belsomra 10 mg HS prn insomnia. Discontinue hydroxyzine 75 mg HS    - daughter present today on video. Melanie has continued to think rosalee stewart is intentionally messing with her and causing vibrations she is experieincing.  \"he is having a hayday with me\". Melanie feels he is dropping magnets and following Melanie around in his apartment.   - having  memory issues getting days and times mixed up. Driving is fine. Last year or two memory changes  - I inquired about any history of dementia in the family and Tesha noted two aunts had Parkinson's  - hx \"mental breakdown\" when had lots of things going on.   - doesn't know mom's side history   - granddaughter Jess 1 yo  - Noemy  working on her AA. 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 Noemy's dad, Akash, down in [place] helping take care of quarter horse [employer]. The roel had a stroke.  - cousin in [place] colorectal cancer  - Accident Jan '20 with nephew Michael 8 yo. He has PTSD since    MEDICAL / SURGICAL HISTORY                    Patient Active Problem List   Diagnosis    Degenerative disc disease, cervical    Pseudoarthrosis of cervical spine (H)    Cervical pain    Irritable bowel syndrome    Depression, major    Chronic, continuous use of opioids    Chronic rhinitis    Chronic maxillary sinusitis    Hypertrophy of inferior nasal turbinate    Chronic pain    Chronic tension-type headache, intractable    History of arthrodesis    Myofascial pain    Disuse syndrome    Intractable chronic migraine without aura and with status migrainosus    Gastroesophageal reflux disease with esophagitis    Mild episode of recurrent major depressive disorder (H24)    Ulnar " neuropathy at elbow of left upper extremity    Lumbar radiculopathy    S/P cervical spinal fusion    ACP (advance care planning)    Calculus of kidney    Pulmonary emphysema, unspecified emphysema type (H)    Pelvic floor dysfunction    Moderate mixed hyperlipidemia not requiring statin therapy    Rheumatoid factor positive. MARISEL and anti CCP negative    Insomnia, unspecified type    Cervical radicular pain    Psoriasis with arthropathy (H) - follows with Dr Francisco     ALLERGY   Aspirin, Ibuprofen, Lansoprazole, Red dye, Bupropion, Bupropion hcl, and Demerol [meperidine]  MEDICATIONS                                                                                              Current Outpatient Medications   Medication Sig    albuterol (PROAIR HFA/PROVENTIL HFA/VENTOLIN HFA) 108 (90 Base) MCG/ACT inhaler INHALE 2 PUFFS INTO THE LUNGS EVERY 4 HOURS AS NEEDED FOR SHORTNESS OF BREATH OR WHEEZING.    baclofen (LIORESAL) 10 MG tablet TAKE 1 TABLET (10 MG) BY MOUTH DAILY    budesonide-formoterol (SYMBICORT) 80-4.5 MCG/ACT Inhaler Inhale 2 puffs into the lungs 2 times daily    busPIRone (BUSPAR) 10 MG tablet Take 1/2 tablet 2 times daily x 7 days then take 1 tablet 2 times daily    butalbital-acetaminophen-caffeine (ESGIC) -40 MG tablet Take 1 tablet by mouth every 4 hours as needed TAKE 1 TO 2 TABLETS BY MOUTH AT THE ONSET OF A MIGRAINE HEADACHE, LIMIT NO MORE THAN 3 TIMES PER WEEK. ACETAMINOPHEN SHOULD BE LIMITED TO 40    Calcium Carbonate Antacid 1177 MG CHEW     Cholecalciferol (VITAMIN D3) 1000 UNITS CAPS Take 1,000 Units by mouth Takes 5000 unit capsule daily    dexlansoprazole (DEXILANT) 60 MG CPDR CR capsule TAKE 1 CAPSULE (60 MG) BY MOUTH DAILY    docusate sodium (COLACE) 100 MG capsule Take 100 mg by mouth daily     Erenumab-aooe (AIMOVIG SC) Inject 140 mg Subcutaneous every 30 days Takes once a month    famotidine (PEPCID) 40 MG tablet TAKE ONE TABLET BY MOUTH DAILY AT BEDTIME    FLUoxetine (PROZAC) 20 MG  capsule Take 1 capsule daily along with 40 mg capsule (total daily dose 60 mg)    FLUoxetine (PROZAC) 40 MG capsule Take 1 capsule (40 mg) by mouth daily    HYDROcodone-acetaminophen (NORCO) 7.5-325 MG per tablet TAKE ONE TABLET BY MOUTH TWICE DAILY AS NEEDED FOR PAIN    hydrOXYzine (ATARAX) 25 MG tablet Take 1 tablet up to 2 times daily as needed for anxiety and take 2 tablets at bedtime    ibuprofen (ADVIL/MOTRIN) 800 MG tablet TAKE 1 TABLET (800 MG) BY MOUTH EVERY 8 HOURS AS NEEDED FOR MODERATE PAIN    methocarbamol (ROBAXIN) 750 MG tablet Take 1 tablet (750 mg) by mouth 3 times daily as needed for muscle spasms    Multiple Vitamins-Minerals (CENTRUM SILVER ULTRA WOMENS) TABS Take 1 tablet by mouth daily     ondansetron (ZOFRAN ODT) 4 MG ODT tab Take 1 tablet (4 mg) by mouth every 6 hours as needed (after migraine medication)    pregabalin (LYRICA) 150 MG capsule TAKE 1 CAPSULE BY MOUTH TWICE A DAY    pregabalin (LYRICA) 75 MG capsule Take 1 capsule (75 mg) by mouth daily Take 75mg at noon. Continue with 150mg in the morning and night    sucralfate (CARAFATE) 1 GM/10ML suspension Take 10 mLs (1 g) by mouth 4 times daily    topiramate (TOPAMAX) 50 MG tablet 2 times daily     No current facility-administered medications for this visit.       VITALS   There were no vitals taken for this visit.     LABS                                                                                                                         Last Comprehensive Metabolic Panel:  Sodium   Date Value Ref Range Status   09/29/2023 142 135 - 145 mmol/L Final     Comment:     Reference intervals for this test were updated on 09/26/2023 to more accurately reflect our healthy population. There may be differences in the flagging of prior results with similar values performed with this method. Interpretation of those prior results can be made in the context of the updated reference intervals.    03/25/2021 144 133 - 144 mmol/L Final     Potassium    Date Value Ref Range Status   09/29/2023 3.9 3.4 - 5.3 mmol/L Final   09/25/2022 3.7 3.4 - 5.3 mmol/L Final   03/25/2021 3.7 3.4 - 5.3 mmol/L Final     Chloride   Date Value Ref Range Status   09/29/2023 109 (H) 98 - 107 mmol/L Final   09/25/2022 108 94 - 109 mmol/L Final   03/25/2021 114 (H) 94 - 109 mmol/L Final     Carbon Dioxide   Date Value Ref Range Status   03/25/2021 23 20 - 32 mmol/L Final     Carbon Dioxide (CO2)   Date Value Ref Range Status   09/29/2023 23 22 - 29 mmol/L Final   09/25/2022 28 20 - 32 mmol/L Final     Anion Gap   Date Value Ref Range Status   09/29/2023 10 7 - 15 mmol/L Final   09/25/2022 4 3 - 14 mmol/L Final   03/25/2021 7 3 - 14 mmol/L Final     Glucose   Date Value Ref Range Status   09/29/2023 82 70 - 99 mg/dL Final   09/25/2022 110 (H) 70 - 99 mg/dL Final   03/25/2021 93 70 - 99 mg/dL Final     Urea Nitrogen   Date Value Ref Range Status   09/29/2023 10.1 8.0 - 23.0 mg/dL Final   09/25/2022 10 7 - 30 mg/dL Final   03/25/2021 11 7 - 30 mg/dL Final     Creatinine   Date Value Ref Range Status   09/29/2023 1.01 (H) 0.51 - 0.95 mg/dL Final   03/25/2021 0.92 0.52 - 1.04 mg/dL Final     GFR Estimate   Date Value Ref Range Status   09/29/2023 63 >60 mL/min/1.73m2 Final   03/25/2021 68 >60 mL/min/[1.73_m2] Final     Comment:     Non  GFR Calc  Starting 12/18/2018, serum creatinine based estimated GFR (eGFR) will be   calculated using the Chronic Kidney Disease Epidemiology Collaboration   (CKD-EPI) equation.       Calcium   Date Value Ref Range Status   09/29/2023 9.3 8.8 - 10.2 mg/dL Final   03/25/2021 8.3 (L) 8.5 - 10.1 mg/dL Final     Bilirubin Total   Date Value Ref Range Status   09/29/2023 0.2 <=1.2 mg/dL Final   03/25/2021 0.3 0.2 - 1.3 mg/dL Final     Alkaline Phosphatase   Date Value Ref Range Status   09/29/2023 108 (H) 35 - 104 U/L Final   03/25/2021 110 40 - 150 U/L Final     ALT   Date Value Ref Range Status   09/29/2023 24 0 - 50 U/L Final     Comment:      Reference intervals for this test were updated on 6/12/2023 to more accurately reflect our healthy population. There may be differences in the flagging of prior results with similar values performed with this method. Interpretation of those prior results can be made in the context of the updated reference intervals.     03/25/2021 24 0 - 50 U/L Final     AST   Date Value Ref Range Status   09/29/2023 19 0 - 45 U/L Final     Comment:     Reference intervals for this test were updated on 6/12/2023 to more accurately reflect our healthy population. There may be differences in the flagging of prior results with similar values performed with this method. Interpretation of those prior results can be made in the context of the updated reference intervals.   03/25/2021 13 0 - 45 U/L Final       MENTAL STATUS EXAM                                                                                          Awake, alert, oriented. Mood anxious, irritable with affect congruent to mood and speech content. No problems with speech . Thought process: + paranoia and delusional thoughts. No SI.  No hallucinations. Insight fair to limited.        ASSESSMENT                                                                                                      HISTORICAL:  Initial psych note 10/13/15        NOTES:  Cymbalta -> agitation, angry and increase in diastolic BP    Tesha Blankenship is a 63 year old, female who has depression anxiety. Tesha struggles with headaches, chronic pain. Tesha's daughter accompanied her today on video visit. I was recently made aware that Tesha has been experiencing paranoia and delusions. Interim last visit I spoke with Tesha on the phone when she was in [place] in the emergency room. At that time Tesha insight into the paranoia and delusions and was able to explain that she thought her brain misinterpreted physical symptoms from her body. Today not the case and she during our visit was talking about thinking  "the upstairs neighbor is causing the vibrations. Tesha and her daughter note they had a pharmacist friend look at her meds and she is on too many meds. We can certainly start by narrowing down psych meds and given I've recently learned of cognitive issues Tesha is having makes sense to first get rid of hydroxyzine which could exacerbate this. Next we can work on getting rid of Buspar. Her daughter notes this is not the first time Tesha has experienced delusions and paranoia and this has happened in the past albeit milder. They noted wanting to narrow down meds for Tesha but want a medication for her for sleep. I suggested Seroquel given is antipsychotic, she is experiencing psychosis, and can help with insomnia and anxiety. Tesha notes she will not take meds in this class. As to the memory issues, Tesha's primary put in a referral for neuropsych testing.    **  I spoke with daughter Noemy on Friday 12/15/23 two days after Tesha's video visit. Tesha said she wants to go out buy a gun, shoot her neighbor for vibrating her bed all night. Tesha also made a comment something to the point of \"I'll make sure he goes missing\". Noemy called the [place] police and was told they couldn't do anything about this given Tesha had not actually went out to buy a gun. Tesha told Noemy she was going to head out and drive to [place] (her cousin lives there and Tesha visits there several times per year). Noemy called Tesha's cousin and told the situation and asked cousin help convince Tesha to stop in [place] at ER to get assessed. Tesha did go to [facility] and Noemy did get confirmation from nurse at ER that Tesha was there. Noemy informed staff at  of what's been going on including that Tesha has made multiple homicidal statements towards her neighbor. Noemy called to speak with Tesha later and nurse said Tesha did not want to talk to Noemy and would not sign a release information.      TREATMENT RISK STATEMENT:  " The risks, benefits, alternatives and potential adverse effects have been explained and are understood by the pt.  The pt agrees to the treatment plan with the ability to do so.   The pt knows to call the clinic for any problems or access emergency care if needed.        DIAGNOSES                     MDD recurrent, moderate  Psychosis unspecified (Delusional disorder vs. Paranoid personality disorder vs. MDD severe with psychotic features)  Rule out neurocognitive disorder  ESTRELLA      PLAN                                                                                                                    1)  MEDICATIONS:    Continue Prozac 60 mg daily. Discontinue hydroxyzine 25 mg take 1 to 2 tabs daily prn anxiety and 2 tabs bedtime.  Continue Buspar 10 mg 2 times daily.   2)  THERAPY:  No Change    3)  LABS:  None    4)  PT MONITOR [call for probs]:  worsening sx, SI/HI, SEs from meds    5)  REFERRALS [CD, medical, other]: none    6)  RTC:  ~ 1 month

## 2023-12-14 ENCOUNTER — THERAPY VISIT (OUTPATIENT)
Dept: OCCUPATIONAL THERAPY | Facility: HOSPITAL | Age: 63
End: 2023-12-14
Attending: FAMILY MEDICINE
Payer: COMMERCIAL

## 2023-12-14 DIAGNOSIS — R41.3 MEMORY CHANGE: Primary | ICD-10-CM

## 2023-12-14 PROCEDURE — 97530 THERAPEUTIC ACTIVITIES: CPT | Mod: GO

## 2023-12-14 PROCEDURE — 97130 THER IVNTJ EA ADDL 15 MIN: CPT | Mod: GO

## 2023-12-14 PROCEDURE — 97129 THER IVNTJ 1ST 15 MIN: CPT | Mod: GO

## 2023-12-26 ENCOUNTER — TELEPHONE (OUTPATIENT)
Dept: FAMILY MEDICINE | Facility: OTHER | Age: 63
End: 2023-12-26

## 2023-12-26 NOTE — TELEPHONE ENCOUNTER
1:24 PM    Reason for Call: Phone Call    Description: Patient called regarding new prescription Risperidone 1mg 2x daily she was prescribed from [facility]. She states she is having vision and headache issues from this and was told to contact her PCP. Please give her a call to discuss this     Was an appointment offered for this call? No  If yes : Appointment type              Date    Preferred method for responding to this message: Telephone Call  What is your phone number ?[phone]     If we cannot reach you directly, may we leave a detailed response at the number you provided? Yes    Can this message wait until your PCP/provider returns, if available today? Not applicable    Susanna Montes

## 2023-12-26 NOTE — TELEPHONE ENCOUNTER
Needs to be seen by Dr. Herbert or myself. Phone visit would be okay to start with   Henna Moyer MD

## 2023-12-28 ENCOUNTER — TELEPHONE (OUTPATIENT)
Dept: PSYCHIATRY | Facility: OTHER | Age: 63
End: 2023-12-28

## 2023-12-28 NOTE — TELEPHONE ENCOUNTER
12:34 PM    Reason for Call: Phone Call    Description: Patients daughter Noemy called and is requesting to speak with Dr. Herbert's nurse. She states that Tesha was recently discharged from a hospital stay at [facility] for paranoia and delusions and Noemy states she received text messages from her mother in the middle of the night last night - she is worried that the paranoia and delusions are back. Please give Noemy a call back regarding this     Was an appointment offered for this call? No  If yes : Appointment type              Date    Preferred method for responding to this message: Telephone Call  What is your phone number ?[phone]     If we cannot reach you directly, may we leave a detailed response at the number you provided? Yes    Can this message wait until your PCP/provider returns, if available today? Louise Montes

## 2023-12-29 ENCOUNTER — TELEPHONE (OUTPATIENT)
Dept: PSYCHIATRY | Facility: OTHER | Age: 63
End: 2023-12-29

## 2023-12-29 DIAGNOSIS — M54.2 CHRONIC NECK PAIN: ICD-10-CM

## 2023-12-29 DIAGNOSIS — F41.1 GAD (GENERALIZED ANXIETY DISORDER): Primary | ICD-10-CM

## 2023-12-29 DIAGNOSIS — G89.29 CHRONIC NECK PAIN: ICD-10-CM

## 2023-12-29 RX ORDER — PROPRANOLOL HYDROCHLORIDE 10 MG/1
10 TABLET ORAL 2 TIMES DAILY PRN
Qty: 60 TABLET | Refills: 1 | Status: SHIPPED | OUTPATIENT
Start: 2023-12-29 | End: 2024-02-09

## 2023-12-29 RX ORDER — HYDROCODONE BITARTRATE AND ACETAMINOPHEN 7.5; 325 MG/1; MG/1
1 TABLET ORAL 2 TIMES DAILY PRN
Qty: 60 TABLET | Refills: 0 | OUTPATIENT
Start: 2023-12-29

## 2023-12-29 NOTE — TELEPHONE ENCOUNTER
Called and spoke with Tesha. She was prescribed trazodone 50 mg at bedtime for insomnia and Risperdal 1 mg 2 times daily. She notes double vision and headaches. She recalls taking trazodone in the past and getting headaches. Discussed trazodone can cause blurry vision. Risperdal could but I hear vision issues from people more so in context of trazodone than Risperdal. We agreed on discontinuing trazodone and having her take all 2 mg hence two 1 mg tabs Risperdal at night with hope the sedating effect can help her with insomnia.    Tesha notes she needs a med for anxiety and she has been having panic attacks. We agreed on propranolol. She denies major issues with respiratory such as asthma or COPD. She has an inhaler but notes typically just uses in the spring allergies (discussed propranolol can exacerbate respiratory issues). Also discussed its role as antihypertensive although prescribed at higher doses - still cautioned with any lightheadedness.     Melony : will you call Tesha let her know I sent in propranolol that her and I talked about?

## 2023-12-29 NOTE — TELEPHONE ENCOUNTER
Due to concerns with delusions, will decline Rx for norco at this time  Needs to be seen in clinic before further refills.  Henna Moyer MD

## 2023-12-29 NOTE — TELEPHONE ENCOUNTER
Norco      Last Written Prescription Date:  11.27.23  Last Fill Quantity: #60,   # refills: 0  Last Office Visit: 12.4.23  Future Office visit:    Next 5 appointments (look out 90 days)      Jan 16, 2024 11:40 AM  (Arrive by 11:25 AM)  Return Visit with Zoe Herbert MD  [facility] [address] [place] [id number] [phone]     Feb 16, 2024 10:30 AM  (Arrive by 10:15 AM)  PHYSICAL with Henna Moyer MD  [facility] [address] [facility] [address]  [phone]             Routing refill request to provider for review/approval because:  Drug not on the [facility], [facility] or [facility] refill protocol or controlled substance

## 2023-12-29 NOTE — TELEPHONE ENCOUNTER
"Patient reports having constant double vision form one of the medications she is taking.  She thinks it is coming from the Risperidone. She also has Trazodone to take at night and is getting headaches from this.  She feels she needs something for \"nerves\" as she may have been taken off the Hydroxyzine.  Please call patient at [phone]  Thank you  "

## 2023-12-29 NOTE — TELEPHONE ENCOUNTER
Return call from patient's daughter Noemy.  Noemy feels her mom is having delusions, paranoia again.  She seemed to be fine after the discharge from her hospital stay in [place].  She did c/o double vision from medication, but now the lack of double vision.  Noemy thinks her mom is either not taking her medications or the medications are not working.  She talks about the man upstairs in the apartment complex again and the delusions keep happening. Noemy wanted Zoe to know this information.  Next follow up is on 1-.  Daughter feels Tesha will tell us she is doing fine.  Thank you

## 2024-01-05 ENCOUNTER — TELEPHONE (OUTPATIENT)
Dept: PSYCHIATRY | Facility: OTHER | Age: 64
End: 2024-01-05

## 2024-01-05 NOTE — TELEPHONE ENCOUNTER
Return call from Tesha.  She reports feeling dizzy and nauseous.  She is thinking it could be from the Propranolol.  Advised patient to be careful upon standing.  Stand up slowly. She is understanding of this.   I have put her on our cancellation list for a sooner appt.  Next follow up for Tesha is on 1-.  Will forward to Zoe for review.  Thank you, please advise.

## 2024-01-05 NOTE — TELEPHONE ENCOUNTER
LM for patient that message for a sooner appt was received and will place on cancellation list for the next available appt.  Gave call back #.  Thank you

## 2024-01-05 NOTE — TELEPHONE ENCOUNTER
3:03 PM    Reason for Call: OVERBOOK    Patient is having the following symptoms: Patient is having issues with her medications and she is wondering if she can be seen sooner. The medication is causing memory issues.    The patient is requesting an appointment for a sooner appointment with Dr Herbert. FYI: Patient does have an appointment with Dr Herbert on Tuesday 1/16/24 but wants to get in sooner    Was an appointment offered for this call? No  If yes : Appointment type              Date    Preferred method for responding to this message: Telephone Call  What is your phone number ? [phone]    If we cannot reach you directly, may we leave a detailed response at the number you provided? Yes    Can this message wait until your PCP/provider returns, if unavailable today? No,

## 2024-01-08 NOTE — TELEPHONE ENCOUNTER
"I wrote propranolol \"as needed\" so Tesha does not have to take it. Propranolol certainly can cause lightheadedness. That was what we started telephone call 12/28/23. I would have her try holding off on taking it couple days and note if her symptoms go away.   "

## 2024-01-08 NOTE — TELEPHONE ENCOUNTER
Patient contacted about Propranolol.  Tesha will hold off on taking the Propranolol for a couple of days.  She tells me that she takes Risperidone 1 mg (2 at night) and Propranolol 2 - 10 mg (20 mg) in the morning. Patient also reports Dr. Pérez is tapering her off the Topamax (because of memory issues) and will be starting Botox inj the end of February.  Next follow up is on 1-16-24.  Thank you

## 2024-01-15 ENCOUNTER — TELEPHONE (OUTPATIENT)
Dept: PSYCHIATRY | Facility: OTHER | Age: 64
End: 2024-01-15

## 2024-01-15 DIAGNOSIS — F22 DELUSIONAL DISORDER (H): Primary | ICD-10-CM

## 2024-01-15 RX ORDER — RISPERIDONE 2 MG/1
2 TABLET ORAL EVERY EVENING
Qty: 30 TABLET | Refills: 4 | Status: SHIPPED | OUTPATIENT
Start: 2024-01-15 | End: 2024-03-04

## 2024-01-15 NOTE — TELEPHONE ENCOUNTER
Return call to patient.  Patient tells me that she needs to cancel appt tomorrow because [place].  Appt was rescheduled for the end of February.  Patient also needs a refill of the Risperidone, which is pending.  Patient also states she is off the Propranolol but has it available for use as a prn and has not used it at all. Patient feels the Propranolol does not work / help for calmness.  Will forward to Dr. Enriquez to review.  Thank you

## 2024-01-16 DIAGNOSIS — M54.2 CHRONIC NECK PAIN: ICD-10-CM

## 2024-01-16 DIAGNOSIS — G89.29 CHRONIC NECK PAIN: ICD-10-CM

## 2024-01-16 DIAGNOSIS — G89.29 OTHER CHRONIC PAIN: Chronic | ICD-10-CM

## 2024-01-16 DIAGNOSIS — M54.2 CERVICAL PAIN: ICD-10-CM

## 2024-01-16 DIAGNOSIS — M54.12 CERVICAL RADICULOPATHY: ICD-10-CM

## 2024-01-16 RX ORDER — HYDROCODONE BITARTRATE AND ACETAMINOPHEN 7.5; 325 MG/1; MG/1
1 TABLET ORAL 2 TIMES DAILY PRN
Qty: 60 TABLET | Refills: 0 | OUTPATIENT
Start: 2024-01-16

## 2024-01-16 RX ORDER — METHOCARBAMOL 750 MG/1
750 TABLET, FILM COATED ORAL 3 TIMES DAILY PRN
Qty: 45 TABLET | Refills: 1 | Status: SHIPPED | OUTPATIENT
Start: 2024-01-16 | End: 2024-04-23

## 2024-01-16 NOTE — TELEPHONE ENCOUNTER
Norco, Robaxin      Last Written Prescription Date:  11.27.23, 11.13.23  Last Fill Quantity: #60, #45,   # refills: 0  Last Office Visit: 12.4.23  Future Office visit:    Next 5 appointments (look out 90 days)      Feb 16, 2024 10:30 AM  (Arrive by 10:15 AM)  PHYSICAL with Henna Moyer MD  [facility] ([facility] ) [address] [place] [address] [place]  [place] [address]  [phone]             Routing refill request to provider for review/approval because:  Drug not on the [facility], P or  Health refill protocol or controlled substance

## 2024-01-25 RX ORDER — HYDROCODONE BITARTRATE AND ACETAMINOPHEN 7.5; 325 MG/1; MG/1
1 TABLET ORAL 2 TIMES DAILY PRN
Qty: 30 TABLET | Refills: 0 | Status: SHIPPED | OUTPATIENT
Start: 2024-01-25 | End: 2024-03-04

## 2024-02-08 DIAGNOSIS — F41.1 GAD (GENERALIZED ANXIETY DISORDER): ICD-10-CM

## 2024-02-08 NOTE — TELEPHONE ENCOUNTER
Disp Refills Start End FEDE   propranolol (INDERAL) 10 MG tablet 60 tablet 1 12/29/2023 -- No     Last Office Visit: 12/04/2023  Future Office visit:    Next 5 appointments (look out 90 days)      Feb 16, 2024 10:30 AM  (Arrive by 10:15 AM)  PHYSICAL with Henna Moyer MD  [facility] [place] ([facility] [place] ) [address]  [place] [address]  [phone]             Routing refill request to provider for review/approval because:

## 2024-02-09 RX ORDER — PROPRANOLOL HYDROCHLORIDE 10 MG/1
10 TABLET ORAL 2 TIMES DAILY PRN
Qty: 60 TABLET | Refills: 1 | Status: SHIPPED | OUTPATIENT
Start: 2024-02-09 | End: 2024-03-01 | Stop reason: SINTOL

## 2024-02-15 ENCOUNTER — TELEPHONE (OUTPATIENT)
Dept: FAMILY MEDICINE | Facility: OTHER | Age: 64
End: 2024-02-15

## 2024-02-15 DIAGNOSIS — M79.18 MYOFASCIAL PAIN SYNDROME, CERVICAL: ICD-10-CM

## 2024-02-15 RX ORDER — PREGABALIN 150 MG/1
CAPSULE ORAL
Qty: 180 CAPSULE | Refills: 0 | Status: SHIPPED | OUTPATIENT
Start: 2024-02-15 | End: 2024-05-06

## 2024-02-15 NOTE — TELEPHONE ENCOUNTER
Reason for call:  Medication      Have you contacted your pharmacy? Yes   If patient has contacted Pharmacy and it has been over 72hrs, continue to #2  Medication Pregabalin 150 mg  What Pharmacy do you use? [facility] [place]      (Please note that the turn-around-time for prescriptions is 72 business hours; I am sending your request at this time. SEND TO  [facility]  )

## 2024-02-15 NOTE — TELEPHONE ENCOUNTER
Lyrica      Last Written Prescription Date:  11/9/23  Last Fill Quantity: 180,   # refills: 0  Last Office Visit: 12/13/23  Future Office visit:    Next 5 appointments (look out 90 days)      Mar 04, 2024  8:30 AM  (Arrive by 8:15 AM)  SHORT with Henna Moyer MD  [facility] [place] ([facility] [place] ) [address]  [place] [address]  [phone]             Routing refill request to provider for review/approval because:
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
## 2024-02-27 NOTE — PROGRESS NOTES
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 Follow up scheduled for 5/9/2024. No improvements with pain   - Drug Confirmation Panel Urine with Creatinine; Future  - c/w HYDROcodone-acetaminophen (NORCO) 7.5-325 MG per tablet; Take 1 tablet by mouth 2 times daily as needed for pain  - c/w lyrica 150mg bid   - c/w methocarbamol   - c/w baclofen    Moderate mixed hyperlipidemia not requiring statin therapy / Lipid screening  Update with next lab draw  - Lipid Profile (Chol, Trig, HDL, LDL calc); Future  - no lipid medications    Moderate episode of recurrent major depressive disorder (H)  Follows with Dr. Herbert  Currently on latuda  Moving out of her apartment     Memory change  Neuropsych testing scheduled for 5/1/2024  Tesha is off topamax     Intractable chronic migraine without aura and with status migrainosus  Follows with Dr. Rendon, with botox completed on 2/28/2024. No improvements yet        See Patient Instructions    Return in about 6 weeks (around 4/15/2024) for CDM.    Subjective   Tesha is a 63 year old, presenting for the following health issues:  Pain, Anxiety, and Depression    HPI       Depression and Anxiety / memory change Follow-Up  How are you doing with your depression since your last visit? No change  How are you doing with your anxiety since your last visit?  Worsened   Are you having other symptoms that might be associated with depression or anxiety? Yes:  onto able to sleep, bad anxiety that makes her immobile  Have you had a significant life event? No   Do you have any concerns with your use of alcohol or other drugs? No    - follows with Dr. Herbert with last visit 2/28/2024. Discontinued trazodone d/t blurry vision, increased risperdal to 2mg.weight gain with risperdal.   Discontinued risperdal and started latuda   - follows with [facility] neurology, Dr. Pérez with last visit 2024 for botox  - neuropsych testing scheduled for 2024    - moving out of her apartment, does not have a new apartment. Living with her brother   - following with therapy with appt today     Social History     Tobacco Use    Smoking status: Former     Years: 8     Types: Cigarettes     Start date: 1981     Quit date: 1997     Years since quittin.1    Smokeless tobacco: Never   Vaping Use    Vaping Use: Never used   Substance Use Topics    Alcohol use: No    Drug use: No         10/3/2023     2:37 PM 2023     9:47 AM 2024    11:13 AM   PHQ   PHQ-9 Total Score 22 16 15   Q9: Thoughts of better off dead/self-harm past 2 weeks Not at all Not at all Not at all         10/3/2023     2:37 PM 2023     9:47 AM 2024    11:13 AM   ESTRELLA-7 SCORE   Total Score 21 16 11         2024    11:13 AM   Last PHQ-9   1.  Little interest or pleasure in doing things 1   2.  Feeling down, depressed, or hopeless 2   3.  Trouble falling or staying asleep, or sleeping too much 3   4.  Feeling tired or having little energy 2   5.  Poor appetite or overeating 3   6.  Feeling bad about yourself 2   7.  Trouble concentrating 1   8.  Moving slowly or restless 1   Q9: Thoughts of better off dead/self-harm past 2 weeks 0   PHQ-9 Total Score 15         2024    11:13 AM   ESTRELLA-7    1. Feeling nervous, anxious, or on edge 2   2. Not being able to stop or control worrying 1   3. Worrying too much about different things 1   4. Trouble relaxing 2   5. Being so restless that it is hard to sit still 1   6. Becoming easily annoyed or irritable 2   7. Feeling afraid, as if something awful might happen 2   ESTRELLA-7 Total Score 11       956}  Pain History:  When did you first notice your pain? F/u   Have you seen this provider for your pain in the past? Yes   Where in your body do you have pain? Neck and  "shoulder  Are you seeing anyone else for your pain? No        10/3/2023     2:37 PM 12/13/2023     9:47 AM 2/28/2024    11:13 AM   PHQ-9 SCORE   PHQ-9 Total Score 22 16 15           10/3/2023     2:37 PM 12/13/2023     9:47 AM 2/28/2024    11:13 AM   ESTRELLA-7 SCORE   Total Score 21 16 11         Chronic Pain Follow Up:    Location of pain: neck and shoulder  Analgesia/pain control:    - Recent changes:  na    - Overall control: Tolerable with discomfort    - Current treatments: Hydrocodone (generally every day to bid )  Adherence:     - Do you ever take more pain medicine than prescribed? No    - When did you take your last dose of pain medicine?  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 Follow up scheduled for 5/9/2024  - no change in pain      # migraines  - off of topamax  - botox (2/28/2024) no improvement yets    PDMP Review         Value Time User    State PDMP site checked  Yes 3/4/2024  7:44 AM Henna Moyer MD          Last CSA Agreement:   CSA -- Patient Level:     [Media Unavailable] Controlled Substance Agreement - Opioid - Scan on 1/3/2023 10:57 AM: OPIOID/OPIOID PLUS CONTROLLED SUBSTANCE AGREEMENT   [Media Unavailable] Controlled Substance Agreement - Opioid - Scan on 9/7/2021 10:15 AM: OPIOD/OPIOD PLUS CONTROLLED SUBSTANCE AGREEMENT       Last UDS: 2/20/2023  }  Review of Systems   Musculoskeletal:  Positive for neck pain.   Psychiatric/Behavioral:  Positive for dysphoric mood and sleep disturbance. Negative for suicidal ideas. The patient is nervous/anxious.            Objective    /80   Pulse 74   Temp 98.1  F (36.7  C) (Tympanic)   Resp 17   Ht 1.676 m (5' 6\")   Wt 86.8 kg (191 lb 6.4 oz)   SpO2 94%   BMI 30.89 kg/m    Body mass index is 30.89 kg/m .  Physical Exam  Constitutional:       General: She is not in acute distress.     Appearance: She is not " ill-appearing.   Cardiovascular:      Rate and Rhythm: Normal rate and regular rhythm.      Heart sounds: No murmur heard.  Pulmonary:      Effort: Pulmonary effort is normal. No respiratory distress.      Breath sounds: No wheezing or rales.   Musculoskeletal:      Right lower leg: No edema.      Left lower leg: No edema.   Neurological:      Mental Status: She is alert.   Psychiatric:         Mood and Affect: Mood is anxious and depressed.        Labs reviewed in Epic        Signed Electronically by: Henna Moyer MD

## 2024-02-28 ENCOUNTER — VIRTUAL VISIT (OUTPATIENT)
Dept: PSYCHIATRY | Facility: OTHER | Age: 64
End: 2024-02-28
Attending: PSYCHIATRY & NEUROLOGY
Payer: COMMERCIAL

## 2024-02-28 DIAGNOSIS — F22 DELUSIONAL DISORDER (H): Primary | ICD-10-CM

## 2024-02-28 DIAGNOSIS — F33.1 MAJOR DEPRESSIVE DISORDER, RECURRENT EPISODE, MODERATE (H): ICD-10-CM

## 2024-02-28 DIAGNOSIS — F33.3 SEVERE RECURRENT MAJOR DEPRESSIVE DISORDER WITH PSYCHOTIC FEATURES (H): ICD-10-CM

## 2024-02-28 DIAGNOSIS — F41.1 GAD (GENERALIZED ANXIETY DISORDER): ICD-10-CM

## 2024-02-28 PROCEDURE — 99442 PR PHYSICIAN TELEPHONE EVALUATION 11-20 MIN: CPT | Mod: 93 | Performed by: PSYCHIATRY & NEUROLOGY

## 2024-02-28 ASSESSMENT — ANXIETY QUESTIONNAIRES
GAD7 TOTAL SCORE: 11
2. NOT BEING ABLE TO STOP OR CONTROL WORRYING: SEVERAL DAYS
GAD7 TOTAL SCORE: 11
7. FEELING AFRAID AS IF SOMETHING AWFUL MIGHT HAPPEN: MORE THAN HALF THE DAYS
1. FEELING NERVOUS, ANXIOUS, OR ON EDGE: MORE THAN HALF THE DAYS
3. WORRYING TOO MUCH ABOUT DIFFERENT THINGS: SEVERAL DAYS
5. BEING SO RESTLESS THAT IT IS HARD TO SIT STILL: SEVERAL DAYS
6. BECOMING EASILY ANNOYED OR IRRITABLE: MORE THAN HALF THE DAYS

## 2024-02-28 ASSESSMENT — PATIENT HEALTH QUESTIONNAIRE - PHQ9
SUM OF ALL RESPONSES TO PHQ QUESTIONS 1-9: 15
5. POOR APPETITE OR OVEREATING: MORE THAN HALF THE DAYS

## 2024-02-28 NOTE — PROGRESS NOTES
"Type of service: telephone visit14 minutes    start time:11:23 am     end time: 11:37 am    Originating location (pt location): home    Distant location (provider location): [facility]        SUBJECTIVE / INTERIM HISTORY                                                                         Last visit 12/13/23:       - interim last visit hospitalization in [place]. Tesha was started on Risperdal 1 mg twice daily.     Telephone call 12/29/23 \"Called and spoke with Tesha. She was prescribed trazodone 50 mg at bedtime for insomnia and Risperdal 1 mg 2 times daily. She notes double vision and headaches. She recalls taking trazodone in the past and getting headaches. Discussed trazodone can cause blurry vision. Risperdal could but I hear vision issues from people more so in context of trazodone than Risperdal. We agreed on discontinuing trazodone and having her take all 2 mg hence two 1 mg tabs Risperdal at night with hope the sedating effect can help her with insomnia.     Tesha notes she needs a med for anxiety and she has been having panic attacks. We agreed on propranolol. She denies major issues with respiratory such as asthma or COPD. She has an inhaler but notes typically just uses in the spring allergies (discussed propranolol can exacerbate respiratory issues). Also discussed its role as antihypertensive although prescribed at higher doses - still cautioned with any lightheadedness\"    - telephone call 1/5/24: Tesha reported dizziness / lightheadedness  with the propranolol. Relayed to her I wrote it as needed hence she did NOT have to take it.    ---------------------------------------------------------------------------    - Tesha stopped taking propranolol given was experiencing lightheadedness  - neurostimulator was placed today.   - with Risperdal her appetite is up. She is hungry all the time. The weight gain is making her feel sad and depressed  - staying at her brother's. Plan would be for " "her to stay in same building but move to different apartment  - \"my mind won't shut off\" and can't sleep. Trying OTC and not helping.   - I asked if she feels the neighbor is causing her body issues. She notes \"I'm over that\".   - neuropsych testing May 1  -noticing memory issues. Someone will tell her something and the next day the person will say something and Tesha will tell them she doesn't remember nay of it.   - history of dementia in the family and Tesha noted two aunts had Parkinson's  - hx \"mental breakdown\" when had lots of things going on.   - doesn't know mom's side history   - granddaughter Jess 1 yo  - Noemy  working on her AA. 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 Noemy's dad, Akash, down in [place] helping take care of quarter horse ranch. The roel had a stroke.  - cousin in [place] colorectal cancer  - Accident Jan '20 with nephew Michael 8 yo. He has PTSD since    MEDICAL / SURGICAL HISTORY                    Patient Active Problem List   Diagnosis    Degenerative disc disease, cervical    Pseudoarthrosis of cervical spine (H)    Cervical pain    Irritable bowel syndrome    Depression, major    Chronic, continuous use of opioids    Chronic rhinitis    Chronic maxillary sinusitis    Hypertrophy of inferior nasal turbinate    Chronic pain    Chronic tension-type headache, intractable    History of arthrodesis    Myofascial pain    Disuse syndrome    Intractable chronic migraine without aura and with status migrainosus    Gastroesophageal reflux disease with esophagitis    Mild episode of recurrent major depressive disorder (H24)    Ulnar neuropathy at elbow of left upper extremity    Lumbar radiculopathy    S/P cervical spinal fusion    ACP (advance care planning)    Calculus of kidney    Pulmonary emphysema, unspecified emphysema type (H)    Pelvic floor dysfunction    Moderate mixed hyperlipidemia not requiring statin therapy    Rheumatoid factor positive. MARISEL and anti CCP negative    Insomnia, " unspecified type    Cervical radicular pain    Psoriasis with arthropathy (H) - follows with Dr Francisco    Memory change     ALLERGY   Aspirin, Ibuprofen, Lansoprazole, Red dye, Bupropion, Bupropion hcl, and Demerol [meperidine]  MEDICATIONS                                                                                              Current Outpatient Medications   Medication Sig    albuterol (PROAIR HFA/PROVENTIL HFA/VENTOLIN HFA) 108 (90 Base) MCG/ACT inhaler INHALE 2 PUFFS INTO THE LUNGS EVERY 4 HOURS AS NEEDED FOR SHORTNESS OF BREATH OR WHEEZING.    baclofen (LIORESAL) 10 MG tablet TAKE 1 TABLET (10 MG) BY MOUTH DAILY    budesonide-formoterol (SYMBICORT) 80-4.5 MCG/ACT Inhaler Inhale 2 puffs into the lungs 2 times daily    butalbital-acetaminophen-caffeine (ESGIC) -40 MG tablet Take 1 tablet by mouth every 4 hours as needed TAKE 1 TO 2 TABLETS BY MOUTH AT THE ONSET OF A MIGRAINE HEADACHE, LIMIT NO MORE THAN 3 TIMES PER WEEK. ACETAMINOPHEN SHOULD BE LIMITED TO 40    Calcium Carbonate Antacid 1177 MG CHEW     Cholecalciferol (VITAMIN D3) 1000 UNITS CAPS Take 1,000 Units by mouth Takes 5000 unit capsule daily    dexlansoprazole (DEXILANT) 60 MG CPDR CR capsule TAKE 1 CAPSULE (60 MG) BY MOUTH DAILY    docusate sodium (COLACE) 100 MG capsule Take 100 mg by mouth daily     Erenumab-aooe (AIMOVIG SC) Inject 140 mg Subcutaneous every 30 days Takes once a month    famotidine (PEPCID) 40 MG tablet TAKE ONE TABLET BY MOUTH DAILY AT BEDTIME    FLUoxetine (PROZAC) 20 MG capsule TAKE 3 CAPSULES BY MOUTH ONE TIME A DAY. INDICATIONS: DEPRESSION ANDANXIETY    FLUoxetine (PROZAC) 40 MG capsule Take 1 capsule (40 mg) by mouth daily    HYDROcodone-acetaminophen (NORCO) 7.5-325 MG per tablet Take 1 tablet by mouth 2 times daily as needed for pain    ibuprofen (ADVIL/MOTRIN) 800 MG tablet TAKE 1 TABLET (800 MG) BY MOUTH EVERY 8 HOURS AS NEEDED FOR MODERATE PAIN    methocarbamol (ROBAXIN) 750 MG tablet Take 1 tablet (750 mg) by  mouth 3 times daily as needed for muscle spasms    Multiple Vitamins-Minerals (CENTRUM SILVER ULTRA WOMENS) TABS Take 1 tablet by mouth daily     ondansetron (ZOFRAN ODT) 4 MG ODT tab Take 1 tablet (4 mg) by mouth every 6 hours as needed (after migraine medication)    pregabalin (LYRICA) 150 MG capsule TAKE 1 CAPSULE BY MOUTH TWICE A DAY    pregabalin (LYRICA) 75 MG capsule Take 1 capsule (75 mg) by mouth daily Take 75mg at noon. Continue with 150mg in the morning and night    propranolol (INDERAL) 10 MG tablet TAKE 1 TABLET (10 MG) BY MOUTH 2 TIMES DAILY AS NEEDED (ANXIETY)    risperiDONE (RISPERDAL) 2 MG tablet Take 1 tablet (2 mg) by mouth every evening    sucralfate (CARAFATE) 1 GM/10ML suspension Take 10 mLs (1 g) by mouth 4 times daily    topiramate (TOPAMAX) 50 MG tablet 2 times daily     No current facility-administered medications for this visit.       VITALS   There were no vitals taken for this visit.     LABS                                                                                                                         Last Comprehensive Metabolic Panel:  Sodium   Date Value Ref Range Status   09/29/2023 142 135 - 145 mmol/L Final     Comment:     Reference intervals for this test were updated on 09/26/2023 to more accurately reflect our healthy population. There may be differences in the flagging of prior results with similar values performed with this method. Interpretation of those prior results can be made in the context of the updated reference intervals.    03/25/2021 144 133 - 144 mmol/L Final     Potassium   Date Value Ref Range Status   09/29/2023 3.9 3.4 - 5.3 mmol/L Final   09/25/2022 3.7 3.4 - 5.3 mmol/L Final   03/25/2021 3.7 3.4 - 5.3 mmol/L Final     Chloride   Date Value Ref Range Status   09/29/2023 109 (H) 98 - 107 mmol/L Final   09/25/2022 108 94 - 109 mmol/L Final   03/25/2021 114 (H) 94 - 109 mmol/L Final     Carbon Dioxide   Date Value Ref Range Status   03/25/2021 23 20 -  32 mmol/L Final     Carbon Dioxide (CO2)   Date Value Ref Range Status   09/29/2023 23 22 - 29 mmol/L Final   09/25/2022 28 20 - 32 mmol/L Final     Anion Gap   Date Value Ref Range Status   09/29/2023 10 7 - 15 mmol/L Final   09/25/2022 4 3 - 14 mmol/L Final   03/25/2021 7 3 - 14 mmol/L Final     Glucose   Date Value Ref Range Status   09/29/2023 82 70 - 99 mg/dL Final   09/25/2022 110 (H) 70 - 99 mg/dL Final   03/25/2021 93 70 - 99 mg/dL Final     Urea Nitrogen   Date Value Ref Range Status   09/29/2023 10.1 8.0 - 23.0 mg/dL Final   09/25/2022 10 7 - 30 mg/dL Final   03/25/2021 11 7 - 30 mg/dL Final     Creatinine   Date Value Ref Range Status   09/29/2023 1.01 (H) 0.51 - 0.95 mg/dL Final   03/25/2021 0.92 0.52 - 1.04 mg/dL Final     GFR Estimate   Date Value Ref Range Status   09/29/2023 63 >60 mL/min/1.73m2 Final   03/25/2021 68 >60 mL/min/[1.73_m2] Final     Comment:     Non  GFR Calc  Starting 12/18/2018, serum creatinine based estimated GFR (eGFR) will be   calculated using the Chronic Kidney Disease Epidemiology Collaboration   (CKD-EPI) equation.       Calcium   Date Value Ref Range Status   09/29/2023 9.3 8.8 - 10.2 mg/dL Final   03/25/2021 8.3 (L) 8.5 - 10.1 mg/dL Final     Bilirubin Total   Date Value Ref Range Status   09/29/2023 0.2 <=1.2 mg/dL Final   03/25/2021 0.3 0.2 - 1.3 mg/dL Final     Alkaline Phosphatase   Date Value Ref Range Status   09/29/2023 108 (H) 35 - 104 U/L Final   03/25/2021 110 40 - 150 U/L Final     ALT   Date Value Ref Range Status   09/29/2023 24 0 - 50 U/L Final     Comment:     Reference intervals for this test were updated on 6/12/2023 to more accurately reflect our healthy population. There may be differences in the flagging of prior results with similar values performed with this method. Interpretation of those prior results can be made in the context of the updated reference intervals.     03/25/2021 24 0 - 50 U/L Final     AST   Date Value Ref Range Status  "  09/29/2023 19 0 - 45 U/L Final     Comment:     Reference intervals for this test were updated on 6/12/2023 to more accurately reflect our healthy population. There may be differences in the flagging of prior results with similar values performed with this method. Interpretation of those prior results can be made in the context of the updated reference intervals.   03/25/2021 13 0 - 45 U/L Final       MENTAL STATUS EXAM                                                                                          Mood anxious.  No problems with speech . Thought process linear and logical. No evidence of any hallucinations or delusional thoughts. No SI.  No hallucinations. Insight fair to limited.        ASSESSMENT                                                                                                      HISTORICAL:  Initial psych note 10/13/15        NOTES:  Cymbalta -> agitation, angry and increase in diastolic BP. Propranolol 10 mg BID prn anxiety: lightheadedness    Tesha Blankenship is a 63 year old female. Tesha's daughter accompanied her on video visit December '23.       I was recently made aware that Tesha has been experiencing paranoia and delusions. Tesha has experienced delusions and paranoia and this has happened in the past albeit milder.     **  I spoke with daughter Noemy on Friday 12/15/23 two days after our video visit December ' 23. Tesha had told Noemy she wanted to go out buy a gun, shoot her neighbor for vibrating her bed all night. Tesha also made a comment something to the point of \"I'll make sure he goes missing\". Noemy called the [place] police and was told they couldn't do anything about this given Tesha had not actually went out to buy a gun. Tesha told Noemy she was going to head out and drive to [place] (her cousin lives there and Tesha visits there several times per year). Noemy called Tesha's cousin and told the situation and asked cousin help convince Tesha to stop in [place] " at ER to get assessed. Tesha did go to [facility] and Noemy did get confirmation from nurse at ER that Tesha was there. Noemy informed staff at [facility] of what's been going on including that Tesha has made multiple homicidal statements towards her neighbor. Tesha was admitted : started on Risperdal 1 mg twice daily. We since consolidated dose to 2 mg evening.    Today Tesha notes 1) the propranolol prn anxiety interim last visit we started she stopped because of lightheadedness. 2) she does not like taking Risperdal as it has caused increased appetite / significant weight gain. She has been very anxious and would like a medication that can help with anxiety. We discussed options and I noted ideal if we can find a med to replace Risperdal that is for psychosis / paranoia that could also help with anxiety. We agreed on having her try Latuda. Discussed potential SEs including things like EPSE, TD, increase in lipids and glucose hence monitoring with labs. Still risk for increase in appetite / weight gain however hopefully less so than with Risperdal.     Neuropsych testing is scheduled with Dr. Walters for beginning of May.         TREATMENT RISK STATEMENT:  The risks, benefits, alternatives and potential adverse effects have been explained and are understood by the pt.  The pt agrees to the treatment plan with the ability to do so.   The pt knows to call the clinic for any problems or access emergency care if needed.        DIAGNOSES                     MDD recurrent, moderate  Psychosis unspecified (Delusional disorder vs. Paranoid personality disorder vs. MDD severe with psychotic features)  Rule out neurocognitive disorder  ESTRELLA      PLAN                                                                                                                    1)  MEDICATIONS:    Continue Prozac 60 mg daily (I changed  to a 40 mg and a 20 mg of which she prefers rather than 3 - 20 mg caps.  Discontinue Risperdal  2 mg HS. Start Latuda 20 mg daily with food x 7 days then take 40 mg daily with food.    2)  THERAPY:  No Change    3)  LABS:  None    4)  PT MONITOR [call for probs]:  worsening sx, SI/HI, SEs from meds    5)  REFERRALS [CD, medical, other]: none    6)  RTC:  ~ 1 month

## 2024-02-29 RX ORDER — LURASIDONE HYDROCHLORIDE 20 MG/1
TABLET, FILM COATED ORAL
Qty: 60 TABLET | Refills: 4 | Status: SHIPPED | OUTPATIENT
Start: 2024-02-29 | End: 2024-05-10

## 2024-02-29 RX ORDER — FLUOXETINE 40 MG/1
CAPSULE ORAL
Qty: 90 CAPSULE | Refills: 3 | Status: SHIPPED | OUTPATIENT
Start: 2024-02-29

## 2024-03-01 PROBLEM — Z86.59 HISTORY OF DEPRESSION: Status: ACTIVE | Noted: 2023-12-17

## 2024-03-01 PROBLEM — Z86.59 HISTORY OF ANXIETY: Status: ACTIVE | Noted: 2023-12-17

## 2024-03-01 PROBLEM — F22 DELUSIONAL DISORDER (H): Status: ACTIVE | Noted: 2023-12-17

## 2024-03-01 RX ORDER — ERENUMAB-AOOE 140 MG/ML
INJECTION, SOLUTION SUBCUTANEOUS
COMMUNITY
Start: 2023-12-27

## 2024-03-01 RX ORDER — GUSELKUMAB 100 MG/ML
INJECTION SUBCUTANEOUS
COMMUNITY
Start: 2024-01-18

## 2024-03-01 RX ORDER — SENNOSIDES A AND B 8.6 MG/1
1 TABLET, FILM COATED ORAL
COMMUNITY
Start: 2023-12-21

## 2024-03-01 RX ORDER — TRAZODONE HYDROCHLORIDE 50 MG/1
TABLET, FILM COATED ORAL
COMMUNITY
Start: 2023-12-21 | End: 2024-03-04

## 2024-03-01 RX ORDER — POLYETHYLENE GLYCOL 3350 17 G/17G
1 POWDER, FOR SOLUTION ORAL
COMMUNITY
Start: 2023-12-22

## 2024-03-04 ENCOUNTER — OFFICE VISIT (OUTPATIENT)
Dept: FAMILY MEDICINE | Facility: OTHER | Age: 64
End: 2024-03-04
Attending: FAMILY MEDICINE
Payer: COMMERCIAL

## 2024-03-04 VITALS
RESPIRATION RATE: 17 BRPM | DIASTOLIC BLOOD PRESSURE: 80 MMHG | SYSTOLIC BLOOD PRESSURE: 120 MMHG | OXYGEN SATURATION: 94 % | HEIGHT: 66 IN | HEART RATE: 74 BPM | WEIGHT: 191.4 LBS | TEMPERATURE: 98.1 F | BODY MASS INDEX: 30.76 KG/M2

## 2024-03-04 DIAGNOSIS — G43.711 INTRACTABLE CHRONIC MIGRAINE WITHOUT AURA AND WITH STATUS MIGRAINOSUS: Chronic | ICD-10-CM

## 2024-03-04 DIAGNOSIS — G89.29 CHRONIC NECK PAIN: ICD-10-CM

## 2024-03-04 DIAGNOSIS — M54.12 CERVICAL RADICULOPATHY: ICD-10-CM

## 2024-03-04 DIAGNOSIS — F33.1 MODERATE EPISODE OF RECURRENT MAJOR DEPRESSIVE DISORDER (H): Chronic | ICD-10-CM

## 2024-03-04 DIAGNOSIS — M54.2 CHRONIC NECK PAIN: ICD-10-CM

## 2024-03-04 DIAGNOSIS — R41.3 MEMORY CHANGE: ICD-10-CM

## 2024-03-04 DIAGNOSIS — E78.2 MIXED HYPERLIPIDEMIA: Primary | ICD-10-CM

## 2024-03-04 DIAGNOSIS — Z13.220 LIPID SCREENING: ICD-10-CM

## 2024-03-04 DIAGNOSIS — M54.2 CERVICAL PAIN: Primary | ICD-10-CM

## 2024-03-04 DIAGNOSIS — E78.2 MODERATE MIXED HYPERLIPIDEMIA NOT REQUIRING STATIN THERAPY: Chronic | ICD-10-CM

## 2024-03-04 DIAGNOSIS — Z51.81 ENCOUNTER FOR THERAPEUTIC DRUG LEVEL MONITORING: ICD-10-CM

## 2024-03-04 DIAGNOSIS — F11.90 CHRONIC, CONTINUOUS USE OF OPIOIDS: ICD-10-CM

## 2024-03-04 LAB
CHOLEST SERPL-MCNC: 275 MG/DL
CREAT UR-MCNC: 43 MG/DL
FASTING STATUS PATIENT QL REPORTED: NO
HDLC SERPL-MCNC: 64 MG/DL
LDLC SERPL CALC-MCNC: 190 MG/DL
NONHDLC SERPL-MCNC: 211 MG/DL
TRIGL SERPL-MCNC: 103 MG/DL

## 2024-03-04 PROCEDURE — 80356 HEROIN METABOLITE: CPT | Mod: ZL | Performed by: FAMILY MEDICINE

## 2024-03-04 PROCEDURE — 99214 OFFICE O/P EST MOD 30 MIN: CPT | Performed by: FAMILY MEDICINE

## 2024-03-04 PROCEDURE — G0463 HOSPITAL OUTPT CLINIC VISIT: HCPCS | Mod: 25

## 2024-03-04 PROCEDURE — 80360 METHYLPHENIDATE: CPT | Mod: ZL | Performed by: FAMILY MEDICINE

## 2024-03-04 PROCEDURE — G0463 HOSPITAL OUTPT CLINIC VISIT: HCPCS

## 2024-03-04 PROCEDURE — 82465 ASSAY BLD/SERUM CHOLESTEROL: CPT | Mod: ZL | Performed by: FAMILY MEDICINE

## 2024-03-04 PROCEDURE — 36415 COLL VENOUS BLD VENIPUNCTURE: CPT | Mod: ZL | Performed by: FAMILY MEDICINE

## 2024-03-04 RX ORDER — ATORVASTATIN CALCIUM 10 MG/1
10 TABLET, FILM COATED ORAL
Qty: 30 TABLET | Refills: 0 | Status: SHIPPED | OUTPATIENT
Start: 2024-03-04 | End: 2024-05-07

## 2024-03-04 RX ORDER — HYDROCODONE BITARTRATE AND ACETAMINOPHEN 7.5; 325 MG/1; MG/1
1 TABLET ORAL 2 TIMES DAILY PRN
Qty: 60 TABLET | Refills: 0 | Status: SHIPPED | OUTPATIENT
Start: 2024-03-04 | End: 2024-04-23

## 2024-03-04 RX ORDER — FOLIC ACID 1 MG/1
1 TABLET ORAL
COMMUNITY
Start: 2024-01-08 | End: 2024-03-04

## 2024-03-04 RX ORDER — LORATADINE 10 MG/1
10 TABLET ORAL
COMMUNITY
Start: 2023-12-22 | End: 2024-07-15

## 2024-03-04 ASSESSMENT — ENCOUNTER SYMPTOMS
NECK PAIN: 1
SLEEP DISTURBANCE: 1
NERVOUS/ANXIOUS: 1
DYSPHORIC MOOD: 1

## 2024-03-04 ASSESSMENT — PAIN SCALES - GENERAL: PAINLEVEL: MODERATE PAIN (4)

## 2024-03-04 NOTE — COMMUNITY RESOURCES LIST (ENGLISH)
03/04/2024   [facility]  N/A  For questions about this resource list or additional care needs, please contact your primary care clinic or care manager.  Phone: [phone]   Email: N/A   Address: [address] [place] [address]   Hours: N/A        Food and Nutrition       Food pantry  1  [facility] [place] Distance: 5.3 miles      Pickup   [address] [place] [address]  Language: English  Hours: Mon 9:00 AM - 11:00 AM , Tue 1:00 PM - 3:00 PM , Wed - Thu 9:00 AM - 11:00 AM  Fees: Free, Self Pay   Phone: (198) 907-1501 Email: rei@[email or web address] Website: [email or web address]/[facility]/[place]     2  [facility] [employer] [facility] Free [facility] Distance: 16.13 miles      Pickup   [address] [place] [address]  Language: English  Hours: Mon - Sun Open 24 Hours  Fees: Free   Phone: (692) 124-3574 Email: jayda@[employer].org Website: http://www.[employer].org     SNAP application assistance  3  [facility] [employer] & [facility] Distance: 4.78 miles      In-Person, Phone/Virtual   [address] [place] [address]  Language: English  Hours: Mon - Fri 8:00 AM - 4:30 PM  Fees: Free   Phone: (740) 482-3922 Website: https://www.[facility].gov/yubqamqlgyx-h-o/public-health-human-services/economic-services-supports     4  [facility] Economic [employer] & [facility][1] Distance: 16.14 miles      In-Person, Phone/Virtual   [address] [place] [address]  Language: English  Hours: Mon - Fri 8:00 AM - 4:30 PM  Fees: Free   Phone: (379) 922-9844 Email: financialassistance@[facility].gov Website: https://www.[facility].gov/vvwexmqjgpu-j-m/public-health-human-services/economic-services-supports     Soup kitchen or free meals  5  [facility] [employer] and  Service Center Distance: 5.3 miles      [facility]   [address] [place] [address]  Language: English  Hours: Mon - Fri 4:00 PM - 4:45 PM  Fees: Free   Phone: (500) 691-1384 Email: rei@[facility].[employer] Website: https://[facility].org/northern/[place]          Important Numbers & Websites       Emergency Services   911  [facility] Services   311  Poison Control   (319) 957-2089  Suicide Prevention Lifeline   (364) 370-8019 (TALK)  Child Abuse Hotline   (463) 845-2634 (4-A-Child)  Sexual Assault Hotline   (712) 913-1791 (HOPE)  National Runaway Safeline   (193) 661-3569 (RUNAWAY)  All-Options Talkline   (541) 286-3056  Substance Abuse Referral   (750) 886-7200 (HELP)

## 2024-03-04 NOTE — LETTER

## 2024-03-06 LAB
DHC UR CFM-MCNC: 97 NG/ML
DHC/CREAT UR: 226 NG/MG {CREAT}
HYDROCODONE UR CFM-MCNC: 73 NG/ML
HYDROCODONE/CREAT UR: 170 NG/MG {CREAT}
PREGABALIN UR QL CFM: PRESENT

## 2024-03-07 ENCOUNTER — TELEPHONE (OUTPATIENT)
Dept: PSYCHIATRY | Facility: OTHER | Age: 64
End: 2024-03-07

## 2024-03-07 NOTE — TELEPHONE ENCOUNTER
Pt brought paperwork in to give to Zoe Keon to fill out. Please fax and leave original at  for pt to .    Can call pt at [phone]

## 2024-03-11 ENCOUNTER — TELEPHONE (OUTPATIENT)
Dept: PSYCHIATRY | Facility: OTHER | Age: 64
End: 2024-03-11

## 2024-03-11 NOTE — TELEPHONE ENCOUNTER
Patient contacted about form for housing.  Form placed on Zoe's desk.  Will notify patient when we fax form out tomorrow.

## 2024-03-11 NOTE — TELEPHONE ENCOUNTER
2:49 PM    Reason for Call: Phone Call    Description: Patient dropped off a form for housing and she is wondering if this form was completed and was faxed in today?     Was an appointment offered for this call? No  If yes : Appointment type              Date    Preferred method for responding to this message: Telephone Call  What is your phone number ? [phone]    If we cannot reach you directly, may we leave a detailed response at the number you provided? Yes    Can this message wait until your PCP/provider returns, if available today? YES, No

## 2024-03-13 ENCOUNTER — TELEPHONE (OUTPATIENT)
Dept: PSYCHIATRY | Facility: OTHER | Age: 64
End: 2024-03-13

## 2024-03-13 NOTE — TELEPHONE ENCOUNTER
1:34 PM    Reason for Call: Phone Call    Description: Patient is calling to see if  had time to fill out the paperwork that she dropped it off last Thursday. Please call patient back with the status.    Was an appointment offered for this call? No  If yes : Appointment type              Date    Preferred method for responding to this message: Telephone Call  What is your phone number ?  [phone]    If we cannot reach you directly, may we leave a detailed response at the number you provided? Yes    Can this message wait until your PCP/provider returns, if available today? YES, Provider is in    Mouna Andrew

## 2024-04-01 ENCOUNTER — VIRTUAL VISIT (OUTPATIENT)
Dept: PSYCHIATRY | Facility: OTHER | Age: 64
End: 2024-04-01
Attending: PSYCHIATRY & NEUROLOGY
Payer: COMMERCIAL

## 2024-04-01 VITALS — BODY MASS INDEX: 30.67 KG/M2 | WEIGHT: 190 LBS

## 2024-04-01 DIAGNOSIS — F41.1 GAD (GENERALIZED ANXIETY DISORDER): Primary | ICD-10-CM

## 2024-04-01 PROCEDURE — 99443 PR PHYSICIAN TELEPHONE EVALUATION 21-30 MIN: CPT | Mod: 93 | Performed by: PSYCHIATRY & NEUROLOGY

## 2024-04-01 ASSESSMENT — ANXIETY QUESTIONNAIRES
6. BECOMING EASILY ANNOYED OR IRRITABLE: MORE THAN HALF THE DAYS
5. BEING SO RESTLESS THAT IT IS HARD TO SIT STILL: NOT AT ALL
GAD7 TOTAL SCORE: 2
1. FEELING NERVOUS, ANXIOUS, OR ON EDGE: NOT AT ALL
3. WORRYING TOO MUCH ABOUT DIFFERENT THINGS: NOT AT ALL
2. NOT BEING ABLE TO STOP OR CONTROL WORRYING: NOT AT ALL
GAD7 TOTAL SCORE: 2
7. FEELING AFRAID AS IF SOMETHING AWFUL MIGHT HAPPEN: NOT AT ALL

## 2024-04-01 ASSESSMENT — PATIENT HEALTH QUESTIONNAIRE - PHQ9
SUM OF ALL RESPONSES TO PHQ QUESTIONS 1-9: 8
5. POOR APPETITE OR OVEREATING: NOT AT ALL

## 2024-04-01 ASSESSMENT — PAIN SCALES - GENERAL: PAINLEVEL: MODERATE PAIN (4)

## 2024-04-01 NOTE — PROGRESS NOTES
"Virtual Visit Details    Type of service:  Telephone Visit   Phone call duration: 22 minutes   Originating Location (pt. Location): Home    Distant Location (provider location):  Off-site     STart time: 9:08 am  End time: 9:30 am     SUBJECTIVE / INTERIM HISTORY                                                                         Last visit 2/28/24: Continue Prozac 60 mg daily (I changed  to a 40 mg and a 20 mg of which she prefers rather than 3 - 20 mg caps.  Discontinue Risperdal 2 mg HS. Start Latuda 20 mg daily with food x 7 days then take 40 mg daily with food.    - moved to [facility] [place] over the weekend. They have a cafeteria, [employer] does meals on wheels.  - was with her family yesterday for Easter  - still issues with increased appetite / weight however feels Latuda is helping  - \"my fear is gone\" ever since she moved. Doesn't have the fear that someone is after her  - neurostimulator placed end of February. Tesha keeps unplugging it because it's causing headaches. They call her every week to check in  - I asked if she feels the neighbor is causing her body issues. She notes \"I'm over that\".   - neuropsych testing May 1  - history of dementia in the family an two aunts had Parkinson's  - hx \"mental breakdown\" when had lots of things going on.   - doesn't know mom's side history   - granddaughter Jess 3 yo  - Noemy  working on her AA. 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 Noemy's dad, Akash, down in [place] helping take care of [employer]. The roel had a stroke.  - cousin in [place] colorectal cancer  - Accident Jan '20 with nephew Michael 6 yo. He has PTSD since    MEDICAL / SURGICAL HISTORY                    Patient Active Problem List   Diagnosis    Degenerative disc disease, cervical    Pseudoarthrosis of cervical spine (H)    Cervical pain    Irritable bowel syndrome    Depression, major    Chronic, continuous use of opioids    Chronic rhinitis    Chronic maxillary sinusitis    Hypertrophy of inferior " nasal turbinate    Chronic pain    Chronic tension-type headache, intractable    History of arthrodesis    Myofascial pain    Disuse syndrome    Intractable chronic migraine without aura and with status migrainosus    Gastroesophageal reflux disease with esophagitis    Mild episode of recurrent major depressive disorder (H24)    Ulnar neuropathy at elbow of left upper extremity    Lumbar radiculopathy    S/P cervical spinal fusion    ACP (advance care planning)    Calculus of kidney    Pulmonary emphysema, unspecified emphysema type (H)    Pelvic floor dysfunction    Moderate mixed hyperlipidemia not requiring statin therapy    Rheumatoid factor positive. MARISEL and anti CCP negative    Insomnia, unspecified type    Cervical radicular pain    Psoriasis with arthropathy (H) - follows with Dr Francisco    Memory change    Delusional disorder (H)    History of anxiety    History of depression     ALLERGY   Aspirin, Ibuprofen, Lansoprazole, Red dye, Bupropion, Bupropion hcl, and Demerol [meperidine]  MEDICATIONS                                                                                              Current Outpatient Medications   Medication Sig    AIMOVIG 140 MG/ML injection INJECT 140 MG UNDER THE SKIN EVERY 28 (TWENTY-EIGHT) DAYS.    albuterol (PROAIR HFA/PROVENTIL HFA/VENTOLIN HFA) 108 (90 Base) MCG/ACT inhaler INHALE 2 PUFFS INTO THE LUNGS EVERY 4 HOURS AS NEEDED FOR SHORTNESS OF BREATH OR WHEEZING.    atorvastatin (LIPITOR) 10 MG tablet Take 1 tablet (10 mg) by mouth twice a week    baclofen (LIORESAL) 10 MG tablet TAKE 1 TABLET (10 MG) BY MOUTH DAILY    botulinum toxin type A (BOTOX) 100 units injection Inject 155 Units into the muscle once every twelve weeks    budesonide-formoterol (SYMBICORT) 80-4.5 MCG/ACT Inhaler Inhale 2 puffs into the lungs 2 times daily    butalbital-acetaminophen-caffeine (ESGIC) -40 MG tablet Take 1 tablet by mouth every 4 hours as needed TAKE 1 TO 2 TABLETS BY MOUTH AT THE ONSET OF  A MIGRAINE HEADACHE, LIMIT NO MORE THAN 3 TIMES PER WEEK. ACETAMINOPHEN SHOULD BE LIMITED TO 40    Calcium Carbonate Antacid 1177 MG CHEW     Cholecalciferol (VITAMIN D3) 1000 UNITS CAPS Take 1,000 Units by mouth Takes 5000 unit capsule daily    dexlansoprazole (DEXILANT) 60 MG CPDR CR capsule TAKE 1 CAPSULE (60 MG) BY MOUTH DAILY    docusate sodium (COLACE) 100 MG capsule Take 100 mg by mouth daily     famotidine (PEPCID) 40 MG tablet TAKE ONE TABLET BY MOUTH DAILY AT BEDTIME    FLUoxetine (PROZAC) 20 MG capsule Take 1 capsule daily along with 40 mg for total daily dose 60 mg    FLUoxetine (PROZAC) 40 MG capsule Take 1 capsule daily along with 20 mg capsule    HYDROcodone-acetaminophen (NORCO) 7.5-325 MG per tablet Take 1 tablet by mouth 2 times daily as needed for pain    ibuprofen (ADVIL/MOTRIN) 800 MG tablet TAKE 1 TABLET (800 MG) BY MOUTH EVERY 8 HOURS AS NEEDED FOR MODERATE PAIN    loratadine (CLARITIN) 10 MG tablet Take 10 mg by mouth    lurasidone (LATUDA) 20 MG TABS tablet Take 1 tablet daily with food for 7 days; then take 2 tablets daily with food    methocarbamol (ROBAXIN) 750 MG tablet Take 1 tablet (750 mg) by mouth 3 times daily as needed for muscle spasms    Multiple Vitamins-Minerals (CENTRUM SILVER ULTRA WOMENS) TABS Take 1 tablet by mouth daily     ondansetron (ZOFRAN ODT) 4 MG ODT tab Take 1 tablet (4 mg) by mouth every 6 hours as needed (after migraine medication)    polyethylene glycol (MIRALAX) 17 g packet Take 1 packet by mouth    pregabalin (LYRICA) 150 MG capsule TAKE 1 CAPSULE BY MOUTH TWICE A DAY    senna (SENOKOT) 8.6 MG tablet Take 1 tablet by mouth    sucralfate (CARAFATE) 1 GM/10ML suspension Take 10 mLs (1 g) by mouth 4 times daily    TREMFYA 100 MG/ML SOPN INJECT 100MG UNDER THE SKIN ONCE A WEEK AT WEEK 0 AND WEEK 4,THEN INJECT 100MG EVERY 8 WEEKS     No current facility-administered medications for this visit.       VITALS   There were no vitals taken for this visit.     LABS                                                                                                                          Last Comprehensive Metabolic Panel:  Sodium   Date Value Ref Range Status   09/29/2023 142 135 - 145 mmol/L Final     Comment:     Reference intervals for this test were updated on 09/26/2023 to more accurately reflect our healthy population. There may be differences in the flagging of prior results with similar values performed with this method. Interpretation of those prior results can be made in the context of the updated reference intervals.    03/25/2021 144 133 - 144 mmol/L Final     Potassium   Date Value Ref Range Status   09/29/2023 3.9 3.4 - 5.3 mmol/L Final   09/25/2022 3.7 3.4 - 5.3 mmol/L Final   03/25/2021 3.7 3.4 - 5.3 mmol/L Final     Chloride   Date Value Ref Range Status   09/29/2023 109 (H) 98 - 107 mmol/L Final   09/25/2022 108 94 - 109 mmol/L Final   03/25/2021 114 (H) 94 - 109 mmol/L Final     Carbon Dioxide   Date Value Ref Range Status   03/25/2021 23 20 - 32 mmol/L Final     Carbon Dioxide (CO2)   Date Value Ref Range Status   09/29/2023 23 22 - 29 mmol/L Final   09/25/2022 28 20 - 32 mmol/L Final     Anion Gap   Date Value Ref Range Status   09/29/2023 10 7 - 15 mmol/L Final   09/25/2022 4 3 - 14 mmol/L Final   03/25/2021 7 3 - 14 mmol/L Final     Glucose   Date Value Ref Range Status   09/29/2023 82 70 - 99 mg/dL Final   09/25/2022 110 (H) 70 - 99 mg/dL Final   03/25/2021 93 70 - 99 mg/dL Final     Urea Nitrogen   Date Value Ref Range Status   09/29/2023 10.1 8.0 - 23.0 mg/dL Final   09/25/2022 10 7 - 30 mg/dL Final   03/25/2021 11 7 - 30 mg/dL Final     Creatinine   Date Value Ref Range Status   09/29/2023 1.01 (H) 0.51 - 0.95 mg/dL Final   03/25/2021 0.92 0.52 - 1.04 mg/dL Final     GFR Estimate   Date Value Ref Range Status   09/29/2023 63 >60 mL/min/1.73m2 Final   03/25/2021 68 >60 mL/min/[1.73_m2] Final     Comment:     Non  GFR Calc  Starting 12/18/2018,  serum creatinine based estimated GFR (eGFR) will be   calculated using the Chronic Kidney Disease Epidemiology Collaboration   (CKD-EPI) equation.       Calcium   Date Value Ref Range Status   09/29/2023 9.3 8.8 - 10.2 mg/dL Final   03/25/2021 8.3 (L) 8.5 - 10.1 mg/dL Final     Bilirubin Total   Date Value Ref Range Status   09/29/2023 0.2 <=1.2 mg/dL Final   03/25/2021 0.3 0.2 - 1.3 mg/dL Final     Alkaline Phosphatase   Date Value Ref Range Status   09/29/2023 108 (H) 35 - 104 U/L Final   03/25/2021 110 40 - 150 U/L Final     ALT   Date Value Ref Range Status   09/29/2023 24 0 - 50 U/L Final     Comment:     Reference intervals for this test were updated on 6/12/2023 to more accurately reflect our healthy population. There may be differences in the flagging of prior results with similar values performed with this method. Interpretation of those prior results can be made in the context of the updated reference intervals.     03/25/2021 24 0 - 50 U/L Final     AST   Date Value Ref Range Status   09/29/2023 19 0 - 45 U/L Final     Comment:     Reference intervals for this test were updated on 6/12/2023 to more accurately reflect our healthy population. There may be differences in the flagging of prior results with similar values performed with this method. Interpretation of those prior results can be made in the context of the updated reference intervals.   03/25/2021 13 0 - 45 U/L Final       MENTAL STATUS EXAM                                                                                          Mood today euthymic.   No problems with speech . Thought process linear and logical. No evidence of any hallucinations or delusional thoughts. No SI.  No hallucinations. Insight good.       ASSESSMENT                                                                                                      HISTORICAL:  Initial psych note 10/13/15        NOTES:  Cymbalta -> agitation, angry and increase in diastolic BP.  Propranolol 10 mg BID prn anxiety: lightheadedness    Tesha Blankenship is a 63 year old female. Tesha's daughter accompanied her on video visit December '23. I was recently made aware that Tesha has been experiencing paranoia and delusions. Tesha ended up admitted in [place] and was started on Risperdal. Last viist we changed to Latuda given appetite / weight with Risperdal. Today she reports she likes Latuda and is feeling good on it. However appeite / weight ongoing issue and is really bringing her down: to point she hasn't been going to Religious as she regularly does because is embarrassed about how she looks. I noted the topamax she was on can help with appetite however I do not favor this med given it can cause cognitive issues of which she is already having. She agrees. Vyvanse I also am not in favor of given stimulants can cause / exacerbate psychosis of which Tesha is finally feeling symptom relief of paranoia. We decided to keep meds as are for today Neuropsych testing is scheduled with Dr. Walters for beginning of May.       TREATMENT RISK STATEMENT:  The risks, benefits, alternatives and potential adverse effects have been explained and are understood by the pt.  The pt agrees to the treatment plan with the ability to do so.   The pt knows to call the clinic for any problems or access emergency care if needed.        DIAGNOSES                     MDD recurrent, mild  Psychosis unspecified (Delateusional disorder vs. Paranoid personality disorder vs. MDD severe with psychotic features)  Rule out neurocognitive disorder  ESTRELLA      PLAN                                                                                                                    1)  MEDICATIONS:    Continue Prozac 60 mg daily (I changed  to a 40 mg and a 20 mg of which she prefers rather than 3 - 20 mg caps.  . Continue Latuda 40 mg daily with food.    2)  THERAPY:  No Change    3)  LABS:  None    4)  PT MONITOR [call for probs]:   worsening sx, SI/HI, SEs from meds    5)  REFERRALS [CD, medical, other]: none    6)  RTC:  ~ 1 month

## 2024-04-17 DIAGNOSIS — K22.10 ULCER OF ESOPHAGUS WITHOUT BLEEDING: ICD-10-CM

## 2024-04-17 DIAGNOSIS — G89.29 CHRONIC RADICULAR CERVICAL PAIN: ICD-10-CM

## 2024-04-17 DIAGNOSIS — M54.12 CHRONIC RADICULAR CERVICAL PAIN: ICD-10-CM

## 2024-04-18 RX ORDER — BACLOFEN 10 MG/1
10 TABLET ORAL DAILY
Qty: 90 TABLET | Refills: 3 | Status: SHIPPED | OUTPATIENT
Start: 2024-04-18

## 2024-04-18 RX ORDER — DEXLANSOPRAZOLE 60 MG/1
60 CAPSULE, DELAYED RELEASE ORAL DAILY
Qty: 90 CAPSULE | Refills: 2 | Status: SHIPPED | OUTPATIENT
Start: 2024-04-18 | End: 2024-09-12

## 2024-04-18 NOTE — TELEPHONE ENCOUNTER
Dexilant      Last Written Prescription Date:  6/14/23  Last Fill Quantity: 90,   # refills: 2  Last Office Visit: 3/4/24  Future Office visit:    Next 5 appointments (look out 90 days)      Apr 23, 2024 10:00 AM  (Arrive by 9:45 AM)  Provider Visit with Henna Moyer MD  [facility] [place] ([facility] [place] ) [address]  [place] [address]  [phone]     Jun 11, 2024  1:00 PM  (Arrive by 12:45 PM)  Adult Preventative Visit with Henna Moyer MD  [facility] [place] ([facility] [place] ) [address]  [place] [address]  [phone]             Routing refill request to provider for review/approval because:      Baclofen      Last Written Prescription Date:  5/31/23  Last Fill Quantity: 90,   # refills: 3  Last Office Visit: 3/4/24  Future Office visit:    Next 5 appointments (look out 90 days)      Apr 23, 2024 10:00 AM  (Arrive by 9:45 AM)  Provider Visit with Henna Moyer MD  [facility] [place] ([facility] [place] ) [address]  [place] [address]  [phone]     Jun 11, 2024  1:00 PM  (Arrive by 12:45 PM)  Adult Preventative Visit with Henna Moyer MD  [facility] [place] ([facility] [place] ) [address]  [place] [address]  [phone]             Routing refill request to provider for review/approval because:

## 2024-04-18 NOTE — PROGRESS NOTES
"  Assessment & Plan     Cervical radicular pain / Other chronic pain / Lumbar radiculopathy  Tesha will be having her neuro stimulator removed on 4/25/2024, by [facility] PDMP reviewed and appropriate    - norco and methocarbamol refilled under a different encounter today    Moderate episode of recurrent major depressive disorder (H)  Follows with Dr. Herbert with next visit 5/6/2024  Moved to the [place] and feels safe there  Concern for weight gain d/t medications     Chronic maxillary sinusitis  Duration of one month  - amoxicillin-clavulanate (AUGMENTIN) 875-125 MG tablet; Take 1 tablet by mouth 2 times daily for 10 days    BMI 30.0-30.9,adult  Will make an appt with June for weight mgt          See Patient Instructions    Return in about 2 months (around 6/23/2024) for Physical Exam, Lab Work.    Subjective   Tesha is a 63 year old, presenting for the following health issues:  Follow Up        4/23/2024     9:58 AM   Additional Questions   Roomed by Sharda Greene   Accompanied by none         4/23/2024     9:58 AM   Patient Reported Additional Medications   Patient reports taking the following new medications none     History of Present Illness       Reason for visit:  Neck pain, follow up visit    She eats 0-1 servings of fruits and vegetables daily.She consumes 1 sweetened beverage(s) daily.She exercises with enough effort to increase her heart rate 9 or less minutes per day.  She exercises with enough effort to increase her heart rate 3 or less days per week.   She is taking medications regularly.       Depression and Anxiety   How are you doing with your depression since your last visit? Worsened \"in a transition, trying to get out of this black hole\"  How are you doing with your anxiety since your last visit?  \"Not good at all\"  Are you having other symptoms that might be associated with depression or anxiety? No  Have you had a significant life event? Health Concerns and OTHER: just moved and pain " "in throat    Do you have any concerns with your use of alcohol or other drugs? No    - moved to [place], but the residents are \"old\"  - feels safe there  - moved Easter weekend  - meet with therapist yesterday     # weight: starting walking again   Body mass index is 30.63 kg/m .      Wt Readings from Last 4 Encounters:   24 86.1 kg (189 lb 12.8 oz)   24 86.2 kg (190 lb)   24 86.8 kg (191 lb 6.4 oz)   23 85.3 kg (188 lb 1.6 oz)         Social History     Tobacco Use    Smoking status: Former     Current packs/day: 0.00     Types: Cigarettes     Start date: 1981     Quit date: 1997     Years since quittin.3    Smokeless tobacco: Never   Vaping Use    Vaping status: Never Used   Substance Use Topics    Alcohol use: No    Drug use: No         2024    11:13 AM 2024     8:58 AM 2024     9:55 AM   PHQ   PHQ-9 Total Score 15 8 7   Q9: Thoughts of better off dead/self-harm past 2 weeks Not at all Not at all Not at all         2024    11:13 AM 2024     8:58 AM 2024     9:56 AM   ESTRELLA-7 SCORE   Total Score   7 (mild anxiety)   Total Score 11 2 7       Pain History:  When did you first notice your pain? A month ago- has stimulator in and making it worse   Have you seen this provider for your pain in the past? Yes   Where in your body do you have pain? neck  Are you seeing anyone else for your pain? Yes - Dr. Diez from [facility]         2024    11:13 AM 2024     8:58 AM 2024     9:55 AM   PHQ-9 SCORE   PHQ-9 Total Score MyChart   7 (Mild depression)   PHQ-9 Total Score 15 8 7           2024    11:13 AM 2024     8:58 AM 2024     9:56 AM   ESTRELLA-7 SCORE   Total Score   7 (mild anxiety)   Total Score 11 2 7       Chronic Pain Follow Up:    Location of pain: neck and shoulders and lower back   Analgesia/pain control:    - Recent changes:  neck stimulator    - Overall control: Inadequate pain control    - Current treatments: Norco  " "  Adherence:     - Do you ever take more pain medicine than prescribed? No    - When did you take your last dose of pain medicine?  Yesterday    Adverse effects: No       - stimulator placed, and got an infection which has resolved. Will be having stimulator removed on 4/25. It was a temporary device   - topical cream was allergic to  - using flonase   - thought stimulator was helping, but now not helping   - neck pain is different with the stimulator. Shooting pain       PDMP Review         Value Time User    State PDMP site checked  Yes 3/4/2024  8:30 AM Henna Moyer MD          Last CSA Agreement:   CSA -- Patient Level:     [Media Unavailable] Controlled Substance Agreement - Opioid - Scan on 3/5/2024  8:30 AM: OPIOID/OPIOID PLUS CONTROLLED SUBSTANCE AGREEMENT   [Media Unavailable] Controlled Substance Agreement - Opioid - Scan on 1/3/2023 10:57 AM: OPIOID/OPIOID PLUS CONTROLLED SUBSTANCE AGREEMENT   [Media Unavailable] Controlled Substance Agreement - Opioid - Scan on 9/7/2021 10:15 AM: OPIOD/OPIOD PLUS CONTROLLED SUBSTANCE AGREEMENT       Last UDS: 3/6/2024    # sinus issues   - voice change  - has been taking mucinex which has not been effective   - duration of one month     # Social   - lost medical insurance d/t making too much money   - working with [place] [facility] [place] for medical until she is through her deductible   - Tesha will be calling [facility] to see if there are options      Review of Systems   HENT:  Positive for dental problem, postnasal drip, sinus pressure, sinus pain, trouble swallowing and voice change.    Respiratory:  Positive for cough.    Musculoskeletal:  Positive for neck pain.   Psychiatric/Behavioral:  Positive for dysphoric mood. The patient is nervous/anxious.            Objective    /82   Pulse 76   Temp 97.4  F (36.3  C) (Tympanic)   Resp 16   Ht 1.676 m (5' 6\")   Wt 86.1 kg (189 lb 12.8 oz)   SpO2 98%   BMI 30.63 kg/m    Body mass index is 30.63 kg/m .  Physical " Exam  Constitutional:       General: She is not in acute distress.     Appearance: She is not ill-appearing.   Cardiovascular:      Rate and Rhythm: Normal rate and regular rhythm.      Heart sounds: No murmur heard.  Pulmonary:      Effort: Pulmonary effort is normal. No respiratory distress.      Breath sounds: No wheezing or rales.   Neurological:      Mental Status: She is alert.   Psychiatric:         Mood and Affect: Mood is anxious and depressed.               Signed Electronically by: Henna Moyer MD

## 2024-04-23 ENCOUNTER — OFFICE VISIT (OUTPATIENT)
Dept: FAMILY MEDICINE | Facility: OTHER | Age: 64
End: 2024-04-23
Attending: FAMILY MEDICINE
Payer: COMMERCIAL

## 2024-04-23 ENCOUNTER — MYC REFILL (OUTPATIENT)
Dept: FAMILY MEDICINE | Facility: OTHER | Age: 64
End: 2024-04-23

## 2024-04-23 VITALS
SYSTOLIC BLOOD PRESSURE: 126 MMHG | RESPIRATION RATE: 16 BRPM | OXYGEN SATURATION: 98 % | HEIGHT: 66 IN | DIASTOLIC BLOOD PRESSURE: 82 MMHG | TEMPERATURE: 97.4 F | WEIGHT: 189.8 LBS | HEART RATE: 76 BPM | BODY MASS INDEX: 30.5 KG/M2

## 2024-04-23 DIAGNOSIS — M54.2 CHRONIC NECK PAIN: ICD-10-CM

## 2024-04-23 DIAGNOSIS — F33.1 MODERATE EPISODE OF RECURRENT MAJOR DEPRESSIVE DISORDER (H): Chronic | ICD-10-CM

## 2024-04-23 DIAGNOSIS — J32.0 CHRONIC MAXILLARY SINUSITIS: ICD-10-CM

## 2024-04-23 DIAGNOSIS — G89.29 OTHER CHRONIC PAIN: Chronic | ICD-10-CM

## 2024-04-23 DIAGNOSIS — M54.12 CERVICAL RADICULOPATHY: Primary | ICD-10-CM

## 2024-04-23 DIAGNOSIS — G89.29 CHRONIC NECK PAIN: ICD-10-CM

## 2024-04-23 DIAGNOSIS — M54.2 CERVICAL PAIN: ICD-10-CM

## 2024-04-23 DIAGNOSIS — M54.12 CERVICAL RADICULOPATHY: ICD-10-CM

## 2024-04-23 DIAGNOSIS — M54.16 LUMBAR RADICULOPATHY: ICD-10-CM

## 2024-04-23 PROCEDURE — 99214 OFFICE O/P EST MOD 30 MIN: CPT | Performed by: FAMILY MEDICINE

## 2024-04-23 PROCEDURE — G0463 HOSPITAL OUTPT CLINIC VISIT: HCPCS

## 2024-04-23 RX ORDER — HYDROCODONE BITARTRATE AND ACETAMINOPHEN 7.5; 325 MG/1; MG/1
1 TABLET ORAL 2 TIMES DAILY PRN
Qty: 60 TABLET | Refills: 0 | Status: SHIPPED | OUTPATIENT
Start: 2024-04-23 | End: 2024-05-08

## 2024-04-23 RX ORDER — METHOCARBAMOL 750 MG/1
750 TABLET, FILM COATED ORAL 3 TIMES DAILY PRN
Qty: 45 TABLET | Refills: 3 | Status: SHIPPED | OUTPATIENT
Start: 2024-04-23 | End: 2024-06-11

## 2024-04-23 ASSESSMENT — ANXIETY QUESTIONNAIRES
8. IF YOU CHECKED OFF ANY PROBLEMS, HOW DIFFICULT HAVE THESE MADE IT FOR YOU TO DO YOUR WORK, TAKE CARE OF THINGS AT HOME, OR GET ALONG WITH OTHER PEOPLE?: SOMEWHAT DIFFICULT
1. FEELING NERVOUS, ANXIOUS, OR ON EDGE: SEVERAL DAYS
2. NOT BEING ABLE TO STOP OR CONTROL WORRYING: SEVERAL DAYS
6. BECOMING EASILY ANNOYED OR IRRITABLE: SEVERAL DAYS
4. TROUBLE RELAXING: SEVERAL DAYS
7. FEELING AFRAID AS IF SOMETHING AWFUL MIGHT HAPPEN: SEVERAL DAYS
GAD7 TOTAL SCORE: 7
7. FEELING AFRAID AS IF SOMETHING AWFUL MIGHT HAPPEN: SEVERAL DAYS
GAD7 TOTAL SCORE: 7
3. WORRYING TOO MUCH ABOUT DIFFERENT THINGS: SEVERAL DAYS
IF YOU CHECKED OFF ANY PROBLEMS ON THIS QUESTIONNAIRE, HOW DIFFICULT HAVE THESE PROBLEMS MADE IT FOR YOU TO DO YOUR WORK, TAKE CARE OF THINGS AT HOME, OR GET ALONG WITH OTHER PEOPLE: SOMEWHAT DIFFICULT
5. BEING SO RESTLESS THAT IT IS HARD TO SIT STILL: SEVERAL DAYS
GAD7 TOTAL SCORE: 7

## 2024-04-23 ASSESSMENT — ENCOUNTER SYMPTOMS
SINUS PRESSURE: 1
COUGH: 1
NERVOUS/ANXIOUS: 1
VOICE CHANGE: 1
DYSPHORIC MOOD: 1
SINUS PAIN: 1
TROUBLE SWALLOWING: 1
NECK PAIN: 1

## 2024-04-23 ASSESSMENT — PAIN SCALES - GENERAL: PAINLEVEL: MODERATE PAIN (5)

## 2024-04-23 ASSESSMENT — PATIENT HEALTH QUESTIONNAIRE - PHQ9
SUM OF ALL RESPONSES TO PHQ QUESTIONS 1-9: 7
SUM OF ALL RESPONSES TO PHQ QUESTIONS 1-9: 7
10. IF YOU CHECKED OFF ANY PROBLEMS, HOW DIFFICULT HAVE THESE PROBLEMS MADE IT FOR YOU TO DO YOUR WORK, TAKE CARE OF THINGS AT HOME, OR GET ALONG WITH OTHER PEOPLE: SOMEWHAT DIFFICULT

## 2024-04-23 NOTE — TELEPHONE ENCOUNTER
methocarbamol (ROBAXIN) 750 MG tablet       Last Written Prescription Date:  1/16/2024  Last Fill Quantity: 45,   # refills: 1  Last Office Visit: 3/4/2024  Future Office visit:    Next 5 appointments (look out 90 days)      Jun 11, 2024  1:00 PM  (Arrive by 12:45 PM)  Adult Preventative Visit with Henna Moyer MD  [facility] ([facility] ) [address] [facility] [place] [address]  [phone]             Routing refill request to provider for review/approval because:      HYDROcodone-acetaminophen (NORCO) 7.5-325 MG per tablet       Last Written Prescription Date:  3/4/2024  Last Fill Quantity: 60,   # refills: 0  Last Office Visit: 3/4/2024  Future Office visit:    Next 5 appointments (look out 90 days)      Jun 11, 2024  1:00 PM  (Arrive by 12:45 PM)  Adult Preventative Visit with Henna Moyer MD  [facility] ([facility] ) [address]  [facility] [address]  [phone]             Routing refill request to provider for review/approval because:      Kimberly Boecker, RN

## 2024-04-23 NOTE — PATIENT INSTRUCTIONS
Start augmentin for your sinus infection   Also eat yogurt or take a probiotic while on antibiotic

## 2024-05-06 ENCOUNTER — VIRTUAL VISIT (OUTPATIENT)
Dept: PSYCHIATRY | Facility: OTHER | Age: 64
End: 2024-05-06
Attending: PSYCHIATRY & NEUROLOGY
Payer: COMMERCIAL

## 2024-05-06 DIAGNOSIS — F22 DELUSIONAL DISORDER (H): Primary | ICD-10-CM

## 2024-05-06 DIAGNOSIS — M79.18 MYOFASCIAL PAIN SYNDROME, CERVICAL: ICD-10-CM

## 2024-05-06 PROCEDURE — 99442 PR PHYSICIAN TELEPHONE EVALUATION 11-20 MIN: CPT | Mod: 93 | Performed by: PSYCHIATRY & NEUROLOGY

## 2024-05-06 RX ORDER — PREGABALIN 150 MG/1
CAPSULE ORAL
Qty: 180 CAPSULE | Refills: 0 | Status: SHIPPED | OUTPATIENT
Start: 2024-05-06 | End: 2024-07-31

## 2024-05-06 ASSESSMENT — PAIN SCALES - GENERAL: PAINLEVEL: MODERATE PAIN (4)

## 2024-05-06 NOTE — PROGRESS NOTES
"Virtual Visit Details    Type of service:  Telephone Visit   Phone call duration: 19 minutes   Originating Location (pt. Location): Home    Distant Location (provider location):  Off-site     Start time: 2:13 pm  End time: 2:33 pm    SUBJECTIVE / INTERIM HISTORY                                                                         Last visit 4/1/24: Continue Prozac 60 mg daily (I changed  to a 40 mg and a 20 mg of which she prefers rather than 3 - 20 mg caps.  . Continue Latuda 40 mg daily with food.    - still at times has the fear of someone after her / to get her especially when out of her comfort zone  - feels meds are doing what they are supposed to. Not fond of weight weight gain with the Vraylar but feels is helping.  - [place]. Feels safe there. \"But there are so many old people!\" Feels out of place there  - at daughter's house [place] ~ [place] in [place]  - met Dr. Walters and will have further / more detailed neuropsych testing.  - grew up [place] and then family moved all over the state [place] in relation to dad's job  -- neurostimulator taken out. She notes had a rash. Then had a steroid injection and helped neck pain  - hx \"mental breakdown\" when had lots of things going on.   - doesn't know mom's side history   - granddaughter Jess 1 yo  - Noemy  working on her AA. 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 Noemy's dad, Akash, down in [place] helping take care of quarter horse ranch. The roel had a stroke.  - cousin in [place] colorectal cancer  - Accident Jan '20 with nephew Michael 6 yo. He has PTSD since    MEDICAL / SURGICAL HISTORY                    Patient Active Problem List   Diagnosis    Degenerative disc disease, cervical    Pseudoarthrosis of cervical spine (H)    Cervical pain    Irritable bowel syndrome    Depression, major    Chronic, continuous use of opioids    Chronic rhinitis    Chronic maxillary sinusitis    Hypertrophy of inferior nasal turbinate    Chronic pain    " Chronic tension-type headache, intractable    History of arthrodesis    Myofascial pain    Disuse syndrome    Intractable chronic migraine without aura and with status migrainosus    Gastroesophageal reflux disease with esophagitis    Mild episode of recurrent major depressive disorder (H24)    Ulnar neuropathy at elbow of left upper extremity    Lumbar radiculopathy    S/P cervical spinal fusion    ACP (advance care planning)    Calculus of kidney    Pulmonary emphysema, unspecified emphysema type (H)    Pelvic floor dysfunction    Moderate mixed hyperlipidemia not requiring statin therapy    Rheumatoid factor positive. MARISEL and anti CCP negative    Insomnia, unspecified type    Cervical radicular pain    Psoriasis with arthropathy (H) - follows with Dr Francisco    Memory change    Delusional disorder (H)    History of anxiety    History of depression     ALLERGY   Aspirin, Ibuprofen, Lansoprazole, Red dye, Bupropion, Bupropion hcl, and Demerol [meperidine]  MEDICATIONS                                                                                              Current Outpatient Medications   Medication Sig Dispense Refill    AIMOVIG 140 MG/ML injection INJECT 140 MG UNDER THE SKIN EVERY 28 (TWENTY-EIGHT) DAYS.      albuterol (PROAIR HFA/PROVENTIL HFA/VENTOLIN HFA) 108 (90 Base) MCG/ACT inhaler INHALE 2 PUFFS INTO THE LUNGS EVERY 4 HOURS AS NEEDED FOR SHORTNESS OF BREATH OR WHEEZING. 18 g 0    atorvastatin (LIPITOR) 10 MG tablet Take 1 tablet (10 mg) by mouth twice a week 30 tablet 0    baclofen (LIORESAL) 10 MG tablet TAKE 1 TABLET (10 MG) BY MOUTH DAILY 90 tablet 3    botulinum toxin type A (BOTOX) 100 units injection Inject 155 Units into the muscle once every twelve weeks      budesonide-formoterol (SYMBICORT) 80-4.5 MCG/ACT Inhaler Inhale 2 puffs into the lungs 2 times daily 10.2 g 1    butalbital-acetaminophen-caffeine (ESGIC) -40 MG tablet Take 1 tablet by mouth every 4 hours as needed TAKE 1 TO 2 TABLETS  BY MOUTH AT THE ONSET OF A MIGRAINE HEADACHE, LIMIT NO MORE THAN 3 TIMES PER WEEK. ACETAMINOPHEN SHOULD BE LIMITED TO 40      Calcium Carbonate Antacid 1177 MG CHEW       Cholecalciferol (VITAMIN D3) 1000 UNITS CAPS Take 1,000 Units by mouth Takes 5000 unit capsule daily      dexlansoprazole (DEXILANT) 60 MG CPDR CR capsule TAKE 1 CAPSULE (60 MG) BY MOUTH DAILY 90 capsule 2    docusate sodium (COLACE) 100 MG capsule Take 100 mg by mouth daily       famotidine (PEPCID) 40 MG tablet TAKE ONE TABLET BY MOUTH DAILY AT BEDTIME 90 tablet 3    FLUoxetine (PROZAC) 20 MG capsule Take 1 capsule daily along with 40 mg for total daily dose 60 mg 90 capsule 3    FLUoxetine (PROZAC) 40 MG capsule Take 1 capsule daily along with 20 mg capsule 90 capsule 3    HYDROcodone-acetaminophen (NORCO) 7.5-325 MG per tablet Take 1 tablet by mouth 2 times daily as needed for pain 60 tablet 0    ibuprofen (ADVIL/MOTRIN) 800 MG tablet TAKE 1 TABLET (800 MG) BY MOUTH EVERY 8 HOURS AS NEEDED FOR MODERATE PAIN 90 tablet 1    loratadine (CLARITIN) 10 MG tablet Take 10 mg by mouth      lurasidone (LATUDA) 20 MG TABS tablet Take 1 tablet daily with food for 7 days; then take 2 tablets daily with food 60 tablet 4    methocarbamol (ROBAXIN) 750 MG tablet Take 1 tablet (750 mg) by mouth 3 times daily as needed for muscle spasms 45 tablet 3    Multiple Vitamins-Minerals (CENTRUM SILVER ULTRA WOMENS) TABS Take 1 tablet by mouth daily       ondansetron (ZOFRAN ODT) 4 MG ODT tab Take 1 tablet (4 mg) by mouth every 6 hours as needed (after migraine medication) 30 tablet 1    polyethylene glycol (MIRALAX) 17 g packet Take 1 packet by mouth      pregabalin (LYRICA) 150 MG capsule TAKE 1 CAPSULE BY MOUTH TWICE A  capsule 0    senna (SENOKOT) 8.6 MG tablet Take 1 tablet by mouth      sucralfate (CARAFATE) 1 GM/10ML suspension Take 10 mLs (1 g) by mouth 4 times daily 420 mL 3    TREMFYA 100 MG/ML SOPN INJECT 100MG UNDER THE SKIN ONCE A WEEK AT WEEK 0 AND  WEEK 4,THEN INJECT 100MG EVERY 8 WEEKS       No current facility-administered medications for this visit.       VITALS   There were no vitals taken for this visit.     LABS                                                                                                                         Last Comprehensive Metabolic Panel:  Sodium   Date Value Ref Range Status   09/29/2023 142 135 - 145 mmol/L Final     Comment:     Reference intervals for this test were updated on 09/26/2023 to more accurately reflect our healthy population. There may be differences in the flagging of prior results with similar values performed with this method. Interpretation of those prior results can be made in the context of the updated reference intervals.    03/25/2021 144 133 - 144 mmol/L Final     Potassium   Date Value Ref Range Status   09/29/2023 3.9 3.4 - 5.3 mmol/L Final   09/25/2022 3.7 3.4 - 5.3 mmol/L Final   03/25/2021 3.7 3.4 - 5.3 mmol/L Final     Chloride   Date Value Ref Range Status   09/29/2023 109 (H) 98 - 107 mmol/L Final   09/25/2022 108 94 - 109 mmol/L Final   03/25/2021 114 (H) 94 - 109 mmol/L Final     Carbon Dioxide   Date Value Ref Range Status   03/25/2021 23 20 - 32 mmol/L Final     Carbon Dioxide (CO2)   Date Value Ref Range Status   09/29/2023 23 22 - 29 mmol/L Final   09/25/2022 28 20 - 32 mmol/L Final     Anion Gap   Date Value Ref Range Status   09/29/2023 10 7 - 15 mmol/L Final   09/25/2022 4 3 - 14 mmol/L Final   03/25/2021 7 3 - 14 mmol/L Final     Glucose   Date Value Ref Range Status   09/29/2023 82 70 - 99 mg/dL Final   09/25/2022 110 (H) 70 - 99 mg/dL Final   03/25/2021 93 70 - 99 mg/dL Final     Urea Nitrogen   Date Value Ref Range Status   09/29/2023 10.1 8.0 - 23.0 mg/dL Final   09/25/2022 10 7 - 30 mg/dL Final   03/25/2021 11 7 - 30 mg/dL Final     Creatinine   Date Value Ref Range Status   09/29/2023 1.01 (H) 0.51 - 0.95 mg/dL Final   03/25/2021 0.92 0.52 - 1.04 mg/dL Final     GFR Estimate  "  Date Value Ref Range Status   09/29/2023 63 >60 mL/min/1.73m2 Final   03/25/2021 68 >60 mL/min/[1.73_m2] Final     Comment:     Non  GFR Calc  Starting 12/18/2018, serum creatinine based estimated GFR (eGFR) will be   calculated using the Chronic Kidney Disease Epidemiology Collaboration   (CKD-EPI) equation.       Calcium   Date Value Ref Range Status   09/29/2023 9.3 8.8 - 10.2 mg/dL Final   03/25/2021 8.3 (L) 8.5 - 10.1 mg/dL Final     Bilirubin Total   Date Value Ref Range Status   09/29/2023 0.2 <=1.2 mg/dL Final   03/25/2021 0.3 0.2 - 1.3 mg/dL Final     Alkaline Phosphatase   Date Value Ref Range Status   09/29/2023 108 (H) 35 - 104 U/L Final   03/25/2021 110 40 - 150 U/L Final     ALT   Date Value Ref Range Status   09/29/2023 24 0 - 50 U/L Final     Comment:     Reference intervals for this test were updated on 6/12/2023 to more accurately reflect our healthy population. There may be differences in the flagging of prior results with similar values performed with this method. Interpretation of those prior results can be made in the context of the updated reference intervals.     03/25/2021 24 0 - 50 U/L Final     AST   Date Value Ref Range Status   09/29/2023 19 0 - 45 U/L Final     Comment:     Reference intervals for this test were updated on 6/12/2023 to more accurately reflect our healthy population. There may be differences in the flagging of prior results with similar values performed with this method. Interpretation of those prior results can be made in the context of the updated reference intervals.   03/25/2021 13 0 - 45 U/L Final       MENTAL STATUS EXAM                                                                                          Mood today described as \"I think I'm doing okay\"  No problems with speech . Thought process linear and logical. No evidence of any hallucinations or delusional thoughts. No SI.  No hallucinations. Insight good.       ASSESSMENT                 "                                                                                      HISTORICAL:  Initial psych note 10/13/15        NOTES:  Cymbalta -> agitation, angry and increase in diastolic BP. Propranolol 10 mg BID prn anxiety: lightheadedness    Tesha Blankenship is a 63 year old female. Tesha's daughter accompanied her on video visit December '23 and though I have been working with Tesha since 2015 I was not aware of her experiencing paranoia and delusions until the past year. Tesha ended up admitted in [place] and was started on Risperdal. She was very frustrated with the increase in appetite / weight gain with Risperdal. We changed to Latuda. She feels is helping and albeit still frustration with weight feels benefits outweigh the AEs of continuing Latuda. She met with Dr. Walters, neuropsych, recently and will have further testing mid-May. We agreed on having Tesha RTC in ~2 months.      TREATMENT RISK STATEMENT:  The risks, benefits, alternatives and potential adverse effects have been explained and are understood by the pt.  The pt agrees to the treatment plan with the ability to do so.   The pt knows to call the clinic for any problems or access emergency care if needed.        DIAGNOSES                     MDD recurrent, mild  Psychosis unspecified (Delateusional disorder vs. Paranoid personality disorder vs. MDD severe with psychotic features)  Rule out neurocognitive disorder  ESTRELLA      PLAN                                                                                                                    1)  MEDICATIONS:    Continue Prozac 60 mg daily and Latuda 40 mg daily with food.    2)  THERAPY:  No Change    3)  LABS:  None    4)  PT MONITOR [call for probs]:  worsening sx, SI/HI, SEs from meds    5)  REFERRALS [CD, medical, other]: none    6)  RTC:  ~2 months

## 2024-05-06 NOTE — TELEPHONE ENCOUNTER
Pregabalin  Last office visti 4/23/24  Last signed 180, 0 R on 2/15/24  Crystal Carrasco, RN  Care Coordination

## 2024-05-07 DIAGNOSIS — K21.00 GASTROESOPHAGEAL REFLUX DISEASE WITH ESOPHAGITIS WITHOUT HEMORRHAGE: ICD-10-CM

## 2024-05-07 DIAGNOSIS — E78.2 MIXED HYPERLIPIDEMIA: ICD-10-CM

## 2024-05-07 DIAGNOSIS — F33.3 SEVERE RECURRENT MAJOR DEPRESSIVE DISORDER WITH PSYCHOTIC FEATURES (H): ICD-10-CM

## 2024-05-07 RX ORDER — FAMOTIDINE 40 MG/1
40 TABLET, FILM COATED ORAL AT BEDTIME
Qty: 90 TABLET | Refills: 3 | Status: SHIPPED | OUTPATIENT
Start: 2024-05-07

## 2024-05-07 RX ORDER — ATORVASTATIN CALCIUM 10 MG/1
10 TABLET, FILM COATED ORAL
Qty: 6 TABLET | Refills: 0 | Status: SHIPPED | OUTPATIENT
Start: 2024-05-09 | End: 2024-06-07

## 2024-05-07 NOTE — TELEPHONE ENCOUNTER
[place]      Last Written Prescription Date:  2/29/24  Last Fill Quantity: 60,   # refills: 4  Last Office Visit: 4/23/24  Future Office visit:    Next 5 appointments (look out 90 days)      Jun 11, 2024  1:00 PM  (Arrive by 12:45 PM)  Adult Preventative Visit with Henna Moyer MD  [facility] [place] ([facility] [place] ) [address]  [place] [address]  [phone]             Routing refill request to provider for review/approval because:

## 2024-05-07 NOTE — TELEPHONE ENCOUNTER
Lipitor      Last Written Prescription Date:  3/4/24  Last Fill Quantity: 30,   # refills: 0  Last Office Visit: 4/23/24  Future Office visit:    Next 5 appointments (look out 90 days)      Jun 11, 2024  1:00 PM  (Arrive by 12:45 PM)  Adult Preventative Visit with Henna Moyer MD  [facility] ([facility] ) [address] [place] [facility] [address]  [phone]             Routing refill request to provider for review/approval because:      Pepcid      Last Written Prescription Date:  6/6/23  Last Fill Quantity: 90,   # refills: 3  Last Office Visit: 4/23/24  Future Office visit:    Next 5 appointments (look out 90 days)      Jun 11, 2024  1:00 PM  (Arrive by 12:45 PM)  Adult Preventative Visit with Henna Moyer MD  [facility] ([facility] ) [address]  [place] [address]  [phone]             Routing refill request to provider for review/approval because:

## 2024-05-08 DIAGNOSIS — M54.2 CHRONIC NECK PAIN: ICD-10-CM

## 2024-05-08 DIAGNOSIS — G89.29 CHRONIC NECK PAIN: ICD-10-CM

## 2024-05-08 RX ORDER — HYDROCODONE BITARTRATE AND ACETAMINOPHEN 7.5; 325 MG/1; MG/1
1 TABLET ORAL 2 TIMES DAILY PRN
Qty: 60 TABLET | Refills: 0 | Status: SHIPPED | OUTPATIENT
Start: 2024-05-08 | End: 2024-06-11

## 2024-05-08 NOTE — TELEPHONE ENCOUNTER
HYDROCODONE/APAP 7.5-325MG TAB       Last Written Prescription Date:  4/23/2024  Last Fill Quantity: 60,   # refills: 0  Last Office Visit: 4/23/2024  Future Office visit:    Next 5 appointments (look out 90 days)      Jun 11, 2024  1:00 PM  (Arrive by 12:45 PM)  Adult Preventative Visit with Henna Moyer MD  [facility] [place] ([facility] [place] ) [address] [place] [address] AVE  [place] [address]  [phone]             Routing refill request to provider for review/approval because:    Kimberly Boecker, RN

## 2024-05-10 RX ORDER — LURASIDONE HYDROCHLORIDE 20 MG/1
40 TABLET, FILM COATED ORAL DAILY
Qty: 180 TABLET | Refills: 3 | Status: SHIPPED | OUTPATIENT
Start: 2024-05-10 | End: 2024-05-15 | Stop reason: DRUGHIGH

## 2024-05-15 DIAGNOSIS — F22 DELUSIONAL DISORDER (H): Primary | ICD-10-CM

## 2024-05-15 RX ORDER — LURASIDONE HYDROCHLORIDE 40 MG/1
40 TABLET, FILM COATED ORAL DAILY
Qty: 90 TABLET | Refills: 3 | Status: SHIPPED | OUTPATIENT
Start: 2024-05-15 | End: 2024-07-15

## 2024-06-04 ENCOUNTER — TRANSFERRED RECORDS (OUTPATIENT)
Dept: HEALTH INFORMATION MANAGEMENT | Facility: CLINIC | Age: 64
End: 2024-06-04

## 2024-06-07 DIAGNOSIS — E78.2 MIXED HYPERLIPIDEMIA: ICD-10-CM

## 2024-06-07 RX ORDER — ATORVASTATIN CALCIUM 10 MG/1
TABLET, FILM COATED ORAL
Qty: 45 TABLET | Refills: 3 | Status: SHIPPED | OUTPATIENT
Start: 2024-06-07

## 2024-06-08 SDOH — HEALTH STABILITY: PHYSICAL HEALTH: ON AVERAGE, HOW MANY DAYS PER WEEK DO YOU ENGAGE IN MODERATE TO STRENUOUS EXERCISE (LIKE A BRISK WALK)?: 0 DAYS

## 2024-06-08 SDOH — HEALTH STABILITY: PHYSICAL HEALTH: ON AVERAGE, HOW MANY MINUTES DO YOU ENGAGE IN EXERCISE AT THIS LEVEL?: 0 MIN

## 2024-06-08 ASSESSMENT — SOCIAL DETERMINANTS OF HEALTH (SDOH): HOW OFTEN DO YOU GET TOGETHER WITH FRIENDS OR RELATIVES?: MORE THAN THREE TIMES A WEEK

## 2024-06-08 NOTE — COMMUNITY RESOURCES LIST (ENGLISH)
June 8, 2024           YOUR PERSONALIZED LIST OF SERVICES & PROGRAMS           NAVIGATION    Eligibility Screening      [facility] Health insurance application assistance  [address] [place] [address] (Distance: 1.2 miles)  Language: English  Fee: Free  Accessibility: Translation services      [facility] application assistance  [address] [place] [address] (Distance: 1.2 miles)  Language: English  Fee: Free      [employer] [place] - SNAP (formerly food stamps) Screening and Application help  Phone: (564) 153-1224  Website: https://www.[employer].org/programs/[place]-food-helpline/  Language: English  Hours: Mon 10:00 AM - 5:00 PM Tue 10:00 AM - 5:00 PM Wed 10:00 AM - 5:00 PM Thu 10:00 AM - 5:00 PM Fri 10:00 AM - 5:00 PM  Fee: Free  Accessibility: Ada accessible, Blind accommodation, Deaf or hard of hearing, Translation services        ASSISTANCE    Nutrition Benefits      [facility] application assistance  [address] [place] [address] (Distance: 1.2 miles)  Language: English  Fee: Free      [facility] application assistance  [address] [place] [address] (Distance: 1.2 miles)  Language: English  Fee: Free  Accessibility: Translation services      [employer] [place] - SNAP (formerly food stamps) Screening and Application help  Phone: (832) 846-8413  Website: https://www.[employer].org/programs/[place]-food-helpline/  Language: English  Hours: Mon 10:00 AM - 5:00 PM Tue 10:00 AM - 5:00 PM Wed 10:00 AM - 5:00 PM Thu 10:00 AM - 5:00 PM Fri 10:00 AM - 5:00 PM  Fee: Free  Accessibility: Ada accessible, Blind accommodation, Deaf or hard of hearing, Translation services    [facility]  [address] [facility] [address] [facility] [place] [address] (Distance: 1.2 miles)  Language: English  Fee: Free, Self pay      [employer]  [address] [place]  [address] (Distance: 19.9 miles)  Language: English  Fee: Free      [employer] [place] [employer]  Phone: (298) 362-9821  Website: www.STYLIGHT.neoSaej  Language: English, Marshallese  Hours: Mon 9:00 AM - 3:30 PM Tue 9:00 AM - 6:30 PM Wed 9:00 AM - 3:30 PM Thu 9:00 AM - 12:30 PM Fri 9:00 AM - 12:30 PM Sat 9:00 AM - 12:00 PM  Fee: Free               IMPORTANT NUMBERS & WEBSITES        Emergency Services  911  .   [facility]  211 http://211unitedway.org  .   Poison Control  (312) 972-3821 http://[place]poison.org http://wis[place]poison.org  .     Suicide and Crisis Lifeline  988 http://986GetBackline.org  .   Childhelp [place] Child Abuse Hotline  793.494.7231 http://Childhelphotline.org   .   [place] Sexual Assault Hotline  (262) 432-6204 (HOPE) http://M[employer] (Listn)n.neoSaej   .     National Runaway Safeline  (965) 489-7790 (RUNAWAY) http://[employer] Systems.neoSaej  .   Pregnancy & Postpartum Support  Call/text 088-942-8523  [place]: http://ppsupportmn.org  [place]: http://Kiwi Crate.com/wi  .   Substance Abuse National Helpline ([facility])  805-136-HELP (7572) http://Findtreatment.gov   .                DISCLAIMER: These resources have been generated via the [employer] Platform. [employer] does not endorse any service providers mentioned in this resource list. [employer] does not guarantee that the services mentioned in this resource list will be available to you or will improve your health or wellness.    [facility]

## 2024-06-10 NOTE — PATIENT INSTRUCTIONS
"Please update your tetanus at your local pharmacy   Preventive Care Advice   This is general advice we often give to help people stay healthy. Your care team may have specific advice just for you. Please talk to your care team about your own preventive care needs.  Lifestyle  Exercise at least 150 minutes each week (30 minutes a day, 5 days a week).  Do muscle strengthening activities 2 days a week. These help control your weight and prevent disease.  No smoking.  Wear sunscreen to prevent skin cancer.  Have your home tested for radon every 2 to 5 years. Radon is a colorless, odorless gas that can harm your lungs. To learn more, go to www.health.[facility].mn.us and search for \"Radon in Homes.\"  Keep guns unloaded and locked up in a safe place like a safe or gun vault, or, use a gun lock and hide the keys. Always lock away bullets separately. To learn more, visit Optoro.mn.gov and search for \"safe gun storage.\"  Nutrition  Eat 5 or more servings of fruits and vegetables each day.  Try wheat bread, brown rice and whole grain pasta (instead of white bread, rice, and pasta).  Get enough calcium and vitamin D. Check the label on foods and aim for 100% of the RDA (recommended daily allowance).  Regular exams  Have a dental exam and cleaning every 6 months.  See your health care team every year to talk about:  Any changes in your health.  Any medicines your care team has prescribed.  Preventive care, family planning, and ways to prevent chronic diseases.  Shots (vaccines)   HPV shots (up to age 26), if you've never had them before.  Hepatitis B shots (up to age 59), if you've never had them before.  COVID-19 shot: Get this shot when it's due.  Flu shot: Get a flu shot every year.  Tetanus shot: Get a tetanus shot every 10 years.  Pneumococcal, hepatitis A, and RSV shots: Ask your care team if you need these based on your risk.  Shingles shot (for age 50 and up).  General health tests  Diabetes screening:  Starting at age 35, Get " screened for diabetes at least every 3 years.  If you are younger than age 35, ask your care team if you should be screened for diabetes.  Cholesterol test: At age 39, start having a cholesterol test every 5 years, or more often if advised.  Bone density scan (DEXA): At age 50, ask your care team if you should have this scan for osteoporosis (brittle bones).  Hepatitis C: Get tested at least once in your life.  Abdominal aortic aneurysm screening: Talk to your doctor about having this screening if you:  Have ever smoked; and  Are biologically male; and  Are between the ages of 65 and 75.  STIs (sexually transmitted infections)  Before age 24: Ask your care team if you should be screened for STIs.  After age 24: Get screened for STIs if you're at risk. You are at risk for STIs (including HIV) if:  You are sexually active with more than one person.  You don't use condoms every time.  You or a partner was diagnosed with a sexually transmitted infection.  If you are at risk for HIV, ask about PrEP medicine to prevent HIV.  Get tested for HIV at least once in your life, whether you are at risk for HIV or not.  Cancer screening tests  Cervical cancer screening: If you have a cervix, begin getting regular cervical cancer screening tests at age 21. Most people who have regular screenings with normal results can stop after age 65. Talk about this with your provider.  Breast cancer scan (mammogram): If you've ever had breasts, begin having regular mammograms starting at age 40. This is a scan to check for breast cancer.  Colon cancer screening: It is important to start screening for colon cancer at age 45.  Have a colonoscopy test every 10 years (or more often if you're at risk) Or, ask your provider about stool tests like a FIT test every year or Cologuard test every 3 years.  To learn more about your testing options, visit: www.AppTrigger/834703.pdf.  For help making a decision, visit: katy/pm73049.  Prostate cancer  screening test: If you have a prostate and are age 55 to 69, ask your provider if you would benefit from a yearly prostate cancer screening test.  Lung cancer screening: If you are a current or former smoker age 50 to 80, ask your care team if ongoing lung cancer screenings are right for you.  For informational purposes only. Not to replace the advice of your health care provider. Copyright   2023 [facility]. All rights reserved. Clinically reviewed by the [facility] Transitions Program. Preo [id number] - REV 04/24.

## 2024-06-11 ENCOUNTER — OFFICE VISIT (OUTPATIENT)
Dept: FAMILY MEDICINE | Facility: OTHER | Age: 64
End: 2024-06-11
Attending: FAMILY MEDICINE
Payer: COMMERCIAL

## 2024-06-11 VITALS
DIASTOLIC BLOOD PRESSURE: 84 MMHG | WEIGHT: 193 LBS | RESPIRATION RATE: 17 BRPM | SYSTOLIC BLOOD PRESSURE: 128 MMHG | OXYGEN SATURATION: 98 % | BODY MASS INDEX: 31.02 KG/M2 | TEMPERATURE: 98.1 F | HEIGHT: 66 IN | HEART RATE: 85 BPM

## 2024-06-11 DIAGNOSIS — M54.2 CERVICAL PAIN: ICD-10-CM

## 2024-06-11 DIAGNOSIS — F22 DELUSIONAL DISORDER (H): ICD-10-CM

## 2024-06-11 DIAGNOSIS — E78.2 MIXED HYPERLIPIDEMIA: ICD-10-CM

## 2024-06-11 DIAGNOSIS — G89.29 CHRONIC NECK PAIN: ICD-10-CM

## 2024-06-11 DIAGNOSIS — G31.84 MCI (MILD COGNITIVE IMPAIRMENT): ICD-10-CM

## 2024-06-11 DIAGNOSIS — M54.2 CHRONIC NECK PAIN: ICD-10-CM

## 2024-06-11 DIAGNOSIS — G89.29 OTHER CHRONIC PAIN: Chronic | ICD-10-CM

## 2024-06-11 DIAGNOSIS — G43.711 INTRACTABLE CHRONIC MIGRAINE WITHOUT AURA AND WITH STATUS MIGRAINOSUS: Chronic | ICD-10-CM

## 2024-06-11 DIAGNOSIS — Z00.00 ENCOUNTER FOR MEDICARE ANNUAL WELLNESS EXAM: Primary | ICD-10-CM

## 2024-06-11 DIAGNOSIS — M54.12 CERVICAL RADICULOPATHY: ICD-10-CM

## 2024-06-11 LAB
ALBUMIN SERPL BCG-MCNC: 4 G/DL (ref 3.5–5.2)
ALP SERPL-CCNC: 103 U/L (ref 40–150)
ALT SERPL W P-5'-P-CCNC: 26 U/L (ref 0–50)
ANION GAP SERPL CALCULATED.3IONS-SCNC: 9 MMOL/L (ref 7–15)
AST SERPL W P-5'-P-CCNC: 23 U/L (ref 0–45)
BILIRUB SERPL-MCNC: 0.2 MG/DL
BUN SERPL-MCNC: 8.3 MG/DL (ref 8–23)
CALCIUM SERPL-MCNC: 8.8 MG/DL (ref 8.8–10.2)
CHLORIDE SERPL-SCNC: 105 MMOL/L (ref 98–107)
CHOLEST SERPL-MCNC: 203 MG/DL
CREAT SERPL-MCNC: 0.99 MG/DL (ref 0.51–0.95)
DEPRECATED HCO3 PLAS-SCNC: 25 MMOL/L (ref 22–29)
EGFRCR SERPLBLD CKD-EPI 2021: 64 ML/MIN/1.73M2
FASTING STATUS PATIENT QL REPORTED: NO
FASTING STATUS PATIENT QL REPORTED: NO
GLUCOSE SERPL-MCNC: 118 MG/DL (ref 70–99)
HDLC SERPL-MCNC: 64 MG/DL
LDLC SERPL CALC-MCNC: 108 MG/DL
NONHDLC SERPL-MCNC: 139 MG/DL
POTASSIUM SERPL-SCNC: 4 MMOL/L (ref 3.4–5.3)
PROT SERPL-MCNC: 6.8 G/DL (ref 6.4–8.3)
SODIUM SERPL-SCNC: 139 MMOL/L (ref 135–145)
TRIGL SERPL-MCNC: 156 MG/DL

## 2024-06-11 PROCEDURE — 82465 ASSAY BLD/SERUM CHOLESTEROL: CPT | Mod: ZL | Performed by: FAMILY MEDICINE

## 2024-06-11 PROCEDURE — 84155 ASSAY OF PROTEIN SERUM: CPT | Mod: ZL | Performed by: FAMILY MEDICINE

## 2024-06-11 PROCEDURE — G0463 HOSPITAL OUTPT CLINIC VISIT: HCPCS

## 2024-06-11 PROCEDURE — G0439 PPPS, SUBSEQ VISIT: HCPCS | Performed by: FAMILY MEDICINE

## 2024-06-11 PROCEDURE — 99213 OFFICE O/P EST LOW 20 MIN: CPT | Mod: 25 | Performed by: FAMILY MEDICINE

## 2024-06-11 PROCEDURE — 36415 COLL VENOUS BLD VENIPUNCTURE: CPT | Mod: ZL | Performed by: FAMILY MEDICINE

## 2024-06-11 RX ORDER — HYDROCODONE BITARTRATE AND ACETAMINOPHEN 7.5; 325 MG/1; MG/1
1 TABLET ORAL 2 TIMES DAILY PRN
Qty: 60 TABLET | Refills: 0 | Status: SHIPPED | OUTPATIENT
Start: 2024-06-11 | End: 2024-07-31

## 2024-06-11 RX ORDER — METHOCARBAMOL 750 MG/1
750 TABLET, FILM COATED ORAL 3 TIMES DAILY PRN
Qty: 45 TABLET | Refills: 3 | Status: SHIPPED | OUTPATIENT
Start: 2024-06-11

## 2024-06-11 ASSESSMENT — ENCOUNTER SYMPTOMS
ABDOMINAL PAIN: 0
CHILLS: 0
SHORTNESS OF BREATH: 0
UNEXPECTED WEIGHT CHANGE: 1
NERVOUS/ANXIOUS: 1
DYSPHORIC MOOD: 1
PALPITATIONS: 0
FEVER: 0
BACK PAIN: 1

## 2024-06-11 ASSESSMENT — PAIN SCALES - GENERAL: PAINLEVEL: MODERATE PAIN (5)

## 2024-06-11 NOTE — PROGRESS NOTES
Preventive Care Visit  [facility]  Henna Moyer MD, Family Medicine  Jun 11, 2024      Assessment & Plan     Encounter for Medicare annual wellness exam  Discussed and updated preventive cares  Repeat wellness visit in one year     Mixed hyperlipidemia  LDL improved to 108 from 190. LFTs wnl  - Lipid Profile (Chol, Trig, HDL, LDL calc)  - Comprehensive metabolic panel (BMP + Alb, Alk Phos, ALT, AST, Total. Bili, TP)  - c/w atorvastatin twice per week     Chronic neck pain / Other chronic pain / Cervical radicular pain / Cervical pain  Stable. If Tesha starts to use norco daily , consideration for change to butrans patch  [place] PDMP reviewed and appropriate    - HYDROcodone-acetaminophen (NORCO) 7.5-325 MG per tablet; Take 1 tablet by mouth 2 times daily as needed for pain  - methocarbamol (ROBAXIN) 750 MG tablet; Take 1 tablet (750 mg) by mouth 3 times daily as needed for muscle spasms    Intractable chronic migraine without aura and with status migrainosus  Follows with [facility] neurology for botox with next visit 6/17/2024  Memory did not improve with the discontinuation of topamax    Delusional disorder (H)  Follows with Dr Herbert with next visit 7/8/2024    MCI (mild cognitive impairment)  S/p evaluation by [facility] neuropsych. Repeat testing in one year  Neurology visit to complete work up on 8/15/2024      Counseling  Appropriate preventive services were discussed with this patient, including applicable screening as appropriate for fall prevention, nutrition, physical activity, Tobacco-use cessation, weight loss and cognition.  Checklist reviewing preventive services available has been given to the patient.  Reviewed patient's diet, addressing concerns and/or questions.   Updated plan of care.  Patient reported difficulty with activities of daily living were addressed today.Patient reported safety concerns were addressed today.The patient was provided with written information regarding signs  of hearing loss.   Information on urinary incontinence and treatment options given to patient.   I have reviewed Opioid Use Disorder and Substance Use Disorder risk factors and made any needed referrals.       See Patient Instructions    Return in about 3 months (around 9/11/2024) for recheck .    Danni Parkinson is a 64 year old, presenting for the following:  Physical      Health Care Directive  Patient does not have a Health Care Directive or Living Will: Discussed advance care planning with patient; information given to patient to review.    HPI  Hyperlipidemia Follow-Up    Are you regularly taking any medication or supplement to lower your cholesterol?   Yes- Lipitor  Are you having muscle aches or other side effects that you think could be caused by your cholesterol lowering medication?  No    # migraines   - getting botox     # cognition    - s/p evaluation by neuropsych, Dr Walters (6/4/2024). Repeat testing in 12 months  - dx with MCI and delusional disorder  - moderate risk for progression to dementia  - will be seeing neurology to complete work up    # mood  - medication is making her hungry     # Social  - has not be to the [facility] for [facility] care  - has $ 8000 deductible         6/8/2024   General Health   How would you rate your overall physical health? Good   Feel stress (tense, anxious, or unable to sleep) Very much   (!) STRESS CONCERN      6/8/2024   Nutrition   Diet: Other   If other, please elaborate: Mediterranean         6/8/2024   Exercise   Days per week of moderate/strenous exercise 0 days   Average minutes spent exercising at this level 0 min   (!) EXERCISE CONCERN      6/8/2024   Social Factors   Frequency of gathering with friends or relatives More than three times a week   Worry food won't last until get money to buy more Yes   Food not last or not have enough money for food? Yes   Do you have housing?  Yes   Are you worried about losing your housing? No   Lack of transportation? No    Unable to get utilities (heat,electricity)? No   (!) FOOD SECURITY CONCERN PRESENT      2024   Fall Risk   Fallen 2 or more times in the past year? No   Trouble with walking or balance? No          2024   Activities of Daily Living- Home Safety   Needs help with the following daily activites Shopping    Housework    Laundry   Safety concerns in the home Throw rugs in the hallway         2024   Dental   Dentist two times every year? Yes         2024   Hearing Screening   Hearing concerns? (!) TROUBLE UNDERSTANDING SOFT OR WHISPERED SPEECH.         2024   Driving Risk Screening   Patient/family members have concerns about driving No         2024   General Alertness/Fatigue Screening   Have you been more tired than usual lately? No         2024   Urinary Incontinence Screening   Bothered by leaking urine in past 6 months Yes         2024   TB Screening   Were you born outside of the [place]? Yes         Today's PHQ-9 Score:       2024     9:55 AM   PHQ-9 SCORE   PHQ-9 Total Score MyChart 7 (Mild depression)   PHQ-9 Total Score 7           2024   Substance Use   Alcohol more than 3/day or more than 7/wk Not Applicable   Do you have a current opioid prescription? (!) YES   How severe/bad is pain from 1 to 10? 5/10   Do you use any other substances recreationally? No     Social History     Tobacco Use    Smoking status: Former     Current packs/day: 0.00     Types: Cigarettes     Start date: 1981     Quit date: 1997     Years since quittin.4    Smokeless tobacco: Never   Vaping Use    Vaping status: Never Used   Substance Use Topics    Alcohol use: No    Drug use: No           2023   LAST FHS-7 RESULTS   1st degree relative breast or ovarian cancer No   Any relative bilateral breast cancer No   Any male have breast cancer No   Any ONE woman have BOTH breast AND ovarian cancer No   Any woman with breast cancer before 50yrs No   2 or more relatives with breast AND/OR  ovarian cancer No   2 or more relatives with breast AND/OR bowel cancer No       Mammogram Screening - Mammogram every 1-2 years updated in Health Maintenance based on mutual decision making      History of abnormal Pap smear:         Latest Ref Rng & Units 9/27/2018     9:41 AM   PAP / HPV   PAP (Historical)  NIL    HPV 16 DNA NEG^Negative Negative    HPV 18 DNA NEG^Negative Negative    Other HR HPV NEG^Negative Negative      ASCVD Risk   The 10-year ASCVD risk score (Pedro DK, et al., 2019) is: 5.1%    Values used to calculate the score:      Age: 64 years      Sex: Female      Is Non- : No      Diabetic: No      Tobacco smoker: No      Systolic Blood Pressure: 128 mmHg      Is BP treated: No      HDL Cholesterol: 64 mg/dL      Total Cholesterol: 275 mg/dL    # Wellness:  - Pap: s/p hysterectomy   - Mammogram: due 9/12/2024  - Immunizations: tdap (pharm), COVID (declines at this time)  - Lipids: LDL (3/4/2024) 190. Started atorvastatin, taking twice per week, sneezes / runny nose after taking medication    - Dexa scan:  - Colon cancer screening: colonoscopy (2/9/2018, [facility]) normal. Repeat 10 years  - Lung cancer screening: quit 1997  - AAA screening:       Reviewed and updated as needed this visit by Provider   Tobacco  Allergies  Meds  Problems  Med Hx  Surg Hx  Fam Hx              Current providers sharing in care for this patient include:  Patient Care Team:  Henna Moyer MD as PCP - General (Family Medicine)  Gemini Hathaway, RN as Registered Nurse  Zoe Herbert MD as Assigned Behavioral Health Provider  Henna Moyer MD as Assigned PCP  Henna Moyer MD as Assigned Pain Medication Provider  America Aguilar PA-C as Assigned Surgical Provider  Jimbo Lala RPH as Pharmacist (Pharmacist)    The following health maintenance items are reviewed in Epic and correct as of today:  Health Maintenance   Topic Date Due    Pneumococcal Vaccine:  "Pediatrics (0 to 5 Years) and At-Risk Patients (6 to 64 Years) (2 of 2 - PPSV23 or PCV20) 11/22/2018    ZOSTER IMMUNIZATION (2 of 2) 03/16/2021    RSV VACCINE (Pregnancy & 60+) (1 - 1-dose 60+ series) Never done    COVID-19 Vaccine (5 - 2023-24 season) 09/01/2023    DTAP/TDAP/TD IMMUNIZATION (2 - Td or Tdap) 11/06/2023    ESTRELLA ASSESSMENT  07/23/2024    PHQ-9  07/23/2024    LIPID  03/04/2025    URINE DRUG SCREEN  03/04/2025    CONTROLLED SUBSTANCE AGREEMENT FOR CHRONIC PAIN MANAGEMENT  03/05/2025    MEDICARE ANNUAL WELLNESS VISIT  06/11/2025    MAMMO SCREENING  09/12/2025    GLUCOSE  09/29/2026    ADVANCE CARE PLANNING  12/06/2026    COLORECTAL CANCER SCREENING  02/09/2028    SPIROMETRY  Completed    COPD ACTION PLAN  Completed    DEPRESSION ACTION PLAN  Completed    INFLUENZA VACCINE  Completed    HEPATITIS C SCREENING  Addressed    HIV SCREENING  Addressed    IPV IMMUNIZATION  Aged Out    HPV IMMUNIZATION  Aged Out    MENINGITIS IMMUNIZATION  Aged Out    RSV MONOCLONAL ANTIBODY  Aged Out    HPV TEST  Discontinued    PAP  Discontinued       Review of Systems   Constitutional:  Positive for unexpected weight change (weight gain). Negative for chills and fever.   HENT:  Negative for congestion.    Respiratory:  Negative for shortness of breath.    Cardiovascular:  Negative for chest pain and palpitations.   Gastrointestinal:  Negative for abdominal pain.   Endocrine:        No breast tenderness   Genitourinary:  Negative for vaginal bleeding.   Musculoskeletal:  Positive for back pain.   Psychiatric/Behavioral:  Positive for dysphoric mood. The patient is nervous/anxious.         Cognition issues          Objective    Exam  /84   Pulse 85   Temp 98.1  F (36.7  C) (Tympanic)   Resp 17   Ht 1.676 m (5' 6\")   Wt 87.5 kg (193 lb)   SpO2 98%   BMI 31.15 kg/m     Estimated body mass index is 31.15 kg/m  as calculated from the following:    Height as of this encounter: 1.676 m (5' 6\").    Weight as of this " encounter: 87.5 kg (193 lb).    Physical Exam  Constitutional:       General: She is not in acute distress.     Appearance: She is well-developed.   HENT:      Head: Normocephalic and atraumatic.      Right Ear: Hearing and tympanic membrane normal.      Left Ear: Hearing and tympanic membrane normal.      Mouth/Throat:      Mouth: Mucous membranes are moist.      Pharynx: No oropharyngeal exudate.   Eyes:      Extraocular Movements: Extraocular movements intact.      Conjunctiva/sclera: Conjunctivae normal.   Neck:      Thyroid: No thyromegaly.   Cardiovascular:      Rate and Rhythm: Normal rate and regular rhythm.      Pulses: Normal pulses.      Heart sounds: Normal heart sounds. No murmur heard.  Pulmonary:      Effort: Pulmonary effort is normal. No respiratory distress.      Breath sounds: Normal breath sounds. No wheezing or rales.   Abdominal:      General: Bowel sounds are normal. There is no distension.      Palpations: Abdomen is soft.      Tenderness: There is no abdominal tenderness. There is no guarding.   Musculoskeletal:         General: Normal range of motion.      Cervical back: Normal range of motion and neck supple.      Right lower leg: No edema.      Left lower leg: No edema.   Lymphadenopathy:      Cervical: No cervical adenopathy.   Skin:     General: Skin is dry.      Comments: No concerning findings on her back    Neurological:      Mental Status: She is alert.   Psychiatric:         Mood and Affect: Mood is anxious and depressed.       Results for orders placed or performed in visit on 06/11/24   Lipid Profile (Chol, Trig, HDL, LDL calc)     Status: Abnormal   Result Value Ref Range    Cholesterol 203 (H) <200 mg/dL    Triglycerides 156 (H) <150 mg/dL    Direct Measure HDL 64 >=50 mg/dL    LDL Cholesterol Calculated 108 (H) <=100 mg/dL    Non HDL Cholesterol 139 (H) <130 mg/dL    Patient Fasting > 8hrs? No     Narrative    Cholesterol  Desirable:  <200 mg/dL    Triglycerides  Normal:  Less  than 150 mg/dL  Borderline High:  150-199 mg/dL  High:  200-499 mg/dL  Very High:  Greater than or equal to 500 mg/dL    Direct Measure HDL  Female:  Greater than or equal to 50 mg/dL   Male:  Greater than or equal to 40 mg/dL    LDL Cholesterol  Desirable:  <100mg/dL  Above Desirable:  100-129 mg/dL   Borderline High:  130-159 mg/dL   High:  160-189 mg/dL   Very High:  >= 190 mg/dL    Non HDL Cholesterol  Desirable:  130 mg/dL  Above Desirable:  130-159 mg/dL  Borderline High:  160-189 mg/dL  High:  190-219 mg/dL  Very High:  Greater than or equal to 220 mg/dL   Comprehensive metabolic panel (BMP + Alb, Alk Phos, ALT, AST, Total. Bili, TP)     Status: Abnormal   Result Value Ref Range    Sodium 139 135 - 145 mmol/L    Potassium 4.0 3.4 - 5.3 mmol/L    Carbon Dioxide (CO2) 25 22 - 29 mmol/L    Anion Gap 9 7 - 15 mmol/L    Urea Nitrogen 8.3 8.0 - 23.0 mg/dL    Creatinine 0.99 (H) 0.51 - 0.95 mg/dL    GFR Estimate 64 >60 mL/min/1.73m2    Calcium 8.8 8.8 - 10.2 mg/dL    Chloride 105 98 - 107 mmol/L    Glucose 118 (H) 70 - 99 mg/dL    Alkaline Phosphatase 103 40 - 150 U/L    AST 23 0 - 45 U/L    ALT 26 0 - 50 U/L    Protein Total 6.8 6.4 - 8.3 g/dL    Albumin 4.0 3.5 - 5.2 g/dL    Bilirubin Total 0.2 <=1.2 mg/dL    Patient Fasting > 8hrs? No               6/11/2024   Mini Cog   Clock Draw Score 2 Normal   3 Item Recall 0 objects recalled   Mini Cog Total Score 2              Signed Electronically by: Henna Moyer MD

## 2024-07-15 ENCOUNTER — VIRTUAL VISIT (OUTPATIENT)
Dept: PSYCHIATRY | Facility: OTHER | Age: 64
End: 2024-07-15
Attending: PSYCHIATRY & NEUROLOGY
Payer: COMMERCIAL

## 2024-07-15 DIAGNOSIS — F22 DELUSIONAL DISORDER (H): Primary | ICD-10-CM

## 2024-07-15 PROCEDURE — 99443 PR PHYSICIAN TELEPHONE EVALUATION 21-30 MIN: CPT | Mod: 93 | Performed by: PSYCHIATRY & NEUROLOGY

## 2024-07-15 RX ORDER — ASENAPINE 5 MG/1
5 TABLET SUBLINGUAL AT BEDTIME
Qty: 30 TABLET | Refills: 5 | Status: SHIPPED | OUTPATIENT
Start: 2024-07-15 | End: 2024-08-19

## 2024-07-15 ASSESSMENT — ANXIETY QUESTIONNAIRES
3. WORRYING TOO MUCH ABOUT DIFFERENT THINGS: SEVERAL DAYS
GAD7 TOTAL SCORE: 13
1. FEELING NERVOUS, ANXIOUS, OR ON EDGE: MORE THAN HALF THE DAYS
2. NOT BEING ABLE TO STOP OR CONTROL WORRYING: SEVERAL DAYS
GAD7 TOTAL SCORE: 13
6. BECOMING EASILY ANNOYED OR IRRITABLE: NEARLY EVERY DAY
5. BEING SO RESTLESS THAT IT IS HARD TO SIT STILL: NEARLY EVERY DAY
7. FEELING AFRAID AS IF SOMETHING AWFUL MIGHT HAPPEN: SEVERAL DAYS

## 2024-07-15 ASSESSMENT — PAIN SCALES - GENERAL: PAINLEVEL: MILD PAIN (3)

## 2024-07-15 ASSESSMENT — PATIENT HEALTH QUESTIONNAIRE - PHQ9
5. POOR APPETITE OR OVEREATING: MORE THAN HALF THE DAYS
SUM OF ALL RESPONSES TO PHQ QUESTIONS 1-9: 15

## 2024-07-15 NOTE — PROGRESS NOTES
"Virtual Visit Details    Type of service:  Telephone Visit   Phone call duration: 35 minutes   Originating Location (pt. Location): Home    Distant Location (provider location):  Off-site     Start time:9:30 am  End time: *10:05am    SUBJECTIVE / INTERIM HISTORY                                                                         Last visit May '24: Continue Prozac 60 mg daily and Latuda 40 mg daily with food.      - Tesha notes she is upset about our country   - legs won't stop moving. Notes she has cousin's that have restless legs. \"I cannot sit still\". Constantly rubbing feet together    - Dr. Walters \"broke the news\" to Tesha about mild neurocognitive disorder. Tesha notes she notices things more now for example she left the burner on  - [place]. Tolerating it. People there were all begging her to come to a barbeque recently. Tesha didn't want to go and notes she feels out of place. They are much older than she is.  - thinking about volunteering at the [employer]   - at daughter's house [place] ~ [place] near [employer] in [place]  - grew up [place] and then family moved all over the state [place] in relation to dad's payal  - hx \"mental breakdown\" when had lots of things going on.   - doesn't know mom's side history   - granddaughter Jess 1 yo  - Noemy  working on her AA. 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 Noemy's dad, Akash, down in [place] helping take care of quarter horse ranch. The roel had a stroke.  - cousin in [place] colorectal cancer  - Accident Jan '20 with nephew Michael 8 yo. He has PTSD since    MEDICAL / SURGICAL HISTORY                    Patient Active Problem List   Diagnosis    Degenerative disc disease, cervical    Pseudoarthrosis of cervical spine (H)    Cervical pain    Irritable bowel syndrome    Depression, major    Chronic, continuous use of opioids    Chronic rhinitis    Chronic maxillary sinusitis    Hypertrophy of inferior nasal turbinate    Chronic pain    Chronic " tension-type headache, intractable    History of arthrodesis    Myofascial pain    Disuse syndrome    Intractable chronic migraine without aura and with status migrainosus    Gastroesophageal reflux disease with esophagitis    Mild episode of recurrent major depressive disorder (H24)    Ulnar neuropathy at elbow of left upper extremity    Lumbar radiculopathy    S/P cervical spinal fusion    ACP (advance care planning)    Calculus of kidney    Pulmonary emphysema, unspecified emphysema type (H)    Pelvic floor dysfunction    Mixed hyperlipidemia    Rheumatoid factor positive. MARISEL and anti CCP negative    Insomnia, unspecified type    Cervical radicular pain    Psoriasis with arthropathy (H) - follows with Dr Francisco    Memory change    Delusional disorder (H)    History of anxiety    History of depression    MCI (mild cognitive impairment)     ALLERGY   Aspirin, Ibuprofen, Lansoprazole, Red dye, Bupropion, Bupropion hcl, and Demerol [meperidine]  MEDICATIONS                                                                                              Current Outpatient Medications   Medication Sig Dispense Refill    AIMOVIG 140 MG/ML injection INJECT 140 MG UNDER THE SKIN EVERY 28 (TWENTY-EIGHT) DAYS.      albuterol (PROAIR HFA/PROVENTIL HFA/VENTOLIN HFA) 108 (90 Base) MCG/ACT inhaler INHALE 2 PUFFS INTO THE LUNGS EVERY 4 HOURS AS NEEDED FOR SHORTNESS OF BREATH OR WHEEZING. 18 g 0    atorvastatin (LIPITOR) 10 MG tablet TAKE ONE (1) TABLET BY MOUTH TWICE A WEEK 45 tablet 3    baclofen (LIORESAL) 10 MG tablet TAKE 1 TABLET (10 MG) BY MOUTH DAILY 90 tablet 3    botulinum toxin type A (BOTOX) 100 units injection Inject 155 Units into the muscle once every twelve weeks      budesonide-formoterol (SYMBICORT) 80-4.5 MCG/ACT Inhaler Inhale 2 puffs into the lungs 2 times daily 10.2 g 1    butalbital-acetaminophen-caffeine (ESGIC) -40 MG tablet Take 1 tablet by mouth every 4 hours as needed TAKE 1 TO 2 TABLETS BY MOUTH AT  THE ONSET OF A MIGRAINE HEADACHE, LIMIT NO MORE THAN 3 TIMES PER WEEK. ACETAMINOPHEN SHOULD BE LIMITED TO 40      Calcium Carbonate Antacid 1177 MG CHEW       Cholecalciferol (VITAMIN D3) 1000 UNITS CAPS Take 1,000 Units by mouth Takes 5000 unit capsule daily      dexlansoprazole (DEXILANT) 60 MG CPDR CR capsule TAKE 1 CAPSULE (60 MG) BY MOUTH DAILY 90 capsule 2    docusate sodium (COLACE) 100 MG capsule Take 100 mg by mouth daily       famotidine (PEPCID) 40 MG tablet TAKE 1 TABLET BY MOUTH AT BEDTIME 90 tablet 3    FLUoxetine (PROZAC) 20 MG capsule Take 1 capsule daily along with 40 mg for total daily dose 60 mg 90 capsule 3    FLUoxetine (PROZAC) 40 MG capsule Take 1 capsule daily along with 20 mg capsule 90 capsule 3    HYDROcodone-acetaminophen (NORCO) 7.5-325 MG per tablet Take 1 tablet by mouth 2 times daily as needed for pain 60 tablet 0    ibuprofen (ADVIL/MOTRIN) 800 MG tablet TAKE 1 TABLET (800 MG) BY MOUTH EVERY 8 HOURS AS NEEDED FOR MODERATE PAIN 90 tablet 1    loratadine (CLARITIN) 10 MG tablet Take 10 mg by mouth      lurasidone (LATUDA) 40 MG TABS tablet Take 1 tablet (40 mg) by mouth daily 90 tablet 3    methocarbamol (ROBAXIN) 750 MG tablet Take 1 tablet (750 mg) by mouth 3 times daily as needed for muscle spasms 45 tablet 3    Multiple Vitamins-Minerals (CENTRUM SILVER ULTRA WOMENS) TABS Take 1 tablet by mouth daily       ondansetron (ZOFRAN ODT) 4 MG ODT tab Take 1 tablet (4 mg) by mouth every 6 hours as needed (after migraine medication) 30 tablet 1    polyethylene glycol (MIRALAX) 17 g packet Take 1 packet by mouth      pregabalin (LYRICA) 150 MG capsule TAKE 1 CAPSULE BY MOUTH TWICE A  capsule 0    senna (SENOKOT) 8.6 MG tablet Take 1 tablet by mouth      sucralfate (CARAFATE) 1 GM/10ML suspension Take 10 mLs (1 g) by mouth 4 times daily 420 mL 3    TREMFYA 100 MG/ML SOPN INJECT 100MG UNDER THE SKIN ONCE A WEEK AT WEEK 0 AND WEEK 4,THEN INJECT 100MG EVERY 8 WEEKS       No current  facility-administered medications for this visit.       VITALS   There were no vitals taken for this visit.     LABS                                                                                                                         Last Comprehensive Metabolic Panel:  Sodium   Date Value Ref Range Status   06/11/2024 139 135 - 145 mmol/L Final     Comment:     Reference intervals for this test were updated on 09/26/2023 to more accurately reflect our healthy population. There may be differences in the flagging of prior results with similar values performed with this method. Interpretation of those prior results can be made in the context of the updated reference intervals.    03/25/2021 144 133 - 144 mmol/L Final     Potassium   Date Value Ref Range Status   06/11/2024 4.0 3.4 - 5.3 mmol/L Final   09/25/2022 3.7 3.4 - 5.3 mmol/L Final   03/25/2021 3.7 3.4 - 5.3 mmol/L Final     Chloride   Date Value Ref Range Status   06/11/2024 105 98 - 107 mmol/L Final   09/25/2022 108 94 - 109 mmol/L Final   03/25/2021 114 (H) 94 - 109 mmol/L Final     Carbon Dioxide   Date Value Ref Range Status   03/25/2021 23 20 - 32 mmol/L Final     Carbon Dioxide (CO2)   Date Value Ref Range Status   06/11/2024 25 22 - 29 mmol/L Final   09/25/2022 28 20 - 32 mmol/L Final     Anion Gap   Date Value Ref Range Status   06/11/2024 9 7 - 15 mmol/L Final   09/25/2022 4 3 - 14 mmol/L Final   03/25/2021 7 3 - 14 mmol/L Final     Glucose   Date Value Ref Range Status   06/11/2024 118 (H) 70 - 99 mg/dL Final   09/25/2022 110 (H) 70 - 99 mg/dL Final   03/25/2021 93 70 - 99 mg/dL Final     Urea Nitrogen   Date Value Ref Range Status   06/11/2024 8.3 8.0 - 23.0 mg/dL Final   09/25/2022 10 7 - 30 mg/dL Final   03/25/2021 11 7 - 30 mg/dL Final     Creatinine   Date Value Ref Range Status   06/11/2024 0.99 (H) 0.51 - 0.95 mg/dL Final   03/25/2021 0.92 0.52 - 1.04 mg/dL Final     GFR Estimate   Date Value Ref Range Status   06/11/2024 64 >60  mL/min/1.73m2 Final   03/25/2021 68 >60 mL/min/[1.73_m2] Final     Comment:     Non  GFR Calc  Starting 12/18/2018, serum creatinine based estimated GFR (eGFR) will be   calculated using the Chronic Kidney Disease Epidemiology Collaboration   (CKD-EPI) equation.       Calcium   Date Value Ref Range Status   06/11/2024 8.8 8.8 - 10.2 mg/dL Final   03/25/2021 8.3 (L) 8.5 - 10.1 mg/dL Final     Bilirubin Total   Date Value Ref Range Status   06/11/2024 0.2 <=1.2 mg/dL Final   03/25/2021 0.3 0.2 - 1.3 mg/dL Final     Alkaline Phosphatase   Date Value Ref Range Status   06/11/2024 103 40 - 150 U/L Final   03/25/2021 110 40 - 150 U/L Final     ALT   Date Value Ref Range Status   06/11/2024 26 0 - 50 U/L Final     Comment:     Reference intervals for this test were updated on 6/12/2023 to more accurately reflect our healthy population. There may be differences in the flagging of prior results with similar values performed with this method. Interpretation of those prior results can be made in the context of the updated reference intervals.     03/25/2021 24 0 - 50 U/L Final     AST   Date Value Ref Range Status   06/11/2024 23 0 - 45 U/L Final     Comment:     Reference intervals for this test were updated on 6/12/2023 to more accurately reflect our healthy population. There may be differences in the flagging of prior results with similar values performed with this method. Interpretation of those prior results can be made in the context of the updated reference intervals.   03/25/2021 13 0 - 45 U/L Final       MENTAL STATUS EXAM                                                                                          Mood described as irritable.  No problems with speech . Thought process linear and logical. No evidence of any hallucinations or delusional thoughts. No SI.  No hallucinations. Insight good.       ASSESSMENT                                                                                                       HISTORICAL:  Initial psych note 10/13/15        NOTES:  Cymbalta -> agitation, angry and increase in diastolic BP. Propranolol 10 mg BID prn anxiety: lightheadedness    Tesha Blankenship is a 64 year old female. Tesha's daughter accompanied her on video visit December '23 and though I have been working with Tesha since 2015 I was not aware of her experiencing paranoia and delusions until the past year. Tesha ended up admitted in [place] and was started on Risperdal. We ended up changing to Latuda. Today Tesha reports to me symptoms of akathisia and it sounds extremely bothersome. Reviewed other options and agreedon Saphris. Discussed it still comes with risk akathisia along with TD, appetite / weight gain (though I tend to see less as compared to thing slike Risperdal, Seroquel, Zyprxa). Discussed sedating thus my hope it can help her sleep.    She had visit with Dr. Walters and she was really upset about dx mild neurocognitive disorder. We discussed considering medication (discussed Aricept and Namenda) however I would like to start with med change above. Also, she is going to be seeing  aneurology whom she notes specializes in memory issues in near future.      TREATMENT RISK STATEMENT:  The risks, benefits, alternatives and potential adverse effects have been explained and are understood by the pt.  The pt agrees to the treatment plan with the ability to do so.   The pt knows to call the clinic for any problems or access emergency care if needed.        DIAGNOSES                     MDD recurrent, mild  Psychosis unspecified (Delusional disorder vs. Paranoid personality disorder vs. MDD severe with psychotic features)  Rule out neurocognitive disorder  ESTRELLA      PLAN                                                                                                                    1)  MEDICATIONS:    Continue Prozac 60 mg daily. Discontinue Latuda 40 mg daily. Start Saphris 5 mg bedtime    2)  THERAPY:   No Change    3)  LABS:  None    4)  PT MONITOR [call for probs]:  worsening sx, SI/HI, SEs from meds    5)  REFERRALS [CD, medical, other]: none    6)  RTC:  ~1 month

## 2024-07-18 ENCOUNTER — TELEPHONE (OUTPATIENT)
Dept: FAMILY MEDICINE | Facility: OTHER | Age: 64
End: 2024-07-18

## 2024-07-18 NOTE — TELEPHONE ENCOUNTER
Received DENIAL PA from [facility] for  dexlansoprazole (DEXILANT) 60 MG CPDR CR capsule. Scanned into EPIC, routed to provider.    Reasoning: Must try formularies or appeal w med necessity letter.

## 2024-07-18 NOTE — TELEPHONE ENCOUNTER
Received PA request from [facility] for dexlansoprazole (DEXILANT) 60 MG CPDR CR capsule. Submitted on CMM, waiting for response.

## 2024-07-22 ENCOUNTER — TELEPHONE (OUTPATIENT)
Dept: PSYCHIATRY | Facility: OTHER | Age: 64
End: 2024-07-22

## 2024-07-22 DIAGNOSIS — F22 DELUSIONAL DISORDER (H): Primary | ICD-10-CM

## 2024-07-22 NOTE — TELEPHONE ENCOUNTER
Received incoming VM from patient stating she has taken the Saphris Thursday, Friday, and Saturday.  She c/o having a migraine that took 3 - 4 hours to subside.  She also c/o dizziness.  This morning she did not take the Saphris and feels so much better.  She thinks it is the Saphris causing this.  Next follow up is on 8-19-24.  Thank you, please advise.

## 2024-07-23 RX ORDER — ASENAPINE MALEATE 2.5 MG/1
2.5 TABLET SUBLINGUAL EVERY EVENING
Qty: 30 TABLET | Refills: 3 | Status: SHIPPED | OUTPATIENT
Start: 2024-07-23

## 2024-07-23 NOTE — TELEPHONE ENCOUNTER
[id number] [id number]  History and Physical Update    Pt Name: Benjamín Carroll  MRN: [id number]  YOB: 1963  Date of evaluation: 10/25/2018      I have examined the patient and reviewed the H&P/Consult and there are no changes to the patient or plans.         Electronically signed by Tal New MD on 10/25/2018 at 11:45 AM My suggestion is she try a lower dose. I sent in 2.5 mg to [facility]. My hope is she won't have side effects with lower dose.

## 2024-07-23 NOTE — TELEPHONE ENCOUNTER
Patient contacted and notified suggestion to try a lower dose of Saphris.  Hopefully she will not have the side effects with the lower dose.  2.5 mg sent to [facility].  Patient is ok with this plan

## 2024-07-25 ENCOUNTER — TRANSFERRED RECORDS (OUTPATIENT)
Dept: HEALTH INFORMATION MANAGEMENT | Facility: HOSPITAL | Age: 64
End: 2024-07-25

## 2024-07-25 LAB — HEP C HIM: NORMAL

## 2024-07-31 DIAGNOSIS — M54.2 CHRONIC NECK PAIN: ICD-10-CM

## 2024-07-31 DIAGNOSIS — G89.29 CHRONIC NECK PAIN: ICD-10-CM

## 2024-07-31 DIAGNOSIS — M79.18 MYOFASCIAL PAIN SYNDROME, CERVICAL: ICD-10-CM

## 2024-07-31 RX ORDER — PREGABALIN 150 MG/1
150 CAPSULE ORAL 2 TIMES DAILY
Qty: 180 CAPSULE | Refills: 0 | Status: SHIPPED | OUTPATIENT
Start: 2024-07-31

## 2024-07-31 RX ORDER — HYDROCODONE BITARTRATE AND ACETAMINOPHEN 7.5; 325 MG/1; MG/1
1 TABLET ORAL 2 TIMES DAILY PRN
Qty: 60 TABLET | Refills: 0 | Status: SHIPPED | OUTPATIENT
Start: 2024-07-31

## 2024-07-31 NOTE — TELEPHONE ENCOUNTER
PREGABALIN 150MG CAPSULE       Last Written Prescription Date:  05/06/2024  Last Fill Quantity: 180,   # refills: 0  Last Office Visit: 06/11/2024  Future Office visit:       Routing refill request to provider for review/approval because:      HYDROCODONE/APAP 7.5-325MG TAB       Last Written Prescription Date:  06/11/2024  Last Fill Quantity: 60,   # refills: 0  Last Office Visit: 06/11/2024  Future Office visit:       Routing refill request to provider for review/approval because:      Kimberly Boecker, RN

## 2024-08-01 ENCOUNTER — TELEPHONE (OUTPATIENT)
Dept: FAMILY MEDICINE | Facility: OTHER | Age: 64
End: 2024-08-01

## 2024-08-03 ENCOUNTER — HOSPITAL ENCOUNTER (EMERGENCY)
Facility: HOSPITAL | Age: 64
Discharge: HOME | End: 2024-08-03
Attending: PHYSICIAN ASSISTANT | Admitting: PHYSICIAN ASSISTANT
Payer: MEDICARE

## 2024-08-03 VITALS
SYSTOLIC BLOOD PRESSURE: 114 MMHG | TEMPERATURE: 97.5 F | OXYGEN SATURATION: 96 % | RESPIRATION RATE: 19 BRPM | HEART RATE: 88 BPM | DIASTOLIC BLOOD PRESSURE: 76 MMHG

## 2024-08-03 DIAGNOSIS — J32.9 SINUSITIS, UNSPECIFIED CHRONICITY, UNSPECIFIED LOCATION: Primary | ICD-10-CM

## 2024-08-03 DIAGNOSIS — J32.9 SINUSITIS: ICD-10-CM

## 2024-08-03 PROCEDURE — 99213 OFFICE O/P EST LOW 20 MIN: CPT | Performed by: PHYSICIAN ASSISTANT

## 2024-08-03 PROCEDURE — G0463 HOSPITAL OUTPT CLINIC VISIT: HCPCS

## 2024-08-03 ASSESSMENT — ENCOUNTER SYMPTOMS
SINUS PAIN: 1
SINUS PRESSURE: 1
COUGH: 1

## 2024-08-03 ASSESSMENT — COLUMBIA-SUICIDE SEVERITY RATING SCALE - C-SSRS
1. IN THE PAST MONTH, HAVE YOU WISHED YOU WERE DEAD OR WISHED YOU COULD GO TO SLEEP AND NOT WAKE UP?: NO
2. HAVE YOU ACTUALLY HAD ANY THOUGHTS OF KILLING YOURSELF IN THE PAST MONTH?: NO
6. HAVE YOU EVER DONE ANYTHING, STARTED TO DO ANYTHING, OR PREPARED TO DO ANYTHING TO END YOUR LIFE?: NO

## 2024-08-03 ASSESSMENT — ACTIVITIES OF DAILY LIVING (ADL): ADLS_ACUITY_SCORE: 38

## 2024-08-03 NOTE — ED TRIAGE NOTES
C/o congestion    Taking mucinex dm for 3 weeks, sinus pressure, coughing, upper chest feels heavy     On going for 3 weeks

## 2024-08-03 NOTE — ED PROVIDER NOTES
"  History     Chief Complaint   Patient presents with    Nasal Congestion     HPI  Sadaf MCMAHAN Latendresse is a 64 year old female who presents to urgent care for evaluation of cold symptoms.  Over the past 3 weeks patient has had cough, increased sinus pressure and pain and chest congestion.  Patient states she has had ongoing issues with sinus infections in the past.  She denies any fever, shortness of breath, nausea, vomiting, diarrhea, or any other associated symptoms.  Patient has been using Mucinex over-the-counter.    Allergies:  Allergies   Allergen Reactions    Aspirin Hives    Ibuprofen      Dye in the otc form causes headaches  \"patient states over the counter ibuprofen  bothers her\"    Lansoprazole Other (See Comments) and Visual Disturbance     Changes in eyesight  Prevacid  Change in eyesight    Red Dye Hives    Bupropion Rash    Bupropion Hcl Hives and Rash     Wellbutrin    Demerol [Meperidine] Other (See Comments)     Made migraine worse       Problem List:    Patient Active Problem List    Diagnosis Date Noted    MCI (mild cognitive impairment) 06/11/2024     Priority: Medium    Delusional disorder (H) 12/17/2023     Priority: Medium    History of anxiety 12/17/2023     Priority: Medium    History of depression 12/17/2023     Priority: Medium    Memory change 12/14/2023     Priority: Medium    Psoriasis with arthropathy (H) - follows with Dr Francisco 12/04/2023     Priority: Medium    Insomnia, unspecified type 10/03/2023     Priority: Medium    Rheumatoid factor positive. MARISEL and anti CCP negative 06/21/2022     Priority: Medium    Mixed hyperlipidemia 01/06/2022     Priority: Medium    Pelvic floor dysfunction 05/01/2018     Priority: Medium    Calculus of kidney 07/11/2017     Priority: Medium    Pulmonary emphysema, unspecified emphysema type (H) 07/11/2017     Priority: Medium    ACP (advance care planning) 02/09/2017     Priority: Medium     Advance Care Planning 2/9/2017: ACP Review of Chart / " Resources Provided:  Reviewed chart for advance care plan.  Sadaf Blankenship has no plan or code status on file. Discussed available resources and provided with information. Confirmed code status reflects current choices pending further ACP discussions.  Confirmed/documented legally designated decision makers.  Added by Liudmila Rivera            Mild episode of recurrent major depressive disorder (H24) 11/14/2016     Priority: Medium    Ulnar neuropathy at elbow of left upper extremity 11/14/2016     Priority: Medium    Lumbar radiculopathy 11/14/2016     Priority: Medium     bilateral      S/P cervical spinal fusion 11/14/2016     Priority: Medium    Gastroesophageal reflux disease with esophagitis 10/14/2016     Priority: Medium    Intractable chronic migraine without aura and with status migrainosus 09/13/2016     Priority: Medium    Myofascial pain 05/09/2016     Priority: Medium    Disuse syndrome 02/12/2016     Priority: Medium    Chronic pain 02/05/2016     Priority: Medium    Chronic rhinitis 01/22/2016     Priority: Medium    Chronic maxillary sinusitis 01/22/2016     Priority: Medium    Hypertrophy of inferior nasal turbinate 01/22/2016     Priority: Medium    Chronic, continuous use of opioids 12/07/2015     Priority: Medium     Patient is followed by Henna Cruz MD for ongoing prescription of pain medication.  All refills should only be approved by this provider, or covering partner.    Medication(s): Norco 7.5/325mg.   Maximum quantity per month: #60  Clinic visit frequency required: Q 3 months     Controlled substance agreement:  Encounter-Level CSA - 07/11/2017:              Controlled Substance Agreement - Scan on 7/14/2017 12:56 PM : CONTROLLED SUBSTANCE AGREEMENT (below)                Pain Clinic evaluation in the past: No    DIRE Total Score(s):  No flowsheet data found.    Last [facility] website verification:  done on 7.10.17   [email or web address][email or web address]        Chronic tension-type  headache, intractable 10/05/2015     Priority: Medium    History of arthrodesis 10/05/2015     Priority: Medium    Depression, major 09/17/2015     Priority: Medium    Irritable bowel syndrome 01/26/2015     Priority: Medium    Pseudoarthrosis of cervical spine (H) 07/03/2012     Priority: Medium    Cervical pain 05/10/2012     Priority: Medium     With radicuopathy        Cervical radicular pain 05/10/2012     Priority: Medium    Degenerative disc disease, cervical 01/01/2011     Priority: Medium        Past Medical History:    Past Medical History:   Diagnosis Date    Adhesive capsulitis of left shoulder 12/06/2018    ASCUS on Pap smear 05/26/2004    Atypical chest pain 05/26/2008    Biliary dyskinesia 12/02/2021    Bleeding ulcer 05/26/1976    Cervical radicular pain 05/10/2012    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    Concussion with brief LOC 01/16/2020    Degenerative disc disease, cervical 01/01/2011    Esophageal ulcer 05/26/1998    Irritable bowel syndrome 01/26/2015    Long uvula 01/22/2016    Migraine headaches, severe 07/09/2001    Pseudoarthrosis of cervical spine 07/03/2012    Recurrent UTI 05/26/2011    sinusitis (chronic) 04/16/2001    Subacromial bursitis of right shoulder joint 01/26/2017    Ulcer of esophagus 06/25/2012    Uvulitis 01/22/2016       Past Surgical History:    Past Surgical History:   Procedure Laterality Date    BACK SURGERY      cervical    Cervical spine surgery with removal of 2 disks, C5,C7      CHOLECYSTECTOMY  12/10/2021    [facility]    COLONOSCOPY  10/13/2014    Dr Camargo, internal hemorrhoids    COLONOSCOPY - HIM SCAN  02/09/2018    EGD and colonoscopy with Dr. Jennings    Coronary angiogram, normal  2003    Chest pain    ENT SURGERY      nose surgery x3    ESOPHAGOSCOPY, GASTROSCOPY, DUODENOSCOPY (EGD), COMBINED N/A 02/20/2023    Procedure: ESOPHAGOGASTRODUODENOSCOPY, bronchoscopy;  Surgeon: Harish Haley MD;  Location: [facility] OR    fusion discectomy anterior cervical  2011    [facility]  ESOPHAGOSCOPY, DIAGNOSTIC  10/31/2014    Dr Camargo, mild erythema of antrum, negative biopsies    HEMORRHOIDECTOMY  12/10/2021    [facility]    HYSTERECTOMY TOTAL ABDOMINAL, SALPINGECTOMY Right 2002    Dr. Hathaway. Endometriosis.    HYSTERECTOMY, PAP NO LONGER INDICATED N/A 2002    Cervix removed.    IR CONSULTATION FOR IR EXAM  2020    IR FLUORO 0-1 HOUR  2020    NOSE SURGERY      x3          Posterior decompression , fusion C3-5      Psuedoarthrosis       Family History:    Family History   Problem Relation Age of Onset    Cancer Father         lung, also a heavy smoker    Other Cancer Father     Diabetes Mother     Heart Disease Mother 58        MI; cause of death; heavy smoker. had first MI at 40    Coronary Artery Disease Mother     Hypertension Mother     Cancer Other         strong history    Heart Disease Other         heart disease    Heart Disease Brother         x3    Hypertension Brother     Diabetes Brother     Coronary Artery Disease Brother     Cerebrovascular Disease Brother     Hypertension Sister        Social History:  Marital Status:   [4]  Social History     Tobacco Use    Smoking status: Former     Current packs/day: 0.00     Types: Cigarettes     Start date: 1981     Quit date: 1997     Years since quittin.6    Smokeless tobacco: Never   Vaping Use    Vaping status: Never Used   Substance Use Topics    Alcohol use: No    Drug use: No        Medications:    amoxicillin-clavulanate (AUGMENTIN) 875-125 MG tablet  AIMOVIG 140 MG/ML injection  albuterol (PROAIR HFA/PROVENTIL HFA/VENTOLIN HFA) 108 (90 Base) MCG/ACT inhaler  asenapine (SAPHRIS) 2.5 MG SUBL sublingual tablet  asenapine (SAPHRIS) 5 MG SUBL sublingual tablet  atorvastatin (LIPITOR) 10 MG tablet  baclofen (LIORESAL) 10 MG tablet  botulinum toxin type A (BOTOX) 100 units injection  budesonide-formoterol (SYMBICORT) 80-4.5 MCG/ACT Inhaler  butalbital-acetaminophen-caffeine (ESGIC)  -40 MG tablet  Calcium Carbonate Antacid 1177 MG CHEW  Cholecalciferol (VITAMIN D3) 1000 UNITS CAPS  dexlansoprazole (DEXILANT) 60 MG CPDR CR capsule  docusate sodium (COLACE) 100 MG capsule  famotidine (PEPCID) 40 MG tablet  FLUoxetine (PROZAC) 20 MG capsule  FLUoxetine (PROZAC) 40 MG capsule  HYDROcodone-acetaminophen (NORCO) 7.5-325 MG per tablet  ibuprofen (ADVIL/MOTRIN) 800 MG tablet  metFORMIN (GLUCOPHAGE XR) 500 MG 24 hr tablet  methocarbamol (ROBAXIN) 750 MG tablet  Multiple Vitamins-Minerals (CENTRUM SILVER ULTRA WOMENS) TABS  ondansetron (ZOFRAN ODT) 4 MG ODT tab  polyethylene glycol (MIRALAX) 17 g packet  pregabalin (LYRICA) 150 MG capsule  semaglutide (OZEMPIC, 0.25 OR 0.5 MG/DOSE,) 2 MG/3ML pen  senna (SENOKOT) 8.6 MG tablet  sucralfate (CARAFATE) 1 GM/10ML suspension  TREMFYA 100 MG/ML SOPN          Review of Systems   HENT:  Positive for congestion, sinus pressure and sinus pain.    Respiratory:  Positive for cough.    All other systems reviewed and are negative.      Physical Exam   BP: 114/76  Pulse: 88  Temp: 97.5  F (36.4  C)  Resp: 19  SpO2: 96 %      Physical Exam  Vitals and nursing note reviewed.   Constitutional:       General: She is not in acute distress.     Appearance: Normal appearance. She is not ill-appearing.   HENT:      Nose: Congestion present.      Right Sinus: Maxillary sinus tenderness present.      Left Sinus: Maxillary sinus tenderness present.   Eyes:      Conjunctiva/sclera: Conjunctivae normal.      Pupils: Pupils are equal, round, and reactive to light.   Cardiovascular:      Rate and Rhythm: Regular rhythm.      Heart sounds: Normal heart sounds.   Pulmonary:      Breath sounds: Normal breath sounds.   Neurological:      Mental Status: She is alert and oriented to person, place, and time.         ED Course        Procedures             Critical Care time:               No results found for this or any previous visit (from the past 24 hour(s)).    Medications - No  data to display    Assessments & Plan (with Medical Decision Making)   #1.  Sinusitis      Discussed exam findings with patient.  Patient is prescribed course of Augmentin; she has taken this in the past without any issue.  She is encouraged to take Flonase as directed over-the-counter along with nasal irrigation/[place] pot.  Any additional concerns patient can return to urgent care or follow-up with primary care provider.  Patient verbalized understanding and agreement the plan.      I have reviewed the nursing notes.    I have reviewed the findings, diagnosis, plan and need for follow up with the patient.                New Prescriptions    AMOXICILLIN-CLAVULANATE (AUGMENTIN) 875-125 MG TABLET    Take 1 tablet by mouth 2 times daily for 10 days       Final diagnoses:   Sinusitis       8/3/2024   [facility]       Kris Christine PA-C  08/03/24 [id number]

## 2024-08-13 ENCOUNTER — TRANSFERRED RECORDS (OUTPATIENT)
Dept: HEALTH INFORMATION MANAGEMENT | Facility: CLINIC | Age: 64
End: 2024-08-13

## 2024-08-15 ENCOUNTER — MEDICAL CORRESPONDENCE (OUTPATIENT)
Dept: MRI IMAGING | Facility: HOSPITAL | Age: 64
End: 2024-08-15

## 2024-08-19 ENCOUNTER — VIRTUAL VISIT (OUTPATIENT)
Dept: PSYCHIATRY | Facility: OTHER | Age: 64
End: 2024-08-19
Attending: PSYCHIATRY & NEUROLOGY
Payer: MEDICARE

## 2024-08-19 DIAGNOSIS — F22 DELUSIONAL DISORDER (H): Primary | ICD-10-CM

## 2024-08-19 PROCEDURE — 99442 PR PHYSICIAN TELEPHONE EVALUATION 11-20 MIN: CPT | Mod: 93 | Performed by: PSYCHIATRY & NEUROLOGY

## 2024-08-19 ASSESSMENT — ANXIETY QUESTIONNAIRES
2. NOT BEING ABLE TO STOP OR CONTROL WORRYING: SEVERAL DAYS
7. FEELING AFRAID AS IF SOMETHING AWFUL MIGHT HAPPEN: NOT AT ALL
3. WORRYING TOO MUCH ABOUT DIFFERENT THINGS: SEVERAL DAYS
1. FEELING NERVOUS, ANXIOUS, OR ON EDGE: SEVERAL DAYS
5. BEING SO RESTLESS THAT IT IS HARD TO SIT STILL: SEVERAL DAYS
GAD7 TOTAL SCORE: 6
4. TROUBLE RELAXING: SEVERAL DAYS
GAD7 TOTAL SCORE: 6
IF YOU CHECKED OFF ANY PROBLEMS ON THIS QUESTIONNAIRE, HOW DIFFICULT HAVE THESE PROBLEMS MADE IT FOR YOU TO DO YOUR WORK, TAKE CARE OF THINGS AT HOME, OR GET ALONG WITH OTHER PEOPLE: SOMEWHAT DIFFICULT
6. BECOMING EASILY ANNOYED OR IRRITABLE: SEVERAL DAYS

## 2024-08-19 ASSESSMENT — PATIENT HEALTH QUESTIONNAIRE - PHQ9: SUM OF ALL RESPONSES TO PHQ QUESTIONS 1-9: 6

## 2024-08-19 NOTE — PROGRESS NOTES
"Virtual Visit Details    Type of service:  Telephone Visit   Phone call duration: 20 minutes   Originating Location (pt. Location): Home    Distant Location (provider location):  Off-site     Start time: 11:02 am  End time: 11:22 am    SUBJECTIVE / INTERIM HISTORY                                                                         Last visit 7/15/24: Continue Prozac 60 mg daily. Discontinue Latuda 40 mg daily. Start Saphris 5 mg bedtime      - interim last visit we decresaed Saphris to 2.5 mg as she was having severe headaches. Still gets headaches but feels not as frequent nor as severe  - saw neurologist Dr. Toribio (8/15/24) and thought very highly of him. He discussed potential for cognitive issues to progress and delivered this news in a kind and informative manner. Robert labs, will having imaging and Tesha notes has a follow-up in October. Noemy attended the apptmt with her and equally thougyt very highly of the neurologist.  - Jess had her 4rd birthday! [place].   - [place] Feels safe there .  - grew up [place] and then family moved all over the state [place] in relation to dad's payal  - hx \"mental breakdown\" when had lots of things going on.   - doesn't know mom's side history   - Noemy  working on her AA. 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 Noemy's dad, Akash, down in [place] helping take care of quarter horse ranch. The roel had a stroke.  - cousin in [place] colorectal cancer  - Accident Jan '20 with nephew Michael 6 yo. He has PTSD since    MEDICAL / SURGICAL HISTORY                    Patient Active Problem List   Diagnosis    Degenerative disc disease, cervical    Pseudoarthrosis of cervical spine (H)    Cervical pain    Irritable bowel syndrome    Depression, major    Chronic, continuous use of opioids    Chronic rhinitis    Chronic maxillary sinusitis    Hypertrophy of inferior nasal turbinate    Chronic pain    Chronic tension-type headache, intractable    History of arthrodesis    Myofascial " pain    Disuse syndrome    Intractable chronic migraine without aura and with status migrainosus    Gastroesophageal reflux disease with esophagitis    Mild episode of recurrent major depressive disorder (H24)    Ulnar neuropathy at elbow of left upper extremity    Lumbar radiculopathy    S/P cervical spinal fusion    ACP (advance care planning)    Calculus of kidney    Pulmonary emphysema, unspecified emphysema type (H)    Pelvic floor dysfunction    Mixed hyperlipidemia    Rheumatoid factor positive. MARISEL and anti CCP negative    Insomnia, unspecified type    Cervical radicular pain    Psoriasis with arthropathy (H) - follows with Dr Francisco    Memory change    Delusional disorder (H)    History of anxiety    History of depression    MCI (mild cognitive impairment)     ALLERGY   Aspirin, Ibuprofen, Lansoprazole, Red dye #40 (allura red), Bupropion, Bupropion hcl, and Demerol [meperidine]  MEDICATIONS                                                                                              Current Outpatient Medications   Medication Sig Dispense Refill    AIMOVIG 140 MG/ML injection INJECT 140 MG UNDER THE SKIN EVERY 28 (TWENTY-EIGHT) DAYS.      albuterol (PROAIR HFA/PROVENTIL HFA/VENTOLIN HFA) 108 (90 Base) MCG/ACT inhaler INHALE 2 PUFFS INTO THE LUNGS EVERY 4 HOURS AS NEEDED FOR SHORTNESS OF BREATH OR WHEEZING. 18 g 0    asenapine (SAPHRIS) 2.5 MG SUBL sublingual tablet Place 1 tablet (2.5 mg) under the tongue every evening 30 tablet 3    atorvastatin (LIPITOR) 10 MG tablet TAKE ONE (1) TABLET BY MOUTH TWICE A WEEK 45 tablet 3    baclofen (LIORESAL) 10 MG tablet TAKE 1 TABLET (10 MG) BY MOUTH DAILY 90 tablet 3    botulinum toxin type A (BOTOX) 100 units injection Inject 155 Units into the muscle once every twelve weeks      budesonide-formoterol (SYMBICORT) 80-4.5 MCG/ACT Inhaler Inhale 2 puffs into the lungs 2 times daily 10.2 g 1    butalbital-acetaminophen-caffeine (ESGIC) -40 MG tablet Take 1 tablet by  mouth every 4 hours as needed TAKE 1 TO 2 TABLETS BY MOUTH AT THE ONSET OF A MIGRAINE HEADACHE, LIMIT NO MORE THAN 3 TIMES PER WEEK. ACETAMINOPHEN SHOULD BE LIMITED TO 40      Calcium Carbonate Antacid 1177 MG CHEW       Cholecalciferol (VITAMIN D3) 1000 UNITS CAPS Take 1,000 Units by mouth Takes 5000 unit capsule daily      dexlansoprazole (DEXILANT) 60 MG CPDR CR capsule TAKE 1 CAPSULE (60 MG) BY MOUTH DAILY 90 capsule 2    docusate sodium (COLACE) 100 MG capsule Take 100 mg by mouth daily       famotidine (PEPCID) 40 MG tablet TAKE 1 TABLET BY MOUTH AT BEDTIME 90 tablet 3    FLUoxetine (PROZAC) 20 MG capsule Take 1 capsule daily along with 40 mg for total daily dose 60 mg 90 capsule 3    FLUoxetine (PROZAC) 40 MG capsule Take 1 capsule daily along with 20 mg capsule 90 capsule 3    HYDROcodone-acetaminophen (NORCO) 7.5-325 MG per tablet TAKE 1 TABLET BY MOUTH TWICE A DAY AS NEEDED FOR PAIN 60 tablet 0    ibuprofen (ADVIL/MOTRIN) 800 MG tablet TAKE 1 TABLET (800 MG) BY MOUTH EVERY 8 HOURS AS NEEDED FOR MODERATE PAIN 90 tablet 1    metFORMIN (GLUCOPHAGE XR) 500 MG 24 hr tablet Take 500 mg by mouth      methocarbamol (ROBAXIN) 750 MG tablet Take 1 tablet (750 mg) by mouth 3 times daily as needed for muscle spasms 45 tablet 3    Multiple Vitamins-Minerals (CENTRUM SILVER ULTRA WOMENS) TABS Take 1 tablet by mouth daily       ondansetron (ZOFRAN ODT) 4 MG ODT tab Take 1 tablet (4 mg) by mouth every 6 hours as needed (after migraine medication) 30 tablet 1    polyethylene glycol (MIRALAX) 17 g packet Take 1 packet by mouth      pregabalin (LYRICA) 150 MG capsule TAKE 1 CAPSULE BY MOUTH TWICE DAILY 180 capsule 0    semaglutide (OZEMPIC, 0.25 OR 0.5 MG/DOSE,) 2 MG/3ML pen Inject 0.25 mg subcutaneously      senna (SENOKOT) 8.6 MG tablet Take 1 tablet by mouth      sucralfate (CARAFATE) 1 GM/10ML suspension Take 10 mLs (1 g) by mouth 4 times daily 420 mL 3    TREMFYA 100 MG/ML SOPN INJECT 100MG UNDER THE SKIN ONCE A WEEK AT  WEEK 0 AND WEEK 4,THEN INJECT 100MG EVERY 8 WEEKS       No current facility-administered medications for this visit.       VITALS   There were no vitals taken for this visit.     LABS                                                                                                                         Last Comprehensive Metabolic Panel:  Sodium   Date Value Ref Range Status   06/11/2024 139 135 - 145 mmol/L Final     Comment:     Reference intervals for this test were updated on 09/26/2023 to more accurately reflect our healthy population. There may be differences in the flagging of prior results with similar values performed with this method. Interpretation of those prior results can be made in the context of the updated reference intervals.    03/25/2021 144 133 - 144 mmol/L Final     Potassium   Date Value Ref Range Status   06/11/2024 4.0 3.4 - 5.3 mmol/L Final   09/25/2022 3.7 3.4 - 5.3 mmol/L Final   03/25/2021 3.7 3.4 - 5.3 mmol/L Final     Chloride   Date Value Ref Range Status   06/11/2024 105 98 - 107 mmol/L Final   09/25/2022 108 94 - 109 mmol/L Final   03/25/2021 114 (H) 94 - 109 mmol/L Final     Carbon Dioxide   Date Value Ref Range Status   03/25/2021 23 20 - 32 mmol/L Final     Carbon Dioxide (CO2)   Date Value Ref Range Status   06/11/2024 25 22 - 29 mmol/L Final   09/25/2022 28 20 - 32 mmol/L Final     Anion Gap   Date Value Ref Range Status   06/11/2024 9 7 - 15 mmol/L Final   09/25/2022 4 3 - 14 mmol/L Final   03/25/2021 7 3 - 14 mmol/L Final     Glucose   Date Value Ref Range Status   06/11/2024 118 (H) 70 - 99 mg/dL Final   09/25/2022 110 (H) 70 - 99 mg/dL Final   03/25/2021 93 70 - 99 mg/dL Final     Urea Nitrogen   Date Value Ref Range Status   06/11/2024 8.3 8.0 - 23.0 mg/dL Final   09/25/2022 10 7 - 30 mg/dL Final   03/25/2021 11 7 - 30 mg/dL Final     Creatinine   Date Value Ref Range Status   06/11/2024 0.99 (H) 0.51 - 0.95 mg/dL Final   03/25/2021 0.92 0.52 - 1.04 mg/dL Final     GFR  "Estimate   Date Value Ref Range Status   06/11/2024 64 >60 mL/min/1.73m2 Final   03/25/2021 68 >60 mL/min/[1.73_m2] Final     Comment:     Non  GFR Calc  Starting 12/18/2018, serum creatinine based estimated GFR (eGFR) will be   calculated using the Chronic Kidney Disease Epidemiology Collaboration   (CKD-EPI) equation.       Calcium   Date Value Ref Range Status   06/11/2024 8.8 8.8 - 10.2 mg/dL Final   03/25/2021 8.3 (L) 8.5 - 10.1 mg/dL Final     Bilirubin Total   Date Value Ref Range Status   06/11/2024 0.2 <=1.2 mg/dL Final   03/25/2021 0.3 0.2 - 1.3 mg/dL Final     Alkaline Phosphatase   Date Value Ref Range Status   06/11/2024 103 40 - 150 U/L Final   03/25/2021 110 40 - 150 U/L Final     ALT   Date Value Ref Range Status   06/11/2024 26 0 - 50 U/L Final     Comment:     Reference intervals for this test were updated on 6/12/2023 to more accurately reflect our healthy population. There may be differences in the flagging of prior results with similar values performed with this method. Interpretation of those prior results can be made in the context of the updated reference intervals.     03/25/2021 24 0 - 50 U/L Final     AST   Date Value Ref Range Status   06/11/2024 23 0 - 45 U/L Final     Comment:     Reference intervals for this test were updated on 6/12/2023 to more accurately reflect our healthy population. There may be differences in the flagging of prior results with similar values performed with this method. Interpretation of those prior results can be made in the context of the updated reference intervals.   03/25/2021 13 0 - 45 U/L Final       MENTAL STATUS EXAM                                                                                          Mood described as \"doing okay\".  No problems with speech . Thought process linear and logical. No evidence of any hallucinations or delusional thoughts. No SI.  No hallucinations. Insight good.       ASSESSMENT                             "                                                                          HISTORICAL:  Initial psych note 10/13/15        NOTES:  Cymbalta -> agitation, angry and increase in diastolic BP. Propranolol 10 mg BID prn anxiety: lightheadedness. Neuropsych scanning 6/5/24. Latuda: akathisia.    Tesha Blankenship is a 64 year old female. Tesha's daughter accompanied her on video visit December '23 and though I have been working with Tesha since 2015 I was not aware of her experiencing paranoia and delusions until the past year. Tesha ended up admitted in [facility] and was started on Risperdal. We ended up changing to Latuda. We then changed to Saphris given she experienced akathiisa with Latuda. Interim last visit Tesha contacted us and noted headaches worsening with saphris 5 mg; we agreed on trying dose reduction to 2.5 mg. Today she reports going okay and she prefers stick with current med and formulation. She and I are thankful for her visit with neurologist; she felt comfortable with him and notes he was kind, caring and explained things well and was thorough. She has follow-up in October. We agreed have her RTC after that henc late October or early November.        TREATMENT RISK STATEMENT:  The risks, benefits, alternatives and potential adverse effects have been explained and are understood by the pt.  The pt agrees to the treatment plan with the ability to do so.   The pt knows to call the clinic for any problems or access emergency care if needed.        DIAGNOSES                     MDD recurrent, moderate  Psychosis unspecified (Delusional disorder vs. Paranoid personality disorder vs. MDD severe with psychotic features)  Mild neurocognitive disorder  ESTRELLA      PLAN                                                                                                                    1)  MEDICATIONS:    Continue Prozac 60 mg daily. Continue Saphris 2.5 mg bedtime    2)  THERAPY:  No Change    3)  LABS:  None    4)  PT  MONITOR [call for probs]:  worsening sx, SI/HI, SEs from meds    5)  REFERRALS [CD, medical, other]: none    6)  RTC:  ~2-3 months                                                                                                                                                                                                                                 not examined

## 2024-08-21 ENCOUNTER — TELEPHONE (OUTPATIENT)
Dept: PSYCHIATRY | Facility: OTHER | Age: 64
End: 2024-08-21

## 2024-08-21 NOTE — TELEPHONE ENCOUNTER
Received PA request from [facility] regarding asenapine (SAPHRIS) 2.5 MG SUBL sublingual tablet. Submitted via CMM, awaiting response

## 2024-08-23 ENCOUNTER — TELEPHONE (OUTPATIENT)
Dept: PSYCHIATRY | Facility: OTHER | Age: 64
End: 2024-08-23

## 2024-08-23 NOTE — TELEPHONE ENCOUNTER
Received APPROVAL from [facility] regarding asenapine (SAPHRIS) 2.5 MG SUBL sublingual tablet

## 2024-08-23 NOTE — TELEPHONE ENCOUNTER
Return call to patient.  Explained the PA process for the medication Saphris.  Waiting to hear from her insurance for this.  Patient understands.  Thank you.

## 2024-08-23 NOTE — TELEPHONE ENCOUNTER
10:24 AM    Reason for Call: Phone Call    Description:Patient is calling wanting to speak to Melony, pt stated that they were sent a prior auth for her medication Saphris, patient called her pharmacy and they have not received this, she will be out of medication in 5 days.     Pt also stated that this will have to be mailed to her [place].  Pt did not give address as she will talk to Melony  Was an appointment offered for this call? No  If yes : Appointment type              Date    Preferred method for responding to this message: Telephone Call  What is your phone number ?    If we cannot reach you directly, may we leave a detailed response at the number you provided? Yes    Can this message wait until your PCP/provider returns, if available today? Not applicable    Clau Zaragoza

## 2024-08-27 ENCOUNTER — TELEPHONE (OUTPATIENT)
Dept: FAMILY MEDICINE | Facility: OTHER | Age: 64
End: 2024-08-27

## 2024-08-27 DIAGNOSIS — K21.00 GASTROESOPHAGEAL REFLUX DISEASE WITH ESOPHAGITIS WITHOUT HEMORRHAGE: Primary | Chronic | ICD-10-CM

## 2024-08-27 NOTE — TELEPHONE ENCOUNTER
10:34 AM    Reason for Call: OVERBOOK    Patient is having the following symptoms: Loss of taste, smell, cough, painful sinuses for 1 weeks. Took 2 COVID tests. Both were negative.     The patient is requesting an appointment for Today with Dr. Moyer.    Was an appointment offered for this call? No  If yes : Appointment type              Date    Preferred method for responding to this message: Telephone Call  What is your phone number ?[phone]     If we cannot reach you directly, may we leave a detailed response at the number you provided? Yes    Can this message wait until your PCP/provider returns, if unavailable today? Not applicable    Stefany Atwood  
Appointment scheduled for tomorrow in the am  
Please place on my schedule for tomorrow. Early in the morning if possible  Henna Moyer MD   
No

## 2024-08-27 NOTE — LETTER
September 5, 2024      Sadaf MARJ Krzysztof  [address]   [place] [address]        To Whom It May Concern,     I am Tesha Oneileduar's primary care provider. She has trialed and failed omeprazole and protonix. Please consider approving her dexlansoprazole.           Sincerely,        Henna Moyer MD

## 2024-09-12 RX ORDER — PANTOPRAZOLE SODIUM 40 MG/1
40 TABLET, DELAYED RELEASE ORAL DAILY
Qty: 90 TABLET | Refills: 3 | Status: SHIPPED | OUTPATIENT
Start: 2024-09-12

## 2024-09-18 NOTE — TELEPHONE ENCOUNTER
Appeal letter written  Henna Moyer MD   
Faxed appeal letter to [employer], awaiting response  
Looks like appeal denied. Please advise.  Jenny Castro LPN    
Patient notified of below.  Jenny Castro LPN    
Protonix sent  Henna Moyer MD   
Received DENIAL from [employer] regarding dexlansoprazole (DEXILANT) 60 MG CPDR CR capsule. Letter scanned into EPIC.      
Received DENIAL to the appeal from [facility] regarding dexlansoprazole (DEXILANT) 60 MG CPDR CR capsule. Letter scanned into EPIC.       
Received PA request from [facility] regarding dexlansoprazole (DEXILANT) 60 MG CPDR CR capsule. Submitted via CMM, awaiting response  Please update order in chart  
RAD exacerbation in April

## 2024-10-14 ENCOUNTER — HOSPITAL ENCOUNTER (OUTPATIENT)
Dept: MRI IMAGING | Facility: HOSPITAL | Age: 64
Discharge: HOME | End: 2024-10-14
Attending: PSYCHIATRY & NEUROLOGY | Admitting: PSYCHIATRY & NEUROLOGY
Payer: MEDICARE

## 2024-10-14 DIAGNOSIS — F22 DELUSIONAL DISORDERS (H): ICD-10-CM

## 2024-10-14 DIAGNOSIS — G31.84 MCI (MILD COGNITIVE IMPAIRMENT): ICD-10-CM

## 2024-10-14 PROCEDURE — 70553 MRI BRAIN STEM W/O & W/DYE: CPT

## 2024-10-14 PROCEDURE — 255N000002 HC RX 255 OP 636: Performed by: RADIOLOGY

## 2024-10-14 PROCEDURE — A9585 GADOBUTROL INJECTION: HCPCS | Performed by: RADIOLOGY

## 2024-10-14 RX ORDER — GADOBUTROL 604.72 MG/ML
7.5 INJECTION INTRAVENOUS ONCE
Status: COMPLETED | OUTPATIENT
Start: 2024-10-14 | End: 2024-10-14

## 2024-10-14 RX ADMIN — GADOBUTROL 7.5 ML: 604.72 INJECTION INTRAVENOUS at 10:56

## 2024-10-21 ENCOUNTER — VIRTUAL VISIT (OUTPATIENT)
Dept: PSYCHIATRY | Facility: OTHER | Age: 64
End: 2024-10-21
Attending: PSYCHIATRY & NEUROLOGY
Payer: COMMERCIAL

## 2024-10-21 DIAGNOSIS — F33.0 MAJOR DEPRESSIVE DISORDER, RECURRENT EPISODE, MILD (H): Primary | ICD-10-CM

## 2024-10-21 PROCEDURE — 99442 PR PHYSICIAN TELEPHONE EVALUATION 11-20 MIN: CPT | Mod: 93 | Performed by: PSYCHIATRY & NEUROLOGY

## 2024-10-21 ASSESSMENT — PAIN SCALES - GENERAL: PAINLEVEL: NO PAIN (0)

## 2024-10-21 ASSESSMENT — ANXIETY QUESTIONNAIRES
2. NOT BEING ABLE TO STOP OR CONTROL WORRYING: NOT AT ALL
GAD7 TOTAL SCORE: 2
7. FEELING AFRAID AS IF SOMETHING AWFUL MIGHT HAPPEN: NOT AT ALL
1. FEELING NERVOUS, ANXIOUS, OR ON EDGE: SEVERAL DAYS
5. BEING SO RESTLESS THAT IT IS HARD TO SIT STILL: NOT AT ALL
6. BECOMING EASILY ANNOYED OR IRRITABLE: NOT AT ALL
GAD7 TOTAL SCORE: 2
3. WORRYING TOO MUCH ABOUT DIFFERENT THINGS: NOT AT ALL

## 2024-10-21 ASSESSMENT — PATIENT HEALTH QUESTIONNAIRE - PHQ9
SUM OF ALL RESPONSES TO PHQ QUESTIONS 1-9: 5
5. POOR APPETITE OR OVEREATING: SEVERAL DAYS

## 2024-10-21 NOTE — PROGRESS NOTES
Virtual Visit Details    Type of service:  Telephone Visit   Phone call duration: 16 minutes   Originating Location (pt. Location): Home    Distant Location (provider location):  Off-site     Start time: 9:35 am  End time: 9:51 am    SUBJECTIVE / INTERIM HISTORY                                                                         Last visit 8/19/24: Continue Prozac 60 mg daily. Continue Saphris 2.5 mg bedtime    - was in [place] for couple months her cousin who had rectal cancer. Cousin is going to have to have another surgery. She has 3 pockets / liquid. She can eat but she gets severe pain as food is coming out of   - daughter pregnant again and due March. Jess is 3 yo  - has upcoming video visit with Dr. Toribio   - saw neurologist Dr. Toribio (8/15/24) and thought very highly of him. He discussed potential for cognitive issues to progress and delivered this news in a kind and informative manner. Robert labs, will having imaging and Tesha notes has a follow-up in October. Noemy attended the apptmt with her and equally thougyt very highly of the neurologist.  - [place]. Feels safe there .  - grew up [place] and then family moved all over the state [place] in relation to dad's payal  - Noemy  working on her AA. 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 Noemy's dad, Akash, down in [place] helping take care of [employer]. The roel had a stroke.  - Accident Jan '20 with nephew Michael 8 yo. He has PTSD since    MEDICAL / SURGICAL HISTORY                    Patient Active Problem List   Diagnosis    Degenerative disc disease, cervical    Pseudoarthrosis of cervical spine (H)    Cervical pain    Irritable bowel syndrome    Depression, major    Chronic, continuous use of opioids    Chronic rhinitis    Chronic maxillary sinusitis    Hypertrophy of inferior nasal turbinate    Chronic pain    Chronic tension-type headache, intractable    History of arthrodesis    Myofascial pain    Disuse syndrome    Intractable chronic migraine  without aura and with status migrainosus    Gastroesophageal reflux disease with esophagitis    Mild episode of recurrent major depressive disorder (H)    Ulnar neuropathy at elbow of left upper extremity    Lumbar radiculopathy    S/P cervical spinal fusion    ACP (advance care planning)    Calculus of kidney    Pulmonary emphysema, unspecified emphysema type (H)    Pelvic floor dysfunction    Mixed hyperlipidemia    Rheumatoid factor positive. MARISEL and anti CCP negative    Insomnia, unspecified type    Cervical radicular pain    Psoriasis with arthropathy (H) - follows with Dr Francisco    Memory change    Delusional disorder (H)    History of anxiety    History of depression    MCI (mild cognitive impairment)     ALLERGY   Aspirin, Ibuprofen, Lansoprazole, Red dye #40 (allura red), Bupropion, Bupropion hcl, and Demerol [meperidine]  MEDICATIONS                                                                                              Current Outpatient Medications   Medication Sig Dispense Refill    AIMOVIG 140 MG/ML injection INJECT 140 MG UNDER THE SKIN EVERY 28 (TWENTY-EIGHT) DAYS.      albuterol (PROAIR HFA/PROVENTIL HFA/VENTOLIN HFA) 108 (90 Base) MCG/ACT inhaler INHALE 2 PUFFS INTO THE LUNGS EVERY 4 HOURS AS NEEDED FOR SHORTNESS OF BREATH OR WHEEZING. 18 g 0    atorvastatin (LIPITOR) 10 MG tablet TAKE ONE (1) TABLET BY MOUTH TWICE A WEEK 45 tablet 3    baclofen (LIORESAL) 10 MG tablet TAKE 1 TABLET (10 MG) BY MOUTH DAILY 90 tablet 3    botulinum toxin type A (BOTOX) 100 units injection Inject 155 Units into the muscle once every twelve weeks      budesonide-formoterol (SYMBICORT) 80-4.5 MCG/ACT Inhaler Inhale 2 puffs into the lungs 2 times daily 10.2 g 1    butalbital-acetaminophen-caffeine (ESGIC) -40 MG tablet Take 1 tablet by mouth every 4 hours as needed TAKE 1 TO 2 TABLETS BY MOUTH AT THE ONSET OF A MIGRAINE HEADACHE, LIMIT NO MORE THAN 3 TIMES PER WEEK. ACETAMINOPHEN SHOULD BE LIMITED TO 40       Calcium Carbonate Antacid 1177 MG CHEW       Cholecalciferol (VITAMIN D3) 1000 UNITS CAPS Take 1,000 Units by mouth Takes 5000 unit capsule daily      docusate sodium (COLACE) 100 MG capsule Take 100 mg by mouth daily       famotidine (PEPCID) 40 MG tablet TAKE 1 TABLET BY MOUTH AT BEDTIME 90 tablet 3    FLUoxetine (PROZAC) 20 MG capsule Take 1 capsule daily along with 40 mg for total daily dose 60 mg 90 capsule 3    FLUoxetine (PROZAC) 40 MG capsule Take 1 capsule daily along with 20 mg capsule 90 capsule 3    HYDROcodone-acetaminophen (NORCO) 7.5-325 MG per tablet TAKE 1 TABLET BY MOUTH TWICE A DAY AS NEEDED FOR PAIN 60 tablet 0    ibuprofen (ADVIL/MOTRIN) 800 MG tablet TAKE 1 TABLET (800 MG) BY MOUTH EVERY 8 HOURS AS NEEDED FOR MODERATE PAIN 90 tablet 1    metFORMIN (GLUCOPHAGE XR) 500 MG 24 hr tablet Take 500 mg by mouth      methocarbamol (ROBAXIN) 750 MG tablet Take 1 tablet (750 mg) by mouth 3 times daily as needed for muscle spasms 45 tablet 3    Multiple Vitamins-Minerals (CENTRUM SILVER ULTRA WOMENS) TABS Take 1 tablet by mouth daily       ondansetron (ZOFRAN ODT) 4 MG ODT tab Take 1 tablet (4 mg) by mouth every 6 hours as needed (after migraine medication) 30 tablet 1    pantoprazole (PROTONIX) 40 MG EC tablet Take 1 tablet (40 mg) by mouth daily. 90 tablet 3    polyethylene glycol (MIRALAX) 17 g packet Take 1 packet by mouth      pregabalin (LYRICA) 150 MG capsule TAKE 1 CAPSULE BY MOUTH TWICE DAILY 180 capsule 0    semaglutide (OZEMPIC, 0.25 OR 0.5 MG/DOSE,) 2 MG/3ML pen Inject 0.25 mg subcutaneously      senna (SENOKOT) 8.6 MG tablet Take 1 tablet by mouth      sucralfate (CARAFATE) 1 GM/10ML suspension Take 10 mLs (1 g) by mouth 4 times daily 420 mL 3    TREMFYA 100 MG/ML SOPN INJECT 100MG UNDER THE SKIN ONCE A WEEK AT WEEK 0 AND WEEK 4,THEN INJECT 100MG EVERY 8 WEEKS      asenapine (SAPHRIS) 2.5 MG SUBL sublingual tablet Place 1 tablet (2.5 mg) under the tongue every evening (Patient not taking:  Reported on 10/21/2024) 30 tablet 3     No current facility-administered medications for this visit.       VITALS   There were no vitals taken for this visit.     LABS                                                                                                                         Last Comprehensive Metabolic Panel:  Sodium   Date Value Ref Range Status   06/11/2024 139 135 - 145 mmol/L Final     Comment:     Reference intervals for this test were updated on 09/26/2023 to more accurately reflect our healthy population. There may be differences in the flagging of prior results with similar values performed with this method. Interpretation of those prior results can be made in the context of the updated reference intervals.    03/25/2021 144 133 - 144 mmol/L Final     Potassium   Date Value Ref Range Status   06/11/2024 4.0 3.4 - 5.3 mmol/L Final   09/25/2022 3.7 3.4 - 5.3 mmol/L Final   03/25/2021 3.7 3.4 - 5.3 mmol/L Final     Chloride   Date Value Ref Range Status   06/11/2024 105 98 - 107 mmol/L Final   09/25/2022 108 94 - 109 mmol/L Final   03/25/2021 114 (H) 94 - 109 mmol/L Final     Carbon Dioxide   Date Value Ref Range Status   03/25/2021 23 20 - 32 mmol/L Final     Carbon Dioxide (CO2)   Date Value Ref Range Status   06/11/2024 25 22 - 29 mmol/L Final   09/25/2022 28 20 - 32 mmol/L Final     Anion Gap   Date Value Ref Range Status   06/11/2024 9 7 - 15 mmol/L Final   09/25/2022 4 3 - 14 mmol/L Final   03/25/2021 7 3 - 14 mmol/L Final     Glucose   Date Value Ref Range Status   06/11/2024 118 (H) 70 - 99 mg/dL Final   09/25/2022 110 (H) 70 - 99 mg/dL Final   03/25/2021 93 70 - 99 mg/dL Final     Urea Nitrogen   Date Value Ref Range Status   06/11/2024 8.3 8.0 - 23.0 mg/dL Final   09/25/2022 10 7 - 30 mg/dL Final   03/25/2021 11 7 - 30 mg/dL Final     Creatinine   Date Value Ref Range Status   06/11/2024 0.99 (H) 0.51 - 0.95 mg/dL Final   03/25/2021 0.92 0.52 - 1.04 mg/dL Final     GFR Estimate   Date  "Value Ref Range Status   06/11/2024 64 >60 mL/min/1.73m2 Final   03/25/2021 68 >60 mL/min/[1.73_m2] Final     Comment:     Non  GFR Calc  Starting 12/18/2018, serum creatinine based estimated GFR (eGFR) will be   calculated using the Chronic Kidney Disease Epidemiology Collaboration   (CKD-EPI) equation.       Calcium   Date Value Ref Range Status   06/11/2024 8.8 8.8 - 10.2 mg/dL Final   03/25/2021 8.3 (L) 8.5 - 10.1 mg/dL Final     Bilirubin Total   Date Value Ref Range Status   06/11/2024 0.2 <=1.2 mg/dL Final   03/25/2021 0.3 0.2 - 1.3 mg/dL Final     Alkaline Phosphatase   Date Value Ref Range Status   06/11/2024 103 40 - 150 U/L Final   03/25/2021 110 40 - 150 U/L Final     ALT   Date Value Ref Range Status   06/11/2024 26 0 - 50 U/L Final     Comment:     Reference intervals for this test were updated on 6/12/2023 to more accurately reflect our healthy population. There may be differences in the flagging of prior results with similar values performed with this method. Interpretation of those prior results can be made in the context of the updated reference intervals.     03/25/2021 24 0 - 50 U/L Final     AST   Date Value Ref Range Status   06/11/2024 23 0 - 45 U/L Final     Comment:     Reference intervals for this test were updated on 6/12/2023 to more accurately reflect our healthy population. There may be differences in the flagging of prior results with similar values performed with this method. Interpretation of those prior results can be made in the context of the updated reference intervals.   03/25/2021 13 0 - 45 U/L Final       MENTAL STATUS EXAM                                                                                          Mood  today Tesha notes is \"good\".  No problems with speech . Thought process linear and logical. No evidence of any hallucinations or delusional thoughts. No SI.  No hallucinations. Insight good.       ASSESSMENT                                          "                                                             HISTORICAL:  Initial psych note 10/13/15        NOTES:  Cymbalta -> agitation, angry and increase in diastolic BP. Propranolol 10 mg BID prn anxiety: lightheadedness. Neuropsych scanning 6/5/24. Latuda: akathisia.    Tesha Blankenship is a 64 year old female. Tesha's daughter accompanied her on video visit December '23 and though I have been working with Tesha since 2015 I was not aware of her experiencing paranoia and delusions until the past year. Tesha ended up admitted in [place] and was started on Risperdal. We ended up changing to Latuda. We then changed to Saphris given she experienced akathisia with Latuda.     Tesha today notes headaches continued despite small dose of Saphris she was taking (2.5 mg) of which we had reduced from 5 mg because of headaches. She notes today she is doing well and hasn't taken Saphris for 2 weeks.     TREATMENT RISK STATEMENT:  The risks, benefits, alternatives and potential adverse effects have been explained and are understood by the pt.  The pt agrees to the treatment plan with the ability to do so.   The pt knows to call the clinic for any problems or access emergency care if needed.        DIAGNOSES                     MDD recurrent, mild  Psychosis unspecified (Delusional disorder vs. Paranoid personality disorder vs. MDD severe with psychotic features)  Mild neurocognitive disorder  ESTRELLA      PLAN                                                                                                                    1)  MEDICATIONS:    Continue Prozac 60 mg daily. She stopped taking Saphris 2.5 mg bedtime    2)  THERAPY:  No Change    3)  LABS:  None    4)  PT MONITOR [call for probs]:  worsening sx, SI/HI, SEs from meds    5)  REFERRALS [CD, medical, other]: none    6)  RTC:  ~3 months

## 2024-10-28 DIAGNOSIS — K21.00 GASTROESOPHAGEAL REFLUX DISEASE WITH ESOPHAGITIS WITHOUT HEMORRHAGE: ICD-10-CM

## 2024-10-28 RX ORDER — FAMOTIDINE 40 MG/1
TABLET, FILM COATED ORAL
Qty: 30 TABLET | Refills: 3 | Status: SHIPPED | OUTPATIENT
Start: 2024-10-28

## 2024-12-19 DIAGNOSIS — G89.29 CHRONIC NECK PAIN: ICD-10-CM

## 2024-12-19 DIAGNOSIS — M54.2 CERVICAL PAIN: ICD-10-CM

## 2024-12-19 DIAGNOSIS — M54.12 CERVICAL RADICULOPATHY: ICD-10-CM

## 2024-12-19 DIAGNOSIS — M54.2 CHRONIC NECK PAIN: ICD-10-CM

## 2024-12-19 DIAGNOSIS — G89.29 OTHER CHRONIC PAIN: Chronic | ICD-10-CM

## 2024-12-19 RX ORDER — METHOCARBAMOL 750 MG/1
750 TABLET, FILM COATED ORAL 3 TIMES DAILY PRN
Qty: 45 TABLET | Refills: 5 | Status: SHIPPED | OUTPATIENT
Start: 2024-12-19

## 2024-12-19 NOTE — TELEPHONE ENCOUNTER
METHOCARBAMOL 750MG TABLET       Last Written Prescription Date:  06/11/2024  Last Fill Quantity: 45,   # refills: 3  Last Office Visit: 06/11/2024  Future Office visit:       Routing refill request to provider for review/approval because:      Kimberly Boecker, RN

## 2025-01-06 ENCOUNTER — VIRTUAL VISIT (OUTPATIENT)
Dept: PSYCHIATRY | Facility: OTHER | Age: 65
End: 2025-01-06
Attending: PSYCHIATRY & NEUROLOGY
Payer: MEDICARE

## 2025-01-06 DIAGNOSIS — F33.1 MAJOR DEPRESSIVE DISORDER, RECURRENT EPISODE, MODERATE (H): ICD-10-CM

## 2025-01-06 DIAGNOSIS — F41.1 GAD (GENERALIZED ANXIETY DISORDER): ICD-10-CM

## 2025-01-06 RX ORDER — FLUOXETINE 40 MG/1
CAPSULE ORAL
Qty: 90 CAPSULE | Refills: 3 | Status: SHIPPED | OUTPATIENT
Start: 2025-01-06

## 2025-01-06 ASSESSMENT — ANXIETY QUESTIONNAIRES
GAD7 TOTAL SCORE: 0
1. FEELING NERVOUS, ANXIOUS, OR ON EDGE: NOT AT ALL
6. BECOMING EASILY ANNOYED OR IRRITABLE: NOT AT ALL
5. BEING SO RESTLESS THAT IT IS HARD TO SIT STILL: NOT AT ALL
2. NOT BEING ABLE TO STOP OR CONTROL WORRYING: NOT AT ALL
3. WORRYING TOO MUCH ABOUT DIFFERENT THINGS: NOT AT ALL
GAD7 TOTAL SCORE: 0
7. FEELING AFRAID AS IF SOMETHING AWFUL MIGHT HAPPEN: NOT AT ALL

## 2025-01-06 ASSESSMENT — PATIENT HEALTH QUESTIONNAIRE - PHQ9
5. POOR APPETITE OR OVEREATING: NOT AT ALL
SUM OF ALL RESPONSES TO PHQ QUESTIONS 1-9: 7

## 2025-01-06 ASSESSMENT — PAIN SCALES - GENERAL: PAINLEVEL_OUTOF10: MILD PAIN (3)

## 2025-01-06 NOTE — PROGRESS NOTES
"Virtual Visit Details    Type of service:  Telephone Visit   Phone call duration: 11 minutes   Originating Location (pt. Location): Home    Distant Location (provider location):  Off-site     Start time: 9:38 am  End time: 9:49 am    SUBJECTIVE / INTERIM HISTORY                                                                         Last visit October '24: continue Prozac 60 mg daily. She stopped taking Saphris 2.5 mg    - been sick for 3 weeks going to the doctor up at [place] today  - saw neurology and Tesha notes \"he wants me to take this medicine that you take under your tongue\"  - daughter pregnant again and due March. Jess is 3 yo  - cousin in [place] recent PET scan and for now in remission  - neurologist Dr. Malu Galeanati will start on Aricept (didn't start it yet since she is sick and and will have follow-up with him in spring  - [place]. Feels safe there .  - grew up [place] and then family moved all over the state [place] in relation to dad's payal Salguero  working on her AA. 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 Noemy's dad, Akash, down in [place] helping take care of quarter [employer]. The roel had a stroke.  - Accident Jan '20 with nephew Michael 6 yo. He has PTSD since    MEDICAL / SURGICAL HISTORY                    Patient Active Problem List   Diagnosis    Degenerative disc disease, cervical    Pseudoarthrosis of cervical spine (H)    Cervical pain    Irritable bowel syndrome    Depression, major    Chronic, continuous use of opioids    Chronic rhinitis    Chronic maxillary sinusitis    Hypertrophy of inferior nasal turbinate    Chronic pain    Chronic tension-type headache, intractable    History of arthrodesis    Myofascial pain    Disuse syndrome    Intractable chronic migraine without aura and with status migrainosus    Gastroesophageal reflux disease with esophagitis    Mild episode of recurrent major depressive disorder (H)    Ulnar neuropathy at elbow of left upper extremity    Lumbar radiculopathy "    S/P cervical spinal fusion    ACP (advance care planning)    Calculus of kidney    Pulmonary emphysema, unspecified emphysema type (H)    Pelvic floor dysfunction    Mixed hyperlipidemia    Rheumatoid factor positive. MARISEL and anti CCP negative    Insomnia, unspecified type    Cervical radicular pain    Psoriasis with arthropathy (H) - follows with Dr Francisco    Memory change    Delusional disorder (H)    History of anxiety    History of depression    MCI (mild cognitive impairment)     ALLERGY   Aspirin, Ibuprofen, Lansoprazole, Red dye #40 (allura red), Bupropion, Bupropion hcl, and Demerol [meperidine]  MEDICATIONS                                                                                              Current Outpatient Medications   Medication Sig Dispense Refill    AIMOVIG 140 MG/ML injection INJECT 140 MG UNDER THE SKIN EVERY 28 (TWENTY-EIGHT) DAYS.      albuterol (PROAIR HFA/PROVENTIL HFA/VENTOLIN HFA) 108 (90 Base) MCG/ACT inhaler INHALE 2 PUFFS INTO THE LUNGS EVERY 4 HOURS AS NEEDED FOR SHORTNESS OF BREATH OR WHEEZING. 18 g 0    atorvastatin (LIPITOR) 10 MG tablet TAKE ONE (1) TABLET BY MOUTH TWICE A WEEK 45 tablet 3    baclofen (LIORESAL) 10 MG tablet TAKE 1 TABLET (10 MG) BY MOUTH DAILY 90 tablet 3    botulinum toxin type A (BOTOX) 100 units injection Inject 155 Units into the muscle once every twelve weeks      budesonide-formoterol (SYMBICORT) 80-4.5 MCG/ACT Inhaler Inhale 2 puffs into the lungs 2 times daily 10.2 g 1    butalbital-acetaminophen-caffeine (ESGIC) -40 MG tablet Take 1 tablet by mouth every 4 hours as needed TAKE 1 TO 2 TABLETS BY MOUTH AT THE ONSET OF A MIGRAINE HEADACHE, LIMIT NO MORE THAN 3 TIMES PER WEEK. ACETAMINOPHEN SHOULD BE LIMITED TO 40      Calcium Carbonate Antacid 1177 MG CHEW       Cholecalciferol (VITAMIN D3) 1000 UNITS CAPS Take 1,000 Units by mouth Takes 5000 unit capsule daily      docusate sodium (COLACE) 100 MG capsule Take 100 mg by mouth daily        famotidine (PEPCID) 40 MG tablet TAKE ONE (1) TABLET BY MOUTH AT BEDTIME 30 tablet 3    FLUoxetine (PROZAC) 20 MG capsule Take 1 capsule daily along with 40 mg for total daily dose 60 mg 90 capsule 3    FLUoxetine (PROZAC) 40 MG capsule Take 1 capsule daily along with 20 mg capsule 90 capsule 3    HYDROcodone-acetaminophen (NORCO) 7.5-325 MG per tablet TAKE 1 TABLET BY MOUTH TWICE A DAY AS NEEDED FOR PAIN 60 tablet 0    ibuprofen (ADVIL/MOTRIN) 800 MG tablet TAKE 1 TABLET (800 MG) BY MOUTH EVERY 8 HOURS AS NEEDED FOR MODERATE PAIN 90 tablet 1    metFORMIN (GLUCOPHAGE XR) 500 MG 24 hr tablet Take 500 mg by mouth      methocarbamol (ROBAXIN) 750 MG tablet TAKE 1 TABLET BY MOUTH 3 TIMES A DAY AS NEEDED FOR muscle spasms 45 tablet 5    Multiple Vitamins-Minerals (CENTRUM SILVER ULTRA WOMENS) TABS Take 1 tablet by mouth daily       ondansetron (ZOFRAN ODT) 4 MG ODT tab Take 1 tablet (4 mg) by mouth every 6 hours as needed (after migraine medication) 30 tablet 1    pantoprazole (PROTONIX) 40 MG EC tablet Take 1 tablet (40 mg) by mouth daily. 90 tablet 3    polyethylene glycol (MIRALAX) 17 g packet Take 1 packet by mouth      pregabalin (LYRICA) 150 MG capsule TAKE 1 CAPSULE BY MOUTH TWICE DAILY 180 capsule 0    semaglutide (OZEMPIC, 0.25 OR 0.5 MG/DOSE,) 2 MG/3ML pen Inject 0.25 mg subcutaneously      senna (SENOKOT) 8.6 MG tablet Take 1 tablet by mouth      sucralfate (CARAFATE) 1 GM/10ML suspension Take 10 mLs (1 g) by mouth 4 times daily 420 mL 3    TREMFYA 100 MG/ML SOPN INJECT 100MG UNDER THE SKIN ONCE A WEEK AT WEEK 0 AND WEEK 4,THEN INJECT 100MG EVERY 8 WEEKS      asenapine (SAPHRIS) 2.5 MG SUBL sublingual tablet Place 1 tablet (2.5 mg) under the tongue every evening (Patient not taking: Reported on 1/6/2025) 30 tablet 3     No current facility-administered medications for this visit.       VITALS   There were no vitals taken for this visit.     LABS                                                                                                                          Last Comprehensive Metabolic Panel:  Sodium   Date Value Ref Range Status   06/11/2024 139 135 - 145 mmol/L Final     Comment:     Reference intervals for this test were updated on 09/26/2023 to more accurately reflect our healthy population. There may be differences in the flagging of prior results with similar values performed with this method. Interpretation of those prior results can be made in the context of the updated reference intervals.    03/25/2021 144 133 - 144 mmol/L Final     Potassium   Date Value Ref Range Status   06/11/2024 4.0 3.4 - 5.3 mmol/L Final   09/25/2022 3.7 3.4 - 5.3 mmol/L Final   03/25/2021 3.7 3.4 - 5.3 mmol/L Final     Chloride   Date Value Ref Range Status   06/11/2024 105 98 - 107 mmol/L Final   09/25/2022 108 94 - 109 mmol/L Final   03/25/2021 114 (H) 94 - 109 mmol/L Final     Carbon Dioxide   Date Value Ref Range Status   03/25/2021 23 20 - 32 mmol/L Final     Carbon Dioxide (CO2)   Date Value Ref Range Status   06/11/2024 25 22 - 29 mmol/L Final   09/25/2022 28 20 - 32 mmol/L Final     Anion Gap   Date Value Ref Range Status   06/11/2024 9 7 - 15 mmol/L Final   09/25/2022 4 3 - 14 mmol/L Final   03/25/2021 7 3 - 14 mmol/L Final     Glucose   Date Value Ref Range Status   06/11/2024 118 (H) 70 - 99 mg/dL Final   09/25/2022 110 (H) 70 - 99 mg/dL Final   03/25/2021 93 70 - 99 mg/dL Final     Urea Nitrogen   Date Value Ref Range Status   06/11/2024 8.3 8.0 - 23.0 mg/dL Final   09/25/2022 10 7 - 30 mg/dL Final   03/25/2021 11 7 - 30 mg/dL Final     Creatinine   Date Value Ref Range Status   06/11/2024 0.99 (H) 0.51 - 0.95 mg/dL Final   03/25/2021 0.92 0.52 - 1.04 mg/dL Final     GFR Estimate   Date Value Ref Range Status   06/11/2024 64 >60 mL/min/1.73m2 Final   03/25/2021 68 >60 mL/min/[1.73_m2] Final     Comment:     Non  GFR Calc  Starting 12/18/2018, serum creatinine based estimated GFR (eGFR) will be  "  calculated using the Chronic Kidney Disease Epidemiology Collaboration   (CKD-EPI) equation.       Calcium   Date Value Ref Range Status   06/11/2024 8.8 8.8 - 10.2 mg/dL Final   03/25/2021 8.3 (L) 8.5 - 10.1 mg/dL Final     Bilirubin Total   Date Value Ref Range Status   06/11/2024 0.2 <=1.2 mg/dL Final   03/25/2021 0.3 0.2 - 1.3 mg/dL Final     Alkaline Phosphatase   Date Value Ref Range Status   06/11/2024 103 40 - 150 U/L Final   03/25/2021 110 40 - 150 U/L Final     ALT   Date Value Ref Range Status   06/11/2024 26 0 - 50 U/L Final     Comment:     Reference intervals for this test were updated on 6/12/2023 to more accurately reflect our healthy population. There may be differences in the flagging of prior results with similar values performed with this method. Interpretation of those prior results can be made in the context of the updated reference intervals.     03/25/2021 24 0 - 50 U/L Final     AST   Date Value Ref Range Status   06/11/2024 23 0 - 45 U/L Final     Comment:     Reference intervals for this test were updated on 6/12/2023 to more accurately reflect our healthy population. There may be differences in the flagging of prior results with similar values performed with this method. Interpretation of those prior results can be made in the context of the updated reference intervals.   03/25/2021 13 0 - 45 U/L Final       MENTAL STATUS EXAM                                                                                          Mood today Tesha notes is \"good\".  No problems with speech . Thought process linear and logical. No evidence of any hallucinations or delusional thoughts. No SI.  No hallucinations. Insight good.       ASSESSMENT                                                                                                      HISTORICAL:  Initial psych note 10/13/15        NOTES:  Cymbalta -> agitation, angry and increase in diastolic BP. Propranolol 10 mg BID prn anxiety: lightheadedness. " Neuropsych scanning 6/5/24. Latuda: akathisia.    Tesha Blankenship is a 64 year old female. Tesha's daughter accompanied her on video visit December '23 and though I have been working with Tesha since 2015 I was not aware of her experiencing paranoia and delusions until the past year. Tesha ended up admitted in [place] and was started on Risperdal. We ended up changing to Latuda. We then changed to Saphris given she experienced akathisia with Latuda. Last visit she noted she was still getting headaches even with small dose (2.5 mg) of Saphris and she hence stopped taking it.    Today she notes she is doing well albeit having been sick with respiratory sx for 3 weeks. Heading up to [facility] today. She is still taking Prozac 60 mg daily and I noted I will refill given coming up next month for yearly refill. She saw neurologist and plans to start taking Aricept and will follow-up with him in spring.    TREATMENT RISK STATEMENT:  The risks, benefits, alternatives and potential adverse effects have been explained and are understood by the pt.  The pt agrees to the treatment plan with the ability to do so.   The pt knows to call the clinic for any problems or access emergency care if needed.        DIAGNOSES                     MDD recurrent, mild  Psychosis unspecified (Delusional disorder vs. Paranoid personality disorder vs. MDD severe with psychotic features)  Mild neurocognitive disorder  ESTRELLA      PLAN                                                                                                                    1)  MEDICATIONS:    Continue Prozac 60 mg daily refilled today #90 with 3 refills    2)  THERAPY:  No Change    3)  LABS:  None    4)  PT MONITOR [call for probs]:  worsening sx, SI/HI, SEs from meds    5)  REFERRALS [CD, medical, other]: none    6)  RTC:  ~3 months

## 2025-01-17 ENCOUNTER — HOSPITAL ENCOUNTER (EMERGENCY)
Facility: HOSPITAL | Age: 65
Discharge: HOME | End: 2025-01-17
Attending: PHYSICIAN ASSISTANT | Admitting: PHYSICIAN ASSISTANT
Payer: MEDICARE

## 2025-01-17 VITALS
DIASTOLIC BLOOD PRESSURE: 98 MMHG | RESPIRATION RATE: 18 BRPM | SYSTOLIC BLOOD PRESSURE: 139 MMHG | OXYGEN SATURATION: 96 % | HEART RATE: 102 BPM | TEMPERATURE: 98.1 F

## 2025-01-17 DIAGNOSIS — B96.89 ACUTE BACTERIAL RHINOSINUSITIS: ICD-10-CM

## 2025-01-17 DIAGNOSIS — J01.90 ACUTE BACTERIAL RHINOSINUSITIS: ICD-10-CM

## 2025-01-17 PROCEDURE — G0463 HOSPITAL OUTPT CLINIC VISIT: HCPCS

## 2025-01-17 PROCEDURE — 99213 OFFICE O/P EST LOW 20 MIN: CPT | Performed by: PHYSICIAN ASSISTANT

## 2025-01-17 RX ORDER — OFLOXACIN 3 MG/ML
10 SOLUTION AURICULAR (OTIC) DAILY
Qty: 5 ML | Refills: 0 | Status: SHIPPED | OUTPATIENT
Start: 2025-01-17 | End: 2025-01-24

## 2025-01-17 RX ORDER — DONEPEZIL HYDROCHLORIDE 10 MG/1
1 TABLET, FILM COATED ORAL AT BEDTIME
COMMUNITY
Start: 2025-01-30

## 2025-01-17 RX ORDER — LORATADINE 10 MG/1
10 TABLET ORAL DAILY
COMMUNITY

## 2025-01-17 RX ORDER — FLUTICASONE PROPIONATE 50 MCG
2 SPRAY, SUSPENSION (ML) NASAL
COMMUNITY
Start: 2024-10-01

## 2025-01-17 ASSESSMENT — ENCOUNTER SYMPTOMS
COUGH: 1
SHORTNESS OF BREATH: 0
CHEST TIGHTNESS: 0
SINUS PAIN: 1
SINUS PRESSURE: 1
FATIGUE: 1
CONFUSION: 0
FEVER: 0
TROUBLE SWALLOWING: 0
VOMITING: 0

## 2025-01-17 ASSESSMENT — ACTIVITIES OF DAILY LIVING (ADL): ADLS_ACUITY_SCORE: 45

## 2025-01-17 NOTE — ED PROVIDER NOTES
"  History     Chief Complaint   Patient presents with    Sinusitis     HPI  Sadaf MCMAHAN Latendresse is a 64 year old female who presents to  for evaluation of ongoing sinus pressure. PMH includes psoriatic arthritis on guselkumab therapy.   Melanie states that the symptoms overall began around December 13.  She states that she developed a hoarse voice and cold-like symptoms. She lost her sense of taste/smell and had two negative home covid swabs. She visited her PCP for these symptoms on 1/6/24 and was prescribed azithromycin with diagnosis of bronchitis.  Melanie did take the bronchitis and was feeling perhaps a bit better.  Then towards the end of the course she had worsening of her symptoms including worsening nasal congestion and headache. She continues to have these symptoms. She feels she has significant PND and is coughing up light green phlegm. She feels quite fatigued and actually walked here today due to not feeling well enough to drive. No known recent fevers. She has to sleep sitting upright due to the congestion and cough.     She is also bothered by left ear pain.       Allergies:  Allergies   Allergen Reactions    Aspirin Hives    Ibuprofen      Dye in the otc form causes headaches  \"patient states over the counter ibuprofen  bothers her\"    Lansoprazole Other (See Comments) and Visual Disturbance     Changes in eyesight  Prevacid  Change in eyesight    Red Dye #40 (Allura Red) Hives    Bupropion Rash    Bupropion Hcl Hives and Rash     Wellbutrin    Demerol [Meperidine] Other (See Comments)     Made migraine worse       Problem List:    Patient Active Problem List    Diagnosis Date Noted    MCI (mild cognitive impairment) 06/11/2024     Priority: Medium    Delusional disorder (H) 12/17/2023     Priority: Medium    History of anxiety 12/17/2023     Priority: Medium    History of depression 12/17/2023     Priority: Medium    Memory change 12/14/2023     Priority: Medium    Psoriasis with arthropathy (H) - " follows with Dr Francisco 12/04/2023     Priority: Medium    Insomnia, unspecified type 10/03/2023     Priority: Medium    Rheumatoid factor positive. MARISEL and anti CCP negative 06/21/2022     Priority: Medium    Mixed hyperlipidemia 01/06/2022     Priority: Medium    Pelvic floor dysfunction 05/01/2018     Priority: Medium    Calculus of kidney 07/11/2017     Priority: Medium    Pulmonary emphysema, unspecified emphysema type (H) 07/11/2017     Priority: Medium    ACP (advance care planning) 02/09/2017     Priority: Medium     Advance Care Planning 2/9/2017: ACP Review of Chart / Resources Provided:  Reviewed chart for advance care plan.  Sadaf Blankenship has no plan or code status on file. Discussed available resources and provided with information. Confirmed code status reflects current choices pending further ACP discussions.  Confirmed/documented legally designated decision makers.  Added by Liudmila Rivera            Mild episode of recurrent major depressive disorder 11/14/2016     Priority: Medium    Ulnar neuropathy at elbow of left upper extremity 11/14/2016     Priority: Medium    Lumbar radiculopathy 11/14/2016     Priority: Medium     bilateral      S/P cervical spinal fusion 11/14/2016     Priority: Medium    Gastroesophageal reflux disease with esophagitis 10/14/2016     Priority: Medium    Intractable chronic migraine without aura and with status migrainosus 09/13/2016     Priority: Medium    Myofascial pain 05/09/2016     Priority: Medium    Disuse syndrome 02/12/2016     Priority: Medium    Chronic pain 02/05/2016     Priority: Medium    Chronic rhinitis 01/22/2016     Priority: Medium    Chronic maxillary sinusitis 01/22/2016     Priority: Medium    Hypertrophy of inferior nasal turbinate 01/22/2016     Priority: Medium    Chronic, continuous use of opioids 12/07/2015     Priority: Medium     Patient is followed by Henna Cruz MD for ongoing prescription of pain medication.  All refills should  only be approved by this provider, or covering partner.    Medication(s): Norco 7.5/325mg.   Maximum quantity per month: #60  Clinic visit frequency required: Q 3 months     Controlled substance agreement:  Encounter-Level CSA - 07/11/2017:              Controlled Substance Agreement - Scan on 7/14/2017 12:56 PM : CONTROLLED SUBSTANCE AGREEMENT (below)                Pain Clinic evaluation in the past: No    DIRE Total Score(s):  No flowsheet data found.    Last [facility] website verification:  done on 7.10.17   [email or web address]        Chronic tension-type headache, intractable 10/05/2015     Priority: Medium    History of arthrodesis 10/05/2015     Priority: Medium    Depression, major 09/17/2015     Priority: Medium    Irritable bowel syndrome 01/26/2015     Priority: Medium    Pseudoarthrosis of cervical spine (H) 07/03/2012     Priority: Medium    Cervical pain 05/10/2012     Priority: Medium     With radicuopathy        Cervical radicular pain 05/10/2012     Priority: Medium    Degenerative disc disease, cervical 01/01/2011     Priority: Medium        Past Medical History:    Past Medical History:   Diagnosis Date    Adhesive capsulitis of left shoulder 12/06/2018    ASCUS on Pap smear 05/26/2004    Atypical chest pain 05/26/2008    Biliary dyskinesia 12/02/2021    Bleeding ulcer 05/26/1976    Cervical radicular pain 05/10/2012    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    Concussion with brief LOC 01/16/2020    Degenerative disc disease, cervical 01/01/2011    Esophageal ulcer 05/26/1998    Irritable bowel syndrome 01/26/2015    Long uvula 01/22/2016    Migraine headaches, severe 07/09/2001    Pseudoarthrosis of cervical spine 07/03/2012    Recurrent UTI 05/26/2011    sinusitis (chronic) 04/16/2001    Subacromial bursitis of right shoulder joint 01/26/2017    Ulcer of esophagus 06/25/2012    Uvulitis 01/22/2016       Past Surgical History:    Past Surgical History:   Procedure Laterality Date    BACK SURGERY       cervical    Cervical spine surgery with removal of 2 disks, C5,C7      CHOLECYSTECTOMY  12/10/2021    [facility]    COLONOSCOPY  10/13/2014    Dr Camargo, internal hemorrhoids    COLONOSCOPY - HIM SCAN  2018    EGD and colonoscopy with Dr. Jennings    Coronary angiogram, normal      Chest pain    ENT SURGERY      nose surgery x3    ESOPHAGOSCOPY, GASTROSCOPY, DUODENOSCOPY (EGD), COMBINED N/A 2023    Procedure: ESOPHAGOGASTRODUODENOSCOPY, bronchoscopy;  Surgeon: Harish Haley MD;  Location: [facility] OR    fusion discectomy anterior cervical       ESOPHAGOSCOPY, DIAGNOSTIC  10/31/2014    Dr Camargo, mild erythema of antrum, negative biopsies    HEMORRHOIDECTOMY  12/10/2021    [facility]    HYSTERECTOMY TOTAL ABDOMINAL, SALPINGECTOMY Right 2002    Dr. Hathaway. Endometriosis.    HYSTERECTOMY, PAP NO LONGER INDICATED N/A 2002    Cervix removed.    IR CONSULTATION FOR IR EXAM  2020    IR FLUORO 0-1 HOUR  2020    NOSE SURGERY      x3          Posterior decompression , fusion C3-5      Psuedoarthrosis       Family History:    Family History   Problem Relation Age of Onset    Cancer Father         lung, also a heavy smoker    Other Cancer Father     Diabetes Mother     Heart Disease Mother 58        MI; cause of death; heavy smoker. had first MI at 40    Coronary Artery Disease Mother     Hypertension Mother     Cancer Other         strong history    Heart Disease Other         heart disease    Heart Disease Brother         x3    Hypertension Brother     Diabetes Brother     Coronary Artery Disease Brother     Cerebrovascular Disease Brother     Hypertension Sister        Social History:  Marital Status:   [4]  Social History     Tobacco Use    Smoking status: Former     Current packs/day: 0.00     Types: Cigarettes     Start date: 1981     Quit date: 1997     Years since quittin.0    Smokeless tobacco: Never   Vaping Use    Vaping status: Never Used    Substance Use Topics    Alcohol use: No    Drug use: No        Medications:    albuterol (PROAIR HFA/PROVENTIL HFA/VENTOLIN HFA) 108 (90 Base) MCG/ACT inhaler  amoxicillin-clavulanate (AUGMENTIN) 875-125 MG tablet  atorvastatin (LIPITOR) 10 MG tablet  baclofen (LIORESAL) 10 MG tablet  budesonide-formoterol (SYMBICORT) 80-4.5 MCG/ACT Inhaler  Cholecalciferol (VITAMIN D3) 1000 UNITS CAPS  docusate sodium (COLACE) 100 MG capsule  famotidine (PEPCID) 40 MG tablet  FLUoxetine (PROZAC) 20 MG capsule  FLUoxetine (PROZAC) 40 MG capsule  fluticasone (FLONASE) 50 MCG/ACT nasal spray  Guselkumab 100 MG/ML SOAJ  HYDROcodone-acetaminophen (NORCO) 7.5-325 MG per tablet  ibuprofen (ADVIL/MOTRIN) 800 MG tablet  metFORMIN (GLUCOPHAGE XR) 500 MG 24 hr tablet  methocarbamol (ROBAXIN) 750 MG tablet  ofloxacin (FLOXIN) 0.3 % otic solution  pantoprazole (PROTONIX) 40 MG EC tablet  pregabalin (LYRICA) 150 MG capsule  AIMOVIG 140 MG/ML injection  botulinum toxin type A (BOTOX) 100 units injection  butalbital-acetaminophen-caffeine (ESGIC) -40 MG tablet  Calcium Carbonate Antacid 1177 MG CHEW  [START ON 1/30/2025] donepezil (ARICEPT) 10 MG tablet  loratadine (CLARITIN) 10 MG tablet  Multiple Vitamins-Minerals (CENTRUM SILVER ULTRA WOMENS) TABS  ondansetron (ZOFRAN ODT) 4 MG ODT tab  polyethylene glycol (MIRALAX) 17 g packet  semaglutide (OZEMPIC, 0.25 OR 0.5 MG/DOSE,) 2 MG/3ML pen  senna (SENOKOT) 8.6 MG tablet  sucralfate (CARAFATE) 1 GM/10ML suspension  TREMFYA 100 MG/ML SOPN          Review of Systems   Constitutional:  Positive for fatigue. Negative for fever.   HENT:  Positive for congestion, ear pain, postnasal drip, sinus pressure, sinus pain and sneezing. Negative for trouble swallowing.    Respiratory:  Positive for cough. Negative for chest tightness and shortness of breath.    Gastrointestinal:  Negative for vomiting.   Skin:  Negative for rash.   Allergic/Immunologic: Positive for immunocompromised state (on  immunocompromising therapy).   Neurological:  Negative for syncope.   Psychiatric/Behavioral:  Negative for confusion.        Physical Exam   BP: 139/98  Pulse: 102  Temp: 98.1  F (36.7  C)  Resp: 18  SpO2: 96 %      Physical Exam  Vitals and nursing note reviewed.   Constitutional:       General: She is not in acute distress.     Appearance: Normal appearance. She is ill-appearing (fatigued). She is not toxic-appearing or diaphoretic.   HENT:      Right Ear: Tympanic membrane normal.      Left Ear: Tympanic membrane normal.      Ears:      Comments: Left EAC with mild narrowing, tenderness manipulating pinna and during exam, +tragal tenderness     Nose: Nose normal. No congestion.      Mouth/Throat:      Mouth: Mucous membranes are moist.      Pharynx: No oropharyngeal exudate or posterior oropharyngeal erythema.   Eyes:      Extraocular Movements: Extraocular movements intact.   Cardiovascular:      Rate and Rhythm: Normal rate and regular rhythm.      Heart sounds: No murmur heard.     No friction rub. No gallop.   Pulmonary:      Effort: Pulmonary effort is normal. No respiratory distress.      Breath sounds: Normal breath sounds. No wheezing, rhonchi or rales.      Comments: Coughs up quarter-sized light green phlegm  Musculoskeletal:      Cervical back: Normal range of motion. No rigidity.   Skin:     General: Skin is warm.      Findings: No rash.   Neurological:      General: No focal deficit present.      Mental Status: She is alert and oriented to person, place, and time.         ED Course        Procedures            No results found for this or any previous visit (from the past 24 hours).    Medications - No data to display    Assessments & Plan (with Medical Decision Making)     I have reviewed the nursing notes.    I have reviewed the findings, diagnosis, plan and need for follow up with the patient.    64-year-old female presents for evaluation of ongoing sinus pressure with postnasal drip and  productive cough.  Please refer above for her history.  Differential includes but not limited to pneumonia, sinusitis, otitis media, PE.  Her symptoms were overall quite consistent with sinusitis.  Her lung sounds were quite reassuring.  She had a productive cough during the visit that yielded light green phlegm which she felt originated from PND.   Will start her on Augmentin twice daily x 10 days.  It is noted that she is on immunosuppressive medication therefore may require this longer duration of treatment.    Her left ear pain seems due to a mild otitis externa, which she notes she has had in the past.  Did also prescribe her ofloxacin drops.  Note that these drops were prescribed several hours after the visit and a message was left for her informing her that these drops were sent to her pharmacy.        Discharge Medication List as of 1/17/2025  4:09 PM        START taking these medications    Details   amoxicillin-clavulanate (AUGMENTIN) 875-125 MG tablet Take 1 tablet by mouth 2 times daily for 10 days., Disp-20 tablet, R-0, E-Prescribe             Final diagnoses:   Acute bacterial rhinosinusitis       1/17/2025   [facility]       Ciera Cleveland PA-C  01/17/25 1947

## 2025-01-17 NOTE — ED TRIAGE NOTES
Pt presents with c/o having ongoing resp issues that have been on going since 12/21 states was seen for it and put on a Zpack   And states that did not get better at all   Reports increased sinus pain and pressure   Pt had tylenol/ibu and mucinex today

## 2025-01-20 NOTE — OR NURSING
Patient and responsible adult given discharge instructions with no questions regarding instructions. Tatianna score 20/20. Pain level 3/10.  Discharged from unit via ambulation. Patient discharged to home with siblings. Given additional information about nissen diet modifications after pt attempted to drink and had difficulty. Approved by Dr Haley for pt to discharge home, likely irritation to throat area, pt to follow up with [facility] for further evaluation and see if problem persists.        Patient

## 2025-02-03 ENCOUNTER — TELEPHONE (OUTPATIENT)
Dept: OTOLARYNGOLOGY | Facility: OTHER | Age: 65
End: 2025-02-03

## 2025-02-03 ENCOUNTER — OFFICE VISIT (OUTPATIENT)
Dept: OTOLARYNGOLOGY | Facility: OTHER | Age: 65
End: 2025-02-03
Attending: PHYSICIAN ASSISTANT
Payer: MEDICARE

## 2025-02-03 ENCOUNTER — ANCILLARY PROCEDURE (OUTPATIENT)
Dept: GENERAL RADIOLOGY | Facility: OTHER | Age: 65
End: 2025-02-03
Attending: PHYSICIAN ASSISTANT
Payer: MEDICARE

## 2025-02-03 VITALS
HEIGHT: 66 IN | WEIGHT: 192 LBS | SYSTOLIC BLOOD PRESSURE: 113 MMHG | DIASTOLIC BLOOD PRESSURE: 77 MMHG | BODY MASS INDEX: 30.86 KG/M2 | RESPIRATION RATE: 16 BRPM | HEART RATE: 92 BPM | OXYGEN SATURATION: 95 % | TEMPERATURE: 97.5 F

## 2025-02-03 DIAGNOSIS — B96.89 ACUTE BACTERIAL RHINOSINUSITIS: ICD-10-CM

## 2025-02-03 DIAGNOSIS — J01.90 ACUTE BACTERIAL RHINOSINUSITIS: ICD-10-CM

## 2025-02-03 DIAGNOSIS — Z98.890 HISTORY OF NASAL SURGERY: ICD-10-CM

## 2025-02-03 DIAGNOSIS — J32.4 CHRONIC PANSINUSITIS: Primary | ICD-10-CM

## 2025-02-03 DIAGNOSIS — J34.3 NASAL TURBINATE HYPERTROPHY: ICD-10-CM

## 2025-02-03 DIAGNOSIS — R05.2 SUBACUTE COUGH: ICD-10-CM

## 2025-02-03 DIAGNOSIS — J10.1 INFLUENZA A: ICD-10-CM

## 2025-02-03 DIAGNOSIS — R05.1 ACUTE COUGH: ICD-10-CM

## 2025-02-03 LAB
ERYTHROCYTE [DISTWIDTH] IN BLOOD BY AUTOMATED COUNT: 13.3 % (ref 10–15)
FLUAV RNA SPEC QL NAA+PROBE: POSITIVE
FLUBV RNA RESP QL NAA+PROBE: NEGATIVE
HCT VFR BLD AUTO: 42.1 % (ref 35–47)
HGB BLD-MCNC: 14.5 G/DL (ref 11.7–15.7)
MCH RBC QN AUTO: 30.7 PG (ref 26.5–33)
MCHC RBC AUTO-ENTMCNC: 34.4 G/DL (ref 31.5–36.5)
MCV RBC AUTO: 89 FL (ref 78–100)
PLATELET # BLD AUTO: 123 10E3/UL (ref 150–450)
RBC # BLD AUTO: 4.72 10E6/UL (ref 3.8–5.2)
RSV RNA SPEC NAA+PROBE: NEGATIVE
SARS-COV-2 RNA RESP QL NAA+PROBE: NEGATIVE
WBC # BLD AUTO: 3.9 10E3/UL (ref 4–11)

## 2025-02-03 PROCEDURE — 87637 SARSCOV2&INF A&B&RSV AMP PRB: CPT | Mod: ZL | Performed by: PHYSICIAN ASSISTANT

## 2025-02-03 PROCEDURE — 36415 COLL VENOUS BLD VENIPUNCTURE: CPT | Mod: ZL | Performed by: PHYSICIAN ASSISTANT

## 2025-02-03 PROCEDURE — 31231 NASAL ENDOSCOPY DX: CPT | Performed by: PHYSICIAN ASSISTANT

## 2025-02-03 PROCEDURE — 85027 COMPLETE CBC AUTOMATED: CPT | Mod: ZL | Performed by: PHYSICIAN ASSISTANT

## 2025-02-03 PROCEDURE — G0463 HOSPITAL OUTPT CLINIC VISIT: HCPCS | Mod: 25

## 2025-02-03 PROCEDURE — 71046 X-RAY EXAM CHEST 2 VIEWS: CPT | Mod: TC

## 2025-02-03 RX ORDER — BUDESONIDE 0.5 MG/2ML
INHALANT ORAL
Qty: 120 ML | Refills: 1 | Status: SHIPPED | OUTPATIENT
Start: 2025-02-03

## 2025-02-03 RX ORDER — ALBUTEROL SULFATE 90 UG/1
1-2 INHALANT RESPIRATORY (INHALATION) EVERY 4 HOURS PRN
Qty: 18 G | Refills: 0 | Status: SHIPPED | OUTPATIENT
Start: 2025-02-03

## 2025-02-03 RX ORDER — BUDESONIDE AND FORMOTEROL FUMARATE DIHYDRATE 80; 4.5 UG/1; UG/1
2 AEROSOL RESPIRATORY (INHALATION) 2 TIMES DAILY
Qty: 10.2 G | Refills: 1 | Status: SHIPPED | OUTPATIENT
Start: 2025-02-03

## 2025-02-03 RX ORDER — PREDNISONE 20 MG/1
TABLET ORAL
Qty: 8 TABLET | Refills: 0 | Status: SHIPPED | OUTPATIENT
Start: 2025-02-03

## 2025-02-03 ASSESSMENT — PAIN SCALES - GENERAL: PAINLEVEL_OUTOF10: NO PAIN (0)

## 2025-02-03 ASSESSMENT — PATIENT HEALTH QUESTIONNAIRE - PHQ9: SUM OF ALL RESPONSES TO PHQ QUESTIONS 1-9: 7

## 2025-02-03 NOTE — PATIENT INSTRUCTIONS
Complete labs, Chest X-ray    Thank you for allowing KEAGAN Dietrich and our ENT team to participate in your care.  If your medications are too expensive, please give the nurse a call.  We can possibly change this medication.  If you have a scheduling or an appointment question please contact our Health Unit Coordinator at their direct line [phone].   ALL nursing questions or concerns can be directed to your ENT nurseJennifer at: [phone].

## 2025-02-03 NOTE — LETTER
2/3/2025      Sadaf Blankenship  [address]  [place] [address]      Dear Colleague,    Thank you for referring your patient, Sadaf Blankenship, to the [facility] [place]. Please see a copy of my visit note below.    Chief Complaint   Patient presents with     Sinusitis     Acute Bacterial Rhinosinusitis, Ciera Cleveland PA-C     Patient returns to ENT for follow up exam.   She reports increase in chest congestion, wheezing, 5 day of high fever.   +cough   Patient developed illness in December, and seen by her PCP and treated with azithromycin. She went into UC on 1/17/25 Augmentin BID, Floxin for 10 days. Completed course of abx which had not improved. She did have recurrent sinusitis episodes this past year.   She has been using Krishna med rinses, Flonase. She was miserable associated with abdominal pain, discomfort, decreased appetite. She finally ate last night.     She had COVID in past. Recent testing negative.   She does sleep elevated related due to congestion. Maxillary pain.   Recent dental pain, pressure.   She has been OTC products without relief.        She had a severe nasal injury at 6 yoa after a laundry cart fell on her.     She had nasal reconstruction at 19 yoa     She had a revision nasal surgery at 21 yoa describing as the 'tip of her nose' fell off.          CT sinus dated 6/30/2023 shows mucoperiosteal ostial thickening of the frontal sinuses bilaterally anterior and posterior ethmoid sinuses and occlusion of the ostiomeatal complexes with an elongated Myra cell on the left.  There is partial opacification of the left sphenoid the right sphenoid is clear the optic nerve is not dehiscent the lamina is intact the skull base is intact Karalis to.  Bilateral inferior turbinate hypertrophy and naseem bullosa.  There is a septal perforation at the anterior and superior septum with a metal plate at the columella     3 prior nasal surgeries       Past Medical  "History:   Diagnosis Date     Adhesive capsulitis of left shoulder 12/06/2018     ASCUS on Pap smear 05/26/2004    negative HPV     Atypical chest pain 05/26/2008    negative cardiolite stress test [facility]     Biliary dyskinesia 12/02/2021    Formatting of this note might be different from the original. 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     Concussion with brief LOC 01/16/2020     Degenerative disc disease, cervical 01/01/2011     Esophageal ulcer 05/26/1998     Irritable bowel syndrome 01/26/2015     Long uvula 01/22/2016    improved     Migraine headaches, severe 07/09/2001     Pseudoarthrosis of cervical spine 07/03/2012     Recurrent UTI 05/26/2011     sinusitis (chronic) 04/16/2001     Subacromial bursitis of right shoulder joint 01/26/2017     Ulcer of esophagus 06/25/2012     Uvulitis 01/22/2016        Allergies   Allergen Reactions     Aspirin Hives     Ibuprofen      Dye in the otc form causes headaches  \"patient states over the counter ibuprofen  bothers her\"     Lansoprazole Other (See Comments) and Visual Disturbance     Changes in eyesight  Prevacid  Change in eyesight     Red Dye #40 (Allura Red) Hives     Bupropion Rash     Bupropion Hcl Hives and Rash     Wellbutrin     Demerol [Meperidine] Other (See Comments)     Made migraine worse         ROS- SEE HPI  /77 (BP Location: Right arm, Patient Position: Sitting, Cuff Size: Adult Regular)   Pulse 92   Temp 97.5  F (36.4  C) (Tympanic)   Resp 16   Ht 1.676 m (5' 5.98\")   Wt 87.1 kg (192 lb)   SpO2 95%   BMI 31.00 kg/m      General - The patient is well nourished and well developed, and appears to have good nutritional status.  Alert and oriented to person and place, interactive.    Head and Face - Normocephalic and atraumatic, with no gross asymmetry noted of the contour of the facial features.  The facial nerve is intact, " with strong symmetric movements.  Neck-no palpable lymphadenopathy or thyroid mass.  Trachea is midline.  Eyes - Extraocular movements intact.   Ears- External auditory canals are patent, tympanic membranes are intact without effusion or worrisome retractions   Nose -juvenile nasal shape with significant distortion of the entire dorsum and nasal tip secondary to distant surgeries.  The internal nasal valve is obstructed.  The inferior turbinates are 4+ bilateral naseem bullosa  The septum is grossly intact  Mouth - Examination of the oral cavity shows pink, healthy, moist mucosa.  No lesions or ulceration noted.  The dentition are in good repair.  The tongue is mobile and midline.  Throat - The walls of the oropharynx were smooth, pink, moist, symmetric, and had no lesions or ulcerations.  The tonsillar pillars and soft palate were symmetric.  The uvula was midline on elevation.  Tongue base symmetric on palpation   Heart- Regular rate  Lungs- No wheezes or rales  To evaluate the nose and sinuses, I performed rigid nasal endoscopy.  I applied topical nasal lidocaine and neosynephrine.     I began with the LEFT side using a 0 degree rigid nasal endoscope, and then similarly examined the RIGHT side     Findings:  Inferior turbinates:  4+  Middle turbinate and middle meatus:  No purulence, no polyposis, bilateral obstructive naseem  Superior meatus was evaluated  Frontal recess clear  Mucosa is healthy throughout,  no xiao polyps nor polypoid degeneration  Sphenoethmoidal recess without purulence   Nasopharynx clear, et patent  The patient tolerated the procedure well      ASSESSMENT/ PLAN;    ICD-10-CM    1. Chronic pansinusitis  J32.4 budesonide (PULMICORT) 0.5 MG/2ML neb solution      2. Acute cough  R05.1 XR Chest 2 Views     CBC with platelets     Influenza A/B, RSV and SARS-CoV2 PCR (COVID-19) Nasopharyngeal     Influenza A/B, RSV and SARS-CoV2 PCR (COVID-19) Nasopharyngeal     CBC with platelets     predniSONE  (DELTASONE) 20 MG tablet      3. Subacute cough  R05.2 albuterol (PROAIR HFA/PROVENTIL HFA/VENTOLIN HFA) 108 (90 Base) MCG/ACT inhaler     Influenza A/B, RSV and SARS-CoV2 PCR (COVID-19) Nasopharyngeal     Influenza A/B, RSV and SARS-CoV2 PCR (COVID-19) Nasopharyngeal     predniSONE (DELTASONE) 20 MG tablet      4. Nasal turbinate hypertrophy  J34.3       5. History of nasal septal reconstruction at 19 years of age secondary to childhood nasal trauma  Z98.890       6. Influenza A  J10.1                 Rest, supportive cares  Influenza A positive, reviewed symptoms correlated w/ her most recent illness.     Start Budesonide rinses. Rinse 1-2 times daily   Symbicort, Albuterol inhalers refilled    Prednisone taper as directed  Risks of oral steroid use were discussed and include psychiatric/mood changes, insomnia, stomach ulcers and potential GI bleeding, blood sugar elevation/worsening diabetes, hip/bone necrosis called avascular necrosis, or bone demineralization.      Follow up in 3-4 weeks if there is no relief. Consider additional imaging.           America Aguilar PA-C  ENT  [facility] [place]      Again, thank you for allowing me to participate in the care of your patient.        Sincerely,        America Aguilar PA-C    Electronically signed

## 2025-02-03 NOTE — TELEPHONE ENCOUNTER
Spoke to patient via phone.  Advised America Aguilar has reviewed results: Influenza A positive. Will send in Prednisone to help breathing. Supportive cares. Likely what she had related to the fevers. Patient verbalized understanding.

## 2025-02-03 NOTE — PROGRESS NOTES
Chief Complaint   Patient presents with    Sinusitis     Acute Bacterial Rhinosinusitis, Ciera Cleveland PA-C     Patient returns to ENT for follow up exam.   She reports increase in chest congestion, wheezing, 5 day of high fever.   +cough   Patient developed illness in December, and seen by her PCP and treated with azithromycin. She went into UC on 1/17/25 Augmentin BID, Floxin for 10 days. Completed course of abx which had not improved. She did have recurrent sinusitis episodes this past year.   She has been using Krishna med rinses, Flonase. She was miserable associated with abdominal pain, discomfort, decreased appetite. She finally ate last night.     She had COVID in past. Recent testing negative.   She does sleep elevated related due to congestion. Maxillary pain.   Recent dental pain, pressure.   She has been OTC products without relief.        She had a severe nasal injury at 6 yoa after a laundry cart fell on her.     She had nasal reconstruction at 19 yoa     She had a revision nasal surgery at 21 yoa describing as the 'tip of her nose' fell off.          CT sinus dated 6/30/2023 shows mucoperiosteal ostial thickening of the frontal sinuses bilaterally anterior and posterior ethmoid sinuses and occlusion of the ostiomeatal complexes with an elongated Myra cell on the left.  There is partial opacification of the left sphenoid the right sphenoid is clear the optic nerve is not dehiscent the lamina is intact the skull base is intact Karalis to.  Bilateral inferior turbinate hypertrophy and naseem bullosa.  There is a septal perforation at the anterior and superior septum with a metal plate at the columella     3 prior nasal surgeries       Past Medical History:   Diagnosis Date    Adhesive capsulitis of left shoulder 12/06/2018    ASCUS on Pap smear 05/26/2004    negative HPV    Atypical chest pain 05/26/2008    negative cardiolite stress test [facility]    Biliary dyskinesia 12/02/2021    Formatting of  "this note might be different from the original. Added automatically from request for surgery [id number]    Bleeding ulcer 05/26/1976    Cervical radicular pain 05/10/2012    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    Concussion with brief LOC 01/16/2020    Degenerative disc disease, cervical 01/01/2011    Esophageal ulcer 05/26/1998    Irritable bowel syndrome 01/26/2015    Long uvula 01/22/2016    improved    Migraine headaches, severe 07/09/2001    Pseudoarthrosis of cervical spine 07/03/2012    Recurrent UTI 05/26/2011    sinusitis (chronic) 04/16/2001    Subacromial bursitis of right shoulder joint 01/26/2017    Ulcer of esophagus 06/25/2012    Uvulitis 01/22/2016        Allergies   Allergen Reactions    Aspirin Hives    Ibuprofen      Dye in the otc form causes headaches  \"patient states over the counter ibuprofen  bothers her\"    Lansoprazole Other (See Comments) and Visual Disturbance     Changes in eyesight  Prevacid  Change in eyesight    Red Dye #40 (Allura Red) Hives    Bupropion Rash    Bupropion Hcl Hives and Rash     Wellbutrin    Demerol [Meperidine] Other (See Comments)     Made migraine worse         ROS- SEE HPI  /77 (BP Location: Right arm, Patient Position: Sitting, Cuff Size: Adult Regular)   Pulse 92   Temp 97.5  F (36.4  C) (Tympanic)   Resp 16   Ht 1.676 m (5' 5.98\")   Wt 87.1 kg (192 lb)   SpO2 95%   BMI 31.00 kg/m      General - The patient is well nourished and well developed, and appears to have good nutritional status.  Alert and oriented to person and place, interactive.    Head and Face - Normocephalic and atraumatic, with no gross asymmetry noted of the contour of the facial features.  The facial nerve is intact, with strong symmetric movements.  Neck-no palpable lymphadenopathy or thyroid mass.  Trachea is midline.  Eyes - Extraocular movements intact.   Ears- External auditory canals are patent, tympanic membranes are intact without effusion or worrisome retractions   Nose -juvenile " nasal shape with significant distortion of the entire dorsum and nasal tip secondary to distant surgeries.  The internal nasal valve is obstructed.  The inferior turbinates are 4+ bilateral naseem bullosa  The septum is grossly intact  Mouth - Examination of the oral cavity shows pink, healthy, moist mucosa.  No lesions or ulceration noted.  The dentition are in good repair.  The tongue is mobile and midline.  Throat - The walls of the oropharynx were smooth, pink, moist, symmetric, and had no lesions or ulcerations.  The tonsillar pillars and soft palate were symmetric.  The uvula was midline on elevation.  Tongue base symmetric on palpation   Heart- Regular rate  Lungs- No wheezes or rales  To evaluate the nose and sinuses, I performed rigid nasal endoscopy.  I applied topical nasal lidocaine and neosynephrine.     I began with the LEFT side using a 0 degree rigid nasal endoscope, and then similarly examined the RIGHT side     Findings:  Inferior turbinates:  4+  Middle turbinate and middle meatus:  No purulence, no polyposis, bilateral obstructive naseem  Superior meatus was evaluated  Frontal recess clear  Mucosa is healthy throughout,  no xiao polyps nor polypoid degeneration  Sphenoethmoidal recess without purulence   Nasopharynx clear, et patent  The patient tolerated the procedure well      ASSESSMENT/ PLAN;    ICD-10-CM    1. Chronic pansinusitis  J32.4 budesonide (PULMICORT) 0.5 MG/2ML neb solution      2. Acute cough  R05.1 XR Chest 2 Views     CBC with platelets     Influenza A/B, RSV and SARS-CoV2 PCR (COVID-19) Nasopharyngeal     Influenza A/B, RSV and SARS-CoV2 PCR (COVID-19) Nasopharyngeal     CBC with platelets     predniSONE (DELTASONE) 20 MG tablet      3. Subacute cough  R05.2 albuterol (PROAIR HFA/PROVENTIL HFA/VENTOLIN HFA) 108 (90 Base) MCG/ACT inhaler     Influenza A/B, RSV and SARS-CoV2 PCR (COVID-19) Nasopharyngeal     Influenza A/B, RSV and SARS-CoV2 PCR (COVID-19) Nasopharyngeal      predniSONE (DELTASONE) 20 MG tablet      4. Nasal turbinate hypertrophy  J34.3       5. History of nasal septal reconstruction at 19 years of age secondary to childhood nasal trauma  Z98.890       6. Influenza A  J10.1                 Rest, supportive cares  Influenza A positive, reviewed symptoms correlated w/ her most recent illness.     Start Budesonide rinses. Rinse 1-2 times daily   Symbicort, Albuterol inhalers refilled    Prednisone taper as directed  Risks of oral steroid use were discussed and include psychiatric/mood changes, insomnia, stomach ulcers and potential GI bleeding, blood sugar elevation/worsening diabetes, hip/bone necrosis called avascular necrosis, or bone demineralization.      Follow up in 3-4 weeks if there is no relief. Consider additional imaging.           America Aguilar PA-C  ENT  [facility], [place]

## 2025-02-19 DIAGNOSIS — K21.00 GASTROESOPHAGEAL REFLUX DISEASE WITH ESOPHAGITIS WITHOUT HEMORRHAGE: ICD-10-CM

## 2025-02-19 RX ORDER — FAMOTIDINE 40 MG/1
TABLET, FILM COATED ORAL
Qty: 30 TABLET | Refills: 11 | Status: SHIPPED | OUTPATIENT
Start: 2025-02-19

## 2025-02-19 NOTE — TELEPHONE ENCOUNTER
famotidine (PEPCID) 40 MG tablet       Last Written Prescription Date:  10/28/2024  Last Fill Quantity: 30,   # refills: 3  Last Office Visit: 9/12/2024

## 2025-03-25 DIAGNOSIS — G89.29 CHRONIC RADICULAR CERVICAL PAIN: ICD-10-CM

## 2025-03-25 DIAGNOSIS — M54.12 CHRONIC RADICULAR CERVICAL PAIN: ICD-10-CM

## 2025-03-25 NOTE — TELEPHONE ENCOUNTER
baclofen (LIORESAL) 10 MG tablet 90 tablet 3 4/18/2024     Last Office Visit: 9/12/2024     Future Office visit:       Routing refill request to provider for review/approval because:

## 2025-03-26 RX ORDER — BACLOFEN 10 MG/1
10 TABLET ORAL
Qty: 90 TABLET | Refills: 0 | OUTPATIENT
Start: 2025-03-26

## 2025-03-31 ENCOUNTER — MYC MEDICAL ADVICE (OUTPATIENT)
Dept: FAMILY MEDICINE | Facility: OTHER | Age: 65
End: 2025-03-31

## 2025-03-31 ASSESSMENT — PATIENT HEALTH QUESTIONNAIRE - PHQ9
SUM OF ALL RESPONSES TO PHQ QUESTIONS 1-9: 3
SUM OF ALL RESPONSES TO PHQ QUESTIONS 1-9: 3
10. IF YOU CHECKED OFF ANY PROBLEMS, HOW DIFFICULT HAVE THESE PROBLEMS MADE IT FOR YOU TO DO YOUR WORK, TAKE CARE OF THINGS AT HOME, OR GET ALONG WITH OTHER PEOPLE: NOT DIFFICULT AT ALL

## 2025-03-31 ASSESSMENT — ANXIETY QUESTIONNAIRES
IF YOU CHECKED OFF ANY PROBLEMS ON THIS QUESTIONNAIRE, HOW DIFFICULT HAVE THESE PROBLEMS MADE IT FOR YOU TO DO YOUR WORK, TAKE CARE OF THINGS AT HOME, OR GET ALONG WITH OTHER PEOPLE: SOMEWHAT DIFFICULT
1. FEELING NERVOUS, ANXIOUS, OR ON EDGE: SEVERAL DAYS
8. IF YOU CHECKED OFF ANY PROBLEMS, HOW DIFFICULT HAVE THESE MADE IT FOR YOU TO DO YOUR WORK, TAKE CARE OF THINGS AT HOME, OR GET ALONG WITH OTHER PEOPLE?: SOMEWHAT DIFFICULT
2. NOT BEING ABLE TO STOP OR CONTROL WORRYING: SEVERAL DAYS
7. FEELING AFRAID AS IF SOMETHING AWFUL MIGHT HAPPEN: NOT AT ALL
GAD7 TOTAL SCORE: 5
4. TROUBLE RELAXING: NOT AT ALL
6. BECOMING EASILY ANNOYED OR IRRITABLE: SEVERAL DAYS
7. FEELING AFRAID AS IF SOMETHING AWFUL MIGHT HAPPEN: NOT AT ALL
5. BEING SO RESTLESS THAT IT IS HARD TO SIT STILL: SEVERAL DAYS
GAD7 TOTAL SCORE: 5
GAD7 TOTAL SCORE: 5
3. WORRYING TOO MUCH ABOUT DIFFERENT THINGS: SEVERAL DAYS

## 2025-04-01 NOTE — ED NOTES
Cardiology Dr. Staley at bedside to complete secondary assessment.    Cardiology Hospitalist Hospitalist Hospitalist

## 2025-04-01 NOTE — TELEPHONE ENCOUNTER
Completed [facility] PHQ-9/ESTRELLA-7 on 3/31/2025 for Depression Remission quality patient outreach per [facility] quality guidelines. This writer sees no major mental health and/or safety concerns at present. Currently meeting depression remission status with PHQ-9 total score <=4. Continue with current plan of care.          1/6/2025     9:15 AM 2/3/2025     9:56 AM 3/31/2025     7:53 PM   PHQ   PHQ-9 Total Score 7 7 3    Q9: Thoughts of better off dead/self-harm past 2 weeks Not at all Not at all Not at all       Patient-reported          10/21/2024     9:22 AM 1/6/2025     9:15 AM 3/31/2025     7:54 PM   ESTRELLA-7 SCORE   Total Score   5 (mild anxiety)   Total Score 2 0 5        Patient-reported        Appointments in Next Year      Apr 14, 2025 9:20 AM  (Arrive by 9:05 AM)  Video Visit with Zoe Herbert MD  [facility] ([facility] ) [phone]          Negra Prabhakar, RN

## 2025-04-13 ENCOUNTER — HEALTH MAINTENANCE LETTER (OUTPATIENT)
Age: 65
End: 2025-04-13

## 2025-04-14 ENCOUNTER — VIRTUAL VISIT (OUTPATIENT)
Dept: PSYCHIATRY | Facility: OTHER | Age: 65
End: 2025-04-14
Attending: PSYCHIATRY & NEUROLOGY
Payer: COMMERCIAL

## 2025-04-14 DIAGNOSIS — F41.1 GAD (GENERALIZED ANXIETY DISORDER): ICD-10-CM

## 2025-04-14 DIAGNOSIS — F33.0 MAJOR DEPRESSIVE DISORDER, RECURRENT EPISODE, MILD: Primary | ICD-10-CM

## 2025-04-14 DIAGNOSIS — R41.9 NEUROCOGNITIVE DISORDER: ICD-10-CM

## 2025-04-14 NOTE — PROGRESS NOTES
"Virtual Visit Details    Type of service:  Video Visit 45 minutes. 5 minutes reviewing chart, documenting. Total time date of service 50 minutes.  Video Start Time:  9:33 am  Video End Time:10:18 AM    Originating Location (pt. Location): Home    Distant Location (provider location):  Off-site  Platform used for Video Visit: Dru      SUBJECTIVE / INTERIM HISTORY                                                                         Last visit 1/6/25: Continue Prozac 60 mg daily refilled today #90 with 3 refills    - edilia Javed was 3/21/25  - Jess is now 4 yo. She is a little jealous of the baby  - started donepezil and didn't tolerate it well. Had GI issues then went down to 1/2 of a tab and still was having problems. Going to see neurologist Dr. Toribio again in June and have repeat testing.  - \"something is going on with me and I'm not really sure\"  feels off. Doesn't remember a lot of things.   - Tesha hurt her shoulder while trying to help at niece's place.   niece 46 yo with lymphoma. Niece's  has MS. Kids 18 yo daughter and 15 yo nephew.   - neurology. Started back on botox for migrianes and is helping  - cousin in [place] recent PET scan and for now in remission  - [place]. Feels safe there .  - grew up [place] and then family moved all over the state [place] in relation to dad's payal Salguero  working on her AA. 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 Noemy's dad, Akash, down in [place] helping take care of quarter [employer] ranch. The roel had a stroke.  - Accident Jan '20 with nephew Michael 8 yo. He has PTSD since    MEDICAL / SURGICAL HISTORY                    Patient Active Problem List   Diagnosis    Degenerative disc disease, cervical    Pseudoarthrosis of cervical spine (H)    Cervical pain    Irritable bowel syndrome    Depression, major    Chronic, continuous use of opioids    Chronic rhinitis    Chronic maxillary sinusitis    Hypertrophy of inferior nasal turbinate    Chronic pain    Chronic tension-type " headache, intractable    History of arthrodesis    Myofascial pain    Disuse syndrome    Intractable chronic migraine without aura and with status migrainosus    Gastroesophageal reflux disease with esophagitis    Mild episode of recurrent major depressive disorder    Ulnar neuropathy at elbow of left upper extremity    Lumbar radiculopathy    S/P cervical spinal fusion    ACP (advance care planning)    Calculus of kidney    Pulmonary emphysema, unspecified emphysema type (H)    Pelvic floor dysfunction    Mixed hyperlipidemia    Rheumatoid factor positive. MARISEL and anti CCP negative    Insomnia, unspecified type    Cervical radicular pain    Psoriasis with arthropathy (H) - follows with Dr Francisco    Memory change    Delusional disorder (H)    History of anxiety    History of depression    MCI (mild cognitive impairment)     ALLERGY   Aspirin, Ibuprofen, Lansoprazole, Red dye #40 (allura red), Bupropion, Bupropion hcl, and Demerol [meperidine]  MEDICATIONS                                                                                              Current Outpatient Medications   Medication Sig Dispense Refill    AIMOVIG 140 MG/ML injection INJECT 140 MG UNDER THE SKIN EVERY 28 (TWENTY-EIGHT) DAYS.      albuterol (PROAIR HFA/PROVENTIL HFA/VENTOLIN HFA) 108 (90 Base) MCG/ACT inhaler Inhale 1-2 puffs into the lungs every 4 hours as needed for shortness of breath, wheezing or cough. 18 g 0    atorvastatin (LIPITOR) 10 MG tablet TAKE ONE (1) TABLET BY MOUTH TWICE A WEEK 45 tablet 3    baclofen (LIORESAL) 10 MG tablet TAKE 1 TABLET (10 MG) BY MOUTH DAILY 90 tablet 3    budesonide (PULMICORT) 0.5 MG/2ML neb solution Make 240 cc Krishna med sinus irrigation Mix 2 ml vial of budesonide 0.5 mg Rinse 1-2 times daily 120 mL 1    budesonide-formoterol (SYMBICORT/BREYNA) 80-4.5 MCG/ACT Inhaler Inhale 2 puffs into the lungs 2 times daily. 10.2 g 1    butalbital-acetaminophen-caffeine (ESGIC) -40 MG tablet Take 1 tablet by mouth  every 4 hours as needed TAKE 1 TO 2 TABLETS BY MOUTH AT THE ONSET OF A MIGRAINE HEADACHE, LIMIT NO MORE THAN 3 TIMES PER WEEK. ACETAMINOPHEN SHOULD BE LIMITED TO 40      Calcium Carbonate Antacid 1177 MG CHEW       Cholecalciferol (VITAMIN D3) 1000 UNITS CAPS Take 1,000 Units by mouth Takes 5000 unit capsule daily      docusate sodium (COLACE) 100 MG capsule Take 100 mg by mouth daily       donepezil (ARICEPT) 10 MG tablet Take 1 tablet by mouth at bedtime.      famotidine (PEPCID) 40 MG tablet TAKE ONE (1) TABLET BY MOUTH AT BEDTIME 30 tablet 11    FLUoxetine (PROZAC) 20 MG capsule Take 1 capsule daily along with 40 mg for total daily dose 60 mg 90 capsule 3    FLUoxetine (PROZAC) 40 MG capsule Take 1 capsule daily along with 20 mg capsule 90 capsule 3    fluticasone (FLONASE) 50 MCG/ACT nasal spray Spray 2 sprays in nostril.      Guselkumab 100 MG/ML SOAJ       HYDROcodone-acetaminophen (NORCO) 7.5-325 MG per tablet TAKE 1 TABLET BY MOUTH TWICE A DAY AS NEEDED FOR PAIN 60 tablet 0    ibuprofen (ADVIL/MOTRIN) 800 MG tablet TAKE 1 TABLET (800 MG) BY MOUTH EVERY 8 HOURS AS NEEDED FOR MODERATE PAIN 90 tablet 1    loratadine (CLARITIN) 10 MG tablet Take 10 mg by mouth daily.      methocarbamol (ROBAXIN) 750 MG tablet TAKE 1 TABLET BY MOUTH 3 TIMES A DAY AS NEEDED FOR muscle spasms 45 tablet 5    Multiple Vitamins-Minerals (CENTRUM SILVER ULTRA WOMENS) TABS Take 1 tablet by mouth daily       ondansetron (ZOFRAN ODT) 4 MG ODT tab Take 1 tablet (4 mg) by mouth every 6 hours as needed (after migraine medication) 30 tablet 1    pantoprazole (PROTONIX) 40 MG EC tablet Take 1 tablet (40 mg) by mouth daily. 90 tablet 3    polyethylene glycol (MIRALAX) 17 g packet Take 1 packet by mouth      predniSONE (DELTASONE) 20 MG tablet Take 40 mg daily for 2 days, then take 20 mg daily for 4 days 8 tablet 0    pregabalin (LYRICA) 150 MG capsule TAKE 1 CAPSULE BY MOUTH TWICE DAILY 180 capsule 0    semaglutide (OZEMPIC, 0.25 OR 0.5  MG/DOSE,) 2 MG/3ML pen Inject 0.25 mg subcutaneously      senna (SENOKOT) 8.6 MG tablet Take 1 tablet by mouth      sucralfate (CARAFATE) 1 GM/10ML suspension Take 10 mLs (1 g) by mouth 4 times daily 420 mL 3     No current facility-administered medications for this visit.       VITALS   There were no vitals taken for this visit.     LABS                                                                                                                         Last Comprehensive Metabolic Panel:  Sodium   Date Value Ref Range Status   06/11/2024 139 135 - 145 mmol/L Final     Comment:     Reference intervals for this test were updated on 09/26/2023 to more accurately reflect our healthy population. There may be differences in the flagging of prior results with similar values performed with this method. Interpretation of those prior results can be made in the context of the updated reference intervals.    03/25/2021 144 133 - 144 mmol/L Final     Potassium   Date Value Ref Range Status   06/11/2024 4.0 3.4 - 5.3 mmol/L Final   09/25/2022 3.7 3.4 - 5.3 mmol/L Final   03/25/2021 3.7 3.4 - 5.3 mmol/L Final     Chloride   Date Value Ref Range Status   06/11/2024 105 98 - 107 mmol/L Final   09/25/2022 108 94 - 109 mmol/L Final   03/25/2021 114 (H) 94 - 109 mmol/L Final     Carbon Dioxide   Date Value Ref Range Status   03/25/2021 23 20 - 32 mmol/L Final     Carbon Dioxide (CO2)   Date Value Ref Range Status   06/11/2024 25 22 - 29 mmol/L Final   09/25/2022 28 20 - 32 mmol/L Final     Anion Gap   Date Value Ref Range Status   06/11/2024 9 7 - 15 mmol/L Final   09/25/2022 4 3 - 14 mmol/L Final   03/25/2021 7 3 - 14 mmol/L Final     Glucose   Date Value Ref Range Status   06/11/2024 118 (H) 70 - 99 mg/dL Final   09/25/2022 110 (H) 70 - 99 mg/dL Final   03/25/2021 93 70 - 99 mg/dL Final     Urea Nitrogen   Date Value Ref Range Status   06/11/2024 8.3 8.0 - 23.0 mg/dL Final   09/25/2022 10 7 - 30 mg/dL Final   03/25/2021 11 7 - 30  mg/dL Final     Creatinine   Date Value Ref Range Status   06/11/2024 0.99 (H) 0.51 - 0.95 mg/dL Final   03/25/2021 0.92 0.52 - 1.04 mg/dL Final     GFR Estimate   Date Value Ref Range Status   06/11/2024 64 >60 mL/min/1.73m2 Final   03/25/2021 68 >60 mL/min/[1.73_m2] Final     Comment:     Non  GFR Calc  Starting 12/18/2018, serum creatinine based estimated GFR (eGFR) will be   calculated using the Chronic Kidney Disease Epidemiology Collaboration   (CKD-EPI) equation.       Calcium   Date Value Ref Range Status   06/11/2024 8.8 8.8 - 10.2 mg/dL Final   03/25/2021 8.3 (L) 8.5 - 10.1 mg/dL Final     Bilirubin Total   Date Value Ref Range Status   06/11/2024 0.2 <=1.2 mg/dL Final   03/25/2021 0.3 0.2 - 1.3 mg/dL Final     Alkaline Phosphatase   Date Value Ref Range Status   06/11/2024 103 40 - 150 U/L Final   03/25/2021 110 40 - 150 U/L Final     ALT   Date Value Ref Range Status   06/11/2024 26 0 - 50 U/L Final     Comment:     Reference intervals for this test were updated on 6/12/2023 to more accurately reflect our healthy population. There may be differences in the flagging of prior results with similar values performed with this method. Interpretation of those prior results can be made in the context of the updated reference intervals.     03/25/2021 24 0 - 50 U/L Final     AST   Date Value Ref Range Status   06/11/2024 23 0 - 45 U/L Final     Comment:     Reference intervals for this test were updated on 6/12/2023 to more accurately reflect our healthy population. There may be differences in the flagging of prior results with similar values performed with this method. Interpretation of those prior results can be made in the context of the updated reference intervals.   03/25/2021 13 0 - 45 U/L Final       MENTAL STATUS EXAM                                                                                         Awake, alert. Mood stressed.  No problems with speech . Thought process linear and  logical. No evidence of any hallucinations or delusional thoughts. No SI.  No hallucinations. Insight good.       ASSESSMENT                                                                                                      HISTORICAL:  Initial psych note 10/13/15        NOTES:  Cymbalta -> agitation, angry and increase in diastolic BP. Propranolol 10 mg BID prn anxiety: lightheadedness. Neuropsych scanning 6/5/24. Latuda: akathisia.    Tesha Blankenship is a 64 year old female. Tesha's daughter accompanied her on video visit December '23 and though I have been working with Tesha since 2015 I was not aware of her experiencing paranoia and delusions until that then. Tesha ended up admitted in [place] and was started on Risperdal. We ended up changing to Latuda. We then changed to Saphris given she experienced akathisia with Latuda. Tesha was still getting headaches even with small dose (2.5 mg) of Saphris and she hence stopped taking it.    Tesha started on donepezil (for neurocognitive disorder) since I last touched base with her in January. Today she reports she stopped taking it because of headaches and GI SEs. She notes she did contact her neurologist and they had her try cutting down to 1/2 tab however she stopped taking it given she still had SEs even at lower dose. She is going to have repeat testing in June and she is nervous about it. Tesha is still taking fluoxetine for MDD and ESTRELLA. She is stressed about her niece whom has lymphoma and niece has two children including 16 yo daughter dropped out of school. Thought stressors Tesha and I feel no medication changes / adjustments needed and we agreed continue fluoxetine at current dose and will have her RTC in July sometimes which will be after repeat neuropsych testing.        TREATMENT RISK STATEMENT:  The risks, benefits, alternatives and potential adverse effects have been explained and are understood by the pt.  The pt agrees to the treatment plan with the  ability to do so.   The pt knows to call the clinic for any problems or access emergency care if needed.        DIAGNOSES                     MDD recurrent, mild  Psychosis unspecified (Delusional disorder vs. Paranoid personality disorder vs. MDD severe with psychotic features)  Mild neurocognitive disorder  ESTRELLA      PLAN                                                                                                                    1)  MEDICATIONS:    Continue Prozac 60 mg daily     2)  THERAPY:  No Change    3)  LABS:  None    4)  PT MONITOR [call for probs]:  worsening sx, SI/HI, SEs from meds    5)  REFERRALS [CD, medical, other]: none    6)  RTC:  ~3 months

## 2025-04-16 ENCOUNTER — THERAPY VISIT (OUTPATIENT)
Dept: PHYSICAL THERAPY | Facility: HOSPITAL | Age: 65
End: 2025-04-16
Attending: NURSE PRACTITIONER
Payer: MEDICARE

## 2025-04-16 DIAGNOSIS — M25.512 PAIN IN LEFT SHOULDER: Primary | ICD-10-CM

## 2025-04-16 DIAGNOSIS — M75.02 ADHESIVE CAPSULITIS OF LEFT SHOULDER: ICD-10-CM

## 2025-04-16 PROCEDURE — 97162 PT EVAL MOD COMPLEX 30 MIN: CPT | Mod: GP

## 2025-04-16 PROCEDURE — 97110 THERAPEUTIC EXERCISES: CPT | Mod: GP

## 2025-04-16 NOTE — PROGRESS NOTES
PHYSICAL THERAPY EVALUATION  Type of Visit: Evaluation and treatment        Fall Risk Screen:  Have you fallen 2 or more times in the past year?: No  Have you fallen and had an injury in the past year?: No    Subjective         Presenting condition or subjective complaint: Pain in left shoulder down to elbow  Date of onset: 26    Relevant medical history:   see med chart, migraines, GI Bleeds, cervical surgery, adhesive capsulitis of left shoulder, Subacromial bursitis of right shoulder joint  Dates & types of surgery:  see chart     Prior diagnostic imaging/testing results: X-ray     Prior therapy history for the same diagnosis, illness or injury: No      Prior Level of Function  Transfers: Independent  Ambulation: Independent  ADL: Independent  IADL: Childcare, Housekeeping, Laundry, indep  Able to hold grandchild  Living Environment  Social support: Alone   Type of home: Apartment/condo; Multi-level   Stairs to enter the home: No       Ramp: No   Stairs inside the home: No       Help at home: None  Equipment owned:       Employment: No      Hobbies/Interests:  cares for 3 y/o and  grandchildren    Patient goals for therapy: Move my arm without pain    Pain assessment: See objective evaluation for additional pain details   L shld pain onset when cleaning out a hoarders house and while moving headboard in early March.  Adrian L shld pain immediately.   Objective   SHOULDER EVALUATION  Wakes her up t/o night. Can't sleep on L side.   PAIN: Pain Level at Rest: 2/10  Pain Level with Use: 7/10  Pain Location: shoulder  Pain Quality: Aching and Dull  Pain Frequency: intermittent  Pain is Worst: varies  Pain is Exacerbated By: rest, lying down  Pain is Relieved By: hydrocodone,   Pain Progression: Improved  Hx of R Sided neck pain into shld. Has chronic cerv injections and nerve ablation. Taking 150 mg bid lyrica for nerve pain t/o body.   positive for tenderness about the glenohumeral joint, sensory exam  normal, radial pulse intact, and ROM both passive and active limited to < 90 degrees flexion. Positive empty and full can as well as pimentel/roman. Unable to get into full flexion with passive motion to perform neer   Pain on anter lateral surface of upper arm, goes down arm and occas to fingers. Not sure which fingers.   INTEGUMENTARY (edema, incisions): WNL   no incision  POSTURE: Sitting Posture: forwards shld.  Pain L shld is most signif getting dressed and undressed, not able to curl hair.     ROM:   (Degrees) Left AROM Left PROM Right AROM  Right PROM   Shoulder Flexion 85 90 pain     Shoulder Extension       Shoulder Abduction 80  90 pain     Shoulder Adduction       Shoulder Internal Rotation full      Shoulder External Rotation 32 32     Shoulder Horizontal Abduction Pain with atempting      Shoulder Horizontal Adduction Pain with attempting       Shoulder Flexion ER       Shoulder Flexion IR       Elbow Extension wfl wfl     Elbow Flexion wfl wfl     Pain:       STRENGTH:  3-/5 grade flex, abduct. Pt has signif mm guarding which may be creating functional weakness    SPECIAL TESTS:  Most tests not able to tolerate    Left Right   Impingement     Neer's     Hawkin's-Roman Positive    Coracoid Impingement     Internal impingement     Labral     Anterior Slide     Curtiss's     Crank     Instability     Apprehension (anterior)     Relocation (anterior)     Anterior Load & Shift     Posterior Load & Shift     Posterior instability (with 90 degrees flex)     Multi-Directional Instability      Sulcus     Biceps      Speed's     Rotator Cuff Tear     Drop Arm     Belly Press     Lift off        PALPATION:   + Tenderness At Location Left Right   Clavicle -    Sternoclavicular -    Acromioclavicular +    Biceps +    Triceps -    Supraspinatus -    Infraspinatus -    Teres minor     Subscapularis     Deltoid +    Levator     Rhomboids     Upper trap +    Incisional     Bicipital groove +      JOINT MOBILITY:   N/A  CERVICAL SCREEN:  hx of significant cervical issues with surgery. Limited in rotation, lat flexion.     Assessment & Plan   CLINICAL IMPRESSIONS  Medical Diagnosis: L RC tear strain, adhesive capsulitis    Treatment Diagnosis: L RC tear strain, adhesive capsulitis   Impression/Assessment: Patient is a 64 year old female with  complaints.  The following significant findings have been identified: Pain, Decreased ROM/flexibility, Decreased joint mobility, Decreased strength, Impaired muscle performance, Decreased activity tolerance, and Impaired posture. These impairments interfere with their ability to perform self care tasks, recreational activities, household chores, and household mobility as compared to previous level of function.     Clinical Decision Making (Complexity):  Clinical Presentation: Evolving/Changing  Clinical Presentation Rationale: based on medical and personal factors listed in PT evaluation  Clinical Decision Making (Complexity): Moderate complexity    PLAN OF CARE  Treatment Interventions:  Modalities: Dry Needling, Hot Pack, Iontophoresis  Interventions: Manual Therapy, Therapeutic Activity, Therapeutic Exercise    Long Term Goals     PT Goal 1  Goal Identifier: STG 1  Goal Description: Pt will mickie indep HEP compliance  Target Date: 05/28/25  PT Goal 2  Goal Identifier: STG 2  Goal Description: Pt will mickie improved shld pain to 3/10 or less  with active shld flexion to 90 degrees to enable her to reach in shower, cupboards and take care of grandchildren  Target Date: 05/28/25  PT Goal 3  Goal Identifier: LTG  Goal Description: ADLs, sleep and grandchildren care not affected by shld pain or ROM Deficits  Target Date: 07/09/25  PT Goal 4  Goal Identifier: LTG 2  Goal Description: Reduce SPADI score statistically for objective measurement improvement  Target Date: 07/09/25      Frequency of Treatment: 1x week  Duration of Treatment: 12 weeks      Education Assessment:   Learner/Method: No  Barriers to Learning    Risks and benefits of evaluation/treatment have been explained.   Patient/Family/caregiver agrees with Plan of Care.     Evaluation Time:     PT Eval, Moderate Complexity Minutes (90651): 35       Signing Clinician: Gemini Jackman PT        [facility]                                                                                   OUTPATIENT PHYSICAL THERAPY      PLAN OF TREATMENT FOR OUTPATIENT REHABILITATION   Patient's Last Name, First Name, Sadaf Gutiérrez YOB: 1960   Provider's Name   [facility]   Medical Record No.  [id number]     Onset Date: 04/16/25  Start of Care Date: 04/16/25     Medical Diagnosis:  L RC tear strain, adhesive capsulitis      PT Treatment Diagnosis:  L RC tear strain, adhesive capsulitis Plan of Treatment  Frequency/Duration: 1x week/ 12 weeks    Certification date from 04/16/25 to 07/09/25         See note for plan of treatment details and functional goals     Gemini Jackman, PT                         I CERTIFY THE NEED FOR THESE SERVICES FURNISHED UNDER        THIS PLAN OF TREATMENT AND WHILE UNDER MY CARE .             Physician Signature               Date    X_____________________________________________________                  Referring Provider:  Trish Jenkins NP    Initial Assessment  See Epic Evaluation- Start of Care Date: 04/16/25

## 2025-04-30 ENCOUNTER — THERAPY VISIT (OUTPATIENT)
Dept: PHYSICAL THERAPY | Facility: HOSPITAL | Age: 65
End: 2025-04-30
Attending: NURSE PRACTITIONER
Payer: MEDICARE

## 2025-04-30 DIAGNOSIS — M25.512 ACUTE PAIN OF LEFT SHOULDER: Primary | ICD-10-CM

## 2025-04-30 DIAGNOSIS — M75.02 ADHESIVE CAPSULITIS OF LEFT SHOULDER: ICD-10-CM

## 2025-04-30 PROCEDURE — 97110 THERAPEUTIC EXERCISES: CPT | Mod: GP

## 2025-05-07 ENCOUNTER — THERAPY VISIT (OUTPATIENT)
Dept: PHYSICAL THERAPY | Facility: HOSPITAL | Age: 65
End: 2025-05-07
Attending: NURSE PRACTITIONER
Payer: MEDICARE

## 2025-05-07 DIAGNOSIS — M25.512 ACUTE PAIN OF LEFT SHOULDER: Primary | ICD-10-CM

## 2025-05-07 DIAGNOSIS — M75.02 ADHESIVE CAPSULITIS OF LEFT SHOULDER: ICD-10-CM

## 2025-05-07 DIAGNOSIS — M25.512 LEFT SHOULDER PAIN, UNSPECIFIED CHRONICITY: ICD-10-CM

## 2025-05-07 PROCEDURE — 97110 THERAPEUTIC EXERCISES: CPT | Mod: GP

## 2025-05-14 ENCOUNTER — THERAPY VISIT (OUTPATIENT)
Dept: PHYSICAL THERAPY | Facility: HOSPITAL | Age: 65
End: 2025-05-14
Attending: NURSE PRACTITIONER
Payer: MEDICARE

## 2025-05-14 DIAGNOSIS — M25.512 ACUTE PAIN OF LEFT SHOULDER: Primary | ICD-10-CM

## 2025-05-14 DIAGNOSIS — M75.02 ADHESIVE CAPSULITIS OF LEFT SHOULDER: ICD-10-CM

## 2025-05-14 PROCEDURE — 97110 THERAPEUTIC EXERCISES: CPT | Mod: GP

## 2025-05-14 PROCEDURE — 97140 MANUAL THERAPY 1/> REGIONS: CPT | Mod: GP

## 2025-05-21 ENCOUNTER — THERAPY VISIT (OUTPATIENT)
Dept: PHYSICAL THERAPY | Facility: HOSPITAL | Age: 65
End: 2025-05-21
Attending: NURSE PRACTITIONER
Payer: MEDICARE

## 2025-05-21 DIAGNOSIS — M75.02 ADHESIVE CAPSULITIS OF LEFT SHOULDER: ICD-10-CM

## 2025-05-21 DIAGNOSIS — M25.512 ACUTE PAIN OF LEFT SHOULDER: Primary | ICD-10-CM

## 2025-05-21 PROCEDURE — 97140 MANUAL THERAPY 1/> REGIONS: CPT | Mod: GP,CQ

## 2025-05-21 PROCEDURE — 97110 THERAPEUTIC EXERCISES: CPT | Mod: GP,CQ

## 2025-06-04 ENCOUNTER — THERAPY VISIT (OUTPATIENT)
Dept: PHYSICAL THERAPY | Facility: HOSPITAL | Age: 65
End: 2025-06-04
Attending: NURSE PRACTITIONER
Payer: MEDICARE

## 2025-06-04 DIAGNOSIS — M25.512 CHRONIC LEFT SHOULDER PAIN: ICD-10-CM

## 2025-06-04 DIAGNOSIS — M75.02 ADHESIVE CAPSULITIS OF LEFT SHOULDER: ICD-10-CM

## 2025-06-04 DIAGNOSIS — M25.512 ACUTE PAIN OF LEFT SHOULDER: Primary | ICD-10-CM

## 2025-06-04 DIAGNOSIS — M25.512 LEFT SHOULDER PAIN, UNSPECIFIED CHRONICITY: ICD-10-CM

## 2025-06-04 DIAGNOSIS — G89.29 CHRONIC LEFT SHOULDER PAIN: ICD-10-CM

## 2025-06-04 PROCEDURE — 97110 THERAPEUTIC EXERCISES: CPT | Mod: GP,CQ

## 2025-06-04 PROCEDURE — 97140 MANUAL THERAPY 1/> REGIONS: CPT | Mod: GP,CQ

## 2025-06-18 ENCOUNTER — THERAPY VISIT (OUTPATIENT)
Dept: PHYSICAL THERAPY | Facility: HOSPITAL | Age: 65
End: 2025-06-18
Attending: NURSE PRACTITIONER
Payer: MEDICARE

## 2025-06-18 DIAGNOSIS — M75.02 ADHESIVE CAPSULITIS OF LEFT SHOULDER: ICD-10-CM

## 2025-06-18 DIAGNOSIS — M25.512 LEFT SHOULDER PAIN, UNSPECIFIED CHRONICITY: ICD-10-CM

## 2025-06-18 DIAGNOSIS — M25.512 ACUTE PAIN OF LEFT SHOULDER: Primary | ICD-10-CM

## 2025-06-18 DIAGNOSIS — E78.2 MIXED HYPERLIPIDEMIA: ICD-10-CM

## 2025-06-18 PROCEDURE — 97110 THERAPEUTIC EXERCISES: CPT | Mod: GP

## 2025-06-18 RX ORDER — ATORVASTATIN CALCIUM 10 MG/1
TABLET, FILM COATED ORAL
Qty: 45 TABLET | Refills: 3 | OUTPATIENT
Start: 2025-06-18

## 2025-06-18 NOTE — TELEPHONE ENCOUNTER
ATORVASTATIN CALCIUM 10MG TABLET       Last Written Prescription Date:  06/07/2024  Last Fill Quantity: 45,   # refills: 3  Last Office Visit: 06/11/2024  Future Office visit:       Routing refill request to provider for review/approval because:    Antihyperlipidemic agents [id number]/2025 08:58 AM   Protocol Details LDL on file in the past 12 months    Recent (12 month) or future (90 days) visit with authorizing provider's specialty (provided they have been seen in the past 15 months)          Kimberly Boecker, RN

## 2025-07-21 ENCOUNTER — VIRTUAL VISIT (OUTPATIENT)
Dept: PSYCHIATRY | Facility: OTHER | Age: 65
End: 2025-07-21
Attending: PSYCHIATRY & NEUROLOGY
Payer: COMMERCIAL

## 2025-07-21 DIAGNOSIS — R41.9 NEUROCOGNITIVE DISORDER: Primary | ICD-10-CM

## 2025-07-21 DIAGNOSIS — F41.1 GAD (GENERALIZED ANXIETY DISORDER): ICD-10-CM

## 2025-07-21 DIAGNOSIS — F33.0 MAJOR DEPRESSIVE DISORDER, RECURRENT EPISODE, MILD: ICD-10-CM

## 2025-07-21 RX ORDER — MEMANTINE HYDROCHLORIDE 5 MG/1
5 TABLET ORAL DAILY
Qty: 30 TABLET | Refills: 4 | Status: SHIPPED | OUTPATIENT
Start: 2025-07-21

## 2025-07-21 NOTE — PROGRESS NOTES
Virtual Visit Details    Type of service:  Video Visit   Video Start Time: 3:49 pm  Video End Time:4:12 PM    Originating Location (pt. Location): Home    Distant Location (provider location):  Off-site  Platform used for Video Visit: Dru        SUBJECTIVE / INTERIM HISTORY                                                                         Last visit April '25; Continue Prozac 60 mg daily     - edilia Javed was 3/21/25. She is a good baby.   - Jess is  3 yo.   - sawt Dr. Toribio again in June and Dr. Walters. Recommended she start a med gain   - has been having lots of fears again (paranoia) is really scary. .   - reminds me when she tried Aricept made her headaches worse  - cousin in [place] recent PET scan and for now in remission  - [place].   - grew up [place] and then family moved all over the state [place] in relation to dad's payal Salguero  working on her AA. 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 Noemy's dad, Akash, down in [place] helping take care of quarter horse ranch. The roel had a stroke.  - Accident Jan '20 with nephew Michael 6 yo. He has PTSD since    MEDICAL / SURGICAL HISTORY                    Patient Active Problem List   Diagnosis    Degenerative disc disease, cervical    Pseudoarthrosis of cervical spine (H)    Cervical pain    Irritable bowel syndrome    Depression, major    Chronic, continuous use of opioids    Chronic rhinitis    Chronic maxillary sinusitis    Hypertrophy of inferior nasal turbinate    Chronic pain    Chronic tension-type headache, intractable    History of arthrodesis    Myofascial pain    Disuse syndrome    Intractable chronic migraine without aura and with status migrainosus    Gastroesophageal reflux disease with esophagitis    Mild episode of recurrent major depressive disorder    Ulnar neuropathy at elbow of left upper extremity    Lumbar radiculopathy    S/P cervical spinal fusion    ACP (advance care planning)    Calculus of kidney    Pulmonary emphysema, unspecified  emphysema type (H)    Pelvic floor dysfunction    Mixed hyperlipidemia    Rheumatoid factor positive. MARISEL and anti CCP negative    Insomnia, unspecified type    Cervical radicular pain    Psoriasis with arthropathy (H) - follows with Dr Francisco    Memory change    Delusional disorder (H)    History of anxiety    History of depression    MCI (mild cognitive impairment)     ALLERGY   Aspirin, Ibuprofen, Lansoprazole, Red dye #40 (allura red), Bupropion, Bupropion hcl, and Demerol [meperidine]  MEDICATIONS                                                                                              Current Outpatient Medications   Medication Sig Dispense Refill    AIMOVIG 140 MG/ML injection INJECT 140 MG UNDER THE SKIN EVERY 28 (TWENTY-EIGHT) DAYS.      albuterol (PROAIR HFA/PROVENTIL HFA/VENTOLIN HFA) 108 (90 Base) MCG/ACT inhaler Inhale 1-2 puffs into the lungs every 4 hours as needed for shortness of breath, wheezing or cough. 18 g 0    atorvastatin (LIPITOR) 10 MG tablet TAKE ONE (1) TABLET BY MOUTH TWICE A WEEK 45 tablet 3    baclofen (LIORESAL) 10 MG tablet TAKE 1 TABLET (10 MG) BY MOUTH DAILY 90 tablet 3    budesonide (PULMICORT) 0.5 MG/2ML neb solution Make 240 cc Krishna med sinus irrigation Mix 2 ml vial of budesonide 0.5 mg Rinse 1-2 times daily 120 mL 1    budesonide-formoterol (SYMBICORT/BREYNA) 80-4.5 MCG/ACT Inhaler Inhale 2 puffs into the lungs 2 times daily. 10.2 g 1    butalbital-acetaminophen-caffeine (ESGIC) -40 MG tablet Take 1 tablet by mouth every 4 hours as needed TAKE 1 TO 2 TABLETS BY MOUTH AT THE ONSET OF A MIGRAINE HEADACHE, LIMIT NO MORE THAN 3 TIMES PER WEEK. ACETAMINOPHEN SHOULD BE LIMITED TO 40      Calcium Carbonate Antacid 1177 MG CHEW       Cholecalciferol (VITAMIN D3) 1000 UNITS CAPS Take 1,000 Units by mouth Takes 5000 unit capsule daily      docusate sodium (COLACE) 100 MG capsule Take 100 mg by mouth daily       famotidine (PEPCID) 40 MG tablet TAKE ONE (1) TABLET BY MOUTH AT  BEDTIME 30 tablet 11    FLUoxetine (PROZAC) 20 MG capsule Take 1 capsule daily along with 40 mg for total daily dose 60 mg 90 capsule 3    FLUoxetine (PROZAC) 40 MG capsule Take 1 capsule daily along with 20 mg capsule 90 capsule 3    fluticasone (FLONASE) 50 MCG/ACT nasal spray Spray 2 sprays in nostril.      Guselkumab 100 MG/ML SOAJ       HYDROcodone-acetaminophen (NORCO) 7.5-325 MG per tablet TAKE 1 TABLET BY MOUTH TWICE A DAY AS NEEDED FOR PAIN 60 tablet 0    ibuprofen (ADVIL/MOTRIN) 800 MG tablet TAKE 1 TABLET (800 MG) BY MOUTH EVERY 8 HOURS AS NEEDED FOR MODERATE PAIN 90 tablet 1    loratadine (CLARITIN) 10 MG tablet Take 10 mg by mouth daily.      methocarbamol (ROBAXIN) 750 MG tablet TAKE 1 TABLET BY MOUTH 3 TIMES A DAY AS NEEDED FOR muscle spasms 45 tablet 5    Multiple Vitamins-Minerals (CENTRUM SILVER ULTRA WOMENS) TABS Take 1 tablet by mouth daily       ondansetron (ZOFRAN ODT) 4 MG ODT tab Take 1 tablet (4 mg) by mouth every 6 hours as needed (after migraine medication) 30 tablet 1    pantoprazole (PROTONIX) 40 MG EC tablet Take 1 tablet (40 mg) by mouth daily. 90 tablet 3    polyethylene glycol (MIRALAX) 17 g packet Take 1 packet by mouth      pregabalin (LYRICA) 150 MG capsule TAKE 1 CAPSULE BY MOUTH TWICE DAILY 180 capsule 0    semaglutide (OZEMPIC, 0.25 OR 0.5 MG/DOSE,) 2 MG/3ML pen Inject 0.25 mg subcutaneously      senna (SENOKOT) 8.6 MG tablet Take 1 tablet by mouth      sucralfate (CARAFATE) 1 GM/10ML suspension Take 10 mLs (1 g) by mouth 4 times daily 420 mL 3     No current facility-administered medications for this visit.       VITALS   There were no vitals taken for this visit.     LABS                                                                                                                         Last Comprehensive Metabolic Panel:  Sodium   Date Value Ref Range Status   06/11/2024 139 135 - 145 mmol/L Final     Comment:     Reference intervals for this test were updated on  09/26/2023 to more accurately reflect our healthy population. There may be differences in the flagging of prior results with similar values performed with this method. Interpretation of those prior results can be made in the context of the updated reference intervals.    03/25/2021 144 133 - 144 mmol/L Final     Potassium   Date Value Ref Range Status   06/11/2024 4.0 3.4 - 5.3 mmol/L Final   09/25/2022 3.7 3.4 - 5.3 mmol/L Final   03/25/2021 3.7 3.4 - 5.3 mmol/L Final     Chloride   Date Value Ref Range Status   06/11/2024 105 98 - 107 mmol/L Final   09/25/2022 108 94 - 109 mmol/L Final   03/25/2021 114 (H) 94 - 109 mmol/L Final     Carbon Dioxide   Date Value Ref Range Status   03/25/2021 23 20 - 32 mmol/L Final     Carbon Dioxide (CO2)   Date Value Ref Range Status   06/11/2024 25 22 - 29 mmol/L Final   09/25/2022 28 20 - 32 mmol/L Final     Anion Gap   Date Value Ref Range Status   06/11/2024 9 7 - 15 mmol/L Final   09/25/2022 4 3 - 14 mmol/L Final   03/25/2021 7 3 - 14 mmol/L Final     Glucose   Date Value Ref Range Status   06/11/2024 118 (H) 70 - 99 mg/dL Final   09/25/2022 110 (H) 70 - 99 mg/dL Final   03/25/2021 93 70 - 99 mg/dL Final     Urea Nitrogen   Date Value Ref Range Status   06/11/2024 8.3 8.0 - 23.0 mg/dL Final   09/25/2022 10 7 - 30 mg/dL Final   03/25/2021 11 7 - 30 mg/dL Final     Creatinine   Date Value Ref Range Status   06/11/2024 0.99 (H) 0.51 - 0.95 mg/dL Final   03/25/2021 0.92 0.52 - 1.04 mg/dL Final     GFR Estimate   Date Value Ref Range Status   06/11/2024 64 >60 mL/min/1.73m2 Final   03/25/2021 68 >60 mL/min/[1.73_m2] Final     Comment:     Non  GFR Calc  Starting 12/18/2018, serum creatinine based estimated GFR (eGFR) will be   calculated using the Chronic Kidney Disease Epidemiology Collaboration   (CKD-EPI) equation.       Calcium   Date Value Ref Range Status   06/11/2024 8.8 8.8 - 10.2 mg/dL Final   03/25/2021 8.3 (L) 8.5 - 10.1 mg/dL Final     Bilirubin Total    Date Value Ref Range Status   06/11/2024 0.2 <=1.2 mg/dL Final   03/25/2021 0.3 0.2 - 1.3 mg/dL Final     Alkaline Phosphatase   Date Value Ref Range Status   06/11/2024 103 40 - 150 U/L Final   03/25/2021 110 40 - 150 U/L Final     ALT   Date Value Ref Range Status   06/11/2024 26 0 - 50 U/L Final     Comment:     Reference intervals for this test were updated on 6/12/2023 to more accurately reflect our healthy population. There may be differences in the flagging of prior results with similar values performed with this method. Interpretation of those prior results can be made in the context of the updated reference intervals.     03/25/2021 24 0 - 50 U/L Final     AST   Date Value Ref Range Status   06/11/2024 23 0 - 45 U/L Final     Comment:     Reference intervals for this test were updated on 6/12/2023 to more accurately reflect our healthy population. There may be differences in the flagging of prior results with similar values performed with this method. Interpretation of those prior results can be made in the context of the updated reference intervals.   03/25/2021 13 0 - 45 U/L Final       MENTAL STATUS EXAM                                                                                         Awake, alert. Mood stressed.  No problems with speech . Thought process linear and logical. No evidence of any hallucinations or delusional thoughts. No SI.  No hallucinations. Insight good.       ASSESSMENT                                                                                                      HISTORICAL:  Initial psych note 10/13/15        NOTES:  Cymbalta -> agitation, angry and increase in diastolic BP. Propranolol 10 mg BID prn anxiety: lightheadedness. Neuropsych scanning 6/5/24. Latuda: akathisia.    Tesha Blankenship is a 64 year old female. Tesha's daughter accompanied her on video visit December '23 and though I have been working with Tesha since 2015 I was not aware of her experiencing  paranoia and delusions until then. Tesha ended up admitted in [place] and was started on Risperdal. We ended up changing to Latuda. We then changed to Saphris given she experienced akathisia with Latuda. Tesha was still getting headaches even with small dose (2.5 mg) of Saphris and she hence stopped taking it.    Tesha started on donepezil (for neurocognitive disorder) and then stopped taking it because of headaches and GI SEs. She notes she did contact her neurologist and they had her try cutting down to 1/2 tab however she stopped taking it given she still had SEs even at lower dose. Tesha had repeat neuropsych testing 6/12/25: improvement in overall cognitive picutre but decline in executive functioning.     Tesha has had return of psychosis sx including things such as VH, AH. We discussed the antipsychotic medications, and we could have her try another medication - Namenda - for the neurocognitive issues. Granted this medication does not have the indication to treat the psychosis sx she experieinces however indirectly I have observed patients to experience improvement with their neuropsychiatric sx (we discussed what this mean) with this class of medications. We reviewed the most common SEs and agreed on having her try         TREATMENT RISK STATEMENT:  The risks, benefits, alternatives and potential adverse effects have been explained and are understood by the pt.  The pt agrees to the treatment plan with the ability to do so.   The pt knows to call the clinic for any problems or access emergency care if needed.        DIAGNOSES                     Mild neurocognitive disorder  MDD recurrent, mild  Psychosis unspecified (Delusional disorder vs. Paranoid personality disorder vs. MDD severe with psychotic features)  ESTRELLA      PLAN                                                                                                                    1)  MEDICATIONS:    . Start memantine (Namenda) 5 mg daily. Continue  Prozac 60 mg daily     2)  THERAPY:  No Change    3)  LABS:  None    4)  PT MONITOR [call for probs]:  worsening sx, SI/HI, SEs from meds    5)  REFERRALS [CD, medical, other]: none    6)  RTC:  ~5-6 weeks

## 2025-07-23 ENCOUNTER — THERAPY VISIT (OUTPATIENT)
Dept: PHYSICAL THERAPY | Facility: HOSPITAL | Age: 65
End: 2025-07-23
Attending: NURSE PRACTITIONER
Payer: MEDICARE

## 2025-07-23 ENCOUNTER — TELEPHONE (OUTPATIENT)
Dept: PSYCHIATRY | Facility: OTHER | Age: 65
End: 2025-07-23

## 2025-07-23 DIAGNOSIS — M75.02 ADHESIVE CAPSULITIS OF LEFT SHOULDER: ICD-10-CM

## 2025-07-23 DIAGNOSIS — M25.512 ACUTE PAIN OF LEFT SHOULDER: Primary | ICD-10-CM

## 2025-07-23 PROCEDURE — 97164 PT RE-EVAL EST PLAN CARE: CPT | Mod: GP

## 2025-07-23 PROCEDURE — 97110 THERAPEUTIC EXERCISES: CPT | Mod: GP

## 2025-07-23 NOTE — CONFIDENTIAL NOTE
July 23, 2025    Left message for patient to return call to schedule 6 wk follow up with Zoe Herbert.    -Kya Hutton on 7/23/2025 at 9:22 AM

## 2025-07-23 NOTE — PROGRESS NOTES
07/23/25 0500   Appointment Info   Signing clinician's name / credentials Gemini Jackman PT   Visits Used 9 Medicare   Medical Diagnosis L RC tear strain, adhesive capsulitis   PT Tx Diagnosis L RC tear strain, adhesive capsulitis   Quick Adds Certification   Progress Note/Certification   Start of Care Date 04/16/25   Onset of illness/injury or Date of Surgery 04/16/25   Therapy Frequency 1x week, taper as indic   Predicted Duration 12 weeks   Certification date from 07/23/25   Certification date to 10/15/25   Progress Note Completed Date 07/23/25   GOALS   PT Goals 5   PT Goal 1   Goal Identifier STG 1   Goal Description Pt will mickie indep HEP compliance   Goal Progress cont goal- pt has been working on partial HEP   Target Date 08/20/25   PT Goal 2   Goal Identifier STG 2   Goal Description Pt will mickie improved shld pain to 3/10 or less  with active shld flexion to 90 degrees to enable her to reach in shower, cupboards and take care of grandchildren   Goal Progress Pain ranges from 0-4 /10 L shld with over 8 reps of overhead shld elevation. Cont goal   Target Date 08/20/25   PT Goal 3   Goal Identifier LTG   Goal Description ADLs, sleep and grandchildren care not affected by shld pain or ROM Deficits   Goal Progress Pt is able to hold baby many hours but has to switch arms back and forth. Sleeping still is difficult if she sleeps on that arm. ADLS are getting easier overall. Cont goal   Target Date 10/15/25   PT Goal 4   Goal Identifier LTG 2   Goal Description Reduce SPADI score statistically for objective measurement improvement   Goal Progress Pt has achieved 10 point score on SPADI which is signif improved from 56.  Probably can reduce slight more with cont'd PT so will cont goal   Target Date 10/15/25   PT Goal 5   Goal Identifier New LTG 3 added 7/23/25   Goal Description Improve strength of ER/IR of L shld to 4-/5 to enable her to reach and lift objects as well as scrub back with greater ease.    Target Date 10/15/25   Subjective Report   Subjective Report L shld is feeling pain 0-2/10 pain. Most function has returned but any over head or L shld ER motion causes discomfort.   Objective Measure 1   Objective Measure SPADI   Details 10   Therapeutic Procedure/Exercise   Therapeutic Procedures: strength, endurance, ROM, flexibility minutes (85813) 40   Ther Proc 1 - Details Reciprocal pulleys x 3 mns. Pain incr in neck with pulleys. Has chronic hx of  neck pain/L shld pain.  Wall slides fwd flex x 10, over head small circles x 5 each direction. Limited by neck- had position head in neutral.  seated shld prss bilat x 10 reps. Pt then worked on  scap retracton using green tband x 20 reps. Resisted band ER and IR x 20 reps each.  STrength L shld- flex 3/5,L shld abduct 3+/5, Shld ER 3-/5, Shld IR 3/5.   Eval/Assessments   Assessments PT Re-Eval   PT Eval, Re-eval Minutes (09047) 10   Plan   Home program no changes today for HEP   Plan for next session Add resisted Tband strengthening RC for HEP   Comments   Comments [place]. Her initial POC is completed and will restart new POC this date.   Total Session Time   Timed Code Treatment Minutes 40   Total Treatment Time (sum of timed and untimed services) 50                           [facility]                                                                                   OUTPATIENT PHYSICAL THERAPY    PLAN OF TREATMENT FOR OUTPATIENT REHABILITATION   Patient's Last Name, First Name, Sadaf Gutiérrez YOB: 1960   Provider's Name   [facility]   Medical Record No.  [id number]     Onset Date: 04/16/25  Start of Care Date: 04/16/25     Medical Diagnosis:  L RC tear strain, adhesive capsulitis      PT Treatment Diagnosis:  L RC tear strain, adhesive capsulitis Plan of Treatment  Frequency/Duration: 1x week, taper as indic/ 12 weeks    Certification  date from 07/23/25 to 10/15/25         See note for plan of treatment details and functional goals     Gemini Jackman, PT                         I CERTIFY THE NEED FOR THESE SERVICES FURNISHED UNDER        THIS PLAN OF TREATMENT AND WHILE UNDER MY CARE .             Physician Signature               Date    X_____________________________________________________                  Crystal Lobe NP    Initial Assessment  See Epic Evaluation- Start of Care Date: 04/16/25

## 2025-07-27 ENCOUNTER — HEALTH MAINTENANCE LETTER (OUTPATIENT)
Age: 65
End: 2025-07-27

## 2025-08-06 ENCOUNTER — THERAPY VISIT (OUTPATIENT)
Dept: PHYSICAL THERAPY | Facility: HOSPITAL | Age: 65
End: 2025-08-06
Attending: NURSE PRACTITIONER
Payer: MEDICARE

## 2025-08-06 DIAGNOSIS — M25.512 ACUTE PAIN OF LEFT SHOULDER: Primary | ICD-10-CM

## 2025-08-06 DIAGNOSIS — M75.02 ADHESIVE CAPSULITIS OF LEFT SHOULDER: ICD-10-CM

## 2025-08-06 PROCEDURE — 97110 THERAPEUTIC EXERCISES: CPT | Mod: GP

## 2025-08-20 ENCOUNTER — THERAPY VISIT (OUTPATIENT)
Dept: PHYSICAL THERAPY | Facility: HOSPITAL | Age: 65
End: 2025-08-20
Attending: NURSE PRACTITIONER
Payer: MEDICARE

## 2025-08-20 DIAGNOSIS — M25.512 LEFT SHOULDER PAIN, UNSPECIFIED CHRONICITY: ICD-10-CM

## 2025-08-20 DIAGNOSIS — M25.512 ACUTE PAIN OF LEFT SHOULDER: Primary | ICD-10-CM

## 2025-08-20 DIAGNOSIS — M75.02 ADHESIVE CAPSULITIS OF LEFT SHOULDER: ICD-10-CM

## 2025-08-20 PROCEDURE — 97110 THERAPEUTIC EXERCISES: CPT | Mod: GP

## 2025-08-28 ENCOUNTER — MYC REFILL (OUTPATIENT)
Dept: OTOLARYNGOLOGY | Facility: OTHER | Age: 65
End: 2025-08-28

## 2025-08-28 DIAGNOSIS — R05.2 SUBACUTE COUGH: ICD-10-CM

## 2025-08-28 RX ORDER — ALBUTEROL SULFATE 90 UG/1
1-2 INHALANT RESPIRATORY (INHALATION) EVERY 4 HOURS PRN
Qty: 18 G | Refills: 0 | OUTPATIENT
Start: 2025-08-28

## 2025-08-28 RX ORDER — BUDESONIDE AND FORMOTEROL FUMARATE DIHYDRATE 80; 4.5 UG/1; UG/1
2 AEROSOL RESPIRATORY (INHALATION) 2 TIMES DAILY
Qty: 10.2 G | Refills: 1 | OUTPATIENT
Start: 2025-08-28

## 2025-09-02 ENCOUNTER — VIRTUAL VISIT (OUTPATIENT)
Dept: PSYCHIATRY | Facility: OTHER | Age: 65
End: 2025-09-02
Attending: PSYCHIATRY & NEUROLOGY
Payer: COMMERCIAL

## 2025-09-02 DIAGNOSIS — R41.9 NEUROCOGNITIVE DISORDER: ICD-10-CM

## 2025-09-02 DIAGNOSIS — F33.1 MAJOR DEPRESSIVE DISORDER, RECURRENT EPISODE, MODERATE (H): Primary | ICD-10-CM

## 2025-09-02 ASSESSMENT — ANXIETY QUESTIONNAIRES
6. BECOMING EASILY ANNOYED OR IRRITABLE: SEVERAL DAYS
3. WORRYING TOO MUCH ABOUT DIFFERENT THINGS: NOT AT ALL
7. FEELING AFRAID AS IF SOMETHING AWFUL MIGHT HAPPEN: SEVERAL DAYS
5. BEING SO RESTLESS THAT IT IS HARD TO SIT STILL: NOT AT ALL
7. FEELING AFRAID AS IF SOMETHING AWFUL MIGHT HAPPEN: SEVERAL DAYS
GAD7 TOTAL SCORE: 4
IF YOU CHECKED OFF ANY PROBLEMS ON THIS QUESTIONNAIRE, HOW DIFFICULT HAVE THESE PROBLEMS MADE IT FOR YOU TO DO YOUR WORK, TAKE CARE OF THINGS AT HOME, OR GET ALONG WITH OTHER PEOPLE: SOMEWHAT DIFFICULT
1. FEELING NERVOUS, ANXIOUS, OR ON EDGE: SEVERAL DAYS
2. NOT BEING ABLE TO STOP OR CONTROL WORRYING: NOT AT ALL
GAD7 TOTAL SCORE: 4
4. TROUBLE RELAXING: SEVERAL DAYS
GAD7 TOTAL SCORE: 4
8. IF YOU CHECKED OFF ANY PROBLEMS, HOW DIFFICULT HAVE THESE MADE IT FOR YOU TO DO YOUR WORK, TAKE CARE OF THINGS AT HOME, OR GET ALONG WITH OTHER PEOPLE?: SOMEWHAT DIFFICULT

## 2025-09-02 ASSESSMENT — PATIENT HEALTH QUESTIONNAIRE - PHQ9
SUM OF ALL RESPONSES TO PHQ QUESTIONS 1-9: 3
10. IF YOU CHECKED OFF ANY PROBLEMS, HOW DIFFICULT HAVE THESE PROBLEMS MADE IT FOR YOU TO DO YOUR WORK, TAKE CARE OF THINGS AT HOME, OR GET ALONG WITH OTHER PEOPLE: NOT DIFFICULT AT ALL
SUM OF ALL RESPONSES TO PHQ QUESTIONS 1-9: 3

## 2025-09-03 ENCOUNTER — TELEPHONE (OUTPATIENT)
Dept: PSYCHIATRY | Facility: OTHER | Age: 65
End: 2025-09-03

## 2025-09-03 ENCOUNTER — THERAPY VISIT (OUTPATIENT)
Dept: PHYSICAL THERAPY | Facility: HOSPITAL | Age: 65
End: 2025-09-03
Attending: NURSE PRACTITIONER
Payer: MEDICARE

## 2025-09-03 DIAGNOSIS — M25.512 ACUTE PAIN OF LEFT SHOULDER: Primary | ICD-10-CM

## 2025-09-03 DIAGNOSIS — M75.02 ADHESIVE CAPSULITIS OF LEFT SHOULDER: ICD-10-CM

## 2025-09-03 PROCEDURE — 97110 THERAPEUTIC EXERCISES: CPT | Mod: GP

## (undated) DEVICE — CONNECTOR ERBEFLO 2 PORT 20325-215

## (undated) DEVICE — SOL WATER IRRIG 1000ML BOTTLE 2F7114

## (undated) DEVICE — TUBING SUCTION 20FT N620A

## (undated) DEVICE — CANISTER SUCTION MEDI-VAC GUARDIAN 2000ML 90D 65651-220

## (undated) DEVICE — ENDO BITE BLOCK ADULT OLYMPUS LATEX FREE MAJ-1632

## (undated) DEVICE — JELLY LUBRICATING SURGILUBE 2OZ TUBE

## (undated) DEVICE — FORCEP COLON BIOPSY STD W/NEEDLE 160CM M00513390

## (undated) DEVICE — SYR 30ML SLIP TIP W/O NDL 302833

## (undated) RX ORDER — PROPOFOL 10 MG/ML
INJECTION, EMULSION INTRAVENOUS
Status: DISPENSED
Start: 2023-02-20

## (undated) RX ORDER — DEXAMETHASONE SODIUM PHOSPHATE 10 MG/ML
INJECTION, SOLUTION INTRAMUSCULAR; INTRAVENOUS
Status: DISPENSED
Start: 2023-02-20

## (undated) RX ORDER — FENTANYL CITRATE 50 UG/ML
INJECTION, SOLUTION INTRAMUSCULAR; INTRAVENOUS
Status: DISPENSED
Start: 2023-02-20